# Patient Record
Sex: FEMALE | Race: WHITE | NOT HISPANIC OR LATINO | Employment: UNEMPLOYED | URBAN - METROPOLITAN AREA
[De-identification: names, ages, dates, MRNs, and addresses within clinical notes are randomized per-mention and may not be internally consistent; named-entity substitution may affect disease eponyms.]

---

## 2017-01-03 ENCOUNTER — COMMUNICATION - HEALTHEAST (OUTPATIENT)
Dept: FAMILY MEDICINE | Facility: CLINIC | Age: 52
End: 2017-01-03

## 2017-01-04 ENCOUNTER — COMMUNICATION - HEALTHEAST (OUTPATIENT)
Dept: SLEEP MEDICINE | Facility: CLINIC | Age: 52
End: 2017-01-04

## 2017-01-10 ENCOUNTER — RECORDS - HEALTHEAST (OUTPATIENT)
Dept: GENERAL RADIOLOGY | Facility: CLINIC | Age: 52
End: 2017-01-10

## 2017-01-10 ENCOUNTER — COMMUNICATION - HEALTHEAST (OUTPATIENT)
Dept: TELEHEALTH | Facility: CLINIC | Age: 52
End: 2017-01-10

## 2017-01-10 ENCOUNTER — OFFICE VISIT - HEALTHEAST (OUTPATIENT)
Dept: FAMILY MEDICINE | Facility: CLINIC | Age: 52
End: 2017-01-10

## 2017-01-10 DIAGNOSIS — M54.50 LOW BACK PAIN: ICD-10-CM

## 2017-01-11 ENCOUNTER — AMBULATORY - HEALTHEAST (OUTPATIENT)
Dept: PHYSICAL MEDICINE AND REHAB | Facility: CLINIC | Age: 52
End: 2017-01-11

## 2017-01-11 ENCOUNTER — HOSPITAL ENCOUNTER (OUTPATIENT)
Dept: PHYSICAL MEDICINE AND REHAB | Facility: CLINIC | Age: 52
Discharge: HOME | End: 2017-01-11
Attending: FAMILY MEDICINE

## 2017-01-11 DIAGNOSIS — M54.50 LOW BACK PAIN: ICD-10-CM

## 2017-01-11 DIAGNOSIS — M53.3 SACROILIAC JOINT DYSFUNCTION OF RIGHT SIDE: ICD-10-CM

## 2017-01-11 ASSESSMENT — MIFFLIN-ST. JEOR: SCORE: 1513.98

## 2017-01-12 ENCOUNTER — AMBULATORY - HEALTHEAST (OUTPATIENT)
Dept: PHYSICAL MEDICINE AND REHAB | Facility: CLINIC | Age: 52
End: 2017-01-12

## 2017-01-12 DIAGNOSIS — M51.26 LUMBAR DISC HERNIATION: ICD-10-CM

## 2017-01-12 DIAGNOSIS — M48.061 LUMBAR FORAMINAL STENOSIS: ICD-10-CM

## 2017-01-12 DIAGNOSIS — M54.41 ACUTE BILATERAL LOW BACK PAIN WITH RIGHT-SIDED SCIATICA: ICD-10-CM

## 2017-01-12 DIAGNOSIS — M99.83 OTHER BIOMECHANICAL LESIONS OF LUMBAR REGION: ICD-10-CM

## 2017-01-12 DIAGNOSIS — M48.061 LUMBAR STENOSIS: ICD-10-CM

## 2017-01-13 ENCOUNTER — COMMUNICATION - HEALTHEAST (OUTPATIENT)
Dept: PHYSICAL MEDICINE AND REHAB | Facility: CLINIC | Age: 52
End: 2017-01-13

## 2017-01-15 ENCOUNTER — COMMUNICATION - HEALTHEAST (OUTPATIENT)
Dept: PHYSICAL MEDICINE AND REHAB | Facility: CLINIC | Age: 52
End: 2017-01-15

## 2017-01-15 DIAGNOSIS — M54.50 LOW BACK PAIN: ICD-10-CM

## 2017-01-16 ENCOUNTER — COMMUNICATION - HEALTHEAST (OUTPATIENT)
Dept: FAMILY MEDICINE | Facility: CLINIC | Age: 52
End: 2017-01-16

## 2017-01-16 DIAGNOSIS — M54.50 LOW BACK PAIN: ICD-10-CM

## 2017-01-19 ENCOUNTER — HOSPITAL ENCOUNTER (OUTPATIENT)
Dept: PHYSICAL MEDICINE AND REHAB | Facility: CLINIC | Age: 52
Discharge: HOME | End: 2017-01-19
Attending: PHYSICAL MEDICINE & REHABILITATION

## 2017-01-19 DIAGNOSIS — M51.26 LUMBAR DISC HERNIATION: ICD-10-CM

## 2017-01-19 DIAGNOSIS — M54.41 ACUTE BILATERAL LOW BACK PAIN WITH RIGHT-SIDED SCIATICA: ICD-10-CM

## 2017-01-23 ENCOUNTER — HOSPITAL ENCOUNTER (OUTPATIENT)
Dept: PHYSICAL MEDICINE AND REHAB | Facility: CLINIC | Age: 52
Discharge: HOME | End: 2017-01-23
Attending: NURSE PRACTITIONER

## 2017-01-23 ENCOUNTER — COMMUNICATION - HEALTHEAST (OUTPATIENT)
Dept: PHYSICAL MEDICINE AND REHAB | Facility: CLINIC | Age: 52
End: 2017-01-23

## 2017-01-23 ENCOUNTER — COMMUNICATION - HEALTHEAST (OUTPATIENT)
Dept: FAMILY MEDICINE | Facility: CLINIC | Age: 52
End: 2017-01-23

## 2017-01-23 DIAGNOSIS — M48.061 LUMBAR FORAMINAL STENOSIS: ICD-10-CM

## 2017-01-23 DIAGNOSIS — M54.50 LOW BACK PAIN: ICD-10-CM

## 2017-01-23 DIAGNOSIS — M51.26 LUMBAR DISC HERNIATION: ICD-10-CM

## 2017-01-23 DIAGNOSIS — M48.061 LUMBAR STENOSIS: ICD-10-CM

## 2017-01-23 DIAGNOSIS — M54.41 ACUTE BILATERAL LOW BACK PAIN WITH RIGHT-SIDED SCIATICA: ICD-10-CM

## 2017-01-24 ENCOUNTER — OFFICE VISIT - HEALTHEAST (OUTPATIENT)
Dept: PHYSICAL THERAPY | Facility: REHABILITATION | Age: 52
End: 2017-01-24

## 2017-01-24 DIAGNOSIS — M54.50 ACUTE RIGHT-SIDED LOW BACK PAIN WITHOUT SCIATICA: ICD-10-CM

## 2017-01-24 DIAGNOSIS — M53.3 CHRONIC RIGHT SI JOINT PAIN: ICD-10-CM

## 2017-01-24 DIAGNOSIS — G89.29 CHRONIC RIGHT SI JOINT PAIN: ICD-10-CM

## 2017-01-24 DIAGNOSIS — M62.81 MUSCLE WEAKNESS (GENERALIZED): ICD-10-CM

## 2017-01-25 ENCOUNTER — COMMUNICATION - HEALTHEAST (OUTPATIENT)
Dept: SLEEP MEDICINE | Facility: CLINIC | Age: 52
End: 2017-01-25

## 2017-01-25 ENCOUNTER — OFFICE VISIT - HEALTHEAST (OUTPATIENT)
Dept: SLEEP MEDICINE | Facility: CLINIC | Age: 52
End: 2017-01-25

## 2017-01-25 ENCOUNTER — COMMUNICATION - HEALTHEAST (OUTPATIENT)
Dept: PHYSICAL MEDICINE AND REHAB | Facility: CLINIC | Age: 52
End: 2017-01-25

## 2017-01-25 DIAGNOSIS — G47.69 SLEEP-RELATED MOVEMENT DISORDER: ICD-10-CM

## 2017-01-25 DIAGNOSIS — G47.10 HYPERSOMNIA, UNSPECIFIED: ICD-10-CM

## 2017-01-25 DIAGNOSIS — G47.59 OTHER PARASOMNIA: ICD-10-CM

## 2017-01-25 DIAGNOSIS — G47.33 OSA (OBSTRUCTIVE SLEEP APNEA): ICD-10-CM

## 2017-01-26 ENCOUNTER — OFFICE VISIT - HEALTHEAST (OUTPATIENT)
Dept: PHYSICAL THERAPY | Facility: REHABILITATION | Age: 52
End: 2017-01-26

## 2017-01-26 DIAGNOSIS — M53.3 CHRONIC RIGHT SI JOINT PAIN: ICD-10-CM

## 2017-01-26 DIAGNOSIS — M54.50 ACUTE RIGHT-SIDED LOW BACK PAIN WITHOUT SCIATICA: ICD-10-CM

## 2017-01-26 DIAGNOSIS — M62.81 MUSCLE WEAKNESS (GENERALIZED): ICD-10-CM

## 2017-01-26 DIAGNOSIS — G89.29 CHRONIC RIGHT SI JOINT PAIN: ICD-10-CM

## 2017-01-30 ENCOUNTER — COMMUNICATION - HEALTHEAST (OUTPATIENT)
Dept: PHYSICAL MEDICINE AND REHAB | Facility: CLINIC | Age: 52
End: 2017-01-30

## 2017-01-31 ENCOUNTER — AMBULATORY - HEALTHEAST (OUTPATIENT)
Dept: SLEEP MEDICINE | Facility: CLINIC | Age: 52
End: 2017-01-31

## 2017-01-31 ENCOUNTER — OFFICE VISIT - HEALTHEAST (OUTPATIENT)
Dept: PHYSICAL THERAPY | Facility: REHABILITATION | Age: 52
End: 2017-01-31

## 2017-01-31 ENCOUNTER — COMMUNICATION - HEALTHEAST (OUTPATIENT)
Dept: FAMILY MEDICINE | Facility: CLINIC | Age: 52
End: 2017-01-31

## 2017-01-31 ENCOUNTER — AMBULATORY - HEALTHEAST (OUTPATIENT)
Dept: FAMILY MEDICINE | Facility: CLINIC | Age: 52
End: 2017-01-31

## 2017-01-31 ENCOUNTER — HOSPITAL ENCOUNTER (OUTPATIENT)
Dept: PHYSICAL MEDICINE AND REHAB | Facility: CLINIC | Age: 52
Discharge: HOME | End: 2017-01-31
Attending: NURSE PRACTITIONER

## 2017-01-31 DIAGNOSIS — M53.3 SACROILIAC JOINT DYSFUNCTION OF RIGHT SIDE: ICD-10-CM

## 2017-01-31 DIAGNOSIS — M54.50 LOW BACK PAIN: ICD-10-CM

## 2017-01-31 DIAGNOSIS — M51.26 LUMBAR DISC HERNIATION: ICD-10-CM

## 2017-01-31 DIAGNOSIS — M48.061 LUMBAR STENOSIS: ICD-10-CM

## 2017-01-31 DIAGNOSIS — M48.061 LUMBAR FORAMINAL STENOSIS: ICD-10-CM

## 2017-01-31 DIAGNOSIS — M54.41 ACUTE BILATERAL LOW BACK PAIN WITH RIGHT-SIDED SCIATICA: ICD-10-CM

## 2017-01-31 DIAGNOSIS — M53.3 CHRONIC RIGHT SI JOINT PAIN: ICD-10-CM

## 2017-01-31 DIAGNOSIS — M62.81 MUSCLE WEAKNESS (GENERALIZED): ICD-10-CM

## 2017-01-31 DIAGNOSIS — G89.29 CHRONIC RIGHT SI JOINT PAIN: ICD-10-CM

## 2017-01-31 DIAGNOSIS — M54.50 ACUTE RIGHT-SIDED LOW BACK PAIN WITHOUT SCIATICA: ICD-10-CM

## 2017-02-01 ENCOUNTER — COMMUNICATION - HEALTHEAST (OUTPATIENT)
Dept: PHYSICAL MEDICINE AND REHAB | Facility: CLINIC | Age: 52
End: 2017-02-01

## 2017-02-01 ENCOUNTER — OFFICE VISIT - HEALTHEAST (OUTPATIENT)
Dept: FAMILY MEDICINE | Facility: CLINIC | Age: 52
End: 2017-02-01

## 2017-02-01 DIAGNOSIS — M53.3 SACROILIAC JOINT DYSFUNCTION OF RIGHT SIDE: ICD-10-CM

## 2017-02-01 DIAGNOSIS — M51.26 LUMBAR DISC HERNIATION: ICD-10-CM

## 2017-02-01 DIAGNOSIS — M48.061 LUMBAR FORAMINAL STENOSIS: ICD-10-CM

## 2017-02-01 DIAGNOSIS — M54.31 SCIATICA OF RIGHT SIDE: ICD-10-CM

## 2017-02-01 DIAGNOSIS — G43.909 MIGRAINE: ICD-10-CM

## 2017-02-02 ENCOUNTER — RECORDS - HEALTHEAST (OUTPATIENT)
Dept: ADMINISTRATIVE | Facility: OTHER | Age: 52
End: 2017-02-02

## 2017-02-02 ENCOUNTER — COMMUNICATION - HEALTHEAST (OUTPATIENT)
Dept: PHYSICAL MEDICINE AND REHAB | Facility: CLINIC | Age: 52
End: 2017-02-02

## 2017-02-04 ENCOUNTER — RECORDS - HEALTHEAST (OUTPATIENT)
Dept: ADMINISTRATIVE | Facility: OTHER | Age: 52
End: 2017-02-04

## 2017-02-08 ENCOUNTER — HOSPITAL ENCOUNTER (OUTPATIENT)
Dept: PHYSICAL MEDICINE AND REHAB | Facility: CLINIC | Age: 52
Discharge: HOME | End: 2017-02-08
Attending: NURSE PRACTITIONER

## 2017-02-08 DIAGNOSIS — M54.16 LUMBAR RADICULOPATHY, RIGHT: ICD-10-CM

## 2017-02-08 DIAGNOSIS — Z98.890 HISTORY OF LUMBAR SURGERY: ICD-10-CM

## 2017-02-09 ENCOUNTER — COMMUNICATION - HEALTHEAST (OUTPATIENT)
Dept: FAMILY MEDICINE | Facility: CLINIC | Age: 52
End: 2017-02-09

## 2017-02-09 DIAGNOSIS — J45.909 ASTHMA: ICD-10-CM

## 2017-02-14 ENCOUNTER — OFFICE VISIT - HEALTHEAST (OUTPATIENT)
Dept: PHYSICAL THERAPY | Facility: REHABILITATION | Age: 52
End: 2017-02-14

## 2017-02-14 DIAGNOSIS — G89.29 CHRONIC RIGHT SI JOINT PAIN: ICD-10-CM

## 2017-02-14 DIAGNOSIS — M54.50 ACUTE RIGHT-SIDED LOW BACK PAIN WITHOUT SCIATICA: ICD-10-CM

## 2017-02-14 DIAGNOSIS — M62.81 MUSCLE WEAKNESS (GENERALIZED): ICD-10-CM

## 2017-02-14 DIAGNOSIS — M53.3 CHRONIC RIGHT SI JOINT PAIN: ICD-10-CM

## 2017-02-15 ENCOUNTER — COMMUNICATION - HEALTHEAST (OUTPATIENT)
Dept: FAMILY MEDICINE | Facility: CLINIC | Age: 52
End: 2017-02-15

## 2017-02-15 DIAGNOSIS — M54.50 LOW BACK PAIN: ICD-10-CM

## 2017-02-17 ENCOUNTER — COMMUNICATION - HEALTHEAST (OUTPATIENT)
Dept: FAMILY MEDICINE | Facility: CLINIC | Age: 52
End: 2017-02-17

## 2017-02-21 ENCOUNTER — COMMUNICATION - HEALTHEAST (OUTPATIENT)
Dept: PHYSICAL THERAPY | Facility: REHABILITATION | Age: 52
End: 2017-02-21

## 2017-02-23 ENCOUNTER — COMMUNICATION - HEALTHEAST (OUTPATIENT)
Dept: PHYSICAL THERAPY | Facility: REHABILITATION | Age: 52
End: 2017-02-23

## 2017-02-24 ENCOUNTER — COMMUNICATION - HEALTHEAST (OUTPATIENT)
Dept: FAMILY MEDICINE | Facility: CLINIC | Age: 52
End: 2017-02-24

## 2017-02-24 DIAGNOSIS — F33.9 MAJOR DEPRESSION, RECURRENT (H): ICD-10-CM

## 2017-03-06 ENCOUNTER — COMMUNICATION - HEALTHEAST (OUTPATIENT)
Dept: FAMILY MEDICINE | Facility: CLINIC | Age: 52
End: 2017-03-06

## 2017-03-07 ENCOUNTER — COMMUNICATION - HEALTHEAST (OUTPATIENT)
Dept: FAMILY MEDICINE | Facility: CLINIC | Age: 52
End: 2017-03-07

## 2017-03-07 ENCOUNTER — OFFICE VISIT - HEALTHEAST (OUTPATIENT)
Dept: PHYSICAL THERAPY | Facility: REHABILITATION | Age: 52
End: 2017-03-07

## 2017-03-07 DIAGNOSIS — M62.81 MUSCLE WEAKNESS (GENERALIZED): ICD-10-CM

## 2017-03-07 DIAGNOSIS — M54.50 LOW BACK PAIN: ICD-10-CM

## 2017-03-07 DIAGNOSIS — M53.3 CHRONIC RIGHT SI JOINT PAIN: ICD-10-CM

## 2017-03-07 DIAGNOSIS — M54.50 ACUTE RIGHT-SIDED LOW BACK PAIN WITHOUT SCIATICA: ICD-10-CM

## 2017-03-07 DIAGNOSIS — G89.29 CHRONIC RIGHT SI JOINT PAIN: ICD-10-CM

## 2017-03-09 ENCOUNTER — OFFICE VISIT - HEALTHEAST (OUTPATIENT)
Dept: PHYSICAL THERAPY | Facility: REHABILITATION | Age: 52
End: 2017-03-09

## 2017-03-09 DIAGNOSIS — G89.29 CHRONIC RIGHT SI JOINT PAIN: ICD-10-CM

## 2017-03-09 DIAGNOSIS — M62.81 MUSCLE WEAKNESS (GENERALIZED): ICD-10-CM

## 2017-03-09 DIAGNOSIS — M53.3 CHRONIC RIGHT SI JOINT PAIN: ICD-10-CM

## 2017-03-09 DIAGNOSIS — M54.50 ACUTE RIGHT-SIDED LOW BACK PAIN WITHOUT SCIATICA: ICD-10-CM

## 2017-03-14 ENCOUNTER — OFFICE VISIT - HEALTHEAST (OUTPATIENT)
Dept: PHYSICAL THERAPY | Facility: REHABILITATION | Age: 52
End: 2017-03-14

## 2017-03-14 DIAGNOSIS — M53.3 CHRONIC RIGHT SI JOINT PAIN: ICD-10-CM

## 2017-03-14 DIAGNOSIS — G89.29 CHRONIC RIGHT SI JOINT PAIN: ICD-10-CM

## 2017-03-14 DIAGNOSIS — M54.50 ACUTE RIGHT-SIDED LOW BACK PAIN WITHOUT SCIATICA: ICD-10-CM

## 2017-03-14 DIAGNOSIS — M62.81 MUSCLE WEAKNESS (GENERALIZED): ICD-10-CM

## 2017-03-16 ENCOUNTER — OFFICE VISIT - HEALTHEAST (OUTPATIENT)
Dept: PHYSICAL THERAPY | Facility: REHABILITATION | Age: 52
End: 2017-03-16

## 2017-03-16 DIAGNOSIS — M62.81 MUSCLE WEAKNESS (GENERALIZED): ICD-10-CM

## 2017-03-16 DIAGNOSIS — M53.3 CHRONIC RIGHT SI JOINT PAIN: ICD-10-CM

## 2017-03-16 DIAGNOSIS — G89.29 CHRONIC RIGHT SI JOINT PAIN: ICD-10-CM

## 2017-03-16 DIAGNOSIS — M54.50 ACUTE RIGHT-SIDED LOW BACK PAIN WITHOUT SCIATICA: ICD-10-CM

## 2017-03-23 ENCOUNTER — OFFICE VISIT - HEALTHEAST (OUTPATIENT)
Dept: PHYSICAL THERAPY | Facility: REHABILITATION | Age: 52
End: 2017-03-23

## 2017-03-23 DIAGNOSIS — M62.81 MUSCLE WEAKNESS (GENERALIZED): ICD-10-CM

## 2017-03-23 DIAGNOSIS — G89.29 CHRONIC RIGHT SI JOINT PAIN: ICD-10-CM

## 2017-03-23 DIAGNOSIS — M53.3 CHRONIC RIGHT SI JOINT PAIN: ICD-10-CM

## 2017-03-23 DIAGNOSIS — M54.50 ACUTE RIGHT-SIDED LOW BACK PAIN WITHOUT SCIATICA: ICD-10-CM

## 2017-03-30 ENCOUNTER — OFFICE VISIT - HEALTHEAST (OUTPATIENT)
Dept: PHYSICAL THERAPY | Facility: REHABILITATION | Age: 52
End: 2017-03-30

## 2017-03-30 DIAGNOSIS — M62.81 MUSCLE WEAKNESS (GENERALIZED): ICD-10-CM

## 2017-03-30 DIAGNOSIS — M53.3 CHRONIC RIGHT SI JOINT PAIN: ICD-10-CM

## 2017-03-30 DIAGNOSIS — M54.50 ACUTE RIGHT-SIDED LOW BACK PAIN WITHOUT SCIATICA: ICD-10-CM

## 2017-03-30 DIAGNOSIS — G89.29 CHRONIC RIGHT SI JOINT PAIN: ICD-10-CM

## 2017-04-11 ENCOUNTER — OFFICE VISIT - HEALTHEAST (OUTPATIENT)
Dept: SLEEP MEDICINE | Facility: CLINIC | Age: 52
End: 2017-04-11

## 2017-04-11 DIAGNOSIS — G47.33 OSA ON CPAP: ICD-10-CM

## 2017-04-11 DIAGNOSIS — G47.8 SLEEP DYSFUNCTION WITH SLEEP STAGE DISTURBANCE: ICD-10-CM

## 2017-04-11 DIAGNOSIS — G47.10 HYPERSOMNIA, UNSPECIFIED: ICD-10-CM

## 2017-04-11 ASSESSMENT — MIFFLIN-ST. JEOR: SCORE: 1478.6

## 2017-04-19 ENCOUNTER — OFFICE VISIT - HEALTHEAST (OUTPATIENT)
Dept: FAMILY MEDICINE | Facility: CLINIC | Age: 52
End: 2017-04-19

## 2017-04-19 DIAGNOSIS — E78.5 HYPERLIPIDEMIA: ICD-10-CM

## 2017-04-19 DIAGNOSIS — K13.0 ANGULAR CHEILITIS: ICD-10-CM

## 2017-04-19 DIAGNOSIS — G47.33 OSA (OBSTRUCTIVE SLEEP APNEA): ICD-10-CM

## 2017-04-19 DIAGNOSIS — E03.9 HYPOTHYROID: ICD-10-CM

## 2017-04-20 ENCOUNTER — AMBULATORY - HEALTHEAST (OUTPATIENT)
Dept: LAB | Facility: CLINIC | Age: 52
End: 2017-04-20

## 2017-04-20 DIAGNOSIS — E03.9 HYPOTHYROID: ICD-10-CM

## 2017-04-20 DIAGNOSIS — E78.5 HYPERLIPIDEMIA: ICD-10-CM

## 2017-04-20 LAB
CHOLEST SERPL-MCNC: 226 MG/DL
FASTING STATUS PATIENT QL REPORTED: YES
HDLC SERPL-MCNC: 26 MG/DL
LDLC SERPL CALC-MCNC: 90 MG/DL
LDLC SERPL CALC-MCNC: ABNORMAL MG/DL
TRIGL SERPL-MCNC: 741 MG/DL

## 2017-04-21 ENCOUNTER — COMMUNICATION - HEALTHEAST (OUTPATIENT)
Dept: FAMILY MEDICINE | Facility: CLINIC | Age: 52
End: 2017-04-21

## 2017-05-02 ENCOUNTER — RECORDS - HEALTHEAST (OUTPATIENT)
Dept: ADMINISTRATIVE | Facility: OTHER | Age: 52
End: 2017-05-02

## 2017-05-11 ENCOUNTER — COMMUNICATION - HEALTHEAST (OUTPATIENT)
Dept: FAMILY MEDICINE | Facility: CLINIC | Age: 52
End: 2017-05-11

## 2017-05-11 DIAGNOSIS — J45.909 ASTHMA: ICD-10-CM

## 2017-06-15 ENCOUNTER — COMMUNICATION - HEALTHEAST (OUTPATIENT)
Dept: FAMILY MEDICINE | Facility: CLINIC | Age: 52
End: 2017-06-15

## 2017-06-27 ENCOUNTER — COMMUNICATION - HEALTHEAST (OUTPATIENT)
Dept: FAMILY MEDICINE | Facility: CLINIC | Age: 52
End: 2017-06-27

## 2017-06-27 DIAGNOSIS — J45.909 ASTHMA: ICD-10-CM

## 2017-07-07 ENCOUNTER — COMMUNICATION - HEALTHEAST (OUTPATIENT)
Dept: FAMILY MEDICINE | Facility: CLINIC | Age: 52
End: 2017-07-07

## 2017-07-07 DIAGNOSIS — J45.909 ASTHMA: ICD-10-CM

## 2017-08-15 ENCOUNTER — COMMUNICATION - HEALTHEAST (OUTPATIENT)
Dept: FAMILY MEDICINE | Facility: CLINIC | Age: 52
End: 2017-08-15

## 2017-08-27 ENCOUNTER — COMMUNICATION - HEALTHEAST (OUTPATIENT)
Dept: FAMILY MEDICINE | Facility: CLINIC | Age: 52
End: 2017-08-27

## 2017-08-27 DIAGNOSIS — J45.909 ASTHMA: ICD-10-CM

## 2017-09-08 ENCOUNTER — COMMUNICATION - HEALTHEAST (OUTPATIENT)
Dept: FAMILY MEDICINE | Facility: CLINIC | Age: 52
End: 2017-09-08

## 2017-09-12 ENCOUNTER — COMMUNICATION - HEALTHEAST (OUTPATIENT)
Dept: FAMILY MEDICINE | Facility: CLINIC | Age: 52
End: 2017-09-12

## 2017-09-21 ENCOUNTER — HOSPITAL ENCOUNTER (OUTPATIENT)
Dept: MAMMOGRAPHY | Facility: CLINIC | Age: 52
Discharge: HOME | End: 2017-09-21
Attending: FAMILY MEDICINE

## 2017-09-21 DIAGNOSIS — Z12.31 VISIT FOR SCREENING MAMMOGRAM: ICD-10-CM

## 2017-09-22 ENCOUNTER — COMMUNICATION - HEALTHEAST (OUTPATIENT)
Dept: FAMILY MEDICINE | Facility: CLINIC | Age: 52
End: 2017-09-22

## 2017-09-25 ENCOUNTER — COMMUNICATION - HEALTHEAST (OUTPATIENT)
Dept: FAMILY MEDICINE | Facility: CLINIC | Age: 52
End: 2017-09-25

## 2017-10-24 ENCOUNTER — OFFICE VISIT - HEALTHEAST (OUTPATIENT)
Dept: FAMILY MEDICINE | Facility: CLINIC | Age: 52
End: 2017-10-24

## 2017-10-24 DIAGNOSIS — E78.5 HYPERLIPIDEMIA: ICD-10-CM

## 2017-10-24 DIAGNOSIS — M25.562 LEFT KNEE PAIN: ICD-10-CM

## 2017-10-24 DIAGNOSIS — Z12.11 COLON CANCER SCREENING: ICD-10-CM

## 2017-10-24 DIAGNOSIS — F32.A DEPRESSION: ICD-10-CM

## 2017-10-24 DIAGNOSIS — R79.89 ABNORMAL THYROID BLOOD TEST: ICD-10-CM

## 2017-10-26 ENCOUNTER — AMBULATORY - HEALTHEAST (OUTPATIENT)
Dept: LAB | Facility: CLINIC | Age: 52
End: 2017-10-26

## 2017-10-26 DIAGNOSIS — R79.89 ABNORMAL THYROID BLOOD TEST: ICD-10-CM

## 2017-10-26 DIAGNOSIS — E78.5 HYPERLIPIDEMIA: ICD-10-CM

## 2017-10-26 LAB
CHOLEST SERPL-MCNC: 205 MG/DL
FASTING STATUS PATIENT QL REPORTED: YES
HDLC SERPL-MCNC: 35 MG/DL
LDLC SERPL CALC-MCNC: 113 MG/DL
TRIGL SERPL-MCNC: 285 MG/DL

## 2017-10-30 ENCOUNTER — RECORDS - HEALTHEAST (OUTPATIENT)
Dept: ADMINISTRATIVE | Facility: OTHER | Age: 52
End: 2017-10-30

## 2017-10-31 ENCOUNTER — COMMUNICATION - HEALTHEAST (OUTPATIENT)
Dept: FAMILY MEDICINE | Facility: CLINIC | Age: 52
End: 2017-10-31

## 2017-11-13 ENCOUNTER — RECORDS - HEALTHEAST (OUTPATIENT)
Dept: ADMINISTRATIVE | Facility: OTHER | Age: 52
End: 2017-11-13

## 2017-11-16 ENCOUNTER — COMMUNICATION - HEALTHEAST (OUTPATIENT)
Dept: FAMILY MEDICINE | Facility: CLINIC | Age: 52
End: 2017-11-16

## 2017-11-20 ENCOUNTER — COMMUNICATION - HEALTHEAST (OUTPATIENT)
Dept: FAMILY MEDICINE | Facility: CLINIC | Age: 52
End: 2017-11-20

## 2017-11-30 ENCOUNTER — OFFICE VISIT - HEALTHEAST (OUTPATIENT)
Dept: FAMILY MEDICINE | Facility: CLINIC | Age: 52
End: 2017-11-30

## 2017-11-30 DIAGNOSIS — G47.33 OSA (OBSTRUCTIVE SLEEP APNEA): ICD-10-CM

## 2017-11-30 DIAGNOSIS — F17.200 TOBACCO USE DISORDER: ICD-10-CM

## 2017-11-30 DIAGNOSIS — E78.5 HYPERLIPIDEMIA: ICD-10-CM

## 2017-11-30 DIAGNOSIS — Z01.818 PRE-OP EXAM: ICD-10-CM

## 2017-11-30 DIAGNOSIS — L91.8 SKIN TAG: ICD-10-CM

## 2017-11-30 DIAGNOSIS — E03.9 HYPOTHYROID: ICD-10-CM

## 2017-11-30 ASSESSMENT — MIFFLIN-ST. JEOR: SCORE: 1514.21

## 2017-12-01 LAB
ATRIAL RATE - MUSE: 66 BPM
DIASTOLIC BLOOD PRESSURE - MUSE: NORMAL MMHG
INTERPRETATION ECG - MUSE: NORMAL
P AXIS - MUSE: 50 DEGREES
PR INTERVAL - MUSE: 168 MS
QRS DURATION - MUSE: 86 MS
QT - MUSE: 410 MS
QTC - MUSE: 429 MS
R AXIS - MUSE: 36 DEGREES
SYSTOLIC BLOOD PRESSURE - MUSE: NORMAL MMHG
T AXIS - MUSE: 44 DEGREES
VENTRICULAR RATE- MUSE: 66 BPM

## 2017-12-05 ENCOUNTER — COMMUNICATION - HEALTHEAST (OUTPATIENT)
Dept: FAMILY MEDICINE | Facility: CLINIC | Age: 52
End: 2017-12-05

## 2017-12-11 ENCOUNTER — RECORDS - HEALTHEAST (OUTPATIENT)
Dept: ADMINISTRATIVE | Facility: OTHER | Age: 52
End: 2017-12-11

## 2017-12-23 ENCOUNTER — COMMUNICATION - HEALTHEAST (OUTPATIENT)
Dept: FAMILY MEDICINE | Facility: CLINIC | Age: 52
End: 2017-12-23

## 2017-12-23 DIAGNOSIS — G43.909 MIGRAINE: ICD-10-CM

## 2017-12-28 ENCOUNTER — COMMUNICATION - HEALTHEAST (OUTPATIENT)
Dept: FAMILY MEDICINE | Facility: CLINIC | Age: 52
End: 2017-12-28

## 2017-12-28 DIAGNOSIS — G43.909 MIGRAINE: ICD-10-CM

## 2018-01-04 ENCOUNTER — COMMUNICATION - HEALTHEAST (OUTPATIENT)
Dept: FAMILY MEDICINE | Facility: CLINIC | Age: 53
End: 2018-01-04

## 2018-01-04 DIAGNOSIS — E03.9 HYPOTHYROID: ICD-10-CM

## 2018-02-12 ENCOUNTER — RECORDS - HEALTHEAST (OUTPATIENT)
Dept: ADMINISTRATIVE | Facility: OTHER | Age: 53
End: 2018-02-12

## 2018-03-02 ENCOUNTER — COMMUNICATION - HEALTHEAST (OUTPATIENT)
Dept: FAMILY MEDICINE | Facility: CLINIC | Age: 53
End: 2018-03-02

## 2018-03-08 ENCOUNTER — RECORDS - HEALTHEAST (OUTPATIENT)
Dept: ADMINISTRATIVE | Facility: OTHER | Age: 53
End: 2018-03-08

## 2018-03-09 ENCOUNTER — RECORDS - HEALTHEAST (OUTPATIENT)
Dept: ADMINISTRATIVE | Facility: OTHER | Age: 53
End: 2018-03-09

## 2018-04-08 ENCOUNTER — OFFICE VISIT - HEALTHEAST (OUTPATIENT)
Dept: FAMILY MEDICINE | Facility: CLINIC | Age: 53
End: 2018-04-08

## 2018-04-08 DIAGNOSIS — R39.9 UTI SYMPTOMS: ICD-10-CM

## 2018-04-08 DIAGNOSIS — R39.89 SUSPECTED UTI: ICD-10-CM

## 2018-04-08 LAB
ALBUMIN UR-MCNC: ABNORMAL MG/DL
APPEARANCE UR: ABNORMAL
BACTERIA #/AREA URNS HPF: ABNORMAL HPF
BILIRUB UR QL STRIP: NEGATIVE
COLOR UR AUTO: YELLOW
GLUCOSE UR STRIP-MCNC: NEGATIVE MG/DL
HGB UR QL STRIP: ABNORMAL
KETONES UR STRIP-MCNC: NEGATIVE MG/DL
LEUKOCYTE ESTERASE UR QL STRIP: ABNORMAL
NITRATE UR QL: NEGATIVE
PH UR STRIP: 5.5 [PH] (ref 5–8)
RBC #/AREA URNS AUTO: ABNORMAL HPF
SP GR UR STRIP: 1.02 (ref 1–1.03)
SQUAMOUS #/AREA URNS AUTO: ABNORMAL LPF
UROBILINOGEN UR STRIP-ACNC: ABNORMAL
WBC #/AREA URNS AUTO: >100 HPF

## 2018-04-10 ENCOUNTER — COMMUNICATION - HEALTHEAST (OUTPATIENT)
Dept: FAMILY MEDICINE | Facility: CLINIC | Age: 53
End: 2018-04-10

## 2018-04-10 DIAGNOSIS — N39.0 UTI (URINARY TRACT INFECTION): ICD-10-CM

## 2018-04-10 LAB — BACTERIA SPEC CULT: ABNORMAL

## 2018-04-12 ENCOUNTER — COMMUNICATION - HEALTHEAST (OUTPATIENT)
Dept: FAMILY MEDICINE | Facility: CLINIC | Age: 53
End: 2018-04-12

## 2018-04-12 DIAGNOSIS — E03.9 HYPOTHYROID: ICD-10-CM

## 2018-04-19 ENCOUNTER — OFFICE VISIT - HEALTHEAST (OUTPATIENT)
Dept: FAMILY MEDICINE | Facility: CLINIC | Age: 53
End: 2018-04-19

## 2018-04-19 DIAGNOSIS — L03.90 CELLULITIS: ICD-10-CM

## 2018-04-19 DIAGNOSIS — E03.9 HYPOTHYROID: ICD-10-CM

## 2018-04-19 LAB
T3 SERPL-MCNC: 91 NG/DL (ref 45–175)
T4 FREE SERPL-MCNC: 1.1 NG/DL (ref 0.7–1.8)
TSH SERPL DL<=0.005 MIU/L-ACNC: 0.49 UIU/ML (ref 0.3–5)

## 2018-04-20 ENCOUNTER — COMMUNICATION - HEALTHEAST (OUTPATIENT)
Dept: FAMILY MEDICINE | Facility: CLINIC | Age: 53
End: 2018-04-20

## 2018-04-24 ENCOUNTER — AMBULATORY - HEALTHEAST (OUTPATIENT)
Dept: FAMILY MEDICINE | Facility: CLINIC | Age: 53
End: 2018-04-24

## 2018-04-24 DIAGNOSIS — H69.90 EUSTACHIAN TUBE DYSFUNCTION: ICD-10-CM

## 2018-04-24 DIAGNOSIS — F17.200 TOBACCO USE DISORDER: ICD-10-CM

## 2018-04-24 DIAGNOSIS — L02.91 ABSCESS: ICD-10-CM

## 2018-04-24 DIAGNOSIS — H61.20 CERUMEN IMPACTION: ICD-10-CM

## 2018-04-25 ENCOUNTER — COMMUNICATION - HEALTHEAST (OUTPATIENT)
Dept: FAMILY MEDICINE | Facility: CLINIC | Age: 53
End: 2018-04-25

## 2018-04-26 ENCOUNTER — COMMUNICATION - HEALTHEAST (OUTPATIENT)
Dept: FAMILY MEDICINE | Facility: CLINIC | Age: 53
End: 2018-04-26

## 2018-04-26 LAB — BACTERIA SPEC CULT: ABNORMAL

## 2018-04-27 ENCOUNTER — AMBULATORY - HEALTHEAST (OUTPATIENT)
Dept: FAMILY MEDICINE | Facility: CLINIC | Age: 53
End: 2018-04-27

## 2018-04-27 DIAGNOSIS — L02.213 ABSCESS OF CHEST WALL: ICD-10-CM

## 2018-04-27 ASSESSMENT — MIFFLIN-ST. JEOR: SCORE: 1500.6

## 2018-05-01 ENCOUNTER — COMMUNICATION - HEALTHEAST (OUTPATIENT)
Dept: FAMILY MEDICINE | Facility: CLINIC | Age: 53
End: 2018-05-01

## 2018-05-01 DIAGNOSIS — G43.909 MIGRAINE: ICD-10-CM

## 2018-05-22 ENCOUNTER — COMMUNICATION - HEALTHEAST (OUTPATIENT)
Dept: FAMILY MEDICINE | Facility: CLINIC | Age: 53
End: 2018-05-22

## 2018-05-22 DIAGNOSIS — G43.909 MIGRAINE: ICD-10-CM

## 2018-05-30 ENCOUNTER — COMMUNICATION - HEALTHEAST (OUTPATIENT)
Dept: FAMILY MEDICINE | Facility: CLINIC | Age: 53
End: 2018-05-30

## 2018-07-11 ENCOUNTER — OFFICE VISIT - HEALTHEAST (OUTPATIENT)
Dept: FAMILY MEDICINE | Facility: CLINIC | Age: 53
End: 2018-07-11

## 2018-07-11 ENCOUNTER — RECORDS - HEALTHEAST (OUTPATIENT)
Dept: ADMINISTRATIVE | Facility: OTHER | Age: 53
End: 2018-07-11

## 2018-07-11 DIAGNOSIS — M54.50 ACUTE RIGHT-SIDED LOW BACK PAIN WITHOUT SCIATICA: ICD-10-CM

## 2018-07-11 DIAGNOSIS — M51.26 LUMBAR DISC HERNIATION: ICD-10-CM

## 2018-07-11 DIAGNOSIS — M54.31 SCIATICA OF RIGHT SIDE: ICD-10-CM

## 2018-07-11 DIAGNOSIS — M54.50 LOW BACK PAIN: ICD-10-CM

## 2018-07-18 ENCOUNTER — COMMUNICATION - HEALTHEAST (OUTPATIENT)
Dept: FAMILY MEDICINE | Facility: CLINIC | Age: 53
End: 2018-07-18

## 2018-08-09 ENCOUNTER — COMMUNICATION - HEALTHEAST (OUTPATIENT)
Dept: FAMILY MEDICINE | Facility: CLINIC | Age: 53
End: 2018-08-09

## 2018-08-10 ENCOUNTER — RECORDS - HEALTHEAST (OUTPATIENT)
Dept: ADMINISTRATIVE | Facility: OTHER | Age: 53
End: 2018-08-10

## 2018-09-14 ENCOUNTER — COMMUNICATION - HEALTHEAST (OUTPATIENT)
Dept: FAMILY MEDICINE | Facility: CLINIC | Age: 53
End: 2018-09-14

## 2018-10-11 ENCOUNTER — COMMUNICATION - HEALTHEAST (OUTPATIENT)
Dept: FAMILY MEDICINE | Facility: CLINIC | Age: 53
End: 2018-10-11

## 2018-10-11 DIAGNOSIS — M54.50 LOW BACK PAIN: ICD-10-CM

## 2018-10-30 ENCOUNTER — OFFICE VISIT - HEALTHEAST (OUTPATIENT)
Dept: FAMILY MEDICINE | Facility: CLINIC | Age: 53
End: 2018-10-30

## 2018-10-30 DIAGNOSIS — G56.02 CARPAL TUNNEL SYNDROME OF LEFT WRIST: ICD-10-CM

## 2018-10-30 DIAGNOSIS — Z12.11 SCREENING FOR COLON CANCER: ICD-10-CM

## 2018-10-30 DIAGNOSIS — F17.200 TOBACCO USE DISORDER: ICD-10-CM

## 2018-11-06 ENCOUNTER — COMMUNICATION - HEALTHEAST (OUTPATIENT)
Dept: FAMILY MEDICINE | Facility: CLINIC | Age: 53
End: 2018-11-06

## 2018-11-06 DIAGNOSIS — F32.5 MAJOR DEPRESSION IN REMISSION (H): ICD-10-CM

## 2018-11-06 DIAGNOSIS — E78.5 HYPERLIPIDEMIA: ICD-10-CM

## 2018-11-15 ENCOUNTER — HOSPITAL ENCOUNTER (OUTPATIENT)
Dept: PHYSICAL MEDICINE AND REHAB | Facility: CLINIC | Age: 53
Discharge: HOME | End: 2018-11-15
Attending: FAMILY MEDICINE

## 2018-11-15 DIAGNOSIS — G56.02 CARPAL TUNNEL SYNDROME OF LEFT WRIST: ICD-10-CM

## 2018-11-20 ENCOUNTER — COMMUNICATION - HEALTHEAST (OUTPATIENT)
Dept: FAMILY MEDICINE | Facility: CLINIC | Age: 53
End: 2018-11-20

## 2018-11-20 DIAGNOSIS — G56.02 CARPAL TUNNEL SYNDROME OF LEFT WRIST: ICD-10-CM

## 2018-12-03 ENCOUNTER — RECORDS - HEALTHEAST (OUTPATIENT)
Dept: ADMINISTRATIVE | Facility: OTHER | Age: 53
End: 2018-12-03

## 2018-12-04 ENCOUNTER — COMMUNICATION - HEALTHEAST (OUTPATIENT)
Dept: FAMILY MEDICINE | Facility: CLINIC | Age: 53
End: 2018-12-04

## 2018-12-04 ENCOUNTER — OFFICE VISIT - HEALTHEAST (OUTPATIENT)
Dept: FAMILY MEDICINE | Facility: CLINIC | Age: 53
End: 2018-12-04

## 2018-12-04 DIAGNOSIS — G89.11 ACUTE LOW BACK PAIN DUE TO TRAUMA: ICD-10-CM

## 2018-12-04 DIAGNOSIS — M54.50 ACUTE LOW BACK PAIN DUE TO TRAUMA: ICD-10-CM

## 2019-01-10 ENCOUNTER — RECORDS - HEALTHEAST (OUTPATIENT)
Dept: ADMINISTRATIVE | Facility: OTHER | Age: 54
End: 2019-01-10

## 2019-01-21 ENCOUNTER — RECORDS - HEALTHEAST (OUTPATIENT)
Dept: ADMINISTRATIVE | Facility: OTHER | Age: 54
End: 2019-01-21

## 2019-01-29 ENCOUNTER — OFFICE VISIT - HEALTHEAST (OUTPATIENT)
Dept: FAMILY MEDICINE | Facility: CLINIC | Age: 54
End: 2019-01-29

## 2019-01-29 DIAGNOSIS — M54.50 ACUTE BILATERAL LOW BACK PAIN WITHOUT SCIATICA: ICD-10-CM

## 2019-02-25 ENCOUNTER — RECORDS - HEALTHEAST (OUTPATIENT)
Dept: ADMINISTRATIVE | Facility: OTHER | Age: 54
End: 2019-02-25

## 2019-02-27 ENCOUNTER — OFFICE VISIT - HEALTHEAST (OUTPATIENT)
Dept: FAMILY MEDICINE | Facility: CLINIC | Age: 54
End: 2019-02-27

## 2019-02-27 DIAGNOSIS — E78.5 HYPERLIPIDEMIA, UNSPECIFIED HYPERLIPIDEMIA TYPE: ICD-10-CM
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
## 2019-02-27 DIAGNOSIS — E03.9 HYPOTHYROIDISM, UNSPECIFIED TYPE: ICD-10-CM

## 2019-02-27 DIAGNOSIS — F17.200 TOBACCO USE DISORDER: ICD-10-CM

## 2019-02-27 ASSESSMENT — MIFFLIN-ST. JEOR: SCORE: 1541.42

## 2019-03-04 ENCOUNTER — COMMUNICATION - HEALTHEAST (OUTPATIENT)
Dept: FAMILY MEDICINE | Facility: CLINIC | Age: 54
End: 2019-03-04

## 2019-03-06 ENCOUNTER — COMMUNICATION - HEALTHEAST (OUTPATIENT)
Dept: FAMILY MEDICINE | Facility: CLINIC | Age: 54
End: 2019-03-06

## 2019-04-15 ENCOUNTER — OFFICE VISIT - HEALTHEAST (OUTPATIENT)
Dept: FAMILY MEDICINE | Facility: CLINIC | Age: 54
End: 2019-04-15

## 2019-04-15 DIAGNOSIS — E03.9 ACQUIRED HYPOTHYROIDISM: ICD-10-CM

## 2019-04-15 DIAGNOSIS — J01.10 ACUTE NON-RECURRENT FRONTAL SINUSITIS: ICD-10-CM

## 2019-04-15 DIAGNOSIS — E78.5 HYPERLIPIDEMIA: ICD-10-CM

## 2019-04-15 DIAGNOSIS — J45.20 MILD INTERMITTENT ASTHMA WITHOUT COMPLICATION: ICD-10-CM

## 2019-04-15 DIAGNOSIS — F17.200 TOBACCO USE DISORDER: ICD-10-CM

## 2019-04-15 DIAGNOSIS — E66.01 MORBID OBESITY (H): ICD-10-CM

## 2019-04-15 DIAGNOSIS — F33.0 MILD EPISODE OF RECURRENT MAJOR DEPRESSIVE DISORDER (H): ICD-10-CM

## 2019-04-15 LAB
FLUAV AG SPEC QL IA: NORMAL
FLUBV AG SPEC QL IA: NORMAL

## 2019-05-04 ENCOUNTER — COMMUNICATION - HEALTHEAST (OUTPATIENT)
Dept: FAMILY MEDICINE | Facility: CLINIC | Age: 54
End: 2019-05-04

## 2019-05-04 DIAGNOSIS — M25.50 PAIN IN JOINT, MULTIPLE SITES: ICD-10-CM

## 2019-05-13 ENCOUNTER — COMMUNICATION - HEALTHEAST (OUTPATIENT)
Dept: FAMILY MEDICINE | Facility: CLINIC | Age: 54
End: 2019-05-13

## 2019-05-13 DIAGNOSIS — J01.91 ACUTE RECURRENT SINUSITIS, UNSPECIFIED LOCATION: ICD-10-CM

## 2019-05-24 ENCOUNTER — OFFICE VISIT - HEALTHEAST (OUTPATIENT)
Dept: OTOLARYNGOLOGY | Facility: CLINIC | Age: 54
End: 2019-05-24

## 2019-05-24 ENCOUNTER — COMMUNICATION - HEALTHEAST (OUTPATIENT)
Dept: FAMILY MEDICINE | Facility: CLINIC | Age: 54
End: 2019-05-24

## 2019-05-24 DIAGNOSIS — L29.9 ITCHING OF EAR: ICD-10-CM

## 2019-05-24 DIAGNOSIS — J32.9 CHRONIC SINUSITIS, UNSPECIFIED LOCATION: ICD-10-CM

## 2019-05-24 DIAGNOSIS — R09.81 NASAL CONGESTION: ICD-10-CM

## 2019-06-19 ENCOUNTER — COMMUNICATION - HEALTHEAST (OUTPATIENT)
Dept: FAMILY MEDICINE | Facility: CLINIC | Age: 54
End: 2019-06-19

## 2019-06-20 ENCOUNTER — OFFICE VISIT - HEALTHEAST (OUTPATIENT)
Dept: FAMILY MEDICINE | Facility: CLINIC | Age: 54
End: 2019-06-20

## 2019-06-20 DIAGNOSIS — E89.0 POSTABLATIVE HYPOTHYROIDISM: ICD-10-CM

## 2019-06-20 DIAGNOSIS — E78.5 HYPERLIPIDEMIA, UNSPECIFIED HYPERLIPIDEMIA TYPE: ICD-10-CM

## 2019-06-20 DIAGNOSIS — Z12.4 CERVICAL CANCER SCREENING: ICD-10-CM

## 2019-06-20 DIAGNOSIS — M67.432 GANGLION CYST OF DORSUM OF LEFT WRIST: ICD-10-CM

## 2019-06-20 DIAGNOSIS — G43.809 OTHER MIGRAINE WITHOUT STATUS MIGRAINOSUS, NOT INTRACTABLE: ICD-10-CM

## 2019-06-20 DIAGNOSIS — M26.609 TMJ (TEMPOROMANDIBULAR JOINT SYNDROME): ICD-10-CM

## 2019-06-20 DIAGNOSIS — G47.33 OSA (OBSTRUCTIVE SLEEP APNEA): ICD-10-CM

## 2019-06-20 DIAGNOSIS — Z71.89 ADVANCED DIRECTIVES, COUNSELING/DISCUSSION: ICD-10-CM

## 2019-06-20 DIAGNOSIS — M72.2 PLANTAR FASCIITIS: ICD-10-CM

## 2019-06-20 DIAGNOSIS — J45.20 MILD INTERMITTENT ASTHMA WITHOUT COMPLICATION: ICD-10-CM

## 2019-06-20 DIAGNOSIS — Z12.31 VISIT FOR SCREENING MAMMOGRAM: ICD-10-CM

## 2019-06-20 DIAGNOSIS — Z12.11 COLON CANCER SCREENING: ICD-10-CM

## 2019-06-20 DIAGNOSIS — R25.2 MUSCLE CRAMPS: ICD-10-CM

## 2019-06-21 LAB
HPV SOURCE: ABNORMAL
HUMAN PAPILLOMA VIRUS 16 DNA: NEGATIVE
HUMAN PAPILLOMA VIRUS 18 DNA: NEGATIVE
HUMAN PAPILLOMA VIRUS FINAL DIAGNOSIS: ABNORMAL
HUMAN PAPILLOMA VIRUS OTHER HR: POSITIVE
SPECIMEN DESCRIPTION: ABNORMAL

## 2019-06-27 ENCOUNTER — COMMUNICATION - HEALTHEAST (OUTPATIENT)
Dept: FAMILY MEDICINE | Facility: CLINIC | Age: 54
End: 2019-06-27

## 2019-06-27 DIAGNOSIS — R87.610 ASCUS WITH POSITIVE HIGH RISK HPV CERVICAL: ICD-10-CM

## 2019-06-27 DIAGNOSIS — R87.810 ASCUS WITH POSITIVE HIGH RISK HPV CERVICAL: ICD-10-CM

## 2019-06-27 LAB
BKR LAB AP ABNORMAL BLEEDING: NO
BKR LAB AP BIRTH CONTROL/HORMONES: ABNORMAL
BKR LAB AP CERVICAL APPEARANCE: NORMAL
BKR LAB AP GYN ADEQUACY: ABNORMAL
BKR LAB AP GYN INTERPRETATION: ABNORMAL
BKR LAB AP HPV REFLEX: ABNORMAL
BKR LAB AP LMP: ABNORMAL
BKR LAB AP PATIENT STATUS: ABNORMAL
BKR LAB AP PREVIOUS ABNORMAL: NO
BKR LAB AP PREVIOUS NORMAL: ABNORMAL
HIGH RISK?: NO
PATH REPORT.COMMENTS IMP SPEC: ABNORMAL
RESULT FLAG (HE HISTORICAL CONVERSION): ABNORMAL

## 2019-06-28 ENCOUNTER — RECORDS - HEALTHEAST (OUTPATIENT)
Dept: MAMMOGRAPHY | Facility: CLINIC | Age: 54
End: 2019-06-28

## 2019-06-28 DIAGNOSIS — Z12.31 ENCOUNTER FOR SCREENING MAMMOGRAM FOR MALIGNANT NEOPLASM OF BREAST: ICD-10-CM

## 2019-07-26 ENCOUNTER — COMMUNICATION - HEALTHEAST (OUTPATIENT)
Dept: FAMILY MEDICINE | Facility: CLINIC | Age: 54
End: 2019-07-26

## 2019-07-26 DIAGNOSIS — M25.50 PAIN IN JOINT, MULTIPLE SITES: ICD-10-CM

## 2019-08-01 ENCOUNTER — RECORDS - HEALTHEAST (OUTPATIENT)
Dept: ADMINISTRATIVE | Facility: OTHER | Age: 54
End: 2019-08-01

## 2019-08-21 ENCOUNTER — COMMUNICATION - HEALTHEAST (OUTPATIENT)
Dept: FAMILY MEDICINE | Facility: CLINIC | Age: 54
End: 2019-08-21

## 2019-08-21 DIAGNOSIS — G43.909 MIGRAINE: ICD-10-CM

## 2019-08-22 ENCOUNTER — AMBULATORY - HEALTHEAST (OUTPATIENT)
Dept: FAMILY MEDICINE | Facility: CLINIC | Age: 54
End: 2019-08-22

## 2019-08-22 DIAGNOSIS — R87.610 ASCUS WITH POSITIVE HIGH RISK HPV CERVICAL: ICD-10-CM

## 2019-08-22 DIAGNOSIS — R87.810 ASCUS WITH POSITIVE HIGH RISK HPV CERVICAL: ICD-10-CM

## 2019-08-22 ASSESSMENT — MIFFLIN-ST. JEOR: SCORE: 1564.1

## 2019-08-26 LAB
BKR LAB AP ABNORMAL BLEEDING: NO
BKR LAB AP BIRTH CONTROL/HORMONES: ABNORMAL
BKR LAB AP CERVICAL APPEARANCE: NORMAL
BKR LAB AP GYN ADEQUACY: ABNORMAL
BKR LAB AP GYN INTERPRETATION: ABNORMAL
BKR LAB AP GYN OTHER FINDINGS: ABNORMAL
BKR LAB AP HPV REFLEX: ABNORMAL
BKR LAB AP LMP: ABNORMAL
BKR LAB AP PATIENT STATUS: ABNORMAL
BKR LAB AP PREVIOUS ABNORMAL: ABNORMAL
BKR LAB AP PREVIOUS NORMAL: 2014
HIGH RISK?: NO
LAB AP CHARGES (HE HISTORICAL CONVERSION): NORMAL
PATH REPORT.COMMENTS IMP SPEC: ABNORMAL
PATH REPORT.COMMENTS IMP SPEC: NORMAL
PATH REPORT.COMMENTS IMP SPEC: NORMAL
PATH REPORT.FINAL DX SPEC: NORMAL
PATH REPORT.GROSS SPEC: NORMAL
PATH REPORT.MICROSCOPIC SPEC OTHER STN: NORMAL
PATH REPORT.RELEVANT HX SPEC: NORMAL
RESULT FLAG (HE HISTORICAL CONVERSION): ABNORMAL
RESULT FLAG (HE HISTORICAL CONVERSION): NORMAL

## 2019-08-29 ENCOUNTER — COMMUNICATION - HEALTHEAST (OUTPATIENT)
Dept: FAMILY MEDICINE | Facility: CLINIC | Age: 54
End: 2019-08-29

## 2019-09-10 ENCOUNTER — AMBULATORY - HEALTHEAST (OUTPATIENT)
Dept: FAMILY MEDICINE | Facility: CLINIC | Age: 54
End: 2019-09-10

## 2019-09-10 DIAGNOSIS — F33.0 MILD EPISODE OF RECURRENT MAJOR DEPRESSIVE DISORDER (H): ICD-10-CM

## 2019-09-10 DIAGNOSIS — D22.22: ICD-10-CM

## 2019-09-10 ASSESSMENT — ANXIETY QUESTIONNAIRES
IF YOU CHECKED OFF ANY PROBLEMS ON THIS QUESTIONNAIRE, HOW DIFFICULT HAVE THESE PROBLEMS MADE IT FOR YOU TO DO YOUR WORK, TAKE CARE OF THINGS AT HOME, OR GET ALONG WITH OTHER PEOPLE: SOMEWHAT DIFFICULT
2. NOT BEING ABLE TO STOP OR CONTROL WORRYING: SEVERAL DAYS
5. BEING SO RESTLESS THAT IT IS HARD TO SIT STILL: NOT AT ALL
4. TROUBLE RELAXING: SEVERAL DAYS
GAD7 TOTAL SCORE: 6
7. FEELING AFRAID AS IF SOMETHING AWFUL MIGHT HAPPEN: NOT AT ALL
3. WORRYING TOO MUCH ABOUT DIFFERENT THINGS: SEVERAL DAYS
6. BECOMING EASILY ANNOYED OR IRRITABLE: SEVERAL DAYS
1. FEELING NERVOUS, ANXIOUS, OR ON EDGE: MORE THAN HALF THE DAYS

## 2019-09-10 ASSESSMENT — PATIENT HEALTH QUESTIONNAIRE - PHQ9: SUM OF ALL RESPONSES TO PHQ QUESTIONS 1-9: 6

## 2019-09-20 ENCOUNTER — COMMUNICATION - HEALTHEAST (OUTPATIENT)
Dept: FAMILY MEDICINE | Facility: CLINIC | Age: 54
End: 2019-09-20

## 2019-09-20 LAB
LAB AP CHARGES (HE HISTORICAL CONVERSION): NORMAL
PATH REPORT.ADDENDUM SPEC: NORMAL
PATH REPORT.COMMENTS IMP SPEC: NORMAL
PATH REPORT.COMMENTS IMP SPEC: NORMAL
PATH REPORT.FINAL DX SPEC: NORMAL
PATH REPORT.GROSS SPEC: NORMAL
PATH REPORT.MICROSCOPIC SPEC OTHER STN: NORMAL
PATH REPORT.MICROSCOPIC SPEC OTHER STN: NORMAL
PATH REPORT.RELEVANT HX SPEC: NORMAL
RESULT FLAG (HE HISTORICAL CONVERSION): NORMAL

## 2019-09-29 ENCOUNTER — COMMUNICATION - HEALTHEAST (OUTPATIENT)
Dept: FAMILY MEDICINE | Facility: CLINIC | Age: 54
End: 2019-09-29

## 2019-09-29 DIAGNOSIS — E03.9 HYPOTHYROID: ICD-10-CM

## 2019-09-30 ENCOUNTER — THERAPY VISIT (OUTPATIENT)
Dept: PHYSICAL THERAPY | Facility: CLINIC | Age: 54
End: 2019-09-30
Payer: COMMERCIAL

## 2019-09-30 DIAGNOSIS — M26.609 TEMPOROMANDIBULAR JOINT DISORDER: ICD-10-CM

## 2019-09-30 PROCEDURE — 97112 NEUROMUSCULAR REEDUCATION: CPT | Mod: GP | Performed by: PHYSICAL THERAPIST

## 2019-09-30 PROCEDURE — 97162 PT EVAL MOD COMPLEX 30 MIN: CPT | Mod: GP | Performed by: PHYSICAL THERAPIST

## 2019-09-30 NOTE — PROGRESS NOTES
[facility]: Physical Therapy Initial Evaluation   Sep 30, 2019  Therapist Impression: Susan is a 54 year-old female who presents to physical therapy with a primary complaint of right-sided jaw pain. Clinical findings include excessive mobility of the right TMJ with impaired movement patterns. A right-sided click is also present along with a left deviation. ROM is measured at normal values, but without the patient having any bottom teeth which impacts the accuracy and magnitude of the measurement. Tenderness of the right lateral pterygoid implies involvement and dysfunction. Prognosis is good due to low barriers to treatment and clinical findings that correlate well with the patient complaint.     Subjective:   Chief Complaint: right sided jaw pain   Pain: pain on the right side of the face just in front of the ear   Numbness/Tingling: None   Stiffness: None  New/Recurrent/Chronic: Chronic  DOI/onset: many years ago; symptoms since she was a teenager   Referral Date: 9/12/2019 - Amy Douglass  Mechanism of onset: oral habits (pressing her tongue on the top of her mouth) ; has only had upper teeth for 20 years  PMH/surgical history/trauma:    - Asthma   - Depression   - Implanted Device (spinal cord stimulator treating pain in the leg)   - Menopausal   - migraines   - overweight   - sleep apnea w/ CPAP   - smoking (1 pack per day)   - thyroid problem (graves disease, now supplements)   - foot drop  General health as reported by patient: Good    Medications: Anti-depressants, Muscle Relaxants, Pain, Thyroid, Imitrex  Occupation:  Job duties:  prolonged sitting,  driving,   Previous Treatment (Effect): muscle relaxants (helps a little), steroid (took some of the swelling down, but then it returned)  Imaging: None  AM/PM: Better in the morning, worse as the day goes on  Quality of Pain: usually can just tell it's there, can be sharp  Pain: 5/10 at present, 5/10 at best, 9/10 at  worst  Worse: touching the area, chewing, clenching, yawning,   Better: ibuprofen,   Progression of Symptoms since onset: staying the same   Sleeping: Can keep her awake, maybe twice per week. She lays on the right side.    Current Functional Status:   - chewing - more pain with chewing   - yawning - will get pain once in a while with yawning,   - Clenching - notes that she has oral habits during the day such as clenching  Current HEP/exercise regimen: None  Transportation to Therapy: Independent with transportation  Pain with: chewing, opening,     Habits: caffeine intake (2 cups of coffee per day, pop once per week); clenching; ; tongue thrusting   Denies: chewing gum; grinding    Patient Goals: get rid of the pain and stop clenching.         Objective:    Posture: forward head posture, rounded shoulders    Cervical Screen: negative    Headaches: just migraines    Observable Habits/Behaviors: tongue thrusting    AROM:   Movement  measurement (mm) Pain   Opening 49    Left Lat Excursion 15    Right Lat Excursion 11    Patient ROM measurements were taken without the patient having any teeth on the bottom. She reports that she will be getting new ones in the next week or so. Future measurements are expected to be less. The accuracy of these measurements is also less without individual teeth to use as landmarks for the measurement.     Opening Pattern: L Deviation    Translation during opening: excessive and immediate translation in the right TMJ    Joint Noise: click in the right TMJ    Palpation: (R - right, L - left, B - bilateral)  Extra-Orally Location of Pressure Referred Pain?   TMJ R    Masseter     Temporalis     Supra-Hyoids     Intra-Orally     Medial Pterygoid     Lateral Pterygoid R    Cervical     Sternocleidomastoid     Suboccipitals     Upper Trapezius       Joint Play: WNL    Resisted Motions: no provocation of symptoms    Clench Test: Not tested due to lack of teeth.       Assessment/Plan:    Patient  is a 54 year old female with right side TMJ complaints.    Patient has the following significant findings with corresponding treatment plan.                Referring Diagnosis: B TMJ Arthralgia, Myofascial Pain,   Pain -  hot/cold therapy, manual therapy, splint/taping/bracing/orthotics, self management, education and home program  Decreased proprioception - neuro re-education, therapeutic activities and home program  Impaired muscle performance - neuro re-education and home program  Decreased function - therapeutic activities and home program  Impaired posture - neuro re-education, therapeutic activities and home program      Therapy Evaluation Codes:   1) History comprised of:   Personal factors that impact the plan of care:      Time since onset of symptoms.    Comorbidity factors that impact the plan of care are:      Migraines/headaches.     Medications impacting care: Muscle relaxant and Pain.  2) Examination of Body Systems comprised of:   Body structures and functions that impact the plan of care:      Cervical spine and Head.   Activity limitations that impact the plan of care are:      Chewing, Yawning, and Clenching.  3) Clinical presentation characteristics are:   Evolving/Changing.  4) Decision-Making    Moderate complexity using standardized patient assessment instrument and/or measureable assessment of functional outcome.  Cumulative Therapy Evaluation is: Moderate complexity.    Previous and current functional limitations:  (See Goal Flow Sheet for this information)    Short term and Long term goals: (See Goal Flow Sheet for this information)     Communication ability:  Patient appears to be able to clearly communicate and understand verbal and written communication and follow directions correctly.  Treatment Explanation - The following has been discussed with the patient:   RX ordered/plan of care  Anticipated outcomes  Possible risks and side effects  This patient would benefit from PT intervention  to resume normal activities.   Rehab potential is good.    Frequency:  1 X week, once daily  Duration:  for 6 weeks  Discharge Plan:  Achieve all LTG.  Independent in home treatment program.  Reach maximal therapeutic benefit.    Please refer to the daily flowsheet for treatment today, total treatment time and time spent performing 1:1 timed codes.     If you agree to the above recommendation please sign and date below and return this order to the clinic listed below.        Signature __________________________________________Date: ___________

## 2019-09-30 NOTE — LETTER
[facility]  [id number] [address]  [address]  [place] [address]  [phone]    2019    Re: Susan Kwan   :   1965  MRN:  [id number]   REFERRING PHYSICIAN:   Amy OLMEDO PT    Date of Initial Evaluation: 19  Visits:  Rxs Used: 1  Reason for Referral:  Temporomandibular joint disorder    EVALUATION SUMMARY    [facility]: Physical Therapy Initial Evaluation   Sep 30, 2019  Therapist Impression: Susan is a 54 year-old female who presents to physical therapy with a primary complaint of right-sided jaw pain. Clinical findings include excessive mobility of the right TMJ with impaired movement patterns. A right-sided click is also present along with a left deviation. ROM is measured at normal values, but without the patient having any bottom teeth which impacts the accuracy and magnitude of the measurement. Tenderness of the right lateral pterygoid implies involvement and dysfunction. Prognosis is good due to low barriers to treatment and clinical findings that correlate well with the patient complaint.   Subjective:   Chief Complaint: right sided jaw pain   Pain: pain on the right side of the face just in front of the ear   Numbness/Tingling: None   Stiffness: None  New/Recurrent/Chronic: Chronic  DOI/onset: many years ago; symptoms since she was a teenager   Referral Date: 2019 - Amy Douglass  Mechanism of onset: oral habits (pressing her tongue on the top of her mouth) ; has only had upper teeth for 20 years  PMH/surgical history/trauma:    - Asthma   - Depression   - Implanted Device (spinal cord stimulator treating pain in the leg)   - Menopausal   - migraines   - overweight   - sleep apnea w/ CPAP   - smoking (1 pack per day)   - thyroid problem (graves disease, now supplements)   - foot drop  General health as reported by patient: Good    Medications: Anti-depressants, Muscle Relaxants, Pain, Thyroid, Imitrex  Occupation: Bus   Job duties:  prolonged sitting,  driving,   Previous Treatment (Effect): muscle relaxants (helps a little), steroid (took some of the swelling down, but then it returned)  Imaging: None  AM/PM: Better in the morning, worse as the day goes on  Quality of Pain: usually can just tell it's there, can be sharp  Pain: 5/10 at present, 5/10 at best, 9/10 at worst  Worse: touching the area, chewing, clenching, yawning,   Better: ibuprofen,   Progression of Symptoms since onset: staying the same   Sleeping: Can keep her awake, maybe twice per week. She lays on the right side.    Current Functional Status:   - chewing - more pain with chewing   - yawning - will get pain once in a while with yawning,   - Clenching - notes that she has oral habits during the day such as clenching  Current HEP/exercise regimen: None  Transportation to Therapy: Independent with transportation  Pain with: chewing, opening,     Habits: caffeine intake (2 cups of coffee per day, pop once per week); clenching; ; tongue thrusting   Denies: chewing gum; grinding    Patient Goals: get rid of the pain and stop clenching.     Objective:    Posture: forward head posture, rounded shoulders    Cervical Screen: negative  Re: Susan Kwan   :   1965    Headaches: just migraines    Observable Habits/Behaviors: tongue thrusting    AROM:   Movement  measurement (mm) Pain   Opening 49    Left Lat Excursion 15    Right Lat Excursion 11    Patient ROM measurements were taken without the patient having any teeth on the bottom. She reports that she will be getting new ones in the next week or so. Future measurements are expected to be less. The accuracy of these measurements is also less without individual teeth to use as landmarks for the measurement.     Opening Pattern: L Deviation    Translation during opening: excessive and immediate translation in the right TMJ    Joint Noise: click in the right TMJ    Palpation: (R - right, L - left, B -  bilateral)  Extra-Orally Location of Pressure Referred Pain?   TMJ R    Masseter     Temporalis     Supra-Hyoids     Intra-Orally     Medial Pterygoid     Lateral Pterygoid R    Cervical     Sternocleidomastoid     Suboccipitals     Upper Trapezius       Joint Play: WNL  Resisted Motions: no provocation of symptoms  Clench Test: Not tested due to lack of teeth.     Assessment/Plan:    Patient is a 54 year old female with right side TMJ complaints.    Patient has the following significant findings with corresponding treatment plan.                Referring Diagnosis: B TMJ Arthralgia, Myofascial Pain,   Pain -  hot/cold therapy, manual therapy, splint/taping/bracing/orthotics, self management, education and home program  Decreased proprioception - neuro re-education, therapeutic activities and home program  Impaired muscle performance - neuro re-education and home program  Decreased function - therapeutic activities and home program  Impaired posture - neuro re-education, therapeutic activities and home program              Re: Susan Kwan   :   1965    Therapy Evaluation Codes:   1) History comprised of:   Personal factors that impact the plan of care:      Time since onset of symptoms.    Comorbidity factors that impact the plan of care are:      Migraines/headaches.     Medications impacting care: Muscle relaxant and Pain.  2) Examination of Body Systems comprised of:   Body structures and functions that impact the plan of care:      Cervical spine and Head.   Activity limitations that impact the plan of care are:      Chewing, Yawning, and Clenching.  3) Clinical presentation characteristics are:   Evolving/Changing.  4) Decision-Making    Moderate complexity using standardized patient assessment instrument and/or measureable assessment of functional outcome.  Cumulative Therapy Evaluation is: Moderate complexity.    Previous and current functional limitations:  (See Goal Flow Sheet for this information)     Short term and Long term goals: (See Goal Flow Sheet for this information)     Communication ability:  Patient appears to be able to clearly communicate and understand verbal and written communication and follow directions correctly.  Treatment Explanation - The following has been discussed with the patient:   RX ordered/plan of care  Anticipated outcomes  Possible risks and side effects  This patient would benefit from PT intervention to resume normal activities.   Rehab potential is good.    Frequency:  1 X week, once daily  Duration:  for 6 weeks  Discharge Plan:  Achieve all LTG.  Independent in home treatment program.  Reach maximal therapeutic benefit.    If you agree to the above recommendation please sign and date below and return this order to the clinic listed below.        Signature __________________________________________Date: ___________      Thank you for your referral.    INQUIRIES  Therapist: Adan Dumont DPT, SHABNAM ZARAGOZA [facility]  [address] [address]  [address] [place] [id number]  Phone: [phone]  Fax: [phone]

## 2019-10-01 PROBLEM — M26.609 TEMPOROMANDIBULAR JOINT DISORDER: Status: ACTIVE | Noted: 2019-10-01

## 2019-10-07 ENCOUNTER — THERAPY VISIT (OUTPATIENT)
Dept: PHYSICAL THERAPY | Facility: CLINIC | Age: 54
End: 2019-10-07
Payer: COMMERCIAL

## 2019-10-07 DIAGNOSIS — M26.609 TEMPOROMANDIBULAR JOINT DISORDER: ICD-10-CM

## 2019-10-07 PROCEDURE — 97035 APP MDLTY 1+ULTRASOUND EA 15: CPT | Mod: GP | Performed by: PHYSICAL THERAPIST

## 2019-10-07 PROCEDURE — 97112 NEUROMUSCULAR REEDUCATION: CPT | Mod: GP | Performed by: PHYSICAL THERAPIST

## 2019-10-16 ENCOUNTER — OFFICE VISIT - HEALTHEAST (OUTPATIENT)
Dept: FAMILY MEDICINE | Facility: CLINIC | Age: 54
End: 2019-10-16

## 2019-10-16 DIAGNOSIS — L02.215 CUTANEOUS ABSCESS OF PERINEUM: ICD-10-CM

## 2019-10-16 ASSESSMENT — MIFFLIN-ST. JEOR: SCORE: 1586.78

## 2019-10-20 LAB
BACTERIA SPEC CULT: ABNORMAL

## 2019-10-28 ENCOUNTER — THERAPY VISIT (OUTPATIENT)
Dept: PHYSICAL THERAPY | Facility: CLINIC | Age: 54
End: 2019-10-28
Payer: COMMERCIAL

## 2019-10-28 DIAGNOSIS — M26.609 TEMPOROMANDIBULAR JOINT DISORDER: ICD-10-CM

## 2019-10-28 PROCEDURE — 97140 MANUAL THERAPY 1/> REGIONS: CPT | Mod: GP | Performed by: PHYSICAL THERAPIST

## 2019-10-28 PROCEDURE — 97110 THERAPEUTIC EXERCISES: CPT | Mod: GP | Performed by: PHYSICAL THERAPIST

## 2019-10-28 PROCEDURE — 97035 APP MDLTY 1+ULTRASOUND EA 15: CPT | Mod: GP | Performed by: PHYSICAL THERAPIST

## 2019-11-04 ENCOUNTER — THERAPY VISIT (OUTPATIENT)
Dept: PHYSICAL THERAPY | Facility: CLINIC | Age: 54
End: 2019-11-04
Payer: COMMERCIAL

## 2019-11-04 DIAGNOSIS — M26.609 TEMPOROMANDIBULAR JOINT DISORDER: ICD-10-CM

## 2019-11-04 PROCEDURE — 97140 MANUAL THERAPY 1/> REGIONS: CPT | Mod: GP | Performed by: PHYSICAL THERAPIST

## 2019-11-04 PROCEDURE — 97110 THERAPEUTIC EXERCISES: CPT | Mod: GP | Performed by: PHYSICAL THERAPIST

## 2019-12-04 ENCOUNTER — COMMUNICATION - HEALTHEAST (OUTPATIENT)
Dept: FAMILY MEDICINE | Facility: CLINIC | Age: 54
End: 2019-12-04

## 2019-12-04 ENCOUNTER — OFFICE VISIT - HEALTHEAST (OUTPATIENT)
Dept: FAMILY MEDICINE | Facility: CLINIC | Age: 54
End: 2019-12-04

## 2019-12-04 DIAGNOSIS — M25.561 ACUTE PAIN OF RIGHT KNEE: ICD-10-CM

## 2019-12-04 DIAGNOSIS — M25.50 PAIN IN JOINT, MULTIPLE SITES: ICD-10-CM

## 2019-12-04 DIAGNOSIS — S46.811A STRAIN OF RIGHT TRAPEZIUS MUSCLE, INITIAL ENCOUNTER: ICD-10-CM

## 2019-12-04 DIAGNOSIS — M54.50 ACUTE RIGHT-SIDED LOW BACK PAIN WITHOUT SCIATICA: ICD-10-CM

## 2019-12-12 ENCOUNTER — OFFICE VISIT - HEALTHEAST (OUTPATIENT)
Dept: FAMILY MEDICINE | Facility: CLINIC | Age: 54
End: 2019-12-12

## 2019-12-12 DIAGNOSIS — E78.00 PURE HYPERCHOLESTEROLEMIA: ICD-10-CM

## 2019-12-12 DIAGNOSIS — F17.200 TOBACCO USE DISORDER: ICD-10-CM

## 2019-12-12 DIAGNOSIS — B34.9 VIRAL SYNDROME: ICD-10-CM

## 2019-12-16 ENCOUNTER — COMMUNICATION - HEALTHEAST (OUTPATIENT)
Dept: FAMILY MEDICINE | Facility: CLINIC | Age: 54
End: 2019-12-16

## 2019-12-16 DIAGNOSIS — M54.50 LOW BACK PAIN: ICD-10-CM

## 2019-12-23 PROBLEM — M26.609 TEMPOROMANDIBULAR JOINT DISORDER: Status: RESOLVED | Noted: 2019-10-01 | Resolved: 2019-12-23

## 2019-12-23 NOTE — PROGRESS NOTES
DISCHARGE REPORT    Updated as of December 23, 2019  Progress reporting period is from Sep 30, 2019 to Nov 4, 2019.       SUBJECTIVE  Subjective changes noted by patient:  Unknown. Patient has failed to return to clinic.    Current pain level is unknown. Patient has failed to return to clinic.  .     Initial Pain level: 9/10.   Changes in function:  Unknown. Patient has failed to return to clinic.  Adverse reaction to treatment or activity: Unknown. Patient has failed to return to clinic.    OBJECTIVE  Changes noted in objective findings:  Patient has failed to return to therapy so current objective findings are unknown.    ASSESSMENT/PLAN  Updated problem list and treatment plan: Diagnosis 1:  B TMJ Arthralgia, Myofascial Pain,   STG/LTGs have been met or progress has been made towards goals:  Unknown. Patient has failed to return to clinic.  Assessment of Progress: The patient has not returned to therapy. Current status is unknown.  Self Management Plans:  Patient has been instructed in a home treatment program.  Patient continues to require the following intervention to meet STG and LTG's:  Unknown. Patient has failed to return to clinic.    Recommendations:  Unable to make an accurate recommendation at this time. Patient has failed to return to clinic.    Please refer to the daily flowsheet for treatment today, total treatment time and time spent performing 1:1 timed codes.

## 2020-02-20 ENCOUNTER — COMMUNICATION - HEALTHEAST (OUTPATIENT)
Dept: FAMILY MEDICINE | Facility: CLINIC | Age: 55
End: 2020-02-20

## 2020-02-20 ENCOUNTER — OFFICE VISIT - HEALTHEAST (OUTPATIENT)
Dept: FAMILY MEDICINE | Facility: CLINIC | Age: 55
End: 2020-02-20

## 2020-02-20 DIAGNOSIS — R20.2 PARESTHESIA OF RIGHT LEG: ICD-10-CM

## 2020-02-20 DIAGNOSIS — M54.31 SCIATIC NERVE PAIN, RIGHT: ICD-10-CM

## 2020-02-21 ENCOUNTER — COMMUNICATION - HEALTHEAST (OUTPATIENT)
Dept: FAMILY MEDICINE | Facility: CLINIC | Age: 55
End: 2020-02-21

## 2020-02-21 DIAGNOSIS — M43.06 LUMBAR PARS DEFECT: ICD-10-CM

## 2020-02-21 DIAGNOSIS — M43.06 SPONDYLOLYSIS OF LUMBAR REGION: ICD-10-CM

## 2020-02-22 ENCOUNTER — OFFICE VISIT - HEALTHEAST (OUTPATIENT)
Dept: FAMILY MEDICINE | Facility: CLINIC | Age: 55
End: 2020-02-22

## 2020-02-22 DIAGNOSIS — S32.049D CLOSED FRACTURE OF FOURTH LUMBAR VERTEBRA WITH ROUTINE HEALING, UNSPECIFIED FRACTURE MORPHOLOGY, SUBSEQUENT ENCOUNTER: ICD-10-CM

## 2020-02-24 ENCOUNTER — AMBULATORY - HEALTHEAST (OUTPATIENT)
Dept: OTHER | Facility: CLINIC | Age: 55
End: 2020-02-24

## 2020-02-25 ENCOUNTER — HOSPITAL ENCOUNTER (OUTPATIENT)
Dept: PHYSICAL MEDICINE AND REHAB | Facility: CLINIC | Age: 55
Discharge: HOME | End: 2020-02-25
Attending: NURSE PRACTITIONER

## 2020-02-25 DIAGNOSIS — S32.049D CLOSED FRACTURE OF FOURTH LUMBAR VERTEBRA WITH ROUTINE HEALING, UNSPECIFIED FRACTURE MORPHOLOGY, SUBSEQUENT ENCOUNTER: ICD-10-CM

## 2020-02-25 DIAGNOSIS — M43.06 PARS DEFECT OF LUMBAR SPINE: ICD-10-CM

## 2020-02-25 DIAGNOSIS — M54.41 ACUTE RIGHT-SIDED LOW BACK PAIN WITH RIGHT-SIDED SCIATICA: ICD-10-CM

## 2020-02-25 DIAGNOSIS — Z98.890 HISTORY OF LUMBAR SURGERY: ICD-10-CM

## 2020-02-25 DIAGNOSIS — M54.16 CHRONIC LUMBAR RADICULOPATHY: ICD-10-CM

## 2020-02-25 DIAGNOSIS — Z96.89 S/P INSERTION OF SPINAL CORD STIMULATOR: ICD-10-CM

## 2020-02-25 ASSESSMENT — MIFFLIN-ST. JEOR: SCORE: 1627.15

## 2020-02-27 ENCOUNTER — HOSPITAL ENCOUNTER (OUTPATIENT)
Dept: PHYSICAL MEDICINE AND REHAB | Facility: CLINIC | Age: 55
Discharge: HOME | End: 2020-02-27
Attending: NURSE PRACTITIONER

## 2020-02-27 DIAGNOSIS — Z98.890 HISTORY OF LUMBAR SURGERY: ICD-10-CM

## 2020-02-27 DIAGNOSIS — M54.16 CHRONIC LUMBAR RADICULOPATHY: ICD-10-CM

## 2020-02-27 DIAGNOSIS — S32.049D CLOSED FRACTURE OF FOURTH LUMBAR VERTEBRA WITH ROUTINE HEALING, UNSPECIFIED FRACTURE MORPHOLOGY, SUBSEQUENT ENCOUNTER: ICD-10-CM

## 2020-02-27 DIAGNOSIS — M43.06 PARS DEFECT OF LUMBAR SPINE: ICD-10-CM

## 2020-02-27 DIAGNOSIS — M54.41 ACUTE RIGHT-SIDED LOW BACK PAIN WITH RIGHT-SIDED SCIATICA: ICD-10-CM

## 2020-02-27 ASSESSMENT — MIFFLIN-ST. JEOR: SCORE: 1623.07

## 2020-02-28 ENCOUNTER — OFFICE VISIT - HEALTHEAST (OUTPATIENT)
Dept: FAMILY MEDICINE | Facility: CLINIC | Age: 55
End: 2020-02-28

## 2020-02-28 DIAGNOSIS — J45.909 ASTHMA: ICD-10-CM

## 2020-02-28 DIAGNOSIS — J45.21 MILD INTERMITTENT ASTHMA WITH ACUTE EXACERBATION: ICD-10-CM

## 2020-02-28 DIAGNOSIS — F33.0 MILD EPISODE OF RECURRENT MAJOR DEPRESSIVE DISORDER (H): ICD-10-CM

## 2020-02-28 DIAGNOSIS — L98.9 SKIN LESION: ICD-10-CM

## 2020-02-28 DIAGNOSIS — F17.200 TOBACCO USE DISORDER: ICD-10-CM

## 2020-02-28 DIAGNOSIS — J45.20 MILD INTERMITTENT ASTHMA WITHOUT COMPLICATION: ICD-10-CM

## 2020-02-28 ASSESSMENT — MIFFLIN-ST. JEOR: SCORE: 1633.27

## 2020-03-03 ENCOUNTER — COMMUNICATION - HEALTHEAST (OUTPATIENT)
Dept: PHYSICAL MEDICINE AND REHAB | Facility: CLINIC | Age: 55
End: 2020-03-03

## 2020-03-03 DIAGNOSIS — M54.41 ACUTE RIGHT-SIDED LOW BACK PAIN WITH RIGHT-SIDED SCIATICA: ICD-10-CM

## 2020-03-03 DIAGNOSIS — S32.049D CLOSED FRACTURE OF FOURTH LUMBAR VERTEBRA WITH ROUTINE HEALING, UNSPECIFIED FRACTURE MORPHOLOGY, SUBSEQUENT ENCOUNTER: ICD-10-CM

## 2020-03-03 DIAGNOSIS — M43.06 PARS DEFECT OF LUMBAR SPINE: ICD-10-CM

## 2020-03-04 ENCOUNTER — HOSPITAL ENCOUNTER (OUTPATIENT)
Dept: PHYSICAL MEDICINE AND REHAB | Facility: CLINIC | Age: 55
Discharge: HOME | End: 2020-03-04
Attending: NURSE PRACTITIONER

## 2020-03-04 DIAGNOSIS — M43.06 PARS DEFECT OF LUMBAR SPINE: ICD-10-CM

## 2020-03-04 DIAGNOSIS — Z98.890 HISTORY OF LUMBAR SURGERY: ICD-10-CM

## 2020-03-04 DIAGNOSIS — M54.16 CHRONIC LUMBAR RADICULOPATHY: ICD-10-CM

## 2020-03-04 DIAGNOSIS — M54.41 ACUTE RIGHT-SIDED LOW BACK PAIN WITH RIGHT-SIDED SCIATICA: ICD-10-CM

## 2020-03-04 DIAGNOSIS — S32.049D CLOSED FRACTURE OF FOURTH LUMBAR VERTEBRA WITH ROUTINE HEALING, UNSPECIFIED FRACTURE MORPHOLOGY, SUBSEQUENT ENCOUNTER: ICD-10-CM

## 2020-03-04 DIAGNOSIS — R29.898 RIGHT LEG WEAKNESS: ICD-10-CM

## 2020-03-04 DIAGNOSIS — M54.16 ACUTE RIGHT LUMBAR RADICULOPATHY: ICD-10-CM

## 2020-03-04 ASSESSMENT — PATIENT HEALTH QUESTIONNAIRE - PHQ9: SUM OF ALL RESPONSES TO PHQ QUESTIONS 1-9: 0

## 2020-03-04 ASSESSMENT — MIFFLIN-ST. JEOR: SCORE: 1633.27

## 2020-03-12 ENCOUNTER — HOSPITAL ENCOUNTER (OUTPATIENT)
Dept: RADIOLOGY | Facility: CLINIC | Age: 55
Discharge: HOME | End: 2020-03-12

## 2020-03-12 DIAGNOSIS — M54.41 ACUTE RIGHT-SIDED LOW BACK PAIN WITH RIGHT-SIDED SCIATICA: ICD-10-CM

## 2020-03-12 DIAGNOSIS — S32.049D CLOSED FRACTURE OF FOURTH LUMBAR VERTEBRA WITH ROUTINE HEALING, UNSPECIFIED FRACTURE MORPHOLOGY, SUBSEQUENT ENCOUNTER: ICD-10-CM

## 2020-03-12 DIAGNOSIS — Z98.890 HISTORY OF LUMBAR SURGERY: ICD-10-CM

## 2020-03-12 DIAGNOSIS — R29.898 RIGHT LEG WEAKNESS: ICD-10-CM

## 2020-03-12 DIAGNOSIS — M54.16 ACUTE RIGHT LUMBAR RADICULOPATHY: ICD-10-CM

## 2020-03-12 DIAGNOSIS — M43.06 PARS DEFECT OF LUMBAR SPINE: ICD-10-CM

## 2020-03-12 DIAGNOSIS — M54.16 CHRONIC LUMBAR RADICULOPATHY: ICD-10-CM

## 2020-03-13 ENCOUNTER — COMMUNICATION - HEALTHEAST (OUTPATIENT)
Dept: PHYSICAL MEDICINE AND REHAB | Facility: CLINIC | Age: 55
End: 2020-03-13

## 2020-03-15 ENCOUNTER — COMMUNICATION - HEALTHEAST (OUTPATIENT)
Dept: FAMILY MEDICINE | Facility: CLINIC | Age: 55
End: 2020-03-15

## 2020-03-15 DIAGNOSIS — F33.0 MILD EPISODE OF RECURRENT MAJOR DEPRESSIVE DISORDER (H): ICD-10-CM

## 2020-03-16 ENCOUNTER — COMMUNICATION - HEALTHEAST (OUTPATIENT)
Dept: PHYSICAL MEDICINE AND REHAB | Facility: CLINIC | Age: 55
End: 2020-03-16

## 2020-03-16 DIAGNOSIS — S32.049D CLOSED FRACTURE OF FOURTH LUMBAR VERTEBRA WITH ROUTINE HEALING, UNSPECIFIED FRACTURE MORPHOLOGY, SUBSEQUENT ENCOUNTER: ICD-10-CM

## 2020-03-16 DIAGNOSIS — M43.06 PARS DEFECT OF LUMBAR SPINE: ICD-10-CM

## 2020-03-16 DIAGNOSIS — M54.41 ACUTE RIGHT-SIDED LOW BACK PAIN WITH RIGHT-SIDED SCIATICA: ICD-10-CM

## 2020-03-18 ENCOUNTER — HOSPITAL ENCOUNTER (OUTPATIENT)
Dept: PHYSICAL MEDICINE AND REHAB | Facility: CLINIC | Age: 55
Discharge: HOME | End: 2020-03-18
Attending: PHYSICAL MEDICINE & REHABILITATION

## 2020-03-18 ENCOUNTER — AMBULATORY - HEALTHEAST (OUTPATIENT)
Dept: PHYSICAL MEDICINE AND REHAB | Facility: CLINIC | Age: 55
End: 2020-03-18

## 2020-03-18 ENCOUNTER — COMMUNICATION - HEALTHEAST (OUTPATIENT)
Dept: PHYSICAL MEDICINE AND REHAB | Facility: CLINIC | Age: 55
End: 2020-03-18

## 2020-03-18 DIAGNOSIS — R29.898 RIGHT LEG WEAKNESS: ICD-10-CM

## 2020-03-18 DIAGNOSIS — M54.16 ACUTE RIGHT LUMBAR RADICULOPATHY: ICD-10-CM

## 2020-03-18 DIAGNOSIS — M54.41 ACUTE RIGHT-SIDED LOW BACK PAIN WITH RIGHT-SIDED SCIATICA: ICD-10-CM

## 2020-03-18 DIAGNOSIS — M43.06 PARS DEFECT OF LUMBAR SPINE: ICD-10-CM

## 2020-03-18 DIAGNOSIS — Z98.890 HISTORY OF LUMBAR SURGERY: ICD-10-CM

## 2020-03-20 ENCOUNTER — AMBULATORY - HEALTHEAST (OUTPATIENT)
Dept: NEUROSURGERY | Facility: CLINIC | Age: 55
End: 2020-03-20

## 2020-03-20 ENCOUNTER — RECORDS - HEALTHEAST (OUTPATIENT)
Dept: RADIOLOGY | Facility: CLINIC | Age: 55
End: 2020-03-20

## 2020-03-20 ENCOUNTER — HOSPITAL ENCOUNTER (OUTPATIENT)
Dept: CT IMAGING | Facility: CLINIC | Age: 55
Discharge: HOME | End: 2020-03-20
Attending: SURGERY

## 2020-03-20 DIAGNOSIS — M54.9 BACK PAIN: ICD-10-CM

## 2020-03-23 ENCOUNTER — HOSPITAL ENCOUNTER (OUTPATIENT)
Dept: PHYSICAL MEDICINE AND REHAB | Facility: CLINIC | Age: 55
Discharge: HOME | End: 2020-03-23
Attending: NURSE PRACTITIONER

## 2020-03-23 ENCOUNTER — AMBULATORY - HEALTHEAST (OUTPATIENT)
Dept: PHYSICAL MEDICINE AND REHAB | Facility: CLINIC | Age: 55
End: 2020-03-23

## 2020-03-23 DIAGNOSIS — M54.16 ACUTE RIGHT LUMBAR RADICULOPATHY: ICD-10-CM

## 2020-03-23 DIAGNOSIS — Z98.890 HISTORY OF LUMBAR SURGERY: ICD-10-CM

## 2020-03-23 DIAGNOSIS — S32.049D CLOSED FRACTURE OF FOURTH LUMBAR VERTEBRA WITH ROUTINE HEALING, UNSPECIFIED FRACTURE MORPHOLOGY, SUBSEQUENT ENCOUNTER: ICD-10-CM

## 2020-03-23 DIAGNOSIS — M54.41 ACUTE RIGHT-SIDED LOW BACK PAIN WITH RIGHT-SIDED SCIATICA: ICD-10-CM

## 2020-03-23 DIAGNOSIS — M43.06 PARS DEFECT OF LUMBAR SPINE: ICD-10-CM

## 2020-03-23 DIAGNOSIS — R29.898 RIGHT LEG WEAKNESS: ICD-10-CM

## 2020-03-24 ENCOUNTER — OFFICE VISIT - HEALTHEAST (OUTPATIENT)
Dept: NEUROSURGERY | Facility: CLINIC | Age: 55
End: 2020-03-24

## 2020-03-24 DIAGNOSIS — M54.16 LUMBAR RADICULOPATHY: ICD-10-CM

## 2020-03-24 ASSESSMENT — MIFFLIN-ST. JEOR: SCORE: 1633.27

## 2020-03-27 ENCOUNTER — COMMUNICATION - HEALTHEAST (OUTPATIENT)
Dept: PHYSICAL MEDICINE AND REHAB | Facility: CLINIC | Age: 55
End: 2020-03-27

## 2020-03-27 DIAGNOSIS — M43.06 PARS DEFECT OF LUMBAR SPINE: ICD-10-CM

## 2020-03-27 DIAGNOSIS — S32.049D CLOSED FRACTURE OF FOURTH LUMBAR VERTEBRA WITH ROUTINE HEALING, UNSPECIFIED FRACTURE MORPHOLOGY, SUBSEQUENT ENCOUNTER: ICD-10-CM

## 2020-03-27 DIAGNOSIS — M54.41 ACUTE RIGHT-SIDED LOW BACK PAIN WITH RIGHT-SIDED SCIATICA: ICD-10-CM

## 2020-03-30 ENCOUNTER — COMMUNICATION - HEALTHEAST (OUTPATIENT)
Dept: PHYSICAL MEDICINE AND REHAB | Facility: CLINIC | Age: 55
End: 2020-03-30

## 2020-03-30 ENCOUNTER — HOSPITAL ENCOUNTER (OUTPATIENT)
Dept: PHYSICAL MEDICINE AND REHAB | Facility: CLINIC | Age: 55
Discharge: HOME | End: 2020-03-30
Attending: PHYSICAL MEDICINE & REHABILITATION

## 2020-03-30 DIAGNOSIS — M54.6 THORACIC SPINE PAIN: ICD-10-CM

## 2020-03-30 DIAGNOSIS — M43.06 PARS DEFECT OF LUMBAR SPINE: ICD-10-CM

## 2020-03-30 DIAGNOSIS — M54.41 ACUTE RIGHT-SIDED LOW BACK PAIN WITH RIGHT-SIDED SCIATICA: ICD-10-CM

## 2020-03-30 DIAGNOSIS — S32.049D CLOSED FRACTURE OF FOURTH LUMBAR VERTEBRA WITH ROUTINE HEALING, UNSPECIFIED FRACTURE MORPHOLOGY, SUBSEQUENT ENCOUNTER: ICD-10-CM

## 2020-04-12 ENCOUNTER — COMMUNICATION - HEALTHEAST (OUTPATIENT)
Dept: PHYSICAL MEDICINE AND REHAB | Facility: CLINIC | Age: 55
End: 2020-04-12

## 2020-04-12 DIAGNOSIS — M43.06 PARS DEFECT OF LUMBAR SPINE: ICD-10-CM

## 2020-04-12 DIAGNOSIS — M54.41 ACUTE RIGHT-SIDED LOW BACK PAIN WITH RIGHT-SIDED SCIATICA: ICD-10-CM

## 2020-04-12 DIAGNOSIS — M54.6 THORACIC SPINE PAIN: ICD-10-CM

## 2020-04-14 ENCOUNTER — COMMUNICATION - HEALTHEAST (OUTPATIENT)
Dept: FAMILY MEDICINE | Facility: CLINIC | Age: 55
End: 2020-04-14

## 2020-04-14 DIAGNOSIS — E78.5 HYPERLIPIDEMIA: ICD-10-CM

## 2020-04-14 DIAGNOSIS — E03.9 ACQUIRED HYPOTHYROIDISM: ICD-10-CM

## 2020-04-17 ENCOUNTER — COMMUNICATION - HEALTHEAST (OUTPATIENT)
Dept: PHYSICAL MEDICINE AND REHAB | Facility: CLINIC | Age: 55
End: 2020-04-17

## 2020-04-29 ENCOUNTER — COMMUNICATION - HEALTHEAST (OUTPATIENT)
Dept: PHYSICAL MEDICINE AND REHAB | Facility: CLINIC | Age: 55
End: 2020-04-29

## 2020-04-29 DIAGNOSIS — M54.6 THORACIC SPINE PAIN: ICD-10-CM

## 2020-04-30 ENCOUNTER — COMMUNICATION - HEALTHEAST (OUTPATIENT)
Dept: FAMILY MEDICINE | Facility: CLINIC | Age: 55
End: 2020-04-30

## 2020-05-04 ENCOUNTER — COMMUNICATION - HEALTHEAST (OUTPATIENT)
Dept: PHYSICAL MEDICINE AND REHAB | Facility: CLINIC | Age: 55
End: 2020-05-04

## 2020-05-04 ENCOUNTER — COMMUNICATION - HEALTHEAST (OUTPATIENT)
Dept: FAMILY MEDICINE | Facility: CLINIC | Age: 55
End: 2020-05-04

## 2020-05-13 ENCOUNTER — COMMUNICATION - HEALTHEAST (OUTPATIENT)
Dept: PHYSICAL MEDICINE AND REHAB | Facility: CLINIC | Age: 55
End: 2020-05-13

## 2020-05-13 DIAGNOSIS — M43.06 PARS DEFECT OF LUMBAR SPINE: ICD-10-CM

## 2020-05-13 DIAGNOSIS — M54.41 ACUTE RIGHT-SIDED LOW BACK PAIN WITH RIGHT-SIDED SCIATICA: ICD-10-CM

## 2020-05-13 DIAGNOSIS — M54.6 THORACIC SPINE PAIN: ICD-10-CM

## 2020-05-20 ENCOUNTER — COMMUNICATION - HEALTHEAST (OUTPATIENT)
Dept: PHYSICAL MEDICINE AND REHAB | Facility: CLINIC | Age: 55
End: 2020-05-20

## 2020-05-20 ENCOUNTER — COMMUNICATION - HEALTHEAST (OUTPATIENT)
Dept: FAMILY MEDICINE | Facility: CLINIC | Age: 55
End: 2020-05-20

## 2020-05-20 DIAGNOSIS — M54.41 ACUTE RIGHT-SIDED LOW BACK PAIN WITH RIGHT-SIDED SCIATICA: ICD-10-CM

## 2020-05-20 DIAGNOSIS — M43.06 PARS DEFECT OF LUMBAR SPINE: ICD-10-CM

## 2020-05-20 DIAGNOSIS — Z20.822 EXPOSURE TO COVID-19 VIRUS: ICD-10-CM

## 2020-05-22 ENCOUNTER — COMMUNICATION - HEALTHEAST (OUTPATIENT)
Dept: PHYSICAL MEDICINE AND REHAB | Facility: CLINIC | Age: 55
End: 2020-05-22

## 2020-05-22 DIAGNOSIS — M43.06 PARS DEFECT OF LUMBAR SPINE: ICD-10-CM

## 2020-05-22 DIAGNOSIS — M54.41 ACUTE RIGHT-SIDED LOW BACK PAIN WITH RIGHT-SIDED SCIATICA: ICD-10-CM

## 2020-05-23 ENCOUNTER — AMBULATORY - HEALTHEAST (OUTPATIENT)
Dept: FAMILY MEDICINE | Facility: CLINIC | Age: 55
End: 2020-05-23

## 2020-05-23 DIAGNOSIS — Z20.822 EXPOSURE TO COVID-19 VIRUS: ICD-10-CM

## 2020-05-25 ENCOUNTER — COMMUNICATION - HEALTHEAST (OUTPATIENT)
Dept: FAMILY MEDICINE | Facility: CLINIC | Age: 55
End: 2020-05-25

## 2020-05-26 ENCOUNTER — COMMUNICATION - HEALTHEAST (OUTPATIENT)
Dept: PHYSICAL MEDICINE AND REHAB | Facility: CLINIC | Age: 55
End: 2020-05-26

## 2020-05-26 DIAGNOSIS — M43.06 PARS DEFECT OF LUMBAR SPINE: ICD-10-CM

## 2020-05-26 DIAGNOSIS — M54.41 ACUTE RIGHT-SIDED LOW BACK PAIN WITH RIGHT-SIDED SCIATICA: ICD-10-CM

## 2020-05-27 ENCOUNTER — HOSPITAL ENCOUNTER (OUTPATIENT)
Dept: PHYSICAL MEDICINE AND REHAB | Facility: CLINIC | Age: 55
Discharge: HOME | End: 2020-05-27
Attending: PHYSICAL MEDICINE & REHABILITATION

## 2020-05-27 DIAGNOSIS — M48.061 STENOSIS OF LATERAL RECESS OF LUMBAR SPINE: ICD-10-CM

## 2020-05-27 DIAGNOSIS — M54.50 LUMBAR SPINE PAIN: ICD-10-CM

## 2020-05-27 DIAGNOSIS — M43.06 PARS DEFECT OF LUMBAR SPINE: ICD-10-CM

## 2020-05-27 DIAGNOSIS — R29.898 RIGHT LEG WEAKNESS: ICD-10-CM

## 2020-05-27 DIAGNOSIS — M54.6 THORACIC SPINE PAIN: ICD-10-CM

## 2020-06-01 ENCOUNTER — OFFICE VISIT - HEALTHEAST (OUTPATIENT)
Dept: PHYSICAL THERAPY | Facility: REHABILITATION | Age: 55
End: 2020-06-01

## 2020-06-01 DIAGNOSIS — M62.81 GENERALIZED MUSCLE WEAKNESS: ICD-10-CM

## 2020-06-01 DIAGNOSIS — G89.29 CHRONIC RIGHT-SIDED LOW BACK PAIN WITH RIGHT-SIDED SCIATICA: ICD-10-CM

## 2020-06-01 DIAGNOSIS — M54.41 CHRONIC RIGHT-SIDED LOW BACK PAIN WITH RIGHT-SIDED SCIATICA: ICD-10-CM

## 2020-06-02 ENCOUNTER — HOSPITAL ENCOUNTER (OUTPATIENT)
Dept: PHYSICAL MEDICINE AND REHAB | Facility: CLINIC | Age: 55
Discharge: HOME | End: 2020-06-02
Attending: SURGERY

## 2020-06-02 ENCOUNTER — HOSPITAL ENCOUNTER (OUTPATIENT)
Dept: PHYSICAL MEDICINE AND REHAB | Facility: CLINIC | Age: 55
Discharge: HOME | End: 2020-06-02
Attending: NURSE PRACTITIONER

## 2020-06-02 DIAGNOSIS — M43.06 PARS DEFECT OF LUMBAR SPINE: ICD-10-CM

## 2020-06-02 DIAGNOSIS — M54.41 ACUTE RIGHT-SIDED LOW BACK PAIN WITH RIGHT-SIDED SCIATICA: ICD-10-CM

## 2020-06-02 DIAGNOSIS — R29.898 RIGHT LEG WEAKNESS: ICD-10-CM

## 2020-06-02 DIAGNOSIS — M48.061 STENOSIS OF LATERAL RECESS OF LUMBAR SPINE: ICD-10-CM

## 2020-06-02 DIAGNOSIS — M54.16 LUMBAR RADICULOPATHY: ICD-10-CM

## 2020-06-02 DIAGNOSIS — M54.41 CHRONIC RIGHT-SIDED LOW BACK PAIN WITH RIGHT-SIDED SCIATICA: ICD-10-CM

## 2020-06-02 DIAGNOSIS — G89.29 CHRONIC RIGHT-SIDED LOW BACK PAIN WITH RIGHT-SIDED SCIATICA: ICD-10-CM

## 2020-06-05 ENCOUNTER — OFFICE VISIT - HEALTHEAST (OUTPATIENT)
Dept: PHYSICAL THERAPY | Facility: REHABILITATION | Age: 55
End: 2020-06-05

## 2020-06-05 DIAGNOSIS — M62.81 GENERALIZED MUSCLE WEAKNESS: ICD-10-CM

## 2020-06-05 DIAGNOSIS — G89.29 CHRONIC RIGHT-SIDED LOW BACK PAIN WITH RIGHT-SIDED SCIATICA: ICD-10-CM

## 2020-06-05 DIAGNOSIS — M54.41 CHRONIC RIGHT-SIDED LOW BACK PAIN WITH RIGHT-SIDED SCIATICA: ICD-10-CM

## 2020-06-23 ENCOUNTER — COMMUNICATION - HEALTHEAST (OUTPATIENT)
Dept: PHYSICAL THERAPY | Facility: REHABILITATION | Age: 55
End: 2020-06-23

## 2020-06-30 ENCOUNTER — COMMUNICATION - HEALTHEAST (OUTPATIENT)
Dept: FAMILY MEDICINE | Facility: CLINIC | Age: 55
End: 2020-06-30

## 2020-06-30 DIAGNOSIS — J45.909 ASTHMA: ICD-10-CM

## 2020-07-26 ENCOUNTER — COMMUNICATION - HEALTHEAST (OUTPATIENT)
Dept: FAMILY MEDICINE | Facility: CLINIC | Age: 55
End: 2020-07-26

## 2020-07-26 DIAGNOSIS — F33.0 MILD EPISODE OF RECURRENT MAJOR DEPRESSIVE DISORDER (H): ICD-10-CM

## 2020-08-03 ENCOUNTER — COMMUNICATION - HEALTHEAST (OUTPATIENT)
Dept: PHYSICAL MEDICINE AND REHAB | Facility: CLINIC | Age: 55
End: 2020-08-03

## 2020-09-10 ENCOUNTER — HOSPITAL ENCOUNTER (OUTPATIENT)
Dept: PHYSICAL MEDICINE AND REHAB | Facility: CLINIC | Age: 55
Discharge: HOME | End: 2020-09-10
Attending: NURSE PRACTITIONER

## 2020-09-10 DIAGNOSIS — G89.29 CHRONIC RIGHT-SIDED LOW BACK PAIN WITH RIGHT-SIDED SCIATICA: ICD-10-CM

## 2020-09-10 DIAGNOSIS — M48.061 STENOSIS OF LATERAL RECESS OF LUMBAR SPINE: ICD-10-CM

## 2020-09-10 DIAGNOSIS — M43.06 PARS DEFECT OF LUMBAR SPINE: ICD-10-CM

## 2020-09-10 DIAGNOSIS — M54.41 CHRONIC RIGHT-SIDED LOW BACK PAIN WITH RIGHT-SIDED SCIATICA: ICD-10-CM

## 2020-09-10 DIAGNOSIS — M79.18 MYOFASCIAL PAIN: ICD-10-CM

## 2020-09-10 DIAGNOSIS — M54.16 LUMBAR RADICULOPATHY: ICD-10-CM

## 2020-09-22 ENCOUNTER — COMMUNICATION - HEALTHEAST (OUTPATIENT)
Dept: PHYSICAL MEDICINE AND REHAB | Facility: CLINIC | Age: 55
End: 2020-09-22

## 2020-09-22 ENCOUNTER — HOSPITAL ENCOUNTER (OUTPATIENT)
Dept: PHYSICAL MEDICINE AND REHAB | Facility: CLINIC | Age: 55
Discharge: HOME | End: 2020-09-22
Attending: PHYSICAL MEDICINE & REHABILITATION

## 2020-09-22 DIAGNOSIS — G89.29 CHRONIC RIGHT-SIDED LOW BACK PAIN WITH RIGHT-SIDED SCIATICA: ICD-10-CM

## 2020-09-22 DIAGNOSIS — M54.41 CHRONIC RIGHT-SIDED LOW BACK PAIN WITH RIGHT-SIDED SCIATICA: ICD-10-CM

## 2020-09-22 DIAGNOSIS — M54.16 LUMBAR RADICULOPATHY: ICD-10-CM

## 2020-09-22 DIAGNOSIS — M48.061 STENOSIS OF LATERAL RECESS OF LUMBAR SPINE: ICD-10-CM

## 2020-09-24 ENCOUNTER — HOSPITAL ENCOUNTER (OUTPATIENT)
Dept: PHYSICAL MEDICINE AND REHAB | Facility: CLINIC | Age: 55
Discharge: HOME | End: 2020-09-24
Attending: NURSE PRACTITIONER

## 2020-09-24 DIAGNOSIS — G89.29 CHRONIC RIGHT-SIDED LOW BACK PAIN WITH RIGHT-SIDED SCIATICA: ICD-10-CM

## 2020-09-24 DIAGNOSIS — R29.898 RIGHT LEG WEAKNESS: ICD-10-CM

## 2020-09-24 DIAGNOSIS — M54.16 LUMBAR RADICULOPATHY: ICD-10-CM

## 2020-09-24 DIAGNOSIS — M43.06 PARS DEFECT OF LUMBAR SPINE: ICD-10-CM

## 2020-09-24 DIAGNOSIS — M48.061 STENOSIS OF LATERAL RECESS OF LUMBAR SPINE: ICD-10-CM

## 2020-09-24 DIAGNOSIS — M54.41 CHRONIC RIGHT-SIDED LOW BACK PAIN WITH RIGHT-SIDED SCIATICA: ICD-10-CM

## 2020-10-06 ENCOUNTER — COMMUNICATION - HEALTHEAST (OUTPATIENT)
Dept: PHYSICAL MEDICINE AND REHAB | Facility: CLINIC | Age: 55
End: 2020-10-06

## 2020-10-06 DIAGNOSIS — M54.41 CHRONIC RIGHT-SIDED LOW BACK PAIN WITH RIGHT-SIDED SCIATICA: ICD-10-CM

## 2020-10-06 DIAGNOSIS — G89.29 CHRONIC RIGHT-SIDED LOW BACK PAIN WITH RIGHT-SIDED SCIATICA: ICD-10-CM

## 2020-10-06 DIAGNOSIS — M79.18 MYOFASCIAL PAIN: ICD-10-CM

## 2020-10-06 DIAGNOSIS — M54.16 LUMBAR RADICULOPATHY: ICD-10-CM

## 2020-10-07 ENCOUNTER — HOSPITAL ENCOUNTER (OUTPATIENT)
Dept: PHYSICAL MEDICINE AND REHAB | Facility: CLINIC | Age: 55
Discharge: HOME | End: 2020-10-07
Attending: NURSE PRACTITIONER

## 2020-10-07 ENCOUNTER — HOSPITAL ENCOUNTER (OUTPATIENT)
Dept: PHYSICAL MEDICINE AND REHAB | Facility: CLINIC | Age: 55
Discharge: HOME | End: 2020-10-07
Attending: PHYSICAL MEDICINE & REHABILITATION

## 2020-10-07 DIAGNOSIS — R29.898 RIGHT LEG WEAKNESS: ICD-10-CM

## 2020-10-07 DIAGNOSIS — G89.29 CHRONIC RIGHT-SIDED LOW BACK PAIN WITH RIGHT-SIDED SCIATICA: ICD-10-CM

## 2020-10-07 DIAGNOSIS — M54.41 CHRONIC RIGHT-SIDED LOW BACK PAIN WITH RIGHT-SIDED SCIATICA: ICD-10-CM

## 2020-10-07 DIAGNOSIS — M54.16 LUMBAR RADICULOPATHY: ICD-10-CM

## 2020-10-07 DIAGNOSIS — M48.061 STENOSIS OF LATERAL RECESS OF LUMBAR SPINE: ICD-10-CM

## 2020-10-07 DIAGNOSIS — M43.06 PARS DEFECT OF LUMBAR SPINE: ICD-10-CM

## 2020-10-08 ENCOUNTER — COMMUNICATION - HEALTHEAST (OUTPATIENT)
Dept: PHYSICAL MEDICINE AND REHAB | Facility: CLINIC | Age: 55
End: 2020-10-08

## 2020-10-08 ENCOUNTER — AMBULATORY - HEALTHEAST (OUTPATIENT)
Dept: PHYSICAL MEDICINE AND REHAB | Facility: CLINIC | Age: 55
End: 2020-10-08

## 2020-10-08 DIAGNOSIS — Z98.890 HISTORY OF LUMBAR SURGERY: ICD-10-CM

## 2020-10-08 DIAGNOSIS — R29.898 WEAKNESS OF RIGHT LOWER EXTREMITY: ICD-10-CM

## 2020-10-08 DIAGNOSIS — M54.16 ACUTE RIGHT LUMBAR RADICULOPATHY: ICD-10-CM

## 2020-10-08 DIAGNOSIS — G89.29 CHRONIC RIGHT-SIDED LOW BACK PAIN WITH RIGHT-SIDED SCIATICA: ICD-10-CM

## 2020-10-08 DIAGNOSIS — M54.41 CHRONIC RIGHT-SIDED LOW BACK PAIN WITH RIGHT-SIDED SCIATICA: ICD-10-CM

## 2020-10-14 ENCOUNTER — COMMUNICATION - HEALTHEAST (OUTPATIENT)
Dept: NEUROSURGERY | Facility: CLINIC | Age: 55
End: 2020-10-14

## 2020-10-14 DIAGNOSIS — M54.9 BACK PAIN: ICD-10-CM

## 2020-10-15 ENCOUNTER — AMBULATORY - HEALTHEAST (OUTPATIENT)
Dept: RADIOLOGY | Facility: HOSPITAL | Age: 55
End: 2020-10-15

## 2020-10-15 DIAGNOSIS — Z11.59 ENCOUNTER FOR SCREENING FOR OTHER VIRAL DISEASES: ICD-10-CM

## 2020-10-21 ENCOUNTER — COMMUNICATION - HEALTHEAST (OUTPATIENT)
Dept: PHYSICAL MEDICINE AND REHAB | Facility: CLINIC | Age: 55
End: 2020-10-21

## 2020-10-21 DIAGNOSIS — M54.41 CHRONIC RIGHT-SIDED LOW BACK PAIN WITH RIGHT-SIDED SCIATICA: ICD-10-CM

## 2020-10-21 DIAGNOSIS — G89.29 CHRONIC RIGHT-SIDED LOW BACK PAIN WITH RIGHT-SIDED SCIATICA: ICD-10-CM

## 2020-10-21 DIAGNOSIS — M54.16 LUMBAR RADICULOPATHY: ICD-10-CM

## 2020-10-21 DIAGNOSIS — M79.18 MYOFASCIAL PAIN: ICD-10-CM

## 2020-10-29 ENCOUNTER — HOSPITAL ENCOUNTER (OUTPATIENT)
Dept: PHYSICAL MEDICINE AND REHAB | Facility: CLINIC | Age: 55
Discharge: HOME | End: 2020-10-29
Attending: NURSE PRACTITIONER

## 2020-10-29 DIAGNOSIS — G89.29 CHRONIC RIGHT-SIDED LOW BACK PAIN WITH RIGHT-SIDED SCIATICA: ICD-10-CM

## 2020-10-29 DIAGNOSIS — Z98.890 HISTORY OF LUMBAR SURGERY: ICD-10-CM

## 2020-10-29 DIAGNOSIS — R29.898 WEAKNESS OF RIGHT LOWER EXTREMITY: ICD-10-CM

## 2020-10-29 DIAGNOSIS — M54.16 ACUTE RIGHT LUMBAR RADICULOPATHY: ICD-10-CM

## 2020-10-29 DIAGNOSIS — M54.41 CHRONIC RIGHT-SIDED LOW BACK PAIN WITH RIGHT-SIDED SCIATICA: ICD-10-CM

## 2020-11-02 ENCOUNTER — AMBULATORY - HEALTHEAST (OUTPATIENT)
Dept: LAB | Facility: CLINIC | Age: 55
End: 2020-11-02

## 2020-11-02 DIAGNOSIS — Z11.59 ENCOUNTER FOR SCREENING FOR OTHER VIRAL DISEASES: ICD-10-CM

## 2020-11-04 ENCOUNTER — COMMUNICATION - HEALTHEAST (OUTPATIENT)
Dept: SCHEDULING | Facility: CLINIC | Age: 55
End: 2020-11-04

## 2020-11-05 ENCOUNTER — COMMUNICATION - HEALTHEAST (OUTPATIENT)
Dept: PHYSICAL MEDICINE AND REHAB | Facility: CLINIC | Age: 55
End: 2020-11-05

## 2020-11-05 ENCOUNTER — HOSPITAL ENCOUNTER (OUTPATIENT)
Dept: CT IMAGING | Facility: HOSPITAL | Age: 55
Discharge: HOME | End: 2020-11-05
Attending: SURGERY

## 2020-11-05 ENCOUNTER — HOSPITAL ENCOUNTER (OUTPATIENT)
Dept: RADIOLOGY | Facility: HOSPITAL | Age: 55
Discharge: HOME | End: 2020-11-05
Attending: SURGERY

## 2020-11-05 DIAGNOSIS — M54.9 BACK PAIN: ICD-10-CM

## 2020-11-05 DIAGNOSIS — Z98.890 S/P LUMBAR MICRODISCECTOMY: ICD-10-CM

## 2020-11-05 DIAGNOSIS — G89.29 CHRONIC RIGHT-SIDED LOW BACK PAIN WITH RIGHT-SIDED SCIATICA: ICD-10-CM

## 2020-11-05 DIAGNOSIS — M54.16 ACUTE RIGHT LUMBAR RADICULOPATHY: ICD-10-CM

## 2020-11-05 DIAGNOSIS — M54.41 CHRONIC RIGHT-SIDED LOW BACK PAIN WITH RIGHT-SIDED SCIATICA: ICD-10-CM

## 2020-11-05 DIAGNOSIS — M54.16 LUMBAR RADICULOPATHY: ICD-10-CM

## 2020-11-05 DIAGNOSIS — M79.18 MYOFASCIAL PAIN: ICD-10-CM

## 2020-11-05 ASSESSMENT — MIFFLIN-ST. JEOR: SCORE: 1633.27

## 2020-11-10 ENCOUNTER — OFFICE VISIT - HEALTHEAST (OUTPATIENT)
Dept: NEUROSURGERY | Facility: CLINIC | Age: 55
End: 2020-11-10

## 2020-11-10 DIAGNOSIS — M54.16 LUMBAR RADICULOPATHY: ICD-10-CM

## 2020-11-10 ASSESSMENT — MIFFLIN-ST. JEOR: SCORE: 1633.27

## 2020-11-18 ENCOUNTER — HOSPITAL ENCOUNTER (OUTPATIENT)
Dept: PHYSICAL MEDICINE AND REHAB | Facility: CLINIC | Age: 55
Discharge: HOME | End: 2020-11-18
Attending: NURSE PRACTITIONER

## 2020-11-18 DIAGNOSIS — G89.29 CHRONIC RIGHT-SIDED LOW BACK PAIN WITH RIGHT-SIDED SCIATICA: ICD-10-CM

## 2020-11-18 DIAGNOSIS — M54.41 CHRONIC RIGHT-SIDED LOW BACK PAIN WITH RIGHT-SIDED SCIATICA: ICD-10-CM

## 2020-11-18 DIAGNOSIS — M54.16 LUMBAR RADICULOPATHY: ICD-10-CM

## 2020-11-18 DIAGNOSIS — Z98.890 HISTORY OF LUMBAR SURGERY: ICD-10-CM

## 2020-11-18 DIAGNOSIS — M79.18 MYOFASCIAL PAIN: ICD-10-CM

## 2020-11-18 DIAGNOSIS — M54.16 ACUTE RIGHT LUMBAR RADICULOPATHY: ICD-10-CM

## 2020-11-18 DIAGNOSIS — R29.898 WEAKNESS OF RIGHT LOWER EXTREMITY: ICD-10-CM

## 2020-11-20 ENCOUNTER — SURGERY - HEALTHEAST (OUTPATIENT)
Dept: NEUROSURGERY | Facility: CLINIC | Age: 55
End: 2020-11-20

## 2020-11-20 ENCOUNTER — RECORDS - HEALTHEAST (OUTPATIENT)
Dept: ADMINISTRATIVE | Facility: OTHER | Age: 55
End: 2020-11-20

## 2020-11-20 DIAGNOSIS — M54.16 LUMBAR RADICULOPATHY: ICD-10-CM

## 2020-11-20 DIAGNOSIS — M48.061 SPINAL STENOSIS OF LUMBAR REGION WITHOUT NEUROGENIC CLAUDICATION: ICD-10-CM

## 2020-11-23 ENCOUNTER — AMBULATORY - HEALTHEAST (OUTPATIENT)
Dept: SURGERY | Facility: HOSPITAL | Age: 55
End: 2020-11-23

## 2020-11-23 ENCOUNTER — COMMUNICATION - HEALTHEAST (OUTPATIENT)
Dept: FAMILY MEDICINE | Facility: CLINIC | Age: 55
End: 2020-11-23

## 2020-11-23 ENCOUNTER — COMMUNICATION - HEALTHEAST (OUTPATIENT)
Dept: PHYSICAL MEDICINE AND REHAB | Facility: CLINIC | Age: 55
End: 2020-11-23

## 2020-11-23 DIAGNOSIS — F33.0 MILD EPISODE OF RECURRENT MAJOR DEPRESSIVE DISORDER (H): ICD-10-CM

## 2020-11-23 DIAGNOSIS — Z11.59 ENCOUNTER FOR SCREENING FOR OTHER VIRAL DISEASES: ICD-10-CM

## 2020-11-24 ENCOUNTER — AMBULATORY - HEALTHEAST (OUTPATIENT)
Dept: NEUROSURGERY | Facility: CLINIC | Age: 55
End: 2020-11-24

## 2020-11-24 ENCOUNTER — COMMUNICATION - HEALTHEAST (OUTPATIENT)
Dept: PHYSICAL MEDICINE AND REHAB | Facility: CLINIC | Age: 55
End: 2020-11-24

## 2020-11-24 ENCOUNTER — OFFICE VISIT - HEALTHEAST (OUTPATIENT)
Dept: NEUROSURGERY | Facility: CLINIC | Age: 55
End: 2020-11-24

## 2020-11-24 DIAGNOSIS — Z01.818 PRE-OP EXAM: ICD-10-CM

## 2020-11-24 DIAGNOSIS — M54.16 LUMBAR RADICULOPATHY: ICD-10-CM

## 2020-11-24 ASSESSMENT — MIFFLIN-ST. JEOR: SCORE: 1633.27

## 2020-11-25 ENCOUNTER — COMMUNICATION - HEALTHEAST (OUTPATIENT)
Dept: PHYSICAL MEDICINE AND REHAB | Facility: CLINIC | Age: 55
End: 2020-11-25

## 2020-11-25 ENCOUNTER — COMMUNICATION - HEALTHEAST (OUTPATIENT)
Dept: NEUROSURGERY | Facility: CLINIC | Age: 55
End: 2020-11-25

## 2020-11-25 ENCOUNTER — HOSPITAL ENCOUNTER (OUTPATIENT)
Dept: PHYSICAL MEDICINE AND REHAB | Facility: CLINIC | Age: 55
Discharge: HOME | End: 2020-11-25
Attending: NURSE PRACTITIONER

## 2020-11-25 DIAGNOSIS — Z98.890 HISTORY OF LUMBAR SURGERY: ICD-10-CM

## 2020-11-25 DIAGNOSIS — G89.29 CHRONIC RIGHT-SIDED LOW BACK PAIN WITH RIGHT-SIDED SCIATICA: ICD-10-CM

## 2020-11-25 DIAGNOSIS — R29.898 WEAKNESS OF RIGHT LOWER EXTREMITY: ICD-10-CM

## 2020-11-25 DIAGNOSIS — M54.41 CHRONIC RIGHT-SIDED LOW BACK PAIN WITH RIGHT-SIDED SCIATICA: ICD-10-CM

## 2020-11-25 DIAGNOSIS — M54.16 ACUTE RIGHT LUMBAR RADICULOPATHY: ICD-10-CM

## 2020-11-25 DIAGNOSIS — M54.16 LUMBAR RADICULOPATHY: ICD-10-CM

## 2020-11-27 ENCOUNTER — COMMUNICATION - HEALTHEAST (OUTPATIENT)
Dept: PHYSICAL MEDICINE AND REHAB | Facility: CLINIC | Age: 55
End: 2020-11-27

## 2020-11-27 ENCOUNTER — OFFICE VISIT - HEALTHEAST (OUTPATIENT)
Dept: FAMILY MEDICINE | Facility: CLINIC | Age: 55
End: 2020-11-27

## 2020-11-27 DIAGNOSIS — M79.89 LEFT LEG SWELLING: ICD-10-CM

## 2020-12-02 ENCOUNTER — COMMUNICATION - HEALTHEAST (OUTPATIENT)
Dept: FAMILY MEDICINE | Facility: CLINIC | Age: 55
End: 2020-12-02

## 2020-12-02 ENCOUNTER — HOSPITAL ENCOUNTER (OUTPATIENT)
Dept: PHYSICAL MEDICINE AND REHAB | Facility: CLINIC | Age: 55
Discharge: HOME | End: 2020-12-02
Attending: NURSE PRACTITIONER

## 2020-12-02 DIAGNOSIS — M54.41 CHRONIC RIGHT-SIDED LOW BACK PAIN WITH RIGHT-SIDED SCIATICA: ICD-10-CM

## 2020-12-02 DIAGNOSIS — R29.898 WEAKNESS OF RIGHT LOWER EXTREMITY: ICD-10-CM

## 2020-12-02 DIAGNOSIS — J45.909 ASTHMA: ICD-10-CM

## 2020-12-02 DIAGNOSIS — M54.16 LUMBAR RADICULOPATHY: ICD-10-CM

## 2020-12-02 DIAGNOSIS — M54.16 ACUTE RIGHT LUMBAR RADICULOPATHY: ICD-10-CM

## 2020-12-02 DIAGNOSIS — G89.29 CHRONIC RIGHT-SIDED LOW BACK PAIN WITH RIGHT-SIDED SCIATICA: ICD-10-CM

## 2020-12-03 ENCOUNTER — COMMUNICATION - HEALTHEAST (OUTPATIENT)
Dept: NEUROSURGERY | Facility: CLINIC | Age: 55
End: 2020-12-03

## 2020-12-07 ENCOUNTER — AMBULATORY - HEALTHEAST (OUTPATIENT)
Dept: SURGERY | Facility: HOSPITAL | Age: 55
End: 2020-12-07

## 2020-12-07 ENCOUNTER — COMMUNICATION - HEALTHEAST (OUTPATIENT)
Dept: NEUROSURGERY | Facility: CLINIC | Age: 55
End: 2020-12-07

## 2020-12-07 DIAGNOSIS — Z11.59 ENCOUNTER FOR SCREENING FOR OTHER VIRAL DISEASES: ICD-10-CM

## 2020-12-08 ENCOUNTER — RECORDS - HEALTHEAST (OUTPATIENT)
Dept: MAMMOGRAPHY | Facility: CLINIC | Age: 55
End: 2020-12-08

## 2020-12-08 ENCOUNTER — OFFICE VISIT - HEALTHEAST (OUTPATIENT)
Dept: FAMILY MEDICINE | Facility: CLINIC | Age: 55
End: 2020-12-08

## 2020-12-08 ENCOUNTER — COMMUNICATION - HEALTHEAST (OUTPATIENT)
Dept: NEUROSURGERY | Facility: CLINIC | Age: 55
End: 2020-12-08

## 2020-12-08 DIAGNOSIS — E89.0 POSTABLATIVE HYPOTHYROIDISM: ICD-10-CM

## 2020-12-08 DIAGNOSIS — F17.200 TOBACCO USE DISORDER: ICD-10-CM

## 2020-12-08 DIAGNOSIS — Z11.59 NEED FOR HEPATITIS C SCREENING TEST: ICD-10-CM

## 2020-12-08 DIAGNOSIS — Z12.31 VISIT FOR SCREENING MAMMOGRAM: ICD-10-CM

## 2020-12-08 DIAGNOSIS — M48.061 SPINAL STENOSIS OF LUMBAR REGION, UNSPECIFIED WHETHER NEUROGENIC CLAUDICATION PRESENT: ICD-10-CM

## 2020-12-08 DIAGNOSIS — Z12.31 ENCOUNTER FOR SCREENING MAMMOGRAM FOR MALIGNANT NEOPLASM OF BREAST: ICD-10-CM

## 2020-12-08 DIAGNOSIS — Z01.818 PRE-OP EXAM: ICD-10-CM

## 2020-12-08 DIAGNOSIS — J45.20 MILD INTERMITTENT ASTHMA WITHOUT COMPLICATION: ICD-10-CM

## 2020-12-08 DIAGNOSIS — Z11.4 ENCOUNTER FOR SCREENING FOR HIV: ICD-10-CM

## 2020-12-08 DIAGNOSIS — G47.33 OSA (OBSTRUCTIVE SLEEP APNEA): ICD-10-CM

## 2020-12-08 DIAGNOSIS — M51.26 LUMBAR DISC HERNIATION: ICD-10-CM

## 2020-12-08 DIAGNOSIS — E78.00 PURE HYPERCHOLESTEROLEMIA: ICD-10-CM

## 2020-12-08 DIAGNOSIS — F32.0 MILD MAJOR DEPRESSION (H): ICD-10-CM

## 2020-12-08 LAB
ALBUMIN UR-MCNC: NEGATIVE MG/DL
ANION GAP SERPL CALCULATED.3IONS-SCNC: 12 MMOL/L (ref 5–18)
APPEARANCE UR: CLEAR
APTT PPP: 29 SECONDS (ref 24–37)
BACTERIA #/AREA URNS HPF: ABNORMAL HPF
BILIRUB UR QL STRIP: NEGATIVE
BUN SERPL-MCNC: 13 MG/DL (ref 8–22)
CALCIUM SERPL-MCNC: 9.6 MG/DL (ref 8.5–10.5)
CHLORIDE BLD-SCNC: 105 MMOL/L (ref 98–107)
CHOLEST SERPL-MCNC: 189 MG/DL
CLOSURE TME COLL+EPINEP BLD: 147 SEC (ref 1–180)
CO2 SERPL-SCNC: 25 MMOL/L (ref 22–31)
COLOR UR AUTO: YELLOW
CREAT SERPL-MCNC: 0.81 MG/DL (ref 0.6–1.1)
ERYTHROCYTE [DISTWIDTH] IN BLOOD BY AUTOMATED COUNT: 12 % (ref 11–14.5)
FASTING STATUS PATIENT QL REPORTED: YES
GFR SERPL CREATININE-BSD FRML MDRD: >60 ML/MIN/1.73M2
GLUCOSE BLD-MCNC: 111 MG/DL (ref 70–125)
GLUCOSE UR STRIP-MCNC: NEGATIVE MG/DL
HCT VFR BLD AUTO: 43.2 % (ref 35–47)
HDLC SERPL-MCNC: 34 MG/DL
HGB BLD-MCNC: 14.3 G/DL (ref 12–16)
HGB UR QL STRIP: NEGATIVE
HIV 1+2 AB+HIV1 P24 AG SERPL QL IA: NEGATIVE
INR PPP: 0.94 (ref 0.9–1.1)
KETONES UR STRIP-MCNC: NEGATIVE MG/DL
LDLC SERPL CALC-MCNC: 82 MG/DL
LEUKOCYTE ESTERASE UR QL STRIP: ABNORMAL
MCH RBC QN AUTO: 30.8 PG (ref 27–34)
MCHC RBC AUTO-ENTMCNC: 33.1 G/DL (ref 32–36)
MCV RBC AUTO: 93 FL (ref 80–100)
NITRATE UR QL: NEGATIVE
PH UR STRIP: 6 [PH] (ref 5–8)
PLATELET # BLD AUTO: 341 THOU/UL (ref 140–440)
PMV BLD AUTO: 7.2 FL (ref 7–10)
POTASSIUM BLD-SCNC: 4.6 MMOL/L (ref 3.5–5)
RBC # BLD AUTO: 4.65 MILL/UL (ref 3.8–5.4)
RBC #/AREA URNS AUTO: ABNORMAL HPF
SODIUM SERPL-SCNC: 142 MMOL/L (ref 136–145)
SP GR UR STRIP: >=1.03 (ref 1–1.03)
SQUAMOUS #/AREA URNS AUTO: ABNORMAL LPF
T4 FREE SERPL-MCNC: 1.1 NG/DL (ref 0.7–1.8)
TRIGL SERPL-MCNC: 364 MG/DL
TSH SERPL DL<=0.005 MIU/L-ACNC: 0.31 UIU/ML (ref 0.3–5)
UROBILINOGEN UR STRIP-ACNC: ABNORMAL
WBC #/AREA URNS AUTO: ABNORMAL HPF
WBC: 7.3 THOU/UL (ref 4–11)

## 2020-12-08 ASSESSMENT — PATIENT HEALTH QUESTIONNAIRE - PHQ9: SUM OF ALL RESPONSES TO PHQ QUESTIONS 1-9: 2

## 2020-12-08 ASSESSMENT — MIFFLIN-ST. JEOR: SCORE: 1500.03

## 2020-12-09 ENCOUNTER — HOSPITAL ENCOUNTER (OUTPATIENT)
Dept: PHYSICAL MEDICINE AND REHAB | Facility: CLINIC | Age: 55
Discharge: HOME | End: 2020-12-09
Attending: NURSE PRACTITIONER

## 2020-12-09 DIAGNOSIS — M54.16 ACUTE RIGHT LUMBAR RADICULOPATHY: ICD-10-CM

## 2020-12-09 DIAGNOSIS — M54.41 CHRONIC RIGHT-SIDED LOW BACK PAIN WITH RIGHT-SIDED SCIATICA: ICD-10-CM

## 2020-12-09 DIAGNOSIS — R29.898 WEAKNESS OF RIGHT LOWER EXTREMITY: ICD-10-CM

## 2020-12-09 DIAGNOSIS — M54.16 LUMBAR RADICULOPATHY: ICD-10-CM

## 2020-12-09 DIAGNOSIS — Z98.890 HISTORY OF LUMBAR SURGERY: ICD-10-CM

## 2020-12-09 DIAGNOSIS — G89.29 CHRONIC RIGHT-SIDED LOW BACK PAIN WITH RIGHT-SIDED SCIATICA: ICD-10-CM

## 2020-12-09 LAB
ATRIAL RATE - MUSE: 72 BPM
BACTERIA SPEC CULT: NORMAL
DIASTOLIC BLOOD PRESSURE - MUSE: NORMAL
HCV AB SERPL QL IA: NEGATIVE
INTERPRETATION ECG - MUSE: NORMAL
P AXIS - MUSE: 58 DEGREES
PR INTERVAL - MUSE: 158 MS
QRS DURATION - MUSE: 96 MS
QT - MUSE: 394 MS
QTC - MUSE: 431 MS
R AXIS - MUSE: 34 DEGREES
SYSTOLIC BLOOD PRESSURE - MUSE: NORMAL
T AXIS - MUSE: 42 DEGREES
VENTRICULAR RATE- MUSE: 72 BPM

## 2020-12-14 ENCOUNTER — AMBULATORY - HEALTHEAST (OUTPATIENT)
Dept: LAB | Facility: CLINIC | Age: 55
End: 2020-12-14

## 2020-12-14 DIAGNOSIS — Z11.59 ENCOUNTER FOR SCREENING FOR OTHER VIRAL DISEASES: ICD-10-CM

## 2020-12-16 ENCOUNTER — ANESTHESIA - HEALTHEAST (OUTPATIENT)
Dept: SURGERY | Facility: HOSPITAL | Age: 55
End: 2020-12-16

## 2020-12-16 ENCOUNTER — COMMUNICATION - HEALTHEAST (OUTPATIENT)
Dept: NEUROSURGERY | Facility: CLINIC | Age: 55
End: 2020-12-16

## 2020-12-16 ENCOUNTER — COMMUNICATION - HEALTHEAST (OUTPATIENT)
Dept: SCHEDULING | Facility: CLINIC | Age: 55
End: 2020-12-16

## 2020-12-17 ENCOUNTER — SURGERY - HEALTHEAST (OUTPATIENT)
Dept: SURGERY | Facility: HOSPITAL | Age: 55
End: 2020-12-17

## 2020-12-21 ENCOUNTER — COMMUNICATION - HEALTHEAST (OUTPATIENT)
Dept: NEUROSURGERY | Facility: CLINIC | Age: 55
End: 2020-12-21

## 2020-12-21 DIAGNOSIS — Z98.1 S/P LUMBAR FUSION: ICD-10-CM

## 2020-12-22 ENCOUNTER — COMMUNICATION - HEALTHEAST (OUTPATIENT)
Dept: NEUROLOGY | Facility: CLINIC | Age: 55
End: 2020-12-22

## 2020-12-22 ENCOUNTER — COMMUNICATION - HEALTHEAST (OUTPATIENT)
Dept: NEUROSURGERY | Facility: CLINIC | Age: 55
End: 2020-12-22

## 2020-12-22 DIAGNOSIS — M48.061 SPINAL STENOSIS OF LUMBAR REGION WITHOUT NEUROGENIC CLAUDICATION: ICD-10-CM

## 2020-12-22 DIAGNOSIS — M54.16 LUMBAR RADICULOPATHY: ICD-10-CM

## 2020-12-22 DIAGNOSIS — Z98.1 S/P LUMBAR FUSION: ICD-10-CM

## 2020-12-27 ENCOUNTER — COMMUNICATION - HEALTHEAST (OUTPATIENT)
Dept: FAMILY MEDICINE | Facility: CLINIC | Age: 55
End: 2020-12-27

## 2020-12-27 DIAGNOSIS — M54.50 LOW BACK PAIN: ICD-10-CM

## 2020-12-28 ENCOUNTER — AMBULATORY - HEALTHEAST (OUTPATIENT)
Dept: NEUROSURGERY | Facility: CLINIC | Age: 55
End: 2020-12-28

## 2020-12-30 ENCOUNTER — COMMUNICATION - HEALTHEAST (OUTPATIENT)
Dept: NEUROSURGERY | Facility: CLINIC | Age: 55
End: 2020-12-30

## 2020-12-30 ENCOUNTER — COMMUNICATION - HEALTHEAST (OUTPATIENT)
Dept: PHYSICAL MEDICINE AND REHAB | Facility: CLINIC | Age: 55
End: 2020-12-30

## 2020-12-30 ENCOUNTER — COMMUNICATION - HEALTHEAST (OUTPATIENT)
Dept: NEUROLOGY | Facility: CLINIC | Age: 55
End: 2020-12-30

## 2020-12-30 ENCOUNTER — COMMUNICATION - HEALTHEAST (OUTPATIENT)
Dept: FAMILY MEDICINE | Facility: CLINIC | Age: 55
End: 2020-12-30

## 2020-12-30 DIAGNOSIS — G89.29 CHRONIC RIGHT-SIDED LOW BACK PAIN WITH RIGHT-SIDED SCIATICA: ICD-10-CM

## 2020-12-30 DIAGNOSIS — Z98.1 S/P LUMBAR FUSION: ICD-10-CM

## 2020-12-30 DIAGNOSIS — M79.18 MYOFASCIAL PAIN: ICD-10-CM

## 2020-12-30 DIAGNOSIS — M54.16 LUMBAR RADICULOPATHY: ICD-10-CM

## 2020-12-30 DIAGNOSIS — M48.061 SPINAL STENOSIS OF LUMBAR REGION WITHOUT NEUROGENIC CLAUDICATION: ICD-10-CM

## 2020-12-30 DIAGNOSIS — M54.41 CHRONIC RIGHT-SIDED LOW BACK PAIN WITH RIGHT-SIDED SCIATICA: ICD-10-CM

## 2020-12-31 ENCOUNTER — COMMUNICATION - HEALTHEAST (OUTPATIENT)
Dept: NEUROSURGERY | Facility: CLINIC | Age: 55
End: 2020-12-31

## 2021-01-05 ENCOUNTER — COMMUNICATION - HEALTHEAST (OUTPATIENT)
Dept: NEUROSURGERY | Facility: CLINIC | Age: 56
End: 2021-01-05

## 2021-01-06 ENCOUNTER — COMMUNICATION - HEALTHEAST (OUTPATIENT)
Dept: NEUROSURGERY | Facility: CLINIC | Age: 56
End: 2021-01-06

## 2021-01-06 ENCOUNTER — COMMUNICATION - HEALTHEAST (OUTPATIENT)
Dept: SCHEDULING | Facility: CLINIC | Age: 56
End: 2021-01-06

## 2021-01-07 ENCOUNTER — COMMUNICATION - HEALTHEAST (OUTPATIENT)
Dept: ADMINISTRATIVE | Facility: CLINIC | Age: 56
End: 2021-01-07

## 2021-01-07 ENCOUNTER — OFFICE VISIT - HEALTHEAST (OUTPATIENT)
Dept: FAMILY MEDICINE | Facility: CLINIC | Age: 56
End: 2021-01-07

## 2021-01-07 ENCOUNTER — COMMUNICATION - HEALTHEAST (OUTPATIENT)
Dept: NEUROSURGERY | Facility: CLINIC | Age: 56
End: 2021-01-07

## 2021-01-07 ENCOUNTER — COMMUNICATION - HEALTHEAST (OUTPATIENT)
Dept: FAMILY MEDICINE | Facility: CLINIC | Age: 56
End: 2021-01-07

## 2021-01-07 ENCOUNTER — RECORDS - HEALTHEAST (OUTPATIENT)
Dept: ADMINISTRATIVE | Facility: OTHER | Age: 56
End: 2021-01-07

## 2021-01-07 DIAGNOSIS — F41.0 ANXIETY ATTACK: ICD-10-CM

## 2021-01-07 DIAGNOSIS — M54.16 LUMBAR RADICULOPATHY: ICD-10-CM

## 2021-01-07 DIAGNOSIS — M48.00 SPINAL STENOSIS, UNSPECIFIED SPINAL REGION: ICD-10-CM

## 2021-01-07 DIAGNOSIS — F33.2 SEVERE EPISODE OF RECURRENT MAJOR DEPRESSIVE DISORDER, WITHOUT PSYCHOTIC FEATURES (H): ICD-10-CM

## 2021-01-07 DIAGNOSIS — F19.11 HISTORY OF DRUG ABUSE (H): ICD-10-CM

## 2021-01-07 ASSESSMENT — ANXIETY QUESTIONNAIRES
5. BEING SO RESTLESS THAT IT IS HARD TO SIT STILL: MORE THAN HALF THE DAYS
7. FEELING AFRAID AS IF SOMETHING AWFUL MIGHT HAPPEN: NOT AT ALL
GAD7 TOTAL SCORE: 12
3. WORRYING TOO MUCH ABOUT DIFFERENT THINGS: MORE THAN HALF THE DAYS
1. FEELING NERVOUS, ANXIOUS, OR ON EDGE: SEVERAL DAYS
6. BECOMING EASILY ANNOYED OR IRRITABLE: NEARLY EVERY DAY
4. TROUBLE RELAXING: SEVERAL DAYS
2. NOT BEING ABLE TO STOP OR CONTROL WORRYING: NEARLY EVERY DAY

## 2021-01-07 ASSESSMENT — PATIENT HEALTH QUESTIONNAIRE - PHQ9: SUM OF ALL RESPONSES TO PHQ QUESTIONS 1-9: 12

## 2021-01-08 ENCOUNTER — COMMUNICATION - HEALTHEAST (OUTPATIENT)
Dept: NEUROSURGERY | Facility: CLINIC | Age: 56
End: 2021-01-08

## 2021-01-08 DIAGNOSIS — M54.16 LUMBAR RADICULOPATHY: ICD-10-CM

## 2021-01-11 ENCOUNTER — COMMUNICATION - HEALTHEAST (OUTPATIENT)
Dept: FAMILY MEDICINE | Facility: CLINIC | Age: 56
End: 2021-01-11

## 2021-01-13 ENCOUNTER — COMMUNICATION - HEALTHEAST (OUTPATIENT)
Dept: NEUROSURGERY | Facility: CLINIC | Age: 56
End: 2021-01-13

## 2021-01-13 ENCOUNTER — AMBULATORY - HEALTHEAST (OUTPATIENT)
Dept: NEUROSURGERY | Facility: CLINIC | Age: 56
End: 2021-01-13

## 2021-01-13 ENCOUNTER — COMMUNICATION - HEALTHEAST (OUTPATIENT)
Dept: PHYSICAL MEDICINE AND REHAB | Facility: CLINIC | Age: 56
End: 2021-01-13

## 2021-01-13 DIAGNOSIS — G89.29 CHRONIC RIGHT-SIDED LOW BACK PAIN WITH RIGHT-SIDED SCIATICA: ICD-10-CM

## 2021-01-13 DIAGNOSIS — M54.9 BACK PAIN: ICD-10-CM

## 2021-01-13 DIAGNOSIS — M54.16 ACUTE RIGHT LUMBAR RADICULOPATHY: ICD-10-CM

## 2021-01-13 DIAGNOSIS — M54.41 CHRONIC RIGHT-SIDED LOW BACK PAIN WITH RIGHT-SIDED SCIATICA: ICD-10-CM

## 2021-01-14 ENCOUNTER — COMMUNICATION - HEALTHEAST (OUTPATIENT)
Dept: NEUROLOGY | Facility: CLINIC | Age: 56
End: 2021-01-14

## 2021-01-14 ENCOUNTER — COMMUNICATION - HEALTHEAST (OUTPATIENT)
Dept: NEUROSURGERY | Facility: CLINIC | Age: 56
End: 2021-01-14

## 2021-01-14 DIAGNOSIS — M54.16 LUMBAR RADICULOPATHY: ICD-10-CM

## 2021-01-16 ENCOUNTER — COMMUNICATION - HEALTHEAST (OUTPATIENT)
Dept: FAMILY MEDICINE | Facility: CLINIC | Age: 56
End: 2021-01-16

## 2021-01-16 DIAGNOSIS — M54.16 LUMBAR RADICULOPATHY: ICD-10-CM

## 2021-01-19 ENCOUNTER — COMMUNICATION - HEALTHEAST (OUTPATIENT)
Dept: FAMILY MEDICINE | Facility: CLINIC | Age: 56
End: 2021-01-19

## 2021-01-27 ENCOUNTER — COMMUNICATION - HEALTHEAST (OUTPATIENT)
Dept: NEUROSURGERY | Facility: CLINIC | Age: 56
End: 2021-01-27

## 2021-01-27 ENCOUNTER — HOSPITAL ENCOUNTER (OUTPATIENT)
Dept: RADIOLOGY | Facility: HOSPITAL | Age: 56
Discharge: HOME | End: 2021-01-27
Attending: SURGERY

## 2021-01-27 ENCOUNTER — OFFICE VISIT - HEALTHEAST (OUTPATIENT)
Dept: NEUROSURGERY | Facility: CLINIC | Age: 56
End: 2021-01-27

## 2021-01-27 DIAGNOSIS — Z98.1 S/P LUMBAR FUSION: ICD-10-CM

## 2021-01-27 DIAGNOSIS — R29.898 RIGHT LEG WEAKNESS: ICD-10-CM

## 2021-01-27 DIAGNOSIS — M54.16 LUMBAR RADICULOPATHY: ICD-10-CM

## 2021-01-27 DIAGNOSIS — R29.6 FALLS FREQUENTLY: ICD-10-CM

## 2021-01-27 ASSESSMENT — MIFFLIN-ST. JEOR: SCORE: 1500.03

## 2021-01-28 ENCOUNTER — RECORDS - HEALTHEAST (OUTPATIENT)
Dept: ADMINISTRATIVE | Facility: OTHER | Age: 56
End: 2021-01-28

## 2021-02-01 ENCOUNTER — OFFICE VISIT - HEALTHEAST (OUTPATIENT)
Dept: PHYSICAL THERAPY | Facility: REHABILITATION | Age: 56
End: 2021-02-01

## 2021-02-01 DIAGNOSIS — R29.898 WEAKNESS OF RIGHT LOWER EXTREMITY: ICD-10-CM

## 2021-02-01 DIAGNOSIS — M54.16 LUMBAR RADICULOPATHY: ICD-10-CM

## 2021-02-01 DIAGNOSIS — Z98.1 S/P LUMBAR SPINAL FUSION: ICD-10-CM

## 2021-02-02 ENCOUNTER — COMMUNICATION - HEALTHEAST (OUTPATIENT)
Dept: NEUROSURGERY | Facility: CLINIC | Age: 56
End: 2021-02-02

## 2021-02-02 DIAGNOSIS — M54.16 LUMBAR RADICULOPATHY: ICD-10-CM

## 2021-02-03 ENCOUNTER — COMMUNICATION - HEALTHEAST (OUTPATIENT)
Dept: NEUROSURGERY | Facility: CLINIC | Age: 56
End: 2021-02-03

## 2021-02-03 DIAGNOSIS — M54.16 LUMBAR RADICULOPATHY: ICD-10-CM

## 2021-02-04 ENCOUNTER — COMMUNICATION - HEALTHEAST (OUTPATIENT)
Dept: NEUROSURGERY | Facility: CLINIC | Age: 56
End: 2021-02-04

## 2021-02-04 ENCOUNTER — OFFICE VISIT - HEALTHEAST (OUTPATIENT)
Dept: PHYSICAL THERAPY | Facility: REHABILITATION | Age: 56
End: 2021-02-04

## 2021-02-04 DIAGNOSIS — M54.16 LUMBAR RADICULOPATHY: ICD-10-CM

## 2021-02-04 DIAGNOSIS — R20.0 LEG NUMBNESS: ICD-10-CM

## 2021-02-04 DIAGNOSIS — R29.898 WEAKNESS OF RIGHT LOWER EXTREMITY: ICD-10-CM

## 2021-02-04 DIAGNOSIS — Z98.1 S/P LUMBAR SPINAL FUSION: ICD-10-CM

## 2021-02-04 DIAGNOSIS — M62.81 GENERALIZED MUSCLE WEAKNESS: ICD-10-CM

## 2021-02-08 ENCOUNTER — OFFICE VISIT - HEALTHEAST (OUTPATIENT)
Dept: PHYSICAL THERAPY | Facility: REHABILITATION | Age: 56
End: 2021-02-08

## 2021-02-08 DIAGNOSIS — M54.16 LUMBAR RADICULOPATHY: ICD-10-CM

## 2021-02-08 DIAGNOSIS — M62.81 GENERALIZED MUSCLE WEAKNESS: ICD-10-CM

## 2021-02-08 DIAGNOSIS — Z98.1 S/P LUMBAR SPINAL FUSION: ICD-10-CM

## 2021-02-08 DIAGNOSIS — R29.898 WEAKNESS OF RIGHT LOWER EXTREMITY: ICD-10-CM

## 2021-02-09 ENCOUNTER — HOSPITAL ENCOUNTER (OUTPATIENT)
Dept: CT IMAGING | Facility: HOSPITAL | Age: 56
Discharge: HOME | End: 2021-02-09
Attending: SURGERY

## 2021-02-09 ENCOUNTER — HOSPITAL ENCOUNTER (OUTPATIENT)
Dept: RADIOLOGY | Facility: HOSPITAL | Age: 56
Discharge: HOME | End: 2021-02-09
Attending: SURGERY

## 2021-02-09 DIAGNOSIS — Z98.1 S/P LUMBAR FUSION: ICD-10-CM

## 2021-02-09 DIAGNOSIS — R29.898 RIGHT LEG WEAKNESS: ICD-10-CM

## 2021-02-09 DIAGNOSIS — R29.6 FALLS FREQUENTLY: ICD-10-CM

## 2021-02-09 DIAGNOSIS — M54.16 LUMBAR RADICULOPATHY: ICD-10-CM

## 2021-02-11 ENCOUNTER — OFFICE VISIT - HEALTHEAST (OUTPATIENT)
Dept: FAMILY MEDICINE | Facility: CLINIC | Age: 56
End: 2021-02-11

## 2021-02-11 DIAGNOSIS — M54.16 LUMBAR RADICULOPATHY: ICD-10-CM

## 2021-02-11 DIAGNOSIS — E03.9 ACQUIRED HYPOTHYROIDISM: ICD-10-CM

## 2021-02-11 DIAGNOSIS — M48.00 SPINAL STENOSIS, UNSPECIFIED SPINAL REGION: ICD-10-CM

## 2021-02-11 DIAGNOSIS — F19.11 HISTORY OF DRUG ABUSE (H): ICD-10-CM

## 2021-02-11 DIAGNOSIS — F41.0 ANXIETY ATTACK: ICD-10-CM

## 2021-02-11 DIAGNOSIS — F33.2 SEVERE EPISODE OF RECURRENT MAJOR DEPRESSIVE DISORDER, WITHOUT PSYCHOTIC FEATURES (H): ICD-10-CM

## 2021-02-15 ENCOUNTER — OFFICE VISIT - HEALTHEAST (OUTPATIENT)
Dept: PHYSICAL THERAPY | Facility: REHABILITATION | Age: 56
End: 2021-02-15

## 2021-02-15 DIAGNOSIS — R29.898 WEAKNESS OF RIGHT LOWER EXTREMITY: ICD-10-CM

## 2021-02-15 DIAGNOSIS — Z98.1 S/P LUMBAR SPINAL FUSION: ICD-10-CM

## 2021-02-15 DIAGNOSIS — M54.16 LUMBAR RADICULOPATHY: ICD-10-CM

## 2021-02-15 DIAGNOSIS — M62.81 GENERALIZED MUSCLE WEAKNESS: ICD-10-CM

## 2021-02-16 ENCOUNTER — AMBULATORY - HEALTHEAST (OUTPATIENT)
Dept: NEUROSURGERY | Facility: CLINIC | Age: 56
End: 2021-02-16

## 2021-02-16 ENCOUNTER — OFFICE VISIT - HEALTHEAST (OUTPATIENT)
Dept: NEUROSURGERY | Facility: CLINIC | Age: 56
End: 2021-02-16

## 2021-02-16 ENCOUNTER — COMMUNICATION - HEALTHEAST (OUTPATIENT)
Dept: FAMILY MEDICINE | Facility: CLINIC | Age: 56
End: 2021-02-16

## 2021-02-16 DIAGNOSIS — R26.89 IMBALANCE: ICD-10-CM

## 2021-02-16 DIAGNOSIS — M54.2 NECK PAIN: ICD-10-CM

## 2021-02-16 DIAGNOSIS — G43.909 MIGRAINE: ICD-10-CM

## 2021-02-16 ASSESSMENT — MIFFLIN-ST. JEOR: SCORE: 1418.39

## 2021-02-18 ENCOUNTER — COMMUNICATION - HEALTHEAST (OUTPATIENT)
Dept: NEUROSURGERY | Facility: CLINIC | Age: 56
End: 2021-02-18

## 2021-02-18 ENCOUNTER — OFFICE VISIT - HEALTHEAST (OUTPATIENT)
Dept: PHYSICAL THERAPY | Facility: REHABILITATION | Age: 56
End: 2021-02-18

## 2021-02-18 DIAGNOSIS — M54.16 LUMBAR RADICULOPATHY: ICD-10-CM

## 2021-02-18 DIAGNOSIS — R29.898 WEAKNESS OF RIGHT LOWER EXTREMITY: ICD-10-CM

## 2021-02-18 DIAGNOSIS — Z98.1 S/P LUMBAR SPINAL FUSION: ICD-10-CM

## 2021-02-18 DIAGNOSIS — M62.81 GENERALIZED MUSCLE WEAKNESS: ICD-10-CM

## 2021-02-22 ENCOUNTER — AMBULATORY - HEALTHEAST (OUTPATIENT)
Dept: NEUROSURGERY | Facility: CLINIC | Age: 56
End: 2021-02-22

## 2021-02-22 ENCOUNTER — OFFICE VISIT - HEALTHEAST (OUTPATIENT)
Dept: PHYSICAL THERAPY | Facility: REHABILITATION | Age: 56
End: 2021-02-22

## 2021-02-22 DIAGNOSIS — Z98.1 S/P LUMBAR SPINAL FUSION: ICD-10-CM

## 2021-02-22 DIAGNOSIS — M62.81 GENERALIZED MUSCLE WEAKNESS: ICD-10-CM

## 2021-02-22 DIAGNOSIS — Z11.59 ENCOUNTER FOR SCREENING FOR OTHER VIRAL DISEASES: ICD-10-CM

## 2021-02-22 DIAGNOSIS — M54.16 LUMBAR RADICULOPATHY: ICD-10-CM

## 2021-02-22 DIAGNOSIS — R29.898 WEAKNESS OF RIGHT LOWER EXTREMITY: ICD-10-CM

## 2021-02-25 ENCOUNTER — COMMUNICATION - HEALTHEAST (OUTPATIENT)
Dept: NEUROSURGERY | Facility: CLINIC | Age: 56
End: 2021-02-25

## 2021-02-25 ENCOUNTER — OFFICE VISIT - HEALTHEAST (OUTPATIENT)
Dept: PHYSICAL THERAPY | Facility: REHABILITATION | Age: 56
End: 2021-02-25

## 2021-02-25 DIAGNOSIS — R29.898 WEAKNESS OF RIGHT LOWER EXTREMITY: ICD-10-CM

## 2021-02-25 DIAGNOSIS — M54.16 LUMBAR RADICULOPATHY: ICD-10-CM

## 2021-02-25 DIAGNOSIS — M62.81 GENERALIZED MUSCLE WEAKNESS: ICD-10-CM

## 2021-02-25 DIAGNOSIS — Z98.1 S/P LUMBAR SPINAL FUSION: ICD-10-CM

## 2021-03-01 ENCOUNTER — OFFICE VISIT - HEALTHEAST (OUTPATIENT)
Dept: PHYSICAL THERAPY | Facility: REHABILITATION | Age: 56
End: 2021-03-01

## 2021-03-01 DIAGNOSIS — Z98.1 S/P LUMBAR SPINAL FUSION: ICD-10-CM

## 2021-03-01 DIAGNOSIS — M54.41 CHRONIC RIGHT-SIDED LOW BACK PAIN WITH RIGHT-SIDED SCIATICA: ICD-10-CM

## 2021-03-01 DIAGNOSIS — G89.29 CHRONIC RIGHT-SIDED LOW BACK PAIN WITH RIGHT-SIDED SCIATICA: ICD-10-CM

## 2021-03-01 DIAGNOSIS — M62.81 GENERALIZED MUSCLE WEAKNESS: ICD-10-CM

## 2021-03-01 DIAGNOSIS — M54.16 LUMBAR RADICULOPATHY: ICD-10-CM

## 2021-03-01 DIAGNOSIS — R29.898 WEAKNESS OF RIGHT LOWER EXTREMITY: ICD-10-CM

## 2021-03-03 ENCOUNTER — COMMUNICATION - HEALTHEAST (OUTPATIENT)
Dept: NEUROSURGERY | Facility: CLINIC | Age: 56
End: 2021-03-03

## 2021-03-03 DIAGNOSIS — M54.16 LUMBAR RADICULOPATHY: ICD-10-CM

## 2021-03-08 ENCOUNTER — OFFICE VISIT - HEALTHEAST (OUTPATIENT)
Dept: PHYSICAL THERAPY | Facility: REHABILITATION | Age: 56
End: 2021-03-08

## 2021-03-08 DIAGNOSIS — R29.898 WEAKNESS OF RIGHT LOWER EXTREMITY: ICD-10-CM

## 2021-03-08 DIAGNOSIS — Z98.1 S/P LUMBAR SPINAL FUSION: ICD-10-CM

## 2021-03-08 DIAGNOSIS — M54.16 LUMBAR RADICULOPATHY: ICD-10-CM

## 2021-03-08 DIAGNOSIS — M62.81 GENERALIZED MUSCLE WEAKNESS: ICD-10-CM

## 2021-03-08 DIAGNOSIS — G89.29 CHRONIC RIGHT-SIDED LOW BACK PAIN WITH RIGHT-SIDED SCIATICA: ICD-10-CM

## 2021-03-08 DIAGNOSIS — M54.41 CHRONIC RIGHT-SIDED LOW BACK PAIN WITH RIGHT-SIDED SCIATICA: ICD-10-CM

## 2021-03-09 ENCOUNTER — AMBULATORY - HEALTHEAST (OUTPATIENT)
Dept: LAB | Facility: CLINIC | Age: 56
End: 2021-03-09

## 2021-03-09 DIAGNOSIS — Z11.59 ENCOUNTER FOR SCREENING FOR OTHER VIRAL DISEASES: ICD-10-CM

## 2021-03-09 LAB
SARS-COV-2 PCR COMMENT: NORMAL
SARS-COV-2 RNA SPEC QL NAA+PROBE: NEGATIVE
SARS-COV-2 VIRUS SPECIMEN SOURCE: NORMAL

## 2021-03-10 ENCOUNTER — COMMUNICATION - HEALTHEAST (OUTPATIENT)
Dept: SCHEDULING | Facility: CLINIC | Age: 56
End: 2021-03-10

## 2021-03-11 ENCOUNTER — OFFICE VISIT - HEALTHEAST (OUTPATIENT)
Dept: PHYSICAL THERAPY | Facility: REHABILITATION | Age: 56
End: 2021-03-11

## 2021-03-11 DIAGNOSIS — R29.898 WEAKNESS OF RIGHT LOWER EXTREMITY: ICD-10-CM

## 2021-03-11 DIAGNOSIS — Z98.1 S/P LUMBAR SPINAL FUSION: ICD-10-CM

## 2021-03-11 DIAGNOSIS — M62.81 GENERALIZED MUSCLE WEAKNESS: ICD-10-CM

## 2021-03-12 ENCOUNTER — HOSPITAL ENCOUNTER (OUTPATIENT)
Dept: RADIOLOGY | Facility: HOSPITAL | Age: 56
Discharge: HOME | End: 2021-03-12
Attending: SURGERY

## 2021-03-12 ENCOUNTER — HOSPITAL ENCOUNTER (OUTPATIENT)
Dept: CT IMAGING | Facility: HOSPITAL | Age: 56
Discharge: HOME | End: 2021-03-12
Attending: SURGERY

## 2021-03-12 DIAGNOSIS — M54.2 NECK PAIN: ICD-10-CM

## 2021-03-15 ENCOUNTER — COMMUNICATION - HEALTHEAST (OUTPATIENT)
Dept: NEUROSURGERY | Facility: CLINIC | Age: 56
End: 2021-03-15

## 2021-03-15 DIAGNOSIS — M54.16 LUMBAR RADICULOPATHY: ICD-10-CM

## 2021-03-17 ENCOUNTER — HOSPITAL ENCOUNTER (OUTPATIENT)
Dept: RADIOLOGY | Facility: HOSPITAL | Age: 56
Discharge: HOME | End: 2021-03-17
Attending: SURGERY

## 2021-03-17 ENCOUNTER — RECORDS - HEALTHEAST (OUTPATIENT)
Dept: ADMINISTRATIVE | Facility: OTHER | Age: 56
End: 2021-03-17

## 2021-03-17 ENCOUNTER — SURGERY - HEALTHEAST (OUTPATIENT)
Dept: NEUROSURGERY | Facility: CLINIC | Age: 56
End: 2021-03-17

## 2021-03-17 ENCOUNTER — COMMUNICATION - HEALTHEAST (OUTPATIENT)
Dept: PHYSICAL MEDICINE AND REHAB | Facility: CLINIC | Age: 56
End: 2021-03-17

## 2021-03-17 ENCOUNTER — COMMUNICATION - HEALTHEAST (OUTPATIENT)
Dept: NEUROSURGERY | Facility: CLINIC | Age: 56
End: 2021-03-17

## 2021-03-17 ENCOUNTER — COMMUNICATION - HEALTHEAST (OUTPATIENT)
Dept: NEUROLOGY | Facility: CLINIC | Age: 56
End: 2021-03-17

## 2021-03-17 ENCOUNTER — AMBULATORY - HEALTHEAST (OUTPATIENT)
Dept: NEUROSURGERY | Facility: CLINIC | Age: 56
End: 2021-03-17

## 2021-03-17 ENCOUNTER — HOSPITAL ENCOUNTER (OUTPATIENT)
Dept: RADIOLOGY | Facility: HOSPITAL | Age: 56
Discharge: HOME | End: 2021-03-17
Attending: PHYSICAL MEDICINE & REHABILITATION

## 2021-03-17 ENCOUNTER — OFFICE VISIT - HEALTHEAST (OUTPATIENT)
Dept: NEUROSURGERY | Facility: CLINIC | Age: 56
End: 2021-03-17

## 2021-03-17 DIAGNOSIS — G95.20 CERVICAL CORD COMPRESSION WITH MYELOPATHY (H): ICD-10-CM

## 2021-03-17 DIAGNOSIS — M48.02 CERVICAL STENOSIS OF SPINAL CANAL: ICD-10-CM

## 2021-03-17 DIAGNOSIS — M47.12 CERVICAL SPONDYLOSIS WITH MYELOPATHY: ICD-10-CM

## 2021-03-17 DIAGNOSIS — R26.89 IMBALANCE: ICD-10-CM

## 2021-03-17 DIAGNOSIS — M54.6 THORACIC SPINE PAIN: ICD-10-CM

## 2021-03-17 DIAGNOSIS — Z11.59 ENCOUNTER FOR SCREENING FOR OTHER VIRAL DISEASES: ICD-10-CM

## 2021-03-17 DIAGNOSIS — Z98.1 S/P LUMBAR FUSION: ICD-10-CM

## 2021-03-17 DIAGNOSIS — Z01.818 PRE-OP EXAM: ICD-10-CM

## 2021-03-17 ASSESSMENT — MIFFLIN-ST. JEOR: SCORE: 1413.85

## 2021-03-18 ENCOUNTER — COMMUNICATION - HEALTHEAST (OUTPATIENT)
Dept: FAMILY MEDICINE | Facility: CLINIC | Age: 56
End: 2021-03-18

## 2021-03-19 ENCOUNTER — COMMUNICATION - HEALTHEAST (OUTPATIENT)
Dept: NEUROSURGERY | Facility: CLINIC | Age: 56
End: 2021-03-19

## 2021-03-19 ENCOUNTER — COMMUNICATION - HEALTHEAST (OUTPATIENT)
Dept: FAMILY MEDICINE | Facility: CLINIC | Age: 56
End: 2021-03-19

## 2021-03-19 DIAGNOSIS — E03.9 ACQUIRED HYPOTHYROIDISM: ICD-10-CM

## 2021-03-22 ENCOUNTER — COMMUNICATION - HEALTHEAST (OUTPATIENT)
Dept: NEUROSURGERY | Facility: CLINIC | Age: 56
End: 2021-03-22

## 2021-03-22 ENCOUNTER — COMMUNICATION - HEALTHEAST (OUTPATIENT)
Dept: FAMILY MEDICINE | Facility: CLINIC | Age: 56
End: 2021-03-22

## 2021-03-22 ENCOUNTER — OFFICE VISIT - HEALTHEAST (OUTPATIENT)
Dept: PHYSICAL THERAPY | Facility: REHABILITATION | Age: 56
End: 2021-03-22

## 2021-03-22 DIAGNOSIS — M47.12 CERVICAL SPONDYLOSIS WITH MYELOPATHY: ICD-10-CM

## 2021-03-22 DIAGNOSIS — G95.20 CERVICAL CORD COMPRESSION WITH MYELOPATHY (H): ICD-10-CM

## 2021-03-22 DIAGNOSIS — Z98.1 S/P LUMBAR FUSION: ICD-10-CM

## 2021-03-22 DIAGNOSIS — M54.16 LUMBAR RADICULOPATHY: ICD-10-CM

## 2021-03-23 ENCOUNTER — COMMUNICATION - HEALTHEAST (OUTPATIENT)
Dept: NEUROSURGERY | Facility: CLINIC | Age: 56
End: 2021-03-23

## 2021-03-23 ENCOUNTER — COMMUNICATION - HEALTHEAST (OUTPATIENT)
Dept: FAMILY MEDICINE | Facility: CLINIC | Age: 56
End: 2021-03-23

## 2021-03-23 ENCOUNTER — OFFICE VISIT - HEALTHEAST (OUTPATIENT)
Dept: FAMILY MEDICINE | Facility: CLINIC | Age: 56
End: 2021-03-23

## 2021-03-23 ENCOUNTER — COMMUNICATION - HEALTHEAST (OUTPATIENT)
Dept: NEUROLOGY | Facility: CLINIC | Age: 56
End: 2021-03-23

## 2021-03-23 DIAGNOSIS — Z12.4 ENCOUNTER FOR SCREENING FOR CERVICAL CANCER: ICD-10-CM

## 2021-03-23 DIAGNOSIS — E78.00 PURE HYPERCHOLESTEROLEMIA: ICD-10-CM

## 2021-03-23 DIAGNOSIS — Z01.818 PRE-OP EXAM: ICD-10-CM

## 2021-03-23 DIAGNOSIS — G47.33 OSA (OBSTRUCTIVE SLEEP APNEA): ICD-10-CM

## 2021-03-23 DIAGNOSIS — R87.610 ASCUS WITH POSITIVE HIGH RISK HPV CERVICAL: ICD-10-CM

## 2021-03-23 DIAGNOSIS — F19.11 HISTORY OF DRUG ABUSE (H): ICD-10-CM

## 2021-03-23 DIAGNOSIS — R68.82 DECREASED LIBIDO: ICD-10-CM

## 2021-03-23 DIAGNOSIS — E89.0 POSTABLATIVE HYPOTHYROIDISM: ICD-10-CM

## 2021-03-23 DIAGNOSIS — G95.20 CERVICAL CORD COMPRESSION WITH MYELOPATHY (H): ICD-10-CM

## 2021-03-23 DIAGNOSIS — M48.061 SPINAL STENOSIS OF LUMBAR REGION, UNSPECIFIED WHETHER NEUROGENIC CLAUDICATION PRESENT: ICD-10-CM

## 2021-03-23 DIAGNOSIS — R87.810 ASCUS WITH POSITIVE HIGH RISK HPV CERVICAL: ICD-10-CM

## 2021-03-23 DIAGNOSIS — F33.2 SEVERE EPISODE OF RECURRENT MAJOR DEPRESSIVE DISORDER, WITHOUT PSYCHOTIC FEATURES (H): ICD-10-CM

## 2021-03-23 DIAGNOSIS — Z00.00 ENCOUNTER FOR GENERAL ADULT MEDICAL EXAMINATION WITHOUT ABNORMAL FINDINGS: ICD-10-CM

## 2021-03-23 LAB
ANION GAP SERPL CALCULATED.3IONS-SCNC: 10 MMOL/L (ref 5–18)
APTT PPP: 30 SECONDS (ref 24–37)
BUN SERPL-MCNC: 17 MG/DL (ref 8–22)
CALCIUM SERPL-MCNC: 9.7 MG/DL (ref 8.5–10.5)
CHLORIDE BLD-SCNC: 104 MMOL/L (ref 98–107)
CLOSURE TME COLL+EPINEP BLD: 131 SEC (ref 1–180)
CO2 SERPL-SCNC: 27 MMOL/L (ref 22–31)
CREAT SERPL-MCNC: 0.77 MG/DL (ref 0.6–1.1)
ERYTHROCYTE [DISTWIDTH] IN BLOOD BY AUTOMATED COUNT: 13.8 % (ref 11–14.5)
GFR SERPL CREATININE-BSD FRML MDRD: >60 ML/MIN/1.73M2
GLUCOSE BLD-MCNC: 88 MG/DL (ref 70–125)
HCT VFR BLD AUTO: 42.5 % (ref 35–47)
HGB BLD-MCNC: 14.1 G/DL (ref 12–16)
INR PPP: 0.92 (ref 0.9–1.1)
MCH RBC QN AUTO: 28.8 PG (ref 27–34)
MCHC RBC AUTO-ENTMCNC: 33.2 G/DL (ref 32–36)
MCV RBC AUTO: 87 FL (ref 80–100)
PLATELET # BLD AUTO: 291 THOU/UL (ref 140–440)
PMV BLD AUTO: 9 FL (ref 7–10)
POTASSIUM BLD-SCNC: 4.7 MMOL/L (ref 3.5–5)
RBC # BLD AUTO: 4.89 MILL/UL (ref 3.8–5.4)
SODIUM SERPL-SCNC: 141 MMOL/L (ref 136–145)
WBC: 9.5 THOU/UL (ref 4–11)

## 2021-03-23 ASSESSMENT — MIFFLIN-ST. JEOR: SCORE: 1406.16

## 2021-03-24 LAB
HPV SOURCE: NORMAL
HUMAN PAPILLOMA VIRUS 16 DNA: NEGATIVE
HUMAN PAPILLOMA VIRUS 18 DNA: NEGATIVE
HUMAN PAPILLOMA VIRUS FINAL DIAGNOSIS: NORMAL
HUMAN PAPILLOMA VIRUS OTHER HR: NEGATIVE
SPECIMEN DESCRIPTION: NORMAL

## 2021-03-25 ENCOUNTER — OFFICE VISIT - HEALTHEAST (OUTPATIENT)
Dept: PHYSICAL THERAPY | Facility: REHABILITATION | Age: 56
End: 2021-03-25

## 2021-03-25 DIAGNOSIS — M47.12 CERVICAL SPONDYLOSIS WITH MYELOPATHY: ICD-10-CM

## 2021-03-30 ENCOUNTER — COMMUNICATION - HEALTHEAST (OUTPATIENT)
Dept: NEUROSURGERY | Facility: CLINIC | Age: 56
End: 2021-03-30

## 2021-03-30 DIAGNOSIS — M54.16 LUMBAR RADICULOPATHY: ICD-10-CM

## 2021-03-30 LAB
BKR LAB AP ABNORMAL BLEEDING: NO
BKR LAB AP BIRTH CONTROL/HORMONES: NORMAL
BKR LAB AP CERVICAL APPEARANCE: NORMAL
BKR LAB AP GYN ADEQUACY: NORMAL
BKR LAB AP GYN INTERPRETATION: NORMAL
BKR LAB AP HPV REFLEX: NORMAL
BKR LAB AP LMP: 2014
BKR LAB AP PATIENT STATUS: NORMAL
BKR LAB AP PREVIOUS ABNORMAL: NORMAL
BKR LAB AP PREVIOUS NORMAL: 2019
HIGH RISK?: NO
PATH REPORT.COMMENTS IMP SPEC: NORMAL
RESULT FLAG (HE HISTORICAL CONVERSION): NORMAL

## 2021-03-31 ENCOUNTER — OFFICE VISIT - HEALTHEAST (OUTPATIENT)
Dept: PHYSICAL THERAPY | Facility: REHABILITATION | Age: 56
End: 2021-03-31

## 2021-03-31 ENCOUNTER — COMMUNICATION - HEALTHEAST (OUTPATIENT)
Dept: FAMILY MEDICINE | Facility: CLINIC | Age: 56
End: 2021-03-31

## 2021-03-31 DIAGNOSIS — M47.12 CERVICAL SPONDYLOSIS WITH MYELOPATHY: ICD-10-CM

## 2021-03-31 DIAGNOSIS — R29.898 WEAKNESS OF RIGHT LOWER EXTREMITY: ICD-10-CM

## 2021-03-31 DIAGNOSIS — M62.81 GENERALIZED MUSCLE WEAKNESS: ICD-10-CM

## 2021-03-31 DIAGNOSIS — Z98.1 S/P LUMBAR FUSION: ICD-10-CM

## 2021-03-31 DIAGNOSIS — Z98.1 S/P LUMBAR SPINAL FUSION: ICD-10-CM

## 2021-03-31 DIAGNOSIS — M54.16 LUMBAR RADICULOPATHY: ICD-10-CM

## 2021-04-01 ENCOUNTER — AMBULATORY - HEALTHEAST (OUTPATIENT)
Dept: LAB | Facility: CLINIC | Age: 56
End: 2021-04-01

## 2021-04-01 DIAGNOSIS — Z11.59 ENCOUNTER FOR SCREENING FOR OTHER VIRAL DISEASES: ICD-10-CM

## 2021-04-02 ENCOUNTER — COMMUNICATION - HEALTHEAST (OUTPATIENT)
Dept: NEUROSURGERY | Facility: CLINIC | Age: 56
End: 2021-04-02

## 2021-04-02 ENCOUNTER — OFFICE VISIT - HEALTHEAST (OUTPATIENT)
Dept: PHYSICAL THERAPY | Facility: REHABILITATION | Age: 56
End: 2021-04-02

## 2021-04-02 DIAGNOSIS — G89.29 CHRONIC RIGHT-SIDED LOW BACK PAIN WITH RIGHT-SIDED SCIATICA: ICD-10-CM

## 2021-04-02 DIAGNOSIS — R29.898 WEAKNESS OF RIGHT LOWER EXTREMITY: ICD-10-CM

## 2021-04-02 DIAGNOSIS — M47.12 CERVICAL SPONDYLOSIS WITH MYELOPATHY: ICD-10-CM

## 2021-04-02 DIAGNOSIS — M54.16 LUMBAR RADICULOPATHY: ICD-10-CM

## 2021-04-02 DIAGNOSIS — Z98.1 S/P LUMBAR FUSION: ICD-10-CM

## 2021-04-02 DIAGNOSIS — M54.41 CHRONIC RIGHT-SIDED LOW BACK PAIN WITH RIGHT-SIDED SCIATICA: ICD-10-CM

## 2021-04-02 DIAGNOSIS — M62.81 GENERALIZED MUSCLE WEAKNESS: ICD-10-CM

## 2021-04-02 DIAGNOSIS — Z98.1 S/P LUMBAR SPINAL FUSION: ICD-10-CM

## 2021-04-02 ASSESSMENT — MIFFLIN-ST. JEOR: SCORE: 1405.91

## 2021-04-03 ENCOUNTER — COMMUNICATION - HEALTHEAST (OUTPATIENT)
Dept: SCHEDULING | Facility: CLINIC | Age: 56
End: 2021-04-03

## 2021-04-05 ENCOUNTER — RECORDS - HEALTHEAST (OUTPATIENT)
Dept: ADMINISTRATIVE | Facility: OTHER | Age: 56
End: 2021-04-05

## 2021-04-05 ENCOUNTER — SURGERY - HEALTHEAST (OUTPATIENT)
Dept: SURGERY | Facility: HOSPITAL | Age: 56
End: 2021-04-05

## 2021-04-05 ENCOUNTER — COMMUNICATION - HEALTHEAST (OUTPATIENT)
Dept: SCHEDULING | Facility: CLINIC | Age: 56
End: 2021-04-05

## 2021-04-05 ENCOUNTER — ANESTHESIA - HEALTHEAST (OUTPATIENT)
Dept: SURGERY | Facility: HOSPITAL | Age: 56
End: 2021-04-05

## 2021-04-05 ASSESSMENT — MIFFLIN-ST. JEOR: SCORE: 1466.24

## 2021-04-06 ENCOUNTER — COMMUNICATION - HEALTHEAST (OUTPATIENT)
Dept: NEUROLOGY | Facility: CLINIC | Age: 56
End: 2021-04-06

## 2021-04-06 DIAGNOSIS — M54.16 LUMBAR RADICULOPATHY: ICD-10-CM

## 2021-04-06 DIAGNOSIS — M48.02 CERVICAL STENOSIS OF SPINAL CANAL: ICD-10-CM

## 2021-04-06 DIAGNOSIS — Z98.1 S/P LUMBAR FUSION: ICD-10-CM

## 2021-04-06 DIAGNOSIS — G95.20 CERVICAL CORD COMPRESSION WITH MYELOPATHY (H): ICD-10-CM

## 2021-04-08 ENCOUNTER — COMMUNICATION - HEALTHEAST (OUTPATIENT)
Dept: NEUROSURGERY | Facility: CLINIC | Age: 56
End: 2021-04-08

## 2021-04-09 ENCOUNTER — COMMUNICATION - HEALTHEAST (OUTPATIENT)
Dept: NEUROSURGERY | Facility: CLINIC | Age: 56
End: 2021-04-09

## 2021-04-09 DIAGNOSIS — M54.16 LUMBAR RADICULOPATHY: ICD-10-CM

## 2021-04-15 ENCOUNTER — COMMUNICATION - HEALTHEAST (OUTPATIENT)
Dept: NEUROSURGERY | Facility: CLINIC | Age: 56
End: 2021-04-15

## 2021-04-15 DIAGNOSIS — M62.838 MUSCLE SPASM: ICD-10-CM

## 2021-04-15 DIAGNOSIS — R11.0 NAUSEA: ICD-10-CM

## 2021-04-16 ENCOUNTER — COMMUNICATION - HEALTHEAST (OUTPATIENT)
Dept: NEUROSURGERY | Facility: CLINIC | Age: 56
End: 2021-04-16

## 2021-04-16 DIAGNOSIS — M54.16 LUMBAR RADICULOPATHY: ICD-10-CM

## 2021-04-18 ENCOUNTER — COMMUNICATION - HEALTHEAST (OUTPATIENT)
Dept: FAMILY MEDICINE | Facility: CLINIC | Age: 56
End: 2021-04-18

## 2021-04-18 DIAGNOSIS — F41.0 ANXIETY ATTACK: ICD-10-CM

## 2021-04-18 DIAGNOSIS — F33.2 SEVERE EPISODE OF RECURRENT MAJOR DEPRESSIVE DISORDER, WITHOUT PSYCHOTIC FEATURES (H): ICD-10-CM

## 2021-04-18 DIAGNOSIS — E78.5 HYPERLIPIDEMIA: ICD-10-CM

## 2021-04-21 ENCOUNTER — HOSPITAL ENCOUNTER (OUTPATIENT)
Dept: RADIOLOGY | Facility: HOSPITAL | Age: 56
Discharge: HOME | End: 2021-04-21

## 2021-04-21 ENCOUNTER — AMBULATORY - HEALTHEAST (OUTPATIENT)
Dept: NEUROSURGERY | Facility: CLINIC | Age: 56
End: 2021-04-21

## 2021-04-21 DIAGNOSIS — M54.16 LUMBAR RADICULOPATHY: ICD-10-CM

## 2021-04-21 DIAGNOSIS — G95.20 CERVICAL CORD COMPRESSION WITH MYELOPATHY (H): ICD-10-CM

## 2021-04-21 DIAGNOSIS — Z98.1 S/P LUMBAR FUSION: ICD-10-CM

## 2021-04-21 DIAGNOSIS — M54.2 NECK PAIN: ICD-10-CM

## 2021-04-21 DIAGNOSIS — M48.02 CERVICAL STENOSIS OF SPINAL CANAL: ICD-10-CM

## 2021-04-22 ENCOUNTER — COMMUNICATION - HEALTHEAST (OUTPATIENT)
Dept: NEUROSURGERY | Facility: CLINIC | Age: 56
End: 2021-04-22

## 2021-04-22 DIAGNOSIS — M62.838 MUSCLE SPASM: ICD-10-CM

## 2021-04-22 DIAGNOSIS — M54.16 LUMBAR RADICULOPATHY: ICD-10-CM

## 2021-04-23 ENCOUNTER — AMBULATORY - HEALTHEAST (OUTPATIENT)
Dept: NEUROSURGERY | Facility: CLINIC | Age: 56
End: 2021-04-23

## 2021-04-23 DIAGNOSIS — R91.8 PULMONARY NODULES: ICD-10-CM

## 2021-04-30 ENCOUNTER — COMMUNICATION - HEALTHEAST (OUTPATIENT)
Dept: NEUROSURGERY | Facility: CLINIC | Age: 56
End: 2021-04-30

## 2021-04-30 DIAGNOSIS — M62.838 MUSCLE SPASM: ICD-10-CM

## 2021-05-03 ENCOUNTER — COMMUNICATION - HEALTHEAST (OUTPATIENT)
Dept: NEUROSURGERY | Facility: CLINIC | Age: 56
End: 2021-05-03

## 2021-05-03 DIAGNOSIS — M54.16 LUMBAR RADICULOPATHY: ICD-10-CM

## 2021-05-04 ENCOUNTER — RECORDS - HEALTHEAST (OUTPATIENT)
Dept: ADMINISTRATIVE | Facility: OTHER | Age: 56
End: 2021-05-04

## 2021-05-10 ENCOUNTER — COMMUNICATION - HEALTHEAST (OUTPATIENT)
Dept: NEUROSURGERY | Facility: CLINIC | Age: 56
End: 2021-05-10

## 2021-05-10 DIAGNOSIS — M54.16 LUMBAR RADICULOPATHY: ICD-10-CM

## 2021-05-10 DIAGNOSIS — M62.838 MUSCLE SPASM: ICD-10-CM

## 2021-05-13 ENCOUNTER — HOSPITAL ENCOUNTER (OUTPATIENT)
Dept: CT IMAGING | Facility: CLINIC | Age: 56
Discharge: HOME | End: 2021-05-13
Attending: SURGERY
Payer: COMMERCIAL

## 2021-05-13 DIAGNOSIS — R91.8 PULMONARY NODULES: ICD-10-CM

## 2021-05-14 ENCOUNTER — COMMUNICATION - HEALTHEAST (OUTPATIENT)
Dept: NEUROSURGERY | Facility: CLINIC | Age: 56
End: 2021-05-14

## 2021-05-17 ENCOUNTER — COMMUNICATION - HEALTHEAST (OUTPATIENT)
Dept: NEUROSURGERY | Facility: CLINIC | Age: 56
End: 2021-05-17

## 2021-05-17 ENCOUNTER — RECORDS - HEALTHEAST (OUTPATIENT)
Dept: ADMINISTRATIVE | Facility: OTHER | Age: 56
End: 2021-05-17

## 2021-05-17 DIAGNOSIS — M54.16 LUMBAR RADICULOPATHY: ICD-10-CM

## 2021-05-17 DIAGNOSIS — M62.838 MUSCLE SPASM: ICD-10-CM

## 2021-05-17 DIAGNOSIS — M54.2 NECK PAIN: ICD-10-CM

## 2021-05-25 ENCOUNTER — COMMUNICATION - HEALTHEAST (OUTPATIENT)
Dept: NEUROSURGERY | Facility: CLINIC | Age: 56
End: 2021-05-25

## 2021-05-25 DIAGNOSIS — M62.838 MUSCLE SPASM: ICD-10-CM

## 2021-05-26 VITALS
DIASTOLIC BLOOD PRESSURE: 67 MMHG | TEMPERATURE: 98.8 F | OXYGEN SATURATION: 93 % | SYSTOLIC BLOOD PRESSURE: 110 MMHG | RESPIRATION RATE: 16 BRPM | HEART RATE: 82 BPM

## 2021-05-26 ASSESSMENT — PATIENT HEALTH QUESTIONNAIRE - PHQ9: SUM OF ALL RESPONSES TO PHQ QUESTIONS 1-9: 6

## 2021-05-27 VITALS — SYSTOLIC BLOOD PRESSURE: 110 MMHG | HEART RATE: 72 BPM | RESPIRATION RATE: 18 BRPM | DIASTOLIC BLOOD PRESSURE: 68 MMHG

## 2021-05-27 VITALS — DIASTOLIC BLOOD PRESSURE: 68 MMHG | RESPIRATION RATE: 18 BRPM | HEART RATE: 72 BPM | SYSTOLIC BLOOD PRESSURE: 106 MMHG

## 2021-05-27 ASSESSMENT — PATIENT HEALTH QUESTIONNAIRE - PHQ9
SUM OF ALL RESPONSES TO PHQ QUESTIONS 1-9: 12
SUM OF ALL RESPONSES TO PHQ QUESTIONS 1-9: 2
SUM OF ALL RESPONSES TO PHQ QUESTIONS 1-9: 0

## 2021-05-27 NOTE — PROGRESS NOTES
[facility] Clinic Office Visit    Chief Complaint:  Chief Complaint   Patient presents with     Possible Flu         Assessment/Plan:  1. Acute non-recurrent frontal sinusitis  Based on sx and pt history will treat with augmentin as below and f/u in next week if no improvement or worsening sx.    - Influenza A/B Rapid Test- Nasopharyngeal Washing  - amoxicillin-clavulanate (AUGMENTIN) 875-125 mg per tablet; Take 1 tablet by mouth 2 (two) times a day for 14 days.  Dispense: 28 tablet; Refill: 0    2. Morbid obesity (H)  Pt will f/u for CPE in 2 months for weight check and planning.     3. Acquired hypothyroidism  Restart replacement and recheck labs in 2 months.    - levothyroxine (SYNTHROID, LEVOTHROID) 175 MCG tablet; Take 1 tablet (175 mcg total) by mouth daily.  Dispense: 90 tablet; Refill: 3    4. Mild intermittent asthma without complication  Stable and no significant sx with URI sx.      5. Nicotine Dependence  Pt aware she needs to stop smoking- declines rx assitance- will reassess in 2months.      6. Mild episode of recurrent major depressive disorder (H)  Restart meds now that insurance establised.   - FLUoxetine (PROZAC) 20 MG capsule; Take 1 capsule (20 mg total) by mouth daily.  Dispense: 60 capsule; Refill: 3    7. Hyperlipidemia  Recheck labs in 2 months once back on meds.  Refills as requested.    - simvastatin (ZOCOR) 20 MG tablet; TAKE 1 TABLET(20 MG) BY MOUTH AT BEDTIME  Dispense: 90 tablet; Refill: 4      Return in about 2 months (around 6/15/2019) for Recheck.  The following are part of a depression follow up plan for the patient:  implementation of measures to provide psychological support  The following high BMI interventions were performed this visit: encouragement to exercise and lifestyle education regarding diet  I have counseled the patient for tobacco cessation and the follow up will occur  at the next visit.    Patient Education/AVS:  There are no Patient Instructions on file  for this visit.    HPI:   Susan Kwan is a 53 y.o. female c/o concerned about possible flu.  Has had nasal drainage, neck pain, fatigue, cough, sneeze, congestion starting around 4/3/19.  Started to get better last week but starting on 4/13/19 got worse again- now with increased neck soreness in the back and notes that it if she rotates her head she feels dizzy/vertigo.  Has had vertigo before and does have PT exercises to help with this- last bout was 2-3 years ago.  No fever but notes chills.  Pain in her forehead.  Has had tried nasal irrigation along with mucinex and alkaseltzer without much benefit.  Works in a  and also as a - no obvious known illness exposure. H/o asthma but breathing is fine.  Continues to smoke.  Not on any meds due to lapse in insurance.  Does note nausea but no vomiting or abdominal pain/other GI sx. Clear nasal d/c.  Neck pain 4/10    ROS:  Constitutional, CV, Resp, GI, , MSK, skin, neuro, psych all negative except as outlined in the HPI above and patient denies any other symptoms.      Lab tests reviewed-1: 2018    Physical Exam:  /76 (Patient Site: Right Arm, Patient Position: Sitting, Cuff Size: Adult Regular)   Pulse 76   Temp 97.4  F (36.3  C) (Oral)   Wt 221 lb (100.2 kg)   LMP 09/21/2014   BMI 39.78 kg/m   Body mass index is 39.78 kg/m . Patient's last menstrual period was 09/21/2014.  Vital signs reviewed  Wt Readings from Last 3 Encounters:   04/15/19 221 lb (100.2 kg)   02/27/19 215 lb (97.5 kg)   01/29/19 214 lb 1.9 oz (97.1 kg)     Social History     Tobacco Use   Smoking Status Current Every Day Smoker     Packs/day: 1.00     Types: Cigarettes   Smokeless Tobacco Never Used   Tobacco Comment    down to six cigarettes per day     Social History     Substance and Sexual Activity   Sexual Activity Never     No data recorded  PHQ-9 Total Score: 9 (4/15/2019 11:00 AM)    PHQ-2 Total Score: 2 (4/15/2019 11:00 AM)    ACT Total Score: 20  (4/15/2019 11:00 AM)      All normal as below except abnormalities include: pt appears tired.  Nasal congestion.  Tenderness over frontal sinuses bilaterally.    General is a  53 y.o. female sitting comfortably in no apparent distress.   HEENT:  TM are clear bilaterally.  Eye, nasal, oral exams within normal   Neck: Supple without lymphadenopathy or thyromegally  CV: Regular rate and rhythm S1S2 without rubs, murmurs or gallops,   Lungs: Clear to auscultation bilaterally  Extremities: Warm, No Edema, 2+ Pedal and radial pulses bilaterally  Skin: No lesions or rashes noted  Neuro/MSK: Able to ambulate around the exam room with equal movement, strength and normal coordination of the upper and lower extremeties symmetrically    Results for orders placed or performed in visit on 04/15/19   Influenza A/B Rapid Test- Nasopharyngeal Washing   Result Value Ref Range    Influenza  A, Rapid Antigen No Influenza A antigen detected No Influenza A antigen detected    Influenza B, Rapid Antigen No Influenza B antigen detected No Influenza B antigen detected       Med list and active problem list reviewed and updated as part of this encounter    Current Outpatient Medications on File Prior to Visit   Medication Sig Dispense Refill     cyclobenzaprine (FLEXERIL) 5 MG tablet TAKE 1 TABLET(5 MG) BY MOUTH AT BEDTIME 90 tablet 0     gabapentin (NEURONTIN) 300 MG capsule TAKE 3 CAPSULE BY MOUTH EVERY MORNING,3 IN THE AFTERNOON AND 4 CAPSULE IN THE EVENING 300 capsule 9     lidocaine-menthol 4-1 % PtMd Apply 1 patch topically every 12 (twelve) hours as needed. 5 patch 0     melatonin 3 mg Tab tablet TAKE 1 TABLET(3 MG) BY MOUTH AT BEDTIME AS NEEDED 30 tablet 11     methocarbamol (ROBAXIN) 500 MG tablet Take 500 mg by mouth 4 (four) times a day.  1     SUMAtriptan (IMITREX) 50 MG tablet TAKE 1 TABLET BY MOUTH EVERY 2 HOURS AS NEEDED FOR MIGRAINE. MAY REPEAT IN 2 HOURS AS NEEDED 9 tablet 6     VENTOLIN HFA 90 mcg/actuation inhaler INHALE 1  TO 2 PUFFS EVERY 4 HOURS AS NEEDED FOR WHEEZING 18 g 3     No current facility-administered medications on file prior to visit.          Larisa Rand MD

## 2021-05-28 ASSESSMENT — ANXIETY QUESTIONNAIRES
GAD7 TOTAL SCORE: 6
GAD7 TOTAL SCORE: 12

## 2021-05-28 ASSESSMENT — ASTHMA QUESTIONNAIRES
ACT_TOTALSCORE: 20
ACT_TOTALSCORE: 20
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
## 2021-05-28 NOTE — TELEPHONE ENCOUNTER
RN cannot approve Refill Request    RN can NOT refill this medication med is not covered by policy/route to provider. Last office visit: 4/15/2019 Larisa Rand MD Last Physical: Visit date not found Last MTM visit: Visit date not found Last visit same specialty: 4/15/2019 Larisa Rand MD.  Next visit within 3 mo: Visit date not found  Next physical within 3 mo: Visit date not found      Cristal Gamboa, [facility] Triage/Med Refill 5/4/2019    Requested Prescriptions   Pending Prescriptions Disp Refills     cyclobenzaprine (FLEXERIL) 5 MG tablet [Pharmacy Med Name: CYCLOBENZAPRINE 5MG TABLETS] 90 tablet 0     Sig: TAKE 1 TABLET(5 MG) BY MOUTH AT BEDTIME       There is no refill protocol information for this order

## 2021-05-28 NOTE — TELEPHONE ENCOUNTER
Please schedule pt to be seen for recheck- can either see us or ENT at this point    Ask pt what she would like

## 2021-05-29 NOTE — PROGRESS NOTES
[facility] Office Visit    Chief Complaint:  Chief Complaint   Patient presents with     Follow-up     Mass     on left wrist, painful with pressure     right foot     feels bruised     Concerns     would like to schedule mammo, interested in fecal occult test, interested in pap if there is time         Assessment/Plan:  1. Ganglion cyst of dorsum of left wrist  Mild - reviewed dx with pt and given handout about these.  F/u for increased symptoms.     2. Plantar fasciitis  Reviewed dx with pt and strategies to start with.      3. Colon cancer screening  - Ambulatory referral for Colonoscopy    4. Visit for screening mammogram  - Mammo Screening Bilateral; Future    5. Cervical cancer screening  - Gynecologic Cytology (PAP Smear)    6. Hyperlipidemia, unspecified hyperlipidemia type  Pt not fasting today  - Thyroid Cascade; Future  - Glucose; Future  - Lipid Cascade; Future  - Comprehensive Metabolic Panel; Future  - CK Total; Future    7. Postablative hypothyroidism  F/u for labs in 6-8 weeks once on meds regularly  - Thyroid Cascade; Future    8. TMJ (temporomandibular joint syndrome)  - Ambulatory referral to [facility]  Taking muscle relaxer at night only - noconcern for commercial driving    9. Other migraine without status migrainosus, not intractable  Well controlled with 1-2 imitrex/headache.  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      10. 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 DESI (obstructive sleep apnea)  Wearing cpap regularly without day time sleepiness.      12. Muscle cramps  Unclear cause.  Pt on statin and off thyroid replacement for several months.  Labs as below   - Comprehensive Metabolic Panel; Future  - Magnesium; Future  - HM2(CBC w/o Differential); Future  - Ferritin; Future  - CK Total; Future    13. Advanced directives, counseling/discussion  Pt would trust her daughter Juli Mobley to be POAHC as needed.  Given  HOnoring choices today to complete and return.     14.  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      Total time was 45 minutes, greater than 50% counseling and coordinating care regarding the above issues.    Return in about 6 weeks (around 8/1/2019) for fasting labs.  The following high BMI interventions were performed this visit: encouragement to exercise and lifestyle education regarding diet        HPI:   Susan Kwan is a 53 y.o. female c/o needing a form completed from primary clinic that the meds she is on are appropriate for a 's licsense.  Also noting mass on left wrist and right foot pain.     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  Pt on meds as reviewed today.      Left wrist- bump that is sore when she touches.  On the underside of the wrist below the thumb.  Doesn't move.  Noted about 3 weeks ago.  Never had anything like this before.  Nothing on right wrist.  Right handed.  Has a bump at the base of her left thumb related to an injury but doesn't bother her any more.  Had CTS in Jan this year and still has some tenderness in this area.    Right foot- starting about 3 months ago started to get pain on the arch of the foot just in front of the heel.  Felt like she stepped on a rock but doesn't remember doing this.  Felt bruised.  Now feels like there is something in there when she steps on it at times.  Feels an achiness most of the time.  Sharper pain when she steps on it.  Working  this summer so on feet most of the time.  Wearing tennis shoes at work.  Never had this before.      Has been getting bad eduardo horses all  over.  Noting muscle tightness in toes that cause her toes to go up.  Pain in the back/neck that cause her muscles get tight for several minutes.  Worse at night when trying to stretch in bed.      Was going to get a colonoscopy but ended up getting stomach flu the day of her procedure and so never went in again.  Dad had colon cancer.      Grandma had breast cancer past menopause- wants to schedule mammogram.     Wants to do pap smear today.  Last one was 2014.  No abnormal ones.     Hemorrhoids are coming back.  Tucks and preparation H products both say that if you have a thyroid problem that you shouldn't use their products.   Has been off thyroid for a while due to loss of insurance and has been taking for about a week now.      Chronic pain- takes cyclobenzaprine as needed for TMJ- takes every night.  Takes gabapentin 900, 900, 1200mg and has been on this dose since her back surgery 3-4 years ago.  No side effects or sedation at this point now that she is used to this dosing.  Imitrex for migraines as needed- 2x/ last week for one headache.  Gets 1-2 migraines a month.  Imitrex usually works quickly and able to function quickly after taking 1 dose after 20 minutes.  Tends to get migraines around the right eye and once imitrex works then no visual changes.      Asthma- using inhaler once a week as needed for cough/wheezing.  Did also start flonase for possible allergies per ENT recommendations.      Does use CPAP every night for DESI.      TMJ problems are causing her sinus problems needs referal per ENT    ROS:  Constitutional, CV, Resp, GI, , MSK, skin, neuro, psych all negative except as outlined in the HPI above and patient denies any other symptoms.      Last mammo 2017 wnl  Lab tests reviewed-1: 2018    Physical Exam:  /72 (Patient Site: Left Arm, Patient Position: Sitting, Cuff Size: Adult Regular)   Pulse 80   Resp 16   Wt 217 lb (98.4 kg)   LMP 09/21/2014   BMI 39.06 kg/m   Body mass index  is 39.06 kg/m . Patient's last menstrual period was 09/21/2014.  Vital signs reviewed  Wt Readings from Last 3 Encounters:   06/20/19 217 lb (98.4 kg)   04/15/19 221 lb (100.2 kg)   02/27/19 215 lb (97.5 kg)     Social History     Tobacco Use   Smoking Status Current Every Day Smoker     Packs/day: 1.00     Types: Cigarettes   Smokeless Tobacco Never Used   Tobacco Comment    down to six cigarettes per day     Social History     Substance and Sexual Activity   Sexual Activity Never     No data recorded  PHQ-9 Total Score: 9 (4/15/2019 11:00 AM)    PHQ-2 Total Score: 2 (4/15/2019 11:00 AM)    ACT Total Score: 20 (4/15/2019 11:00 AM)      All normal as below except abnormalities include: grossly normal exam today  GYN:  Normal external genitalia.  Healthy normal vaginal mucosa.  Health appearing cervix.  Testing obtained without pain or difficulty.  Normal bimanual exam.    General is a  53 y.o. female sitting comfortably in no apparent distress.   Neck: Supple without lymphadenopathy or thyromegally  CV: Regular rate and rhythm S1S2 without rubs, murmurs or gallops,   Lungs: Clear to auscultation bilaterally  Abd:  +BS, soft NT/ND,  No masses or organomegally  Extremities: Warm, No Edema, 2+ Pedal and radial pulses bilaterally  Skin: No lesions or rashes noted  Neuro/MSK: Able to ambulate around the exam room with equal movement, strength and normal coordination of the upper and lower extremeties symmetrically    Results for orders placed or performed in visit on 04/15/19   Influenza A/B Rapid Test- Nasopharyngeal Washing   Result Value Ref Range    Influenza  A, Rapid Antigen No Influenza A antigen detected No Influenza A antigen detected    Influenza B, Rapid Antigen No Influenza B antigen detected No Influenza B antigen detected       Med list and active problem list reviewed and updated as part of this encounter    Current Outpatient Medications on File Prior to Visit   Medication Sig Dispense Refill      cyclobenzaprine (FLEXERIL) 5 MG tablet TAKE 1 TABLET(5 MG) BY MOUTH AT BEDTIME 90 tablet 0     FLUoxetine (PROZAC) 20 MG capsule Take 1 capsule (20 mg total) by mouth daily. 60 capsule 3     fluticasone propionate (FLONASE) 50 mcg/actuation nasal spray Apply 1 spray into each nostril daily.       gabapentin (NEURONTIN) 300 MG capsule TAKE 3 CAPSULE BY MOUTH EVERY MORNING,3 IN THE AFTERNOON AND 4 CAPSULE IN THE EVENING 300 capsule 9     levothyroxine (SYNTHROID, LEVOTHROID) 175 MCG tablet Take 1 tablet (175 mcg total) by mouth daily. 90 tablet 3     lidocaine-menthol 4-1 % PtMd Apply 1 patch topically every 12 (twelve) hours as needed. 5 patch 0     melatonin 3 mg Tab tablet TAKE 1 TABLET(3 MG) BY MOUTH AT BEDTIME AS NEEDED 30 tablet 11     simvastatin (ZOCOR) 20 MG tablet TAKE 1 TABLET(20 MG) BY MOUTH AT BEDTIME 90 tablet 4     SUMAtriptan (IMITREX) 50 MG tablet TAKE 1 TABLET BY MOUTH EVERY 2 HOURS AS NEEDED FOR MIGRAINE. MAY REPEAT IN 2 HOURS AS NEEDED 9 tablet 6     VENTOLIN HFA 90 mcg/actuation inhaler INHALE 1 TO 2 PUFFS EVERY 4 HOURS AS NEEDED FOR WHEEZING 18 g 3     [DISCONTINUED] methocarbamol (ROBAXIN) 500 MG tablet Take 500 mg by mouth 4 (four) times a day.  1     No current facility-administered medications on file prior to visit.          Larisa Rand MD

## 2021-05-29 NOTE — PROGRESS NOTES
HPI: This patient is a 54 yo who presents for evaluation of the sinuses at the request of Dr. Rand. The patient reports 3 sinus infections per year, the symptoms of which are nasal congestion, head pressure, ear pressure, and clear rhinorrhea. There have not really been episodes of high fever, localized sinus pain, tooth pain, and pururlent drainage.  Antibiotics provide relief for a short amount of time. Has a prescription for nasal steroids, but not using them on a routine basis.  Does use NetiPot regularly which helps symptoms.  Has known TMD for which she has not been evaluated by a specialist. Notes itching in her ears.  Denies otalgia, otorrhea, fever, weight changes, or other major medical issues.    Past medical history, surgical history, social history, family history, medications, and allergies have been reviewed with the patient and are documented above.    Review of Systems: a 10-system review was performed. Pertinent positives are noted in the HPI and on a separate scanned document in the chart.    PHYSICAL EXAMINATION:  GEN: no acute distress, normocephalic  EYES: extraocular movements are intact, pupils are equal and round. Sclera clear.   EARS: auricles are normally formed. The external auditory canals are clear with minimal to no cerumen. Tympanic membranes are intact bilaterally with no signs of infection, effusion, retractions, or perforations.  NOSE: anterior nares are patent. There are no masses or lesions. The septum is non-obstructing.  OC/OP: clear, upper dentures. No bottom teeth. The tongue and palate are fully mobile and symmetric. The floor of mouth, base of tongue, and tonsils are soft and symmetric.  NECK: soft and supple. No lymphadenopathy or masses. Airway is midline. Tenderness of the bilateral TMJ and some clicking with jaw motion.  NEURO: CN VII and XII are intact and symmetric. alert and oriented. No nystagmus. Gait is normal.  PULM: breathing comfortably on room air, normal  "chest expansion with respiration  HEART: no clubbing or cyanosis, no peripheral edema    IMAGING: none    MEDICAL DECISION-MAKING: This patient is a 54 yo with nasal congestion/allergic rhinitis and sinus pressure due to TMD. Discussed that only 3% of people with \"sinus headache\" actually have any sinus pathology to explain headache and that the other 97% are headache or TMJ. Given her exam findings and lack of true sinus infection symptoms, my suspicion is most of her symptoms are due to TMD.  Will obtain a CT scan to make sure there is no evidence of sinusitis.  If CT scan negative, will refer patient to TMJ clinic for further evaluation and treatment.  As for her nasal congestion, discussed proper use of steroid nasal sprays and recommend using on a consistent basis to see if this improves her nasal congestion.  For the itching in her ears, recommend trying mineral oil or olive oil. Patient with good understanding and in agreement of plan.    "

## 2021-05-29 NOTE — TELEPHONE ENCOUNTER
Patient has seen Dr. Rand and form has been completed.  Called patient to let her know the form was ready for  she stated she will pick it up today

## 2021-05-29 NOTE — TELEPHONE ENCOUNTER
Pt will need to come in to do this form with myself or someone else.     I saw her for a sinus infection and really didn't review her medications, what they were for and if these meds would interfere with her ability to be a .      NTBS

## 2021-05-29 NOTE — TELEPHONE ENCOUNTER
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    Provider Preference: Dr. Rand  How soon do you need to be seen?: next week - patient is looking for 6/28/19 - available all day.  Waitlist offered?: No  Okay to leave a detailed message:  Yes

## 2021-05-29 NOTE — TELEPHONE ENCOUNTER
Patient dropped off  Medication Form for Dr. Jimenez to fill out. Placed the original copies in the 's slot.    When forms are completed, patient would like it:    -Please call patient to let them know it's ready      Please re-route task back to the  to shred the copied forms and complete the task. Thanks!

## 2021-05-30 VITALS — HEIGHT: 63 IN | WEIGHT: 207.2 LBS | BODY MASS INDEX: 36.71 KG/M2

## 2021-05-30 VITALS — WEIGHT: 204 LBS | BODY MASS INDEX: 37.31 KG/M2

## 2021-05-30 VITALS — WEIGHT: 201 LBS | BODY MASS INDEX: 35.61 KG/M2

## 2021-05-30 VITALS — WEIGHT: 199.4 LBS | BODY MASS INDEX: 35.33 KG/M2 | HEIGHT: 63 IN

## 2021-05-30 VITALS — WEIGHT: 203 LBS | BODY MASS INDEX: 35.96 KG/M2

## 2021-05-30 VITALS — WEIGHT: 203.2 LBS | BODY MASS INDEX: 36 KG/M2

## 2021-05-30 VITALS — WEIGHT: 199 LBS | BODY MASS INDEX: 35.25 KG/M2

## 2021-05-30 NOTE — TELEPHONE ENCOUNTER
Relayed message, pt stated that she wants to wait for that colposcopy, will call back and set up appt.

## 2021-05-30 NOTE — TELEPHONE ENCOUNTER
Please call pt and let her know that her pap smear was abnormal but probably okay.  She does have an HPV virus that can cause cervical cancer in the future.      I would recommend that we schedule her to come back for a colposcopy here at [facility] so we can check to make sure everything is mild and will clear up on it's own.  I can answer any questions she has at that point.

## 2021-05-30 NOTE — TELEPHONE ENCOUNTER
RN cannot approve Refill Request    RN can NOT refill this medication med is not covered by policy/route to provider. Last office visit: 6/20/2019 Larisa Rand MD Last Physical: Visit date not found Last MTM visit: Visit date not found Last visit same specialty: 6/20/2019 Larisa Rand MD.  Next visit within 3 mo: Visit date not found  Next physical within 3 mo: Visit date not found      Chelsie Owen, [facility] Triage/Med Refill 7/27/2019    Requested Prescriptions   Pending Prescriptions Disp Refills     cyclobenzaprine (FLEXERIL) 5 MG tablet [Pharmacy Med Name: CYCLOBENZAPRINE 5MG TABLETS] 90 tablet 0     Sig: TAKE 1 TABLET(5 MG) BY MOUTH AT BEDTIME       There is no refill protocol information for this order

## 2021-05-31 VITALS — WEIGHT: 209 LBS | BODY MASS INDEX: 37.03 KG/M2 | HEIGHT: 63 IN

## 2021-05-31 VITALS — BODY MASS INDEX: 36.14 KG/M2 | WEIGHT: 204 LBS

## 2021-05-31 NOTE — PROGRESS NOTES
"[facility]    Chief Complaint:  Chief Complaint   Patient presents with     Colposcopy       Colposcopy Procedure Note   Indications: Pap smear 3 months ago showed: no abnormalities and atypical squamous cellularity of undetermined significance (ASCUS). The prior pap showed no abnormalities. Prior cervical/vaginal disease: normal exam without visible pathology. Prior cervical treatment: no treatment.   Social History     Tobacco Use   Smoking Status Current Every Day Smoker     Packs/day: 1.00     Types: Cigarettes   Smokeless Tobacco Never Used   Tobacco Comment    down to six cigarettes per day       Procedure Details   /76 (Patient Site: Right Arm, Patient Position: Sitting, Cuff Size: Adult Large)   Pulse 80   Resp 20   Ht 5' 2.5\" (1.588 m)   Wt 220 lb (99.8 kg)   LMP 09/21/2014   BMI 39.60 kg/m   Body mass index is 39.6 kg/m . Patient's last menstrual period was 09/21/2014.  The risks and benefits of the procedure and verbal informed consent obtained.   Speculum placed in vagina and excellent visualization of cervix achieved, cervix swabbed x 3 with acetic acid solution.     Findings:   Cervix: no visible lesions, no abnormal vasculature, acetowhite lesion(s) noted at 1 o'clock and SCJ visualized - lesion at 1 o'clock; Endocervical curettage performed and Cervical biopsies taken at 1 o'clock.     Specimens: dx pap smear, ECC, Cervical Bx at 1 O'clock     Complications: none.     Assessment/Plan:  Pt counseled on HPV, treatment, monitoring, etc.  Counseled on effects of nicotine and STIs on progression to cervical cancer.  Shots were reviewed and uptdated as indicated.  Patient was given post colposcopy handout to review.  Will contact patient by phone when results are available.  If PATY 1 or lower will recommend follow-up diagnostic pap smear with HPV testing in 12 months and if PATY 2 or higher will help arrange for further evaluation with GYN specialist to consider treatment " options.      1. ASCUS with positive high risk HPV cervical  - Gynecologic Cytology (PAP Smear)  - Surgical Pathology Exam    Pt scheduled for shave bx of left ear for non healing sore suspicious for squamous cell skin cancer.       Larisa Rand MD

## 2021-05-31 NOTE — TELEPHONE ENCOUNTER
Refill Approved    Rx renewed per Medication Renewal Policy. Medication was last renewed on 5/22/18.    Roshni Moreau, [facility] Triage/Med Refill 8/21/2019     Requested Prescriptions   Pending Prescriptions Disp Refills     SUMAtriptan (IMITREX) 50 MG tablet [Pharmacy Med Name: SUMATRIPTAN 50MG TABLETS] 9 tablet 0     Sig: TAKE 1 TABLET BY MOUTH EVERY 2 HOURS AS NEEDED FOR MIGRAINE. MAY REPEAT IN 2 HOURS AS NEEDED       Triptans Refill Protocol Passed - 8/21/2019  8:29 AM        Passed - PCP or prescribing provider visit in past 12 months       Last office visit with prescriber/PCP: 6/20/2019 Larisa Rand MD OR same dept: 6/20/2019 Larisa Rand MD OR same specialty: 6/20/2019 Larisa Rand MD  Last physical: Visit date not found Last MTM visit: Visit date not found   Next visit within 3 mo: Visit date not found  Next physical within 3 mo: Visit date not found  Prescriber OR PCP: Larisa Rand MD  Last diagnosis associated with med order: 1. Migraine  - SUMAtriptan (IMITREX) 50 MG tablet [Pharmacy Med Name: SUMATRIPTAN 50MG TABLETS]; TAKE 1 TABLET BY MOUTH EVERY 2 HOURS AS NEEDED FOR MIGRAINE. MAY REPEAT IN 2 HOURS AS NEEDED  Dispense: 9 tablet; Refill: 0    If protocol passes may refill for 12 months if within 3 months of last provider visit (or a total of 15 months).

## 2021-06-01 ENCOUNTER — AMBULATORY - HEALTHEAST (OUTPATIENT)
Dept: NEUROSURGERY | Facility: CLINIC | Age: 56
End: 2021-06-01

## 2021-06-01 ENCOUNTER — OFFICE VISIT - HEALTHEAST (OUTPATIENT)
Dept: NEUROSURGERY | Facility: CLINIC | Age: 56
End: 2021-06-01

## 2021-06-01 ENCOUNTER — RECORDS - HEALTHEAST (OUTPATIENT)
Dept: GENERAL RADIOLOGY | Facility: CLINIC | Age: 56
End: 2021-06-01

## 2021-06-01 ENCOUNTER — COMMUNICATION - HEALTHEAST (OUTPATIENT)
Dept: NEUROSURGERY | Facility: CLINIC | Age: 56
End: 2021-06-01

## 2021-06-01 VITALS — WEIGHT: 201 LBS | BODY MASS INDEX: 36.18 KG/M2

## 2021-06-01 VITALS — WEIGHT: 206 LBS | BODY MASS INDEX: 37.08 KG/M2

## 2021-06-01 VITALS — BODY MASS INDEX: 37.8 KG/M2 | WEIGHT: 210 LBS

## 2021-06-01 VITALS — HEIGHT: 63 IN | WEIGHT: 206 LBS | BODY MASS INDEX: 36.5 KG/M2

## 2021-06-01 VITALS — BODY MASS INDEX: 36.36 KG/M2 | WEIGHT: 202 LBS

## 2021-06-01 DIAGNOSIS — M54.2 CERVICALGIA: ICD-10-CM

## 2021-06-01 DIAGNOSIS — Z98.1 HISTORY OF CERVICAL SPINAL ARTHRODESIS: ICD-10-CM

## 2021-06-01 DIAGNOSIS — Z98.1 S/P CERVICAL SPINAL FUSION: ICD-10-CM

## 2021-06-01 DIAGNOSIS — M54.16 LUMBAR RADICULOPATHY: ICD-10-CM

## 2021-06-01 DIAGNOSIS — M54.2 NECK PAIN: ICD-10-CM

## 2021-06-01 DIAGNOSIS — Z98.1 S/P LUMBAR FUSION: ICD-10-CM

## 2021-06-01 ASSESSMENT — MIFFLIN-ST. JEOR: SCORE: 1446.73

## 2021-06-01 NOTE — PROGRESS NOTES
[facility] Office Visit    Chief Complaint:  Chief Complaint   Patient presents with     Procedure     shave bx left ear         Assessment/Plan:  1. Atypical nevus of ear, left  S/p shave bx- await path report for further f/u and planning.    - lidocaine 1%-EPINEPHrine 1:100,000 1 %-1:100,000 injection 0.5 mL (XYLOCAINE W/EPI)  - Skin / nail biopsy  - Surgical Pathology Exam    2. Mild episode of recurrent major depressive disorder (H)  Back up to 40mg daily fluoxetine.  Recommend f/u in 3 months for recheck and TSH now that she has been taking her replacement on regular basis.    - FLUoxetine (PROZAC) 20 MG capsule; Take 2 capsules (40 mg total) by mouth daily.  Dispense: 60 capsule; Refill: 3      Return in about 3 months (around 12/10/2019) for Recheck.    Patient Education/AVS:  There are no Patient Instructions on file for this visit.    HPI:   Susan Kwan is a 54 y.o. female c/o scheduled shave bx left ear.       Sore on the back of her left ear she noted about 4 months ago.  Scabs over and then falls off and then comes back again.  No personal h/o skin cancer.  Dad had some sort of skin cancer on his ear.      Pt decreased her dose of prozac last spring from 40 to 20mg daily and with restarting work as  increased back up to 40mg daily to help with anxiety again.  Doing well and likes this dose.      Working with TMJ doctor and next step is to get new dentures - this is scheduled for later this month.      ROS:  Constitutional, CV, Resp, GI, , MSK, skin, neuro, psych all negative except as outlined in the HPI above and patient denies any other symptoms.      Lab tests reviewed-1: pt still hasn't had her labs drawn ordered last spring.      Physical Exam:  BP 92/62 (Patient Site: Right Arm, Patient Position: Sitting, Cuff Size: Adult Large)   Pulse 80   Resp 24   Wt 216 lb (98 kg)   LMP 09/21/2014   BMI 38.88 kg/m   Body mass index is 38.88 kg/m . Patient's last menstrual  period was 09/21/2014.  Vital signs reviewed  Wt Readings from Last 3 Encounters:   09/10/19 216 lb (98 kg)   08/22/19 220 lb (99.8 kg)   06/20/19 217 lb (98.4 kg)     Social History     Tobacco Use   Smoking Status Current Every Day Smoker     Packs/day: 1.00     Types: Cigarettes   Smokeless Tobacco Never Used   Tobacco Comment    down to six cigarettes per day     Social History     Substance and Sexual Activity   Sexual Activity Never     GIUSEPPE 7 Total Score: 6 (9/10/2019 12:00 PM)    PHQ-9 Total Score: 6 (9/10/2019 12:00 PM)    PHQ-2 Total Score: 2 (9/10/2019 12:00 PM)    ACT Total Score: 20 (4/15/2019 11:00 AM)      All normal as below except abnormalities include: 3mm red/brown papule atypical nevus on back of left pinnae with some scabbing.  See procedure note for details of shave bx.    General is a  54 y.o. female sitting comfortably in no apparent distress.   Neuro/MSK: Able to ambulate around the exam room with equal movement, strength and normal coordination of the upper and lower extremeties symmetrically    Results for orders placed or performed in visit on 08/22/19   Gynecologic Cytology (PAP Smear)   Result Value Ref Range    Case Report       Gynecologic Cytology Report                       Case: [id number]                                   Authorizing Provider:  Larisa Rand MD    Collected:           08/22/2019 1289              Ordering Location:     [facility]  Received:            08/22/2019 7183                                     Medicine/OB                                                                  First Screen:          Juli Moise CT                                                                            (ASCP)                                                                       Pathologist:           Victor Hugo Valadez MD                                                      Specimen:    SUREPATH PAP, DIAGNOSTIC, Endocervical/cervical                                             Interpretation (!) Negative for squamous intraepithelial lesion or malignancy.      Atypical squamous cells of undetermined significance    Result Flag Abnormal (!) Normal    Other Findings Correlated with current biopsy.     Specimen Adequacy       Satisfactory for evaluation, endocervical/transformation zone component present    HPV Reflex? Yes regardless of result     HIGH RISK No     LMP/Menopause Date 9/2014     Abnormal Bleeding No     Pt Status na     Birth Control/Hormones None     Previous Normal/Date 2014     Prev Abn Date/Dx ascus wiht high risk hpv 6/2019     Cervical Appearance Normal    Surgical Pathology Exam   Result Value Ref Range    Case Report       Surgical Pathology Report                         Case: [id number]                                    Authorizing Provider:  Larisa Rand MD    Collected:           08/22/2019 1719              Ordering Location:     [facility]  Received:            08/22/2019 1719                                     Medicine/OB                                                                  Pathologist:           Victor Hugo Valadez MD                                                      Specimens:   A) - Cervix, Endocervical                                                                           B) - Cervix @ 01:00                                                                        Final Diagnosis       A) ENDOCERVICAL CURETTINGS:       -  ATYPICAL SQUAMOUS METAPLASIA       -  NEGATIVE FOR HIGH GRADE EPITHELIAL DYSPLASIA     B) CERVICAL BIOPSY @ 0100:       -  LGSIL (MILD DYSPLASIA, PATY I) WITH KOILOCYTOSIS, CONSISTENT WITH HPV          CYTOPATHIC EFFECT      -  NEGATIVE FOR HIGH GRADE EPITHELIAL DYSPLASIA    Comment       HISTOLOGY-CYTOLOGY CORRELATION:   Biopsy tissue correlates with pap smear [id number], and also with earlier pap smear [id number].    Microscopic Description       Microscopic examination  "performed, substantiating the above diagnosis.    Clinical Information       Clinical history:  ASCUS Pap with high risk HPV    Reason for procedure:  Colposcopy    Gross Description       A) Received in formalin, labeled with the patient's name and \"cervix, endocervical,\" is a 0.3 x 0.2 x 0.2-cm aggregate of pink-red, irregular soft tissue fragments with mucus and blood clot. F&TE-1C    B) Received in formalin, labeled with the patient's name and \"cervix biopsy 1 o'clock\", are several irregular, white to pink mucosal/soft tissue fragments and mucus, measuring individually from minute to 0.2 cm. F&TE-1C RJR:sg    Charges CPT:  88305 x2   ICD-10:  N87.0     Result Flag     HPV High Risk DNA Cervical   Result Value Ref Range    HPV Source [facility]     HPV16 DNA Negative NEG    HPV18 DNA Negative NEG    Other HR HPV Positive (!) NEG    Final Diagnosis SEE NOTES     Specimen Description Cervical Cells        Med list and active problem list reviewed and updated as part of this encounter    Current Outpatient Medications on File Prior to Visit   Medication Sig Dispense Refill     cyclobenzaprine (FLEXERIL) 5 MG tablet TAKE 1 TABLET(5 MG) BY MOUTH AT BEDTIME 30 tablet 0     fluticasone propionate (FLONASE) 50 mcg/actuation nasal spray Apply 1 spray into each nostril daily.       gabapentin (NEURONTIN) 300 MG capsule TAKE 3 CAPSULE BY MOUTH EVERY MORNING,3 IN THE AFTERNOON AND 4 CAPSULE IN THE EVENING 300 capsule 9     ibuprofen (ADVIL,MOTRIN) 800 MG tablet TK 1 T PO TID AFTER MEALS FOR 15 DAYS  0     levothyroxine (SYNTHROID, LEVOTHROID) 175 MCG tablet Take 1 tablet (175 mcg total) by mouth daily. 90 tablet 3     lidocaine-menthol 4-1 % PtMd Apply 1 patch topically every 12 (twelve) hours as needed. 5 patch 0     melatonin 3 mg Tab tablet TAKE 1 TABLET(3 MG) BY MOUTH AT BEDTIME AS NEEDED 30 tablet 11     simvastatin (ZOCOR) 20 MG tablet TAKE 1 TABLET(20 MG) BY MOUTH AT BEDTIME 90 tablet 4     SUMAtriptan (IMITREX) 50 MG " tablet TAKE 1 TABLET BY MOUTH EVERY 2 HOURS AS NEEDED FOR MIGRAINE. MAY REPEAT IN 2 HOURS AS NEEDED 9 tablet 10     VENTOLIN HFA 90 mcg/actuation inhaler INHALE 1 TO 2 PUFFS EVERY 4 HOURS AS NEEDED FOR WHEEZING 18 g 3     [DISCONTINUED] FLUoxetine (PROZAC) 20 MG capsule Take 1 capsule (20 mg total) by mouth daily. 60 capsule 3     No current facility-administered medications on file prior to visit.          Larisa Rand MD

## 2021-06-01 NOTE — PROGRESS NOTES
Skin / nail biopsy  Date/Time: 9/10/2019 1:32 PM  Performed by: Larisa Rand MD  Authorized by: Larisa Rand MD   Consent: Verbal consent obtained.  Risks and benefits: risks, benefits and alternatives were discussed  Consent given by: patient  Patient understanding: patient states understanding of the procedure being performed  Patient identity confirmed: verbally with patient  Local anesthesia used: yes (0.5ml 1% lido with epi)    Anesthesia:  Local anesthesia used: yes (0.5ml 1% lido with epi)  Comments: 3mm atypical nevus identified on back of left pinnae.  After local anesthesia achieved shave bx performed for further evaluation due to concern for skin cancer.  Tissue submitted for pathology.

## 2021-06-02 VITALS — BODY MASS INDEX: 38.09 KG/M2 | WEIGHT: 215 LBS | HEIGHT: 63 IN

## 2021-06-02 VITALS — BODY MASS INDEX: 38.54 KG/M2 | WEIGHT: 214.12 LBS

## 2021-06-02 VITALS — BODY MASS INDEX: 38.02 KG/M2 | WEIGHT: 211.25 LBS

## 2021-06-02 VITALS — WEIGHT: 221 LBS | BODY MASS INDEX: 39.78 KG/M2

## 2021-06-02 VITALS — WEIGHT: 213.56 LBS | BODY MASS INDEX: 38.44 KG/M2

## 2021-06-02 NOTE — PROGRESS NOTES
"Assessment/ Plan  1. Cutaneous abscess of perineum  Incision and drainage   Cutaneous subcutaneous abscess  Location: Right perineum, anterior  History:  Duration: 2 weeks  Discomfort: Moderate  Associated sx: No constitutional symptoms.  Has had some drainage on and off over the last few days  Narrative: Patient noticed a bump that grew in size and then drained on right leg.  Painful.  Never had similar problem before.  Comments: None    Exam: Papule, central ulcer, about 1-1/2 cm of underlying induration    Procedure:   Area prepped with antiseptic  Local Anesthesia instilled to cc of lidocaine with epinephrine in region    Small hemostat used to explore and liberate any sequestrant-this was followed by iris scissors.  No purulent material removed.  Small amount of serous drainage  Cultured?  No    Unable to pack with iodoform gauze since it was so shallow.  Antibiotics? :  Yes/clindamycin as below    Follow-up if needed    - clindamycin (CLEOCIN HCL) 150 MG capsule; Take 1 capsule (150 mg total) by mouth 4 (four) times a day for 7 days.  Dispense: 28 capsule; Refill: 0  - Culture, Wound    Physical Exam  Vitals:    10/16/19 1410   BP: 100/70   Patient Site: Right Arm   Patient Position: Sitting   Cuff Size: Adult Large   Pulse: 80   Resp: 20   Weight: (!) 225 lb (102.1 kg)   Height: 5' 2.5\" (1.588 m)     Patient otherwise alert and oriented, no acute distress.  Voiced understanding of procedure and consent      Please note: Voice recognition software was used in this dictation.  It may therefore contain typographical errors.    "

## 2021-06-03 VITALS
HEART RATE: 80 BPM | DIASTOLIC BLOOD PRESSURE: 70 MMHG | HEIGHT: 63 IN | SYSTOLIC BLOOD PRESSURE: 100 MMHG | RESPIRATION RATE: 20 BRPM | BODY MASS INDEX: 39.87 KG/M2 | WEIGHT: 225 LBS

## 2021-06-03 VITALS — BODY MASS INDEX: 38.98 KG/M2 | WEIGHT: 220 LBS | HEIGHT: 63 IN

## 2021-06-03 VITALS
HEART RATE: 80 BPM | WEIGHT: 216 LBS | SYSTOLIC BLOOD PRESSURE: 92 MMHG | BODY MASS INDEX: 38.88 KG/M2 | RESPIRATION RATE: 24 BRPM | DIASTOLIC BLOOD PRESSURE: 62 MMHG

## 2021-06-03 VITALS — WEIGHT: 217 LBS | BODY MASS INDEX: 39.06 KG/M2

## 2021-06-04 VITALS
SYSTOLIC BLOOD PRESSURE: 98 MMHG | HEIGHT: 64 IN | BODY MASS INDEX: 39.27 KG/M2 | WEIGHT: 230 LBS | DIASTOLIC BLOOD PRESSURE: 78 MMHG | TEMPERATURE: 97.8 F | HEART RATE: 86 BPM | RESPIRATION RATE: 16 BRPM

## 2021-06-04 VITALS
BODY MASS INDEX: 38.59 KG/M2 | WEIGHT: 214.4 LBS | OXYGEN SATURATION: 96 % | RESPIRATION RATE: 16 BRPM | HEART RATE: 66 BPM | TEMPERATURE: 97.9 F | DIASTOLIC BLOOD PRESSURE: 69 MMHG | SYSTOLIC BLOOD PRESSURE: 108 MMHG

## 2021-06-04 VITALS
SYSTOLIC BLOOD PRESSURE: 116 MMHG | HEIGHT: 64 IN | HEART RATE: 68 BPM | BODY MASS INDEX: 39.27 KG/M2 | OXYGEN SATURATION: 95 % | WEIGHT: 230 LBS | DIASTOLIC BLOOD PRESSURE: 80 MMHG

## 2021-06-04 VITALS — BODY MASS INDEX: 41.29 KG/M2 | HEIGHT: 63 IN | WEIGHT: 233 LBS

## 2021-06-04 VITALS
BODY MASS INDEX: 39.81 KG/M2 | RESPIRATION RATE: 14 BRPM | SYSTOLIC BLOOD PRESSURE: 126 MMHG | WEIGHT: 221.2 LBS | HEART RATE: 75 BPM | DIASTOLIC BLOOD PRESSURE: 81 MMHG | TEMPERATURE: 98.1 F | OXYGEN SATURATION: 96 %

## 2021-06-04 VITALS
HEART RATE: 73 BPM | WEIGHT: 220 LBS | RESPIRATION RATE: 18 BRPM | BODY MASS INDEX: 39.6 KG/M2 | TEMPERATURE: 97.7 F | SYSTOLIC BLOOD PRESSURE: 113 MMHG | OXYGEN SATURATION: 98 % | DIASTOLIC BLOOD PRESSURE: 76 MMHG

## 2021-06-04 VITALS
WEIGHT: 214 LBS | RESPIRATION RATE: 18 BRPM | SYSTOLIC BLOOD PRESSURE: 110 MMHG | DIASTOLIC BLOOD PRESSURE: 76 MMHG | HEART RATE: 94 BPM | BODY MASS INDEX: 38.52 KG/M2

## 2021-06-04 VITALS — BODY MASS INDEX: 41.45 KG/M2 | WEIGHT: 233.9 LBS | HEIGHT: 63 IN

## 2021-06-04 VITALS — WEIGHT: 230 LBS | HEIGHT: 64 IN | BODY MASS INDEX: 39.27 KG/M2

## 2021-06-04 NOTE — TELEPHONE ENCOUNTER
Question following Office Visit  When did you see your provider: 12/04/19    What is your question:   Employer is requesting a BACK TO WORK NOTE W/   1. Restrictions  2. Return date   Please fax to :      [employer]   Att:Trae, Fax# [phone]    Okay to leave a detailed message: Yes

## 2021-06-04 NOTE — TELEPHONE ENCOUNTER
Printed letter and provider added no restriction to letter. Letter was faxed to number given.    BREEZY Ramos   1:44 PM

## 2021-06-04 NOTE — PROGRESS NOTES
Four days.    Started as diarrhea.     Sweats and chills.  Felt warm but no tem taken.  Yesterday emesis.  No blood or black in emesis or stools.  Five in first six hours today.   Twice in middle of night.  Boyfriend with same.    Has usual smoker's cough    Ph  Thyroid.    Surgery   On meds    Back surgeries    Hyperlipidemia on statin denies muscle pain  Mood disorder controlled on fluoxetine years.    Nicotine dependence.  Denies hemoptysis.    One ppd.  No will power.        Boyfriend lung surgery for spot.   A year ago.   Pt smokes outside.         OBJECTIVE:   Vitals:    12/12/19 1015   BP: 110/76   Pulse: 94   Resp: 18      Wt is noted.  No diaphoresis  Eyes: nl eom, anicteric   External ears, nose: nl    Neck: nl nodes, supple, thyroid normal   Lungs: clear to ausc   Heart: regular rhythm  Abd: soft nontender     No cva (renal) tenderness  Neuro: no weakness  Skin no rash  Joints: uninflamed   No ketotic breath odor noted  Mental: euthymic  Ext: nontender calves   Gait: normal    Body mass index is 38.52 kg/m .     ASSESSMENT/PLAN:    1. Nicotine Dependence     2. Pure hypercholesterolemia     3. Viral syndrome       Problem number of new, unstable, or uncontrolled problems above (others are stable):3     Life style modification for coronary artery disease risk management    Sx rx, Anticipate resolution otherwise return.  Return sooner if symptoms worsen.    Chronic issues stable/ same treatment  Current Outpatient Medications on File Prior to Visit   Medication Sig Dispense Refill     cyclobenzaprine (FLEXERIL) 10 MG tablet Take 1 tablet (10 mg total) by mouth at bedtime as needed for muscle spasms. 10 tablet 0     FLUoxetine (PROZAC) 20 MG capsule Take 2 capsules (40 mg total) by mouth daily. 60 capsule 3     fluticasone propionate (FLONASE) 50 mcg/actuation nasal spray Apply 1 spray into each nostril daily.       gabapentin (NEURONTIN) 300 MG capsule TAKE 3 CAPSULE BY MOUTH EVERY MORNING,3 IN THE  AFTERNOON AND 4 CAPSULE IN THE EVENING 300 capsule 9     ibuprofen (ADVIL,MOTRIN) 800 MG tablet TK 1 T PO TID AFTER MEALS FOR 15 DAYS  0     levothyroxine (SYNTHROID, LEVOTHROID) 175 MCG tablet Take 1 tablet (175 mcg total) by mouth daily. 90 tablet 3     lidocaine-menthol 4-1 % PtMd Apply 1 patch topically every 12 (twelve) hours as needed. 5 patch 0     melatonin 3 mg Tab tablet TAKE 1 TABLET(3 MG) BY MOUTH AT BEDTIME AS NEEDED 30 tablet 11     simvastatin (ZOCOR) 20 MG tablet TAKE 1 TABLET(20 MG) BY MOUTH AT BEDTIME 90 tablet 4     SUMAtriptan (IMITREX) 50 MG tablet TAKE 1 TABLET BY MOUTH EVERY 2 HOURS AS NEEDED FOR MIGRAINE. MAY REPEAT IN 2 HOURS AS NEEDED 9 tablet 10     VENTOLIN HFA 90 mcg/actuation inhaler INHALE 1 TO 2 PUFFS EVERY 4 HOURS AS NEEDED FOR WHEEZING 18 g 3     No current facility-administered medications on file prior to visit.

## 2021-06-04 NOTE — TELEPHONE ENCOUNTER
Patient has this note already.  No restrictions, can go back tomorrow.  Please reprint and fax to number given.

## 2021-06-04 NOTE — TELEPHONE ENCOUNTER
Refill Approved    Rx renewed per Medication Renewal Policy. Medication was last renewed on 10/11/18.    Roshni Moreau, [facility] Triage/Med Refill 12/18/2019     Requested Prescriptions   Pending Prescriptions Disp Refills     gabapentin (NEURONTIN) 300 MG capsule [Pharmacy Med Name: GABAPENTIN 300MG CAPSULES] 300 capsule 0     Sig: TAKE 3 CAPSULES BY MOUTH EVERY MORNING, 3 CAPSULES BY MOUTH EVERY DAY IN THE AFTERNOON, AND 4 CAPSULES EVERY EVENING       Gabapentin/Levetiracetam/Tiagabine Refill Protocol  Passed - 12/16/2019  9:25 PM        Passed - PCP or prescribing provider visit in past 12 months or next 3 months     Last office visit with prescriber/PCP: 2/27/2019 Earline Jimenez MD OR same dept: 12/12/2019 Ed Machado MD OR same specialty: 12/12/2019 Ed Machado MD  Last physical: 11/30/2017 Last MTM visit: Visit date not found   Next visit within 3 mo: Visit date not found  Next physical within 3 mo: Visit date not found  Prescriber OR PCP: Earline Jimenez MD  Last diagnosis associated with med order: 1. Low back pain  - gabapentin (NEURONTIN) 300 MG capsule [Pharmacy Med Name: GABAPENTIN 300MG CAPSULES]; TAKE 3 CAPSULES BY MOUTH EVERY MORNING, 3 CAPSULES BY MOUTH EVERY DAY IN THE AFTERNOON, AND 4 CAPSULES EVERY EVENING  Dispense: 300 capsule; Refill: 0    If protocol passes may refill for 12 months if within 3 months of last provider visit (or a total of 15 months).

## 2021-06-05 VITALS
OXYGEN SATURATION: 96 % | WEIGHT: 230 LBS | SYSTOLIC BLOOD PRESSURE: 108 MMHG | HEIGHT: 64 IN | RESPIRATION RATE: 16 BRPM | DIASTOLIC BLOOD PRESSURE: 71 MMHG | HEART RATE: 84 BPM | BODY MASS INDEX: 39.27 KG/M2

## 2021-06-05 VITALS
HEIGHT: 63 IN | SYSTOLIC BLOOD PRESSURE: 82 MMHG | OXYGEN SATURATION: 96 % | DIASTOLIC BLOOD PRESSURE: 64 MMHG | HEART RATE: 92 BPM | WEIGHT: 186 LBS | BODY MASS INDEX: 32.96 KG/M2

## 2021-06-05 VITALS — BODY MASS INDEX: 33.21 KG/M2 | WEIGHT: 186 LBS

## 2021-06-05 VITALS
WEIGHT: 230 LBS | SYSTOLIC BLOOD PRESSURE: 137 MMHG | RESPIRATION RATE: 18 BRPM | HEIGHT: 64 IN | HEART RATE: 72 BPM | DIASTOLIC BLOOD PRESSURE: 77 MMHG | OXYGEN SATURATION: 98 % | BODY MASS INDEX: 39.27 KG/M2

## 2021-06-05 VITALS
SYSTOLIC BLOOD PRESSURE: 111 MMHG | DIASTOLIC BLOOD PRESSURE: 76 MMHG | BODY MASS INDEX: 34.09 KG/M2 | HEART RATE: 80 BPM | HEIGHT: 62 IN | TEMPERATURE: 97.7 F | WEIGHT: 185.25 LBS

## 2021-06-05 VITALS
HEIGHT: 63 IN | WEIGHT: 205 LBS | BODY MASS INDEX: 36.32 KG/M2 | HEART RATE: 76 BPM | DIASTOLIC BLOOD PRESSURE: 69 MMHG | SYSTOLIC BLOOD PRESSURE: 99 MMHG | OXYGEN SATURATION: 96 %

## 2021-06-05 VITALS — BODY MASS INDEX: 36.67 KG/M2 | WEIGHT: 199.3 LBS | HEIGHT: 62 IN

## 2021-06-05 VITALS
SYSTOLIC BLOOD PRESSURE: 109 MMHG | BODY MASS INDEX: 33.13 KG/M2 | HEIGHT: 63 IN | OXYGEN SATURATION: 98 % | WEIGHT: 187 LBS | HEART RATE: 84 BPM | DIASTOLIC BLOOD PRESSURE: 60 MMHG | RESPIRATION RATE: 18 BRPM

## 2021-06-05 VITALS
BODY MASS INDEX: 36.32 KG/M2 | DIASTOLIC BLOOD PRESSURE: 67 MMHG | HEIGHT: 63 IN | HEART RATE: 81 BPM | WEIGHT: 205 LBS | SYSTOLIC BLOOD PRESSURE: 106 MMHG | OXYGEN SATURATION: 95 %

## 2021-06-05 VITALS — WEIGHT: 230 LBS | BODY MASS INDEX: 39.27 KG/M2 | HEIGHT: 64 IN

## 2021-06-05 VITALS — WEIGHT: 205 LBS | BODY MASS INDEX: 36.6 KG/M2

## 2021-06-06 NOTE — TELEPHONE ENCOUNTER
CT report is available through [facility]:    EXAM: CT LUMBAR SPINE WITHOUT CONTRAST  LOCATION: [facility]  DATE/TIME: 2/21/2020 7:30 AM  INDICATION: Back pain, right sciatica with skin paresthesias; fall on ice on 02/05/2020, now with no feeling down right lower leg and pain in right thigh.  COMPARISON: Lumbar spine radiographs 02/25/2019 and MRI lumbar spine 05/22/2017  TECHNIQUE: Routine without IV contrast. Multiplanar reformats. Dose reduction techniques were used.  FINDINGS:  VERTEBRAE: Normal vertebral body heights and alignment. There is an acute, nondisplaced fracture of the right L4 pars interarticularis (sagittal image #25). No additional acute fracture or posttraumatic subluxation.  CANAL/FORAMINA: Mild degenerative disc disease throughout the lumbar spine, though without high-grade spinal canal stenosis. Prior right L4 hemilaminotomy.  At L4-L5, there is severe right and mild left neuroforaminal stenosis, with distortion of the exiting right L4 nerve root.  PARASPINAL: Atherosclerotic vascular calcifications. Paraspinous soft tissues are otherwise grossly unremarkable.  IMPRESSION:  1. Acute, nondisplaced fracture of the right L4 pars interarticularis.  2. At L4-L5, there is severe right and mild left neuroforaminal stenosis with distortion of the exiting right L4 nerve root.  3. No high-grade spinal canal stenosis.    Keven Combs MD 02/21/20 8:36 AM

## 2021-06-06 NOTE — PROGRESS NOTES
Chief Complaint   Patient presents with     broken vertebrae     just found out yesterday- was given tylenol 3 yesterday- THe pain so bad that it makes her nauseated.       HPI:  Susan Kwan is a 54 y.o. female who presents today complaining of back pain. Patient was dx with an L4 fracture causing severe right neuroforaminal stenosis at L4-L5. Patient is currently taking Tylenol3 without much benefit. An order for a brace has already been placed, she will be fitted for it in 2 days. She is scheduled to see [facility] on Tuesday next week. She is here for pain control, as she states the pain is nauseating, severe, and keeping her awake. She denies any loss of control of bowel or bladder function.     History obtained from the patient.    Problem List:  2019-08: Xerostomia due to hyposecretion of salivary gland  2019-08: Myofascial pain  2019-07: Benign neoplasm of ascending colon  2019-06: ASCUS with positive high risk HPV cervical  2019-06: TMJ (temporomandibular joint syndrome)  2019-06: Advanced directives, counseling/discussion  2019-04: Obesity (BMI 35.0-39.9) with comorbidity (H)  2017-03: S/P lumbar microdiscectomy  2017-02: Lumbar stenosis  2017-02: Sacroiliac joint dysfunction of right side  2017-02: Lumbar foraminal stenosis  2017-02: Lumbar disc herniation  2017-02: Sciatica of right side  2017-01: DESI (obstructive sleep apnea)  2007-09: Gastro-esophageal reflux disease with esophagitis  2007-09: Depressive disorder  2007-09: Anxiety state  Nicotine Dependence  Migraine Headache  Arthralgias In Multiple Sites  Postablative hypothyroidism  Hyperlipidemia  Major Depression, Recurrent  Sudden Redness Of The Skin (Flushing)  Lower Back Pain  Carpal Tunnel Syndrome  Mild intermittent asthma without complication  Allergic Rhinitis  Lump In / On The Skin  Common Migraine (Without Aura)  Seborrheic Keratosis      Past Medical History:   Diagnosis Date     Anxiety      Carpal tunnel syndrome      Depression      PMDD     Disease of thyroid gland      Migraine      Pregnancy          Substance abuse (H)     sober since , narcotic pain medication       Social History     Tobacco Use     Smoking status: Current Every Day Smoker     Packs/day: 1.00     Types: Cigarettes     Smokeless tobacco: Never Used     Tobacco comment: down to six cigarettes per day   Substance Use Topics     Alcohol use: No       Review of Systems   Genitourinary: Negative for dysuria and enuresis.   Musculoskeletal: Positive for back pain and gait problem.   Neurological: Positive for numbness.       Vitals:    20 1020   BP: 113/76   Patient Site: Right Arm   Patient Position: Sitting   Cuff Size: Adult Large   Pulse: 73   Resp: 18   Temp: 97.7  F (36.5  C)   TempSrc: Oral   SpO2: 98%   Weight: 220 lb (99.8 kg)       Physical Exam  Constitutional:       General: She is not in acute distress.     Appearance: She is well-developed. She is not diaphoretic.   HENT:      Head: Normocephalic and atraumatic.      Right Ear: External ear normal.      Left Ear: External ear normal.   Eyes:      General:         Right eye: No discharge.         Left eye: No discharge.      Conjunctiva/sclera: Conjunctivae normal.   Pulmonary:      Effort: Pulmonary effort is normal. No respiratory distress.   Psychiatric:         Behavior: Behavior normal.         Thought Content: Thought content normal.         Judgment: Judgment normal.         Radiology:  Ct Lumbar Spine Without Contrast    Result Date: 2020  EXAM DATE:         2020 EXAM: CT LUMBAR SPINE WITHOUT CONTRAST LOCATION: [facility] DATE/TIME: 2020 7:30 AM INDICATION: Back pain, right sciatica with skin paresthesias; fall on ice on 2020, now with no feeling down right lower leg and pain in right thigh. COMPARISON: Lumbar spine radiographs 2019 and MRI lumbar spine 2017 TECHNIQUE: Routine without IV contrast. Multiplanar reformats.  Dose reduction  techniques were used. FINDINGS: VERTEBRAE: Normal vertebral body heights and alignment. There is an acute, nondisplaced fracture of the right L4 pars interarticularis (sagittal image #25). No additional acute fracture or posttraumatic subluxation. CANAL/FORAMINA: Mild degenerative disc disease throughout the lumbar spine, though without high-grade spinal canal stenosis. Prior right L4 hemilaminotomy. At L4-L5, there is severe right and mild left neuroforaminal stenosis, with distortion of the exiting right L4 nerve root. PARASPINAL: Atherosclerotic vascular calcifications. Paraspinous soft tissues are otherwise grossly unremarkable. IMPRESSION: 1.  Acute, nondisplaced fracture of the right L4 pars interarticularis. 2.  At L4-L5, there is severe right and mild left neuroforaminal stenosis with distortion of the exiting right L4 nerve root. 3.  No high-grade spinal canal stenosis. DICOM format image data is available to non-affiliated external healthcare facilities or entities on a secure, media-free, reciprocally searchable basis with patient authorization for at least a 12-month period after the study.       Clinical Decision Making:  Patient's CT shows severe foraminal stenosis. This is acute back pain. Will give 3 days worth of Oxycodone to last until the patient's follow up with [facility]. No current signs of cauda equina syndrome.   At the end of the encounter, I discussed results, diagnosis, medications. Discussed red flags for immediate return to clinic/ER, as well as indications for follow up if no improvement. Patient understood and agreed to plan. Patient was stable for discharge.    1. Closed fracture of fourth lumbar vertebra with routine healing, unspecified fracture morphology, subsequent encounter  oxyCODONE (ROXICODONE) 5 MG immediate release tablet    ondansetron (ZOFRAN ODT) 4 MG disintegrating tablet         Patient Instructions   1. May take Oxycodone as needed for pain. Recommend taking stool  softener and drinking more fluids while taking this medicine.   2. Follow up with [facility]. They will continue with your pain management plan.   3. Seek emergency medical attention if you develop any loss of bowel or bladder function.

## 2021-06-06 NOTE — PROGRESS NOTES
Assessment:     Diagnoses and all orders for this visit:    Acute right-sided low back pain with right-sided sciatica  -     EMG; Future; Expected date: 03/04/2020  -     XR Lumbar Spine Flex and Ext 2 or 3 VWS; Future; Expected date: 03/04/2020  -     acetaminophen-codeine (TYLENOL #3) 300-30 mg per tablet; Take 1 tablet by mouth every 6 (six) hours as needed for pain (Moderate to severe breakthrough pain).  Dispense: 30 tablet; Refill: 0    Acute right lumbar radiculopathy  -     EMG; Future; Expected date: 03/04/2020  -     XR Lumbar Spine Flex and Ext 2 or 3 VWS; Future; Expected date: 03/04/2020    Right leg weakness  -     EMG; Future; Expected date: 03/04/2020  -     XR Lumbar Spine Flex and Ext 2 or 3 VWS; Future; Expected date: 03/04/2020    Chronic lumbar radiculopathy  -     XR Lumbar Spine Flex and Ext 2 or 3 VWS; Future; Expected date: 03/04/2020    Closed fracture of fourth lumbar vertebra with routine healing, unspecified fracture morphology, subsequent encounter  -     XR Lumbar Spine Flex and Ext 2 or 3 VWS; Future; Expected date: 03/04/2020  -     acetaminophen-codeine (TYLENOL #3) 300-30 mg per tablet; Take 1 tablet by mouth every 6 (six) hours as needed for pain (Moderate to severe breakthrough pain).  Dispense: 30 tablet; Refill: 0  -     oxyCODONE (ROXICODONE) 5 MG immediate release tablet; Take 1 tablet (5 mg total) by mouth every 8 (eight) hours as needed for pain (Severe breakthrough pain: Do not drive while taking this medication.).  Dispense: 5 tablet; Refill: 0    Pars defect of lumbar spine  -     XR Lumbar Spine Flex and Ext 2 or 3 VWS; Future; Expected date: 03/04/2020  -     acetaminophen-codeine (TYLENOL #3) 300-30 mg per tablet; Take 1 tablet by mouth every 6 (six) hours as needed for pain (Moderate to severe breakthrough pain).  Dispense: 30 tablet; Refill: 0    History of lumbar surgery  -     XR Lumbar Spine Flex and Ext 2 or 3 VWS; Future; Expected date: 03/04/2020       Susan  A White is a 54 y.o. y.o. female with past medical history significant for major depression, TMJ, DESI, hyperlipidemia, nicotine dependence, migraine headache, post ablative hypothyroidism, status post lumbar microdiscectomy Dr. Cerna 2017, spinal cord stimulator implant 2018 who presents today for follow-up regarding:    -Worsening acute right low back pain at L4 since fall 2/5/2020 where patient sustained a right L4 pars interarticularis fracture, nondisplaced.    -Progressive right lumbar radiculopathy, worsening perceived right lower extremity weakness and paresthesias.  Patient is strong on exam however does have sensory deficit below the knee.      -Chronic right lumbar radiculopathy since surgery.     Plan:     A shared decision making plan was used. The patient's values and choices were respected. Prior medical records from 2/272020 were reviewed today. The following represents what was discussed and decided upon by the provider and the patient.        -DIAGNOSTIC TESTS: Images were personally reviewed and interpreted.   --Ordered right lower extremity EMG evaluate radiculopathy.  --Also ordered lumbar flexion-extension x-ray to evaluate L4-5 level and pars defect/fracture.  --Consider CT myelogram lumbar spine if symptoms are not improving.  --Lumbar CT scan 2/21/2020 shows acute nondisplaced right L4 pars interarticularis fracture.  L4-5 severe right and mild left foraminal stenosis with distortion of the right L4 nerve root.  Prior right L4 hemilaminotomy.  --[facility] spinal cord stimulator, patient cannot have MRI.    -INTERVENTIONS: Consider right  L4-5 TFESI pending EMG results.  Currently the weakness and numbness and tingling is her biggest concern over the pain however.    -MEDICATIONS: Refilled Tylenol #3 with codeine, 1 tablet every 6 hours as needed for moderate to severe breakthrough pain, number 30 tablets given for 2 weeks worth.  Patient is going on vacation for 1 week as well therefore  prescribing this is likely early.  -Prescribed oxycodone 5 mg 1 tablet twice daily for severe breakthrough pain for travel only, number 5 tablets given.  Discussed side effects of medications and proper use. Patient verbalized understanding.    -PATIENT EDUCATION:  20 minutes of total visit time was spent face to face with the patient today, 60 % of the visit was spent on counseling, education, and coordinating care.   -5 minutes spent outside of visit time, non-face-to-face time, reviewing chart.    -FOLLOW UP: Follow-up for EMG with Dr. Curtis.  Advised to contact clinic if symptoms worsen or change.    Subjective:     Susan Kwan is a 54 y.o. female who presents today for follow-up regarding Ongoing right low back pain since fall 2/5/2020 where patient slipped on ice and hit her head as well.  Back pain is currently still significant at a 7/10, and 8 at its worst, a 4 at its best it does feel better with wearing her back brace but is most pronounced on the left at the beltline.  Patient was having some intermittent pain into the lower extremity on the right however currently denies any right leg pain.  She is most concerned today however about the progressive weakness that she is having in her right lower extremity and is relying on her cane more often and still feels that her right leg is giving out on her at times even when the pain is not severe.  She does feel progressive worsening numbness and tingling into the anterior shin top of the right foot and sides of the right foot.  Patient denies recent falls since initial injury.  Patient denies left-sided symptoms, denies bowel or bladder loss control.     -Treatment to Date: Right L4-5 hemilaminectomy/microdiscectomy with right L5 nerve root decompression surgery with Dr. Cerna 2017.  Spinal cord stimulator implant T8-9 2018    Patient Active Problem List   Diagnosis     Nicotine Dependence     Migraine Headache     Arthralgias In Multiple Sites      Postablative hypothyroidism     Hyperlipidemia     Major Depression, Recurrent     Sudden Redness Of The Skin (Flushing)     Lower Back Pain     Carpal Tunnel Syndrome     Mild intermittent asthma without complication     Allergic Rhinitis     Seborrheic Keratosis     DESI (obstructive sleep apnea)     Sacroiliac joint dysfunction of right side     Lumbar foraminal stenosis     Lumbar disc herniation     Sciatica of right side     Lumbar stenosis     S/P lumbar microdiscectomy     Gastro-esophageal reflux disease with esophagitis     Depressive disorder     Anxiety state     Obesity (BMI 35.0-39.9) with comorbidity (H)     TMJ (temporomandibular joint syndrome)     Advanced directives, counseling/discussion     ASCUS with positive high risk HPV cervical     Xerostomia due to hyposecretion of salivary gland     Myofascial pain     Benign neoplasm of ascending colon       Current Outpatient Medications on File Prior to Encounter   Medication Sig Dispense Refill     albuterol (VENTOLIN HFA) 90 mcg/actuation inhaler INHALE 1 TO 2 PUFFS EVERY 4 HOURS AS NEEDED FOR WHEEZING 18 g 1     FLUoxetine (PROZAC) 20 MG capsule Take 2 capsules (40 mg total) by mouth daily. 60 capsule 3     gabapentin (NEURONTIN) 300 MG capsule TAKE 3 CAPSULES BY MOUTH EVERY MORNING, 3 CAPSULES BY MOUTH EVERY DAY IN THE AFTERNOON, AND 4 CAPSULES EVERY EVENING 900 capsule 3     levothyroxine (SYNTHROID, LEVOTHROID) 175 MCG tablet Take 1 tablet (175 mcg total) by mouth daily. 90 tablet 3     melatonin 3 mg Tab tablet TAKE 1 TABLET(3 MG) BY MOUTH AT BEDTIME AS NEEDED 30 tablet 11     multivitamin therapeutic tablet Take 1 tablet by mouth daily.       nabumetone (RELAFEN) 500 MG tablet Take 1-2 tablets (500-1,000 mg total) by mouth 2 (two) times a day as needed. Max 4 tablets/day.  Take with food 28 tablet 1     ondansetron (ZOFRAN ODT) 4 MG disintegrating tablet Take 1 tablet (4 mg total) by mouth every 8 (eight) hours as needed for nausea. 10 tablet 0  "    predniSONE (DELTASONE) 10 mg tablet Take 40 mg by mouth daily for 5 days. 20 tablet 0     simvastatin (ZOCOR) 20 MG tablet TAKE 1 TABLET(20 MG) BY MOUTH AT BEDTIME 90 tablet 4     SUMAtriptan (IMITREX) 50 MG tablet TAKE 1 TABLET BY MOUTH EVERY 2 HOURS AS NEEDED FOR MIGRAINE. MAY REPEAT IN 2 HOURS AS NEEDED 9 tablet 10     [DISCONTINUED] acetaminophen-codeine (TYLENOL #3) 300-30 mg per tablet Take 1 tablet by mouth every 6 (six) hours as needed for pain. 30 tablet 0     [DISCONTINUED] oxyCODONE (ROXICODONE) 5 MG immediate release tablet Take 1 tablet (5 mg total) by mouth every 6 (six) hours as needed for pain (Do not drive while taking this medication.). 24 tablet 0     No current facility-administered medications on file prior to encounter.        Allergies   Allergen Reactions     Acetaminophen      Other: very sore stomach       Cefuroxime Axetil      Sulfa (Sulfonamide Antibiotics) Rash       Past Medical History:   Diagnosis Date     Anxiety      Carpal tunnel syndrome      Depression     PMDD     Disease of thyroid gland      Migraine      Pregnancy          Substance abuse (H)     sober since , narcotic pain medication        Review of Systems  ROS: Positive for numbness and tingling and perceived weakness.  Specifically negative for bowel/bladder dysfunction, balance changes, headache, dizziness, fevers, chills, appetite changes, nausea/vomiting, unexplained weight loss. Otherwise 13 systems reviewed are negative. Please see the patient's intake questionnaire from today for details.    Reviewed Social, Family, Past Medical and Past Surgical history with patient, no significant changes noted since prior visit.     Objective:     /67 (Patient Site: Left Arm, Patient Position: Standing, Cuff Size: Adult Regular)   Pulse 85   Ht 5' 4\" (1.626 m)   Wt (!) 230 lb (104.3 kg)   LMP 2014   BMI 39.48 kg/m      PHYSICAL EXAMINATION:    --CONSTITUTIONAL: Well developed, well nourished, " healthy appearing individual.  --PSYCHIATRIC: Appropriate mood and affect. No difficulty interacting due to temper, social withdrawal, or memory issues.  --SKIN: Lumbar region is dry and intact.   --RESPIRATORY: Normal rhythm and effort. No abnormal accessory muscle breathing patterns noted.   --MUSCULOSKELETAL:  Normal lumbar lordosis noted, no lateral shift.  --GROSS MOTOR: Easily arises from a seated position. Gait is non-antalgic  --LUMBAR SPINE:  Inspection reveals no evidence of deformity.   --LOWER EXTREMITY MOTOR TESTING:  Plantar flexion left 5/5, right 5/5   Dorsiflexion left 5/5, right 5/5   Great toe MTP extension left 5/5, right 5/5  Knee flexion left 5/5, right 5/5  Knee extension left 5/5, right 5/5   Hip flexion left 5/5, right 5/5  Hip abduction left 5/5, right 5/5  Hip adduction left 5/5, right 5/5   --HIPS: Full range of motion bilaterally. Negative FABERs on the involved lower extremity.   --NEUROLOGIC: Bilateral patellar and achilles reflexes are physiologic and symmetric. Sensation to light touch is intact on the left, diminished into the right anterior shin dorsal and lateral/medial foot.    RESULTS:   Imaging: Lumbar spine imaging was reviewed today. The images were shown to the patient and the findings were explained using a spine model.      Ct Lumbar Spine Without Contrast  Result Date: 2/21/2020  INDICATION: Back pain, right sciatica with skin paresthesias; fall on ice on 02/05/2020, now with no feeling down right lower leg and pain in right thigh. COMPARISON: Lumbar spine radiographs 02/25/2019 and MRI lumbar spine 05/22/2017 TECHNIQUE: Routine without IV contrast. Multiplanar reformats.  Dose reduction techniques were used. FINDINGS: VERTEBRAE: Normal vertebral body heights and alignment. There is an acute, nondisplaced fracture of the right L4 pars interarticularis (sagittal image #25). No additional acute fracture or posttraumatic subluxation. CANAL/FORAMINA: Mild degenerative disc  disease throughout the lumbar spine, though without high-grade spinal canal stenosis. Prior right L4 hemilaminotomy. At L4-L5, there is severe right and mild left neuroforaminal stenosis, with distortion of the exiting right L4 nerve root. PARASPINAL: Atherosclerotic vascular calcifications. Paraspinous soft tissues are otherwise grossly unremarkable.   IMPRESSION:   1.  Acute, nondisplaced fracture of the right L4 pars interarticularis.   2.  At L4-L5, there is severe right and mild left neuroforaminal stenosis with distortion of the exiting right L4 nerve root.   3.  No high-grade spinal canal stenosis. DICOM format image data is available to non-affiliated external healthcare facilities or entities on a secure, media-free, reciprocally searchable basis with patient authorization for at least a 12-month period after the study.

## 2021-06-06 NOTE — PATIENT INSTRUCTIONS - HE
MRI of the lumbar spine has been ordered for further evaluation.  Please check in at the [facility] desk for information regarding your appointment later today.

## 2021-06-06 NOTE — TELEPHONE ENCOUNTER
Call and spoke with patient and message was given. No further questions. Patient will call [facility] to schedule CT for tomorrow.    BREEZY Ramos  2/20/2020  5:31 PM

## 2021-06-06 NOTE — TELEPHONE ENCOUNTER
Spoke with Jenny @ [facility]. Awaiting a call back from Dr. Curtis to discuss next steps in patient management.     Keven Combs MD 02/21/20 9:04 AM

## 2021-06-06 NOTE — PROGRESS NOTES
Patient presents at the request of Cristela Salguero for right lower extremity EMG.  She has a history of lumbar surgery.  She has spinal cord stimulator in the left gluteal region.  She has low back pain with right lower extremity pain paresthesias and weakness.  Increased after a fall.    EMG/NCS  results: Please see scanned full report.    Comment NCS: Normal study  1.  Normal nerve conduction studies right lower extremity.  This includes right sural and peroneal SNAPs, peroneal and tibial CMAPs and symmetric bilateral tibial H reflexes.    Comment EMG: Abnormal study  1.  Increased insertional activity with  spontaneous activity right tibialis anterior, tibialis posterior and peroneus longus.  Remainder right lower extremity needle EMG normal.    Interpretation: Abnormal study: There is electrodiagnostic evidence of    1.  Right L5 radiculopathy, active.      2. There is no electrodiagnostic evidence of   lumbosacral plexopathy  or focal neuropathy in the right lower extremity.    The testing was completed in its entirety by the physician.      It was our pleasure caring for your patient today, if there any questions or concerns please do not hesitate to contact us.

## 2021-06-06 NOTE — TELEPHONE ENCOUNTER
Patient returned call. Results given. Discussed continued use of the brace and returning for the scheduled EMG. Patient is scheduled for a follow up with Cristela Salguero CNP the following week. Stated understanding and appreciation for call.

## 2021-06-06 NOTE — TELEPHONE ENCOUNTER
Received a call back from Dr. Curtis @ [facility]. He reviewed the patient's CT result and recommended that an LSO Brace be ordered for her with follow up scheduled at [facility] next week.     Keven Combs MD 02/21/20 9:25 AM

## 2021-06-06 NOTE — TELEPHONE ENCOUNTER
Returned pt's call and provider spoke with patient. Patient requesting tylenol #3. Confirmed pharmacy.    BREEZY Ramos  2/20/2020  3:51 PM

## 2021-06-06 NOTE — TELEPHONE ENCOUNTER
Voicemail received from pt returning call. Call placed to pt. Results and recommendations given. Pt stated understanding. Pt states she has no preference where referral is placed. Wherever Cristela would like her to go is ok with her. She is aware referral will be placed and she will be contacted.

## 2021-06-06 NOTE — TELEPHONE ENCOUNTER
Who is calling:  Patient   Reason for Call:  Caller stated that she was denied to get her scans done due to patient having a Spinal cord stimulator. Caller stated she would like to know if the doctor can find another place for her to go to.   Date of last appointment with primary care:   Okay to leave a detailed message: Yes

## 2021-06-06 NOTE — PATIENT INSTRUCTIONS - HE
Thank you for choosing the [facility] for your EMG testing.    The ordering provider will receive your final EMG results within the next few days.  Please follow up with your provider for the results and further treatment recommendations.

## 2021-06-06 NOTE — PATIENT INSTRUCTIONS - HE
~Please call Nurse Navigation line [phone] with any questions or concerns about your treatment plan, if symptoms worsen and you would like to be seen urgently, or if you have problems controlling bladder and bowel function.  ~Follow Up Appointment time slots with Cristela Salguero, CNP with the [facility], are also available at the [facility] location near [facility] on the first and third THURSDAY afternoons of each month.

## 2021-06-06 NOTE — TELEPHONE ENCOUNTER
----- Message from Cristela Salguero CNP sent at 3/13/2020  8:12 AM CDT -----  Please call patient and notify her that I did review her lumbar flexion-extension x-ray which does show slight shift at L4-5 1 to 2 mm but no instability or abnormal movement which is good.  Nothing concerning with her previous pars fracture, recommend still wearing the back brace however at this time until follow-up.  Recommend EMG as scheduled next week and then follow-up to discuss results.

## 2021-06-06 NOTE — PATIENT INSTRUCTIONS - HE
1. May take Oxycodone as needed for pain. Recommend taking stool softener and drinking more fluids while taking this medicine.   2. Follow up with [facility]. They will continue with your pain management plan.   3. Seek emergency medical attention if you develop any loss of bowel or bladder function.

## 2021-06-06 NOTE — PROGRESS NOTES
"S: Patient is a 53 y/o female, 5'3\", 220 lbs seen at our [place] location for the evaluation and delivery of a Aspen Contour LSO on orders from Dr. Keven Combs MD for the treatment of Dx: Spondylolysis of lumbar region [M43.06]. Patient's Epic chart also denotes that she sustained an L4 Fracture which is causing severe lumbar stenosis as well.     O: Patient presented walking under her own power to her appointment today. Patient reports fairly constant pain in her back whenever standing for more than 5 minutes. Patient was alert and oriented throughout our visit.     G: Patient's LSO will support and stabilize her affected vertebra during her healing period in order to allow proper healing and aid in pain control through soft tissue compression.    A: I fit the patient with a 12\" posterior panel, regular anterior panel and XXL quickdraw. Patient was able to stand at the edge of her chair in our office for fitting and after fitting was complete, fit was deemed as optimal. I demonstrated how the LSO functioned with the patient and she was able to demonstrate proper functioning of her LSO during our appointment. Patient and I discussed care and use of her LSO and she was provided with written care and use instructions from the . Patient reported she understood all instructions and did not have further questions at the end of our visit.     P: Patient was asked to call our office if there are any issues with her LSO in the future.   "

## 2021-06-06 NOTE — PROGRESS NOTES
Subjective:    Susan Kwan is a 54 y.o. female who presents for evaluation of 2 concerns:    1.  Asthma check.  She has mild intermittent asthma.  She is a refill of her albuterol inhaler.  She has been using her inhaler a lot recently.  She feels like her asthma has been worse recently.  No fevers.  She has used present prednisone in the past.    2.  Skin lesion.    She has a small scab a bump on her face on the left side by the ear.  Seems to catch on her CPAP mask a lot and she is annoyed by this and can get painful when it gets caught on things.  Additionally, she has a skin tag on the right neck that also seems to get caught on things and painful.  She has had multiple other skin lesions removed.  I reviewed pathology on 2 previous face lesions and they were both benign.    Patient Active Problem List   Diagnosis     Nicotine Dependence     Migraine Headache     Arthralgias In Multiple Sites     Postablative hypothyroidism     Hyperlipidemia     Major Depression, Recurrent     Sudden Redness Of The Skin (Flushing)     Lower Back Pain     Carpal Tunnel Syndrome     Mild intermittent asthma without complication     Allergic Rhinitis     Seborrheic Keratosis     DESI (obstructive sleep apnea)     Sacroiliac joint dysfunction of right side     Lumbar foraminal stenosis     Lumbar disc herniation     Sciatica of right side     Lumbar stenosis     S/P lumbar microdiscectomy     Gastro-esophageal reflux disease with esophagitis     Depressive disorder     Anxiety state     Obesity (BMI 35.0-39.9) with comorbidity (H)     TMJ (temporomandibular joint syndrome)     Advanced directives, counseling/discussion     ASCUS with positive high risk HPV cervical     Xerostomia due to hyposecretion of salivary gland     Myofascial pain     Benign neoplasm of ascending colon       Current Outpatient Medications:      acetaminophen-codeine (TYLENOL #3) 300-30 mg per tablet, Take 1 tablet by mouth every 6 (six) hours as needed  for pain., Disp: 30 tablet, Rfl: 0     albuterol (VENTOLIN HFA) 90 mcg/actuation inhaler, INHALE 1 TO 2 PUFFS EVERY 4 HOURS AS NEEDED FOR WHEEZING, Disp: 18 g, Rfl: 1     FLUoxetine (PROZAC) 20 MG capsule, Take 2 capsules (40 mg total) by mouth daily., Disp: 60 capsule, Rfl: 3     gabapentin (NEURONTIN) 300 MG capsule, TAKE 3 CAPSULES BY MOUTH EVERY MORNING, 3 CAPSULES BY MOUTH EVERY DAY IN THE AFTERNOON, AND 4 CAPSULES EVERY EVENING, Disp: 900 capsule, Rfl: 3     levothyroxine (SYNTHROID, LEVOTHROID) 175 MCG tablet, Take 1 tablet (175 mcg total) by mouth daily., Disp: 90 tablet, Rfl: 3     melatonin 3 mg Tab tablet, TAKE 1 TABLET(3 MG) BY MOUTH AT BEDTIME AS NEEDED, Disp: 30 tablet, Rfl: 11     multivitamin therapeutic tablet, Take 1 tablet by mouth daily., Disp: , Rfl:      nabumetone (RELAFEN) 500 MG tablet, Take 1-2 tablets (500-1,000 mg total) by mouth 2 (two) times a day as needed. Max 4 tablets/day.  Take with food, Disp: 28 tablet, Rfl: 1     ondansetron (ZOFRAN ODT) 4 MG disintegrating tablet, Take 1 tablet (4 mg total) by mouth every 8 (eight) hours as needed for nausea., Disp: 10 tablet, Rfl: 0     oxyCODONE (ROXICODONE) 5 MG immediate release tablet, Take 1 tablet (5 mg total) by mouth every 6 (six) hours as needed for pain (Do not drive while taking this medication.)., Disp: 24 tablet, Rfl: 0     simvastatin (ZOCOR) 20 MG tablet, TAKE 1 TABLET(20 MG) BY MOUTH AT BEDTIME, Disp: 90 tablet, Rfl: 4     SUMAtriptan (IMITREX) 50 MG tablet, TAKE 1 TABLET BY MOUTH EVERY 2 HOURS AS NEEDED FOR MIGRAINE. MAY REPEAT IN 2 HOURS AS NEEDED, Disp: 9 tablet, Rfl: 10     predniSONE (DELTASONE) 10 mg tablet, Take 40 mg by mouth daily for 5 days., Disp: 20 tablet, Rfl: 0     Objective:   Allergies:  Acetaminophen; Cefuroxime axetil; and Sulfa (sulfonamide antibiotics)    Vitals:  Vitals:    02/28/20 0941   BP: 98/78   Patient Site: Left Arm   Patient Position: Sitting   Cuff Size: Adult Regular   Pulse: 86   Resp: 16  "  Temp: 97.8  F (36.6  C)   TempSrc: Oral   Weight: (!) 230 lb (104.3 kg)   Height: 5' 4\" (1.626 m)     Body mass index is 39.48 kg/m .    Vital signs reviewed.  General: Patient is alert and oriented x 3, in no apparent distress  Ears: TMs are non-erythematous with good light reflex bilaterally  Throat: no erythema, edema or exudate noted  Lymphatic: no anterior cervical lymph node enlargement  Cardiac: regular rate and rhythm, no murmurs  Pulmonary: lungs clear to auscultation bilaterally, no crackles, rales, rhonchi, or wheezing noted  Skin: Small 2 mm raised rough tan-colored lesion present on the left lateral head anterior to the ear, small skin colored skin tag present on right lateral neck    Procedure  Left side face skin lesion frozen with liquid nitrogen x5  Right neck skin tag was frozen with liquid nitrogen x5.  I reviewed appropriate aftercare with patient.    PHQ 9 = 0    Assessment and Plan:   1.  Mild intermittent asthma with current flare.  Inhaler refilled.  Prescription sent for prednisone for recent worsening symptoms.  If symptoms are not improving with prednisone, patient will return for follow-up.  Could consider reassessing severity of asthma in the future if needed.    2.  Nicotine dependence.  Patient is smoking more recently due to her back pain.  She has a back brace on today.  She says she has tried everything to quit and nothing has worked.  I discussed she could consider acupuncture if her insurance covers it.  She will look into this.    3.  Skin tag removal.  Right neck skin tag was frozen today as above.    4.  Left sided face lesion.  Appears benign, likely seborrheic keratoses.  I discussed doing a punch biopsy versus freezing it.  Patient would like it frozen.  I reviewed that there is a small possibility of hypopigmentation of the skin at that site.  She is okay with that.  Lesion frozen.  If this does not seem to take care of it, she will return for follow-up.  No acute " concerns today regarding the lesion.  She has no history of skin cancer.  She notes her father has had skin cancer, not sure what kind.    This dictation uses voice recognition software, which may contain typographical errors.

## 2021-06-06 NOTE — PROGRESS NOTES
Assessment:     Diagnoses and all orders for this visit:    Acute right-sided low back pain with right-sided sciatica  -     acetaminophen-codeine (TYLENOL #3) 300-30 mg per tablet; Take 1 tablet by mouth every 6 (six) hours as needed for pain.  Dispense: 30 tablet; Refill: 0    Closed fracture of fourth lumbar vertebra with routine healing, unspecified fracture morphology, subsequent encounter  -     acetaminophen-codeine (TYLENOL #3) 300-30 mg per tablet; Take 1 tablet by mouth every 6 (six) hours as needed for pain.  Dispense: 30 tablet; Refill: 0    Pars defect of lumbar spine  -     acetaminophen-codeine (TYLENOL #3) 300-30 mg per tablet; Take 1 tablet by mouth every 6 (six) hours as needed for pain.  Dispense: 30 tablet; Refill: 0    History of lumbar surgery    Chronic lumbar radiculopathy       Susan Kwan is a 54 y.o. y.o. female with past medical history significant for major depression, TMJ, DESI, hyperlipidemia, nicotine dependence, migraine headache, post ablative hypothyroidism, status post lumbar microdiscectomy Dr. Cerna 2017, spinal cord stimulator implant 2018 who presents today for follow-up regarding:    -Acute right low back pain at L4 since fall 2/5/2020 where patient sustained a right L4 pars interarticularis fracture, nondisplaced.     -Chronic right lumbar radiculopathy since surgery.     Plan:     A shared decision making plan was used. The patient's values and choices were respected. Prior medical records from 2/25/2020 were reviewed today. The following represents what was discussed and decided upon by the provider and the patient.        -DIAGNOSTIC TESTS: Images were personally reviewed and interpreted.   --Consider right lower extremity EMG if perceived weakness is worsening.  --Consider lumbar CT myelogram if symptoms are worsening.  --Lumbar CT scan 2/21/2020 shows acute nondisplaced right L4 pars interarticularis fracture.  L4-5 severe right and mild left foraminal stenosis with  distortion of the right L4 nerve root.  Prior right L4 hemilaminotomy.  --[facility] spinal cord stimulator, patient cannot have MRI.    -INTERVENTIONS: No injection recommendations at this time.  Down the road if right lower extremity symptoms not improving could consider right L4-5 TFESI she does have a right L4 nerve root compression on CT scan.    -MEDICATIONS: Patient is doing well therefore advised patient to stop taking oxycodone and only take as needed for severe breakthrough pain.  -Prescribed Tylenol 3 for moderate pain every 6 hours as needed, number 30 tablets given for 2 weeks worth, discussed with patient she should not take this medication at the same time as oxycodone.  [place]  checked. Discussed the risks (eg, addiction, overdose, worsening pain) verses benefit of opioid use with patient today. Explained that this medication will not be a long term solution to ongoing pain. Discussed using lowest effective dose and the importance of other measures for pain management including PT, other non-opioid medications, behavioral treatments, and other procedure options.   Discussed side effects of medications and proper use. Patient verbalized understanding.    -PHYSICAL THERAPY: Did discuss that if symptoms are doing well in the next couple of weeks we could start physical therapy at that time.  Will hold off at this time however.  Discussed the importance of core strengthening, ROM, stretching exercises with the patient and how each of these entities is important in decreasing pain.  Explained to the patient that the purpose of physical therapy is to teach the patient a home exercise program.  These exercises need to be performed every day in order to decrease pain and prevent future occurrences of pain.        --FMLA paperwork filled out today for restrictions through 3/12/2020.  At that time if patient is doing well we can lift lifting restrictions however discussed with patient we typically still  recommend lifting restrictions for 10 pounds for 2 months.  If she does not lift on a regular basis for her job but needs restrictions lifted to go back as a  in case of an emergency, she is hoping to be able to do this in the next couple of weeks.    -PATIENT EDUCATION:  20 minutes of total visit time was spent face to face with the patient today, 60 % of the visit was spent on counseling, education, and coordinating care.   -5 minutes spent outside of visit time, non-face-to-face time, reviewing chart.    -FOLLOW UP: Follow-up 3/11/2020 per scheduled visit.  Advised to contact clinic if symptoms worsen or change.    Subjective:     Susan Kwan is a 54 y.o. female who presents today for follow-up regarding ongoing low back pain at the belt line on the right since fall 2/5/2020 where patient slipped on the ice and hit her head as well.  She has been improving while wearing the back brace over the last couple of days, currently her pain is a 7/10, a 4 at its best, she reports that today is a little bit worse than it has been in the last couple of days for whatever reason.  She does otherwise feel like the brace is helpful and the medication has been helpful as well.  She does feel that the nabumetone has been making a bigger difference and she is taking less of the oxycodone at this time and is interested in less intense pain medication but slightly stronger than nabumetone.    Patient does have chronic right lower extremity weakness as well that have been progressive post fall, denies any recent trips or falls, she does have chronic numbness and tingling in her right lower extremity since surgery 2017.  She does report that the numbness and tingling had slightly worsened since her fall and she was worried about driving given it was her driving leg, she reports that it has slightly improved since then and is similar to baseline at this time.  Patient is walking with a cane at this time for  stability.    -Treatment to Date: Right L4-5 hemilaminectomy/microdiscectomy with right L5 nerve root decompression surgery with Dr. Cerna 2017.  Spinal cord stimulator implant T8-9 2018    Patient Active Problem List   Diagnosis     Nicotine Dependence     Migraine Headache     Arthralgias In Multiple Sites     Postablative hypothyroidism     Hyperlipidemia     Major Depression, Recurrent     Sudden Redness Of The Skin (Flushing)     Lower Back Pain     Carpal Tunnel Syndrome     Mild intermittent asthma without complication     Allergic Rhinitis     Seborrheic Keratosis     DESI (obstructive sleep apnea)     Sacroiliac joint dysfunction of right side     Lumbar foraminal stenosis     Lumbar disc herniation     Sciatica of right side     Lumbar stenosis     S/P lumbar microdiscectomy     Gastro-esophageal reflux disease with esophagitis     Depressive disorder     Anxiety state     Obesity (BMI 35.0-39.9) with comorbidity (H)     TMJ (temporomandibular joint syndrome)     Advanced directives, counseling/discussion     ASCUS with positive high risk HPV cervical     Xerostomia due to hyposecretion of salivary gland     Myofascial pain     Benign neoplasm of ascending colon       Current Outpatient Medications on File Prior to Encounter   Medication Sig Dispense Refill     FLUoxetine (PROZAC) 20 MG capsule Take 2 capsules (40 mg total) by mouth daily. 60 capsule 3     gabapentin (NEURONTIN) 300 MG capsule TAKE 3 CAPSULES BY MOUTH EVERY MORNING, 3 CAPSULES BY MOUTH EVERY DAY IN THE AFTERNOON, AND 4 CAPSULES EVERY EVENING 900 capsule 3     levothyroxine (SYNTHROID, LEVOTHROID) 175 MCG tablet Take 1 tablet (175 mcg total) by mouth daily. 90 tablet 3     melatonin 3 mg Tab tablet TAKE 1 TABLET(3 MG) BY MOUTH AT BEDTIME AS NEEDED 30 tablet 11     multivitamin therapeutic tablet Take 1 tablet by mouth daily.       nabumetone (RELAFEN) 500 MG tablet Take 1-2 tablets (500-1,000 mg total) by mouth 2 (two) times a day as needed.  "Max 4 tablets/day.  Take with food 28 tablet 1     ondansetron (ZOFRAN ODT) 4 MG disintegrating tablet Take 1 tablet (4 mg total) by mouth every 8 (eight) hours as needed for nausea. 10 tablet 0     oxyCODONE (ROXICODONE) 5 MG immediate release tablet Take 1 tablet (5 mg total) by mouth every 6 (six) hours as needed for pain (Do not drive while taking this medication.). 24 tablet 0     simvastatin (ZOCOR) 20 MG tablet TAKE 1 TABLET(20 MG) BY MOUTH AT BEDTIME 90 tablet 4     SUMAtriptan (IMITREX) 50 MG tablet TAKE 1 TABLET BY MOUTH EVERY 2 HOURS AS NEEDED FOR MIGRAINE. MAY REPEAT IN 2 HOURS AS NEEDED 9 tablet 10     VENTOLIN HFA 90 mcg/actuation inhaler INHALE 1 TO 2 PUFFS EVERY 4 HOURS AS NEEDED FOR WHEEZING 18 g 3     No current facility-administered medications on file prior to encounter.        Allergies   Allergen Reactions     Acetaminophen      Other: very sore stomach       Cefuroxime Axetil      Sulfa (Sulfonamide Antibiotics) Rash       Past Medical History:   Diagnosis Date     Anxiety      Carpal tunnel syndrome      Depression     PMDD     Disease of thyroid gland      Migraine      Pregnancy          Substance abuse (H)     sober since , narcotic pain medication        Review of Systems  ROS: Positive for numbness and tingling, chronic right leg weakness, previous fall.  Specifically negative for bowel/bladder dysfunction, headache, dizziness, foot drop, fevers, chills, appetite changes, nausea/vomiting, unexplained weight loss. Otherwise 13 systems reviewed are negative. Please see the patient's intake questionnaire from today for details.    Reviewed Social, Family, Past Medical and Past Surgical history with patient, no significant changes noted since prior visit.     Objective:     BP 97/52 (Patient Site: Left Arm, Patient Position: Standing, Cuff Size: Adult Regular)   Pulse 89   Ht 5' 2.5\" (1.588 m)   Wt (!) 233 lb (105.7 kg)   LMP 2014   BMI 41.94 kg/m      PHYSICAL " EXAMINATION:    --CONSTITUTIONAL: Well developed, well nourished, healthy appearing individual.  --PSYCHIATRIC: Appropriate mood and affect. No difficulty interacting due to temper, social withdrawal, or memory issues.  --SKIN: Lumbar region is dry and intact.   --RESPIRATORY: Normal rhythm and effort. No abnormal accessory muscle breathing patterns noted.   --MUSCULOSKELETAL:  Normal lumbar lordosis noted, no lateral shift.  --GROSS MOTOR: Easily arises from a seated position. Gait is non-antalgic  --LUMBAR SPINE:  Inspection reveals no evidence of deformity.   --LOWER EXTREMITY MOTOR TESTING:  Plantar flexion left 5/5, right 5/5   Dorsiflexion left 5/5, right 5/5   Great toe MTP extension left 5/5, right 5/5  Knee flexion left 5/5, right 5/5  Knee extension left 5/5, right 5/5   Hip flexion left 5/5, right 5/5  Hip abduction left 5/5, right 5/5  Hip adduction left 5/5, right 5/5   --NEUROLOGIC: Bilateral patellar and achilles reflexes are physiologic and symmetric. Sensation to light touch is intact in the bilateral L4, L5, and S1 dermatomes.    RESULTS:   Imaging: Lumbar spine imaging was reviewed today. The images were shown to the patient and the findings were explained using a spine model.      Ct Lumbar Spine Without Contrast  Result Date: 2/21/2020  INDICATION: Back pain, right sciatica with skin paresthesias; fall on ice on 02/05/2020, now with no feeling down right lower leg and pain in right thigh. COMPARISON: Lumbar spine radiographs 02/25/2019 and MRI lumbar spine 05/22/2017 TECHNIQUE: Routine without IV contrast. Multiplanar reformats.  Dose reduction techniques were used. FINDINGS: VERTEBRAE: Normal vertebral body heights and alignment. There is an acute, nondisplaced fracture of the right L4 pars interarticularis (sagittal image #25). No additional acute fracture or posttraumatic subluxation. CANAL/FORAMINA: Mild degenerative disc disease throughout the lumbar spine, though without high-grade spinal  canal stenosis. Prior right L4 hemilaminotomy. At L4-L5, there is severe right and mild left neuroforaminal stenosis, with distortion of the exiting right L4 nerve root. PARASPINAL: Atherosclerotic vascular calcifications. Paraspinous soft tissues are otherwise grossly unremarkable.   IMPRESSION:   1.  Acute, nondisplaced fracture of the right L4 pars interarticularis.   2.  At L4-L5, there is severe right and mild left neuroforaminal stenosis with distortion of the exiting right L4 nerve root.   3.  No high-grade spinal canal stenosis. DICOM format image data is available to non-affiliated external healthcare facilities or entities on a secure, media-free, reciprocally searchable basis with patient authorization for at least a 12-month period after the study.

## 2021-06-06 NOTE — TELEPHONE ENCOUNTER
"Patient was in clinic today for her CT scan. She was informed by imaging  that results would take about 2-3 days. Patient is wondering if there is any restrictions till she hears back regarding her CT scan. Patient is a  and unable to drive if she can't lift her leg up. Patient states \"she would like to ask a note to be an aide for the time being until she finds out about the CT results\".    Informed patient will route message to provider as there was no restrictions on provider's note from yesterday. Patient would like note released to [facility] and call to inform her letter was done.    BREEZY Ramos  2/21/2020  7:43 AM      "

## 2021-06-06 NOTE — TELEPHONE ENCOUNTER
----- Message from Cristela Salguero CNP sent at 3/18/2020 11:21 AM CDT -----  Regarding: EMG results surgical referral  Please call patient and notify her that I did review her EMG results that she had with Dr. Curtis today and it does show active nerve damage occurring on the right. Typically when we see this we would like her to get a surgical opinion to prevent permanent nerve damage.  Her previous surgery was done with Dr. Cerna we can refer her back to [facility] if she would like or we can refer her to [facility] neurosurgeons to determine if she needs to be seen at this time. They may require her to have a CT myelogram as well however. Please ask where the patient would like to be referred for surgical opinion.

## 2021-06-06 NOTE — TELEPHONE ENCOUNTER
Support staff to please contact patient. I spoke with the MRI tech at [facility]. None of the MRI scanners within the [facility] system are compatible with the patient's spinal cord stimulator. MRI order has therefore been cancelled. CT of the lumbar spine has been ordered through [facility] for further evaluation of patient's symptoms. She should call [phone] Ext [phone] to schedule this for Friday 2/21/2020. A prescription for a limited supply of Tylenol #3 has been provided.     Keven Combs MD 02/20/20 4:25 PM

## 2021-06-06 NOTE — PATIENT INSTRUCTIONS - HE
~Please call Nurse Navigation line [phone] with any questions or concerns about your treatment plan, if symptoms worsen and you would like to be seen urgently, or if you have problems controlling bladder and bowel function.  ~Follow Up Appointment time slots with Cristela Salguero, CNP with [facility], are also available at the [facility] location near [facility] on the first and third THURSDAY afternoons of each month.

## 2021-06-06 NOTE — PROGRESS NOTES
ASSESSMENT: Susan Kwan is a 54 y.o. female who presents for consultation at the request of PCP Earline Jimenez MD, with a past medical history significant for major depression, TMJ, DESI, hyperlipidemia, nicotine dependence, migraine headache, post ablative hypothyroidism, status post lumbar microdiscectomy Dr. Cerna 2017, spinal cord stimulator implant 2018 who presents today for new patient evaluation of:    -Acute right low back pain L4 since fall 2/5/2020 where patient sustained right L4 pars interarticularis fracture nondisplaced.  Recommend back brace x2 to 3 months.    -Chronic right lumbar radiculopathy with perceived weakness progressive since surgery, patient is strong on exam however does have sensory deficit right lower extremity.  Patient reports history of foot drop.    Patient is neurologically intact on exam. No myelopathic or red flag symptoms.     JACKIE Score: 60    WHO 5: 10     Diagnoses and all orders for this visit:    Acute right-sided low back pain with right-sided sciatica  -     nabumetone (RELAFEN) 500 MG tablet; Take 1-2 tablets (500-1,000 mg total) by mouth 2 (two) times a day as needed. Max 4 tablets/day.  Take with food  Dispense: 28 tablet; Refill: 1    Pars defect of lumbar spine  -     nabumetone (RELAFEN) 500 MG tablet; Take 1-2 tablets (500-1,000 mg total) by mouth 2 (two) times a day as needed. Max 4 tablets/day.  Take with food  Dispense: 28 tablet; Refill: 1    History of lumbar surgery    S/P insertion of spinal cord stimulator    Chronic lumbar radiculopathy    Closed fracture of fourth lumbar vertebra with routine healing, unspecified fracture morphology, subsequent encounter  -     nabumetone (RELAFEN) 500 MG tablet; Take 1-2 tablets (500-1,000 mg total) by mouth 2 (two) times a day as needed. Max 4 tablets/day.  Take with food  Dispense: 28 tablet; Refill: 1  -     oxyCODONE (ROXICODONE) 5 MG immediate release tablet; Take 1 tablet (5 mg total) by mouth every 6 (six) hours  as needed for pain (Do not drive while taking this medication.).  Dispense: 24 tablet; Refill: 0      PLAN:  Reviewed spine anatomy and disease process. Discussed diagnosis and treatment options with the patient today. A shared decision making model was used.  The patient's values and choices were respected. The following represents what was discussed and decided upon by the provider and the patient.      -DIAGNOSTIC TESTS:  Images were personally reviewed and interpreted and explained to patient today using spine model.   --Consider right lower extremity EMG if perceived weakness is worsening.  --Consider lumbar CT myelogram if symptoms are worsening.  --Lumbar CT scan 2/21/2020 shows acute nondisplaced right L4 pars interarticularis fracture.  L4-5 severe right and mild left foraminal stenosis with distortion of the right L4 nerve root.  Prior right L4 hemilaminotomy.  --[facility] spinal cord stimulator, patient cannot have MRI.    -PHYSICAL THERAPY: Did discuss that we would recommend physical therapy in the next couple of weeks however would recommend wearing the brace initially due to acute fracture.  Discussed the importance of core strengthening, ROM, stretching exercises with the patient and how each of these entities is important in decreasing pain.  Explained to the patient that the purpose of physical therapy is to teach the patient a home exercise program.  These exercises need to be performed every day in order to decrease pain and prevent future occurrences of pain.        -MEDICATIONS: Prescribe nabumetone 500 mg 1 to 2 tablets twice daily as needed for pain and inflammation at this time.  -Also refilled oxycodone 5 mg 1 tablet every 6-8 hours as needed for severe breakthrough pain number 24 tablets given for 7 days worth.  Discussed with patient at that point if she is still needing pain medication we could consider tapering down to Tylenol 3.  She does have a history of addiction to Vicodin years  prior which she is very aware of and not wanting to get back in this type of scenario.  [place]  checked. Discussed the risks (eg, addiction, overdose, worsening pain) verses benefit of opioid use with patient today. Explained that this medication will not be a long term solution to ongoing pain. Discussed using lowest effective dose and the importance of other measures for pain management including PT, other non-opioid medications, behavioral treatments, and other procedure options.   Discussed multiple medication options today with patient. Discussed risks, side effects, and proper use of medications. Patient verbalized understanding.    -INTERVENTIONS: No recommendations for spine injections at this time.  If her right leg symptoms however worsen down the road we could consider a right L4-5 TFESI she does have a right L4 nerve root compression on her CT scan and chronic progressive radiculopathy symptoms.  Discussed risks and benefits of injections with patient today.    -LSO back brace advised patient to wear this brace at all times during the daytime she can take it off for bathing and sleeping or laying down or icing her back for 10 to 20 minutes.  Did discuss with patient that we recommend wearing this for 2 to 3 months to allow healing of the fracture as well as help with pain relief.    -Workability: Work restrictions given to limit bending and lifting/twisting with wearing her brace at all times, limit lifting 10 pounds, no driving however patient is okay to be a .  She does work as a  and does not feel safe with her right leg weakness giving it is her driving leg.  Restrictions through 3/16/2020, will reevaluate workability at that time.    -PATIENT EDUCATION:  45 minutes of total visit time was spent face to face with the patient today, greater than 50% of total time spent with patient was spent on counseling, education, and coordinating care.   -10 minutes spent outside of visit time,  non-face-to-face time, reviewing chart.    -FOLLOW-UP:   Follow-up in 2 weeks, sooner if needed for pain medication refills.  Patient is aware that she must be seen in clinic for refills.    Advised patient to call the [facility] if symptoms worsen or you have problems controlling bladder and bowel function.   ______________________________________________________________________    SUBJECTIVE:  HPI:  Susan Kwan  Is a 54 y.o. female who presents today for new patient evaluation of low back pain at the beltline on the right ongoing since fall 2/5/2020 where patient slipped on the ice and landed on her back as well as hit her head.  She reports that her pain was minimal at the time of the fall however progressively worsened over the next couple of weeks.  Currently her pain in the low back is a 6/10, and 8 at its worst, a 4 at its best.  She did obtain a back brace yesterday and does feel like it gives her some support and pain relief as well.      -Treatment to Date: Right L4-5 hemilaminectomy/microdiscectomy with right L5 nerve root decompression surgery with Dr. Cerna 2017.  Spinal cord stimulator implant T8-9 2018    -Medications:    Current Outpatient Medications on File Prior to Encounter   Medication Sig Dispense Refill     acetaminophen-codeine (TYLENOL #3) 300-30 mg per tablet Take 1-2 tablets by mouth at bedtime as needed for pain. 10 tablet 0     FLUoxetine (PROZAC) 20 MG capsule Take 2 capsules (40 mg total) by mouth daily. 60 capsule 3     gabapentin (NEURONTIN) 300 MG capsule TAKE 3 CAPSULES BY MOUTH EVERY MORNING, 3 CAPSULES BY MOUTH EVERY DAY IN THE AFTERNOON, AND 4 CAPSULES EVERY EVENING 900 capsule 3     levothyroxine (SYNTHROID, LEVOTHROID) 175 MCG tablet Take 1 tablet (175 mcg total) by mouth daily. 90 tablet 3     melatonin 3 mg Tab tablet TAKE 1 TABLET(3 MG) BY MOUTH AT BEDTIME AS NEEDED 30 tablet 11     multivitamin therapeutic tablet Take 1 tablet by mouth daily.       ondansetron (ZOFRAN  ODT) 4 MG disintegrating tablet Take 1 tablet (4 mg total) by mouth every 8 (eight) hours as needed for nausea. 10 tablet 0     simvastatin (ZOCOR) 20 MG tablet TAKE 1 TABLET(20 MG) BY MOUTH AT BEDTIME 90 tablet 4     SUMAtriptan (IMITREX) 50 MG tablet TAKE 1 TABLET BY MOUTH EVERY 2 HOURS AS NEEDED FOR MIGRAINE. MAY REPEAT IN 2 HOURS AS NEEDED 9 tablet 10     VENTOLIN HFA 90 mcg/actuation inhaler INHALE 1 TO 2 PUFFS EVERY 4 HOURS AS NEEDED FOR WHEEZING 18 g 3     [DISCONTINUED] oxyCODONE (ROXICODONE) 5 MG immediate release tablet Take 1 tablet (5 mg total) by mouth every 6 (six) hours as needed for pain (Do not drive while taking this medication.). 12 tablet 0     No current facility-administered medications on file prior to encounter.        Allergies   Allergen Reactions     Acetaminophen      Other: very sore stomach       Cefuroxime Axetil      Sulfa (Sulfonamide Antibiotics) Rash       Past Medical History:   Diagnosis Date     Anxiety      Carpal tunnel syndrome      Depression     PMDD     Disease of thyroid gland      Migraine      Pregnancy          Substance abuse (H)     sober since , narcotic pain medication        Patient Active Problem List   Diagnosis     Nicotine Dependence     Migraine Headache     Arthralgias In Multiple Sites     Postablative hypothyroidism     Hyperlipidemia     Major Depression, Recurrent     Sudden Redness Of The Skin (Flushing)     Lower Back Pain     Carpal Tunnel Syndrome     Mild intermittent asthma without complication     Allergic Rhinitis     Seborrheic Keratosis     DESI (obstructive sleep apnea)     Sacroiliac joint dysfunction of right side     Lumbar foraminal stenosis     Lumbar disc herniation     Sciatica of right side     Lumbar stenosis     S/P lumbar microdiscectomy     Gastro-esophageal reflux disease with esophagitis     Depressive disorder     Anxiety state     Obesity (BMI 35.0-39.9) with comorbidity (H)     TMJ (temporomandibular joint syndrome)      "Advanced directives, counseling/discussion     ASCUS with positive high risk HPV cervical     Xerostomia due to hyposecretion of salivary gland     Myofascial pain     Benign neoplasm of ascending colon       Past Surgical History:   Procedure Laterality Date     BACK SURGERY       CHOLECYSTECTOMY       NV DILATION/CURETTAGE,DIAGNOSTIC      Description: Dilation And Curettage;  Recorded: 2011;  Comments: for \"clots\" after one of her pregnancies     NV LIGATE FALLOPIAN TUBE      Description: Tubal Ligation;  Recorded: 2011;     NV REMOVAL GALLBLADDER      Description: Cholecystectomy;  Recorded: 2011;     SPINAL CORD STIMULATOR IMPLANT  2018    [facility], Dr. Cerna     TUBAL LIGATION         Family History   Problem Relation Age of Onset     Heart disease Daughter         WPW,  at age 3     Diabetes Paternal Grandmother      Stroke Paternal Grandfather      Sleep apnea Father      Breast cancer Maternal Grandmother        Reviewed past medical, surgical, and family history with patient found on new patient intake packet located in EMR Media tab.     SOCIAL HX: Patient works as a , she is .  Patient smokes 1 pack cigarettes per day, denies history of alcohol use, denies current alcohol use,  does report history of marijuana use years ago but not currently.    ROS: Positive for muscle pain, itching, falls, anxiety/depression, reflux, cough, shortness of breath.  Specifically negative for bowel/bladder dysfunction, balance changes, headache, dizziness, foot drop, fevers, chills, appetite changes, nausea/vomiting, unexplained weight loss. Otherwise 13 systems reviewed are negative. Please see the patient's intake questionnaire from today for details.    OBJECTIVE:  /73 (Patient Site: Left Arm, Patient Position: Sitting, Cuff Size: Adult Regular)   Pulse 88   Ht 5' 2.5\" (1.588 m)   Wt (!) 233 lb 14.4 oz (106.1 kg)   LMP 2014   BMI 42.10 kg/m  "     PHYSICAL EXAMINATION:    --CONSTITUTIONAL:  Vital signs as above.  No acute distress.  The patient is well nourished and well groomed.  --PSYCHIATRIC:  Appropriate mood and affect. The patient is awake, alert, oriented to person, place, time and answering questions appropriately with clear speech.    --SKIN:  Skin over the face, bilateral lower extremities, and posterior torso is clean, dry, intact without rashes.    --RESPIRATORY: Normal rhythm and effort. No abnormal accessory muscle breathing patterns noted.   --STANDING EXAMINATION:  Normal lumbar lordosis noted, no lateral shift.  --MUSCULOSKELETAL: Lumbar spine inspection reveals no evidence of deformity.  Tenderness to palpation right L4 region. Straight leg raising in the supine position is negative to radicular pain. Sciatic notch non-tender.  --SACROILIAC JOINT: One Finger point test negative.  --GROSS MOTOR: Gait is non-antalgic. Easily arises from a seated position.  --LOWER EXTREMITY MOTOR TESTING:  Plantar flexion left 5/5, right 5/5   Dorsiflexion left 5/5, right 5/5   Great toe MTP extension left 5/5, right 5/5  Knee flexion left 5/5, right 5/5  Knee extension left 5/5, right 5/5   Hip flexion left 5/5, right 5/5  Hip abduction left 5/5, right 5/5  Hip adduction left 5/5, right 5/5   --HIPS: Full range of motion bilaterally.   --NEUROLOGICAL:  2/4 patellar, medial hamstring, and achilles reflexes bilaterally.  Sensation to light touch is intact on the left, diminished in the right medial shin and medial foot. Babinski is negative. No clonus.  Negative Maria Isabel reflex bilaterally.  --VASCULAR:  2/4 dorsalis pedis and posterior tibialsi pulses bilaterally.  Bilateral lower extremities are warm.  There is no pitting edema of the bilateral lower extremities.    RESULTS: Prior medical records from [facility] 11/15/2018 to current and care everywhere were reviewed today.    Imaging: Lumbar spine Imaging was personally reviewed and interpreted  today. The images were shown to the patient and the findings were explained using a spine model.      Ct Lumbar Spine Without Contrast  Result Date: 2/21/2020  INDICATION: Back pain, right sciatica with skin paresthesias; fall on ice on 02/05/2020, now with no feeling down right lower leg and pain in right thigh. COMPARISON: Lumbar spine radiographs 02/25/2019 and MRI lumbar spine 05/22/2017 TECHNIQUE: Routine without IV contrast. Multiplanar reformats.  Dose reduction techniques were used. FINDINGS: VERTEBRAE: Normal vertebral body heights and alignment. There is an acute, nondisplaced fracture of the right L4 pars interarticularis (sagittal image #25). No additional acute fracture or posttraumatic subluxation. CANAL/FORAMINA: Mild degenerative disc disease throughout the lumbar spine, though without high-grade spinal canal stenosis. Prior right L4 hemilaminotomy. At L4-L5, there is severe right and mild left neuroforaminal stenosis, with distortion of the exiting right L4 nerve root. PARASPINAL: Atherosclerotic vascular calcifications. Paraspinous soft tissues are otherwise grossly unremarkable.   IMPRESSION:   1.  Acute, nondisplaced fracture of the right L4 pars interarticularis.   2.  At L4-L5, there is severe right and mild left neuroforaminal stenosis with distortion of the exiting right L4 nerve root.   3.  No high-grade spinal canal stenosis. DICOM format image data is available to non-affiliated external healthcare facilities or entities on a secure, media-free, reciprocally searchable basis with patient authorization for at least a 12-month period after the study.

## 2021-06-06 NOTE — PATIENT INSTRUCTIONS - HE
~Please call Nurse Navigation line [phone] with any questions or concerns about your treatment plan, if symptoms worsen and you would like to be seen urgently, or if you have problems controlling bladder and bowel function.  ~Follow Up Appointment time slots with Cristela Salguero, CNP with the [facility], are also available at the [facility] location near [facility] on the first and third THURSDAY afternoons of each month.

## 2021-06-06 NOTE — TELEPHONE ENCOUNTER
Spoke with patient. Reviewed CT result with her. Discussed recommendations from the [facility]. She reports that she has already been contacted and is scheduled for follow up there on Tuesday 2/25/2020. Order has been placed for LSO Brace. Provided the phone number for [facility] in [place] and instructed patient to call there immediately to be fitted with an LSO Brace. Work excuse will be provided for next week through [facility]. Patient had no additional questions at this time.     Keven Combs MD 02/21/20 3:06 PM

## 2021-06-07 NOTE — PROGRESS NOTES
Neurosurgery consultation was requested by: Cristela Salguero  Pain: Low back pain   Radicular Pain is present: Right leg back of thigh, knee down to toes   Motor complaints: Weakness, walks with cane   Sensory complaints: Numbness and tingling top of the right foot   Gait and balance issues: Walks with cane due to weakness   Bowel or bladder issues: None   Duration of SX is: x 1 month   The symptoms are worse with: Activity/walking   The symptoms are better with: Laying down/rest   Injury: None, Did slip on ice back in February   Severity is: Moderate/severe   Patient has tried the following conservative measures: None   JACKIE score is: 64%   Juli Campuzano CMA,9:36 AM

## 2021-06-07 NOTE — TELEPHONE ENCOUNTER
This was already discussed and dealt with last week. Please ask Daria for details.   The form was already reviewed by me. She is still asking for a DOT physical which needs to be done elsewhere since I am not certified in this matter.   Hopefully she has scheduled at [facility] or somewhere they do these exams.

## 2021-06-07 NOTE — TELEPHONE ENCOUNTER
Pt called about [facility] paperwork. She inquires if Cristela thinks she is ok to get back to work. She does feel she can go back to work at this time. Pt ok to do virtual visit if needed.

## 2021-06-07 NOTE — TELEPHONE ENCOUNTER
Called pt to reschedule Right L4-5 & L5S1 TFESI with Dr. Byrne on 04/27/20, d/2 COVID safety concerns.  Pt in agreement.  Forwarded to Scheduling.

## 2021-06-07 NOTE — PROGRESS NOTES
Assessment:     Diagnoses and all orders for this visit:    Acute right-sided low back pain with right-sided sciatica  -     acetaminophen-codeine (TYLENOL #3) 300-30 mg per tablet; Take 1-2 tablets by mouth every 8 (eight) hours as needed for pain (Moderate to severe breakthrough pain).  Dispense: 30 tablet; Refill: 0    Acute right lumbar radiculopathy    Right leg weakness    Pars defect of lumbar spine  -     acetaminophen-codeine (TYLENOL #3) 300-30 mg per tablet; Take 1-2 tablets by mouth every 8 (eight) hours as needed for pain (Moderate to severe breakthrough pain).  Dispense: 30 tablet; Refill: 0    History of lumbar surgery    Closed fracture of fourth lumbar vertebra with routine healing, unspecified fracture morphology, subsequent encounter  -     acetaminophen-codeine (TYLENOL #3) 300-30 mg per tablet; Take 1-2 tablets by mouth every 8 (eight) hours as needed for pain (Moderate to severe breakthrough pain).  Dispense: 30 tablet; Refill: 0       Susan Kwan is a 54 y.o. y.o. female with past medical history significant for major depression, TMJ, DESI, hyperlipidemia, nicotine dependence, migraine headache, post ablative hypothyroidism, status post lumbar microdiscectomy Dr. Cerna 2017, spinal cord stimulator implant 2018 who presents today for follow-up via the telephone regarding:    -Progressive worsening right low back pain with right lumbar radiculopathy, previous EMG did show active L5 radiculopathy.    -Patient has an appointment with [facility] neurosurgeon Dr. Ward 3/24/2020 to discuss surgical options for her acute radiculopathy.      This accurately captures the substance of my conversation with the patient.  Phone call contact time  Call started at: 1300  Call ended at: 1315     Plan:     A shared decision making plan was used. The patient's values and choices were respected. Prior medical records from 3/4/2020 were reviewed today. The following represents what was discussed and  decided upon by the provider and the patient.        -DIAGNOSTIC TESTS: Images were personally reviewed and interpreted.   --EMG 3/18/2020 shows active right L5 radiculopathy.  --Lumbar CT scan 2/21/2020 shows acute nondisplaced right L4 pars interarticularis fracture.  L4-5 severe right and mild left foraminal stenosis with distortion of the right L4 nerve root.  Prior right L4 hemilaminotomy.  --[facility] spinal cord stimulator, patient cannot have MRI.    -INTERVENTIONS: No further injection recommendations at this time.    -MEDICATIONS: Refilled Tylenol 3,  1 to 2 tablets 3 times daily as needed for severe breakthrough pain, number 30 tablets given for 7 days worth.  Patient is aware that this is not a long-term solution for her pain and is to help with severe breakthrough pain only until further plans potentially with neurosurgery.  [place]  checked. Discussed the risks (eg, addiction, overdose, worsening pain) verses benefit of opioid use with patient today. Explained that this medication will not be a long term solution to ongoing pain. Discussed using lowest effective dose and the importance of other measures for pain management including PT, other non-opioid medications, behavioral treatments, and other procedure options.   -Did advise patient to reestablish gabapentin which she has not taken for some time slowly titrating up to 300 mg, 3 tablets in the morning 3 in the afternoon and 4 at nighttime which is a dose she has been on in the past and tolerated.  Discussed side effects of medications and proper use. Patient verbalized understanding.    -PHYSICAL THERAPY: Encourage patient to continue with home exercises from prior physical therapy sessions as tolerated.  Discussed the importance of core strengthening, ROM, stretching exercises with the patient and how each of these entities is important in decreasing pain.  Explained to the patient that the purpose of physical therapy is to teach the patient a  home exercise program.  These exercises need to be performed every day in order to decrease pain and prevent future occurrences of pain.        -PATIENT EDUCATION:  15 minutes of total visit time was spent via telephone with the patient today, 60 % of the visit was spent on counseling, education, and coordinating care.   -5 minutes spent outside of visit time, non-face-to-face time, reviewing chart.    -FOLLOW UP: Follow-up with Dr. Ward [facility] neurosurgery.  Advised to contact clinic if symptoms worsen or change.    Subjective:     Susan Kwan is a 54 y.o. female who presents today for follow-up via telephone regarding ongoing right low back pain that does radiate into the right lower extremity specifically into the anterior shin and the foot with associated numbness and tingling and perceived weakness that has been progressively worsening since a fall she had 2/5/2020 where she slipped on ice.  She has had some chronic lower extremity pain however this is much more significant and she is now having weakness where her right leg is giving out on her and she is needing a cane at this time for walking.  Patient denies left leg symptoms, denies bowel or bladder loss control, denies saddle anesthesia.    -Treatment to Date: Right L4-5 hemilaminectomy/microdiscectomy with right L5 nerve root decompression surgery with Dr. Cerna 2017.  Spinal cord stimulator implant T8-9 2018.    Medications:  Gabapentin 300 mg, not currently taking previous dose 3-3-4.  Some sedation on higher doses.  Tylenol 3 prescribed 3/16/2020, 21 tablets.  Previous prescription for oxycodone 5 mg 3/4/2020 for severe breakthrough pain, patient no longer currently taking.    Patient Active Problem List   Diagnosis     Nicotine Dependence     Migraine Headache     Arthralgias In Multiple Sites     Postablative hypothyroidism     Hyperlipidemia     Major Depression, Recurrent     Sudden Redness Of The Skin (Flushing)     Lower Back Pain      Carpal Tunnel Syndrome     Mild intermittent asthma without complication     Allergic Rhinitis     Seborrheic Keratosis     DESI (obstructive sleep apnea)     Sacroiliac joint dysfunction of right side     Lumbar foraminal stenosis     Lumbar disc herniation     Sciatica of right side     Lumbar stenosis     S/P lumbar microdiscectomy     Gastro-esophageal reflux disease with esophagitis     Depressive disorder     Anxiety state     Obesity (BMI 35.0-39.9) with comorbidity (H)     TMJ (temporomandibular joint syndrome)     Advanced directives, counseling/discussion     ASCUS with positive high risk HPV cervical     Xerostomia due to hyposecretion of salivary gland     Myofascial pain     Benign neoplasm of ascending colon       Current Outpatient Medications on File Prior to Encounter   Medication Sig Dispense Refill     albuterol (VENTOLIN HFA) 90 mcg/actuation inhaler INHALE 1 TO 2 PUFFS EVERY 4 HOURS AS NEEDED FOR WHEEZING 18 g 1     FLUoxetine (PROZAC) 20 MG capsule TAKE 2 CAPSULES(40 MG) BY MOUTH DAILY 60 capsule 3     gabapentin (NEURONTIN) 300 MG capsule TAKE 3 CAPSULES BY MOUTH EVERY MORNING, 3 CAPSULES BY MOUTH EVERY DAY IN THE AFTERNOON, AND 4 CAPSULES EVERY EVENING 900 capsule 3     levothyroxine (SYNTHROID, LEVOTHROID) 175 MCG tablet Take 1 tablet (175 mcg total) by mouth daily. 90 tablet 3     melatonin 3 mg Tab tablet TAKE 1 TABLET(3 MG) BY MOUTH AT BEDTIME AS NEEDED 30 tablet 11     multivitamin therapeutic tablet Take 1 tablet by mouth daily.       ondansetron (ZOFRAN ODT) 4 MG disintegrating tablet Take 1 tablet (4 mg total) by mouth every 8 (eight) hours as needed for nausea. 10 tablet 0     simvastatin (ZOCOR) 20 MG tablet TAKE 1 TABLET(20 MG) BY MOUTH AT BEDTIME 90 tablet 4     SUMAtriptan (IMITREX) 50 MG tablet TAKE 1 TABLET BY MOUTH EVERY 2 HOURS AS NEEDED FOR MIGRAINE. MAY REPEAT IN 2 HOURS AS NEEDED 9 tablet 10     [DISCONTINUED] acetaminophen-codeine (TYLENOL #3) 300-30 mg per tablet Take  1 tablet by mouth every 8 (eight) hours as needed for pain (Moderate to severe breakthrough pain). 21 tablet 0     No current facility-administered medications on file prior to encounter.        Allergies   Allergen Reactions     Acetaminophen      Other: very sore stomach       Cefuroxime Axetil      Sulfa (Sulfonamide Antibiotics) Rash       Past Medical History:   Diagnosis Date     Anxiety      Carpal tunnel syndrome      Depression     PMDD     Disease of thyroid gland      Migraine      Pregnancy          Substance abuse (H)     sober since , narcotic pain medication        Review of Systems  ROS: Positive for numbness and tingling and weakness, headache.  Specifically negative for bowel/bladder dysfunction, balance changes, dizziness, foot drop, fevers, chills, appetite changes, nausea/vomiting, unexplained weight loss. Otherwise 13 systems reviewed are negative. Please see the patient's intake questionnaire from today for details.    Reviewed Social, Family, Past Medical and Past Surgical history with patient, no significant changes noted since prior visit.     Objective:     Virtual visit: No exam completed.    RESULTS:   Imaging: Lumbar spine imaging was reviewed today.     Ct Lumbar Spine Without Contrast  Result Date: 3/20/2020  INDICATION: Back pain or radiculopathy, > 6 weeks. COMPARISON: Lumbar spine radiographs dated 2020, CT lumbar spine 2020. TECHNIQUE: Routine without IV contrast. Multiplanar reformats. Dose reduction techniques were used. FINDINGS: Nomenclature is based on 5 lumbar type vertebral bodies. Alignment is within normal limits. The vertebral body heights are normal. Redemonstrated nondisplaced right L4 pars interarticularis fracture, essentially unchanged in appearance compared to the most recent study. No acute fracture seen. Partially visualized spinal cord stimulator leads overlying the left posterior paraspinous musculature, incompletely evaluated on this exam.  Mild ectasia of the infrarenal abdominal aorta measuring up to 2.6 cm in transverse dimension (series 3 image 42). Moderate atherosclerotic calcification involving the abdominal aorta and iliac arteries. Unremarkable visualized bony pelvis. SEGMENTAL ANALYSIS: T12-L1: Normal disc height. No significant disc bulge or herniation. Minimal facet arthropathy. No significant spinal or neural foraminal stenosis. The findings are unchanged. L1-L2: Normal disc height. No significant disc bulge or herniation. Minimal facet arthropathy. No spinal or neural foraminal stenosis. The findings are unchanged. L2-L3: Normal disc height. There is a symmetric disc bulge with minimal facet arthropathy. No significant spinal stenosis. Mild left neural foraminal stenosis. The right neural foramen is patent. The findings are unchanged. L3-L4: Normal disc height. Small symmetric disc bulge with minimal facet arthropathy. No spinal stenosis. Moderate left neural foraminal stenosis. The right neural foramen is patent. The findings are unchanged. L4-L5: Minimal disc height loss. Findings of right hemilaminectomy again noted. There is a small disc bulge eccentric to the right with mild bilateral facet arthropathy. There is suggestion of at least mild potentially moderate right lateral recess narrowing with mild overall spinal canal narrowing. This is unchanged from prior. Severe right and moderate left neural foraminal stenosis. The findings are unchanged. L5-S1: Normal disc height. No significant disc bulge or herniation. Mild facet arthropathy. No significant spinal or neural foraminal stenosis. The findings are unchanged.   1. Unchanged nondisplaced right L4 pars interarticularis defect/fracture. No acute fracture.   2. Multilevel degenerative disc disease and facet arthropathy of the lumbar spine, as described. No high-grade spinal stenosis. There is at least mild encroachment on the right lateral recess at the L4-L5 level related to an  asymmetric disc bulge to the right. This is seen in the setting of prior right L4-L5 hemilaminectomy.   3. Varying degrees of neural foraminal narrowing, most pronounced on the right at L4-L5 where it is severe. Please see the body of the report for additional details.      Xr Lumbar Spine Flex And Ext 2 Or 3 Vws  Result Date: 3/12/2020  INDICATION: Evaluate spondylolisthesis stability: Please provide flexion, extension, and neutral measurements COMPARISON: Lumbar radiograph 02/25/2019.   Nomenclature assumes 5 lumbar type vertebral bodies. Minimal 1-2 mm anterior spondylolisthesis at L4-L5 without change on flexion or extension views. Otherwise normal alignment. Normal vertebral body heights. Minimal disc space narrowing with  mild marginal osteophytes. Mild to moderate facet arthropathy within the lower lumbar spine. A stimulator device and lead are partially visualized.      Ct Lumbar Spine Without Contrast  Result Date: 2/21/2020  INDICATION: Back pain, right sciatica with skin paresthesias; fall on ice on 02/05/2020, now with no feeling down right lower leg and pain in right thigh. COMPARISON: Lumbar spine radiographs 02/25/2019 and MRI lumbar spine 05/22/2017 TECHNIQUE: Routine without IV contrast. Multiplanar reformats.  Dose reduction techniques were used. FINDINGS: VERTEBRAE: Normal vertebral body heights and alignment. There is an acute, nondisplaced fracture of the right L4 pars interarticularis (sagittal image #25). No additional acute fracture or posttraumatic subluxation. CANAL/FORAMINA: Mild degenerative disc disease throughout the lumbar spine, though without high-grade spinal canal stenosis. Prior right L4 hemilaminotomy. At L4-L5, there is severe right and mild left neuroforaminal stenosis, with distortion of the exiting right L4 nerve root. PARASPINAL: Atherosclerotic vascular calcifications. Paraspinous soft tissues are otherwise grossly unremarkable.   IMPRESSION:   1.  Acute, nondisplaced  fracture of the right L4 pars interarticularis.   2.  At L4-L5, there is severe right and mild left neuroforaminal stenosis with distortion of the exiting right L4 nerve root.   3.  No high-grade spinal canal stenosis. DICOM format image data is available to non-affiliated external healthcare facilities or entities on a secure, media-free, reciprocally searchable basis with patient authorization for at least a 12-month period after the study.

## 2021-06-07 NOTE — TELEPHONE ENCOUNTER
Mohit    I printed out medication list and put it with the forms on your desk for review and complete if ok. Thank you.

## 2021-06-07 NOTE — TELEPHONE ENCOUNTER
Her baclofen prescription was refilled.  She can take ibuprofen over-the-counter.  Otherwise for medication changes, would recommend scheduling a video visit to discuss her current pain management.

## 2021-06-07 NOTE — TELEPHONE ENCOUNTER
DOD,   Are you or Barbara able to sign the medication part or does the providers who does DOT have to sign it?

## 2021-06-07 NOTE — PROGRESS NOTES
NEUROSURGERY CONSULTATION NOTE    3/24/2020     Susan Kwan is a 54 y.o. female who is sent to us in consultation by Earline Jimenez  for evaluation of            CONSULTATION ASSESSMENT AND PLAN:     55 yo female who presents with low back and leg pain, numbness, diffuse weakness.  CT lumbar spine showed right L4 pars versus defect as well as right lateral recess stenosis at the L4-L5 level in the setting of a prior hemilaminectomy.  Patient currently is using a soft brace.  Discussed with patient given history of surgery at this level pars fracture could iatrogenic in nature a well. No instability on dynamic x-rays. Discussed transforaminal epidural injection and physical therapy.  Return to clinic in 6 weeks.    I spent more than 45 minutes in this apt, examining the pt, reviewing the scans, reviewing notes from chart, discussing treatment options with risks and benefits and coordinating care. >50 % clinic time was spent in face to face counseling and coordinating care    Leanna Ward     HPI:  55 yo female who presents with low back and leg pain. This began after falling on ice in the beginning of February. One week later she developed leg numbness. The leg numbness is located diffusely from her kneed own on the right. She also gets a a sharp pain on the right from her buttocks posteriorly to her right knee. Whole leg feels weak on the right. Right drop foot is stable from prior surgery. No left leg symptoms, bowel or bladder dysfunction, saddle anestheia.     Laying down will improve her pain and walking standing and sitting can worsen her pain.  She currently is walking with a cane.  SCS helps the pain when she incresaes it. Tyelnol #3 also can improve herpain. No injections or PT.     Prior Spine Surgery: 1 lumbar decompression perviously and SCS. Current everyday smoker.     Past Medical History:   Diagnosis Date     Anxiety      Asthma      Carpal tunnel syndrome      Depression     PMDD     Disease  "of thyroid gland      Low back pain      Migraine      Pregnancy          Substance abuse (H)     sober since , narcotic pain medication     Past Surgical History:   Procedure Laterality Date     BACK SURGERY       CHOLECYSTECTOMY       FL DILATION/CURETTAGE,DIAGNOSTIC      Description: Dilation And Curettage;  Recorded: 2011;  Comments: for \"clots\" after one of her pregnancies     FL LIGATE FALLOPIAN TUBE      Description: Tubal Ligation;  Recorded: 2011;     FL REMOVAL GALLBLADDER      Description: Cholecystectomy;  Recorded: 2011;     SPINAL CORD STIMULATOR IMPLANT  2018    [facility], Dr. Cerna     TUBAL LIGATION           REVIEW OF SYSTEMS:  ROS reviewed with pt as documented on pt health form of 3/24/2020.      No family hx of anesthetic reactions.  No family hx of hypercoagulability.       MEDICATIONS:  Current Outpatient Medications   Medication Sig Dispense Refill     acetaminophen-codeine (TYLENOL #3) 300-30 mg per tablet Take 1-2 tablets by mouth every 8 (eight) hours as needed for pain (Moderate to severe breakthrough pain). 30 tablet 0     albuterol (VENTOLIN HFA) 90 mcg/actuation inhaler INHALE 1 TO 2 PUFFS EVERY 4 HOURS AS NEEDED FOR WHEEZING 18 g 1     FLUoxetine (PROZAC) 20 MG capsule TAKE 2 CAPSULES(40 MG) BY MOUTH DAILY 60 capsule 3     gabapentin (NEURONTIN) 300 MG capsule TAKE 3 CAPSULES BY MOUTH EVERY MORNING, 3 CAPSULES BY MOUTH EVERY DAY IN THE AFTERNOON, AND 4 CAPSULES EVERY EVENING 900 capsule 3     levothyroxine (SYNTHROID, LEVOTHROID) 175 MCG tablet Take 1 tablet (175 mcg total) by mouth daily. 90 tablet 3     melatonin 3 mg Tab tablet TAKE 1 TABLET(3 MG) BY MOUTH AT BEDTIME AS NEEDED 30 tablet 11     multivitamin therapeutic tablet Take 1 tablet by mouth daily.       ondansetron (ZOFRAN ODT) 4 MG disintegrating tablet Take 1 tablet (4 mg total) by mouth every 8 (eight) hours as needed for nausea. 10 tablet 0     simvastatin (ZOCOR) 20 MG tablet TAKE 1 " TABLET(20 MG) BY MOUTH AT BEDTIME 90 tablet 4     SUMAtriptan (IMITREX) 50 MG tablet TAKE 1 TABLET BY MOUTH EVERY 2 HOURS AS NEEDED FOR MIGRAINE. MAY REPEAT IN 2 HOURS AS NEEDED 9 tablet 10     No current facility-administered medications for this visit.          ALLERGIES/SENSITIVITIES:     Allergies   Allergen Reactions     Acetaminophen      Other: very sore stomach       Cefuroxime Axetil      Sulfa (Sulfonamide Antibiotics) Rash       PERTINENT SOCIAL HISTORY:   Social History     Socioeconomic History     Marital status:      Spouse name: None     Number of children: None     Years of education: None     Highest education level: None   Occupational History     None   Social Needs     Financial resource strain: None     Food insecurity     Worry: None     Inability: None     Transportation needs     Medical: None     Non-medical: None   Tobacco Use     Smoking status: Current Every Day Smoker     Packs/day: 1.00     Types: Cigarettes     Smokeless tobacco: Never Used     Tobacco comment: down to six cigarettes per day   Substance and Sexual Activity     Alcohol use: No     Drug use: No     Sexual activity: Never   Lifestyle     Physical activity     Days per week: None     Minutes per session: None     Stress: None   Relationships     Social connections     Talks on phone: None     Gets together: None     Attends Protestant service: None     Active member of club or organization: None     Attends meetings of clubs or organizations: None     Relationship status: None     Intimate partner violence     Fear of current or ex partner: None     Emotionally abused: None     Physically abused: None     Forced sexual activity: None   Other Topics Concern     None   Social History Narrative     None         FAMILY HISTORY:  Family History   Problem Relation Age of Onset     Heart disease Daughter         WPW,  at age 3     Diabetes Paternal Grandmother      Stroke Paternal Grandfather      Sleep apnea  "Father      Breast cancer Maternal Grandmother         PHYSICAL EXAM:   Constitutional: /80   Pulse 68   Ht 5' 4\" (1.626 m)   Wt (!) 230 lb (104.3 kg)   LMP 09/21/2014   SpO2 95%   BMI 39.48 kg/m       Mental Status: A & O in no acute distress.  Affect is appropriate.  Speech is fluent.  Recent and remote memory are intact.  Attention span and concentration are normal.     Cranial Nerves: CN1: grossly intact per patient recall. CN2: No funduscopic exam performed. CN3,4 & 6: Pupillary light response, lateral and vertical gaze normal.  No nystagmus.  Visual fields are full to confrontation. CN5: Intact to touch CN7: No facial weakness, smile, facial symmetry intact. CN8: Intact to spoken voice. CN9&10: Gag reflex, uvula midline, palate rises with phonation. CN11: Shoulder shrug 5/5 intact bilaterally. CN12: Tongue midline and moves freely from side to side.     Motor: No pronator drift of upper extremity. Normal bulk and tone all muscle groups of upper and lower extremities. R foot drop (baseine chronically per patient)     Sensory: Sensation intact bilaterally to light touch.      Coordination:  Antalgic gait. Ambulates with a cane      Reflexes; supinator, biceps, triceps, knee/ ankle jerk intact. no hoffmans/ no    Babinski    +SLR On right     IMAGING: I personally reviewed all radiographic images      Cc:   Earline Jimenez MD  [address] [place] [id number]  "

## 2021-06-07 NOTE — TELEPHONE ENCOUNTER
Travel questionnaire was asked. Verified that they have no signs of COVID-19 symptoms.    Patient dropped off DOT REQUEST FORM AND  MEDICATION FORM for Dr. Jimenez to fill out. Placed the original copies in the 's slot.    When forms are completed, patient would like it:    Fax to [phone] -no copy is needed      Please re-route task back to the  to shred the copied forms and complete the task. Thanks!

## 2021-06-07 NOTE — TELEPHONE ENCOUNTER
Dr[facility] Jimenez [facility] and all they need was a med list of what Dr. Jimenez prescribed for patient. They did not need Dr. Jimenez to sign anything. Medications was printed. RENETTA was signed by patient. We faxed the forms along with the medication lists to the clinic. I called and confirm with the [facility] and they do not need anything further from us. Patient was inform of this message.

## 2021-06-07 NOTE — TELEPHONE ENCOUNTER
I was unaware the patient was having pain in the thoracic spine.  He also looks as if she is having some questions about gabapentin.      Please schedule a telemedicine visit with me to discuss the need for imaging of the thoracic spine and her medications.

## 2021-06-07 NOTE — TELEPHONE ENCOUNTER
Called and left detailed message for patient to return call to clinic to schedule telephone visit with Dr. Jimenez either today or tomorrow. Form is at my desk. Okay to schedule upon call back.

## 2021-06-07 NOTE — PROGRESS NOTES
"Susan Kwan is a 54 y.o. female who is being evaluated via a billable telephone visit.      The patient has been notified of following:     \"This telephone visit will be conducted via a call between you and your physician/provider. We have found that certain health care needs can be provided without the need for a physical exam.  This service lets us provide the care you need with a short phone conversation.  If a prescription is necessary we can send it directly to your pharmacy.  If lab work is needed we can place an order for that and you can then stop by our lab to have the test done at a later time.    If during the course of the call the physician/provider feels a telephone visit is not appropriate, you will not be charged for this service.\"     Due to the COVID 19 crisis, this visit was done over the telephone with a telemedicine visit as opposed to face-to-face.  This was done to limit exposure/risk for the patient and provider.      Susan Kwan complains of  No chief complaint on file.      The patient contacts us today and follows up via telemedicine visit for evaluation of thoracic spine pain and persistent low back pain running out of medications.  Her thoracic spine has been an issue since the fall February 5, 2020.  She fell slipping on ice.  I did review notes and there was no mention of the thoracic spine pain in any note but she tells me that this is been there since.  Severe pain as if she is being stabbed by a hot poker in the back.  Between his shoulder blades constant pain nothing makes it worse and better with lying down.  She has no radiation of the pain around the chest or no paresthesias.  This pain is a 9/10 at worst 7/10 today 4/10 at best.    Her low back is also very painful and she still has numbness down the right leg although the right leg pain has greatly improved.  Previous EMG showed positive L5 radiculopathy on the right that is active.  She was seen by Dr. Ward in " neurosurgery note was reviewed.  [place] to have a pars fracture at L4 but she felt that lumbar epidural steroid injection would be helpful for diagnostic and therapeutic purposes and order the injection.  If that fails plans for potential CT myelogram.  Patient has started taking gabapentin again taking 900 mg in the morning and 12:30 100 mg at bedtime.  She questions if a muscle relaxer would be helpful for her thoracic spine.    Imaging: *CT scan lumbar spine personally reviewed from March.  This shows a right L4 nondisplaced pars fracture.  This was felt to be acute in February.  Show some lateral recess stenosis in the right at all and foraminal stenosis with an osteophyte.      I have no imaging of the thoracic spine.        ALLERGIES  Acetaminophen; Cefuroxime axetil; and Sulfa (sulfonamide antibiotics)    Review of systems:  Has numbness and tingling in the right leg with weakness.  No bowel or bladder incontinence no headaches, falls, swallowing difficulties, fevers, coordination problems or unintentional weight loss.  No new weakness in the right leg.  Had one episode of left leg weakness which she will monitor.  That has resolved.    I have reviewed and updated the patient's Past Medical History, Social History, Family History and Medication List.      Assessment:  Diagnoses and all orders for this visit:    Thoracic spine pain  -     baclofen (LIORESAL) 10 MG tablet; Take 0.5-1 tablets (5-10 mg total) by mouth 2 (two) times a day as needed (for muscle spasms).  Dispense: 30 tablet; Refill: 0  -     XR Thoracic Spine 3 VWS; Future; Expected date: 03/30/2020    Pars defect of lumbar spine  -     acetaminophen-codeine (TYLENOL #3) 300-30 mg per tablet; Take 1 tablet by mouth 2 (two) times a day as needed for pain.  Dispense: 30 tablet; Refill: 0    Acute right-sided low back pain with right-sided sciatica  -     acetaminophen-codeine (TYLENOL #3) 300-30 mg per tablet; Take 1 tablet by mouth 2 (two) times a day  as needed for pain.  Dispense: 30 tablet; Refill: 0        Pleasant 54 y.o. female with past medical history significant for major depression, TMJ, DESI, hyperlipidemia, nicotine dependence, migraine headache, post ablative hypothyroidism, status post lumbar microdiscectomy Dr. Cerna 2017, spinal cord stimulator implant 2018 who presents today for follow-up via the telephone regardin.  Subacute thoracic spine pain mid thoracic spine since her fall.  Question compression fracture during the fall versus myofascial pain.    2.  Persistent lumbar spine pain with right low back and lower extremity radicular pain with an active right L5 radiculopathy.    3.  She does have right-sided low back pain after a fall on 2020.  There is a pars interarticularis fracture on the right at L4.  Nondisplaced.  Felt to be acute on CT scan from February.  Was seen by neurosurgery.  In a brace.    PLAN:    1.  With regards to her thoracic spine pain, this was not mentioned before but she tells me that she has significant pain since the fall.  We will obtain plain films of the thoracic spine to evaluate for fracture.  This will be done at [facility].  She wants to avoid being in the hospital.  Given the coronavirus situation.    2.  We will provide trial of baclofen for myofascial pain/muscle spasms.    3.  She does have a lumbar epidural steroid injection that was ordered by Dr. Ward and she should schedule this at her earliest convenience.    4.  She may continue with her dose of gabapentin 900 mg in the morning and afternoon and 1200 mg at bedtime.    5.  She has used all of her Tylenol 3.  She was taking 6 tablets/day, 2 tablets 3 times daily as prescribed but her pain is severe and she is now out of this medication.  I did review  and she has received approximately 81 tablets over the past month (all at the [facility] [place] ) but taking them 2 tablets 3 times daily.  I am willing to provide 30  more tablets but only 2 tablets daily.  These should get her through the next 15 days.  Cautioned regarding side effects and she tells me that she has had substance abuse issues in the past with hydrocodone and cautioned her regarding Tylenol 3.      6.  I will have her follow-up in 3 weeks with Cristela Salguero over the telephone with telemedicine.      Call start: 1330  Call completed: 1348    Phone call duration: 18minutes    Jack Curtis,

## 2021-06-07 NOTE — TELEPHONE ENCOUNTER
I think we can review med list and sign the medication portion if Dr Jimenez is comfortable with this- probably best to schedule a virtual visit and fax the form for Dr Jimenez to review at the visit.

## 2021-06-07 NOTE — TELEPHONE ENCOUNTER
Please shred copy. I had put the original back at the  for the pt to come back in to be picked up. Pt was scheduled for a tele visit today with Dr. Jimenez but per Dr. Jimenez, pt is to see a certified DOT doctor and will not fill out form. Pt was called and appt was cancelled. Thanks.

## 2021-06-07 NOTE — TELEPHONE ENCOUNTER
Patient Returning Call  Reason for call:  Return call  Information relayed to patient: n/a  Patient has additional questions:  Yes  If YES, what are your questions/concerns:  Patient stated to have Earline Jimenez MD check yes ok for patient to drive and then sign it. Patient stated that the rest of the form the DOT will take care of it.  Okay to leave a detailed message?: Yes  [phone]

## 2021-06-07 NOTE — TELEPHONE ENCOUNTER
HI Dr. Jimenez, please advise. This is the patient requesting DOT certification, I did direct her to the clinics that have providers that are able to do the DOT visit type. This is what she sent in return. Thank you

## 2021-06-07 NOTE — TELEPHONE ENCOUNTER
Should schedule virtual visit with Dr Jimenez and fax her the form to complete during this visit.

## 2021-06-07 NOTE — PATIENT INSTRUCTIONS - HE
~Please call Nurse Navigation line [phone] with any questions or concerns about your treatment plan, if symptoms worsen and you would like to be seen urgently, or if you have problems controlling bladder and bowel function.

## 2021-06-07 NOTE — TELEPHONE ENCOUNTER
I called and spoke with patient. Patient stated the medication portion just needs to be sign by Dr. Jimenez to clear to drive with the medication. Dr. Jimenez will be calling the provider who did the DOT physical for patient and find out what is needed from Dr. Jimenez. We will contact the patient back once Dr. Jimenez finds out what is needed.     Routing back to Dr. Jimenez.

## 2021-06-07 NOTE — PATIENT INSTRUCTIONS - HE
1.  Continue with gabapentin  2.  Try baclofen for myofascial pain  3.  We will get x-rays of the thoracic spine and call you with the results.  4.  We will provide a refill of Tylenol 3.

## 2021-06-08 NOTE — PROGRESS NOTES
Assessment/plan  1. Lumbar foraminal stenosis  2. Sacroiliac joint dysfunction of right side  3. Lumbar disc herniation  4. Sciatica of right side  Reviewed EHR, evaluation and treatment by Spine car, Physical Medicine, Rehabilitation.   Problem list, medications, reconciled.   Has already received refills of flexeril and percocet, aware of instructions for further refills or need.   Complete FMLA paperwork. See scanned form.   Encouraged to keep up with spine care evaluation as directed.       5. Migraine  Refilled. Discussed appropriate use. Cautioned over side affects and interactions with other chronic medications at this point.   - SUMAtriptan (IMITREX) 50 MG tablet; TAKE 1 TABLET BY MOUTH AT ONSET OF HEADACHE. MAY REPEAT IN 2 HOURS AS NEEDED  Dispense: 9 tablet; Refill: 3          Subjective  [age] year old female here with request for completion of FMLA paperwork.   Has history of low back pain, first noted in December as per patient, seen for the first time about this with Dr. Barlow in January. Has been working with spine care, physical medicine, rehabilitation.   EHR reviewed.   Currently taking her medications as directed. Taking gabapentin three pills TID since pain was not controlled with less. Started naporsyn. Taking flexeril nightly. Taking percocet as needed, encouraged to use sparingly. 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 Patient verbalized understanding.   She was told to stay home from work due to sedative affects of medication. As per EHR, has been excused from work 1/27/17 to 2/8/17. FMLA paperwork completed according to spine care note and evaluation. Patient agrees with plan.   She is not able to sit for long. Is not able to complete job duties at this time.   Is at home currently, her daughter just delivered and she is staying with her. She  "notes good support at home.   Needs a refill for imitrex. Still using this for migraines. No change in frequency.       Past Medical History   Diagnosis Date     Anxiety      Carpal tunnel syndrome      Depression      PMDD     Disease of thyroid gland      Migraine      Pregnancy           Substance abuse      sober since , narcotic pain medication       Patient Active Problem List   Diagnosis     Nicotine Dependence     Migraine Headache     Arthralgias In Multiple Sites     Hypothyroidism Postprocedural     Hyperlipidemia     Major Depression, Recurrent     Sudden Redness Of The Skin (Flushing)     Lower Back Pain     Carpal Tunnel Syndrome     Intermittent Asthma     Allergic Rhinitis     Lump In / On The Skin     Common Migraine (Without Aura)     Seborrheic Keratosis     DESI (obstructive sleep apnea)     Sacroiliac joint dysfunction of right side     Lumbar foraminal stenosis     Lumbar disc herniation     Sciatica of right side     Past Surgical History   Procedure Laterality Date     Pr removal gallbladder       Description: Cholecystectomy;  Recorded: 2011;     Pr ligate fallopian tube       Description: Tubal Ligation;  Recorded: 2011;     Pr dilation/curettage,diagnostic       Description: Dilation And Curettage;  Recorded: 2011;  Comments: for \"clots\" after one of her pregnancies     Cholecystectomy       Tubal ligation       Family History   Problem Relation Age of Onset     Heart disease Daughter      WPW,  at age 3     Diabetes Paternal Grandmother      Stroke Paternal Grandfather      Sleep apnea Father      Current Outpatient Prescriptions on File Prior to Visit   Medication Sig Dispense Refill     albuterol (PROAIR HFA) 90 mcg/actuation inhaler INHALE 1 TO 2 PUFFS BY MOUTH EVERY 4 TO 6 HOURS AS NEEDED 8.5 g 0     cyclobenzaprine (FLEXERIL) 5 MG tablet TAKE 1 TABLET(5 MG) BY MOUTH AT BEDTIME 90 tablet 3     diazePAM (VALIUM) 5 MG tablet 5 mg tablets, take 2 tablets, " 2 hours prior to MRI or Procedure. 2 tablet 0     FLUoxetine (PROZAC) 20 MG capsule TK 2 CS PO D  1     FLUZONE QUAD 1040-1678 60 mcg (15 mcg x 4)/0.5 mL Susp injection        gabapentin (NEURONTIN) 300 MG capsule Take 3 capsules (900 mg total) by mouth 3 (three) times a day. Follow Gabapentin Dosing chart given 270 capsule 3     levothyroxine (SYNTHROID, LEVOTHROID) 175 MCG tablet TAKE 1 TABLET BY MOUTH DAILY 90 tablet 3     naproxen (EC NAPROSYN) 500 MG EC tablet Take 1 tablet (500 mg total) by mouth 3 (three) times a day as needed. 42 tablet 1     oxyCODONE-acetaminophen (PERCOCET) 5-325 mg per tablet Take 1-2 tablets by mouth 3 (three) times a day as needed for pain. 60 tablet 0     [DISCONTINUED] SUMAtriptan (IMITREX) 50 MG tablet TAKE 1 TABLET BY MOUTH AT ONSET OF HEADACHE. MAY REPEAT IN 2 HOURS AS NEEDED 9 tablet 1     [DISCONTINUED] FLUoxetine (PROZAC) 20 MG capsule TAKE 2 CAPSULES BY MOUTH DAILY 60 capsule 0     No current facility-administered medications on file prior to visit.      Social History     Social History     Marital status:      Spouse name: N/A     Number of children: N/A     Years of education: N/A     Occupational History     Not on file.     Social History Main Topics     Smoking status: Current Every Day Smoker     Packs/day: 1.00     Types: Cigarettes     Smokeless tobacco: Not on file     Alcohol use No     Drug use: No     Sexual activity: No     Other Topics Concern     Not on file     Social History Narrative     Objective  Vitals:    02/01/17 1028   BP: 124/70   Pulse: 72   Resp: 24       General Appearance:  Alert, cooperative, no distress, appears stated age, pain on standing from chair   Head:  Normocephalic, without obvious abnormality, atraumatic   Eyes:  PERRL, conjunctiva/corneas clear, EOM's intact   Throat: Lips, mucosa, and tongue normal   Neck: Supple   Skin: Skin color, texture, turgor normal, no rashes or lesions

## 2021-06-08 NOTE — PROGRESS NOTES
[facility]   [place]-Pelvic Initial Evaluation    Patient Name: Susan Kwan  Date of evaluation: 1/24/2017  Referral Diagnosis: Acute bilateral low back pain with right-sided sciatica  Referring provider: Cristela Salguero C*  Visit Diagnosis:     ICD-10-CM    1. Acute right-sided low back pain without sciatica M54.5    2. Chronic right SI joint pain M53.3     G89.29    3. Muscle weakness (generalized) M62.81        Assessment:      Pt. is appropriate for skilled PT intervention as outlined in the Plan of Care (POC).    Goals:  Pt. will demonstrate/verbalize independence in self-management of condition in : 6 weeks  Pt. will be able to walk : for community mobility;with less pain;with less difficulty;in 6 weeks  Patient will sit: 60 minutes;for driving;for work;with less pain;with less difficultty;in 6 weeks  Patient will return to: work;for full duty;in 6 weeks  Patient Turn Head: without dizziness;for driving;for watching traffic;for conversation;in 12 weeks  Patient will look up / down: without dizziness;for drinking;in 12 weeks  Patient will decrease : JACKIE score;for improved quality of function;in 6 weeks    Patient's expectations/goals are realistic.    Barriers to Learning or Achieving Goals:  No Barriers.       Plan / Patient Instructions:        Plan of Care:   Communication with: Referral Source;Employer  Patient Related Instruction: Nature of Condition;Treatment plan and rationale;Self Care instruction;Basis of treatment;Body mechanics;Posture;Expected outcome;Precautions  Times per Week: 2  Number of Weeks: 6  Number of Visits: 12 - PRN   Discharge Planning: Pt will be discharged upon meeting goals or plateau of progress   Therapeutic Exercise: ROM;Stretching;Strengthening  Neuromuscular Reeducation: kinesio tape;posture;core  Manual Therapy: soft tissue mobilization;joint mobilization;muscle energy;strain counterstrain  Modalities: electrical stimulation;TENS;ultrasound;cold pack;hot  pack    Plan for next visit: Focus on strength and stability of the core and pelvis and add MT PRN Only.      Subjective:         History of Present Illness:    Susan is a 51 y.o. female who presents to therapy today with complaints of low back pain. Date of onset/duration of symptoms is 2016. Onset was sudden after sitting on/off for 4 hours and then standing up. Symptoms are getting better. She reports  an episodic  history of similar symptoms. She describes their previous level of function as not limited    Pt reports that she has periodic back flare-ups with the last one about a year ago.  However, this is the worst one yet.      Pain Ratin  Pain rating at best: 4  Pain rating at worst: 8  Pain description: pain, sharp and pressure    Functional limitations are described as occurring with:   bending  performing routine daily activities  sleeping  transitional movements sit to stand and sit to supine  twisting or turning trunk  walking too much     Patient reports benefit from:  pain medication, cold, gabapentin   Pt had a cortisone injection last Thursday and it really has seemed to help.         Objective:      Note: Items left blank indicates the item was not performed or not indicated at the time of the evaluation.    Patient Outcome Measures :    Modified Oswestry Low Back Pain Disablity Questionnaire  in %: 62   Scores range from 0-100%, where a score of 0% represents minimal pain and maximal function. The minimal clinically important difference is a score reduction of 12%.    Examination  1. Acute right-sided low back pain without sciatica     2. Chronic right SI joint pain     3. Muscle weakness (generalized)       Involved side: Right and Bilateral  Posture Observation:      General sitting posture is  fair.  General standing posture is fair.    Lumbar ROM:   Date: 17      *Indicate scale AROM AROM AROM   Lumbar Flexion No loss, no pain     Lumbar Extension No loss, no pain        "Right Left Right Left Right Left   Lumbar Sidebending No loss, no pain  No loss, no pain        Lumbar Rotation Min loss, no pain  Min loss, no pain          Lower Extremity Strength:     Date: 1/24/17     LE strength/5 Right Left Right Left Right Left   Hip Flexion (L1-3) 4+ 4+       Hip Extension (L5-S1) 4 4+       Hip Abduction (L4-5) 4 4       Hip Adduction (L2-3) 5 5       Hip External Rotation 5 5       Hip Internal Rotation 5 5       Knee Extension (L3-4) 5 5       Knee Flexion 5 5       Ankle Dorsiflexion (L4-5) 5 5       Great Toe Extension (L5) 5 5       Ankle Plantar flexion (S1) 5 5       Abdominals Decreased strength and awareness        Sensation: WNL   Reflex : WNL     Palpation:  Tenderness over L4, 5, S1 and right SI joint   Increased tenderness in the right piriformis and gluteal muscles     Lumbar Special Tests:    Lumbar Special Tests Right Left SI Tests Right  Left   Quadrant test - - SI Compression + -   Straight leg raise - - SI Distraction - -   Crossover response - - POSH Test - -   Slump   Sacral Thrust + -   Sit-up test  FADIR - -   Trunk extensor endurance test Decreased strength Resisted Abduction     Prone instability test  Other:     Pubic shotgun  Other:       Pelvis is level     Repeated Motion Testing:  Does not centralize  Does not peripheralize    Passive Mobility - Joint Integrity:  WNL   Pain over L3, 4, 5 S1    LE Screen:  Not indicated.    Exercises Given Today:  Exercise #1: Treadmill or Nu-Step Warm-up   Comment #1: next time   Exercise #2: Cat/cow   Comment #2: x10   Exercise #3: LTR  Comment #3: Bx10   Exercise #4: Piriformis stretch   Comment #4: Bx30\"  Exercise #5: Ab set   Comment #5: 2x5  Exercise #6: Supine Bridge   Comment #6: Bx10   Exercise #7: Supine Hip ABD/ER with orange band  Comment #7: Bx10     Treatment Today     TREATMENT MINUTES COMMENTS   Evaluation 25    Self-care/ Home management     Manual therapy     Neuromuscular Re-education     Therapeutic Activity "     Therapeutic Exercises 30 Pathology, POC, HEP, posture   See flow sheet    Gait training     Modality__________________                Total 55    Blank areas are intentional and mean the treatment did not include these items.     PT Evaluation Code: (Please list factors)  Patient History/Comorbidities: Hyperlipidemia, Chronic LBP  Examination: Pain with mobility, Tenderness, weakness   Clinical Presentation: stable and uncomplicated   Clinical Decision Making: low    Patient History/  Comorbidities Examination  (body structures and functions, activity limitations, and/or participation restrictions) Clinical Presentation Clinical Decision Making (Complexity)   No documented Comorbidities or personal factors 1-2 Elements Stable and/or uncomplicated Low   1-2 documented comorbidities or personal factor 3 Elements Evolving clinical presentation with changing characteristics Moderate   3-4 documented comorbidities or personal factors 4 or more Unstable and unpredictable High            Lina Chavez  1/24/2017  11:38 AM

## 2021-06-08 NOTE — PROGRESS NOTES
Subjective:  51 y.o. female with concerns of severe back pain.  Has been ongoing since December 24.  Was sitting for a couple hours playing cards and then stood up and had significant amount of pain.  At 1226 walk-in care visit followed by emergency room visit.  Prescribed muscle relaxers and ibuprofen.  NSAIDs not helping. Had some relief from percocet in the ER.  Completed a course of prednisone after that.  She may have been getting a little bit better when she returned to work.  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  Reports she has been unable to eat for the last 2 days and had some nausea yesterday.    Denies fever.  Pain radiates only in the area of the gluteus muscles.    Denies saddle anesthesia.  Has had mild urinary incontinence.  Denies weakness of lower extremities.    Social:  She works as a .  She is not currently able to drive due to her recent evaluation for sleep apnea.  She has not started sleep apnea treatment yet.  She is a follow-up visit with pulmonology on January 25.    History   Smoking Status     Current Every Day Smoker     Packs/day: 1.00     Types: Cigarettes   Smokeless Tobacco     Not on file      Objective:  Visit Vitals     /60 (Patient Site: Right Arm, Patient Position: Sitting, Cuff Size: Adult Regular)     Pulse 72     Temp 98.3  F (36.8  C) (Oral)     Wt 204 lb (92.5 kg)  Comment: with shoes     Breastfeeding No     BMI 37.31 kg/m2     GENERAL: alert, not distressed  Does not sit.  Stands with elbows resting on exam table.  CHEST: clear, no rales, rhonchi, or wheezes  CARDIAC: regular without murmur  ABDOMEN: soft, non tender, non distended, normal bowel sounds  BACK: some point tenderness in both SI joins and sciattic notch on left.  NEURO: gait antalgic, SLR unable to test due to pain    Lumbar spine films personally reviewed.  Straightening of the usual lordosis.  No evidence for cause of pain.    Assessment and Plan:   1. Low  back pain  Continue NSAIDs.  Treat breakthrough pain with percocet for the short term.  ASAP referral to spine care (she has appt tomorrow).  Care with additive sedation from opioid and untreated DESI.    - oxyCODONE-acetaminophen (PERCOCET) 5-325 mg per tablet; Take 1-2 tablets by mouth 3 (three) times a day as needed for pain.  Dispense: 30 tablet; Refill: 0  - Ambulatory referral to Spine Care

## 2021-06-08 NOTE — PROGRESS NOTES
[facility] Discharge Summary  Patient Name: Susan Kwan  Date: 7/8/2020  Referral Diagnosis: chronic low back pain  Referring provider: Leanna Ward MD  Visit Diagnosis:   1. Generalized muscle weakness     2. 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  pt NS her last scheduled appt.  I called and LM for her to call me back and let me know her POC but she did not return the call.      Therapy will be discontinued at this time.  The patient will need a new referral to resume.    Thank you for your referral.  Marta WORKMAN Sulma, PT  7/8/2020  11:41 AM    [facility] Daily Progress     Patient Name: Susan Kwan  Date: 6/5/2020  Visit #: 2/12  POC Dates: 6/1/2020-9/1/2020  Referral Diagnosis: Lumbar radiculopathy  Referring provider: Earline Jimenez MD  Visit Diagnosis:     ICD-10-CM    1. Generalized muscle weakness  M62.81    2. Chronic right-sided low back pain with right-sided sciatica  M54.41     G89.29          Assessment:   Patient felt the injection was very helpful in decreasing pain levels so has been able to be more active.  She did not experience any increase in symptoms with the strengthening exercises today.  She has been introduced to core engagement in the past but felt the review was helpful.  She had not been tightening her core so will start doing it again for max stabilization  Patient is benefitting from skilled physical therapy and is making steady progress toward functional goals.  Patient is appropriate to continue with skilled physical therapy intervention, as indicated by initial plan of  care.    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   I got out of bed with a lot more ease as the pain is so much less.    Objective:     Patient Active Problem List   Diagnosis     Nicotine Dependence     Migraine Headache     Arthralgias In Multiple Sites     Postablative hypothyroidism     Hyperlipidemia     Major Depression, Recurrent     Sudden Redness Of The Skin (Flushing)     Lower Back Pain     Carpal Tunnel Syndrome     Mild intermittent asthma without complication     Allergic Rhinitis     Seborrheic Keratosis     DESI (obstructive sleep apnea)     Sacroiliac joint dysfunction of right side     Lumbar foraminal stenosis     Lumbar disc herniation     Sciatica of right side     Lumbar stenosis     S/P lumbar microdiscectomy     Gastro-esophageal reflux disease with esophagitis     Depressive disorder     Anxiety state     Obesity (BMI 35.0-39.9) with comorbidity (H)     TMJ (temporomandibular joint syndrome)     Advanced directives, counseling/discussion     ASCUS with positive high risk HPV cervical     Xerostomia due to hyposecretion of salivary gland     Myofascial pain     Benign neoplasm of ascending colon     Exercises:  Exercise #1: stretches: sitting hamstring stretch, SKC, LTR, supine hip ER/piriformis, sitting midback, sitting midback totation  Comment #1: hold 30 seconds X 1-3 reps  Exercise #2: LE supine nerve glide    repeat 12-15 reps, X 3-5X/day  Comment #2: bridges hold 10 seconds x 6-12 reps  Exercise #3: clamshells  x 20  Comment #3: supine hooklying low abdominals     hip and knee 90 degrees  working up to 20  Exercise #4: sitting hip isometric adduction squeezing ball   hold 10 seconds x 6-12 reps      Treatment Today     TREATMENT MINUTES COMMENTS   Evaluation     Self-care/ Home management     Manual therapy     Neuromuscular Re-education 8 Core engagement: TAr, buttocks and kegel   Therapeutic Activity     Therapeutic Exercises 17 See above or flow sheet   Gait training     Modality__________________                Total 25    Blank areas are intentional and mean the treatment did not include these items.       Marta Ozuna, PT  6/5/2020

## 2021-06-08 NOTE — PROGRESS NOTES
Assessment:     Diagnoses and all orders for this visit:    Acute bilateral low back pain with right-sided sciatica  -     Ambulatory referral to PT/OT    Lumbar disc herniation  -     Ambulatory referral to PT/OT    Lumbar foraminal stenosis  -     Ambulatory referral to PT/OT    Lumbar stenosis  -     Ambulatory referral to PT/OT       Susan Kwan is a 51 y.o. y.o. female with past medical history significant for nicotine dependence, migraine headache, hypothyroidism post procedural, hyperlipidemia, major depression recurrent, carpal tunnel syndrome, chronic low back pain, intermittent asthma, DESI who presents today for follow-up regarding 2 weeks post bilateral L4-5 T if you said when she did receive 50% relief of her symptoms and is feeling better in regards to the pain and improve functioning however her pain is still more bothersome with activity into the midline low back that radiates to the right buttock.  Patient is neurologically intact today.    Recently she was having bladder urgency and occasional incontinence that is more consistent with urge incontinence.  Did review case with Dr. Huerta in regards to this as well and the moderate central canal stenosis at the L4-5 level is unlikely concerning to bladder incontinence.     Plan:     A shared decision making plan was used. The patient's values and choices were respected. Prior medical records from 1/11/17 were reviewed today. The following represents what was discussed and decided upon by the provider and the patient.        -DIAGNOSTIC TESTS: Reviewed lumbar spine MRI again with patient today.    -Workability: As she is a , due to current lethargy with gabapentin, we will have her out of work until 1/26/2016 in returning to work on 1/27/2016 as symptoms typically do improve with this medication.  However if she is not having improvement in her sleepiness with the medication at that time I would recommend tapering back to either  just at nighttime 3 tablets, or 1 tablet in the morning 1 tablet afternoon and 3 at nighttime to prevent excessive sleepiness.    -INTERVENTIONS: Discussed that if her pain does worsen we could consider either repeat bilateral for a right L4-5 TF RHIANNON.    -MEDICATIONS: Encourage patient to continue increasing gabapentin to max dose of 3 tablets 3 times a day.   -Continue ibuprofen as needed for pain as well as muscle relaxant Flexeril.  -Patient is out of Percocet at this time, recommended that she follows up with her primary care provider in regards to refills on this.  Discussed side effects of medications and proper use. Patient verbalized understanding.    -PHYSICAL THERAPY: Referral to physical therapy [facility] and was very sent for nerve gliding exercises and core strengthening to help with her pain and improve functioning.  Discussed the importance of core strengthening, ROM, stretching exercises with the patient and how each of these entities is important in decreasing pain.  Explained to the patient that the purpose of physical therapy is to teach the patient a home exercise program.  These exercises need to be performed every day in order to decrease pain and prevent future occurrences of pain.        -PATIENT EDUCATION:  25 minutes of total visit time was spent face to face with the patient today, 60 % of the visit was spent on counseling, education, and coordinating care.     -FOLLOW UP: Follow-up in 4 weeks, sooner if new symptoms arise or pain worsens.   Advised to contact clinic if symptoms worsen or change.    Subjective:     Susan Kwan is a 51 y.o. female who presents today for follow-up regarding 2 weeks post bilateral L4-5 TF RHIANNON when she did receive about 50% relief of her symptoms and is feeling improvement in her functioning.  Currently her midline low back pain at the lumbosacral junction still radiates to the right posterior buttock and is currently a 6/10, C4 to pass but in 8 at its  worst.  She reports it is more bothersome with sitting and standing for long periods of time but is improved with laying on her stomach.  She denies any radicular pain at this time, denies numbness or tingling or weakness lower extremities.  This is ongoing since 12/25/2016.    Treatment to Date: Physical therapy years ago, nothing recent.  No prior spinal surgery.  Bilateral L4-5 TF RHIANNON 1/19/2017 with 50% relief of symptoms.     Medications: Percocet prescribed by PCP yesterday with some relief of severe pain.  Ibuprofen and Medrol Dosepak with no relief. Flexeril with no relief.  Gabapentin 300 mg 2-2-3 with some relief and minimal side effects however does notice excessive sleepiness.  Patient is still increasing dose.    Patient Active Problem List   Diagnosis     Nicotine Dependence     Migraine Headache     Arthralgias In Multiple Sites     Hypothyroidism Postprocedural     Hyperlipidemia     Major Depression, Recurrent     Sudden Redness Of The Skin (Flushing)     Lower Back Pain     Carpal Tunnel Syndrome     Intermittent Asthma     Allergic Rhinitis     Lump In / On The Skin     Common Migraine (Without Aura)     Seborrheic Keratosis     DESI (obstructive sleep apnea)       Current Outpatient Prescriptions on File Prior to Encounter   Medication Sig Dispense Refill     albuterol (PROAIR HFA) 90 mcg/actuation inhaler INHALE 1 TO 2 PUFFS BY MOUTH EVERY 4 TO 6 HOURS AS NEEDED 8.5 g 0     cyclobenzaprine (FLEXERIL) 5 MG tablet TAKE 1 TABLET(5 MG) BY MOUTH AT BEDTIME 60 tablet 0     diazePAM (VALIUM) 5 MG tablet 5 mg tablets, take 2 tablets, 2 hours prior to MRI or Procedure. 2 tablet 0     FLUoxetine (PROZAC) 20 MG capsule TK 2 CS PO D  1     FLUoxetine (PROZAC) 20 MG capsule TAKE 2 CAPSULES BY MOUTH DAILY 60 capsule 0     FLUZONE QUAD 5342-7203 60 mcg (15 mcg x 4)/0.5 mL Susp injection        gabapentin (NEURONTIN) 300 MG capsule Take 3 capsules (900 mg total) by mouth 3 (three) times a day. Follow Gabapentin  Dosing chart given 270 capsule 3     ibuprofen (ADVIL,MOTRIN) 600 MG tablet Take one tablet 3 times per day.  0     levothyroxine (SYNTHROID, LEVOTHROID) 175 MCG tablet TAKE 1 TABLET BY MOUTH DAILY 90 tablet 3     oxyCODONE-acetaminophen (PERCOCET) 5-325 mg per tablet Take 1-2 tablets by mouth 3 (three) times a day as needed for pain. 30 tablet 0     SUMAtriptan (IMITREX) 50 MG tablet TAKE 1 TABLET BY MOUTH AT ONSET OF HEADACHE. MAY REPEAT IN 2 HOURS AS NEEDED 9 tablet 1     No current facility-administered medications on file prior to encounter.        Allergies   Allergen Reactions     Acetaminophen      Other: very sore stomach       Cefuroxime Axetil      Sulfa (Sulfonamide Antibiotics) Rash       Past Medical History   Diagnosis Date     Anxiety      Carpal tunnel syndrome      Depression      PMDD     Disease of thyroid gland      Migraine      Pregnancy           Substance abuse      sober since , narcotic pain medication        Review of Systems  ROS: Positive for headache dizziness, nausea/vomiting due to headache.  Specifically negative for bowel/bladder dysfunction, balance changes, headache, dizziness, foot drop, fevers, chills, appetite changes, nausea/vomiting, unexplained weight loss. Otherwise 13 systems reviewed are negative. Please see the patient's intake questionnaire from today for details.    Reviewed Social, Family, Past Medical and Past Surgical history with patient, no significant changes noted since prior visit.     Objective:     Visit Vitals     /83 (Patient Site: Left Arm, Patient Position: Sitting)     Pulse 69     Temp 97.7  F (36.5  C) (Oral)     Wt 201 lb (91.2 kg)     SpO2 94%     BMI 35.61 kg/m2     PHYSICAL EXAMINATION:    --CONSTITUTIONAL: Well developed, well nourished, healthy appearing individual.  --PSYCHIATRIC: Appropriate mood and affect. No difficulty interacting due to temper, social withdrawal, or memory issues.  --SKIN: Lumbar region is dry and intact.  Sensation to light touch is intact in the bilateral L4, L5, and S1 dermatomes.  --RESPIRATORY: Normal rhythm and effort. No abnormal accessory muscle breathing patterns noted.   --MUSCULOSKELETAL:  Normal lumbar lordosis noted, no lateral shift.  --GROSS MOTOR: Easily arises from a seated position.   --LUMBAR SPINE:  Inspection reveals no evidence of deformity. Range of motion is not limited in lumbar flexion forward but does have increased pain with return to upright, however significantly limited in extension due to pain. No tenderness to palpation. Straight leg raising in the seated position is negative to radicular pain, however positive to back pain on the right, negative on the left. Sciatic notch non-tender on the left, significantly tender on the right.  --LOWER EXTREMITY MOTOR TESTING:  Plantar flexion left 5/5, right 5/5   Dorsiflexion left 5/5, right 5/5   Great toe MTP extension left 5/5, right 5/5  Knee flexion left 5/5, right 5/5  Knee extension left 5/5, right 5/5   Hip flexion left 5/5, right 5/5  Hip abduction left 5/5, right 5/5  Hip adduction left 5/5, right 5/5   --HIPS: Full range of motion bilaterally. Negative FABERs on the involved lower extremity.   --NEUROLOGIC: Bilateral patellar and achilles reflexes are physiologic and symmetric. Lower extremities are intact to light touch.     RESULTS:   Imaging: MRI of the lumbar spine was reviewed today. The images were shown to the patient and the findings were explained using a spine model.    Xr Lumbar Spine 2 Or 3 Vws  Result Date: 1/11/2017  XR LUMBAR SPINE 2 OR 3 VWS1/10/2017 2:37 PMINDICATION: Back painCOMPARISON: None.FINDINGS: 6 nonrib-bearing lumbar type vertebra. This report will consider the first nonrib-bearing segment L1 with a transitional lumbarized S1. Straightened lumbar lordosis and mild lumbar dextrocurvature centered at L4-5. No radiographic evidence for fracture or subluxation. Mild lumbar spondylosis, including mild multilevel  endplate irregularity/spurring and mild loss of disc height at L5-S1. Minor lower lumbar facet arthropathy.This report was electronically interpreted by: Dr. Darío Lin MD ON 01/11/2017 at 08:49    Lumbar Spine MRI [facility] 1/11/2017  Conclusion:  1.  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  2.  At L4-5, there is a right paracentral/foraminal broad-based disc protrusion resulting in narrowing of the right lateral recess and mass effect on the transverse right L5 nerve root.  There is also moderate central canal stenosis at this level.  There is mild to moderate bilateral foraminal stenosis.  2.  At L3-4, there is mild right and mild-to-moderate left foraminal stenosis.  4.  At L2-3, there is mild bilateral foraminal stenosis.

## 2021-06-08 NOTE — PROGRESS NOTES
"[facility] Daily Progress     Patient Name: Susan Kwan  Date: 2017  Visit #: 2  PTA visit #:  1  Referral Diagnosis: Acute right sided low  Back pain without sciatica  Referring provider: Cristela Salguero C*  Visit Diagnosis:     ICD-10-CM    1. Acute right-sided low back pain without sciatica M54.5    2. Chronic right SI joint pain M53.3     G89.29    3. Muscle weakness (generalized) M62.81          Assessment:   Pt demonstrates weak core and hip muscles. She is challenged with the strengthening exercises.  Patient is appropriate to continue with skilled physical therapy intervention, as indicated by initial plan of care.    Goal Status:  Pt. will demonstrate/verbalize independence in self-management of condition in : 6 weeks: Ongoing  Pt. will be able to walk : for community mobility;with less pain;with less difficulty;in 6 weeks: Progressing towards  Patient will sit: 60 minutes;for driving;for work;with less pain;with less difficultty;in 6 weeks: Ongoing  Patient will return to: work;for full duty;in 6 weeks: Ongoing  Patient Turn Head: without dizziness;for driving;for watching traffic;for conversation;in 12 weeks: Met  Patient will look up / down: without dizziness;for drinking;in 12 weeks: Met  Patient will decrease : JACKIE score;for improved quality of function;in 6 weeks    Plan / Patient Education:     Continue with initial plan of care.  Progress with home program as tolerated.   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  Trial treadmill next visit.    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  Pain Ratin      Objective:     Pelvis levels equal in supine to sit, leg length equal.    Treatment Today     TREATMENT MINUTES COMMENTS   Evaluation     Self-care/ Home management     Manual therapy     Neuromuscular Re-education     Therapeutic Activity     Therapeutic Exercises 30 Nu step WL " "5 x 5 minutes, see flow sheet for ex's performed today, added to HEP: supine march x 10, SL hip AB x10 B, supine alternating arm raises with 2# x10, child's pose 20\" x2,   Gait training     Modality__________________                Total 30    Blank areas are intentional and mean the treatment did not include these items.       Sanaz Martinez,CLT  1/26/2017      "

## 2021-06-08 NOTE — TELEPHONE ENCOUNTER
Dr. Jimenez, patient was exposed to someone who tested positive for COVID-19. Please advise. If you decide that she should also be tested, she prefers [place]. Thank you.

## 2021-06-08 NOTE — TELEPHONE ENCOUNTER
Could you please have this scheduled for a telephone or video visit with Cristela next week to discuss this refill?

## 2021-06-08 NOTE — TELEPHONE ENCOUNTER
PHONE CONSENT:    Due to the COVID-19 pandemic, consents are being performed by phone in order to decrease face-to-face time in order to protect patients and staff.  The patient has been offered other conservative treatment (physical therapy, medications, etc.) but has opted to proceed with an injection.  The risks and benefits of the injection (Right L4-5 and L5-S1 transforaminal epidural steroid injection) were discussed with the patient over the phone on 5-13-20 at 13:17.  The patient was told that the corticosteroid injection will somewhat suppress the immune system.  This could potentially increase the chance of catching the virus if exposed.  If the patient already has the virus but is asymptomatic, having a corticosteroid injection could potentially worsen the course of the virus.  These are theoretical risks.  The patient opted to proceed with the injection at this time.   Other risks of the injection include infection, bleeding, damage to surrounding structures, nerve injury, permanent weakness, permanent paralysis, worsened pain, soreness, headache, allergic reaction, no change in pain.      The patient understands that they cannot have symptoms of an infection or be on antibiotics at the time of the injection as this would increase their risk of infection. If they start antibiotics prior to the procedure, they should call the clinic to see if the procedure will need to be rescheduled.  The patient understands that if they start any blood thinners prior to the injection, the provider must be notified immediately.  The patient denies any allergies to iodine contrast dye or iodine products.  The patient denies any symptoms of an active infection (cough, fevers, myalgias) and denies taking antibiotics.  The patient knows not to come in to clinic if they have any symptoms of an active infection, particularly cough, fevers, myalgias.   The patient denies taking any prescription blood thinning medications. The  patient denies any allergies to iodine or iodine contrast.       The patient verbalized understanding of the information given. The patient was given the opportunity to ask questions of the physician.  The patient elected to proceed and gave consent over the phone with DEMETRIO Dickey as a witness.  Informed consent form was signed by the physician and the witness.

## 2021-06-08 NOTE — PROGRESS NOTES
"[facility] Daily Progress     Patient Name: Susan Kwan  Date: 2017  Visit #: 4  PTA visit #:  2  Referral Diagnosis: Acute right sided low  Back pain without sciatica  Referring provider: Cristela Salguero C*  Visit Diagnosis:     ICD-10-CM    1. Acute right-sided low back pain without sciatica M54.5    2. Chronic right SI joint pain M53.3     G89.29    3. Muscle weakness (generalized) M62.81          Assessment:   Ten days ago on  pt had surgery. Pt reports she had a discectomy. She is now returning to physical therapy. Subjective report of a 5# lifting restriction, no bending or twisting.  Pt ambulates with a FWW.  Patient is appropriate to continue with skilled physical therapy intervention, as indicated by initial plan of care.     Goal Status:  Pt. will demonstrate/verbalize independence in self-management of condition in : 6 weeks: Ongoing  Pt. will be able to walk : for community mobility;with less pain;with less difficulty;in 6 weeks: Progressing towards  Patient will sit: 60 minutes;for driving;for work;with less pain;with less difficultty;in 6 weeks: Ongoing  Patient will return to: work;for full duty;in 6 weeks: Ongoing  Patient Turn Head: without dizziness;for driving;for watching traffic;for conversation;in 12 weeks: Met  Patient will look up / down: without dizziness;for drinking;in 12 weeks: Met  Patient will decrease : JACKIE score;for improved quality of function;in 6 weeks    Plan / Patient Education:     Continue with initial plan of care.  Progress with home program as tolerated.   Pt to follow up with Dr. Kan on   Pt instructed to use ice on her back.  Subjective:     Pain Ratin     Pt reports she had a discectomy on 17. Dr. Kan did the surgery.   Pt reports her R lower  Leg sensation. Pt reports decreased ability to Df her R ankle.  Pt states she has a 5# lifting restriction, no bending and twisting.  Pt reports her LB feels \" tender.\" She is not using " "ice at home.      Objective:     R LE strength:  DF: 2/5  Quads: 5/5  Hamstring: +4/5  ER; 5/5  IR: 4/5  Hip flexors:   Hip Ab: +4/5        Current HEP exercises( Prior to surgery)  Exercises:  Exercise #1: Ab sets  Comment #1: 5\" x10  Exercise #2: glut sets   Comment #2: 5\" x10  Exercise #3: supine march  Comment #3: x5  Exercise #4: supine slternating arm raises  Comment #4: x10  Exercise #5: supine nerve glides  Comment #5: x 10 B  Exercise #6: Supine Bridge   Comment #6: Bx12  Exercise #7: Supine Hip ABD/ER with orange band  Comment #7: 2# x10  Exercise #8: Supine marching; Supine alternating arm raises   Comment #8: March Bx10  Exercise #9: Child's Pose x30\"  Comment #9: Supine nerve glide Rx10       Pt demonstrates benefits from skilled PT.        Treatment Today     TREATMENT MINUTES COMMENTS   Evaluation     Self-care/ Home management     Manual therapy     Neuromuscular Re-education     Therapeutic Activity     Therapeutic Exercises 30 See flow sheet for exercises performed today   Gait training     Modality__________________                Total 30    Blank areas are intentional and mean the treatment did not include these items.       Sanaz Martinez,STEPHANIE  2/14/2017    "

## 2021-06-08 NOTE — PROGRESS NOTES
Assessment:   Diagnoses and all orders for this visit:    Lumbar radiculopathy, right    History of lumbar surgery       Susan Kawn is a 51 y.o. y.o. female with past medical history significant for nicotine dependence, migraine headache, hypothyroidism post procedural, hyperlipidemia, major depression recurrent, carpal tunnel syndrome, chronic low back pain, intermittent asthma, DESI who presents today for follow-up regarding midline low back pain and ongoing numbness and tingling into right L4 and L5 pattern status post recent surgery with Dr. Cerna.      Patient just wanted to update us on her progression and does report that she has significant improvement status post surgery and is doing well.  She is walking with a walker today and does have some weakness noted in the right lower extremity, however reports that it slowly improving.  She does have a follow-up with Dr. Cerna scheduled for 2/17/2017 to discuss workability and ongoing restrictions.  Advised patient to follow-up with him for the next couple of months until he finds off on her, and then she can be seen back at the spine center if needed.    Patient had right L4 hemilaminectomy and right L4-5 micro-discectomy, right L5 decompression of nerve root with Dr. Cerna with [facility] 2/2/2017.     Plan:     A shared decision making plan was used. The patient's values and choices were respected. Prior medical records from 1/31/17 were reviewed today. The following represents what was discussed and decided upon by the provider and the patient.        -DIAGNOSTIC TESTS: Reviewed lumbar spine MRI again the patient today.  No need for further imaging at this time.    -MEDICATIONS: Encouraged patient to continue gabapentin medication.  Also encouraged to continue pain medication Percocet prescribed by Dr. Cerna which they will be continuing to prescribe as needed postsurgery.   Discussed side effects of medications and proper use. Patient verbalized  understanding.    -PHYSICAL THERAPY: Discussed that in regards to any further physical therapy I would defer to 's recommendations.    -PATIENT EDUCATION:  25 minutes of total visit time was spent face to face with the patient today, 60 % of the visit was spent on counseling, education, and coordinating care.     -FOLLOW UP: Follow-up as needed in the future after signed off by Surgeon Dr. Cerna.   Advised to contact clinic if symptoms worsen or change.    Subjective:     Susan Kwan is a 51 y.o. female who presents today for follow-up regarding ongoing midline low back pain and numbness and tingling right lower extremity as well as weakness into dorsiflexors ongoing since surgery 2/2/2017 with Dr. Cerna.  She reports that her right lumbar radiculitis has completely resolved and her pain is significantly improved overall.  Currently her pain is a 4/10, I discuss that and 8 at its worst more significant to the low back that is related to her incision.  She reports that still bending, twisting and lifting does aggravate her pain.  She is currently walking with a walker since surgery due to weakness in her right lower cavity.    Treatment to Date: Physical therapy ×3 sessions with some relief prior to surgery.  Bilateral L4-5 TF RHIANNON 1/19/2017 with 75% relief of symptoms.    Patient had right L4 hemilaminectomy and right L4-5 micro-discectomy, right L5 decompression of nerve root with Dr. Cerna with [facility] 2/2/2017.      Medications: Percocet prescribed by Dr. Cerna.  Gabapentin 300 mg 3-3-3 and tolerating well at this time.    Patient Active Problem List   Diagnosis     Nicotine Dependence     Migraine Headache     Arthralgias In Multiple Sites     Hypothyroidism Postprocedural     Hyperlipidemia     Major Depression, Recurrent     Sudden Redness Of The Skin (Flushing)     Lower Back Pain     Carpal Tunnel Syndrome     Intermittent Asthma     Allergic Rhinitis     Lump In / On The Skin     Common Migraine  (Without Aura)     Seborrheic Keratosis     DESI (obstructive sleep apnea)     Sacroiliac joint dysfunction of right side     Lumbar foraminal stenosis     Lumbar disc herniation     Sciatica of right side       Current Outpatient Prescriptions on File Prior to Encounter   Medication Sig Dispense Refill     albuterol (PROAIR HFA) 90 mcg/actuation inhaler INHALE 1 TO 2 PUFFS BY MOUTH EVERY 4 TO 6 HOURS AS NEEDED 8.5 g 0     cyclobenzaprine (FLEXERIL) 5 MG tablet TAKE 1 TABLET(5 MG) BY MOUTH AT BEDTIME 90 tablet 3     diazePAM (VALIUM) 5 MG tablet 5 mg tablets, take 2 tablets, 2 hours prior to MRI or Procedure. 2 tablet 0     FLUoxetine (PROZAC) 20 MG capsule TK 2 CS PO D  1     FLUZONE QUAD [id number] 60 mcg (15 mcg x 4)/0.5 mL Susp injection        gabapentin (NEURONTIN) 300 MG capsule Take 3 capsules (900 mg total) by mouth 3 (three) times a day. Follow Gabapentin Dosing chart given 270 capsule 3     levothyroxine (SYNTHROID, LEVOTHROID) 175 MCG tablet TAKE 1 TABLET BY MOUTH DAILY 90 tablet 3     naproxen (EC NAPROSYN) 500 MG EC tablet Take 1 tablet (500 mg total) by mouth 3 (three) times a day as needed. 42 tablet 1     oxyCODONE-acetaminophen (PERCOCET) 5-325 mg per tablet Take 1-2 tablets by mouth 3 (three) times a day as needed for pain. 60 tablet 0     SUMAtriptan (IMITREX) 50 MG tablet TAKE 1 TABLET BY MOUTH AT ONSET OF HEADACHE. MAY REPEAT IN 2 HOURS AS NEEDED 9 tablet 3     No current facility-administered medications on file prior to encounter.        Allergies   Allergen Reactions     Acetaminophen      Other: very sore stomach       Cefuroxime Axetil      Sulfa (Sulfonamide Antibiotics) Rash       Past Medical History:   Diagnosis Date     Anxiety      Carpal tunnel syndrome      Depression     PMDD     Disease of thyroid gland      Migraine      Pregnancy          Substance abuse     sober since , narcotic pain medication        Review of Systems  ROS: Positive for numbness and tingling, weakness.   Specifically negative for bowel/bladder dysfunction, balance changes, headache, dizziness, foot drop, fevers, chills, appetite changes, nausea/vomiting, unexplained weight loss. Otherwise 13 systems reviewed are negative. Please see the patient's intake questionnaire from today for details.    Reviewed Social, Family, Past Medical and Past Surgical history with patient, no significant changes noted since prior visit.     Objective:     Visit Vitals     /67 (Patient Site: Left Arm, Patient Position: Sitting)     Pulse 94     Temp 97.9  F (36.6  C) (Oral)     Wt 203 lb (92.1 kg)     BMI 35.96 kg/m2     PHYSICAL EXAMINATION:    --CONSTITUTIONAL: Well developed, well nourished, healthy appearing individual.  --PSYCHIATRIC: Appropriate mood and affect. No difficulty interacting due to temper, social withdrawal, or memory issues.  --SKIN: Lumbar region is dry and intact. Sensation to light touch is intact in the bilateral L4, L5, and S1 dermatomes.  --RESPIRATORY: Normal rhythm and effort. No abnormal accessory muscle breathing patterns noted.   --MUSCULOSKELETAL:  Normal lumbar lordosis noted, no lateral shift.  --GROSS MOTOR: Easily arises from a seated position.   --LUMBAR SPINE:  Inspection reveals no evidence of deformity. Range of motion is not limited in lumbar flexion forward but does have increased pain with return to upright, however significantly limited in extension due to pain as well. No tenderness to palpation. Straight leg raising in the seated position is negative to radicular pain bilaterally, however positive to back pain on the right, negative on the left. Sciatic notch non-tender on the left, significantly tender on the right.  --LOWER EXTREMITY MOTOR TESTING:  Plantar flexion left 5/5, right 2/5   Dorsiflexion left 5/5, right 2/5   Great toe MTP extension left 5/5, right 2/5  Knee flexion left 5/5, right 5/5  Knee extension left 5/5, right 5/5   Hip flexion left 5/5, right 5/5  Hip abduction  left 5/5, right 5/5  Hip adduction left 5/5, right 5/5   --HIPS: Full range of motion bilaterally. Negative FABERs on the involved lower extremity.   --NEUROLOGIC: Bilateral patellar and achilles reflexes are physiologic and symmetric. Lower extremities are intact to light touch.     RESULTS:   Imaging: MRI of the lumbar spine was reviewed today. The images were shown to the patient and the findings were explained using a spine model.    Xr Lumbar Spine 2 Or 3 Vws  Result Date: 1/11/2017  XR LUMBAR SPINE 2 OR 3 VWS1/10/2017 2:37 PMINDICATION: Back painCOMPARISON: None.FINDINGS: 6 nonrib-bearing lumbar type vertebra. This report will consider the first nonrib-bearing segment L1 with a transitional lumbarized S1. Straightened lumbar lordosis and mild lumbar dextrocurvature centered at L4-5. No radiographic evidence for fracture or subluxation. Mild lumbar spondylosis, including mild multilevel endplate irregularity/spurring and mild loss of disc height at L5-S1. Minor lower lumbar facet arthropathy.    Lumbar Spine MRI [facility] 1/11/2017  Conclusion:  1. 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  2. At L4-5, there is a right paracentral/foraminal broad-based disc protrusion resulting in narrowing of the right lateral recess and mass effect on the transverse right L5 nerve root. There is also moderate central canal stenosis at this level. There is mild to moderate bilateral foraminal stenosis.  2. At L3-4, there is mild right and mild-to-moderate left foraminal stenosis.  4. At L2-3, there is mild bilateral foraminal stenosis.

## 2021-06-08 NOTE — PROGRESS NOTES
"Susan Kwan is a 54 y.o. female who is being evaluated via a billable video visit.      The patient has been notified of following:     \"This video visit will be conducted via a call between you and your physician/provider. We have found that certain health care needs can be provided without the need for an in-person physical exam.  This service lets us provide the care you need with a video conversation.  If a prescription is necessary we can send it directly to your pharmacy.  If lab work is needed we can place an order for that and you can then stop by our lab to have the test done at a later time.    Video visits are billed at different rates depending on your insurance coverage. Please reach out to your insurance provider with any questions.    If during the course of the call the physician/provider feels a video visit is not appropriate, you will not be charged for this service.\"    Patient has given verbal consent to a Video visit? Yes    Patient would like to receive their AVS by AVS Preference: Maggie.    Patient would like the video invitation sent by: Text to cell phone: [phone]    Will anyone else be joining your video visit? No        Video Start Time: 7:58 AM    Additional provider notes:   Assessment/Plan:      There are no diagnoses linked to this encounter.    Assessment: Pleasant 54 y.o. female with past medical history significant for major depression, TMJ, DESI, hyperlipidemia, nicotine dependence, migraine headache, post ablative hypothyroidism, status post lumbar microdiscectomy Dr. Cerna 2017, spinal cord stimulator implant 2018     1. Low back pain after a fall on February 5, 2020.  There is a pars interarticularis fracture on the right at L4.  Nondisplaced.  Felt to be acute on CT scan from February.  Was seen by neurosurgery.  Out of brace.  Was doing very well until quarantined for the past 10 days.  Sitting and laying down all day causes significant increase in low back pain.    2. " Persistent lumbar spine pain with right low back and lower extremity radicular pain with an active right L5 radiculopathy.  Likely related to right lateral recess stenosis at L4-5.  Her leg pain has improved with gabapentin.    3.  Chronic right leg weakness/foot drop after lumbar decompression at L4-5.        Discussion:    1.  Her pain significantly increased after being on self quarantine for potential COVID.  She has tested negative but is having significant back pain in the lower lumbar spine only.  No current radiation down the leg.  She has an injection scheduled which had to be rescheduled due to her quarantine.   she has been on Tylenol 3 but is now off.  She would like a refill until she can get her injection.    2.  I will refill Tylenol 3 1 tablet twice a day as needed for pain.  20 tablets provided.   checked and appropriate last prescription provided in April by Cristela Salguero at our clinic.    3.  We will refill baclofen as its helpful as well.    4.  Lumbar epidural scheduled for June 2 she is encouraged to keep that appointment.    5.  Follow-up 2 to 4 weeks after injection with Cristela Salguero.      It was our pleasure caring for your patient today, if there any questions or concerns please do not hesitate to contact us.      Subjective:   Patient ID: Susan Kwan is a 54 y.o. female.    History of Present Illness:   Patient presents for follow-up of low back pain.  Pain is significantly increased over the past 1 to 2 weeks.  She was doing well off the Tylenol 3 and only taking gabapentin still having back pain.  She then was exposed to someone with COVID and has been on self quarantine in her bedroom so she does not get her significant other sick.  With sitting and laying all day long in bed she has developed significant increased pain in the low back.  She has no pain down the leg although she has chronic weakness of the right leg which does worsen with prolonged walking and fatigue that  has not changed since her lumbar surgery.  The back pain is worse with sitting and laying in bed better with getting up and moving around standing and walking.  She has a stiffness in her back.  Rated a 6/10 today.    Her right leg pain is doing well with gabapentin.  Back pain was improved with baclofen as well and she is out of that medication.  She did have a lumbar epidural scheduled which had to be rescheduled due to her exposure to COVID.    Imaging: I personally reviewed CT scan lumbar spine.  Images and report personally reviewed.  This is from 2020.  She hasRight paracentral disc bulge L4-5 with at least moderate.Right lateral recess stenosis.  She also has right pars defect/fracture.  Moderate right L4-5 foraminal stenosis.    Review of Systems: No new weakness in the right leg or no progressive weakness.  No numbness or tingling in the legs.     No bowel or bladder incontinence.  No urinary retention.  No fevers, unintentional weight loss, balance changes, headaches, frequent falling, difficulty swallowing, or coordination difficulties.  Denies chest pain shortness of breath abdominal pain recent illnesses.          Past Medical History:   Diagnosis Date     Anxiety      Asthma      Carpal tunnel syndrome      Depression     PMDD     Disease of thyroid gland      Low back pain      Migraine      Pregnancy          Substance abuse (H)     sober since , narcotic pain medication       The following portions of the patient's history were reviewed and updated as appropriate: allergies, current medications, past family history, past medical history, past social history, past surgical history and problem list.           Objective:   Physical Exam:    There were no vitals filed for this visit.  There is no height or weight on file to calculate BMI.        General:  Well-appearing female in no acute distress.  Overweight, pleasant,   cooperative, and interactive throughout the examination and  interview.  HEENT: Head atraumatic normocephalic, sclera clear.  Skin: No rashes or lesions seen over the face.  Respirations unlabored.  MSK: Gait is nonantalgic.  Able to heel toe walk on the left lower extremity able to toe walk on the right not able to heel stand well on the right.  Range of motion: Mild decreased range of motion lumbar spine and finger to floor testing in flexion.       Neurologic exam: Mental status: Patient is alert and oriented with normal affect.  Attention, knowledge, memory, and language are intact.  Normal coordination throughout the examination of the upper extremities.     Manual muscle testing :   Appears to have at least antigravity strength in the bilateral ankle plantar flexors, left  ankle dorsiflexors, EHL.  She does have at least a 2/5 in the right ankle dorsiflexors and EHL.      Video-Visit Details    Type of service:  Video Visit    Video End Time (time video stopped): 8:15 AM  Originating Location (pt. Location): Home    Distant Location (provider location):  [facility]     Platform used for Video Visit: Ander Curtis DO

## 2021-06-08 NOTE — PROGRESS NOTES
Order for Durable Medical Equipment was processed and equipment ordered.   DME provider: [facility]  Date Faxed: 01/31/17  Ordering Provider: JUSTEN  Equipment ordered: CPAP

## 2021-06-08 NOTE — PROGRESS NOTES
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

## 2021-06-08 NOTE — PATIENT INSTRUCTIONS - HE
1.  I will provide 20 tablets of Tylenol 3 1 tablet twice a day as needed to help with your pain until you have the lumbar epidural.    2.  I will refill baclofen.    3.  Follow-up with Critsela Salguero 2 to 4 weeks after the injection.

## 2021-06-08 NOTE — PROGRESS NOTES
"Susan Kwan is a 54 y.o. female who is being evaluated via a billable video visit.      The patient has been notified of following:     \"This video visit will be conducted via a call between you and your physician/provider. We have found that certain health care needs can be provided without the need for an in-person physical exam.  This service lets us provide the care you need with a video conversation.  If a prescription is necessary we can send it directly to your pharmacy.  If lab work is needed we can place an order for that and you can then stop by our lab to have the test done at a later time.    Video visits are billed at different rates depending on your insurance coverage. Please reach out to your insurance provider with any questions.    If during the course of the call the physician/provider feels a video visit is not appropriate, you will not be charged for this service.\"    Patient has given verbal consent to a Video visit? Yes    Patient would like to receive their AVS by AVS Preference: Maggie.    Patient would like the video invitation sent by: Text to cell phone: .    Will anyone else be joining your video visit? No        Video Start Time: 11:30 AM      Video-Visit Details    Type of service:  Video Visit    Video End Time (time video stopped): 11:42 AM  Originating Location (pt. Location): In patient car    Distant Location (provider location):  [facility]     Platform used for Video Visit: Ander Salguero CNP      Assessment:     Diagnoses and all orders for this visit:    Chronic right-sided low back pain with right-sided sciatica    Lumbar radiculopathy    Pars defect of lumbar spine  -     acetaminophen-codeine (TYLENOL #3) 300-30 mg per tablet; Take 1 tablet by mouth every 8 (eight) hours as needed for pain (Severe).  Dispense: 30 tablet; Refill: 0    Stenosis of lateral recess of lumbar spine    Right leg weakness    Acute right-sided low back pain with " right-sided sciatica  -     acetaminophen-codeine (TYLENOL #3) 300-30 mg per tablet; Take 1 tablet by mouth every 8 (eight) hours as needed for pain (Severe).  Dispense: 30 tablet; Refill: 0       Susan Kwan is a 54 y.o. y.o. female with past medical history significant for major depression, TMJ, DESI, hyperlipidemia, nicotine dependence, migraine headache, post ablative hypothyroidism, status post lumbar microdiscectomy Dr. Cerna 2017, spinal cord stimulator implant 2018 who presents today for follow-up via telephone regarding:    -Ongoing right low back pain with right lumbar radiculopathy, previous EMG showed active L5 radiculopathy.  Patient was evaluated  by Dr. Ward [facility] neurosurgery 3/24/2020 and recommended epidural steroid injection and physical therapy at this time and follow-up with her in 6 weeks.    I have reviewed the note as documented. This accurately captures the substance of my conversation with the patient.     Plan:     A shared decision making plan was used. The patient's values and choices were respected. Prior medical records from 5/27/2020 were reviewed today. The following represents what was discussed and decided upon by the provider and the patient.        -DIAGNOSTIC TESTS: Images were personally reviewed and interpreted.   --Lumbar flexion-extension x-ray shows no dynamic instability.  --EMG 3/18/2020 shows active right L5 radiculopathy.  --Lumbar CT scan 2/21/2020 shows acute nondisplaced right L4 pars interarticularis fracture.  L4-5 severe right and mild left foraminal stenosis with distortion of the right L4 nerve root.  Prior right L4 hemilaminotomy.  --[facility] spinal cord stimulator, patient cannot have MRI.    -INTERVENTIONS: Patient completed RHIANNON injection today, will follow-up 2 weeks postinjection to evaluate her response.    -MEDICATIONS: Refilled Tylenol 3, 1 tablet 3 times daily as needed for severe breakthrough pain, number 30 tablets given for 10 days  worth.  Patient is aware that this medication is for acute pain only and not a long-term recommendation.  -MN  checked. Discussed the risks (eg, addiction, overdose, worsening pain) verses benefit of opioid use with patient today. Explained that this medication will not be a long term solution to ongoing pain. Discussed using lowest effective dose and the importance of other measures for pain management including PT, other non-opioid medications, behavioral treatments, and other procedure options.   Discussed side effects of medications and proper use. Patient verbalized understanding.    -PHYSICAL THERAPY: Encourage patient to continue with home physical therapy at this time.  Discussed the importance of core strengthening, ROM, stretching exercises with the patient and how each of these entities is important in decreasing pain.  Explained to the patient that the purpose of physical therapy is to teach the patient a home exercise program.  These exercises need to be performed every day in order to decrease pain and prevent future occurrences of pain.        -PATIENT EDUCATION:  12 minutes of total visit time was spent on the telephone with the patient today, 60 % of the visit was spent on counseling, education, and coordinating care.   -5 minutes spent outside of visit time, non-face-to-face time, reviewing chart.  -Today we also discussed the issues related to the current COVID-19 pandemic, the pros and cons of the current treatment plan, the CDC guidelines such as social distancing, washing the hands, and covering the cough.    -FOLLOW UP: Follow-up in 2 weeks postinjection.  Advised to contact clinic if symptoms worsen or change.    Subjective:     Susan Kwan is a 54 y.o. female who presents today for follow-up via video follow-up for ongoing right low back pain, right lower extremity pain with numbness and tingling and perceived weakness that is ongoing since a fall 2/5/2020 when she slipped on  ice.  Patient was here for an injection today and is following up with me for medication management.    -Treatment to Date: Right L4-5 hemilaminectomy/microdiscectomy with right L5 nerve root decompression surgery with Dr. Cerna 2017.  Spinal cord stimulator implant T8-9 2018.     Medications:  Gabapentin 300 mg, not currently taking previous dose 3-3-4.  Some sedation on higher doses.  Tylenol 3 prescribed 3/16/2020, 21 tablets.      Previous prescription for oxycodone 5 mg 3/4/2020 for severe breakthrough pain, patient no longer currently taking.    Patient Active Problem List   Diagnosis     Nicotine Dependence     Migraine Headache     Arthralgias In Multiple Sites     Postablative hypothyroidism     Hyperlipidemia     Major Depression, Recurrent     Sudden Redness Of The Skin (Flushing)     Lower Back Pain     Carpal Tunnel Syndrome     Mild intermittent asthma without complication     Allergic Rhinitis     Seborrheic Keratosis     DESI (obstructive sleep apnea)     Sacroiliac joint dysfunction of right side     Lumbar foraminal stenosis     Lumbar disc herniation     Sciatica of right side     Lumbar stenosis     S/P lumbar microdiscectomy     Gastro-esophageal reflux disease with esophagitis     Depressive disorder     Anxiety state     Obesity (BMI 35.0-39.9) with comorbidity (H)     TMJ (temporomandibular joint syndrome)     Advanced directives, counseling/discussion     ASCUS with positive high risk HPV cervical     Xerostomia due to hyposecretion of salivary gland     Myofascial pain     Benign neoplasm of ascending colon       Current Outpatient Medications on File Prior to Encounter   Medication Sig Dispense Refill     acetaminophen-codeine (TYLENOL #3) 300-30 mg per tablet Take 1 tablet by mouth 2 (two) times a day as needed for pain. 20 tablet 0     albuterol (VENTOLIN HFA) 90 mcg/actuation inhaler INHALE 1 TO 2 PUFFS EVERY 4 HOURS AS NEEDED FOR WHEEZING 18 g 1     baclofen (LIORESAL) 10 MG tablet Take  0.5-1 tablets (5-10 mg total) by mouth 2 (two) times a day as needed (for muscle spasms). 30 tablet 0     FLUoxetine (PROZAC) 20 MG capsule TAKE 2 CAPSULES(40 MG) BY MOUTH DAILY 60 capsule 3     gabapentin (NEURONTIN) 300 MG capsule TAKE 3 CAPSULES BY MOUTH EVERY MORNING, 3 CAPSULES BY MOUTH EVERY DAY IN THE AFTERNOON, AND 4 CAPSULES EVERY EVENING 900 capsule 3     levothyroxine (SYNTHROID, LEVOTHROID) 175 MCG tablet TAKE 1 TABLET(175 MCG) BY MOUTH DAILY 90 tablet 3     melatonin 3 mg Tab tablet TAKE 1 TABLET(3 MG) BY MOUTH AT BEDTIME AS NEEDED 30 tablet 11     multivitamin therapeutic tablet Take 1 tablet by mouth daily.       ondansetron (ZOFRAN ODT) 4 MG disintegrating tablet Take 1 tablet (4 mg total) by mouth every 8 (eight) hours as needed for nausea. 10 tablet 0     simvastatin (ZOCOR) 20 MG tablet TAKE 1 TABLET(20 MG) BY MOUTH AT BEDTIME 90 tablet 4     SUMAtriptan (IMITREX) 50 MG tablet TAKE 1 TABLET BY MOUTH EVERY 2 HOURS AS NEEDED FOR MIGRAINE. MAY REPEAT IN 2 HOURS AS NEEDED 9 tablet 10     [DISCONTINUED] acetaminophen-codeine (TYLENOL #3) 300-30 mg per tablet Take 1 tablet by mouth 2 (two) times a day as needed for pain. 30 tablet 0     Current Facility-Administered Medications on File Prior to Encounter   Medication Dose Route Frequency Provider Last Rate Last Dose     [COMPLETED] iohexoL 300 mg iodine/mL injection (OMNIPAQUE)    PRN Lina Vivas, DO   2 mL at 06/02/20 1102     [COMPLETED] lidocaine (PF) 10 mg/mL (1 %) injection (XYLOCAINE-MPF)    PRN Lina Vivas, DO   3 mL at 06/02/20 1101     [COMPLETED] methylPREDNISolone acetate injection (DEPO-MEDROL)    PRN Lina Vivas, DO   80 mg at 06/02/20 1102       Allergies   Allergen Reactions     Acetaminophen      Other: very sore stomach       Cefuroxime Axetil      Sulfa (Sulfonamide Antibiotics) Rash       Past Medical History:   Diagnosis Date     Anxiety      Asthma      Carpal tunnel syndrome       Depression     PMDD     Disease of thyroid gland      Low back pain      Migraine      Pregnancy          Substance abuse (H)     sober since , narcotic pain medication        Review of Systems  ROS:  Specifically negative for bowel/bladder dysfunction, balance changes, headache, dizziness, foot drop, fevers, chills, appetite changes, nausea/vomiting, unexplained weight loss. Otherwise 13 systems reviewed are negative. Please see the patient's intake questionnaire from today for details.    Reviewed Social, Family, Past Medical and Past Surgical history with patient, no significant changes noted since prior visit.     Objective:     Virtual video visit: Patient in her car therefore no physical exam completed.  --PSYCHIATRIC: The patient is verbally awake, alert, oriented to person, place, time and answering questions appropriately with clear speech. Appropriate mood.    RESULTS:   Imaging: Spine imaging was reviewed today.       Ct Lumbar Spine Without Contrast  Result Date: 3/20/2020  INDICATION: Back pain or radiculopathy, > 6 weeks. COMPARISON: Lumbar spine radiographs dated 2020, CT lumbar spine 2020. TECHNIQUE: Routine without IV contrast. Multiplanar reformats. Dose reduction techniques were used. FINDINGS: Nomenclature is based on 5 lumbar type vertebral bodies. Alignment is within normal limits. The vertebral body heights are normal. Redemonstrated nondisplaced right L4 pars interarticularis fracture, essentially unchanged in appearance compared to the most recent study. No acute fracture seen. Partially visualized spinal cord stimulator leads overlying the left posterior paraspinous musculature, incompletely evaluated on this exam. Mild ectasia of the infrarenal abdominal aorta measuring up to 2.6 cm in transverse dimension (series 3 image 42). Moderate atherosclerotic calcification involving the abdominal aorta and iliac arteries. Unremarkable visualized bony pelvis. SEGMENTAL ANALYSIS:  T12-L1: Normal disc height. No significant disc bulge or herniation. Minimal facet arthropathy. No significant spinal or neural foraminal stenosis. The findings are unchanged. L1-L2: Normal disc height. No significant disc bulge or herniation. Minimal facet arthropathy. No spinal or neural foraminal stenosis. The findings are unchanged. L2-L3: Normal disc height. There is a symmetric disc bulge with minimal facet arthropathy. No significant spinal stenosis. Mild left neural foraminal stenosis. The right neural foramen is patent. The findings are unchanged. L3-L4: Normal disc height. Small symmetric disc bulge with minimal facet arthropathy. No spinal stenosis. Moderate left neural foraminal stenosis. The right neural foramen is patent. The findings are unchanged. L4-L5: Minimal disc height loss. Findings of right hemilaminectomy again noted. There is a small disc bulge eccentric to the right with mild bilateral facet arthropathy. There is suggestion of at least mild potentially moderate right lateral recess narrowing with mild overall spinal canal narrowing. This is unchanged from prior. Severe right and moderate left neural foraminal stenosis. The findings are unchanged. L5-S1: Normal disc height. No significant disc bulge or herniation. Mild facet arthropathy. No significant spinal or neural foraminal stenosis. The findings are unchanged.   1. Unchanged nondisplaced right L4 pars interarticularis defect/fracture. No acute fracture.   2. Multilevel degenerative disc disease and facet arthropathy of the lumbar spine, as described. No high-grade spinal stenosis. There is at least mild encroachment on the right lateral recess at the L4-L5 level related to an asymmetric disc bulge to the right. This is seen in the setting of prior right L4-L5 hemilaminectomy.   3. Varying degrees of neural foraminal narrowing, most pronounced on the right at L4-L5 where it is severe. Please see the body of the report for additional  details.        Xr Lumbar Spine Flex And Ext 2 Or 3 Vws  Result Date: 3/12/2020  INDICATION: Evaluate spondylolisthesis stability: Please provide flexion, extension, and neutral measurements COMPARISON: Lumbar radiograph 02/25/2019.   Nomenclature assumes 5 lumbar type vertebral bodies. Minimal 1-2 mm anterior spondylolisthesis at L4-L5 without change on flexion or extension views. Otherwise normal alignment. Normal vertebral body heights. Minimal disc space narrowing with  mild marginal osteophytes. Mild to moderate facet arthropathy within the lower lumbar spine. A stimulator device and lead are partially visualized.        Ct Lumbar Spine Without Contrast  Result Date: 2/21/2020  INDICATION: Back pain, right sciatica with skin paresthesias; fall on ice on 02/05/2020, now with no feeling down right lower leg and pain in right thigh. COMPARISON: Lumbar spine radiographs 02/25/2019 and MRI lumbar spine 05/22/2017 TECHNIQUE: Routine without IV contrast. Multiplanar reformats.  Dose reduction techniques were used. FINDINGS: VERTEBRAE: Normal vertebral body heights and alignment. There is an acute, nondisplaced fracture of the right L4 pars interarticularis (sagittal image #25). No additional acute fracture or posttraumatic subluxation. CANAL/FORAMINA: Mild degenerative disc disease throughout the lumbar spine, though without high-grade spinal canal stenosis. Prior right L4 hemilaminotomy. At L4-L5, there is severe right and mild left neuroforaminal stenosis, with distortion of the exiting right L4 nerve root. PARASPINAL: Atherosclerotic vascular calcifications. Paraspinous soft tissues are otherwise grossly unremarkable.   IMPRESSION:   1.  Acute, nondisplaced fracture of the right L4 pars interarticularis.   2.  At L4-L5, there is severe right and mild left neuroforaminal stenosis with distortion of the exiting right L4 nerve root.   3.  No high-grade spinal canal stenosis. DICOM format image data is available to  non-affiliated external healthcare facilities or entities on a secure, media-free, reciprocally searchable basis with patient authorization for at least a 12-month period after the study.

## 2021-06-08 NOTE — PATIENT INSTRUCTIONS - HE
Please follow up two to four weeks post procedure with Cristela to evaluate your plan of care.       DISCHARGE INSTRUCTIONS    During office hours (8:00 a.m.- 4:00 p.m.) questions or concerns may be answered  by calling Spine Center Navigation Nurses at  [phone].  Messages received after hours will be returned the following business day.      In the case of an emergency, please dial 911 or seek assistance at the nearest Emergency Room/Urgent Care facility.     All Patients:    ? You may experience an increase in your symptoms for the first 2 days (It may take anywhere between 2 days- 2 weeks for the steroid to have maximum effect).    ? You may use ice on the injection site, as frequently as 20 minutes each hour if needed.    ? You may take your pain medicine.    ? You may continue taking your regular medication after your injection. If you have had a Medial Branch Block you may resume pain medication once your pain diary is completed.    ? You may shower. No swimming, tub bath or hot tub for 48 hours.  You may remove your bandaid/bandage as soon as you are home.    ? You may resume light activities, as tolerated.    ? Resume your usual diet as tolerated.    ? It is strongly advised that you do not drive for 1-3 hours post injection.    ? If you have had oral sedation:  Do not drive for 8 hours post injection.      ? If you have had IV sedation:  Do not drive for 24 hours post injection.  Do not operate hazardous machinery or make important personal/business decisions for 24 hours.      POSSIBLE STEROID SIDE EFFECTS (If steroid/cortisone was used for your procedure)    -If you experience these symptoms, it should only last for a short period      Swelling of the legs                Skin redness (flushing)       Mouth (oral) irritation     Blood sugar (glucose) levels              Sweats                      Mood changes    Headache    Sleeplessness         POSSIBLE PROCEDURE SIDE EFFECTS  -Call the Spine Center  if you are concerned    Increased Pain             Increased numbness/tingling        Nausea/Vomiting            Bruising/bleeding at site        Redness or swelling                                                Difficulty walking        Weakness             Fever greater than 100.5    *In the event of a severe headache after an epidural steroid injection that is relieved by lying down, please call the [facility] to speak with a clinical staff member*

## 2021-06-08 NOTE — PROGRESS NOTES
[facility] Daily Progress     Patient Name: Susan Kwan  Date: 2017  Visit #: 3  PTA visit #:  1  Referral Diagnosis: Acute right sided low  Back pain without sciatica  Referring provider: Cristela Salguero C*  Visit Diagnosis:     ICD-10-CM    1. Acute right-sided low back pain without sciatica M54.5    2. Chronic right SI joint pain M53.3     G89.29    3. Muscle weakness (generalized) M62.81          Assessment:     Patient is appropriate to continue with skilled physical therapy intervention, as indicated by initial plan of care.     Pt noted a significant decrease in pain today after MT.  Pt able to walk without deviation after PT session.     Goal Status:  Pt. will demonstrate/verbalize independence in self-management of condition in : 6 weeks: Ongoing  Pt. will be able to walk : for community mobility;with less pain;with less difficulty;in 6 weeks: Progressing towards  Patient will sit: 60 minutes;for driving;for work;with less pain;with less difficultty;in 6 weeks: Ongoing  Patient will return to: work;for full duty;in 6 weeks: Ongoing  Patient Turn Head: without dizziness;for driving;for watching traffic;for conversation;in 12 weeks: Met  Patient will look up / down: without dizziness;for drinking;in 12 weeks: Met  Patient will decrease : JACKIE score;for improved quality of function;in 6 weeks    Plan / Patient Education:     Continue with initial plan of care.  Progress with home program as tolerated.   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  Trial treadmill next visit.    Subjective:     Pain Ratin     Pt reports that on Friday she started to have severe pain in her right buttocks that has pain that runs down the back of the right leg to her knee.  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      Pt reports that her actual back pain is fine.  Pt reports that she needs to stay on the gabapentin 3x per day with 3 pills  "because she tried to decrease and her pain increased.      Pt reports that she did not do her exercises this weekend because of the pain.        Objective:     Pt tolerates the exercises well with min-mod cueing and no reported pain.     Pt with increased right piriformis and gluteal muscle tightness.  Pt also with increased right hamstring muscle tightness and tenderness.  Pt with increased tenderness over the right sciatic nerve in the gluteals.  Pt also with + nerve tension with glide on the right.      Pt with decreased core, lumbar and gluteal strength and decreased pelvic stability.        Current HEP exercises  Exercises:  Exercise #1: Treadmill or Nu-Step Warm-up   Comment #1: Nu step x5 min RL:%  Exercise #2: Cat/cow   Comment #2: x5  Exercise #3: LTR  Comment #3: Bx10   Exercise #4: Piriformis stretch   Comment #4: B 2x30\"  Exercise #5: Ab set   Comment #5: x5  Exercise #6: Supine Bridge   Comment #6: Bx12  Exercise #7: Supine Hip ABD/ER with orange band  Comment #7: 2# x10  Exercise #8: Supine marching; Supine alternating arm raises   Comment #8: March Bx10  Exercise #9: Child's Pose x30\"  Comment #9: Supine nerve glide Rx10       Pt demonstrates benefits from skilled PT.        Treatment Today     TREATMENT MINUTES COMMENTS   Evaluation     Self-care/ Home management     Manual therapy 10 STM to the right gluteal and piriformis as well as right hamstring muscle; right supine manual nerve glide   Pt in L S/L and supine    Neuromuscular Re-education     Therapeutic Activity     Therapeutic Exercises 20 See flow sheet   Gait training     Modality__________________                Total 30    Blank areas are intentional and mean the treatment did not include these items.       Lina Cannon,STEPHANIE  1/31/2017    "

## 2021-06-08 NOTE — PROGRESS NOTES
4 days post injection F/U  --1/19/17 B/L L4-L5 TFESI  --50% relief per pt  --Today C/O mid low back pain and right buttock pain, doing better  --Rates pain 6/10  --No PT    Medication  --Gabapentin 300 mg 3 caps TID  --Out of percocet  --D/C Ibuprofen

## 2021-06-08 NOTE — PATIENT INSTRUCTIONS - HE
~Please call Nurse Navigation line [phone] with any questions or concerns about your treatment plan, if symptoms worsen and you would like to be seen urgently, or if you have problems controlling bladder and bowel function.      Discussed the risks (eg, addiction, overdose, worsening pain) verses benefit of opioid use with patient today. Explained that this medication will not be a long term solution to ongoing pain. Discussed using lowest effective dose and the importance of other measures for pain management including PT, other non-opioid medications, behavioral treatments, and other procedure options.     For any further refills on opioid pain medication you must come in to the clinic in person to discuss further in which you may or may not receive refills.

## 2021-06-08 NOTE — PROGRESS NOTES
"Dear Dr. Earline Jimenez MD  [address] [place] [id number],    Thank you for the opportunity to participate in the care of Susan Kwan.     She is a 51 y.o.  female patient who comes to the sleep medicine clinic for review of her sleep study. The study was completed on 2016 which showed the the patient had moderate obstructive sleep apnea with an apnea hypopnea index of 16.9 events per hour with the lowest O2 sat of 84%.  The patient was also found to have REM sleep without atonia and periodic limb movement sleep.  I reviewed the results of the sleep study with the patient in detail today.  The patient is eager to move forward with pressure therapy.      Past Medical History   Diagnosis Date     Anxiety      Carpal tunnel syndrome      Depression      PMDD     Disease of thyroid gland      Migraine      Pregnancy           Substance abuse      sober since , narcotic pain medication       Past Surgical History   Procedure Laterality Date     Pr removal gallbladder       Description: Cholecystectomy;  Recorded: 2011;     Pr ligate fallopian tube       Description: Tubal Ligation;  Recorded: 2011;     Pr dilation/curettage,diagnostic       Description: Dilation And Curettage;  Recorded: 2011;  Comments: for \"clots\" after one of her pregnancies     Cholecystectomy       Tubal ligation         Social History     Social History     Marital status:      Spouse name: N/A     Number of children: N/A     Years of education: N/A     Occupational History     Not on file.     Social History Main Topics     Smoking status: Current Every Day Smoker     Packs/day: 1.00     Types: Cigarettes     Smokeless tobacco: Not on file     Alcohol use No     Drug use: No     Sexual activity: No     Other Topics Concern     Not on file     Social History Narrative         Current Outpatient Prescriptions   Medication Sig Dispense Refill     albuterol (PROAIR HFA) 90 mcg/actuation inhaler INHALE 1 TO 2 " PUFFS BY MOUTH EVERY 4 TO 6 HOURS AS NEEDED 8.5 g 0     cyclobenzaprine (FLEXERIL) 5 MG tablet TAKE 1 TABLET(5 MG) BY MOUTH AT BEDTIME 60 tablet 0     diazePAM (VALIUM) 5 MG tablet 5 mg tablets, take 2 tablets, 2 hours prior to MRI or Procedure. 2 tablet 0     FLUoxetine (PROZAC) 20 MG capsule TK 2 CS PO D  1     FLUoxetine (PROZAC) 20 MG capsule TAKE 2 CAPSULES BY MOUTH DAILY 60 capsule 0     FLUZONE QUAD 6828-7415 60 mcg (15 mcg x 4)/0.5 mL Susp injection        gabapentin (NEURONTIN) 300 MG capsule Take 3 capsules (900 mg total) by mouth 3 (three) times a day. Follow Gabapentin Dosing chart given 270 capsule 3     levothyroxine (SYNTHROID, LEVOTHROID) 175 MCG tablet TAKE 1 TABLET BY MOUTH DAILY 90 tablet 3     oxyCODONE-acetaminophen (PERCOCET) 5-325 mg per tablet Take 1-2 tablets by mouth 3 (three) times a day as needed for pain. 30 tablet 0     SUMAtriptan (IMITREX) 50 MG tablet TAKE 1 TABLET BY MOUTH AT ONSET OF HEADACHE. MAY REPEAT IN 2 HOURS AS NEEDED 9 tablet 1     No current facility-administered medications for this visit.        Allergies   Allergen Reactions     Acetaminophen      Other: very sore stomach       Cefuroxime Axetil      Sulfa (Sulfonamide Antibiotics) Rash       Physical Exam:  Visit Vitals     /64     Pulse 88     Wt 203 lb 3.2 oz (92.2 kg)     SpO2 94%     BMI 36 kg/m2     BMI:Body mass index is 36 kg/(m^2).   GEN: NAD, obese  Head: Normocephalic.  Neurological: Alert, oriented to time, place, and person.  Psych: normal mood, normal affect     Labs/Studies:  - We reviewed the results of the overnight PSG as described on the HPI.     Lab Results   Component Value Date    WBC 9.9 01/04/2012    HGB 14.4 02/11/2013         Chemistry        Component Value Date/Time     11/09/2016 0850    K 4.7 11/09/2016 0850     11/09/2016 0850    CO2 25 11/09/2016 0850    BUN 21 11/09/2016 0850    CREATININE 0.85 11/09/2016 0850     11/09/2016 0850        Component Value Date/Time     CALCIUM 9.7 11/09/2016 0850    ALKPHOS 86 11/09/2016 0850    AST 16 11/09/2016 0850    ALT 21 11/09/2016 0850    BILITOT 0.4 11/09/2016 0850            No results found for: FERRITIN        Assessment and Plan:  In summary Susan Kwan is a 51 y.o. year old female here for review of sleep study.  1. Obstructive Sleep Apnea  We had an extensive conversation to review the results of her sleep study and to  her on the importance of treating sleep apnea. Patient decided to proceed with CPAP. She will start using the device as soon as she receives it with the intention to use if for the entire night. We discussed some tips to increase PAP tolerance as well as the normal curve of adaptation. CPAP is going to provide improved respiratory function during the night but it can cause some sleep disruption that tends to improve with continuous usage. She should return to the clinic in 6 weeks to review compliance and efficacy monitoring.  We talked about insurance requirements and I encourage the patient to learn the specific details of her health insurance plan regarding durable medical equipment. I also gave patient a list of DME's to choose from. The patient will call us back to tell us which DME the patient would like to work with.  The prescription is in the chart.  2.  Hypersomnia  3.  REM sleep without atonia  I recommend watchful waiting for now.  4.  Periodic limb movement sleep  Most likely will resolve after optimal pressure therapy.     Patient verbalized understanding of these issues, agrees with the plan and all questions were answered today. Patient was given an opportuntity to voice any other symptoms or concerns not listed above. Patient did not have any other symptoms or concerns.      Patient told to return in 6 weeks. Patient instructed to stop at  to schedule appointment before leaving today.    Gerson Wyatt DO  Board Certified in Internal Medicine and Sleep Medicine  [facility].    We spent a total of 15 minutes of face-to-face encounter and more than 50% of the encounter was used for counseling or coordination of care.    (Note created with Dragon voice recognition and unintended spelling errors and word substitutions may occur)

## 2021-06-08 NOTE — PROGRESS NOTES
Assessment:   Diagnoses and all orders for this visit:    Acute bilateral low back pain with right-sided sciatica    Lumbar disc herniation    Lumbar foraminal stenosis    Lumbar stenosis    Sacroiliac joint dysfunction of right side  -     naproxen (EC NAPROSYN) 500 MG EC tablet; Take 1 tablet (500 mg total) by mouth 3 (three) times a day as needed.  Dispense: 42 tablet; Refill: 1         Susan Kwan is a 51 y.o. y.o. female with past medical history significant for nicotine dependence, migraine headache, hypothyroidism post procedural, hyperlipidemia, major depression recurrent, carpal tunnel syndrome, chronic low back pain, intermittent asthma, DESI who presents today for follow-up regarding ongoing midline low back pain that radiates to the right posterior buttock and right posterior thigh in which she has gotten about 75% relief of bilateral L4-5 TF RHIANNON, however on exam does have some increased pain with SI provocative maneuvers on the right today indicating SI joint dysfunction as well complicating her pain.    We'll have her continue with physical therapy as well as start anti-inflammatory medication for SI joint dysfunction as well as radiculitis.  She does well at gabapentin 300 mg, 3-3-3 however does have some dizziness therefore we'll keep her out of work for the next week to see if the symptoms improve.  She tried tapering down to a lower dose of gabapentin however her radicular pain significantly worsened.    Lumbar spine MRI did reveal a L4-5 right paracentral/foraminal disc protrusion with narrowing of the right lateral recess and mass effect on the right L5 nerve root with moderate central canal stenosis.  She does also have L3-4 mild right and mild-to-moderate left foraminal stenosis and L2-3 mild bilateral foraminal stenosis.     Plan:     A shared decision making plan was used. The patient's values and choices were respected. Prior medical records from 1/23/17 were reviewed today. The following  represents what was discussed and decided upon by the provider and the patient.        -DIAGNOSTIC TESTS: Reviewed lumbar spine MRI again the patient today.    -WORKABILITY: Work note given for her to be out of work from 1/27/2017-2/8/2017.  We did try to have her go back to work on light duty as a , however she was unable to tolerate it when she was on a lower dose gabapentin.  Currently she is back at the max dose gabapentin and feeling better with her pain but noticing some dizziness and drowsiness therefore we'll hold off on going back to work until we see if the symptoms resolve.    -INTERVENTIONS: Discussed that if her pain does worsen we could consider either repeat bilateral or a right L4-5 TF RHIANNON.    -MEDICATIONS: Prescribed naproxen 500 mg 1 tablet 3 times a day as needed for pain.   -Patient continue gabapentin 300 mg, 3-3-3.  If she feels in the next couple of days that her side effects are not improving she can titrate slowly down to 3-2-3 or 2-2-3 however she did not do well with 1-1-3 dosing as her pain significantly worsened.  Discussed side effects of medications and proper use. Patient verbalized understanding.    -PHYSICAL THERAPY: Encouraged patient to continue physical therapy.  Discussed the importance of core strengthening, ROM, stretching exercises with the patient and how each of these entities is important in decreasing pain.  Explained to the patient that the purpose of physical therapy is to teach the patient a home exercise program.  These exercises need to be performed every day in order to decrease pain and prevent future occurrences of pain.        -PATIENT EDUCATION:  25 minutes of total visit time was spent face to face with the patient today, 60% of the visit was spent on counseling, education, and coordinating care.     -FOLLOW UP: Follow-up in 1 week to discuss further workability if needed.   Advised to contact clinic if symptoms worsen or change.    Subjective:      Susan Kwan is a 51 y.o. female who presents today for follow-up regarding ongoing midline low back pain and right buttock pain as well as pain into the right posterior thigh that is currently a 6/10 however significantly improved up to 75% from bilateral L4-5 TF RHIANNON 1/19/2017.  Her biggest concern today is the gabapentin medication as she does feel like it's her significant relief with 300 mg, 3-3-3 however she does have increased side effects with loopiness and dizziness and sleepiness with it.  She tried tapering down to 1-1-3 in which the loopiness was better, however her pain significantly worsened.  She works as a  therefore is unable to drive while having the side effects, therefore wanted to discuss workability today.    Treatment to Date: Physical therapy ×3 sessions with some relief.  No prior spinal surgery.  Bilateral L4-5 TF RHIANNON 1/19/2017 with 75% relief of symptoms.      Medications: Percocet prescribed by PCP yesterday with some relief of severe pain.  Ibuprofen and Medrol Dosepak with no relief. Flexeril with no relief.  Gabapentin 300 mg 2-2-3 with some relief and minimal side effects however does notice excessive sleepiness. Patient is still increasing dose.    Patient Active Problem List   Diagnosis     Nicotine Dependence     Migraine Headache     Arthralgias In Multiple Sites     Hypothyroidism Postprocedural     Hyperlipidemia     Major Depression, Recurrent     Sudden Redness Of The Skin (Flushing)     Lower Back Pain     Carpal Tunnel Syndrome     Intermittent Asthma     Allergic Rhinitis     Lump In / On The Skin     Common Migraine (Without Aura)     Seborrheic Keratosis     DESI (obstructive sleep apnea)       Current Outpatient Prescriptions on File Prior to Encounter   Medication Sig Dispense Refill     albuterol (PROAIR HFA) 90 mcg/actuation inhaler INHALE 1 TO 2 PUFFS BY MOUTH EVERY 4 TO 6 HOURS AS NEEDED 8.5 g 0     diazePAM (VALIUM) 5 MG tablet 5 mg tablets,  take 2 tablets, 2 hours prior to MRI or Procedure. 2 tablet 0     FLUoxetine (PROZAC) 20 MG capsule TK 2 CS PO D  1     FLUoxetine (PROZAC) 20 MG capsule TAKE 2 CAPSULES BY MOUTH DAILY 60 capsule 0     FLUZONE QUAD [id number] 60 mcg (15 mcg x 4)/0.5 mL Susp injection        gabapentin (NEURONTIN) 300 MG capsule Take 3 capsules (900 mg total) by mouth 3 (three) times a day. Follow Gabapentin Dosing chart given 270 capsule 3     levothyroxine (SYNTHROID, LEVOTHROID) 175 MCG tablet TAKE 1 TABLET BY MOUTH DAILY 90 tablet 3     SUMAtriptan (IMITREX) 50 MG tablet TAKE 1 TABLET BY MOUTH AT ONSET OF HEADACHE. MAY REPEAT IN 2 HOURS AS NEEDED 9 tablet 1     [DISCONTINUED] cyclobenzaprine (FLEXERIL) 5 MG tablet TAKE 1 TABLET(5 MG) BY MOUTH AT BEDTIME 60 tablet 0     [DISCONTINUED] oxyCODONE-acetaminophen (PERCOCET) 5-325 mg per tablet Take 1-2 tablets by mouth 3 (three) times a day as needed for pain. 30 tablet 0     No current facility-administered medications on file prior to encounter.        Allergies   Allergen Reactions     Acetaminophen      Other: very sore stomach       Cefuroxime Axetil      Sulfa (Sulfonamide Antibiotics) Rash       Past Medical History   Diagnosis Date     Anxiety      Carpal tunnel syndrome      Depression      PMDD     Disease of thyroid gland      Migraine      Pregnancy           Substance abuse      sober since , narcotic pain medication        Review of Systems  ROS: Positive for weakness, nausea over the weekend.  Specifically negative for bowel/bladder dysfunction, balance changes, headache, dizziness, foot drop, fevers, chills, appetite changes, vomiting, unexplained weight loss. Otherwise 13 systems reviewed are negative. Please see the patient's intake questionnaire from today for details.    Reviewed Social, Family, Past Medical and Past Surgical history with patient, no significant changes noted since prior visit.     Objective:     Visit Vitals     /65 (Patient Site: Left  Arm, Patient Position: Sitting)     Pulse 97     Temp 98.5  F (36.9  C) (Oral)     SpO2 93%     PHYSICAL EXAMINATION:    --CONSTITUTIONAL: Well developed, well nourished, healthy appearing individual.  --PSYCHIATRIC: Appropriate mood and affect. No difficulty interacting due to temper, social withdrawal, or memory issues.  --SKIN: Lumbar region is dry and intact. Sensation to light touch is intact in the bilateral L4, L5, and S1 dermatomes.  --RESPIRATORY: Normal rhythm and effort. No abnormal accessory muscle breathing patterns noted.   --MUSCULOSKELETAL:  Normal lumbar lordosis noted, no lateral shift.  --GROSS MOTOR: Easily arises from a seated position.   --LUMBAR SPINE:  Inspection reveals no evidence of deformity. Range of motion is not limited in lumbar flexion forward but does have increased pain with return to upright, however significantly limited in extension due to pain as well. No tenderness to palpation. Straight leg raising in the seated position is negative to radicular pain bilaterally, however positive to back pain on the right, negative on the left. Sciatic notch non-tender on the left, significantly tender on the right.  --LOWER EXTREMITY MOTOR TESTING:  Plantar flexion left 5/5, right 5/5   Dorsiflexion left 5/5, right 5/5   Great toe MTP extension left 5/5, right 5/5  Knee flexion left 5/5, right 5/5  Knee extension left 5/5, right 5/5   Hip flexion left 5/5, right 5/5  Hip abduction left 5/5, right 5/5  Hip adduction left 5/5, right 5/5   --HIPS: Full range of motion bilaterally. Negative FABERs on the involved lower extremity.   --NEUROLOGIC: Bilateral patellar and achilles reflexes are physiologic and symmetric. Lower extremities are intact to light touch.     RESULTS:   Imaging: MRI of the lumbar spine was reviewed today. The images were shown to the patient and the findings were explained using a spine model.    Xr Lumbar Spine 2 Or 3 Vws  Result Date: 1/11/2017  XR LUMBAR SPINE 2 OR 3  VWS1/10/2017 2:37 PMINDICATION: Back painCOMPARISON: None.FINDINGS: 6 nonrib-bearing lumbar type vertebra. This report will consider the first nonrib-bearing segment L1 with a transitional lumbarized S1. Straightened lumbar lordosis and mild lumbar dextrocurvature centered at L4-5. No radiographic evidence for fracture or subluxation. Mild lumbar spondylosis, including mild multilevel endplate irregularity/spurring and mild loss of disc height at L5-S1. Minor lower lumbar facet arthropathy.This report was electronically interpreted by: Dr. Darío Lin MD ON 01/11/2017 at 08:49     Lumbar Spine MRI [facility] 1/11/2017  Conclusion:  1. 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  2. At L4-5, there is a right paracentral/foraminal broad-based disc protrusion resulting in narrowing of the right lateral recess and mass effect on the transverse right L5 nerve root. There is also moderate central canal stenosis at this level. There is mild to moderate bilateral foraminal stenosis.  2. At L3-4, there is mild right and mild-to-moderate left foraminal stenosis.  4. At L2-3, there is mild bilateral foraminal stenosis.

## 2021-06-08 NOTE — PROGRESS NOTES
ASSESSMENT: Susan Kwan is a 51 y.o. female who presents for consultation at the request of Dr. Barlow with a past medical history significant for nicotine dependence, migraine headache, hypothyroidism post procedural, hyperlipidemia, major depression recurrent, carpal tunnel syndrome, chronic low back pain, intermittent asthma, DESI who presents today for new patient evaluation of severe midline low back pain L4-5 and L5-S1 that is worse with standing and walking and constant and does improve with bending forward which is suggestive of spinal stenosis type pain.  She does also notice that 4 times in the last 3 days she's had complete loss of bladder control, she does have a history of urinary stress incontinence however she reports that this has been more than typical.  She denies any bowel incontinence, denies saddle anesthesia.    Patient is neurologically intact on exam.  However due to the concerning symptoms with bladder incontinence, and severe pain we will obtain lumbar spine MRI as soon as possible.  JACKIE: 56%    WHO-5: Did not fill out    PHQ-2: 3    Diagnoses and all orders for this visit:    Low back pain  -     MR Lumbar Spine Without Contrast; Future; Expected date: 1/11/17  -     diazePAM (VALIUM) 5 MG tablet; 5 mg tablets, take 2 tablets, 2 hours prior to MRI or Procedure.  Dispense: 2 tablet; Refill: 0  -     gabapentin (NEURONTIN) 300 MG capsule; Take 3 capsules (900 mg total) by mouth 3 (three) times a day. Follow Gabapentin Dosing chart given  Dispense: 270 capsule; Refill: 3    Sacroiliac joint dysfunction of right side      PLAN:  Reviewed spine anatomy and disease process. Discussed diagnosis and treatment options with the patient today. A shared decision making model was used.  The patient's values and choices were respected. The following represents what was discussed and decided upon by the provider and the patient.      -DIAGNOSTIC TESTS:  Ordered lumbar spine MRI as soon as possible to  rule out cauda equina syndrome.  Did review prior lumbar x-ray.      -MEDICATIONS:  Prescribed gabapentin 300 mg to titrate up to 3 tablets 3 times a day as tolerated particular pain.    -Advised patient to continue ibuprofen as well as Percocet prescribed by PCP as needed for pain.  Discussed side effects of medications and proper use. Patient verbalized understanding.    -INTERVENTIONS:  Can discuss the possibility of epidural steroid injection pending MRI review.    -PATIENT EDUCATION:  45 minutes of total visit time was spent face to face with the patient today, 60 % of the visit was spent on counseling, education, and coordinating care.     -FOLLOW-UP:   Follow-up after obtaining updated lumbar spine MRI.    Advised pt to call the [facility] if symptoms worsen or you have problems controlling bladder and bowel function.   ______________________________________________________________________    SUBJECTIVE:  HPI:  Susan Kwan  Is a 51 y.o. female who presents today for new patient evaluation of low back pain midline bilateral that does radiate to the right posterior buttock that started on Christmas with no known injury that brought her to emergency room on 12/26/2016 due to the pain which is currently severe at the 9/10 which she describes as a dull/poker type pain into the buttock over the majority of her pain in the severe part of her pain is the midline low back.  She does report that is worse with any type of activity, standing she feels she is unable to fully stand upright, sitting for prolonged period of time, walking, bending, laying.  She does report that she does have improvement of her symptoms with leaning on a shopping cart as well as leaning forward in general which is how she presents today standing and leaning on the counter.  Currently she denies any radicular leg pain, denies numbness or tingling, denies weakness.  She does however have new symptoms in regards to bladder incontinence in  which the last 3 days she's had 4 episodes where she lost complete control of her bladder she reports, she does report that she has a history of urgency due to having multiple children however reports that this is different.  She denies any bladder dysfunction and denies saddle anesthesia    Treatment to Date: Physical therapy years ago, nothing recent.  No prior spinal surgery or epidural steroid injection.    Medications: Percocet prescribed by PCP yesterday with some relief of severe pain.  Ibuprofen and Medrol Dosepak with no relief.  Flexeril with no relief.    Current Outpatient Prescriptions on File Prior to Encounter   Medication Sig Dispense Refill     albuterol (PROAIR HFA) 90 mcg/actuation inhaler INHALE 1 TO 2 PUFFS BY MOUTH EVERY 4 TO 6 HOURS AS NEEDED 8.5 g 0     cyclobenzaprine (FLEXERIL) 5 MG tablet TAKE 1 TABLET(5 MG) BY MOUTH AT BEDTIME 60 tablet 0     FLUoxetine (PROZAC) 20 MG capsule TK 2 CS PO D  1     FLUoxetine (PROZAC) 20 MG capsule TAKE 2 CAPSULES BY MOUTH DAILY 60 capsule 0     FLUZONE QUAD 4311-8032 60 mcg (15 mcg x 4)/0.5 mL Susp injection        levothyroxine (SYNTHROID, LEVOTHROID) 175 MCG tablet TAKE 1 TABLET BY MOUTH DAILY 90 tablet 3     oxyCODONE-acetaminophen (PERCOCET) 5-325 mg per tablet Take 1-2 tablets by mouth 3 (three) times a day as needed for pain. 30 tablet 0     SUMAtriptan (IMITREX) 50 MG tablet TAKE 1 TABLET BY MOUTH AT ONSET OF HEADACHE. MAY REPEAT IN 2 HOURS AS NEEDED 9 tablet 1     ibuprofen (ADVIL,MOTRIN) 600 MG tablet Take one tablet 3 times per day.  0     No current facility-administered medications on file prior to encounter.        Allergies   Allergen Reactions     Acetaminophen      Other: very sore stomach       Cefuroxime Axetil      Sulfa (Sulfonamide Antibiotics) Rash       Past Medical History   Diagnosis Date     Anxiety      Carpal tunnel syndrome      Depression      PMDD     Disease of thyroid gland      Migraine      Pregnancy            "Substance abuse      sober since , narcotic pain medication        Patient Active Problem List   Diagnosis     Nicotine Dependence     Migraine Headache     Arthralgias In Multiple Sites     Hypothyroidism Postprocedural     Hyperlipidemia     Major Depression, Recurrent     Sudden Redness Of The Skin (Flushing)     Lower Back Pain     Carpal Tunnel Syndrome     Intermittent Asthma     Allergic Rhinitis     Lump In / On The Skin     Common Migraine (Without Aura)     Seborrheic Keratosis     DESI (obstructive sleep apnea)       Past Surgical History   Procedure Laterality Date     Pr removal gallbladder       Description: Cholecystectomy;  Recorded: 2011;     Pr ligate fallopian tube       Description: Tubal Ligation;  Recorded: 2011;     Pr dilation/curettage,diagnostic       Description: Dilation And Curettage;  Recorded: 2011;  Comments: for \"clots\" after one of her pregnancies     Cholecystectomy       Tubal ligation         Family History   Problem Relation Age of Onset     Heart disease Daughter      WPW,  at age 3     Diabetes Paternal Grandmother      Stroke Paternal Grandfather      Sleep apnea Father        SOCIAL HX: Patient works as a , is single.  Patient does smoke 1 pack a day ongoing, denies alcohol and illicit drug use.    ROS: Positive for dizziness, percent quality, back pain, headache or depression/worry.  Specifically negative for bowel/bladder dysfunction, balance changes, headache, dizziness, foot drop, fevers, chills, appetite changes, nausea/vomiting, unexplained weight loss. Otherwise 13 systems reviewed are negative. Please see the patient's intake questionnaire from today for details.    OBJECTIVE:  Visit Vitals     /84     Pulse (!) 105     Temp 98.2  F (36.8  C)     Ht 5' 3\" (1.6 m)     Wt 207 lb 3.2 oz (94 kg)     BMI 36.7 kg/m2       PHYSICAL EXAMINATION: Limited due to acute pain.    --CONSTITUTIONAL:  Vital signs as above.  Patient " does appear to be in acute pain today..  The patient is well nourished and well groomed.  --PSYCHIATRIC:  Appropriate mood and affect. The patient is awake, alert, oriented to person, place, time and answering questions appropriately with clear speech.    --SKIN:  Skin over the face, bilateral lower extremities, and posterior torso is clean, dry, intact without rashes.    --RESPIRATORY: Normal rhythm and effort. No abnormal accessory muscle breathing patterns noted.   --STANDING EXAMINATION:  Normal lumbar lordosis noted, no lateral shift.  --MUSCULOSKELETAL: Lumbar spine inspection reveals no evidence of deformity. Range of motion is not limited in lumbar flexion forward but does have increased pain with return to upright, however significantly limited in extension due to pain. No tenderness to palpation. Straight leg raising in the seated position is negative to radicular pain, however positive to back pain on the right, negative on the left. Sciatic notch non-tender on the left, significantly tender on the right.  --GROSS MOTOR: Gait is non-antalgic. Easily arises from a seated position. Toe walking and heel walking are normal without significant difficulty.    --LOWER EXTREMITY MOTOR TESTING:  Plantar flexion left 5/5, right 5/5   Dorsiflexion left 5/5, right 5/5   Great toe MTP extension left 5/5, right 5/5  Knee flexion left 5/5, right 5/5  Knee extension left 5/5, right 5/5   Hip flexion left 5/5, right 5/5  Hip abduction left 5/5, right 5/5  Hip adduction left 5/5, right 5/5   --HIPS: Full range of motion bilaterally. Negative FABERs on the involved lower extremity.   --NEUROLOGICAL:  2/4 patellar, medial hamstring, and achilles reflexes bilaterally.  Sensation to light touch is intact in the bilateral L4, L5, and S1 dermatomes. Babinski is negative. No clonus.  --VASCULAR:  2/4 dorsalis pedis and posterior tibialsi pulses bilaterally.  Bilateral lower extremities are warm.  There is no pitting edema of the  bilateral lower extremities.    RESULTS: Prior medical records from 11/21/2016 were reviewed today.    Imaging: MRI of the lumbar spine was reviewed today. The images were shown to the patient and the findings were explained using a spine model.    Xr Lumbar Spine 2 Or 3 Vws  Result Date: 1/11/2017  INDICATION: Back painCOMPARISON: None.FINDINGS: 6 nonrib-bearing lumbar type vertebra. This report will consider the first nonrib-bearing segment L1 with a transitional lumbarized S1. Straightened lumbar lordosis and mild lumbar dextrocurvature centered at L4-5. No radiographic evidence for fracture or subluxation. Mild lumbar spondylosis, including mild multilevel endplate irregularity/spurring and mild loss of disc height at L5-S1. Minor lower lumbar facet arthropathy.

## 2021-06-09 NOTE — PROGRESS NOTES
[facility] Daily Progress     Patient Name: Susan Kwan  Date: 3/23/2017  Visit #: 10 (6th post-op)  PTA visit #:  2  Referral Diagnosis: Acute right sided low  Back pain without sciatica  Referring provider: Cristela Salguero C*  Visit Diagnosis:     ICD-10-CM    1. Acute right-sided low back pain without sciatica M54.5    2. Chronic right SI joint pain M53.3     G89.29    3. Muscle weakness (generalized) M62.81          Assessment:   On  2/4 pt had surgery. Pt reports she had a discectomy and laminectomy. She is now returning to physical therapy for post-operative strengthening. Subjective report of a 5# lifting restriction, no bending or twisting.  Pt ambulates with a moderate gait deviation  .  Patient is appropriate to continue with skilled physical therapy intervention, as indicated by initial plan of care.     Goal Status:  Pt. will demonstrate/verbalize independence in self-management of condition in : 6 weeks: Ongoing  Pt. will be able to walk : for community mobility;with less pain;with less difficulty;in 6 weeks: Progressing towards  Patient will sit: 60 minutes;for driving;for work;with less pain;with less difficultty;in 6 weeks: Ongoing  Patient will return to: work;for full duty;in 6 weeks: Ongoing  Patient will decrease : JACKIE score;for improved quality of function;in 6 weeks: Ongoing     Plan / Patient Education:     Continue with initial plan of care.  Progress with home program as tolerated.     Pt to follow-up with provider and discuss continued right LE numbness and well as recent start of some minimal left ankle numbness.  Continue PT if the provider feels it is appropriate.     Subjective:     Pain Rating: 3     Pt reports she see's Dr. Juárez at the pain clinic tomorrow.      Pt reports that now she seems to be losing feeling in her left leg from the ankle down and continues to have significant right LE numbness.      Pt reports that her low back continues to feel much better  "with very little pain.  Pt reports that overall in her back she feels more mobile and much stronger.      Pt reports that she did her exercises consistently so far.      Objective:     Pt tolerates the exercises well with minimal pain and no radicular sx change.      Lumbar ROM:  All lumbar ROM = WNL and no pain or radicular sx    Lower Extremity Strength:  Date: 3/23/17    LE strength/5 Right Left Right Left   Hip Flexion (L1-3) 4+ 5     Hip Extension (L5-S1)       Hip Abduction (L4-5) 5 5     Hip Adduction (L2-3) 5 5     Hip External Rotation       Hip Internal Rotation       Knee Extension (L3-4) 4 5     Knee Flexion 4+ 5     Ankle Dorsiflexion (L4-5) 3- 4+     Great Toe Extension (L5) 3- 4+     Ankle Plantar flexion (S1) 4+ 4+     Abdominals Increasing strength and awareness          Gait:    Pt with slightly improved gait deviation but still with lateral movement due to decreased right LE, ankle strength and decreased proprioception. Pt with decreased right ankle PF and DF strength as noted above.      Palpation:  Minimal lumbar tightness but no tenderness over the muscle or lumbar vertebrae   Mild incision sensitivity.      Revised Exercises:  Exercise #1: Treadmill  Comment #1: 2.0 mph x5 min; cues on gait to decrease deviation  Exercise #2: LTR  Comment #2: Bx10   Exercise #3: SKTC + H/S stretch + ankle pump   Comment #3: manual H/S Bx30\"  Exercise #4: Piriformis stretch   Comment #4: x30\"  Exercise #5: Ab set + march   Comment #5: B x10; standing ab set x10 with 5\" hold   Exercise #6: Supine Bridge   Comment #6: standing gluteal squeezes - alternating sides x20   Exercise #7: Supine Hip ABD/ER with orange band  Comment #7: green Bx15   Exercise #8: Sit to stand   Comment #8: TG Squats - Level 20 ; Level 14  Exercise #9: Heel/toe raises   Comment #9: Bx20   Exercise #10: standing hip ABD - side steps 4x50 feet B  Comment #10: step up/down 6\" R, L x20     Pt demonstrates benefits from skilled PT.  "       Treatment Today     TREATMENT MINUTES COMMENTS   Evaluation     Self-care/ Home management     Manual therapy     Neuromuscular Re-education     Therapeutic Activity     Therapeutic Exercises 27 See flow sheet for exercises performed today   Gait training     Modality__________________                Total 27    Blank areas are intentional and mean the treatment did not include these items.       Lina Chavez, PT   3/23/2017

## 2021-06-09 NOTE — PROGRESS NOTES
[facility] Daily Progress     Patient Name: Susan Kwan  Date: 3/16/2017  Visit #: 9 (5th post-op)  PTA visit #:  2  Referral Diagnosis: Acute right sided low  Back pain without sciatica  Referring provider: Cristela Salguero C*  Visit Diagnosis:     ICD-10-CM    1. Acute right-sided low back pain without sciatica M54.5    2. Chronic right SI joint pain M53.3     G89.29    3. Muscle weakness (generalized) M62.81          Assessment:   On  2/4 pt had surgery. Pt reports she had a discectomy and laminectomy. She is now returning to physical therapy for post-operative strengthening. Subjective report of a 5# lifting restriction, no bending or twisting.  Pt ambulates with a moderate gait deviation  .  Patient is appropriate to continue with skilled physical therapy intervention, as indicated by initial plan of care.     Goal Status:  Pt. will demonstrate/verbalize independence in self-management of condition in : 6 weeks: Ongoing  Pt. will be able to walk : for community mobility;with less pain;with less difficulty;in 6 weeks: Progressing towards  Patient will sit: 60 minutes;for driving;for work;with less pain;with less difficultty;in 6 weeks: Ongoing  Patient will return to: work;for full duty;in 6 weeks: Ongoing  Patient Turn Head: without dizziness;for driving;for watching traffic;for conversation;in 12 weeks: Met  Patient will look up / down: without dizziness;for drinking;in 12 weeks: Met  Patient will decrease : JACKIE score;for improved quality of function;in 6 weeks    Plan / Patient Education:     Continue with initial plan of care.  Progress with home program as tolerated.     New order was received to continue with PT for post-op treatment including stretching and strengthening.      Decrease to 1x per week after this week.    Subjective:     Pain Rating: 3     Pt reports she has been consulting with Dr. Juárez at the pain clinic.      Pt reports that her back iis just a little sore this AM  "and she feels like this is from lack of sleep..  Pt reports that her right leg has continued numbness from the knee down on the inside of her leg into the big toe and mild tingling into toes 2 and 3.  This is the most bothersome to her.     Pt reports that the past 2 days her right leg has felt weaker, but not sure why.        Pt reports that she did her exercises consistently so far.      Pt reports that she did use ice at home.      Objective:     Pt tolerates the exercises well with minimal pain and no radicular sx change.      Pt with slightly improved gait deviation but still with lateral movement due to decreased right LE, ankle strength and decreased proprioception. Pt with decreased right ankle PF and DF strength.      Pt with considerable core and lumbar weakness with decreased awareness.  Pt also with considerable right gluteal and hip weakness and has difficulty with right SLS activities.  However, this continues to be addressed by her HEP.      Revised Exercises:  Exercise #1: Treadmill  Comment #1: 2.0 mph x5 min; cues on gait to decrease deviation  Exercise #2: LTR  Comment #2: Bx10   Exercise #3: SKTC + H/S stretch + ankle pump   Comment #3: Bx30\" + Bx30\" + Bx10   Exercise #4: Piriformis stretch   Comment #4: Bx30\"  Exercise #5: Ab set + march   Comment #5:  B 3x5  Exercise #6: Supine Bridge   Comment #6: Bx15  Exercise #7: Supine Hip ABD/ER with orange band  Comment #7: Bx15   Exercise #8: Sit to stand   Comment #8: TG Squats - Level 20 B 2x10; Level 14 Rx14  Exercise #9: Heel/toe raises   Comment #9: Bx15  Exercise #10: standing hip ABD  Bx15 - alt   Comment #10: standing toe taps - 8\" step Bx15 - alternating     Pt demonstrates benefits from skilled PT.        Treatment Today     TREATMENT MINUTES COMMENTS   Evaluation     Self-care/ Home management     Manual therapy     Neuromuscular Re-education     Therapeutic Activity     Therapeutic Exercises 30 See flow sheet for exercises performed today "   Gait training     Modality__________________                Total 30    Blank areas are intentional and mean the treatment did not include these items.       Lina Chavez, PT   3/16/2017

## 2021-06-09 NOTE — PROGRESS NOTES
[facility] Daily Progress     Patient Name: Susan Kwan  Date: 3/14/2017  Visit #: 8 (4th post-op)  PTA visit #:  2  Referral Diagnosis: Acute right sided low  Back pain without sciatica  Referring provider: Cristela Salguero C*  Visit Diagnosis:     ICD-10-CM    1. Acute right-sided low back pain without sciatica M54.5    2. Chronic right SI joint pain M53.3     G89.29    3. Muscle weakness (generalized) M62.81          Assessment:   On   pt had surgery. Pt reports she had a discectomy and laminectomy. She is now returning to physical therapy for post-operative strengthening. Subjective report of a 5# lifting restriction, no bending or twisting.  Pt ambulates with a moderate gait deviation  .  Patient is appropriate to continue with skilled physical therapy intervention, as indicated by initial plan of care.     Goal Status:  Pt. will demonstrate/verbalize independence in self-management of condition in : 6 weeks: Ongoing  Pt. will be able to walk : for community mobility;with less pain;with less difficulty;in 6 weeks: Progressing towards  Patient will sit: 60 minutes;for driving;for work;with less pain;with less difficultty;in 6 weeks: Ongoing  Patient will return to: work;for full duty;in 6 weeks: Ongoing  Patient Turn Head: without dizziness;for driving;for watching traffic;for conversation;in 12 weeks: Met  Patient will look up / down: without dizziness;for drinking;in 12 weeks: Met  Patient will decrease : JACKIE score;for improved quality of function;in 6 weeks    Plan / Patient Education:     Continue with initial plan of care.  Progress with home program as tolerated.     New order was received to continue with PT for post-op treatment including stretching and strengthening.      Decrease to 1x per week after this week.    Subjective:     Pain Ratin     Pt reports she has been consulting with Dr. Juárez at the pain clinic.      Pt reports that her back is feeling fine with no major  "pain.  Pt reports that her right leg has continued numbness from the knee down on the inside of her leg into the big toe and mild tingling into toes 2 and 3.  This is the most bothersome to her.     Pt reports that the past 2 days her right leg has felt weaker, but not sure why.        Pt reports that she did her exercises consistently so far.      Pt reports that she did use ice at home.      Objective:     Pt tolerates the exercises well with minimal pain and no radicular sx change.      Pt with slightly improved gait deviation but still with lateral movement due to decreased right LE, ankle strength and decreased proprioception. Pt with decreased right ankle PF and DF strength.      Pt with considerable core and lumbar weakness with decreased awareness.  Pt also with considerable right gluteal and hip weakness and has difficulty with right SLS activities.  However, this continues to be addressed by her HEP.      Revised Exercises:  Exercise #1: Treadmill  Comment #1: 1.5 mph x5 min; cues on gait to decrease deviation  Exercise #2: LTR  Comment #2: Bx10   Exercise #3: SKTC + H/S stretch + ankle pump   Comment #3: Bx30\" + Bx30\" + Bx10   Exercise #4: Piriformis stretch   Comment #4: Bx30\"  Exercise #5: Ab set   Comment #5: 3x5  Exercise #6: Supine Bridge   Comment #6: Bx12  Exercise #7: Supine Hip ABD/ER with orange band  Comment #7: Bx12  Exercise #8: Sit to stand   Comment #8: x12  Exercise #9: Heel/toe raises   Comment #9: Bx12  Exercise #10: standing hip ABD   Comment #10: Bx10 - not HEP yet     Pt demonstrates benefits from skilled PT.        Treatment Today     TREATMENT MINUTES COMMENTS   Evaluation     Self-care/ Home management     Manual therapy     Neuromuscular Re-education     Therapeutic Activity     Therapeutic Exercises 25 See flow sheet for exercises performed today   Gait training     Modality__________________                Total 25    Blank areas are intentional and mean the treatment did not " include these items.       Lina Chavez, PT   3/14/2017

## 2021-06-09 NOTE — PROGRESS NOTES
[facility] Daily Progress     Patient Name: Susan Kwan  Date: 3/9/2017  Visit #: 7 (3rd post-op)  PTA visit #:  2  Referral Diagnosis: Acute right sided low  Back pain without sciatica  Referring provider: Cristela Salguero C*  Visit Diagnosis:     ICD-10-CM    1. Acute right-sided low back pain without sciatica M54.5    2. Chronic right SI joint pain M53.3     G89.29    3. Muscle weakness (generalized) M62.81          Assessment:   On   pt had surgery. Pt reports she had a discectomy and laminectomy. She is now returning to physical therapy for post-operative strengthening. Subjective report of a 5# lifting restriction, no bending or twisting.  Pt ambulates with a moderate gait deviation  .  Patient is appropriate to continue with skilled physical therapy intervention, as indicated by initial plan of care.     Goal Status:  Pt. will demonstrate/verbalize independence in self-management of condition in : 6 weeks: Ongoing  Pt. will be able to walk : for community mobility;with less pain;with less difficulty;in 6 weeks: Progressing towards  Patient will sit: 60 minutes;for driving;for work;with less pain;with less difficultty;in 6 weeks: Ongoing  Patient will return to: work;for full duty;in 6 weeks: Ongoing  Patient Turn Head: without dizziness;for driving;for watching traffic;for conversation;in 12 weeks: Met  Patient will look up / down: without dizziness;for drinking;in 12 weeks: Met  Patient will decrease : JACKIE score;for improved quality of function;in 6 weeks    Plan / Patient Education:     Continue with initial plan of care.  Progress with home program as tolerated.     New order was received to continue with PT for post-op treatment including stretching and strengthening.      Pt instructed to use ice on her back, especially after exercises.      Subjective:     Pain Ratin     Pt reports she has been consulting with Dr. Juárez at the pain clinic.      Pt reports that her back is  "feeling fine with no major pain.  Pt reports that her right leg has continued numbness from the knee down on the inside of her leg into the big toe and mild tingling into toes 2 and 3.  This is the most bothersome to her.      Pt reports that she did her exercises consistently so far.      Pt reports that she did use ice at home.      Objective:     Pt tolerates the exercises well with minimal pain and no radicular sx change.      Pt with moderate gait deviation with a lot of lateral movement due to decreased right LE, ankle strength and decreased proprioception. Pt with decreased right ankle PF and DF strength.      Pt with considerable core and lumbar weakness with decreased awareness.  Pt also with considerable right gluteal and hip weakness and has difficulty with right SLS activities.      Revised Exercises:  Exercise #1: Treadmill  Comment #1: 1.5 mph x5 min; cues on gait to decrease deviation  Exercise #2: LTR  Comment #2: Bx10   Exercise #3: SKTC + H/S stretch + ankle pump   Comment #3: Bx30\" + Bx30\" + Bx10   Exercise #4: Piriformis stretch   Comment #4: Bx30\"  Exercise #5: Ab set   Comment #5: 2x5  Exercise #6: Supine Bridge   Comment #6: Bx10   Exercise #7: Supine Hip ABD/ER with orange band  Comment #7: Bx10   Exercise #8: Sit to stand   Comment #8: x10   Exercise #9: Heel/toe raises   Comment #9: Bx10     Pt demonstrates benefits from skilled PT.        Treatment Today     TREATMENT MINUTES COMMENTS   Evaluation     Self-care/ Home management     Manual therapy     Neuromuscular Re-education     Therapeutic Activity     Therapeutic Exercises 27 See flow sheet for exercises performed today   Gait training     Modality__________________                Total 27    Blank areas are intentional and mean the treatment did not include these items.       Lina Chavez, PT   3/9/2017  "

## 2021-06-09 NOTE — PROGRESS NOTES
[facility] Daily Progress     Patient Name: Susan Kwan  Date: 3/7/2017  Visit #: 5  PTA visit #:  2  Referral Diagnosis: Acute right sided low  Back pain without sciatica  Referring provider: Cristela Salguero C*  Visit Diagnosis:     ICD-10-CM    1. Acute right-sided low back pain without sciatica M54.5    2. Chronic right SI joint pain M53.3     G89.29    3. Muscle weakness (generalized) M62.81          Assessment:   On   pt had surgery. Pt reports she had a discectomy and laminectomy. She is now returning to physical therapy for post-operative strengthening. Subjective report of a 5# lifting restriction, no bending or twisting.  Pt ambulates with a moderate gait deviation  .  Patient is appropriate to continue with skilled physical therapy intervention, as indicated by initial plan of care.     Goal Status:  Pt. will demonstrate/verbalize independence in self-management of condition in : 6 weeks: Ongoing  Pt. will be able to walk : for community mobility;with less pain;with less difficulty;in 6 weeks: Progressing towards  Patient will sit: 60 minutes;for driving;for work;with less pain;with less difficultty;in 6 weeks: Ongoing  Patient will return to: work;for full duty;in 6 weeks: Ongoing  Patient Turn Head: without dizziness;for driving;for watching traffic;for conversation;in 12 weeks: Met  Patient will look up / down: without dizziness;for drinking;in 12 weeks: Met  Patient will decrease : JACKIE score;for improved quality of function;in 6 weeks    Plan / Patient Education:     Continue with initial plan of care.  Progress with home program as tolerated.     New order was received to continue with PT for post-op treatment including stretching and strengthening.      Pt instructed to use ice on her back, especially after exercises.      Subjective:     Pain Ratin     Pt reports she has been consulting with Dr. Juárez at the pain clinic.      Pt reports that her back is feeling fine with  "no major pain.  Pt reports that her right leg has continued numbness from the knee down on the inside of her leg into the big toe and mild tingling into toes 2 and 3.      Pt reports that she is doing some PT exercises at home, but is constantly moving.      Pt reports that she is not using ice at home.      Objective:     Pt tolerates the exercises well with minimal pain and no radicular sx change.      Pt with moderate gait deviation with a lot of lateral movement due to decreased right LE, ankle strength and decreased proprioception. Pt with decreased right ankle PF and DF strength.      Pt with considerable core and lumbar weakness with decreased awareness.      Revised Exercises:  Exercise #1: Treadmill  Comment #1: 1.5 mph x5 min; cues on gait to decrease deviation  Exercise #2: LTR  Comment #2: Bx10   Exercise #3: SKTC + H/S stretch + ankle pump   Comment #3: Bx30\" + Bx30\" + Bx10   Exercise #4: Piriformis stretch   Comment #4: Bx30\"  Exercise #5: Ab set   Comment #5: 2x5  Exercise #6: Supine Bridge   Comment #6: Bx10   Exercise #7: Supine Hip ABD/ER with orange band  Comment #7: Bx10   Exercise #8: Sit to stand   Comment #8: x10   Exercise #9: Heel/toe raises   Comment #9: Bx10     Pt demonstrates benefits from skilled PT.        Treatment Today     TREATMENT MINUTES COMMENTS   Evaluation     Self-care/ Home management     Manual therapy     Neuromuscular Re-education     Therapeutic Activity     Therapeutic Exercises 30 See flow sheet for exercises performed today   Gait training     Modality__________________                Total 30    Blank areas are intentional and mean the treatment did not include these items.       Lina Cannon,JAYT  3/7/2017  "

## 2021-06-09 NOTE — PROGRESS NOTES
[facility] Discharge Summary     Patient Name: Susan Kwan  Date: 3/30/2017  Visit #: 11 (7th post-op)  PTA visit #:  2  Referral Diagnosis: Acute right sided low  Back pain without sciatica  Referring provider: Cristela Salguero C*  Visit Diagnosis:     ICD-10-CM    1. Acute right-sided low back pain without sciatica M54.5    2. Chronic right SI joint pain M53.3     G89.29    3. Muscle weakness (generalized) M62.81          Assessment:   On   pt had surgery. Pt reports she had a discectomy and laminectomy. She is now returning to physical therapy for post-operative strengthening. Subjective report of a 5# lifting restriction, no bending or twisting.    Patient demonstrates readiness for  Independent sx management and HEP.      Goal Status:  Pt. will demonstrate/verbalize independence in self-management of condition in : 6 weeks: Met  Pt. will be able to walk : for community mobility;with less pain;with less difficulty;in 6 weeks: Met  Patient will sit: 60 minutes;for driving;for work;with less pain;with less difficultty;in 6 weeks: Ongoing  Patient will return to: work;for full duty;in 6 weeks: Ongoing  Patient will decrease : JACIKE score;for improved quality of function;in 6 weeks: Met    Plan / Patient Education:     Discharge patient to independent sx management and HEP.       Subjective:     Pain Ratin     Pt reports that saw Dr. Juárez at the pain clinic and he felt like her continued sx were related to swelling and continued healing in her spine.  Pt notes that he indicated that she could discontinue PT at this time and continue independently with her HEP.  Pt reports that since the last time, she has not had the left LE sx.  The right LE sx are the same.      Pt reports that her low back continues to feel much better with very little pain.  Pt reports that overall in her back she feels more mobile and much stronger.      Pt reports that she did her exercises consistently.       Pt  "reports that she is 50% better since her surgery and she is ready to continue with independent sx management and HEP.      Objective:     Pt tolerates the exercises well with minimal pain and no radicular sx change.  Pt demonstrates increased core and lumbar mobility and strength with increased function and decreased pain.  Pt has had minimal to no changes in her right LE radicular sx    Lumbar ROM:  All lumbar ROM = WNL and no pain or radicular sx    Lower Extremity Strength:  Date: 3/23/17   LE strength/5 Right Left   Hip Flexion (L1-3) 4+ 5   Hip Extension (L5-S1)     Hip Abduction (L4-5) 5 5   Hip Adduction (L2-3) 5 5   Hip External Rotation     Hip Internal Rotation     Knee Extension (L3-4) 4 5   Knee Flexion 4+ 5   Ankle Dorsiflexion (L4-5) 3- 4+   Great Toe Extension (L5) 3- 4+   Ankle Plantar flexion (S1) 4+ 4+   Abdominals Increasing strength and awareness    Pt still with abdominal and gluteal weakness.  However this continues to be addressed in her HEP.       Gait:    Pt with slightly improved gait deviation but still with lateral movement due to decreased right LE, ankle strength and decreased proprioception. Pt with decreased right ankle PF and DF strength as noted above.      Palpation:  Minimal lumbar tightness but no tenderness over the muscle or lumbar vertebrae   Mild incision sensitivity.    Outcomes:  Modified Oswestry Low Back Pain Disablity Questionnaire  in %: 14  Eval score was 62%      Revised Exercises:  Exercise #1: Treadmill  Comment #1: 2.0 mph x5 min; cues on gait to decrease deviation  Exercise #2: LTR  Comment #2: Bx10   Exercise #3: SKTC + H/S stretch + ankle pump   Comment #3: H/S Bx30\" + Bx10   Exercise #4: Piriformis stretch   Comment #4: x30\"  Exercise #5: Ab set + march   Comment #5: B 2x10 - leg extended further; standing abs x10 with 5\"  Exercise #6: Supine Bridge   Comment #6: Bridge Bx20; standing gluteal squeezes - alternating sides x20   Exercise #7: Supine Hip ABD/ER " "with green band  Comment #7: green Bx15   Exercise #8: Sit to stand   Comment #8: TG Squats - Level 22 B 2x12 ; Level 16 R, L x15  Exercise #9: Heel/toe raises   Comment #9: Bx25  Exercise #10: standing hip ABD Bx20   Comment #10: step up/down 6\"      Treatment Today     TREATMENT MINUTES COMMENTS   Evaluation     Self-care/ Home management     Manual therapy     Neuromuscular Re-education     Therapeutic Activity     Therapeutic Exercises 27 See flow sheet for exercises performed today   Gait training     Modality__________________                Total 27    Blank areas are intentional and mean the treatment did not include these items.       Lina Chavez, PT   3/30/2017  "

## 2021-06-10 ENCOUNTER — COMMUNICATION - HEALTHEAST (OUTPATIENT)
Dept: NEUROSURGERY | Facility: CLINIC | Age: 56
End: 2021-06-10

## 2021-06-10 DIAGNOSIS — M54.16 LUMBAR RADICULOPATHY: ICD-10-CM

## 2021-06-10 NOTE — PROGRESS NOTES
Assessment/plan  1. Angular cheilitis  Angle of jaw on the right with some minimal warmth.   No flocculence. No well circumscribed cyst.   Will apply warm compress daily for the next week to ten days and observe for changes or worsening of symptoms, in which case she will call or be seen.   For now treated for oral lesions.   Start topical corticosteroid and antifungal ointment.   Increase clear fluids.   Follow up if any change or worsening on this.     2. Hypothyroid  3. Hyperlipidemia  No recent fasting or thyroid studies.   Not fasting today and will return tomorrow.   She realizes she should be on a statin, this prescription fell off her list.   All labs reviewed.   Significantly elevated triglycerides. Restart statin.   Vitamin D supplementation started.   Normal T4 with elevated TSH. Continue same dose for now.   Recheck of all of the above in three months.   - Thyroid Stimulating Hormone (TSH); Future  - T4, Free; Future  - Lipid Cascade; Future  - Comprehensive Metabolic Panel; Future  - Vitamin D, Total (25-Hydroxy); Future    4. DESI (obstructive sleep apnea)  Letter written as requested. Well controlled sleep apnea working with Sleep medicine.   Return to work date already advised as per PT for low back pain and issues.   See scanned letter.             Subjective  [age] year old female here with concerns of a bump on her right jaw. It started two days ago. She feels like there is bump or lump on the angle of her right jaw line.   Is not sure when this started. She does not think it is in the location of her CPAP mask strap. It is minimally tender.   She denies any gum or tooth pain. Does not wear dentures on the lower, only upper.   Of note, she does have crusting, redness, sores on both angles of mouth. This has been present for several weeks. She has not used any medication for this. No other mouth sores. She thinks her sleep apnea is better, working with sleep medicine.   She needs letter to her  "employer saying her sleep apnea is treated and can return to work. Of note, she is working with physical medicine, rehab to improve her back pain. She is working with PT. EHR reviewed. She has been advised to return to work in September, the actual date is not firm. She agrees with this but still needs a letter regarding her sleep apnea.   Of note, she does not know what happen to her prescription for cholesterol. Is taking her thyroid medication. Is not fasting today. Would like to return tomorrow for check of her cholesterol.     Past Medical History:   Diagnosis Date     Anxiety      Carpal tunnel syndrome      Depression     PMDD     Disease of thyroid gland      Migraine      Pregnancy          Substance abuse     sober since , narcotic pain medication     Patient Active Problem List   Diagnosis     Nicotine Dependence     Migraine Headache     Arthralgias In Multiple Sites     Hypothyroidism Postprocedural     Hyperlipidemia     Major Depression, Recurrent     Sudden Redness Of The Skin (Flushing)     Lower Back Pain     Carpal Tunnel Syndrome     Asthma     Allergic Rhinitis     Lump In / On The Skin     Common Migraine (Without Aura)     Seborrheic Keratosis     DESI (obstructive sleep apnea)     Sacroiliac joint dysfunction of right side     Lumbar foraminal stenosis     Lumbar disc herniation     Sciatica of right side     Past Surgical History:   Procedure Laterality Date     CHOLECYSTECTOMY       AK DILATION/CURETTAGE,DIAGNOSTIC      Description: Dilation And Curettage;  Recorded: 2011;  Comments: for \"clots\" after one of her pregnancies     AK LIGATE FALLOPIAN TUBE      Description: Tubal Ligation;  Recorded: 2011;     AK REMOVAL GALLBLADDER      Description: Cholecystectomy;  Recorded: 2011;     TUBAL LIGATION       Family History   Problem Relation Age of Onset     Heart disease Daughter      WPW,  at age 3     Diabetes Paternal Grandmother      Stroke Paternal " Grandfather      Sleep apnea Father      Social History     Social History     Marital status:      Spouse name: N/A     Number of children: N/A     Years of education: N/A     Occupational History     Not on file.     Social History Main Topics     Smoking status: Current Every Day Smoker     Packs/day: 1.00     Types: Cigarettes     Smokeless tobacco: Not on file     Alcohol use No     Drug use: No     Sexual activity: No     Other Topics Concern     Not on file     Social History Narrative     Current Outpatient Prescriptions on File Prior to Visit   Medication Sig Dispense Refill     cyclobenzaprine (FLEXERIL) 5 MG tablet TAKE 1 TABLET(5 MG) BY MOUTH AT BEDTIME 90 tablet 3     FLUoxetine (PROZAC) 20 MG capsule Take 2 caps by mouth daily 180 capsule 0     FLUZONE QUAD [id number] 60 mcg (15 mcg x 4)/0.5 mL Susp injection        gabapentin (NEURONTIN) 300 MG capsule Take 3 capsules (900 mg total) by mouth 3 (three) times a day. Follow Gabapentin Dosing chart given 270 capsule 3     levothyroxine (SYNTHROID, LEVOTHROID) 175 MCG tablet TAKE 1 TABLET BY MOUTH DAILY 90 tablet 3     PROAIR HFA 90 mcg/actuation inhaler INHALE 1-2 PUFF(S) BY MOUTH EVERY 4 TO 6 HOURS AS NEEDED 8.5 g 0     SUMAtriptan (IMITREX) 50 MG tablet TAKE 1 TABLET BY MOUTH AT ONSET OF HEADACHE. MAY REPEAT IN 2 HOURS AS NEEDED 9 tablet 3     No current facility-administered medications on file prior to visit.      Objective  Vitals:    04/19/17 1003   BP: 100/58   Pulse: 84   Resp: 20       General Appearance:  Alert, cooperative, no distress, appears stated age   Head:  Normocephalic, without obvious abnormality, atraumatic, angle of jaw appear normal bilaterally though with some warmth on the right, no cyst or flocculence noted.    Eyes:  PERRL, conjunctiva/corneas clear, EOM's intact   Ears:  Normal TM's and external ear canals, both ears   Nose: Nares normal, septum midline,mucosa normal, no drainage    Throat: Lips, mucosa, and tongue  normal; angular cheilitis biterally with significant erythema and crusting, gums normal   Neck: Supple, symmetrical, trachea midline, no adenopathy;  thyroid: not enlarged, symmetric, no tenderness/mass/nodules; no carotid bruit or JVD   Lungs:   Clear to auscultation bilaterally, respirations unlabored   Heart:  Regular rate and rhythm, S1 and S2 normal, no murmur, rub, or gallop   Extremities: Extremities normal, atraumatic, no cyanosis or edema   Skin: Skin color, texture, turgor normal, no rashes or lesions   Lymph nodes: Cervical, supraclavicular nodes normal   Neurologic: Normal, CN 2-12 intact, using a cane to walk       Recent Results (from the past 240 hour(s))   Thyroid Stimulating Hormone (TSH)    Collection Time: 04/20/17  8:58 AM   Result Value Ref Range    TSH 7.85 (H) 0.30 - 5.00 uIU/mL   T4, Free    Collection Time: 04/20/17  8:58 AM   Result Value Ref Range    Free T4 0.8 0.7 - 1.8 ng/dL   Lipid Cascade    Collection Time: 04/20/17  8:58 AM   Result Value Ref Range    Cholesterol 226 (H) <=199 mg/dL    Triglycerides 741 (H) <=149 mg/dL    HDL Cholesterol 26 (L) >=50 mg/dL    LDL Calculated  <=129 mg/dL    Patient Fasting > 8hrs? Yes    Comprehensive Metabolic Panel    Collection Time: 04/20/17  8:58 AM   Result Value Ref Range    Sodium 140 136 - 145 mmol/L    Potassium 4.6 3.5 - 5.0 mmol/L    Chloride 103 98 - 107 mmol/L    CO2 27 22 - 31 mmol/L    Anion Gap, Calculation 10 5 - 18 mmol/L    Glucose 113 70 - 125 mg/dL    BUN 15 8 - 22 mg/dL    Creatinine 0.96 0.60 - 1.10 mg/dL    GFR MDRD Af Amer >60 >60 mL/min/1.73m2    GFR MDRD Non Af Amer >60 >60 mL/min/1.73m2    Bilirubin, Total 0.2 0.0 - 1.0 mg/dL    Calcium 9.1 8.5 - 10.5 mg/dL    Protein, Total 6.6 6.0 - 8.0 g/dL    Albumin 3.6 3.5 - 5.0 g/dL    Alkaline Phosphatase 99 45 - 120 U/L    AST 19 0 - 40 U/L    ALT 22 0 - 45 U/L   Vitamin D, Total (25-Hydroxy)    Collection Time: 04/20/17  8:58 AM   Result Value Ref Range    Vitamin D, Total  (25-Hydroxy) 24.9 (L) 30.0 - 80.0 ng/mL   Custom LDL Cholesterol, Direct    Collection Time: 04/20/17  8:58 AM   Result Value Ref Range    Direct LDL 90 <=129 mg/dl

## 2021-06-10 NOTE — TELEPHONE ENCOUNTER
Dr. Jimenez, Medication refill requested. Please authorize medication if appropriate. Thank you.

## 2021-06-10 NOTE — PROGRESS NOTES
"Dear Dr. Earline Jimenez MD  [address] [place] [address], [place] [address],    Thank you for the opportunity to participate in the care of Susan Kwan.     She is a 51 y.o. y/o female patient who comes to the sleep medicine clinic for follow up.  This is the patient's first clinical visit since starting CPAP therapy.  She states that she is doing well and feels much more refreshed upon awakening since starting CPAP.  She also reports having more energy during the day compared to before.  Furthermore she denies any episodes of drowsy driving.  She would like to keep the CPAP pressure settings as they are since she is comfortable with them.    Compliance Download data for 30 Days:  Pressure settin-12 CWP  Residual AHI: 2.7 events per hour  Leak: Minimal  Compliance: 83%  Mask Tolerance: Good  Skin irritation: None      Past Medical History:   Diagnosis Date     Anxiety      Carpal tunnel syndrome      Depression     PMDD     Disease of thyroid gland      Migraine      Pregnancy          Substance abuse     sober since , narcotic pain medication       Past Surgical History:   Procedure Laterality Date     CHOLECYSTECTOMY       AR DILATION/CURETTAGE,DIAGNOSTIC      Description: Dilation And Curettage;  Recorded: 2011;  Comments: for \"clots\" after one of her pregnancies     AR LIGATE FALLOPIAN TUBE      Description: Tubal Ligation;  Recorded: 2011;     AR REMOVAL GALLBLADDER      Description: Cholecystectomy;  Recorded: 2011;     TUBAL LIGATION         Social History     Social History     Marital status:      Spouse name: N/A     Number of children: N/A     Years of education: N/A     Occupational History     Not on file.     Social History Main Topics     Smoking status: Current Every Day Smoker     Packs/day: 1.00     Types: Cigarettes     Smokeless tobacco: Not on file     Alcohol use No     Drug use: No     Sexual activity: No     Other Topics Concern     Not on file     Social History " "Narrative       Current Outpatient Prescriptions   Medication Sig Dispense Refill     cyclobenzaprine (FLEXERIL) 5 MG tablet TAKE 1 TABLET(5 MG) BY MOUTH AT BEDTIME 90 tablet 3     FLUoxetine (PROZAC) 20 MG capsule Take 2 caps by mouth daily 180 capsule 0     FLUZONE QUAD 0665-9282 60 mcg (15 mcg x 4)/0.5 mL Susp injection        gabapentin (NEURONTIN) 300 MG capsule Take 3 capsules (900 mg total) by mouth 3 (three) times a day. Follow Gabapentin Dosing chart given 270 capsule 3     levothyroxine (SYNTHROID, LEVOTHROID) 175 MCG tablet TAKE 1 TABLET BY MOUTH DAILY 90 tablet 3     oxyCODONE-acetaminophen (PERCOCET) 5-325 mg per tablet Take 1-2 tablets by mouth 3 (three) times a day as needed for pain. 60 tablet 0     PROAIR HFA 90 mcg/actuation inhaler INHALE 1-2 PUFF(S) BY MOUTH EVERY 4 TO 6 HOURS AS NEEDED 8.5 g 0     SUMAtriptan (IMITREX) 50 MG tablet TAKE 1 TABLET BY MOUTH AT ONSET OF HEADACHE. MAY REPEAT IN 2 HOURS AS NEEDED 9 tablet 3     No current facility-administered medications for this visit.        Allergies   Allergen Reactions     Acetaminophen      Other: very sore stomach       Cefuroxime Axetil      Sulfa (Sulfonamide Antibiotics) Rash       Physical Exam:  /62  Pulse 82  Ht 5' 3\" (1.6 m)  Wt 199 lb 6.4 oz (90.4 kg)  SpO2 99%  BMI 35.32 kg/m2  BMI:Body mass index is 35.32 kg/(m^2).   GEN: NAD, obese  Neurological: Alert, oriented to time, place, and person.  Psych: normal mood, normal affect    Labs/Studies:     I reviewed the efficacy and compliance report from his device. Data summarized on the HPI and the CPAP compliance flow sheet.     Lab Results   Component Value Date    WBC 9.9 01/04/2012    HGB 14.4 02/11/2013         Chemistry        Component Value Date/Time     11/09/2016 0850    K 4.7 11/09/2016 0850     11/09/2016 0850    CO2 25 11/09/2016 0850    BUN 21 11/09/2016 0850    CREATININE 0.85 11/09/2016 0850     11/09/2016 0850        Component Value Date/Time "    CALCIUM 9.7 11/09/2016 0850    ALKPHOS 86 11/09/2016 0850    AST 16 11/09/2016 0850    ALT 21 11/09/2016 0850    BILITOT 0.4 11/09/2016 0850            No results found for: FERRITIN         Assessment and Plan:  In summary Susan Kwna is a 51 y.o. year old female who is here for review of her compliance download data.    1.  Obstructive sleep apnea on CPAP  As per patient's wishes, I will keep her on the same pressure range of 7-12 CWP.  I also encouraged patient to nap with the machine if possible.  I will also give the patient a handout on how to optimally clean and maintenance her equipment.  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  2.  Hypersomnia  Improving  3.  Other sleep disturbance    We counseled the patient on the importannce of using her CPAP device every night and the risks of not treating sleep apnea.      Patient verbalized understanding of these issues, agrees with the plan and all questions were answered today. Patient was given an opportuntity to voice any other symptoms or concerns not listed above. Patient did not have any other symptoms or concerns.      Patient told to return in 12 months. Patient instructed to stop at  to schedule appointment before leaving today.    Gerson Wyatt DO  Board Certified in Internal Medicine and Sleep Medicine  [facility].    I spent a total of 15 minutes of face-to-face encounter and more than 50% of the encounter was used for counseling or coordination of care.    (Note created with Dragon voice recognition and unintended spelling errors and word substitutions may occur)

## 2021-06-11 NOTE — PATIENT INSTRUCTIONS - HE

## 2021-06-11 NOTE — PATIENT INSTRUCTIONS - HE

## 2021-06-11 NOTE — PROGRESS NOTES
Assessment:     Diagnoses and all orders for this visit:    Chronic right-sided low back pain with right-sided sciatica  -     OPS TFESI Lumbar Sacral Unilateral; Future; Expected date: 09/10/2020  -     baclofen (LIORESAL) 10 MG tablet; Take 1 tablet (10 mg total) by mouth 3 (three) times a day as needed.  Dispense: 30 tablet; Refill: 1  -     acetaminophen-codeine (TYLENOL #3) 300-30 mg per tablet; Take 1 tablet by mouth 2 (two) times a day as needed for pain (Severe breakthrough pain). Do not drive while on this medication  Dispense: 10 tablet; Refill: 0    Lumbar radiculopathy  -     OPS TFESI Lumbar Sacral Unilateral; Future; Expected date: 09/10/2020  -     baclofen (LIORESAL) 10 MG tablet; Take 1 tablet (10 mg total) by mouth 3 (three) times a day as needed.  Dispense: 30 tablet; Refill: 1  -     acetaminophen-codeine (TYLENOL #3) 300-30 mg per tablet; Take 1 tablet by mouth 2 (two) times a day as needed for pain (Severe breakthrough pain). Do not drive while on this medication  Dispense: 10 tablet; Refill: 0    Pars defect of lumbar spine    Stenosis of lateral recess of lumbar spine  -     OPS TFESI Lumbar Sacral Unilateral; Future; Expected date: 09/10/2020    Myofascial pain  -     baclofen (LIORESAL) 10 MG tablet; Take 1 tablet (10 mg total) by mouth 3 (three) times a day as needed.  Dispense: 30 tablet; Refill: 1       Susan Kwan is a 55 y.o. y.o. female with past medical history significant for major depression, TMJ, DESI, hyperlipidemia, nicotine dependence, migraine headache, post ablative hypothyroidism, status post lumbar microdiscectomy Dr. Cerna 2017, spinal cord stimulator implant 2018 who presents today for follow-up regarding:    -Right lumbar radiculopathy L5 dermatome pattern with previous significant relief with L4-5 and L5-S1 TFESI x2 months with recurrent symptoms.  Patient is neurologically intact on exam.  She is not interested in surgery at this time however she did have active  right L5 radiculopathy on EMG March 2020, patient is aware that some of the perceived weakness may be permanent at this, she was not interested in surgery back in March either.     Plan:     A shared decision making plan was used. The patient's values and choices were respected. Prior medical records from 6/2/2020 were reviewed today. The following represents what was discussed and decided upon by the provider and the patient.        -DIAGNOSTIC TESTS: Images were personally reviewed and interpreted.   --Lumbar flexion-extension x-ray shows no dynamic instability.  --EMG 3/18/2020 shows active right L5 radiculopathy.  --Lumbar CT scan 2/21/2020 shows acute nondisplaced right L4 pars interarticularis fracture.  L4-5 severe right and mild left foraminal stenosis with distortion of the right L4 nerve root.  Prior right L4 hemilaminotomy.  --[facility] spinal cord stimulator, patient cannot have MRI.    -INTERVENTIONS: Ordered repeat right L4-5 and L5-S1 transforaminal epidural steroid injection with Dr. Byrne.  She got significant relief with this injection previously for over 2 months and she does have severe right foraminal stenosis at L4-5 and active L5 radiculopathy on EMG.    -MEDICATIONS: Prescribed Tylenol 3 1 tablet twice daily as needed for severe breakthrough pain number 10 tablets given for 5 days worth, patient is aware that this is for acute pain only prior to the injection and is not a long-term option for her pain.  -Also refilled baclofen 10 mg 1 tablet 3 times daily which she has found helpful for the spasms in her right lower extremity.  Discussed side effects of medications and proper use. Patient verbalized understanding.    -PHYSICAL THERAPY: Encourage patient to continue with home exercises from prior physical therapy sessions which she is doing at this time.  Discussed the importance of core strengthening, ROM, stretching exercises with the patient and how each of these entities is important in  decreasing pain.  Explained to the patient that the purpose of physical therapy is to teach the patient a home exercise program.  These exercises need to be performed every day in order to decrease pain and prevent future occurrences of pain.        -PATIENT EDUCATION:  20 minutes of total visit time was spent face to face with the patient today, 60 % of the visit was spent on counseling, education, and coordinating care.   -5 minutes spent outside of visit time, non-face-to-face time, reviewing chart.  -Today we also discussed the issues related to the current COVID-19 pandemic, the pros and cons of the current treatment plan, the CDC guidelines such as social distancing, washing the hands, and covering the cough.    -FOLLOW UP:  Follow-up for injection with Dr. Byrne than 2 weeks postinjection   Advised to contact clinic if symptoms worsen or change.    Subjective:     Susan Kwan is a 55 y.o. female who presents today for follow-up regarding recurrent midline low back pain lumbosacral junction that radiates to the right posterior lateral thigh posterior lateral calf as well as some pain into the anterior thigh.  Patient reports that she received significant relief with previous right L4-5 and L5-S1 TFESI in June up until the last month or so when pain has been recurrent.  She does still have some numbness and tingling in her right lower extremity and slight perceived weakness.  Patient denies any recent trips or falls however.  She does feel like she is able to drive and have full control of the pedal.  Patient currently reports her pain is a 7/10, 9 is worse, 5 at its best.  She does report some charley horses into the right buttock that she did not notice previously and has found baclofen to be helpful for this.    -Treatment to Date: Right L4-5 hemilaminectomy/microdiscectomy with right L5 nerve root decompression surgery with Dr. Cerna 2017.  Spinal cord stimulator implant T8-9 2018.  Physical therapy x2  summer 2020 right lumbar radiculopathy    Right L4-5 and L5-S1 TFESI 6/2/2020 with 90% relief x2 months.  Preprocedure pain 4/10, post 7/10.     Medications:  Gabapentin 300 mg, not currently taking previous dose 3-3-4.  Some sedation on higher doses.  Baclofen 10 mg with benefit  Tylenol 3 previously prescribed 3/16/2020, 21 tablets.        Previous prescription for oxycodone 5 mg 3/4/2020 for severe breakthrough pain, patient no longer currently taking.    Patient Active Problem List   Diagnosis     Nicotine Dependence     Migraine Headache     Arthralgias In Multiple Sites     Postablative hypothyroidism     Hyperlipidemia     Major Depression, Recurrent     Sudden Redness Of The Skin (Flushing)     Lower Back Pain     Carpal Tunnel Syndrome     Mild intermittent asthma without complication     Allergic Rhinitis     Seborrheic Keratosis     DESI (obstructive sleep apnea)     Sacroiliac joint dysfunction of right side     Lumbar foraminal stenosis     Lumbar disc herniation     Sciatica of right side     Lumbar stenosis     S/P lumbar microdiscectomy     Gastro-esophageal reflux disease with esophagitis     Depressive disorder     Anxiety state     Obesity (BMI 35.0-39.9) with comorbidity (H)     TMJ (temporomandibular joint syndrome)     Advanced directives, counseling/discussion     ASCUS with positive high risk HPV cervical     Xerostomia due to hyposecretion of salivary gland     Myofascial pain     Benign neoplasm of ascending colon       Current Outpatient Medications on File Prior to Encounter   Medication Sig Dispense Refill     gabapentin (NEURONTIN) 300 MG capsule TAKE 3 CAPSULES BY MOUTH EVERY MORNING, 3 CAPSULES BY MOUTH EVERY DAY IN THE AFTERNOON, AND 4 CAPSULES EVERY EVENING 900 capsule 3     albuterol (PROAIR HFA;PROVENTIL HFA;VENTOLIN HFA) 90 mcg/actuation inhaler INHALE 1 TO 2 PUFFS BY MOUTH EVERY 4 HOURS AS NEEDED FOR WHEEZING 18 g 1     FLUoxetine (PROZAC) 20 MG capsule TAKE 2 CAPSULES(40 MG) BY  MOUTH DAILY 60 capsule 3     levothyroxine (SYNTHROID, LEVOTHROID) 175 MCG tablet TAKE 1 TABLET(175 MCG) BY MOUTH DAILY 90 tablet 3     melatonin 3 mg Tab tablet TAKE 1 TABLET(3 MG) BY MOUTH AT BEDTIME AS NEEDED 30 tablet 11     multivitamin therapeutic tablet Take 1 tablet by mouth daily.       ondansetron (ZOFRAN ODT) 4 MG disintegrating tablet Take 1 tablet (4 mg total) by mouth every 8 (eight) hours as needed for nausea. 10 tablet 0     simvastatin (ZOCOR) 20 MG tablet TAKE 1 TABLET(20 MG) BY MOUTH AT BEDTIME 90 tablet 4     SUMAtriptan (IMITREX) 50 MG tablet TAKE 1 TABLET BY MOUTH EVERY 2 HOURS AS NEEDED FOR MIGRAINE. MAY REPEAT IN 2 HOURS AS NEEDED 9 tablet 10     [DISCONTINUED] acetaminophen-codeine (TYLENOL #3) 300-30 mg per tablet Take 1 tablet by mouth 2 (two) times a day as needed for pain. 20 tablet 0     [DISCONTINUED] baclofen (LIORESAL) 10 MG tablet Take 0.5-1 tablets (5-10 mg total) by mouth 2 (two) times a day as needed (for muscle spasms). 30 tablet 0     No current facility-administered medications on file prior to encounter.        Allergies   Allergen Reactions     Acetaminophen      Other: very sore stomach       Cefuroxime Axetil      Sulfa (Sulfonamide Antibiotics) Rash       Past Medical History:   Diagnosis Date     Anxiety      Asthma      Carpal tunnel syndrome      Depression     PMDD     Disease of thyroid gland      Low back pain      Migraine      Pregnancy          Substance abuse (H)     sober since , narcotic pain medication        Review of Systems  ROS: Positive for numbness and tingling and perceived weakness right lower extremity, headache and increased pain in the evening hours.  Specifically negative for bowel/bladder dysfunction, balance changes, headache, dizziness, foot drop, fevers, chills, appetite changes, nausea/vomiting, unexplained weight loss. Otherwise 13 systems reviewed are negative. Please see the patient's intake questionnaire from today for  details.    Reviewed Social, Family, Past Medical and Past Surgical history with patient, no significant changes noted since prior visit.     Objective:     /70 (Patient Site: Right Arm, Patient Position: Sitting)   LMP 09/21/2014     PHYSICAL EXAMINATION:    --CONSTITUTIONAL: Well developed, well nourished, healthy appearing individual.  --PSYCHIATRIC: Appropriate mood and affect. No difficulty interacting due to temper, social withdrawal, or memory issues.  --SKIN: Lumbar region is dry and intact.   --RESPIRATORY: Normal rhythm and effort. No abnormal accessory muscle breathing patterns noted.   --MUSCULOSKELETAL:  Normal lumbar lordosis noted, no lateral shift.  --GROSS MOTOR: Easily arises from a seated position. Gait is non-antalgic  --LUMBAR SPINE:  Inspection reveals no evidence of deformity.  --LOWER EXTREMITY MOTOR TESTING:  Plantar flexion left 5/5, right 5/5   Dorsiflexion left 5/5, right 5/5   Great toe MTP extension left 5/5, right 5/5  Knee flexion left 5/5, right 5/5  Knee extension left 5/5, right 5/5   Hip flexion left 5/5, right 5/5  Hip abduction left 5/5, right 5/5  Hip adduction left 5/5, right 5/5   --HIPS: Full range of motion bilaterally.   --NEUROLOGIC: Bilateral patellar and achilles reflexes are physiologic and symmetric. Sensation to light touch is intact in the bilateral L4, L5, and S1 dermatomes.    RESULTS:   Imaging: Lumbar spine imaging was reviewed today. The images were shown to the patient and the findings were explained using a spine model.      Ct Lumbar Spine Without Contrast  Result Date: 3/20/2020  INDICATION: Back pain or radiculopathy, > 6 weeks. COMPARISON: Lumbar spine radiographs dated 03/12/2020, CT lumbar spine 02/21/2020. TECHNIQUE: Routine without IV contrast. Multiplanar reformats. Dose reduction techniques were used. FINDINGS: Nomenclature is based on 5 lumbar type vertebral bodies. Alignment is within normal limits. The vertebral body heights are normal.  Redemonstrated nondisplaced right L4 pars interarticularis fracture, essentially unchanged in appearance compared to the most recent study. No acute fracture seen. Partially visualized spinal cord stimulator leads overlying the left posterior paraspinous musculature, incompletely evaluated on this exam. Mild ectasia of the infrarenal abdominal aorta measuring up to 2.6 cm in transverse dimension (series 3 image 42). Moderate atherosclerotic calcification involving the abdominal aorta and iliac arteries. Unremarkable visualized bony pelvis. SEGMENTAL ANALYSIS: T12-L1: Normal disc height. No significant disc bulge or herniation. Minimal facet arthropathy. No significant spinal or neural foraminal stenosis. The findings are unchanged. L1-L2: Normal disc height. No significant disc bulge or herniation. Minimal facet arthropathy. No spinal or neural foraminal stenosis. The findings are unchanged. L2-L3: Normal disc height. There is a symmetric disc bulge with minimal facet arthropathy. No significant spinal stenosis. Mild left neural foraminal stenosis. The right neural foramen is patent. The findings are unchanged. L3-L4: Normal disc height. Small symmetric disc bulge with minimal facet arthropathy. No spinal stenosis. Moderate left neural foraminal stenosis. The right neural foramen is patent. The findings are unchanged. L4-L5: Minimal disc height loss. Findings of right hemilaminectomy again noted. There is a small disc bulge eccentric to the right with mild bilateral facet arthropathy. There is suggestion of at least mild potentially moderate right lateral recess narrowing with mild overall spinal canal narrowing. This is unchanged from prior. Severe right and moderate left neural foraminal stenosis. The findings are unchanged. L5-S1: Normal disc height. No significant disc bulge or herniation. Mild facet arthropathy. No significant spinal or neural foraminal stenosis. The findings are unchanged.   1. Unchanged  nondisplaced right L4 pars interarticularis defect/fracture. No acute fracture.   2. Multilevel degenerative disc disease and facet arthropathy of the lumbar spine, as described. No high-grade spinal stenosis. There is at least mild encroachment on the right lateral recess at the L4-L5 level related to an asymmetric disc bulge to the right. This is seen in the setting of prior right L4-L5 hemilaminectomy.   3. Varying degrees of neural foraminal narrowing, most pronounced on the right at L4-L5 where it is severe. Please see the body of the report for additional details.        Xr Lumbar Spine Flex And Ext 2 Or 3 Vws  Result Date: 3/12/2020  INDICATION: Evaluate spondylolisthesis stability: Please provide flexion, extension, and neutral measurements COMPARISON: Lumbar radiograph 02/25/2019.   Nomenclature assumes 5 lumbar type vertebral bodies. Minimal 1-2 mm anterior spondylolisthesis at L4-L5 without change on flexion or extension views. Otherwise normal alignment. Normal vertebral body heights. Minimal disc space narrowing with  mild marginal osteophytes. Mild to moderate facet arthropathy within the lower lumbar spine. A stimulator device and lead are partially visualized.        Ct Lumbar Spine Without Contrast  Result Date: 2/21/2020  INDICATION: Back pain, right sciatica with skin paresthesias; fall on ice on 02/05/2020, now with no feeling down right lower leg and pain in right thigh. COMPARISON: Lumbar spine radiographs 02/25/2019 and MRI lumbar spine 05/22/2017 TECHNIQUE: Routine without IV contrast. Multiplanar reformats.  Dose reduction techniques were used. FINDINGS: VERTEBRAE: Normal vertebral body heights and alignment. There is an acute, nondisplaced fracture of the right L4 pars interarticularis (sagittal image #25). No additional acute fracture or posttraumatic subluxation. CANAL/FORAMINA: Mild degenerative disc disease throughout the lumbar spine, though without high-grade spinal canal stenosis.  Prior right L4 hemilaminotomy. At L4-L5, there is severe right and mild left neuroforaminal stenosis, with distortion of the exiting right L4 nerve root. PARASPINAL: Atherosclerotic vascular calcifications. Paraspinous soft tissues are otherwise grossly unremarkable.   IMPRESSION:   1.  Acute, nondisplaced fracture of the right L4 pars interarticularis.   2.  At L4-L5, there is severe right and mild left neuroforaminal stenosis with distortion of the exiting right L4 nerve root.   3.  No high-grade spinal canal stenosis. DICOM format image data is available to non-affiliated external healthcare facilities or entities on a secure, media-free, reciprocally searchable basis with patient authorization for at least a 12-month period after the study.

## 2021-06-11 NOTE — PROGRESS NOTES
"Susan Kwan is a 55 y.o. female who is being evaluated via a billable video visit.      The patient has been notified of following:     \"This video visit will be conducted via a call between you and your physician/provider. We have found that certain health care needs can be provided without the need for an in-person physical exam.  This service lets us provide the care you need with a video conversation.  If a prescription is necessary we can send it directly to your pharmacy.  If lab work is needed we can place an order for that and you can then stop by our lab to have the test done at a later time.    Video visits are billed at different rates depending on your insurance coverage. Please reach out to your insurance provider with any questions.    If during the course of the call the physician/provider feels a video visit is not appropriate, you will not be charged for this service.\"    Patient has given verbal consent to a Video visit? Yes  How would you like to obtain your AVS? AVS Preference: MyChart.        Video Start Time: 11:20 AM      Video-Visit Details    Type of service:  Video Visit    Video End Time (time video stopped): 11:34 AM  Originating Location (pt. Location): Home    Distant Location (provider location):  [facility]     Platform used for Video Visit: Jan Salguero CNP    Assessment:     Diagnoses and all orders for this visit:    Chronic right-sided low back pain with right-sided sciatica  -     EMG; Future; Expected date: 09/24/2020  -     acetaminophen-codeine (TYLENOL #3) 300-30 mg per tablet; Take 1 tablet by mouth 2 (two) times a day as needed for pain (Severe breakthrough pain).  Dispense: 14 tablet; Refill: 0    Lumbar radiculopathy  -     EMG; Future; Expected date: 09/24/2020  -     acetaminophen-codeine (TYLENOL #3) 300-30 mg per tablet; Take 1 tablet by mouth 2 (two) times a day as needed for pain (Severe breakthrough pain).  Dispense: 14 tablet; " Refill: 0    Stenosis of lateral recess of lumbar spine    Pars defect of lumbar spine    Right leg weakness  -     EMG; Future; Expected date: 09/24/2020       Susan Kwan is a 55 y.o. y.o. female with past medical history significant for major depression, TMJ, DESI, hyperlipidemia, nicotine dependence, migraine headache, post ablative hypothyroidism, status post lumbar microdiscectomy Dr. Cerna 2017, spinal cord stimulator implant 2018 who presents today for follow-up regarding:    -Acute right lumbar radiculopathy with recurrent symptoms, previous relief with right L4-5 and L5-S1 TFESI for 2 months with repeat injection 2 days ago with some relief of back pain but no relief of leg pain yet.  Patient still endorses left lower extremity weakness.  Discussed previously that given her initial EMG was in March 2020 she is aware that there is potential for chronic radiculopathy and permanent nerve damage at this time, will further evaluate with updated EMG.     Plan:     A shared decision making plan was used. The patient's values and choices were respected. Prior medical records from 9/10/2020 to current were reviewed today. The following represents what was discussed and decided upon by the provider and the patient.        -DIAGNOSTIC TESTS: Images were personally reviewed and interpreted.   --Ordered updated right lower extremity EMG to evaluate radiculopathy.  --Lumbar flexion-extension x-ray shows no dynamic instability.  --EMG 3/18/2020 shows active right L5 radiculopathy.  --Lumbar CT scan 2/21/2020 shows acute nondisplaced right L4 pars interarticularis fracture.  L4-5 severe right and mild left foraminal stenosis with distortion of the right L4 nerve root.  Prior right L4 hemilaminotomy.  --[facility] spinal cord stimulator, patient cannot have MRI.    -Discussed with patient if active radiculopathy noted would likely recommend surgical opinion.    -MEDICATIONS: Refilled Tylenol 3 1 tablet twice daily as  needed for severe breakthrough pain, 14 tablets given for 7 days worth.  Patient is aware that this is not a medication we will prescribe long-term and is for acute radiculopathy symptoms only.  -Continue baclofen as needed.  [place]  checked. Discussed the risks (eg, addiction, overdose, worsening pain) verses benefit of opioid use with patient today. Explained that this medication will not be a long term solution to ongoing pain. Discussed using lowest effective dose and the importance of other measures for pain management including PT, other non-opioid medications, behavioral treatments, and other procedure options.   Discussed side effects of medications and proper use. Patient verbalized understanding.    -PHYSICAL THERAPY: Encourage patient continue with home exercises from prior physical therapy sessions which she is doing at this time.  Discussed the importance of core strengthening, ROM, stretching exercises with the patient and how each of these entities is important in decreasing pain.  Explained to the patient that the purpose of physical therapy is to teach the patient a home exercise program.  These exercises need to be performed every day in order to decrease pain and prevent future occurrences of pain.        -PATIENT EDUCATION:  14 minutes of total visit time was spent face to face with the patient today, 60 % of the visit was spent on counseling, education, and coordinating care.   -5 minutes spent outside of visit time, non-face-to-face time, reviewing chart.  -Today we also discussed the issues related to the current COVID-19 pandemic, the pros and cons of the current treatment plan, the CDC guidelines such as social distancing, washing the hands, and covering the cough.    -FOLLOW UP: Follow-up for EMG with Dr. Curtis and then 2 weeks postinjection as well.  Advised to contact clinic if symptoms worsen or change.    Subjective:     Susan Kwan is a 55 y.o. female who presents today for  follow-up regarding ongoing midline low back pain with right posterior lateral thigh pain as well as posterior calf pain and anterior thigh pain in which she does report received good relief of her back pain with recent right L4-5 and L5-S1 TFESI however minimal relief of her leg symptoms at this time however she is only 2 days post injection.  She does still feel perceived weakness in her right lower extremity with  and numbness and tingling sensations.  Denies left leg symptoms.  Denies bowel or bladder loss control.    -Treatment to Date: Right L4-5 hemilaminectomy/microdiscectomy with right L5 nerve root decompression surgery with Dr. Cerna 2017.  Spinal cord stimulator implant T8-9 2018.  Physical therapy x2 summer 2020 right lumbar radiculopathy     Right L4-5 and L5-S1 TFESI 6/2/2020 with 90% relief x2 months.  Preprocedure pain 4/10, post 7/10.  Right L4-5 and L5-S1 TFESI 9/22/2020 with some relief of back pain, no relief so far right leg pain 2 days post injection.  Preprocedure pain 5/10, post 2/10.     Medications:  Gabapentin 300 mg, not currently taking previous dose 3-3-4.  Some sedation on higher doses.  Baclofen 10 mg with benefit  Tylenol 3 previously prescribed 3/16/2020, 21 tablets.        Previous prescription for oxycodone 5 mg 3/4/2020 for severe breakthrough pain, patient no longer currently taking.    Patient Active Problem List   Diagnosis     Nicotine Dependence     Migraine Headache     Arthralgias In Multiple Sites     Postablative hypothyroidism     Hyperlipidemia     Major Depression, Recurrent     Sudden Redness Of The Skin (Flushing)     Lower Back Pain     Carpal Tunnel Syndrome     Mild intermittent asthma without complication     Allergic Rhinitis     Seborrheic Keratosis     DESI (obstructive sleep apnea)     Sacroiliac joint dysfunction of right side     Lumbar foraminal stenosis     Lumbar disc herniation     Sciatica of right side     Lumbar stenosis     S/P lumbar  microdiscectomy     Gastro-esophageal reflux disease with esophagitis     Depressive disorder     Anxiety state     Obesity (BMI 35.0-39.9) with comorbidity (H)     TMJ (temporomandibular joint syndrome)     Advanced directives, counseling/discussion     ASCUS with positive high risk HPV cervical     Xerostomia due to hyposecretion of salivary gland     Myofascial pain     Benign neoplasm of ascending colon       Current Outpatient Medications on File Prior to Encounter   Medication Sig Dispense Refill     albuterol (PROAIR HFA;PROVENTIL HFA;VENTOLIN HFA) 90 mcg/actuation inhaler INHALE 1 TO 2 PUFFS BY MOUTH EVERY 4 HOURS AS NEEDED FOR WHEEZING 18 g 1     baclofen (LIORESAL) 10 MG tablet Take 1 tablet (10 mg total) by mouth 3 (three) times a day as needed. 30 tablet 1     FLUoxetine (PROZAC) 20 MG capsule TAKE 2 CAPSULES(40 MG) BY MOUTH DAILY 60 capsule 3     gabapentin (NEURONTIN) 300 MG capsule TAKE 3 CAPSULES BY MOUTH EVERY MORNING, 3 CAPSULES BY MOUTH EVERY DAY IN THE AFTERNOON, AND 4 CAPSULES EVERY EVENING 900 capsule 3     levothyroxine (SYNTHROID, LEVOTHROID) 175 MCG tablet TAKE 1 TABLET(175 MCG) BY MOUTH DAILY 90 tablet 3     melatonin 3 mg Tab tablet TAKE 1 TABLET(3 MG) BY MOUTH AT BEDTIME AS NEEDED 30 tablet 11     multivitamin therapeutic tablet Take 1 tablet by mouth daily.       ondansetron (ZOFRAN ODT) 4 MG disintegrating tablet Take 1 tablet (4 mg total) by mouth every 8 (eight) hours as needed for nausea. 10 tablet 0     simvastatin (ZOCOR) 20 MG tablet TAKE 1 TABLET(20 MG) BY MOUTH AT BEDTIME 90 tablet 4     SUMAtriptan (IMITREX) 50 MG tablet TAKE 1 TABLET BY MOUTH EVERY 2 HOURS AS NEEDED FOR MIGRAINE. MAY REPEAT IN 2 HOURS AS NEEDED 9 tablet 10     No current facility-administered medications on file prior to encounter.        Allergies   Allergen Reactions     Acetaminophen      Other: very sore stomach       Cefuroxime Axetil      Sulfa (Sulfonamide Antibiotics) Rash       Past Medical History:    Diagnosis Date     Anxiety      Asthma      Carpal tunnel syndrome      Depression     PMDD     Disease of thyroid gland      Low back pain      Migraine      Pregnancy          Substance abuse (H)     sober since , narcotic pain medication        Review of Systems  ROS: Positive for numbness and tingling in right lower extremity weakness.  Specifically negative for bowel/bladder dysfunction, balance changes, headache, dizziness, foot drop, fevers, chills, appetite changes, nausea/vomiting, unexplained weight loss. Otherwise 13 systems reviewed are negative. Please see the patient's intake questionnaire from today for details.    Reviewed Social, Family, Past Medical and Past Surgical history with patient, no significant changes noted since prior visit.     Objective:     VIRTUAL PHYSICAL EXAM:   --CONSTITUTIONAL: Well developed, well nourished, healthy appearing individual.  --PSYCHIATRIC: Appropriate mood and affect. No difficulty interacting due to temper, social withdrawal, or memory issues.  --SKIN: Lumbar region is visually dry and intact.   --RESPIRATORY: Normal rhythm and effort. No abnormal accessory muscle breathing patterns noted.   --STANDING EXAMINATION:  Normal lumbar lordosis noted, no lateral shift.  --MUSCULOSKELETAL: Lumbar spine inspection reveals no evidence of deformity. Range of motion is not limited in lumbar flexion, extension, lateral rotation.   --GROSS MOTOR: Gait is non-antalgic. Easily arises from a seated position. Toe walking and heel walking are normal without significant difficulty.    --LOWER EXTREMITY MOTOR TESTING:   Full and equal bilateral active range of motion with knee extension/flexion and ankle dorsiflexion/extension to anti-gravity.     RESULTS:   Imaging: Lumbar spine imaging was reviewed today. The images were shown to the patient and the findings were explained using a spine model.      Ct Lumbar Spine Without Contrast  Result Date: 3/20/2020  INDICATION: Back  pain or radiculopathy, > 6 weeks. COMPARISON: Lumbar spine radiographs dated 03/12/2020, CT lumbar spine 02/21/2020. TECHNIQUE: Routine without IV contrast. Multiplanar reformats. Dose reduction techniques were used. FINDINGS: Nomenclature is based on 5 lumbar type vertebral bodies. Alignment is within normal limits. The vertebral body heights are normal. Redemonstrated nondisplaced right L4 pars interarticularis fracture, essentially unchanged in appearance compared to the most recent study. No acute fracture seen. Partially visualized spinal cord stimulator leads overlying the left posterior paraspinous musculature, incompletely evaluated on this exam. Mild ectasia of the infrarenal abdominal aorta measuring up to 2.6 cm in transverse dimension (series 3 image 42). Moderate atherosclerotic calcification involving the abdominal aorta and iliac arteries. Unremarkable visualized bony pelvis. SEGMENTAL ANALYSIS: T12-L1: Normal disc height. No significant disc bulge or herniation. Minimal facet arthropathy. No significant spinal or neural foraminal stenosis. The findings are unchanged. L1-L2: Normal disc height. No significant disc bulge or herniation. Minimal facet arthropathy. No spinal or neural foraminal stenosis. The findings are unchanged. L2-L3: Normal disc height. There is a symmetric disc bulge with minimal facet arthropathy. No significant spinal stenosis. Mild left neural foraminal stenosis. The right neural foramen is patent. The findings are unchanged. L3-L4: Normal disc height. Small symmetric disc bulge with minimal facet arthropathy. No spinal stenosis. Moderate left neural foraminal stenosis. The right neural foramen is patent. The findings are unchanged. L4-L5: Minimal disc height loss. Findings of right hemilaminectomy again noted. There is a small disc bulge eccentric to the right with mild bilateral facet arthropathy. There is suggestion of at least mild potentially moderate right lateral recess  narrowing with mild overall spinal canal narrowing. This is unchanged from prior. Severe right and moderate left neural foraminal stenosis. The findings are unchanged. L5-S1: Normal disc height. No significant disc bulge or herniation. Mild facet arthropathy. No significant spinal or neural foraminal stenosis. The findings are unchanged.   1. Unchanged nondisplaced right L4 pars interarticularis defect/fracture. No acute fracture.   2. Multilevel degenerative disc disease and facet arthropathy of the lumbar spine, as described. No high-grade spinal stenosis. There is at least mild encroachment on the right lateral recess at the L4-L5 level related to an asymmetric disc bulge to the right. This is seen in the setting of prior right L4-L5 hemilaminectomy.   3. Varying degrees of neural foraminal narrowing, most pronounced on the right at L4-L5 where it is severe. Please see the body of the report for additional details.        Xr Lumbar Spine Flex And Ext 2 Or 3 Vws  Result Date: 3/12/2020  INDICATION: Evaluate spondylolisthesis stability: Please provide flexion, extension, and neutral measurements COMPARISON: Lumbar radiograph 02/25/2019.   Nomenclature assumes 5 lumbar type vertebral bodies. Minimal 1-2 mm anterior spondylolisthesis at L4-L5 without change on flexion or extension views. Otherwise normal alignment. Normal vertebral body heights. Minimal disc space narrowing with  mild marginal osteophytes. Mild to moderate facet arthropathy within the lower lumbar spine. A stimulator device and lead are partially visualized.        Ct Lumbar Spine Without Contrast  Result Date: 2/21/2020  INDICATION: Back pain, right sciatica with skin paresthesias; fall on ice on 02/05/2020, now with no feeling down right lower leg and pain in right thigh. COMPARISON: Lumbar spine radiographs 02/25/2019 and MRI lumbar spine 05/22/2017 TECHNIQUE: Routine without IV contrast. Multiplanar reformats.  Dose reduction techniques were  used. FINDINGS: VERTEBRAE: Normal vertebral body heights and alignment. There is an acute, nondisplaced fracture of the right L4 pars interarticularis (sagittal image #25). No additional acute fracture or posttraumatic subluxation. CANAL/FORAMINA: Mild degenerative disc disease throughout the lumbar spine, though without high-grade spinal canal stenosis. Prior right L4 hemilaminotomy. At L4-L5, there is severe right and mild left neuroforaminal stenosis, with distortion of the exiting right L4 nerve root. PARASPINAL: Atherosclerotic vascular calcifications. Paraspinous soft tissues are otherwise grossly unremarkable.   IMPRESSION:   1.  Acute, nondisplaced fracture of the right L4 pars interarticularis.   2.  At L4-L5, there is severe right and mild left neuroforaminal stenosis with distortion of the exiting right L4 nerve root.   3.  No high-grade spinal canal stenosis. DICOM format image data is available to non-affiliated external healthcare facilities or entities on a secure, media-free, reciprocally searchable basis with patient authorization for at least a 12-month period after the study.

## 2021-06-11 NOTE — PATIENT INSTRUCTIONS - HE
Please follow up two weeks post procedure with Cristela to evaluate your plan of care.       DISCHARGE INSTRUCTIONS    During office hours (8:00 a.m.- 4:00 p.m.) questions or concerns may be answered  by calling [facility] Navigation Nurses at  [phone].  Messages received after hours will be returned the following business day.      In the case of an emergency, please dial 911 or seek assistance at the nearest Emergency Room/Urgent Care facility.     All Patients:    ? You may experience an increase in your symptoms for the first 2 days (It may take anywhere between 2 days- 2 weeks for the steroid to have maximum effect).    ? You may use ice on the injection site, as frequently as 20 minutes each hour if needed.    ? You may take your pain medicine.    ? You may continue taking your regular medication after your injection. If you have had a Medial Branch Block you may resume pain medication once your pain diary is completed.    ? You may shower. No swimming, tub bath or hot tub for 48 hours.  You may remove your bandaid/bandage as soon as you are home.    ? You may resume light activities, as tolerated.    ? Resume your usual diet as tolerated.    ? It is strongly advised that you do not drive for 1-3 hours post injection.    ? If you have had oral sedation:  Do not drive for 8 hours post injection.      ? If you have had IV sedation:  Do not drive for 24 hours post injection.  Do not operate hazardous machinery or make important personal/business decisions for 24 hours.      POSSIBLE STEROID SIDE EFFECTS (If steroid/cortisone was used for your procedure)    -If you experience these symptoms, it should only last for a short period      Swelling of the legs                Skin redness (flushing)       Mouth (oral) irritation     Blood sugar (glucose) levels              Sweats                      Mood changes    Headache    Sleeplessness         POSSIBLE PROCEDURE SIDE EFFECTS  -Call the Spine Center if you  are concerned    Increased Pain             Increased numbness/tingling        Nausea/Vomiting            Bruising/bleeding at site        Redness or swelling                                                Difficulty walking        Weakness             Fever greater than 100.5    *In the event of a severe headache after an epidural steroid injection that is relieved by lying down, please call the [facility] to speak with a clinical staff member*

## 2021-06-12 NOTE — PROGRESS NOTES
Assessment:     Diagnoses and all orders for this visit:    Chronic right-sided low back pain with right-sided sciatica    Lumbar radiculopathy    Stenosis of lateral recess of lumbar spine    Pars defect of lumbar spine    Right leg weakness       Susan Kwan is a 55 y.o. y.o. female with past medical history significant for major depression, TMJ, DESI, hyperlipidemia, nicotine dependence, migraine headache, post ablative hypothyroidism, status post lumbar microdiscectomy Dr. Cerna 2017, spinal cord stimulator implant 2018 who presents today for follow-up regarding:    -Acute right lumbar radiculopathy with 100% relief with right L4-5 and L5-S1 TFESI for back pain, minimal relief with lower extremity pain and patient still noting right lower extremity perceived weakness.    Patient has minimal weakness on exam however today but she does have sensory deficits in an L4 and L5 dermatomal pattern.     Plan:     A shared decision making plan was used. The patient's values and choices were respected. Prior medical records from 9/24/2020 to current were reviewed today. The following represents what was discussed and decided upon by the provider and the patient.        -DIAGNOSTIC TESTS: Images were personally reviewed and interpreted.   --Right lower extremity EMG scheduled this afternoon with Dr. Curtis to evaluate radiculopathy.  Discussed with patient if there is active radiculopathy would recommend surgical evaluation which she would be open to.  We could still discuss surgical evaluation if there is chronic radiculopathy however at that point there is some permanent nerve damage that is irreversible.  Patient verbalized understanding and is still open to discussing with the surgeon given her symptoms are fairly significant at this point.  --Lumbar flexion-extension x-ray shows no dynamic instability.  --EMG 3/18/2020 shows active right L5 radiculopathy.  --Lumbar CT scan 2/21/2020 shows acute nondisplaced right  L4 pars interarticularis fracture.  L4-5 severe right and mild left foraminal stenosis with distortion of the right L4 nerve root.  Prior right L4 hemilaminotomy.  --[facility] spinal cord stimulator, patient cannot have MRI.    -INTERVENTIONS: No further injection recommendations at this time.    -MEDICATIONS: Advised patient continue with baclofen and chronic gabapentin.  Tylenol 3 as needed for severe breakthrough pain which she is trying not to take on a regular basis at this time.  Discussed side effects of medications and proper use. Patient verbalized understanding.    -PHYSICAL THERAPY: Encourage patient to continue with home exercises from prior physical therapy sessions.  Discussed the importance of core strengthening, ROM, stretching exercises with the patient and how each of these entities is important in decreasing pain.  Explained to the patient that the purpose of physical therapy is to teach the patient a home exercise program.  These exercises need to be performed every day in order to decrease pain and prevent future occurrences of pain.        -PATIENT EDUCATION:  20 minutes of total visit time was spent face to face with the patient today, 60 % of the visit was spent on counseling, education, and coordinating care.   -5 minutes spent outside of visit time, non-face-to-face time, reviewing chart.  -Today we also discussed the issues related to the current COVID-19 pandemic, the pros and cons of the current treatment plan, the CDC guidelines such as social distancing, washing the hands, and covering the cough.    -FOLLOW UP: Follow-up for EMG with Dr. Curtis which is scheduled later on today.  Advised to contact clinic if symptoms worsen or change.    Subjective:     Susan Kwan is a 55 y.o. female who presents today for follow-up regarding ongoing chronic midline low back pain in which she did receive 100% relief with injection with her back pain however no relief with right lower extremity  pain.  She is still having fairly significant pain into the right buttock as well as pain in the lateral shin and foot on the right only that is currently a 6/10, a 9 at its worst, 5 at its best.  More bothersome with sitting and driving, nothing is really helping at this time with her symptoms.  Patient currently denies left sided symptoms, denies recent trips or falls but is walking with a cane because of her perceived weakness and reports that she has been very careful so that she does not fall.  Patient denies bowel or bladder loss control..    -Treatment to Date: Right L4-5 hemilaminectomy/microdiscectomy with right L5 nerve root decompression surgery with Dr. Cerna 2017.  Spinal cord stimulator implant T8-9 2018.  Physical therapy x2 summer 2020 right lumbar radiculopathy     Right L4-5 and L5-S1 TFESI 6/2/2020 with 90% relief x2 months.  Preprocedure pain 4/10, post 7/10.  Right L4-5 and L5-S1 TFESI 9/22/2020 with 100% relief of back pain, no relief with right lower extremity symptoms. Preprocedure pain 5/10, post 2/10.     Medications:  Gabapentin 300 mg, not currently taking previous dose 3-3-4.  Some sedation on higher doses.  Baclofen 10 mg with benefit  Tylenol 3 previously prescribed 3/16/2020, 21 tablets.        Previous prescription for oxycodone 5 mg 3/4/2020 for severe breakthrough pain, patient no longer currently taking.    Patient Active Problem List   Diagnosis     Nicotine Dependence     Migraine Headache     Arthralgias In Multiple Sites     Postablative hypothyroidism     Hyperlipidemia     Major Depression, Recurrent     Sudden Redness Of The Skin (Flushing)     Lower Back Pain     Carpal Tunnel Syndrome     Mild intermittent asthma without complication     Allergic Rhinitis     Seborrheic Keratosis     DESI (obstructive sleep apnea)     Sacroiliac joint dysfunction of right side     Lumbar foraminal stenosis     Lumbar disc herniation     Sciatica of right side     Lumbar stenosis     S/P  lumbar microdiscectomy     Gastro-esophageal reflux disease with esophagitis     Depressive disorder     Anxiety state     Obesity (BMI 35.0-39.9) with comorbidity (H)     TMJ (temporomandibular joint syndrome)     Advanced directives, counseling/discussion     ASCUS with positive high risk HPV cervical     Xerostomia due to hyposecretion of salivary gland     Myofascial pain     Benign neoplasm of ascending colon       Current Outpatient Medications on File Prior to Visit   Medication Sig Dispense Refill     albuterol (PROAIR HFA;PROVENTIL HFA;VENTOLIN HFA) 90 mcg/actuation inhaler INHALE 1 TO 2 PUFFS BY MOUTH EVERY 4 HOURS AS NEEDED FOR WHEEZING 18 g 1     baclofen (LIORESAL) 10 MG tablet TAKE 1 TABLET BY MOUTH THREE TIMES DAILY AS NEEDED 30 tablet 1     FLUoxetine (PROZAC) 20 MG capsule TAKE 2 CAPSULES(40 MG) BY MOUTH DAILY 60 capsule 3     gabapentin (NEURONTIN) 300 MG capsule TAKE 3 CAPSULES BY MOUTH EVERY MORNING, 3 CAPSULES BY MOUTH EVERY DAY IN THE AFTERNOON, AND 4 CAPSULES EVERY EVENING 900 capsule 3     levothyroxine (SYNTHROID, LEVOTHROID) 175 MCG tablet TAKE 1 TABLET(175 MCG) BY MOUTH DAILY 90 tablet 3     melatonin 3 mg Tab tablet TAKE 1 TABLET(3 MG) BY MOUTH AT BEDTIME AS NEEDED 30 tablet 11     multivitamin therapeutic tablet Take 1 tablet by mouth daily.       ondansetron (ZOFRAN ODT) 4 MG disintegrating tablet Take 1 tablet (4 mg total) by mouth every 8 (eight) hours as needed for nausea. 10 tablet 0     simvastatin (ZOCOR) 20 MG tablet TAKE 1 TABLET(20 MG) BY MOUTH AT BEDTIME 90 tablet 4     SUMAtriptan (IMITREX) 50 MG tablet TAKE 1 TABLET BY MOUTH EVERY 2 HOURS AS NEEDED FOR MIGRAINE. MAY REPEAT IN 2 HOURS AS NEEDED 9 tablet 10     [DISCONTINUED] baclofen (LIORESAL) 10 MG tablet Take 1 tablet (10 mg total) by mouth 3 (three) times a day as needed. 30 tablet 1     No current facility-administered medications on file prior to visit.        Allergies   Allergen Reactions     Acetaminophen      Other:  very sore stomach       Cefuroxime Axetil      Sulfa (Sulfonamide Antibiotics) Rash       Past Medical History:   Diagnosis Date     Anxiety      Asthma      Carpal tunnel syndrome      Depression     PMDD     Disease of thyroid gland      Low back pain      Migraine      Pregnancy          Substance abuse (H)     sober since , narcotic pain medication        Review of Systems  ROS: Positive for numbness and tingling right lower extremity weakness.  Specifically negative for bowel/bladder dysfunction, balance changes, headache, dizziness, foot drop, fevers, chills, appetite changes, nausea/vomiting, unexplained weight loss. Otherwise 13 systems reviewed are negative. Please see the patient's intake questionnaire from today for details.    Reviewed Social, Family, Past Medical and Past Surgical history with patient, no significant changes noted since prior visit.     Objective:     LMP 2014     PHYSICAL EXAMINATION:    --CONSTITUTIONAL: Well developed, well nourished, healthy appearing individual.  --PSYCHIATRIC: Appropriate mood and affect. No difficulty interacting due to temper, social withdrawal, or memory issues.  --SKIN: Lumbar region is dry and intact.   --RESPIRATORY: Normal rhythm and effort. No abnormal accessory muscle breathing patterns noted.   --MUSCULOSKELETAL:  Normal lumbar lordosis noted, no lateral shift.  --GROSS MOTOR: Easily arises from a seated position. Gait is non-antalgic  --LUMBAR SPINE:  Inspection reveals no evidence of deformity.   --LOWER EXTREMITY MOTOR TESTING:  Plantar flexion left 5/5, right 5/5   Dorsiflexion left 5/5, right 4/5   Great toe MTP extension left 5/5, right 4/5  Knee flexion left 5/5, right 5/5  Knee extension left 5/5, right 5/5   Hip flexion left 5/5, right 5/5  Hip abduction left 5/5, right 5/5  Hip adduction left 5/5, right 5/5   --NEUROLOGIC: Bilateral patellar and achilles reflexes are physiologic and symmetric. Sensation to light touch is intact in  the bilateral L4, L5, and S1 dermatomes.    RESULTS:   Imaging: Lumbar spine imaging was reviewed today. The images were shown to the patient and the findings were explained using a spine model.      Ct Lumbar Spine Without Contrast  Result Date: 3/20/2020  INDICATION: Back pain or radiculopathy, > 6 weeks. COMPARISON: Lumbar spine radiographs dated 03/12/2020, CT lumbar spine 02/21/2020. TECHNIQUE: Routine without IV contrast. Multiplanar reformats. Dose reduction techniques were used. FINDINGS: Nomenclature is based on 5 lumbar type vertebral bodies. Alignment is within normal limits. The vertebral body heights are normal. Redemonstrated nondisplaced right L4 pars interarticularis fracture, essentially unchanged in appearance compared to the most recent study. No acute fracture seen. Partially visualized spinal cord stimulator leads overlying the left posterior paraspinous musculature, incompletely evaluated on this exam. Mild ectasia of the infrarenal abdominal aorta measuring up to 2.6 cm in transverse dimension (series 3 image 42). Moderate atherosclerotic calcification involving the abdominal aorta and iliac arteries. Unremarkable visualized bony pelvis. SEGMENTAL ANALYSIS: T12-L1: Normal disc height. No significant disc bulge or herniation. Minimal facet arthropathy. No significant spinal or neural foraminal stenosis. The findings are unchanged. L1-L2: Normal disc height. No significant disc bulge or herniation. Minimal facet arthropathy. No spinal or neural foraminal stenosis. The findings are unchanged. L2-L3: Normal disc height. There is a symmetric disc bulge with minimal facet arthropathy. No significant spinal stenosis. Mild left neural foraminal stenosis. The right neural foramen is patent. The findings are unchanged. L3-L4: Normal disc height. Small symmetric disc bulge with minimal facet arthropathy. No spinal stenosis. Moderate left neural foraminal stenosis. The right neural foramen is patent. The  findings are unchanged. L4-L5: Minimal disc height loss. Findings of right hemilaminectomy again noted. There is a small disc bulge eccentric to the right with mild bilateral facet arthropathy. There is suggestion of at least mild potentially moderate right lateral recess narrowing with mild overall spinal canal narrowing. This is unchanged from prior. Severe right and moderate left neural foraminal stenosis. The findings are unchanged. L5-S1: Normal disc height. No significant disc bulge or herniation. Mild facet arthropathy. No significant spinal or neural foraminal stenosis. The findings are unchanged.   1. Unchanged nondisplaced right L4 pars interarticularis defect/fracture. No acute fracture.   2. Multilevel degenerative disc disease and facet arthropathy of the lumbar spine, as described. No high-grade spinal stenosis. There is at least mild encroachment on the right lateral recess at the L4-L5 level related to an asymmetric disc bulge to the right. This is seen in the setting of prior right L4-L5 hemilaminectomy.   3. Varying degrees of neural foraminal narrowing, most pronounced on the right at L4-L5 where it is severe. Please see the body of the report for additional details.        Xr Lumbar Spine Flex And Ext 2 Or 3 Vws  Result Date: 3/12/2020  INDICATION: Evaluate spondylolisthesis stability: Please provide flexion, extension, and neutral measurements COMPARISON: Lumbar radiograph 02/25/2019.   Nomenclature assumes 5 lumbar type vertebral bodies. Minimal 1-2 mm anterior spondylolisthesis at L4-L5 without change on flexion or extension views. Otherwise normal alignment. Normal vertebral body heights. Minimal disc space narrowing with  mild marginal osteophytes. Mild to moderate facet arthropathy within the lower lumbar spine. A stimulator device and lead are partially visualized.        Ct Lumbar Spine Without Contrast  Result Date: 2/21/2020  INDICATION: Back pain, right sciatica with skin paresthesias;  fall on ice on 02/05/2020, now with no feeling down right lower leg and pain in right thigh. COMPARISON: Lumbar spine radiographs 02/25/2019 and MRI lumbar spine 05/22/2017 TECHNIQUE: Routine without IV contrast. Multiplanar reformats.  Dose reduction techniques were used. FINDINGS: VERTEBRAE: Normal vertebral body heights and alignment. There is an acute, nondisplaced fracture of the right L4 pars interarticularis (sagittal image #25). No additional acute fracture or posttraumatic subluxation. CANAL/FORAMINA: Mild degenerative disc disease throughout the lumbar spine, though without high-grade spinal canal stenosis. Prior right L4 hemilaminotomy. At L4-L5, there is severe right and mild left neuroforaminal stenosis, with distortion of the exiting right L4 nerve root. PARASPINAL: Atherosclerotic vascular calcifications. Paraspinous soft tissues are otherwise grossly unremarkable.   IMPRESSION:   1.  Acute, nondisplaced fracture of the right L4 pars interarticularis.   2.  At L4-L5, there is severe right and mild left neuroforaminal stenosis with distortion of the exiting right L4 nerve root.   3.  No high-grade spinal canal stenosis. DICOM format image data is available to non-affiliated external healthcare facilities or entities on a secure, media-free, reciprocally searchable basis with patient authorization for at least a 12-month period after the study.

## 2021-06-12 NOTE — PROGRESS NOTES
Patient presents at the request of Cristela Salguero for right lower extremity EMG.  She has right-sided low back pain with lower extremity pain and paresthesias down the lateral aspect of the leg to the knee and then anterior and posterior leg distal to the knee.  Has a sense of weakness in the right leg.  On exam, she has some mild ankle dorsiflexor weakness on the right.  Decreased sensation throughout right L4-S1 dermatomes.  No hyperreflexia.    EMG/NCS  results: Please see scanned full report.    Comment NCS: Normal study  1.  Normal nerve conduction studies right lower extremity.    Comment EMG: Abnormal study  1.  Increased insertional activity with denervation of the right tibialis anterior, tibialis posterior and prolonged motor unit potential duration   of the right TFL, tibialis anterior, and tibialis posterior.    Interpretation: Abnormal study: There is electrodiagnostic evidence of:    1.  Right L5 radiculopathy, chronic and active.  I cannot completely exclude concomitant L4 nerve root involvement.    2.  There is no electrodiagnostic evidence of  lumbosacral plexopathy  or focal neuropathy in the right lower extremity.    The testing was completed in its entirety by the physician.      It was our pleasure caring for your patient today, if there any questions or concerns please do not hesitate to contact us.

## 2021-06-12 NOTE — PROGRESS NOTES
Assessment:     Diagnoses and all orders for this visit:    Chronic right-sided low back pain with right-sided sciatica  -     acetaminophen-codeine (TYLENOL #3) 300-30 mg per tablet; Take 1 tablet by mouth 3 (three) times a day as needed for pain.  Dispense: 30 tablet; Refill: 0    Acute right lumbar radiculopathy  -     acetaminophen-codeine (TYLENOL #3) 300-30 mg per tablet; Take 1 tablet by mouth 3 (three) times a day as needed for pain.  Dispense: 30 tablet; Refill: 0    History of lumbar surgery    Weakness of right lower extremity       Susan Kwan is a 55 y.o. y.o. female with past medical history significant for major depression, TMJ, DESI, hyperlipidemia, nicotine dependence, migraine headache, post ablative hypothyroidism, status post lumbar microdiscectomy Dr. Cerna 2017, spinal cord stimulator implant 2018 who presents today for follow-up regarding:    -Acute progressive right lumbar radiculopathy with 100% relief with right L4-5 and L5-S1 TFESI however only x2 to 3 weeks and then recurrent severe pain.  Patient still noting perceived weakness right lower extremity and does have weakness on exam.      *She is scheduled for CT myelogram next week for surgical consideration with Dr. Ward.  She is wishing to move forward with surgery at this time and not repeat the injections.     Plan:     A shared decision making plan was used. The patient's values and choices were respected. Prior medical records from 10/7/2020 to current were reviewed today. The following represents what was discussed and decided upon by the provider and the patient.        -DIAGNOSTIC TESTS: Images were personally reviewed and interpreted.   --Right lower extremity EMG 10/7/2020 with active and chronic right L5 radiculopathy, potentially L4 as well.  --Lumbar flexion-extension x-ray shows no dynamic instability.  --EMG 3/18/2020 shows active right L5 radiculopathy.  --Lumbar CT scan 2/21/2020 shows acute nondisplaced right L4  pars interarticularis fracture.  L4-5 severe right and mild left foraminal stenosis with distortion of the right L4 nerve root.  Prior right L4 hemilaminotomy.  --[facility] spinal cord stimulator, patient cannot have MRI.    -Referral placed to [facility] neurosurgery 10/8/2020 due to active L5 radiculopathy to discuss surgical options, no appointment has been scheduled.  However she is scheduled for CT myelogram 11/5/2020 ordered by neurosurgery, Dr. Ward.    -INTERVENTIONS: Did discuss that we could repeat right L4-5 and L5-S1 TFESI given her severe pain however discussed that it would unlikely give her any further lasting relief than her previous injection but it did help her for 2 to 3 weeks.  Patient defers on the injection at this time which is reasonable.    -MEDICATIONS: Prescribed Tylenol 3 1 tablet 3 times daily as needed for severe breakthrough pain number 30 tablets given for 10 days worth.  -MN  checked. Discussed the risks (eg, addiction, overdose, worsening pain) verses benefit of opioid use with patient today. Explained that this medication will not be a long term solution to ongoing pain. Discussed using lowest effective dose and the importance of other measures for pain management including PT, other non-opioid medications, behavioral treatments, and other procedure options.   Discussed side effects of medications and proper use. Patient verbalized understanding.    -PHYSICAL THERAPY: Encourage patient continue with home exercises as tolerated at this time.  Discussed the importance of core strengthening, ROM, stretching exercises with the patient and how each of these entities is important in decreasing pain.  Explained to the patient that the purpose of physical therapy is to teach the patient a home exercise program.  These exercises need to be performed every day in order to decrease pain and prevent future occurrences of pain.        -PATIENT EDUCATION:  20 minutes of total visit  time was spent face to face with the patient today, 60 % of the visit was spent on counseling, education, and coordinating care.   -5 minutes spent outside of visit time, non-face-to-face time, reviewing chart.  -Today we also discussed the issues related to the current COVID-19 pandemic, the pros and cons of the current treatment plan, the CDC guidelines such as social distancing, washing the hands, and covering the cough.    -FOLLOW UP: Follow-up for CT myelogram and then with Dr. Ward for surgical discussion.  Advised to contact clinic if symptoms worsen or change.    Subjective:     Susan Kwan is a 55 y.o. female who presents today for follow-up regarding ongoing chronic midline low back pain that had significantly worsened with radiating pain to the right lateral thigh and lateral shin as well as anterior shin with associated numbness and tingling and significant weakness in her leg.  She did report 100% relief with the injection previously with her back pain however at this point she reports that the injection is completely worn off and her pain is returned to baseline.  Currently her pain is fairly severe at an 8/10 constant type pain worse at bedtime, a 5 at its best.  Again she does feel perceived weakness in the right lower extremity however denies any recent trips or falls.  Currently denies left-sided symptoms.  Denies bowel or bladder loss control.    -Treatment to Date: Right L4-5 hemilaminectomy/microdiscectomy with right L5 nerve root decompression surgery with Dr. Cerna 2017.  Spinal cord stimulator implant T8-9 2018.  Physical therapy x2 summer 2020 right lumbar radiculopathy     Right L4-5 and L5-S1 TFESI 6/2/2020 with 90% relief x2 months.  Preprocedure pain 4/10, post 7/10.  Right L4-5 and L5-S1 TFESI 9/22/2020 with 100% relief of back pain, no relief with right lower extremity symptoms. Preprocedure pain 5/10, post 2/10.     Medications:  Gabapentin 300 mg, not currently taking previous  dose 3-3-4.  Some sedation on higher doses.  Baclofen 10 mg with benefit  Tylenol 3 previously prescribed 3/16/2020, 21 tablets.        Previous prescription for oxycodone 5 mg 3/4/2020 for severe breakthrough pain, patient no longer currently taking.    Patient Active Problem List   Diagnosis     Nicotine Dependence     Migraine Headache     Arthralgias In Multiple Sites     Postablative hypothyroidism     Hyperlipidemia     Major Depression, Recurrent     Sudden Redness Of The Skin (Flushing)     Lower Back Pain     Carpal Tunnel Syndrome     Mild intermittent asthma without complication     Allergic Rhinitis     Seborrheic Keratosis     DESI (obstructive sleep apnea)     Sacroiliac joint dysfunction of right side     Lumbar foraminal stenosis     Lumbar disc herniation     Sciatica of right side     Lumbar stenosis     S/P lumbar microdiscectomy     Gastro-esophageal reflux disease with esophagitis     Depressive disorder     Anxiety state     Obesity (BMI 35.0-39.9) with comorbidity (H)     TMJ (temporomandibular joint syndrome)     Advanced directives, counseling/discussion     ASCUS with positive high risk HPV cervical     Xerostomia due to hyposecretion of salivary gland     Myofascial pain     Benign neoplasm of ascending colon       Current Outpatient Medications on File Prior to Encounter   Medication Sig Dispense Refill     albuterol (PROAIR HFA;PROVENTIL HFA;VENTOLIN HFA) 90 mcg/actuation inhaler INHALE 1 TO 2 PUFFS BY MOUTH EVERY 4 HOURS AS NEEDED FOR WHEEZING 18 g 1     baclofen (LIORESAL) 10 MG tablet TAKE 1 TABLET BY MOUTH THREE TIMES DAILY AS NEEDED 30 tablet 1     FLUoxetine (PROZAC) 20 MG capsule TAKE 2 CAPSULES(40 MG) BY MOUTH DAILY 60 capsule 3     gabapentin (NEURONTIN) 300 MG capsule TAKE 3 CAPSULES BY MOUTH EVERY MORNING, 3 CAPSULES BY MOUTH EVERY DAY IN THE AFTERNOON, AND 4 CAPSULES EVERY EVENING 900 capsule 3     levothyroxine (SYNTHROID, LEVOTHROID) 175 MCG tablet TAKE 1 TABLET(175 MCG) BY  MOUTH DAILY 90 tablet 3     melatonin 3 mg Tab tablet TAKE 1 TABLET(3 MG) BY MOUTH AT BEDTIME AS NEEDED 30 tablet 11     multivitamin therapeutic tablet Take 1 tablet by mouth daily.       simvastatin (ZOCOR) 20 MG tablet TAKE 1 TABLET(20 MG) BY MOUTH AT BEDTIME 90 tablet 4     SUMAtriptan (IMITREX) 50 MG tablet TAKE 1 TABLET BY MOUTH EVERY 2 HOURS AS NEEDED FOR MIGRAINE. MAY REPEAT IN 2 HOURS AS NEEDED 9 tablet 10     ondansetron (ZOFRAN ODT) 4 MG disintegrating tablet Take 1 tablet (4 mg total) by mouth every 8 (eight) hours as needed for nausea. 10 tablet 0     [DISCONTINUED] baclofen (LIORESAL) 10 MG tablet Take 1 tablet (10 mg total) by mouth 3 (three) times a day as needed. 30 tablet 1     No current facility-administered medications on file prior to encounter.        Allergies   Allergen Reactions     Acetaminophen      Other: very sore stomach       Cefuroxime Axetil      Sulfa (Sulfonamide Antibiotics) Rash       Past Medical History:   Diagnosis Date     Anxiety      Asthma      Carpal tunnel syndrome      Depression     PMDD     Disease of thyroid gland      Low back pain      Migraine      Pregnancy          Substance abuse (H)     sober since , narcotic pain medication        Review of Systems  ROS: Positive for numbness and tingling, weakness, increased pain in the evening hours.  Specifically negative for bowel/bladder dysfunction, balance changes, headache, dizziness, foot drop, fevers, chills, appetite changes, nausea/vomiting, unexplained weight loss. Otherwise 13 systems reviewed are negative. Please see the patient's intake questionnaire from today for details.    Reviewed Social, Family, Past Medical and Past Surgical history with patient, no significant changes noted since prior visit.     Objective:     /59 (Patient Site: Right Arm, Patient Position: Sitting, Cuff Size: Adult Large)   Pulse 77   LMP 2014     PHYSICAL EXAMINATION:    --CONSTITUTIONAL: Well developed, well  nourished, healthy appearing individual.  --PSYCHIATRIC: Appropriate mood and affect. No difficulty interacting due to temper, social withdrawal, or memory issues.  --SKIN: Lumbar region is dry and intact.   --RESPIRATORY: Normal rhythm and effort. No abnormal accessory muscle breathing patterns noted.   --MUSCULOSKELETAL:  Normal lumbar lordosis noted, no lateral shift.  --GROSS MOTOR: Easily arises from a seated position. Gait is non-antalgic  --LUMBAR SPINE:  Inspection reveals no evidence of deformity.   --SACROILIAC JOINT: One Finger point test negative.  --LOWER EXTREMITY MOTOR TESTING:  Plantar flexion left 5/5, right 3/5   Dorsiflexion left 5/5, right 3/5   Great toe MTP extension left 5/5, right 5/5  Knee flexion left 5/5, right 4/5  Knee extension left 5/5, right 4/5   Hip flexion left 5/5, right 4/5  --NEUROLOGIC: Bilateral patellar and achilles reflexes are physiologic and symmetric. Sensation to light touch is intact in the bilateral L4, L5, and S1 dermatomes.    RESULTS:   Imaging: Lumbar spine imaging was reviewed today. The images were shown to the patient and the findings were explained using a spine model.      Ct Lumbar Spine Without Contrast  Result Date: 3/20/2020  INDICATION: Back pain or radiculopathy, > 6 weeks. COMPARISON: Lumbar spine radiographs dated 03/12/2020, CT lumbar spine 02/21/2020. TECHNIQUE: Routine without IV contrast. Multiplanar reformats. Dose reduction techniques were used. FINDINGS: Nomenclature is based on 5 lumbar type vertebral bodies. Alignment is within normal limits. The vertebral body heights are normal. Redemonstrated nondisplaced right L4 pars interarticularis fracture, essentially unchanged in appearance compared to the most recent study. No acute fracture seen. Partially visualized spinal cord stimulator leads overlying the left posterior paraspinous musculature, incompletely evaluated on this exam. Mild ectasia of the infrarenal abdominal aorta measuring up to  2.6 cm in transverse dimension (series 3 image 42). Moderate atherosclerotic calcification involving the abdominal aorta and iliac arteries. Unremarkable visualized bony pelvis. SEGMENTAL ANALYSIS: T12-L1: Normal disc height. No significant disc bulge or herniation. Minimal facet arthropathy. No significant spinal or neural foraminal stenosis. The findings are unchanged. L1-L2: Normal disc height. No significant disc bulge or herniation. Minimal facet arthropathy. No spinal or neural foraminal stenosis. The findings are unchanged. L2-L3: Normal disc height. There is a symmetric disc bulge with minimal facet arthropathy. No significant spinal stenosis. Mild left neural foraminal stenosis. The right neural foramen is patent. The findings are unchanged. L3-L4: Normal disc height. Small symmetric disc bulge with minimal facet arthropathy. No spinal stenosis. Moderate left neural foraminal stenosis. The right neural foramen is patent. The findings are unchanged. L4-L5: Minimal disc height loss. Findings of right hemilaminectomy again noted. There is a small disc bulge eccentric to the right with mild bilateral facet arthropathy. There is suggestion of at least mild potentially moderate right lateral recess narrowing with mild overall spinal canal narrowing. This is unchanged from prior. Severe right and moderate left neural foraminal stenosis. The findings are unchanged. L5-S1: Normal disc height. No significant disc bulge or herniation. Mild facet arthropathy. No significant spinal or neural foraminal stenosis. The findings are unchanged.   1. Unchanged nondisplaced right L4 pars interarticularis defect/fracture. No acute fracture.   2. Multilevel degenerative disc disease and facet arthropathy of the lumbar spine, as described. No high-grade spinal stenosis. There is at least mild encroachment on the right lateral recess at the L4-L5 level related to an asymmetric disc bulge to the right. This is seen in the setting of  prior right L4-L5 hemilaminectomy.   3. Varying degrees of neural foraminal narrowing, most pronounced on the right at L4-L5 where it is severe. Please see the body of the report for additional details.        Xr Lumbar Spine Flex And Ext 2 Or 3 Vws  Result Date: 3/12/2020  INDICATION: Evaluate spondylolisthesis stability: Please provide flexion, extension, and neutral measurements COMPARISON: Lumbar radiograph 02/25/2019.   Nomenclature assumes 5 lumbar type vertebral bodies. Minimal 1-2 mm anterior spondylolisthesis at L4-L5 without change on flexion or extension views. Otherwise normal alignment. Normal vertebral body heights. Minimal disc space narrowing with  mild marginal osteophytes. Mild to moderate facet arthropathy within the lower lumbar spine. A stimulator device and lead are partially visualized.        Ct Lumbar Spine Without Contrast  Result Date: 2/21/2020  INDICATION: Back pain, right sciatica with skin paresthesias; fall on ice on 02/05/2020, now with no feeling down right lower leg and pain in right thigh. COMPARISON: Lumbar spine radiographs 02/25/2019 and MRI lumbar spine 05/22/2017 TECHNIQUE: Routine without IV contrast. Multiplanar reformats.  Dose reduction techniques were used. FINDINGS: VERTEBRAE: Normal vertebral body heights and alignment. There is an acute, nondisplaced fracture of the right L4 pars interarticularis (sagittal image #25). No additional acute fracture or posttraumatic subluxation. CANAL/FORAMINA: Mild degenerative disc disease throughout the lumbar spine, though without high-grade spinal canal stenosis. Prior right L4 hemilaminotomy. At L4-L5, there is severe right and mild left neuroforaminal stenosis, with distortion of the exiting right L4 nerve root. PARASPINAL: Atherosclerotic vascular calcifications. Paraspinous soft tissues are otherwise grossly unremarkable.   IMPRESSION:   1.  Acute, nondisplaced fracture of the right L4 pars interarticularis.   2.  At L4-L5,  there is severe right and mild left neuroforaminal stenosis with distortion of the exiting right L4 nerve root.   3.  No high-grade spinal canal stenosis. DICOM format image data is available to non-affiliated external healthcare facilities or entities on a secure, media-free, reciprocally searchable basis with patient authorization for at least a 12-month period after the study.

## 2021-06-12 NOTE — PATIENT INSTRUCTIONS - HE
~Please call Nurse Navigation line [phone] with any questions or concerns about your treatment plan, if symptoms worsen and you would like to be seen urgently, or if you have problems controlling bladder and bowel function.

## 2021-06-12 NOTE — TELEPHONE ENCOUNTER
----- Message from Cristela Salguero CNP sent at 10/8/2020  8:52 AM CDT -----  Regarding: EMG results  Please call patient and notify her that I did review her EMG results that she had yesterday with Dr. Curtis.  It did show both active and chronic lumbar radiculopathy therefore some of the weakness that she has in her lower extremity is permanent however reasonable to discuss options further options with a surgeon as well at this time due to the active component which means some of it may be reversible if she has surgery very soon. If she waits too long it will all be chronic and irreversible.   We discussed this all in clinic as well yesterday as well.    The other option would be trialing an Right L5-S1 TF RHIANNON to see if it helps with the pain however it would not help with weakness now or in the future.    Thanks,  Cristela

## 2021-06-12 NOTE — PATIENT INSTRUCTIONS - HE
~Please call our [facility] Nurse Navigation line [phone] with any questions or concerns about your treatment plan, if symptoms worsen and you would like to be seen urgently, or if you have problems controlling bladder and bowel function.      You have been prescribed a controlled substance medication Tylenol 3 today for pain control.   This medication must be taken as prescribed only as it can be very dangerous to your health if taken more than prescribed.  We discussed the risks (eg, addiction, overdose, worsening pain, death) verses the potential benefit of opioid medication use. Explained that this medication will not be a long term solution to ongoing pain. Discussed using lowest effective dose and the importance of other measures for pain management including physical therapy, other non-opioid medications, behavioral treatments, acupuncture and massage, and other procedure options.     For any further refills on opioid pain medication you must come in to the clinic in person to discuss further in which you may or may not receive refills.

## 2021-06-12 NOTE — PATIENT INSTRUCTIONS - HE
Thank you for choosing the [facility] for your EMG testing.    The ordering provider will receive your final EMG results within the next few days.  Please follow up with your provider for the results and further treatment recommendations.

## 2021-06-12 NOTE — TELEPHONE ENCOUNTER
Call to pt with provider's results and recommendations. Pt stated understanding. Pt ok with neurosurgery referral at this time. She is aware order will be placed and she will be contacted to schedule.

## 2021-06-13 NOTE — PROGRESS NOTES
"Susan Kwan is a 55 y.o. female who is being evaluated via a billable video visit.      The patient has been notified of following:     \"This video visit will be conducted via a call between you and your physician/provider. We have found that certain health care needs can be provided without the need for an in-person physical exam.  This service lets us provide the care you need with a video conversation.  If a prescription is necessary we can send it directly to your pharmacy.  If lab work is needed we can place an order for that and you can then stop by our lab to have the test done at a later time.    Video visits are billed at different rates depending on your insurance coverage. Please reach out to your insurance provider with any questions.    If during the course of the call the physician/provider feels a video visit is not appropriate, you will not be charged for this service.\"    Patient has given verbal consent to a Video visit? Yes  How would you like to obtain your AVS? AVS Preference: MyChart.        Video Start Time: 7:56 AM    Video-Visit Details    Type of service:  Video Visit    Video End Time (time video stopped): 8:10 AM  Originating Location (pt. Location): Home    Distant Location (provider location):  [facility]     Platform used for Video Visit: Jan Salguero CNP    Assessment:     Diagnoses and all orders for this visit:    Chronic right-sided low back pain with right-sided sciatica  -     oxyCODONE-acetaminophen (PERCOCET/ENDOCET) 5-325 mg per tablet; Take 0.5-1 tablets by mouth every 6 (six) hours as needed for pain.  Dispense: 30 tablet; Refill: 0    Acute right lumbar radiculopathy  -     oxyCODONE-acetaminophen (PERCOCET/ENDOCET) 5-325 mg per tablet; Take 0.5-1 tablets by mouth every 6 (six) hours as needed for pain.  Dispense: 30 tablet; Refill: 0    Weakness of right lower extremity    Lumbar radiculopathy    History of lumbar " surgery       Susan Kwan is a 55 y.o. y.o. female with past medical history significant for major depression, TMJ, DESI, hyperlipidemia, nicotine dependence, migraine headache, post ablative hypothyroidism, status post lumbar microdiscectomy Dr. Cerna 2017, spinal cord stimulator implant 2018 who presents today for follow-up regarding:    -Acute progressive right lumbar radiculopathy with short-term relief with right L4-5 and L5-S1 TFESI, ongoing worsening right lower extremity pain with paresthesias.    Patient is scheduled for surgery with Dr. Ward 1/4/2021, surgery schedulers are working on moving surgery date up due to progressive weakness and numbness in right lower extremity..     Plan:     A shared decision making plan was used. The patient's values and choices were respected. Prior medical records from 11/18/2020 the current were reviewed today. The following represents what was discussed and decided upon by the provider and the patient.        -DIAGNOSTIC TESTS: Images were personally reviewed and interpreted.   --Right lower extremity EMG 10/7/2020 with active and chronic right L5 radiculopathy, potentially L4 as well.  --Lumbar flexion-extension x-ray shows no dynamic instability.  --EMG 3/18/2020 shows active right L5 radiculopathy.  --Lumbar CT scan 2/21/2020 shows acute nondisplaced right L4 pars interarticularis fracture.  L4-5 severe right and mild left foraminal stenosis with distortion of the right L4 nerve root.  Prior right L4 hemilaminotomy.  --[facility] spinal cord stimulator, patient cannot have MRI.    -INTERVENTIONS: No further injection recommendations as patient is awaiting surgery.    -MEDICATIONS: Discontinue Tylenol 3 due to ineffectiveness  -Prescribed Percocet 5/325 mg 1/2 to 1 tablet every 6 hours as needed for severe breakthrough pain number 30 tablets given for 10 days worth.  [place]  checked. Discussed the risks (eg, addiction, overdose, worsening pain) verses benefit of  opioid use with patient today. Explained that this medication will not be a long term solution to ongoing pain. Discussed using lowest effective dose and the importance of other measures for pain management including PT, other non-opioid medications, behavioral treatments, and other procedure options.   -Did advise patient that at this point it may be worth discontinuing all NSAID/ibuprofen type medication as she would need to discontinue/hold these 7 to 10 days prior to surgery anyway.  Patient verbalized understanding.  Discussed side effects of medications and proper use. Patient verbalized understanding.     -PATIENT EDUCATION:  16 minutes of total visit time was spent face to face with the patient today, 60 % of the visit was spent on counseling, education, and coordinating care.   -5 minutes spent outside of visit time, non-face-to-face time, reviewing chart.  -Today we also discussed the issues related to the current COVID-19 pandemic, the pros and cons of the current treatment plan, the CDC guidelines such as social distancing, washing the hands, and covering the cough.    -FOLLOW UP: Follow-up as needed for medication refills prior to spinal surgery.  Advised to contact clinic if symptoms worsen or change.    Subjective:     Susan Kwan is a 55 y.o. female who presents today for follow-up regarding ongoing right low back pain and diffuse right lower extremity pain with numbness and tingling and progressive right lower extremity weakness.  Patient previously was using a cane for walking and now she is resorting to a walker with a seat as she does not feel her right leg will hold her.  Currently her pain has also increased and is a constant 8/10, 10 at its worst and her current Tylenol 3 is not touching the pain as it was helping previously.  She is also taking gabapentin with minimal benefit at this time as well as ibuprofen 3 times daily alternating with Tylenol 500 mg 3 times daily.  Patient was seen in  clinic yesterday with Dr. Ward and there attempting to move up her surgery due to her progressive right lower extremity pain paresthesias and weakness, which originally was scheduled for 1/4/2020.     -Treatment to Date: Right L4-5 hemilaminectomy/microdiscectomy with right L5 nerve root decompression surgery with Dr. Cerna 2017.  Spinal cord stimulator implant T8-9 2018.  Physical therapy x2 summer 2020 right lumbar radiculopathy     Right L4-5 and L5-S1 TFESI 6/2/2020 with 90% relief x2 months.  Preprocedure pain 4/10, post 7/10.  Right L4-5 and L5-S1 TFESI 9/22/2020 with 100% relief of back pain, no relief with right lower extremity symptoms. Preprocedure pain 5/10, post 2/10.     Medications:  Gabapentin 300 mg, not currently taking previous dose 3-3-4.  Some sedation on higher doses.  Baclofen 10 mg with benefit  Tylenol 3 previously prescribed 3/16/2020, 21 tablets.        Previous prescription for oxycodone 5 mg 3/4/2020 for severe breakthrough pain, patient no longer currently taking.    Patient Active Problem List   Diagnosis     Nicotine Dependence     Migraine Headache     Arthralgias In Multiple Sites     Postablative hypothyroidism     Hyperlipidemia     Major Depression, Recurrent     Sudden Redness Of The Skin (Flushing)     Lower Back Pain     Carpal Tunnel Syndrome     Mild intermittent asthma without complication     Allergic Rhinitis     Seborrheic Keratosis     DESI (obstructive sleep apnea)     Sacroiliac joint dysfunction of right side     Lumbar foraminal stenosis     Lumbar disc herniation     Sciatica of right side     Lumbar stenosis     S/P lumbar microdiscectomy     Gastro-esophageal reflux disease with esophagitis     Depressive disorder     Anxiety state     Obesity (BMI 35.0-39.9) with comorbidity (H)     TMJ (temporomandibular joint syndrome)     Advanced directives, counseling/discussion     ASCUS with positive high risk HPV cervical     Xerostomia due to hyposecretion of salivary  gland     Myofascial pain     Benign neoplasm of ascending colon     Lumbar radiculopathy     Spinal stenosis of lumbar region without neurogenic claudication       Current Outpatient Medications on File Prior to Encounter   Medication Sig Dispense Refill     baclofen (LIORESAL) 10 MG tablet Take 1 tablet (10 mg total) by mouth 3 (three) times a day as needed. 90 tablet 1     gabapentin (NEURONTIN) 300 MG capsule TAKE 3 CAPSULES BY MOUTH EVERY MORNING, 3 CAPSULES BY MOUTH EVERY DAY IN THE AFTERNOON, AND 4 CAPSULES EVERY EVENING 900 capsule 3     [DISCONTINUED] acetaminophen-codeine (TYLENOL #3) 300-30 mg per tablet Take 1 tablet by mouth 3 (three) times a day as needed for pain. 30 tablet 0     albuterol (PROAIR HFA;PROVENTIL HFA;VENTOLIN HFA) 90 mcg/actuation inhaler INHALE 1 TO 2 PUFFS BY MOUTH EVERY 4 HOURS AS NEEDED FOR WHEEZING 18 g 1     FLUoxetine (PROZAC) 20 MG capsule TAKE 2 CAPSULES(40 MG) BY MOUTH DAILY 60 capsule 3     levothyroxine (SYNTHROID, LEVOTHROID) 175 MCG tablet TAKE 1 TABLET(175 MCG) BY MOUTH DAILY 90 tablet 3     multivitamin therapeutic tablet Take 1 tablet by mouth daily.       simvastatin (ZOCOR) 20 MG tablet TAKE 1 TABLET(20 MG) BY MOUTH AT BEDTIME 90 tablet 4     SUMAtriptan (IMITREX) 50 MG tablet TAKE 1 TABLET BY MOUTH EVERY 2 HOURS AS NEEDED FOR MIGRAINE. MAY REPEAT IN 2 HOURS AS NEEDED 9 tablet 10     [DISCONTINUED] baclofen (LIORESAL) 10 MG tablet Take 1 tablet (10 mg total) by mouth 3 (three) times a day as needed. 30 tablet 1     No current facility-administered medications on file prior to encounter.        Allergies   Allergen Reactions     Acetaminophen      Other: very sore stomach       Sulfa (Sulfonamide Antibiotics) Rash     Cefuroxime Axetil Rash       Past Medical History:   Diagnosis Date     Anxiety      Asthma      Carpal tunnel syndrome      Depression     PMDD     Disease of thyroid gland      Low back pain      Migraine      DESI on CPAP      Pregnancy           Substance abuse (H)     sober since 2008, narcotic pain medication        Review of Systems  ROS:  Specifically negative for bowel/bladder dysfunction, balance changes, headache, dizziness, foot drop, fevers, chills, appetite changes, nausea/vomiting, unexplained weight loss. Otherwise 13 systems reviewed are negative. Please see the patient's intake questionnaire from today for details.    Reviewed Social, Family, Past Medical and Past Surgical history with patient, no significant changes noted since prior visit.     Objective:     VIRTUAL PHYSICAL EXAM:   --CONSTITUTIONAL: Well developed, well nourished, healthy appearing individual.  --PSYCHIATRIC: Appropriate mood and affect. No difficulty interacting due to temper, social withdrawal, or memory issues.  --SKIN: Lumbar region is visually dry and intact.   --RESPIRATORY: Normal rhythm and effort. No abnormal accessory muscle breathing patterns noted.   --STANDING EXAMINATION:  Normal lumbar lordosis noted, no lateral shift.  --MUSCULOSKELETAL: Lumbar spine inspection reveals no evidence of deformity.     RESULTS:   Imaging: Lumbar spine imaging was reviewed today. The images were shown to the patient and the findings were explained using a spine model.      Ct Lumbar Myelogram  Result Date: 11/5/2020  EXAM: CT LUMBAR MYELOGRAM LOCATION: [facility] DATE/TIME: 11/5/2020 9:01 AM INDICATION: Low back pain, >6 wks / red flag(s) / radiculopathy COMPARISON: 03/20/2020 head CT. CONTRAST: 20 mL Omnipaque 180 intrathecal TECHNIQUE: Routine with intrathecal contrast administered in a separate procedure. Multiplanar reformats. Dose reduction techniques were used. FINDINGS: Nomenclature is based on 5 lumbar type vertebral bodies. Straightening of lumbar lordosis. No listhesis. Vertebral body heights are maintained. No destructive bony lesion. Normal myelographic appearance of the distal spinal cord and cauda equina with conus medullaris at L1. No  extraspinal soft tissue abnormality. Unremarkable visualized bony pelvis. T12-L1: Normal disc height. No herniation. Mild facet arthropathy. No spinal canal or neural foraminal stenosis. L1-L2: Normal disc height. No herniation. Mild facet arthropathy. No spinal canal or neural foraminal stenosis. L2-L3: Mild disc height loss. Disc bulge. Mild facet arthropathy. Mild spinal canal stenosis. Mild bilateral neural foraminal stenosis. L3-L4: Mild disc height loss. Disc bulge. Normal facets. Mild spinal canal stenosis. Moderate right neural foraminal stenosis. Mild left neural foraminal stenosis. L4-L5: Mild disc height loss. Asymmetric right disc bulge. Mild bilateral facet arthropathy. Mild spinal canal stenosis. Severe right neural foraminal stenosis. Mild left neural foraminal stenosis. L5-S1: Normal disc height. No herniation. Moderate facet arthritic. No spinal canal stenosis. No neural foraminal stenosis..   1.  Unchanged L4 pars interarticularis fracture. 2.  Multilevel lumbar spondylosis. 3.  No high-grade spinal canal stenosis. 4.  Severe neural foraminal stenosis on the right at L4-L5. Moderate neural foraminal stenosis on the right at L3-L4.         Ct Lumbar Spine Without Contrast  Result Date: 3/20/2020  INDICATION: Back pain or radiculopathy, > 6 weeks. COMPARISON: Lumbar spine radiographs dated 03/12/2020, CT lumbar spine 02/21/2020. TECHNIQUE: Routine without IV contrast. Multiplanar reformats. Dose reduction techniques were used. FINDINGS: Nomenclature is based on 5 lumbar type vertebral bodies. Alignment is within normal limits. The vertebral body heights are normal. Redemonstrated nondisplaced right L4 pars interarticularis fracture, essentially unchanged in appearance compared to the most recent study. No acute fracture seen. Partially visualized spinal cord stimulator leads overlying the left posterior paraspinous musculature, incompletely evaluated on this exam. Mild ectasia of the infrarenal  abdominal aorta measuring up to 2.6 cm in transverse dimension (series 3 image 42). Moderate atherosclerotic calcification involving the abdominal aorta and iliac arteries. Unremarkable visualized bony pelvis. SEGMENTAL ANALYSIS: T12-L1: Normal disc height. No significant disc bulge or herniation. Minimal facet arthropathy. No significant spinal or neural foraminal stenosis. The findings are unchanged. L1-L2: Normal disc height. No significant disc bulge or herniation. Minimal facet arthropathy. No spinal or neural foraminal stenosis. The findings are unchanged. L2-L3: Normal disc height. There is a symmetric disc bulge with minimal facet arthropathy. No significant spinal stenosis. Mild left neural foraminal stenosis. The right neural foramen is patent. The findings are unchanged. L3-L4: Normal disc height. Small symmetric disc bulge with minimal facet arthropathy. No spinal stenosis. Moderate left neural foraminal stenosis. The right neural foramen is patent. The findings are unchanged. L4-L5: Minimal disc height loss. Findings of right hemilaminectomy again noted. There is a small disc bulge eccentric to the right with mild bilateral facet arthropathy. There is suggestion of at least mild potentially moderate right lateral recess narrowing with mild overall spinal canal narrowing. This is unchanged from prior. Severe right and moderate left neural foraminal stenosis. The findings are unchanged. L5-S1: Normal disc height. No significant disc bulge or herniation. Mild facet arthropathy. No significant spinal or neural foraminal stenosis. The findings are unchanged.   1. Unchanged nondisplaced right L4 pars interarticularis defect/fracture. No acute fracture.   2. Multilevel degenerative disc disease and facet arthropathy of the lumbar spine, as described. No high-grade spinal stenosis. There is at least mild encroachment on the right lateral recess at the L4-L5 level related to an asymmetric disc bulge to the  right. This is seen in the setting of prior right L4-L5 hemilaminectomy.   3. Varying degrees of neural foraminal narrowing, most pronounced on the right at L4-L5 where it is severe. Please see the body of the report for additional details.        Xr Lumbar Spine Flex And Ext 2 Or 3 Vws  Result Date: 3/12/2020  INDICATION: Evaluate spondylolisthesis stability: Please provide flexion, extension, and neutral measurements COMPARISON: Lumbar radiograph 02/25/2019.   Nomenclature assumes 5 lumbar type vertebral bodies. Minimal 1-2 mm anterior spondylolisthesis at L4-L5 without change on flexion or extension views. Otherwise normal alignment. Normal vertebral body heights. Minimal disc space narrowing with  mild marginal osteophytes. Mild to moderate facet arthropathy within the lower lumbar spine. A stimulator device and lead are partially visualized.        Ct Lumbar Spine Without Contrast  Result Date: 2/21/2020  INDICATION: Back pain, right sciatica with skin paresthesias; fall on ice on 02/05/2020, now with no feeling down right lower leg and pain in right thigh. COMPARISON: Lumbar spine radiographs 02/25/2019 and MRI lumbar spine 05/22/2017 TECHNIQUE: Routine without IV contrast. Multiplanar reformats.  Dose reduction techniques were used. FINDINGS: VERTEBRAE: Normal vertebral body heights and alignment. There is an acute, nondisplaced fracture of the right L4 pars interarticularis (sagittal image #25). No additional acute fracture or posttraumatic subluxation. CANAL/FORAMINA: Mild degenerative disc disease throughout the lumbar spine, though without high-grade spinal canal stenosis. Prior right L4 hemilaminotomy. At L4-L5, there is severe right and mild left neuroforaminal stenosis, with distortion of the exiting right L4 nerve root. PARASPINAL: Atherosclerotic vascular calcifications. Paraspinous soft tissues are otherwise grossly unremarkable.   IMPRESSION:   1.  Acute, nondisplaced fracture of the right L4 pars  interarticularis.   2.  At L4-L5, there is severe right and mild left neuroforaminal stenosis with distortion of the exiting right L4 nerve root.   3.  No high-grade spinal canal stenosis. DICOM format image data is available to non-affiliated external healthcare facilities or entities on a secure, media-free, reciprocally searchable basis with patient authorization for at least a 12-month period after the study.

## 2021-06-13 NOTE — TELEPHONE ENCOUNTER
Agree that she should be seen at urgent care for leg swelling.  Follow up with Cristela if symptoms persist.

## 2021-06-13 NOTE — PROGRESS NOTES
"Susan Kwan is a 55 y.o. female who is being evaluated via a billable video visit.      The patient has been notified of following:     \"This video visit will be conducted via a call between you and your physician/provider. We have found that certain health care needs can be provided without the need for an in-person physical exam.  This service lets us provide the care you need with a video conversation.  If a prescription is necessary we can send it directly to your pharmacy.  If lab work is needed we can place an order for that and you can then stop by our lab to have the test done at a later time.    Video visits are billed at different rates depending on your insurance coverage. Please reach out to your insurance provider with any questions.    If during the course of the call the physician/provider feels a video visit is not appropriate, you will not be charged for this service.\"    Patient has given verbal consent to a Video visit? Yes  How would you like to obtain your AVS? AVS Preference: MyChart.        Video Start Time: 9:10 AM    Video-Visit Details    Type of service:  Video Visit    Video End Time (time video stopped): 9:22 AM  Originating Location (pt. Location): Home    Distant Location (provider location):  [facility]     Platform used for Video Visit: Ander Salguero CNP    Assessment:     Diagnoses and all orders for this visit:    Chronic right-sided low back pain with right-sided sciatica  -     oxyCODONE-acetaminophen (PERCOCET/ENDOCET) 5-325 mg per tablet; Take 0.5-1 tablets by mouth every 6 (six) hours as needed for pain (Severe breakthrough pain).  Dispense: 30 tablet; Refill: 0    Acute right lumbar radiculopathy  -     oxyCODONE-acetaminophen (PERCOCET/ENDOCET) 5-325 mg per tablet; Take 0.5-1 tablets by mouth every 6 (six) hours as needed for pain (Severe breakthrough pain).  Dispense: 30 tablet; Refill: 0    Weakness of right lower " extremity    Lumbar radiculopathy    History of lumbar surgery       Susan Kwan is a 55 y.o. y.o. female with past medical history significant for major depression, TMJ, DESI, hyperlipidemia, nicotine dependence, migraine headache, post ablative hypothyroidism, status post lumbar microdiscectomy Dr. Cerna 2017, spinal cord stimulator implant 2018 who presents today for follow-up regarding:    -Ongoing severe right lumbar radiculopathy with short-term relief with right L4-5 and L5-S1 TFESI.  Patient endorsing significant right lower extremity weakness.    Patient is now scheduled for surgery with Dr. Ward on 12/17/2020 at [facility].     Plan:     A shared decision making plan was used. The patient's values and choices were respected. Prior medical records from 12/2/2020 to current were reviewed today. The following represents what was discussed and decided upon by the provider and the patient.        -DIAGNOSTIC TESTS: Images were personally reviewed and interpreted.   --Right lower extremity EMG 10/7/2020 with active and chronic right L5 radiculopathy, potentially L4 as well.  --Lumbar flexion-extension x-ray shows no dynamic instability.  --EMG 3/18/2020 shows active right L5 radiculopathy.  --Lumbar CT scan 2/21/2020 shows acute nondisplaced right L4 pars interarticularis fracture.  L4-5 severe right and mild left foraminal stenosis with distortion of the right L4 nerve root.  Prior right L4 hemilaminotomy.  --[facility] spinal cord stimulator, patient cannot have MRI.    -INTERVENTIONS: No further injection recommendations at this time.    -MEDICATIONS: Refilled Percocet 5/325 mg 1 tablet every 6 hours as needed for severe breakthrough pain, 30 tablets given for 8 days worth to get her through surgery which is currently scheduled for 12/17/2020.  Post surgery medication will be managed by Dr. Ward.  -[place]  checked. Discussed the risks (eg, addiction, overdose, worsening pain) verses benefit of opioid  use with patient today. Explained that this medication will not be a long term solution to ongoing pain. Discussed using lowest effective dose and the importance of other measures for pain management including PT, other non-opioid medications, behavioral treatments, and other procedure options.   Discussed side effects of medications and proper use. Patient verbalized understanding.    -PHYSICAL THERAPY: Encourage patient to continue with home exercises as tolerated from prior physical therapy sessions.  Discussed the importance of core strengthening, ROM, stretching exercises with the patient and how each of these entities is important in decreasing pain.  Explained to the patient that the purpose of physical therapy is to teach the patient a home exercise program.  These exercises need to be performed every day in order to decrease pain and prevent future occurrences of pain.        -PATIENT EDUCATION:  12 minutes of total visit time was spent face to face with the patient today, 60 % of the visit was spent on counseling, education, and coordinating care.   -5 minutes spent outside of visit time, non-face-to-face time, reviewing chart.  -Today we also discussed the issues related to the current COVID-19 pandemic, the pros and cons of the current treatment plan, the CDC guidelines such as social distancing, washing the hands, and covering the cough.    -FOLLOW UP: Follow-up for surgery with Dr. Ward [facility] neurosurgery.  Advised to contact clinic if symptoms worsen or change.    Subjective:     Susan Kwan is a 55 y.o. female who presents today for follow-up regarding ongoing significant right low back pain with right lower extremity pain, currently her pain is a 6/10, 9 at its worst, 5 at its best and she denies any changes in her pain at this time.  She is still feeling numbness and tingling as well as perceived weakness in her right lower extremity.  Patient is currently scheduled for surgery with  Dr. Ward on 12/17/2020.  She is following up for medication management with Percocet refill, patient reports that she it will be out today.    -Treatment to Date: Right L4-5 hemilaminectomy/microdiscectomy with right L5 nerve root decompression surgery with Dr. Cerna 2017.  Spinal cord stimulator implant T8-9 2018.  Physical therapy x2 summer 2020 right lumbar radiculopathy     Right L4-5 and L5-S1 TFESI 6/2/2020 with 90% relief x2 months.  Preprocedure pain 4/10, post 7/10.  Right L4-5 and L5-S1 TFESI 9/22/2020 with 100% relief of back pain, no relief with right lower extremity symptoms. Preprocedure pain 5/10, post 2/10.     Medications:  Gabapentin 300 mg, not currently taking previous dose 3-3-4.  Some sedation on higher doses.  Baclofen 10 mg with benefit  Tylenol 3 previously prescribed 3/16/2020, 21 tablets.        Previous prescription for oxycodone 5 mg 3/4/2020 for severe breakthrough pain, patient no longer currently taking.    Patient Active Problem List   Diagnosis     Nicotine Dependence     Migraine Headache     Arthralgias In Multiple Sites     Postablative hypothyroidism     Hyperlipidemia     Major Depression, Recurrent     Sudden Redness Of The Skin (Flushing)     Lower Back Pain     Carpal Tunnel Syndrome     Mild intermittent asthma without complication     Allergic Rhinitis     Seborrheic Keratosis     DESI (obstructive sleep apnea)     Sacroiliac joint dysfunction of right side     Lumbar foraminal stenosis     Lumbar disc herniation     Sciatica of right side     Lumbar stenosis     S/P lumbar microdiscectomy     Gastro-esophageal reflux disease with esophagitis     Depressive disorder     Anxiety state     Obesity (BMI 35.0-39.9) with comorbidity (H)     TMJ (temporomandibular joint syndrome)     Advanced directives, counseling/discussion     ASCUS with positive high risk HPV cervical     Xerostomia due to hyposecretion of salivary gland     Myofascial pain     Benign neoplasm of ascending  colon     Lumbar radiculopathy     Spinal stenosis of lumbar region without neurogenic claudication       Current Outpatient Medications on File Prior to Encounter   Medication Sig Dispense Refill     baclofen (LIORESAL) 10 MG tablet Take 1 tablet (10 mg total) by mouth 3 (three) times a day as needed. 90 tablet 1     gabapentin (NEURONTIN) 300 MG capsule TAKE 3 CAPSULES BY MOUTH EVERY MORNING, 3 CAPSULES BY MOUTH EVERY DAY IN THE AFTERNOON, AND 4 CAPSULES EVERY EVENING 900 capsule 3     [DISCONTINUED] oxyCODONE-acetaminophen (PERCOCET/ENDOCET) 5-325 mg per tablet Take 0.5-1 tablets by mouth every 6 (six) hours as needed for pain. 30 tablet 0     albuterol (PROAIR HFA;PROVENTIL HFA;VENTOLIN HFA) 90 mcg/actuation inhaler INHALE 1 TO 2 PUFFS BY MOUTH EVERY 4 HOURS AS NEEDED FOR WHEEZING 18 g 1     FLUoxetine (PROZAC) 20 MG capsule TAKE 2 CAPSULES(40 MG) BY MOUTH DAILY 60 capsule 3     levothyroxine (SYNTHROID, LEVOTHROID) 175 MCG tablet TAKE 1 TABLET(175 MCG) BY MOUTH DAILY 90 tablet 3     multivitamin therapeutic tablet Take 1 tablet by mouth daily.       simvastatin (ZOCOR) 20 MG tablet TAKE 1 TABLET(20 MG) BY MOUTH AT BEDTIME 90 tablet 4     SUMAtriptan (IMITREX) 50 MG tablet TAKE 1 TABLET BY MOUTH EVERY 2 HOURS AS NEEDED FOR MIGRAINE. MAY REPEAT IN 2 HOURS AS NEEDED 9 tablet 10     [DISCONTINUED] baclofen (LIORESAL) 10 MG tablet Take 1 tablet (10 mg total) by mouth 3 (three) times a day as needed. 30 tablet 1     No current facility-administered medications on file prior to encounter.        Allergies   Allergen Reactions     Acetaminophen      Other: very sore stomach       Sulfa (Sulfonamide Antibiotics) Rash     Cefuroxime Axetil Rash       Past Medical History:   Diagnosis Date     Anxiety      Asthma      Carpal tunnel syndrome      Depression     PMDD     Disease of thyroid gland      Low back pain      Migraine      DESI on CPAP      Pregnancy          Substance abuse (H)     sober since , narcotic  pain medication        Review of Systems  ROS:  Specifically negative for bowel/bladder dysfunction, balance changes, headache, dizziness, foot drop, fevers, chills, appetite changes, nausea/vomiting, unexplained weight loss. Otherwise 13 systems reviewed are negative. Please see the patient's intake questionnaire from today for details.    Reviewed Social, Family, Past Medical and Past Surgical history with patient, no significant changes noted since prior visit.     Objective:     VIRTUAL PHYSICAL EXAM:   --CONSTITUTIONAL: Well developed, well nourished, healthy appearing individual.  --PSYCHIATRIC: Appropriate mood and affect. No difficulty interacting due to temper, social withdrawal, or memory issues.  --SKIN: Lumbar region is visually dry and intact.   --RESPIRATORY: Normal rhythm and effort. No abnormal accessory muscle breathing patterns noted.   --STANDING EXAMINATION:  Normal lumbar lordosis noted, no lateral shift.  --MUSCULOSKELETAL: Lumbar spine inspection reveals no evidence of deformity. Range of motion is not limited in lumbar flexion, extension, lateral rotation.   --GROSS MOTOR: Gait is non-antalgic. Easily arises from a seated position. Toe walking and heel walking are normal without significant difficulty.    --LOWER EXTREMITY MOTOR TESTING:   Full and equal bilateral active range of motion with knee extension/flexion and ankle dorsiflexion/extension to anti-gravity.   Heel raises 10/10 bilaterally.      RESULTS:   Imaging: Lumbar spine imaging was reviewed today. The images were shown to the patient and the findings were explained using a spine model.      Us Venous Leg Left  Result Date: 11/27/2020  EXAM DATE:         11/27/2020 EXAM: US LOWER EXTREMITY VEINS LTD LEFT LOCATION: [facility] DATE/TIME: 11/27/2020 3:15 PM INDICATION: Left leg acute swelling and calf tenderness. COMPARISON: None. TECHNIQUE: Venous Duplex ultrasound of the left lower extremity with and without  compression, augmentation and duplex. Color flow and spectral Doppler with waveform analysis performed. FINDINGS: Exam includes the common femoral, femoral, popliteal, and contralateral common femoral veins as well as segmentally visualized deep calf veins and greater saphenous vein. LEFT: No deep vein thrombosis. No superficial thrombophlebitis. No popliteal cyst. IMPRESSION: 1.  No deep venous thrombosis in the left lower extremity. 2.  Left leg subcutaneous edema.         Ct Lumbar Myelogram  Result Date: 11/5/2020  EXAM: CT LUMBAR MYELOGRAM LOCATION: [facility] DATE/TIME: 11/5/2020 9:01 AM INDICATION: Low back pain, >6 wks / red flag(s) / radiculopathy COMPARISON: 03/20/2020 head CT. CONTRAST: 20 mL Omnipaque 180 intrathecal TECHNIQUE: Routine with intrathecal contrast administered in a separate procedure. Multiplanar reformats. Dose reduction techniques were used. FINDINGS: Nomenclature is based on 5 lumbar type vertebral bodies. Straightening of lumbar lordosis. No listhesis. Vertebral body heights are maintained. No destructive bony lesion. Normal myelographic appearance of the distal spinal cord and cauda equina with conus medullaris at L1. No extraspinal soft tissue abnormality. Unremarkable visualized bony pelvis. T12-L1: Normal disc height. No herniation. Mild facet arthropathy. No spinal canal or neural foraminal stenosis. L1-L2: Normal disc height. No herniation. Mild facet arthropathy. No spinal canal or neural foraminal stenosis. L2-L3: Mild disc height loss. Disc bulge. Mild facet arthropathy. Mild spinal canal stenosis. Mild bilateral neural foraminal stenosis. L3-L4: Mild disc height loss. Disc bulge. Normal facets. Mild spinal canal stenosis. Moderate right neural foraminal stenosis. Mild left neural foraminal stenosis. L4-L5: Mild disc height loss. Asymmetric right disc bulge. Mild bilateral facet arthropathy. Mild spinal canal stenosis. Severe right neural foraminal  stenosis. Mild left neural foraminal stenosis. L5-S1: Normal disc height. No herniation. Moderate facet arthritic. No spinal canal stenosis. No neural foraminal stenosis..   1.  Unchanged L4 pars interarticularis fracture. 2.  Multilevel lumbar spondylosis. 3.  No high-grade spinal canal stenosis. 4.  Severe neural foraminal stenosis on the right at L4-L5. Moderate neural foraminal stenosis on the right at L3-L4.         Ct Lumbar Spine Without Contrast  Result Date: 3/20/2020  INDICATION: Back pain or radiculopathy, > 6 weeks. COMPARISON: Lumbar spine radiographs dated 03/12/2020, CT lumbar spine 02/21/2020. TECHNIQUE: Routine without IV contrast. Multiplanar reformats. Dose reduction techniques were used. FINDINGS: Nomenclature is based on 5 lumbar type vertebral bodies. Alignment is within normal limits. The vertebral body heights are normal. Redemonstrated nondisplaced right L4 pars interarticularis fracture, essentially unchanged in appearance compared to the most recent study. No acute fracture seen. Partially visualized spinal cord stimulator leads overlying the left posterior paraspinous musculature, incompletely evaluated on this exam. Mild ectasia of the infrarenal abdominal aorta measuring up to 2.6 cm in transverse dimension (series 3 image 42). Moderate atherosclerotic calcification involving the abdominal aorta and iliac arteries. Unremarkable visualized bony pelvis. SEGMENTAL ANALYSIS: T12-L1: Normal disc height. No significant disc bulge or herniation. Minimal facet arthropathy. No significant spinal or neural foraminal stenosis. The findings are unchanged. L1-L2: Normal disc height. No significant disc bulge or herniation. Minimal facet arthropathy. No spinal or neural foraminal stenosis. The findings are unchanged. L2-L3: Normal disc height. There is a symmetric disc bulge with minimal facet arthropathy. No significant spinal stenosis. Mild left neural foraminal stenosis. The right neural foramen  is patent. The findings are unchanged. L3-L4: Normal disc height. Small symmetric disc bulge with minimal facet arthropathy. No spinal stenosis. Moderate left neural foraminal stenosis. The right neural foramen is patent. The findings are unchanged. L4-L5: Minimal disc height loss. Findings of right hemilaminectomy again noted. There is a small disc bulge eccentric to the right with mild bilateral facet arthropathy. There is suggestion of at least mild potentially moderate right lateral recess narrowing with mild overall spinal canal narrowing. This is unchanged from prior. Severe right and moderate left neural foraminal stenosis. The findings are unchanged. L5-S1: Normal disc height. No significant disc bulge or herniation. Mild facet arthropathy. No significant spinal or neural foraminal stenosis. The findings are unchanged.   1. Unchanged nondisplaced right L4 pars interarticularis defect/fracture. No acute fracture.   2. Multilevel degenerative disc disease and facet arthropathy of the lumbar spine, as described. No high-grade spinal stenosis. There is at least mild encroachment on the right lateral recess at the L4-L5 level related to an asymmetric disc bulge to the right. This is seen in the setting of prior right L4-L5 hemilaminectomy.   3. Varying degrees of neural foraminal narrowing, most pronounced on the right at L4-L5 where it is severe. Please see the body of the report for additional details.        Xr Lumbar Spine Flex And Ext 2 Or 3 Vws  Result Date: 3/12/2020  INDICATION: Evaluate spondylolisthesis stability: Please provide flexion, extension, and neutral measurements COMPARISON: Lumbar radiograph 02/25/2019.   Nomenclature assumes 5 lumbar type vertebral bodies. Minimal 1-2 mm anterior spondylolisthesis at L4-L5 without change on flexion or extension views. Otherwise normal alignment. Normal vertebral body heights. Minimal disc space narrowing with  mild marginal osteophytes. Mild to moderate  facet arthropathy within the lower lumbar spine. A stimulator device and lead are partially visualized.        Ct Lumbar Spine Without Contrast  Result Date: 2/21/2020  INDICATION: Back pain, right sciatica with skin paresthesias; fall on ice on 02/05/2020, now with no feeling down right lower leg and pain in right thigh. COMPARISON: Lumbar spine radiographs 02/25/2019 and MRI lumbar spine 05/22/2017 TECHNIQUE: Routine without IV contrast. Multiplanar reformats.  Dose reduction techniques were used. FINDINGS: VERTEBRAE: Normal vertebral body heights and alignment. There is an acute, nondisplaced fracture of the right L4 pars interarticularis (sagittal image #25). No additional acute fracture or posttraumatic subluxation. CANAL/FORAMINA: Mild degenerative disc disease throughout the lumbar spine, though without high-grade spinal canal stenosis. Prior right L4 hemilaminotomy. At L4-L5, there is severe right and mild left neuroforaminal stenosis, with distortion of the exiting right L4 nerve root. PARASPINAL: Atherosclerotic vascular calcifications. Paraspinous soft tissues are otherwise grossly unremarkable.   IMPRESSION:   1.  Acute, nondisplaced fracture of the right L4 pars interarticularis.   2.  At L4-L5, there is severe right and mild left neuroforaminal stenosis with distortion of the exiting right L4 nerve root.   3.  No high-grade spinal canal stenosis. DICOM format image data is available to non-affiliated external healthcare facilities or entities on a secure, media-free, reciprocally searchable basis with patient authorization for at least a 12-month period after the study.

## 2021-06-13 NOTE — PROGRESS NOTES
"  Assessment & Plan:       1. Left leg swelling  US Venous Leg Left      Medical Decision Making  Patient presents with acute onset left lower extremity swelling.  Initial concerns for DVT were ruled out with a normal ultrasound.  For the patient symptoms are likely secondary to knee pain irritation.  Patient noted having to episodes of the knee \"giving out\".  Recommend rest, ice, compression, and elevation.  Discussed signs of worsening symptoms and when to follow-up with PCP if no symptom improvement.    Patient Instructions   You were seen today with left lower leg swelling.  There are no signs of a blood clot on ultrasound.  Feel that your symptoms are most likely secondary to a knee injury resulting in swelling of the right leg.  Recommend rest, ice, compression, and elevation.        Subjective:       Susan Kwan is a 55 y.o. female here for evaluation of left leg swelling.  Onset of symptoms was in the last 24 hours.  Patient noted that her left knee buckled twice yesterday.  She has had a chronic knee issues over the years.  Then since this morning patient has had worsening left leg swelling distal to the knee with tingling radiating down to the patient's left foot.  She otherwise denies any cuts, trauma, injury, fevers, and rashes.  Patient has no history of DVT.  She is not on any blood thinners.  No recent procedures, prolonged sitting, or estrogen products.    The following portions of the patient's history were reviewed and updated as appropriate: allergies, current medications and problem list.    Review of Systems  Pertinent items are noted in HPI.     Allergies  Allergies   Allergen Reactions     Acetaminophen      Other: very sore stomach       Sulfa (Sulfonamide Antibiotics) Rash     Cefuroxime Axetil Rash       Family History   Problem Relation Age of Onset     Heart disease Daughter         WPW,  at age 3     Diabetes Paternal Grandmother      Stroke Paternal Grandfather      Sleep " apnea Father      Breast cancer Maternal Grandmother        Social History     Socioeconomic History     Marital status:      Spouse name: None     Number of children: None     Years of education: None     Highest education level: None   Occupational History     None   Social Needs     Financial resource strain: None     Food insecurity     Worry: None     Inability: None     Transportation needs     Medical: None     Non-medical: None   Tobacco Use     Smoking status: Former Smoker     Packs/day: 0.50     Types: Cigarettes     Smokeless tobacco: Never Used   Substance and Sexual Activity     Alcohol use: No     Drug use: No     Sexual activity: Never   Lifestyle     Physical activity     Days per week: None     Minutes per session: None     Stress: None   Relationships     Social connections     Talks on phone: None     Gets together: None     Attends Mosque service: None     Active member of club or organization: None     Attends meetings of clubs or organizations: None     Relationship status: None     Intimate partner violence     Fear of current or ex partner: None     Emotionally abused: None     Physically abused: None     Forced sexual activity: None   Other Topics Concern     None   Social History Narrative     None         Objective:       /67 (Patient Site: Right Arm, Patient Position: Sitting, Cuff Size: Adult Large)   Pulse 82   Temp 98.8  F (37.1  C) (Oral)   Resp 16   LMP 09/21/2014   SpO2 93%   General appearance: alert, appears stated age, cooperative, no distress and non-toxic  Extremities: Left lower extremity: Pain to palpation of the calf, no obvious trauma or deformity  Skin: Moderate swelling in the left lower extremity, no pitting edema, slight erythema and increased warmth to touch  Pulses: Pedal pulses are intact    Imaging    Us Venous Leg Left    Result Date: 11/27/2020  EXAM DATE:         11/27/2020 EXAM: US LOWER EXTREMITY VEINS LTD LEFT LOCATION: [facility]  RADIOLOGY [place] DATE/TIME: 11/27/2020 3:15 PM INDICATION: Left leg acute swelling and calf tenderness. COMPARISON: None. TECHNIQUE: Venous Duplex ultrasound of the left lower extremity with and without compression, augmentation and duplex. Color flow and spectral Doppler with waveform analysis performed. FINDINGS: Exam includes the common femoral, femoral, popliteal, and contralateral common femoral veins as well as segmentally visualized deep calf veins and greater saphenous vein. LEFT: No deep vein thrombosis. No superficial thrombophlebitis. No popliteal cyst. IMPRESSION: 1.  No deep venous thrombosis in the left lower extremity. 2.  Left leg subcutaneous edema.     Discussed findings with the patient.

## 2021-06-13 NOTE — TELEPHONE ENCOUNTER
"PSP: Cristela Salguero, CNP     Patient had called and left message on nurse navigation line at 1247 regarding left leg swelling.     Returned her call. Reports \"The leg felt weird yesterday. Looked at it today and I have cankles down there.\" When asked if there is temperature or color changes, she responds \"It feels cold. There is not a whole lot of feeling in the leg.\" She does have a SCS and reports she has felt the vibration in her left leg and needed to turn the SCS down twice. \"I haven't had to do that in a couple of years.\"     Explained covering provider would be notified of the update/concern. Patient will be called if any further recommendations. Stated understanding. States she is going to an urgent care now to have it checked out.   "

## 2021-06-13 NOTE — PROGRESS NOTES
"Susan Kwan is a 55 y.o. female who is being evaluated via a billable video visit.      The patient has been notified of following:     \"This video visit will be conducted via a call between you and your physician/provider. We have found that certain health care needs can be provided without the need for an in-person physical exam.  This service lets us provide the care you need with a video conversation.  If a prescription is necessary we can send it directly to your pharmacy.  If lab work is needed we can place an order for that and you can then stop by our lab to have the test done at a later time.    Video visits are billed at different rates depending on your insurance coverage. Please reach out to your insurance provider with any questions.    If during the course of the call the physician/provider feels a video visit is not appropriate, you will not be charged for this service.\"    Patient has given verbal consent to a Video visit? Yes  How would you like to obtain your AVS? AVS Preference: MyChart.        Video Start Time: 8:20 AM    Video-Visit Details    Type of service:  Video Visit    Video End Time (time video stopped): 8:37 AM  Originating Location (pt. Location): Home    Distant Location (provider location):  [facility]     Platform used for Video Visit: Jan Salguero CNP    Assessment:     Diagnoses and all orders for this visit:    Chronic right-sided low back pain with right-sided sciatica  -     oxyCODONE-acetaminophen (PERCOCET/ENDOCET) 5-325 mg per tablet; Take 0.5-1 tablets by mouth every 6 (six) hours as needed for pain.  Dispense: 30 tablet; Refill: 0    Acute right lumbar radiculopathy  -     oxyCODONE-acetaminophen (PERCOCET/ENDOCET) 5-325 mg per tablet; Take 0.5-1 tablets by mouth every 6 (six) hours as needed for pain.  Dispense: 30 tablet; Refill: 0    Weakness of right lower extremity    Lumbar radiculopathy       Susan Kwan is a 55 " y.o. y.o. female with past medical history significant for major depression, TMJ, DESI, hyperlipidemia, nicotine dependence, migraine headache, post ablative hypothyroidism, status post lumbar microdiscectomy Dr. Cerna 2017, spinal cord stimulator implant 2018 who presents today for follow-up regarding:    -Ongoing severe right lumbar radiculopathy with short-term relief with right L4-5 and L5-S1 TFESI.  Patient endorsing significant right lower extremity weakness.    Patient is scheduled for surgery with Dr. Ward 1/4/2021, surgery schedulers are working on moving surgery date up due to progressive weakness and numbness in right lower extremity.     Plan:     A shared decision making plan was used. The patient's values and choices were respected. Prior medical records from 11/25/2020 were reviewed today. The following represents what was discussed and decided upon by the provider and the patient.        -DIAGNOSTIC TESTS: Images were personally reviewed and interpreted.   --Right lower extremity EMG 10/7/2020 with active and chronic right L5 radiculopathy, potentially L4 as well.  --Lumbar flexion-extension x-ray shows no dynamic instability.  --EMG 3/18/2020 shows active right L5 radiculopathy.  --Lumbar CT scan 2/21/2020 shows acute nondisplaced right L4 pars interarticularis fracture.  L4-5 severe right and mild left foraminal stenosis with distortion of the right L4 nerve root.  Prior right L4 hemilaminotomy.  --[facility] spinal cord stimulator, patient cannot have MRI.    -INTERVENTIONS: No further injection recommendations    -MEDICATIONS: Refilled Percocet 5/325 mg 1/2 to 1 tablet every 6 hours as needed for severe breakthrough pain number 30 tablets given for 10 days worth.  This medication is for management of severe radicular pain prior to spine surgery.  -[place]  checked. Discussed the risks (eg, addiction, overdose, worsening pain) verses benefit of opioid use with patient today. Explained that this  medication will not be a long term solution to ongoing pain. Discussed using lowest effective dose and the importance of other measures for pain management including PT, other non-opioid medications, behavioral treatments, and other procedure options.   Discussed side effects of medications and proper use. Patient verbalized understanding.    Workability forms completed today as well as disability parking permit for 6 months.    -PHYSICAL THERAPY: Encourage patient to continue with home exercises as tolerable.  Discussed the importance of core strengthening, ROM, stretching exercises with the patient and how each of these entities is important in decreasing pain.  Explained to the patient that the purpose of physical therapy is to teach the patient a home exercise program.  These exercises need to be performed every day in order to decrease pain and prevent future occurrences of pain.        -PATIENT EDUCATION:  17 minutes of total visit time was spent face to face with the patient today, 60 % of the visit was spent on counseling, education, and coordinating care.   -5 minutes spent outside of visit time, non-face-to-face time, reviewing chart.  -Today we also discussed the issues related to the current COVID-19 pandemic, the pros and cons of the current treatment plan, the CDC guidelines such as social distancing, washing the hands, and covering the cough.    -FOLLOW UP: Follow-up for medication management prior to surgery as needed.  Advised to contact clinic if symptoms worsen or change.    Subjective:     Susan Kwan is a 55 y.o. female who presents today for follow-up regarding ongoing significant right low back pain with right lower extremity pain currently 7/10, 9 at its worst, 6 at its best.  Patient did endorse swelling in her left lower extremity onset 11/27/2020 after she had multiple falls the day before because of her right leg giving out on her.  She did see urgent care and had an ultrasound at [facility]  which was negative for DVT, does report at this point that the swelling in her left leg has resolved.  Currently her symptoms are back to right-sided however she denies any current lower extremity swelling on the right.    -Treatment to Date: Right L4-5 hemilaminectomy/microdiscectomy with right L5 nerve root decompression surgery with Dr. Cerna 2017.  Spinal cord stimulator implant T8-9 2018.  Physical therapy x2 summer 2020 right lumbar radiculopathy     Right L4-5 and L5-S1 TFESI 6/2/2020 with 90% relief x2 months.  Preprocedure pain 4/10, post 7/10.  Right L4-5 and L5-S1 TFESI 9/22/2020 with 100% relief of back pain, no relief with right lower extremity symptoms. Preprocedure pain 5/10, post 2/10.     Medications:  Gabapentin 300 mg, not currently taking previous dose 3-3-4.  Some sedation on higher doses.  Baclofen 10 mg with benefit  Tylenol 3 previously prescribed 3/16/2020, 21 tablets.        Previous prescription for oxycodone 5 mg 3/4/2020 for severe breakthrough pain, patient no longer currently taking.    Patient Active Problem List   Diagnosis     Nicotine Dependence     Migraine Headache     Arthralgias In Multiple Sites     Postablative hypothyroidism     Hyperlipidemia     Major Depression, Recurrent     Sudden Redness Of The Skin (Flushing)     Lower Back Pain     Carpal Tunnel Syndrome     Mild intermittent asthma without complication     Allergic Rhinitis     Seborrheic Keratosis     DESI (obstructive sleep apnea)     Sacroiliac joint dysfunction of right side     Lumbar foraminal stenosis     Lumbar disc herniation     Sciatica of right side     Lumbar stenosis     S/P lumbar microdiscectomy     Gastro-esophageal reflux disease with esophagitis     Depressive disorder     Anxiety state     Obesity (BMI 35.0-39.9) with comorbidity (H)     TMJ (temporomandibular joint syndrome)     Advanced directives, counseling/discussion     ASCUS with positive high risk HPV cervical     Xerostomia due to  hyposecretion of salivary gland     Myofascial pain     Benign neoplasm of ascending colon     Lumbar radiculopathy     Spinal stenosis of lumbar region without neurogenic claudication       Current Outpatient Medications on File Prior to Encounter   Medication Sig Dispense Refill     baclofen (LIORESAL) 10 MG tablet Take 1 tablet (10 mg total) by mouth 3 (three) times a day as needed. 90 tablet 1     gabapentin (NEURONTIN) 300 MG capsule TAKE 3 CAPSULES BY MOUTH EVERY MORNING, 3 CAPSULES BY MOUTH EVERY DAY IN THE AFTERNOON, AND 4 CAPSULES EVERY EVENING 900 capsule 3     [DISCONTINUED] oxyCODONE-acetaminophen (PERCOCET/ENDOCET) 5-325 mg per tablet Take 0.5-1 tablets by mouth every 6 (six) hours as needed for pain. 30 tablet 0     albuterol (PROAIR HFA;PROVENTIL HFA;VENTOLIN HFA) 90 mcg/actuation inhaler INHALE 1 TO 2 PUFFS BY MOUTH EVERY 4 HOURS AS NEEDED FOR WHEEZING 18 g 1     FLUoxetine (PROZAC) 20 MG capsule TAKE 2 CAPSULES(40 MG) BY MOUTH DAILY 60 capsule 3     levothyroxine (SYNTHROID, LEVOTHROID) 175 MCG tablet TAKE 1 TABLET(175 MCG) BY MOUTH DAILY 90 tablet 3     multivitamin therapeutic tablet Take 1 tablet by mouth daily.       simvastatin (ZOCOR) 20 MG tablet TAKE 1 TABLET(20 MG) BY MOUTH AT BEDTIME 90 tablet 4     SUMAtriptan (IMITREX) 50 MG tablet TAKE 1 TABLET BY MOUTH EVERY 2 HOURS AS NEEDED FOR MIGRAINE. MAY REPEAT IN 2 HOURS AS NEEDED 9 tablet 10     [DISCONTINUED] baclofen (LIORESAL) 10 MG tablet Take 1 tablet (10 mg total) by mouth 3 (three) times a day as needed. 30 tablet 1     No current facility-administered medications on file prior to encounter.        Allergies   Allergen Reactions     Acetaminophen      Other: very sore stomach       Sulfa (Sulfonamide Antibiotics) Rash     Cefuroxime Axetil Rash       Past Medical History:   Diagnosis Date     Anxiety      Asthma      Carpal tunnel syndrome      Depression     PMDD     Disease of thyroid gland      Low back pain      Migraine      DESI on  CPAP      Pregnancy          Substance abuse (H)     sober since , narcotic pain medication        Review of Systems  ROS: Positive for right leg numbness and tingling and weakness.  Specifically negative for bowel/bladder dysfunction, balance changes, headache, dizziness, foot drop, fevers, chills, appetite changes, nausea/vomiting, unexplained weight loss. Otherwise 13 systems reviewed are negative. Please see the patient's intake questionnaire from today for details.    Reviewed Social, Family, Past Medical and Past Surgical history with patient, no significant changes noted since prior visit.     Objective:     VIRTUAL PHYSICAL EXAM:   --CONSTITUTIONAL: Well developed, well nourished, healthy appearing individual.  --PSYCHIATRIC: Appropriate mood and affect. No difficulty interacting due to temper, social withdrawal, or memory issues.  --SKIN: Lumbar region is visually dry and intact.   --RESPIRATORY: Normal rhythm and effort. No abnormal accessory muscle breathing patterns noted.   --STANDING EXAMINATION:  Normal lumbar lordosis noted, no lateral shift.  --MUSCULOSKELETAL: Lumbar spine inspection reveals no evidence of deformity.     RESULTS:   Imaging: Lumbar spine imaging was reviewed today. The images were shown to the patient and the findings were explained using a spine model.      Us Venous Leg Left  Result Date: 2020  EXAM DATE:         2020 EXAM:  LOWER EXTREMITY VEINS LTD LEFT LOCATION: [facility] DATE/TIME: 2020 3:15 PM INDICATION: Left leg acute swelling and calf tenderness. COMPARISON: None. TECHNIQUE: Venous Duplex ultrasound of the left lower extremity with and without compression, augmentation and duplex. Color flow and spectral Doppler with waveform analysis performed. FINDINGS: Exam includes the common femoral, femoral, popliteal, and contralateral common femoral veins as well as segmentally visualized deep calf veins and greater saphenous vein.  LEFT: No deep vein thrombosis. No superficial thrombophlebitis. No popliteal cyst. IMPRESSION: 1.  No deep venous thrombosis in the left lower extremity. 2.  Left leg subcutaneous edema.       Ct Lumbar Myelogram  Result Date: 11/5/2020  EXAM: CT LUMBAR MYELOGRAM LOCATION: [facility] DATE/TIME: 11/5/2020 9:01 AM INDICATION: Low back pain, >6 wks / red flag(s) / radiculopathy COMPARISON: 03/20/2020 head CT. CONTRAST: 20 mL Omnipaque 180 intrathecal TECHNIQUE: Routine with intrathecal contrast administered in a separate procedure. Multiplanar reformats. Dose reduction techniques were used. FINDINGS: Nomenclature is based on 5 lumbar type vertebral bodies. Straightening of lumbar lordosis. No listhesis. Vertebral body heights are maintained. No destructive bony lesion. Normal myelographic appearance of the distal spinal cord and cauda equina with conus medullaris at L1. No extraspinal soft tissue abnormality. Unremarkable visualized bony pelvis. T12-L1: Normal disc height. No herniation. Mild facet arthropathy. No spinal canal or neural foraminal stenosis. L1-L2: Normal disc height. No herniation. Mild facet arthropathy. No spinal canal or neural foraminal stenosis. L2-L3: Mild disc height loss. Disc bulge. Mild facet arthropathy. Mild spinal canal stenosis. Mild bilateral neural foraminal stenosis. L3-L4: Mild disc height loss. Disc bulge. Normal facets. Mild spinal canal stenosis. Moderate right neural foraminal stenosis. Mild left neural foraminal stenosis. L4-L5: Mild disc height loss. Asymmetric right disc bulge. Mild bilateral facet arthropathy. Mild spinal canal stenosis. Severe right neural foraminal stenosis. Mild left neural foraminal stenosis. L5-S1: Normal disc height. No herniation. Moderate facet arthritic. No spinal canal stenosis. No neural foraminal stenosis..   1.  Unchanged L4 pars interarticularis fracture. 2.  Multilevel lumbar spondylosis. 3.  No high-grade spinal canal  stenosis. 4.  Severe neural foraminal stenosis on the right at L4-L5. Moderate neural foraminal stenosis on the right at L3-L4.         Ct Lumbar Spine Without Contrast  Result Date: 3/20/2020  INDICATION: Back pain or radiculopathy, > 6 weeks. COMPARISON: Lumbar spine radiographs dated 03/12/2020, CT lumbar spine 02/21/2020. TECHNIQUE: Routine without IV contrast. Multiplanar reformats. Dose reduction techniques were used. FINDINGS: Nomenclature is based on 5 lumbar type vertebral bodies. Alignment is within normal limits. The vertebral body heights are normal. Redemonstrated nondisplaced right L4 pars interarticularis fracture, essentially unchanged in appearance compared to the most recent study. No acute fracture seen. Partially visualized spinal cord stimulator leads overlying the left posterior paraspinous musculature, incompletely evaluated on this exam. Mild ectasia of the infrarenal abdominal aorta measuring up to 2.6 cm in transverse dimension (series 3 image 42). Moderate atherosclerotic calcification involving the abdominal aorta and iliac arteries. Unremarkable visualized bony pelvis. SEGMENTAL ANALYSIS: T12-L1: Normal disc height. No significant disc bulge or herniation. Minimal facet arthropathy. No significant spinal or neural foraminal stenosis. The findings are unchanged. L1-L2: Normal disc height. No significant disc bulge or herniation. Minimal facet arthropathy. No spinal or neural foraminal stenosis. The findings are unchanged. L2-L3: Normal disc height. There is a symmetric disc bulge with minimal facet arthropathy. No significant spinal stenosis. Mild left neural foraminal stenosis. The right neural foramen is patent. The findings are unchanged. L3-L4: Normal disc height. Small symmetric disc bulge with minimal facet arthropathy. No spinal stenosis. Moderate left neural foraminal stenosis. The right neural foramen is patent. The findings are unchanged. L4-L5: Minimal disc height loss.  Findings of right hemilaminectomy again noted. There is a small disc bulge eccentric to the right with mild bilateral facet arthropathy. There is suggestion of at least mild potentially moderate right lateral recess narrowing with mild overall spinal canal narrowing. This is unchanged from prior. Severe right and moderate left neural foraminal stenosis. The findings are unchanged. L5-S1: Normal disc height. No significant disc bulge or herniation. Mild facet arthropathy. No significant spinal or neural foraminal stenosis. The findings are unchanged.   1. Unchanged nondisplaced right L4 pars interarticularis defect/fracture. No acute fracture.   2. Multilevel degenerative disc disease and facet arthropathy of the lumbar spine, as described. No high-grade spinal stenosis. There is at least mild encroachment on the right lateral recess at the L4-L5 level related to an asymmetric disc bulge to the right. This is seen in the setting of prior right L4-L5 hemilaminectomy.   3. Varying degrees of neural foraminal narrowing, most pronounced on the right at L4-L5 where it is severe. Please see the body of the report for additional details.        Xr Lumbar Spine Flex And Ext 2 Or 3 Vws  Result Date: 3/12/2020  INDICATION: Evaluate spondylolisthesis stability: Please provide flexion, extension, and neutral measurements COMPARISON: Lumbar radiograph 02/25/2019.   Nomenclature assumes 5 lumbar type vertebral bodies. Minimal 1-2 mm anterior spondylolisthesis at L4-L5 without change on flexion or extension views. Otherwise normal alignment. Normal vertebral body heights. Minimal disc space narrowing with  mild marginal osteophytes. Mild to moderate facet arthropathy within the lower lumbar spine. A stimulator device and lead are partially visualized.        Ct Lumbar Spine Without Contrast  Result Date: 2/21/2020  INDICATION: Back pain, right sciatica with skin paresthesias; fall on ice on 02/05/2020, now with no feeling down  right lower leg and pain in right thigh. COMPARISON: Lumbar spine radiographs 02/25/2019 and MRI lumbar spine 05/22/2017 TECHNIQUE: Routine without IV contrast. Multiplanar reformats.  Dose reduction techniques were used. FINDINGS: VERTEBRAE: Normal vertebral body heights and alignment. There is an acute, nondisplaced fracture of the right L4 pars interarticularis (sagittal image #25). No additional acute fracture or posttraumatic subluxation. CANAL/FORAMINA: Mild degenerative disc disease throughout the lumbar spine, though without high-grade spinal canal stenosis. Prior right L4 hemilaminotomy. At L4-L5, there is severe right and mild left neuroforaminal stenosis, with distortion of the exiting right L4 nerve root. PARASPINAL: Atherosclerotic vascular calcifications. Paraspinous soft tissues are otherwise grossly unremarkable.   IMPRESSION:   1.  Acute, nondisplaced fracture of the right L4 pars interarticularis.   2.  At L4-L5, there is severe right and mild left neuroforaminal stenosis with distortion of the exiting right L4 nerve root.   3.  No high-grade spinal canal stenosis. DICOM format image data is available to non-affiliated external healthcare facilities or entities on a secure, media-free, reciprocally searchable basis with patient authorization for at least a 12-month period after the study.

## 2021-06-13 NOTE — PROGRESS NOTES
"NEUROSURGERY FOLLOWUP  NOTE    Susan Kwan comes today in f/u.54 yo female who presents with low back and leg pain, numbness, diffuse weakness.  CT lumbar spine showed right L4 pars versus defect as well as right lateral recess stenosis at the L4-L5 level in the setting of a prior hemilaminectomy.  Patient currently is using a soft brace.  Discussed with patient given history of surgery at this level pars fracture could iatrogenic in nature a well. No instability on dynamic x-rays. Discussed transforaminal epidural injection and physical therapy.  Return to clinic in 6 weeks.    Symptoms continue. She has low back pain. Right leg pain, numbness, weakness. Pain is located over right lateral thigh.  No bowel or bladder dysfunction. Underwent right L4-S1 TFESI on 6/2/20 with several months of relief. She also underwent ar right L4-S1 TFESI on 9/22/20 this lasted one week. EMG showed chronic and active right L5 radiculopathy.     PHYSICAL EXAM:   Constitutional: /71   Pulse 84   Resp 16   Ht 5' 4\" (1.626 m)   Wt (!) 230 lb (104.3 kg)   LMP 09/21/2014   SpO2 96%   BMI 39.48 kg/m       Mental Status: A & O in no acute distress.  Affect is appropriate.  Speech is fluent.  Recent and remote memory are intact.  Attention span and concentration are normal.     Motor: Normal bulk and tone all muscle groups of  lower extremities.     Sensory: Sensation intact bilaterally to light touch.      IMAGING:   I personally reviewed all radiographic images     CONSULTATION ASSESSMENT AND PLAN:    54 yo female who presents with low back and leg pain, numbness, diffuse weakness.  CT lumbar spine showed right L4 pars versus defect as well as right lateral recess stenosis at the L4-L5 level in the setting of a prior hemilaminectomy. Underwent right L4-S1 TFESI on 6/2/20 with several months of relief. She also underwent ar right L4-S1 TFESI on 9/22/20 this lasted one week. EMG showed chronic and active right L5 radiculopathy. " CT myelogram showed a right sided disc herniation at right L4-5 with mild displacement of the right L5 nerve root and moderate lateral recess stenosis. Discussed right L4-5 discectomy with L4-5 instrumented fusion. Has a SCS which will need to be turned off prior to surgery. She agreed to proceed    I spent more than 25 minutes in this apt, examining the pt, reviewing the scans, reviewing notes from chart, discussing treatment options with risks and benefits and coordinating care. >50 % clinic time was spent in face to face counseling and coordinating care    Leanna Ward      CC:     Earline Jimenez MD  [address] [place] [id number]

## 2021-06-13 NOTE — TELEPHONE ENCOUNTER
PATIENT NAME:  Susan Kwan  YOB: 1965  MRN: [id number]  SURGEON: Dr. Ward  DATE of SURGERY: 12/17/2020  PROCEDURE: Right L4-L5 Redo microdiscectomy; L4-L5 fusion      FOLLOW-UP PLAN:  Wound Check:  7-10 days  Staples/Sutures Out : n/a  Post Op Visit: 6 weeks  Post-op Provider: NP on Dr. Ward clinic day  DIAGNOSTICS:  XR  DISPOSITION:  Home 12/19/2020      ADDITIONAL INSTRUCTIONS FOR MEDICAL STAFF:

## 2021-06-13 NOTE — PATIENT INSTRUCTIONS - HE
L4-5 Fusion and Discectomy.     Provided complete information about approaching surgery.  Discussed discharge planning and expected outcomes after surgery as well as follow-ups and restrictions.  Emphasized on stop taking ASA, NSAID's, vitamins and /or OTC herbal supplements within 10 days and any anticoagulant meds within 7 days prior to surgery.  Reminded patient to bring all pertinent films to hospital the day of surgery.    NPO after midnight, Please arrive 2.5 hours prior to scheduled surgery.    Using a washcloth and a bottle of provided Hibiclens, wash your body, avoiding your face and genitals. Preferably, shower the night before surgery and the morning of surgery using a half a bottle each time for your whole body shower. If you are unable to take a shower in the morning of surgery, please discuss your options with the nurse at your readiness visit.     Provided written pamphlets about surgery and Hibiclens bottle.  Answered all questions.  The  will call patient with all pre-op orders and instructions.  Patient to complete all required diagnostic tests prior to surgery.  If test are not completed this will cancel the surgery; contact the clinic nurses at [phone] if unable to complete test.

## 2021-06-13 NOTE — PROGRESS NOTES
Assessment/plan  1. Left knee pain  Discussed possible etiology.   Discussed possible evaluation, treatment.   Patient defers all and requests referral back to orthopedics. This was arranged. Will follow recommendations.   - Ambulatory referral to Orthopedics    2. Hyperlipidemia  Follow up lipids. Not fasting today, will follow up for that lab only. Refilled statin for now at same dose.   Encouraged regular exercise, low fat diet.   - Lipid Cascade; Future    3. Abnormal thyroid blood test  Follow up on thyroid testing. Will return for that with her fasting blood work.   - Thyroid Stimulating Hormone (TSH); Future  - T4, Free; Future  - T3, Total; Future    4. Depression  Stable.   Continue SSRI. Encouraged compliance.   Declines therapy or counseling.   Refills provided.     5. Colon cancer screening  Referred for colonoscopy.   - Ambulatory referral for Colonoscopy      Subjective  [age] year old female here for follow up.   She notes new onset knee pain, it is worsening for the last three years. She did have knee surgery twelve years ago. She thinks they went in with a scope. She is not sure of the exact injury. She denies recent fall or injury. She note cracking, catching, clicking every time she bends her knee, sits, or straightens it out. This is very bothersome. She is worried she needs surgery again. She wants to see orthopedic surgery.   She notes her depression is stable. She used to only take her SSRI during PMS. She got into the habit of taking her SSRI daily. Since then, she feel like her mood is much better. She note her children motivate her to take her medication regularly. She would like a refill. She does not see a therapist. She does not want to.   She has not check her thyroid testing. Reviewed last testing with patient, did not follow up on this yet.   She agrees to schedule colonoscopy. Mammogram up to date. Pap smear up to date. Influenza up to date.       ROS: 12 systems reviewed, all  negative except for what is mentioned in HPI.     Past Medical History:   Diagnosis Date     Anxiety      Carpal tunnel syndrome      Depression     PMDD     Disease of thyroid gland      Migraine      Pregnancy          Substance abuse     sober since , narcotic pain medication     Patient Active Problem List   Diagnosis     Nicotine Dependence     Migraine Headache     Arthralgias In Multiple Sites     Hypothyroidism Postprocedural     Hyperlipidemia     Major Depression, Recurrent     Sudden Redness Of The Skin (Flushing)     Lower Back Pain     Carpal Tunnel Syndrome     Asthma     Allergic Rhinitis     Lump In / On The Skin     Common Migraine (Without Aura)     Seborrheic Keratosis     DESI (obstructive sleep apnea)     Sacroiliac joint dysfunction of right side     Lumbar foraminal stenosis     Lumbar disc herniation     Sciatica of right side     Family History   Problem Relation Age of Onset     Heart disease Daughter      WPW,  at age 3     Diabetes Paternal Grandmother      Stroke Paternal Grandfather      Sleep apnea Father      Breast cancer Maternal Grandfather      Social History     Social History     Marital status:      Spouse name: N/A     Number of children: N/A     Years of education: N/A     Occupational History     Not on file.     Social History Main Topics     Smoking status: Current Every Day Smoker     Packs/day: 1.00     Types: Cigarettes     Smokeless tobacco: Not on file     Alcohol use No     Drug use: No     Sexual activity: No     Other Topics Concern     Not on file     Social History Narrative     Current Outpatient Prescriptions on File Prior to Visit   Medication Sig Dispense Refill     clotrimazole (LOTRIMIN) 1 % cream Apply to affected area 2 or 3 times per day for 2 weeks. 30 g 0     cyclobenzaprine (FLEXERIL) 5 MG tablet TAKE 1 TABLET(5 MG) BY MOUTH AT BEDTIME 90 tablet 3     FLUZONE QUAD 2871-8525 60 mcg (15 mcg x 4)/0.5 mL Susp injection         gabapentin (NEURONTIN) 300 MG capsule Take 3 capsules (900 mg total) by mouth 3 (three) times a day. Follow Gabapentin Dosing chart given 270 capsule 3     levothyroxine (SYNTHROID, LEVOTHROID) 175 MCG tablet TAKE 1 TABLET BY MOUTH DAILY 90 tablet 3     PROAIR HFA 90 mcg/actuation inhaler INHALE 1-2 PUFFS EVERY 4 HOURS AS NEEDED 8.5 g 1     SUMAtriptan (IMITREX) 50 MG tablet TAKE 1 TABLET BY MOUTH AT ONSET OF HEADACHE. MAY REPEAT IN 2 HOURS AS NEEDED 9 tablet 3     triamcinolone (KENALOG) 0.1 % cream Apply to affected area 2 or 3 times per day for two weeks. 30 g 0     varenicline (CHANTIX) 0.5 MG tablet Take 1 tab PO daily for days 1-2. Take 1 tab PO BID for days 4-7. 11 tablet 0     varenicline (CHANTIX CONTINUING MONTH WALE) 1 mg tablet Take 1 tablet (1 mg total) by mouth 2 (two) times a day. 60 tablet 2     VENTOLIN HFA 90 mcg/actuation inhaler INHALE 1 TO 2 PUFFS EVERY 4 HOURS AS NEEDED FOR WHEEZING 18 g 3     No current facility-administered medications on file prior to visit.      Objective  Vitals:    10/24/17 0956   BP: 98/62   Pulse: 80   Resp: 20       General Appearance:  Alert, cooperative, no distress, appears stated age   Head:  Normocephalic, without obvious abnormality, atraumatic   Eyes:  PERRL, conjunctiva/corneas clear, EOM's intact   Ears:  Normal TM's and external ear canals, both ears   Nose: Nares normal, septum midline,mucosa normal, no drainage    Throat: Lips, mucosa, and tongue normal; teeth and gums normal   Neck: Supple, symmetrical, trachea midline, no adenopathy;  thyroid: not enlarged, symmetric, no tenderness/mass/nodules; no carotid bruit or JVD   Lungs:   Clear to auscultation bilaterally, respirations unlabored   Heart:  Regular rate and rhythm, S1 and S2 normal, no murmur, rub, or gallop   Abdomen:   Soft, non-tender, bowel sounds active all four quadrants   Extremities: Left knee without any effusion, edema, some crepitus on extension, flexion, negative anterior drawer,  mcmurrays, tender along medial joint line, patella normal   Skin: Skin color, texture, turgor normal, no rashes or lesions   Neurologic: Normal, CN 2-12 intact         Labs: pending

## 2021-06-13 NOTE — PATIENT INSTRUCTIONS - HE
~Please call our [facility] Nurse Navigation line [phone] with any questions or concerns about your treatment plan, if symptoms worsen and you would like to be seen urgently, or if you have problems controlling bladder and bowel function.      You have been prescribed a controlled substance medication Percocet today for pain control.   This medication must be taken as prescribed only as it can be very dangerous to your health if taken more than prescribed.  We discussed the risks (eg, addiction, overdose, worsening pain, death) verses the potential benefit of opioid medication use. Explained that this medication will not be a long term solution to ongoing pain. Discussed using lowest effective dose and the importance of other measures for pain management including physical therapy, other non-opioid medications, behavioral treatments, acupuncture and massage, and other procedure options.     For any further refills on opioid pain medication you must come in to the clinic in person to discuss further in which you may or may not receive refills.

## 2021-06-13 NOTE — PROGRESS NOTES
[address] [address]  Dept: [phone]  Dept Fax: [phone]  Primary Provider: Jen Jimenez MD  Pre-op Performing Provider: JEN JIMENEZ    PREOPERATIVE EVALUATION:  Today's date: 12/8/2020    Susan Kwan is a 55 y.o. female who presents for a preoperative evaluation.    Surgical Information:  Surgery/Procedure: low back   Surgery Location: [facility]  Surgeon: Leanna Ward  Surgery Date: 12/17/2020  Time of Surgery: 7:30am  Where patient plans to recover: At home with family  Fax number for surgical facility: Note does not need to be faxed, will be available electronically in Epic.    Type of Anesthesia Anticipated: General    Subjective     HPI related to upcoming procedure: [age] year old female with long history of low back pain presents for pre-op evaluation.   Has L4-5 spinal stenosis, herniation, scheduled for discectomy and fusion.   Working with neurosurgery.   History of microdiscectomy in 2017, spinal cord stimulator implant in 2018, has ongoing and worsening right sided lumbar radiculopathy with weakness since a fall in February where she also had a L4 pars interarticularis fracture.   She is still smoking, one cigarette every few days, used to be a pack per day.   Is taking her synthroid and antidepressants as usual.   Needing her inhaler only intermittently.   Is using her CPAP every night.   No recent illness, fever, cough, hospitalizations, covid 19 exposure.       Preop Questions 12/8/2020   Have you ever had a heart attack or stroke? No   Have you ever had surgery on your heart or blood vessels, such as a stent placement, a coronary artery bypass, or surgery on an artery in your head, neck, heart, or legs? No   Do you have chest pain with activity? No   Do you have a history of  heart failure? No   Do you currently have a cold, bronchitis or symptoms of other infection? No   Do you have a cough, shortness of breath, or wheezing?  No   Do you or anyone in your family have previous history of blood clots? No   Do you or does anyone in your family have a serious bleeding problem such as prolonged bleeding following surgeries or cuts? No   Have you ever had problems with anemia or been told to take iron pills? No   Have you had any abnormal blood loss such as black, tarry or bloody stools, or abnormal vaginal bleeding? No   Have you ever had a blood transfusion? No   Are you willing to have a blood transfusion if it is medically needed before, during, or after your surgery? Yes   Have you or any of your relatives ever had problems with anesthesia? No   Do you have sleep apnea, excessive snoring or daytime drowsiness? YES    Do you have a CPAP machine? Yes   Do you have any artifical heart valves or other implanted medical devices like a pacemaker, defibrillator, or continuous glucose monitor? No   Do you have artificial joints? No   Are you allergic to latex? No   Is there any chance that you may be pregnant? No     Health Care Directive:  Patient does not have a Health Care Directive or Living Will: Discussed advance care planning with patient; information given to patient to review.    Preoperative Review of :    reviewed - controlled substances reflected in medication list.  :[id number]}  See problem list for active medical problems.  Problems all longstanding and stable, except as noted/documented.  See ROS for pertinent symptoms related to these conditions.      Review of Systems  Constitutional, neuro, ENT, endocrine, pulmonary, cardiac, gastrointestinal, genitourinary, integument and psychiatric systems are negative, except as otherwise noted.      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 08/01/2019     Myofascial pain 08/01/2019     Benign neoplasm of  "ascending colon 2019     ASCUS with positive high risk HPV cervical 2019     TMJ (temporomandibular joint syndrome) 2019     Advanced directives, counseling/discussion 2019     Obesity (BMI 35.0-39.9) with comorbidity (H) 04/15/2019     S/P lumbar microdiscectomy 2017     Lumbar stenosis 2017     Sacroiliac joint dysfunction of right side 2017     Lumbar foraminal stenosis 2017     Lumbar disc herniation 2017     Sciatica of right side 2017     DESI (obstructive sleep apnea) 2017     Seborrheic Keratosis      Nicotine Dependence      Migraine Headache      Arthralgias In Multiple Sites      Postablative hypothyroidism      Hyperlipidemia      Major Depression, Recurrent      Sudden Redness Of The Skin (Flushing)      Lower Back Pain      Carpal Tunnel Syndrome      Mild intermittent asthma without complication      Allergic Rhinitis      Gastro-esophageal reflux disease with esophagitis 2007     Depressive disorder 2007     Anxiety state 2007     Past Medical History:   Diagnosis Date     Anxiety      Asthma      Carpal tunnel syndrome      Depression     PMDD     Disease of thyroid gland      Low back pain      Migraine      DESI on CPAP      Pregnancy          Substance abuse (H)     sober since , narcotic pain medication     Past Surgical History:   Procedure Laterality Date     BACK SURGERY       CHOLECYSTECTOMY       MN DILATION/CURETTAGE,DIAGNOSTIC      Description: Dilation And Curettage;  Recorded: 2011;  Comments: for \"clots\" after one of her pregnancies     MN LIGATE FALLOPIAN TUBE      Description: Tubal Ligation;  Recorded: 2011;     MN REMOVAL GALLBLADDER      Description: Cholecystectomy;  Recorded: 2011;     [facility]     TONSILLECTOMY       TUBAL LIGATION       XR MYELOGRAM LUMBAR  2020     Family History   Problem Relation Age of " Onset     Heart disease Daughter         WPW,  at age 3     Diabetes Paternal Grandmother      Stroke Paternal Grandfather      Sleep apnea Father      Breast cancer Maternal Grandmother      Heart disease Mother      No Medical Problems Son            Current Outpatient Medications   Medication Sig Dispense Refill     albuterol (PROAIR HFA;PROVENTIL HFA;VENTOLIN HFA) 90 mcg/actuation inhaler INHALE 1 TO 2 PUFFS BY MOUTH EVERY 4 HOURS AS NEEDED FOR WHEEZING 18 g 1     baclofen (LIORESAL) 10 MG tablet Take 1 tablet (10 mg total) by mouth 3 (three) times a day as needed. 90 tablet 1     FLUoxetine (PROZAC) 20 MG capsule TAKE 2 CAPSULES(40 MG) BY MOUTH DAILY 60 capsule 3     gabapentin (NEURONTIN) 300 MG capsule TAKE 3 CAPSULES BY MOUTH EVERY MORNING, 3 CAPSULES BY MOUTH EVERY DAY IN THE AFTERNOON, AND 4 CAPSULES EVERY EVENING 900 capsule 3     levothyroxine (SYNTHROID, LEVOTHROID) 175 MCG tablet TAKE 1 TABLET(175 MCG) BY MOUTH DAILY 90 tablet 3     multivitamin therapeutic tablet Take 1 tablet by mouth daily.       oxyCODONE-acetaminophen (PERCOCET/ENDOCET) 5-325 mg per tablet Take 0.5-1 tablets by mouth every 6 (six) hours as needed for pain. 30 tablet 0     simvastatin (ZOCOR) 20 MG tablet TAKE 1 TABLET(20 MG) BY MOUTH AT BEDTIME 90 tablet 4     SUMAtriptan (IMITREX) 50 MG tablet TAKE 1 TABLET BY MOUTH EVERY 2 HOURS AS NEEDED FOR MIGRAINE. MAY REPEAT IN 2 HOURS AS NEEDED 9 tablet 10     No current facility-administered medications for this visit.        Allergies   Allergen Reactions     Acetaminophen      Other: very sore stomach       Sulfa (Sulfonamide Antibiotics) Rash     Cefuroxime Axetil Rash       Social History     Tobacco Use     Smoking status: Former Smoker     Packs/day: 0.50     Types: Cigarettes     Smokeless tobacco: Never Used   Substance Use Topics     Alcohol use: No        Social History     Substance and Sexual Activity   Drug Use No        Objective     BP 99/69 (Patient Site: Right  "Arm, Patient Position: Sitting, Cuff Size: Adult Large)   Pulse 76   Ht 5' 2.75\" (1.594 m)   Wt 205 lb (93 kg)   LMP 09/21/2014   SpO2 96% Comment: room air  BMI 36.60 kg/m    Physical Exam    GENERAL APPEARANCE: healthy, alert and no distress     EYES: EOMI, PERRL     HENT: ear canals and TM's normal and nose and mouth without ulcers or lesions     NECK: no adenopathy, no asymmetry, masses, or scars and thyroid normal to palpation     RESP: lungs clear to auscultation - no rales, rhonchi or wheezes     CV: regular rates and rhythm, normal S1 S2, no S3 or S4 and no murmur, click or rub     ABDOMEN:  soft, nontender, no HSM or masses and bowel sounds normal     SKIN: no suspicious lesions or rashes     NEURO: Normal strength and tone, sensory exam grossly normal     PSYCH: mentation appears normal. and affect normal/bright     LYMPHATICS: No cervical adenopathy    PRE-OP Diagnostics:   Recent Results (from the past 240 hour(s))   Thyroid Stimulating Hormone (TSH)    Collection Time: 12/08/20  9:18 AM   Result Value Ref Range    TSH 0.31 0.30 - 5.00 uIU/mL   T4, Free    Collection Time: 12/08/20  9:18 AM   Result Value Ref Range    Free T4 1.1 0.7 - 1.8 ng/dL   Lipid Cascade    Collection Time: 12/08/20  9:18 AM   Result Value Ref Range    Cholesterol 189 <=199 mg/dL    Triglycerides 364 (H) <=149 mg/dL    HDL Cholesterol 34 (L) >=50 mg/dL    LDL Calculated 82 <=129 mg/dL    Patient Fasting > 8hrs? Yes    Hepatitis C Antibody (Anti-HCV)    Collection Time: 12/08/20  9:18 AM   Result Value Ref Range    Hepatitis C Ab Negative Negative   HIV Antigen/Antibody Screening Cascade    Collection Time: 12/08/20  9:18 AM   Result Value Ref Range    HIV Antigen / Antibody Negative Negative   HM2(CBC w/o Differential)    Collection Time: 12/08/20  9:18 AM   Result Value Ref Range    WBC 7.3 4.0 - 11.0 thou/uL    RBC 4.65 3.80 - 5.40 mill/uL    Hemoglobin 14.3 12.0 - 16.0 g/dL    Hematocrit 43.2 35.0 - 47.0 %    MCV 93 80 - " 100 fL    MCH 30.8 27.0 - 34.0 pg    MCHC 33.1 32.0 - 36.0 g/dL    RDW 12.0 11.0 - 14.5 %    Platelets 341 140 - 440 thou/uL    MPV 7.2 7.0 - 10.0 fL   Basic Metabolic Panel    Collection Time: 12/08/20  9:18 AM   Result Value Ref Range    Sodium 142 136 - 145 mmol/L    Potassium 4.6 3.5 - 5.0 mmol/L    Chloride 105 98 - 107 mmol/L    CO2 25 22 - 31 mmol/L    Anion Gap, Calculation 12 5 - 18 mmol/L    Glucose 111 70 - 125 mg/dL    Calcium 9.6 8.5 - 10.5 mg/dL    BUN 13 8 - 22 mg/dL    Creatinine 0.81 0.60 - 1.10 mg/dL    GFR MDRD Af Amer >60 >60 mL/min/1.73m2    GFR MDRD Non Af Amer >60 >60 mL/min/1.73m2   INR    Collection Time: 12/08/20  9:18 AM   Result Value Ref Range    INR 0.94 0.90 - 1.10   APTT(PTT)    Collection Time: 12/08/20  9:18 AM   Result Value Ref Range    PTT 29 24 - 37 seconds   Platelet Function Test    Collection Time: 12/08/20  9:18 AM   Result Value Ref Range    PFA-COL/ 1 - 180 sec   Urinalysis-UC if Indicated    Collection Time: 12/08/20  9:25 AM   Result Value Ref Range    Color, UA Yellow Colorless, Yellow, Straw, Light Yellow    Clarity, UA Clear Clear    Glucose, UA Negative Negative    Bilirubin, UA Negative Negative    Ketones, UA Negative Negative    Specific Gravity, UA >=1.030 1.005 - 1.030    Blood, UA Negative Negative    pH, UA 6.0 5.0 - 8.0    Protein, UA Negative Negative mg/dL    Urobilinogen, UA 0.2 E.U./dL 0.2 E.U./dL, 1.0 E.U./dL    Nitrite, UA Negative Negative    Leukocytes, UA Small (!) Negative    Bacteria, UA Moderate (!) None Seen hpf    RBC, UA 0-2 None Seen, 0-2 hpf    WBC, UA 5-10 (!) None Seen, 0-5 hpf    Squam Epithel, UA 25-50 (!) None Seen, 0-5 lpf   Culture, Urine    Collection Time: 12/08/20  9:25 AM    Specimen: Urine   Result Value Ref Range    Culture Mixture of urogenital organisms    Electrocardiogram Perform and Read    Collection Time: 12/08/20  9:54 AM   Result Value Ref Range    SYSTOLIC BLOOD PRESSURE      DIASTOLIC BLOOD PRESSURE       VENTRICULAR RATE 72 BPM    ATRIAL RATE 72 BPM    P-R INTERVAL 158 ms    QRS DURATION 96 ms    Q-T INTERVAL 394 ms    QTC CALCULATION (BEZET) 431 ms    P Axis 58 degrees    R AXIS 34 degrees    T AXIS 42 degrees    MUSE DIAGNOSIS       Normal sinus rhythm  Normal ECG  When compared with ECG of 30-NOV-2017 10:55,  No significant change was found  Confirmed by DINA RUST MD LOC:[facility] ([id number]) on 12/9/2020 4:49:37 PM       EKG: appears normal, NSR    REVISED CARDIAC RISK INDEX (RCRI)   The patient has the following serious cardiovascular risks for perioperative complications:   - No serious cardiac risks = 0 points    RCRI INTERPRETATION: 0 points: Class I (very low risk - 0.4% complication rate)  Assessment & Plan      The proposed surgical procedure is considered INTERMEDIATE risk.    Pre-op exam  - HM2(CBC w/o Differential)  - Basic Metabolic Panel  - INR  - APTT(PTT)  - Platelet Function Test  - Urinalysis-UC if Indicated  - Electrocardiogram Perform and Read  - Culture, Urine  Lumbar disc herniation  Spinal stenosis of lumbar region, unspecified whether neurogenic claudication present  Postablative hypothyroidism  - Thyroid Stimulating Hormone (TSH)  - T4, Free  Pure hypercholesterolemia  - Lipid Cascade  Mild intermittent asthma without complication  DESI (obstructive sleep apnea)  Mild major depression (H)  Nicotine Dependence  Visit for screening mammogram  - Mammo Screening Bilateral  Encounter for screening for HIV  - HIV Antigen/Antibody Screening Cascade  Need for hepatitis C screening test  - Hepatitis C Antibody (Anti-HCV)  EHR reviewed.   Past medical history, problem list, past surgical history, family history, social history, medications reviewed, updated, reconciled.   Mammogram today.   Needs fasting labs, check lipids.   Reviewed need to follow up on abnormal pap smear, this was due in the summer will follow up on this once healed from surgery.   Mood stable. Asthma stable. On CPAP, encouraged  compliance. Counseled on smoking cessation, has cut down a lot.      Risks and Recommendations:  The patient has the following additional risks and recommendations for perioperative complications:  Cardiovascular:   - no concern  Pumlonary:    - Active nicotine user, advised smoking cessation  Obstructive Sleep Apnea:   On CPAP. Use personal CPAP while admitted to hospital.   Anemia/Bleeding/Clotting:    - no concern  Social and Substance:    - Active nicotine user, advised smoking cessation    Medication Instructions:  Patient is to take all scheduled medications on the day of surgery EXCEPT for modifications listed below:  Imitrex-is taking for migraines as needed-hold on day of surgery.     RECOMMENDATION:  APPROVAL GIVEN to proceed with proposed procedure, without further diagnostic evaluation.    Signed Electronically by: Earline Jimenez MD

## 2021-06-13 NOTE — PATIENT INSTRUCTIONS - HE
~Please call our [facility] Nurse Navigation line [phone] with any questions or concerns about your treatment plan, if symptoms worsen and you would like to be seen urgently, or if you have problems controlling bladder and bowel function.      You have been prescribed a controlled substance medication Percocet today for pain control.   This medication must be taken as prescribed only as it can be very dangerous to your health if taken more than prescribed.  We discussed the risks (eg, addiction, overdose, worsening pain, death) verses the potential benefit of opioid medication use. Explained that this medication will not be a long term solution to ongoing pain. Discussed using lowest effective dose and the importance of other measures for pain management including physical therapy, other non-opioid medications, behavioral treatments, acupuncture and massage, and other procedure options.     For any further refills on opioid pain medication you must come in to the clinic in person to discuss further in which you may or may not receive refills.

## 2021-06-13 NOTE — TELEPHONE ENCOUNTER
Patient calling for a refill of oxycodone.     DOS: 12/17/2020  Procedure: RIGHT LUMBAR 4-LUMBAR 5 REDO MICRODISCECTOMY, LUMBAR 4-LUMBAR 5 POSTERIOR INSTRUMENTED FUSION AND ARTHRODESIS, USE OF STEALTH NAVIGATION, USE OF ALLOGRAFT, USE OF AUTOGRAFT  Surgeon: Ed     Current symptom(s): itching at incision site, pain is in the low back, denies any radiculopathy, 8/10 wakes her up at night     Current pain management: 2 tablets of oxycodone q 3 hours, valium q 4, gabapentin as prescribed, not using heat or ice    Last fill: 12/19/2020  Next visit: none scheduled     Medication pended for your approval, if appropriate. Pharmacy verified. * Pt is requesting refill only because of the holiday weekend.     Informed patient request will be forwarded to care team.     Sara Arenas RN

## 2021-06-13 NOTE — TELEPHONE ENCOUNTER
ORDER FROM: Dr. Ward    PRE AUTHORIZATION: PA pending. Ok to schedule.    METHOD OF PATIENT CONTACT: Spoke with Susan on the phone. The best phone number to reach: [phone]    PROCEDURE: Right lumbar 4-lumbar 5 microdiscectomy and transforaminal lumbar interbody fusion and lumbar 4-lumbar 5 posterior lateral instrumented fusion and arthrodesis, use of stealth navigation, use of allograft, use of autograft    SURGICAL DATE: 20 @ 7:15 AM - CHET    READINESS VISIT: PLEASE CALL    PCP, CLINIC, PHONE #: Dr. Earline Jimenez, [facility], [phone]    PRE-OP PHYSICAL: 20 @ 9:20 AM with Dr. Jimenez    COVID- TESTIN20 @ 9:30 AM at the [facility] COVID lab    FILM INFO: CT/XRAY LUMBAR MYELOGRAM: 20 @ CHET HONG LUMBAR: 3/20/20 @ [facility]    SURGERY CONFIRMATION LETTER: Mailed to the patient on 20

## 2021-06-13 NOTE — PROGRESS NOTES
"Susan Kwan is a 55 y.o. female who is being evaluated via a billable video visit.      The patient has been notified of following:     \"This video visit will be conducted via a call between you and your physician/provider. We have found that certain health care needs can be provided without the need for an in-person physical exam.  This service lets us provide the care you need with a video conversation.  If a prescription is necessary we can send it directly to your pharmacy.  If lab work is needed we can place an order for that and you can then stop by our lab to have the test done at a later time.    Video visits are billed at different rates depending on your insurance coverage. Please reach out to your insurance provider with any questions.    If during the course of the call the physician/provider feels a video visit is not appropriate, you will not be charged for this service.\"    Patient has given verbal consent to a Video visit? Yes  How would you like to obtain your AVS? AVS Preference: MyChart.    Video Start Time: 1:22 PM    Video-Visit Details    Type of service:  Video Visit    Video End Time (time video stopped): 1:32 PM  Originating Location (pt. Location): Home    Distant Location (provider location):  [facility]     Platform used for Video Visit: Jan Salguero CNP    Assessment:     Diagnoses and all orders for this visit:    Chronic right-sided low back pain with right-sided sciatica  -     baclofen (LIORESAL) 10 MG tablet; Take 1 tablet (10 mg total) by mouth 3 (three) times a day as needed.  Dispense: 90 tablet; Refill: 1  -     acetaminophen-codeine (TYLENOL #3) 300-30 mg per tablet; Take 1 tablet by mouth 3 (three) times a day as needed for pain.  Dispense: 30 tablet; Refill: 0    Acute right lumbar radiculopathy  -     acetaminophen-codeine (TYLENOL #3) 300-30 mg per tablet; Take 1 tablet by mouth 3 (three) times a day as needed for pain.  " Dispense: 30 tablet; Refill: 0    History of lumbar surgery    Weakness of right lower extremity    Lumbar radiculopathy  -     baclofen (LIORESAL) 10 MG tablet; Take 1 tablet (10 mg total) by mouth 3 (three) times a day as needed.  Dispense: 90 tablet; Refill: 1    Myofascial pain  -     baclofen (LIORESAL) 10 MG tablet; Take 1 tablet (10 mg total) by mouth 3 (three) times a day as needed.  Dispense: 90 tablet; Refill: 1       Susan Kwan is a 55 y.o. y.o. female with past medical history significant for major depression, TMJ, DESI, hyperlipidemia, nicotine dependence, migraine headache, post ablative hypothyroidism, status post lumbar microdiscectomy Dr. Cerna 2017, spinal cord stimulator implant 2018 who presents today for follow-up regarding:    -Acute progressive right lumbar radiculopathy with short-term relief only with right L4-5 and L5-S1 TFESI with ongoing right lower extremity perceived weakness as well as previous weakness on exam 10/29/2020.    Patient is awaiting surgical intervention with Dr. Ward.     Plan:     A shared decision making plan was used. The patient's values and choices were respected. Prior medical records from 10/29/2020 to current were reviewed today. The following represents what was discussed and decided upon by the provider and the patient.        -DIAGNOSTIC TESTS: Images were personally reviewed and interpreted.   --Right lower extremity EMG 10/7/2020 with active and chronic right L5 radiculopathy, potentially L4 as well.  --Lumbar flexion-extension x-ray shows no dynamic instability.  --EMG 3/18/2020 shows active right L5 radiculopathy.  --Lumbar CT scan 2/21/2020 shows acute nondisplaced right L4 pars interarticularis fracture.  L4-5 severe right and mild left foraminal stenosis with distortion of the right L4 nerve root.  Prior right L4 hemilaminotomy.  --[facility] spinal cord stimulator, patient cannot have MRI.    -INTERVENTIONS: No further injection recommendations at  this time.    -MEDICATIONS: Refilled baclofen 1 tablet 3 times daily as needed for myofascial pain.  -Also prescribed Tylenol 3 1 tablet 3 times daily as needed for severe breakthrough pain.  30 tablets given for 10 days worth.  Discussed with patient that she cannot drive while taking this medication therefore on the days if she is going back to work she cannot take this while driving.  [place]. Discussed the risks (eg, addiction, overdose, worsening pain) verses benefit of opioid use with patient today. Explained that this medication will not be a long term solution to ongoing pain. Discussed using lowest effective dose and the importance of other measures for pain management including PT, other non-opioid medications, behavioral treatments, and other procedure options.   Discussed side effects of medications and proper use. Patient verbalized understanding.    -PHYSICAL THERAPY: Encourage patient to continue with home exercises as tolerable.  Discussed the importance of core strengthening, ROM, stretching exercises with the patient and how each of these entities is important in decreasing pain.  Explained to the patient that the purpose of physical therapy is to teach the patient a home exercise program.  These exercises need to be performed every day in order to decrease pain and prevent future occurrences of pain.        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  Discussed with patient that if she needs FMLA paperwork filled out for flareups and presurgical appointments that I would be happy to fill that out, post surgery this paperwork be filled out by the surgical team.    -PATIENT EDUCATION:  20 minutes of total visit time was spent face to face with the patient today, 60 % of the visit was spent on counseling, education, and coordinating  care.   -5 minutes spent outside of visit time, non-face-to-face time, reviewing chart.  -Today we also discussed the issues related to the current COVID-19 pandemic, the pros and cons of the current treatment plan, the CDC guidelines such as social distancing, washing the hands, and covering the cough.    -FOLLOW UP: Follow-up as needed for medication management prior to surgery date which is currently still tentative.  Advised to contact clinic if symptoms worsen or change.    Subjective:     Susan Kwan is a 55 y.o. female who presents today for follow-up regarding ongoing right low back pain that radiates to the right lower extremity posterior buttock anterior thigh and anterior shin as well as the top of the right foot with associated numbness and tingling and perceived weakness.  Currently her pain is an 8/10, 4 at its best.  She reports that she has had a couple really bad days but overall is doing about the same.  She was evaluated by Dr. Ward recently and is planning on moving forward with lumbar fusion surgery at L4-5 as well as discectomy, surgery date is pending however.  She is currently requiring Tylenol 3 3 times daily as needed for severe breakthrough pain.    -Treatment to Date: Right L4-5 hemilaminectomy/microdiscectomy with right L5 nerve root decompression surgery with Dr. Cerna 2017.  Spinal cord stimulator implant T8-9 2018.  Physical therapy x2 summer 2020 right lumbar radiculopathy     Right L4-5 and L5-S1 TFESI 6/2/2020 with 90% relief x2 months.  Preprocedure pain 4/10, post 7/10.  Right L4-5 and L5-S1 TFESI 9/22/2020 with 100% relief of back pain, no relief with right lower extremity symptoms. Preprocedure pain 5/10, post 2/10.     Medications:  Gabapentin 300 mg, not currently taking previous dose 3-3-4.  Some sedation on higher doses.  Baclofen 10 mg with benefit  Tylenol 3 previously prescribed 3/16/2020, 21 tablets.        Previous prescription for oxycodone 5 mg 3/4/2020 for  severe breakthrough pain, patient no longer currently taking.    Patient Active Problem List   Diagnosis     Nicotine Dependence     Migraine Headache     Arthralgias In Multiple Sites     Postablative hypothyroidism     Hyperlipidemia     Major Depression, Recurrent     Sudden Redness Of The Skin (Flushing)     Lower Back Pain     Carpal Tunnel Syndrome     Mild intermittent asthma without complication     Allergic Rhinitis     Seborrheic Keratosis     DESI (obstructive sleep apnea)     Sacroiliac joint dysfunction of right side     Lumbar foraminal stenosis     Lumbar disc herniation     Sciatica of right side     Lumbar stenosis     S/P lumbar microdiscectomy     Gastro-esophageal reflux disease with esophagitis     Depressive disorder     Anxiety state     Obesity (BMI 35.0-39.9) with comorbidity (H)     TMJ (temporomandibular joint syndrome)     Advanced directives, counseling/discussion     ASCUS with positive high risk HPV cervical     Xerostomia due to hyposecretion of salivary gland     Myofascial pain     Benign neoplasm of ascending colon       Current Outpatient Medications on File Prior to Encounter   Medication Sig Dispense Refill     gabapentin (NEURONTIN) 300 MG capsule TAKE 3 CAPSULES BY MOUTH EVERY MORNING, 3 CAPSULES BY MOUTH EVERY DAY IN THE AFTERNOON, AND 4 CAPSULES EVERY EVENING 900 capsule 3     albuterol (PROAIR HFA;PROVENTIL HFA;VENTOLIN HFA) 90 mcg/actuation inhaler INHALE 1 TO 2 PUFFS BY MOUTH EVERY 4 HOURS AS NEEDED FOR WHEEZING 18 g 1     FLUoxetine (PROZAC) 20 MG capsule TAKE 2 CAPSULES(40 MG) BY MOUTH DAILY 60 capsule 3     levothyroxine (SYNTHROID, LEVOTHROID) 175 MCG tablet TAKE 1 TABLET(175 MCG) BY MOUTH DAILY 90 tablet 3     multivitamin therapeutic tablet Take 1 tablet by mouth daily.       simvastatin (ZOCOR) 20 MG tablet TAKE 1 TABLET(20 MG) BY MOUTH AT BEDTIME 90 tablet 4     SUMAtriptan (IMITREX) 50 MG tablet TAKE 1 TABLET BY MOUTH EVERY 2 HOURS AS NEEDED FOR MIGRAINE. MAY  REPEAT IN 2 HOURS AS NEEDED 9 tablet 10     [DISCONTINUED] baclofen (LIORESAL) 10 MG tablet Take 1 tablet (10 mg total) by mouth 3 (three) times a day as needed. 30 tablet 1     [DISCONTINUED] baclofen (LIORESAL) 10 MG tablet TAKE 1 TABLET BY MOUTH THREE TIMES DAILY AS NEEDED 30 tablet 1     No current facility-administered medications on file prior to encounter.        Allergies   Allergen Reactions     Acetaminophen      Other: very sore stomach       Sulfa (Sulfonamide Antibiotics) Rash     Cefuroxime Axetil Rash       Past Medical History:   Diagnosis Date     Anxiety      Asthma      Carpal tunnel syndrome      Depression     PMDD     Disease of thyroid gland      Low back pain      Migraine      DESI on CPAP      Pregnancy          Substance abuse (H)     sober since , narcotic pain medication        Review of Systems  ROS:  Specifically negative for bowel/bladder dysfunction, balance changes, headache, dizziness, foot drop, fevers, chills, appetite changes, nausea/vomiting, unexplained weight loss. Otherwise 13 systems reviewed are negative. Please see the patient's intake questionnaire from today for details.    Reviewed Social, Family, Past Medical and Past Surgical history with patient, no significant changes noted since prior visit.     Objective:     VIRTUAL PHYSICAL EXAM:   --CONSTITUTIONAL: Well developed, well nourished, healthy appearing individual.  --PSYCHIATRIC: Appropriate mood and affect. No difficulty interacting due to temper, social withdrawal, or memory issues.  --SKIN: Lumbar region is visually dry and intact.   --RESPIRATORY: Normal rhythm and effort. No abnormal accessory muscle breathing patterns noted.   --STANDING EXAMINATION:  Normal lumbar lordosis noted, no lateral shift.  --MUSCULOSKELETAL: Lumbar spine inspection reveals no evidence of deformity.     RESULTS:   Imaging: Lumbar spine imaging was reviewed today. The images were shown to the patient and the findings were  explained using a spine model.      Ct Lumbar Myelogram  Result Date: 11/5/2020  EXAM: CT LUMBAR MYELOGRAM LOCATION: [facility] DATE/TIME: 11/5/2020 9:01 AM INDICATION: Low back pain, >6 wks / red flag(s) / radiculopathy COMPARISON: 03/20/2020 head CT. CONTRAST: 20 mL Omnipaque 180 intrathecal TECHNIQUE: Routine with intrathecal contrast administered in a separate procedure. Multiplanar reformats. Dose reduction techniques were used. FINDINGS: Nomenclature is based on 5 lumbar type vertebral bodies. Straightening of lumbar lordosis. No listhesis. Vertebral body heights are maintained. No destructive bony lesion. Normal myelographic appearance of the distal spinal cord and cauda equina with conus medullaris at L1. No extraspinal soft tissue abnormality. Unremarkable visualized bony pelvis. T12-L1: Normal disc height. No herniation. Mild facet arthropathy. No spinal canal or neural foraminal stenosis. L1-L2: Normal disc height. No herniation. Mild facet arthropathy. No spinal canal or neural foraminal stenosis. L2-L3: Mild disc height loss. Disc bulge. Mild facet arthropathy. Mild spinal canal stenosis. Mild bilateral neural foraminal stenosis. L3-L4: Mild disc height loss. Disc bulge. Normal facets. Mild spinal canal stenosis. Moderate right neural foraminal stenosis. Mild left neural foraminal stenosis. L4-L5: Mild disc height loss. Asymmetric right disc bulge. Mild bilateral facet arthropathy. Mild spinal canal stenosis. Severe right neural foraminal stenosis. Mild left neural foraminal stenosis. L5-S1: Normal disc height. No herniation. Moderate facet arthritic. No spinal canal stenosis. No neural foraminal stenosis..   1.  Unchanged L4 pars interarticularis fracture. 2.  Multilevel lumbar spondylosis. 3.  No high-grade spinal canal stenosis. 4.  Severe neural foraminal stenosis on the right at L4-L5. Moderate neural foraminal stenosis on the right at L3-L4.       Ct Lumbar Spine Without  Contrast  Result Date: 3/20/2020  INDICATION: Back pain or radiculopathy, > 6 weeks. COMPARISON: Lumbar spine radiographs dated 03/12/2020, CT lumbar spine 02/21/2020. TECHNIQUE: Routine without IV contrast. Multiplanar reformats. Dose reduction techniques were used. FINDINGS: Nomenclature is based on 5 lumbar type vertebral bodies. Alignment is within normal limits. The vertebral body heights are normal. Redemonstrated nondisplaced right L4 pars interarticularis fracture, essentially unchanged in appearance compared to the most recent study. No acute fracture seen. Partially visualized spinal cord stimulator leads overlying the left posterior paraspinous musculature, incompletely evaluated on this exam. Mild ectasia of the infrarenal abdominal aorta measuring up to 2.6 cm in transverse dimension (series 3 image 42). Moderate atherosclerotic calcification involving the abdominal aorta and iliac arteries. Unremarkable visualized bony pelvis. SEGMENTAL ANALYSIS: T12-L1: Normal disc height. No significant disc bulge or herniation. Minimal facet arthropathy. No significant spinal or neural foraminal stenosis. The findings are unchanged. L1-L2: Normal disc height. No significant disc bulge or herniation. Minimal facet arthropathy. No spinal or neural foraminal stenosis. The findings are unchanged. L2-L3: Normal disc height. There is a symmetric disc bulge with minimal facet arthropathy. No significant spinal stenosis. Mild left neural foraminal stenosis. The right neural foramen is patent. The findings are unchanged. L3-L4: Normal disc height. Small symmetric disc bulge with minimal facet arthropathy. No spinal stenosis. Moderate left neural foraminal stenosis. The right neural foramen is patent. The findings are unchanged. L4-L5: Minimal disc height loss. Findings of right hemilaminectomy again noted. There is a small disc bulge eccentric to the right with mild bilateral facet arthropathy. There is suggestion of at  least mild potentially moderate right lateral recess narrowing with mild overall spinal canal narrowing. This is unchanged from prior. Severe right and moderate left neural foraminal stenosis. The findings are unchanged. L5-S1: Normal disc height. No significant disc bulge or herniation. Mild facet arthropathy. No significant spinal or neural foraminal stenosis. The findings are unchanged.   1. Unchanged nondisplaced right L4 pars interarticularis defect/fracture. No acute fracture.   2. Multilevel degenerative disc disease and facet arthropathy of the lumbar spine, as described. No high-grade spinal stenosis. There is at least mild encroachment on the right lateral recess at the L4-L5 level related to an asymmetric disc bulge to the right. This is seen in the setting of prior right L4-L5 hemilaminectomy.   3. Varying degrees of neural foraminal narrowing, most pronounced on the right at L4-L5 where it is severe. Please see the body of the report for additional details.        Xr Lumbar Spine Flex And Ext 2 Or 3 Vws  Result Date: 3/12/2020  INDICATION: Evaluate spondylolisthesis stability: Please provide flexion, extension, and neutral measurements COMPARISON: Lumbar radiograph 02/25/2019.   Nomenclature assumes 5 lumbar type vertebral bodies. Minimal 1-2 mm anterior spondylolisthesis at L4-L5 without change on flexion or extension views. Otherwise normal alignment. Normal vertebral body heights. Minimal disc space narrowing with  mild marginal osteophytes. Mild to moderate facet arthropathy within the lower lumbar spine. A stimulator device and lead are partially visualized.        Ct Lumbar Spine Without Contrast  Result Date: 2/21/2020  INDICATION: Back pain, right sciatica with skin paresthesias; fall on ice on 02/05/2020, now with no feeling down right lower leg and pain in right thigh. COMPARISON: Lumbar spine radiographs 02/25/2019 and MRI lumbar spine 05/22/2017 TECHNIQUE: Routine without IV contrast.  Multiplanar reformats.  Dose reduction techniques were used. FINDINGS: VERTEBRAE: Normal vertebral body heights and alignment. There is an acute, nondisplaced fracture of the right L4 pars interarticularis (sagittal image #25). No additional acute fracture or posttraumatic subluxation. CANAL/FORAMINA: Mild degenerative disc disease throughout the lumbar spine, though without high-grade spinal canal stenosis. Prior right L4 hemilaminotomy. At L4-L5, there is severe right and mild left neuroforaminal stenosis, with distortion of the exiting right L4 nerve root. PARASPINAL: Atherosclerotic vascular calcifications. Paraspinous soft tissues are otherwise grossly unremarkable.   IMPRESSION:   1.  Acute, nondisplaced fracture of the right L4 pars interarticularis.   2.  At L4-L5, there is severe right and mild left neuroforaminal stenosis with distortion of the exiting right L4 nerve root.   3.  No high-grade spinal canal stenosis. DICOM format image data is available to non-affiliated external healthcare facilities or entities on a secure, media-free, reciprocally searchable basis with patient authorization for at least a 12-month period after the study.

## 2021-06-13 NOTE — PATIENT INSTRUCTIONS - HE
~Please call our [facility] Nurse Navigation line [phone] with any questions or concerns about your treatment plan, if symptoms worsen and you would like to be seen urgently, or if you have problems controlling bladder and bowel function.      You have been prescribed a controlled substance medication Tylenol 3 with Codeine today for pain control.   This medication must be taken as prescribed only as it can be very dangerous to your health if taken more than prescribed.  We discussed the risks (eg, addiction, overdose, worsening pain, death) verses the potential benefit of opioid medication use. Explained that this medication will not be a long term solution to ongoing pain. Discussed using lowest effective dose and the importance of other measures for pain management including physical therapy, other non-opioid medications, behavioral treatments, acupuncture and massage, and other procedure options.     For any further refills on opioid pain medication you must come in to the clinic in person to discuss further in which you may or may not receive refills.

## 2021-06-13 NOTE — ANESTHESIA POSTPROCEDURE EVALUATION
Patient: Susan Kwan  Procedure(s):  RIGHT LUMBAR 4-LUMBAR 5 REDO MICRODISCECTOMY, LUMBAR 4-LUMBAR 5 POSTERIOR INSTRUMENTED FUSION AND ARTHRODESIS, USE OF STEALTH NAVIGATION, USE OF ALLOGRAFT, USE OF AUTOGRAFT  Anesthesia type: general    Last vitals:   Vitals Value Taken Time   /59 12/17/20 1430   Temp 36.6  C (97.9  F) 12/17/20 1430   Pulse 60 12/17/20 1437   Resp 17 12/17/20 1437   SpO2 95 % 12/17/20 1437   Vitals shown include unvalidated device data.  Post vital signs: stable  Level of consciousness: awake and responds to simple questions  Post-anesthesia pain: pain controlled  Post-anesthesia nausea and vomiting: no  Pulmonary: unassisted, return to baseline  Cardiovascular: stable and blood pressure at baseline  Hydration: adequate  Anesthetic events: no    QCDR Measures:  ASA# 11 - Nette-op Cardiac Arrest: ASA11B - Patient did NOT experience unanticipated cardiac arrest  ASA# 12 - Nette-op Mortality Rate: ASA12B - Patient did NOT die  ASA# 13 - PACU Re-Intubation Rate: ASA13B - Patient did NOT require a new airway mgmt  ASA# 10 - Composite Anes Safety: ASA10A - No serious adverse event    Additional Notes:

## 2021-06-13 NOTE — ANESTHESIA CARE TRANSFER NOTE
Last vitals:   Vitals:    12/17/20 1238   BP: 119/58   Pulse: 65   Resp: 12   Temp: 37.4  C (99.4  F)   SpO2: 100%     Patient's level of consciousness is drowsy  Spontaneous respirations: yes  Maintains airway independently: yes  Dentition unchanged: yes  Oropharynx: oropharynx clear of all foreign objects    QCDR Measures:  ASA# 20 - Surgical Safety Checklist: WHO surgical safety checklist completed prior to induction    PQRS# 430 - Adult PONV Prevention: 4558F - Pt received => 2 anti-emetic agents (different classes) preop & intraop  ASA# 8 - Peds PONV Prevention: NA - Not pediatric patient, not GA or 2 or more risk factors NOT present  PQRS# 424 - Nette-op Temp Management: 4559F - At least one body temp DOCUMENTED => 35.5C or 95.9F within required timeframe  PQRS# 426 - PACU Transfer Protocol: - Transfer of care checklist used  ASA# 14 - Acute Post-op Pain: ASA14B - Patient did NOT experience pain >= 7 out of 10

## 2021-06-13 NOTE — PATIENT INSTRUCTIONS - HE
You were seen today with left lower leg swelling.  There are no signs of a blood clot on ultrasound.  Feel that your symptoms are most likely secondary to a knee injury resulting in swelling of the right leg.  Recommend rest, ice, compression, and elevation.

## 2021-06-13 NOTE — TELEPHONE ENCOUNTER
"PSP:  Cristela Salguero CNP   Last clinic visit:  11/18/20 video visit; 11/24 Dr Ward  Reason for call: Pain management  Clinical information:  \"The Tylenol #3 aren't working and she doesn't have any openings today.\" Patient taking 1 T#3 three times a day along with 2 ES Tylenol. She states she is taking 2 Ibuprofen twice a day as well.   Advice given to patient: Discussed that PSP has availability tomorrow to see her and further manage her pain until her surgery (hoping to get moved up form 1/4/21). Encouraged her to take 3 Ibuprofen every 8 hours with food as well as the Tylenol #3 and ES Tylenol. Patient has 6 tablets left of the Tylenol #3. Will update PSP with status and will call patient back if further/different directions.   Provider to address: Pain management; has appointment at 0740 tomorrow AM with you    "

## 2021-06-13 NOTE — PATIENT INSTRUCTIONS - HE
~Please call our [facility] Nurse Navigation line [phone] with any questions or concerns about your treatment plan, if symptoms worsen and you would like to be seen urgently, or if you have problems controlling bladder and bowel function.    You have been prescribed a controlled substance medication today for pain control.   This medication must be taken as prescribed only as it can be very dangerous to your health if taken more than prescribed.  We discussed the risks (eg, addiction, overdose, worsening pain, death) verses the potential benefit of opioid medication use. Explained that this medication will not be a long term solution to ongoing pain. Discussed using lowest effective dose and the importance of other measures for pain management including physical therapy, other non-opioid medications, behavioral treatments, acupuncture and massage, and other procedure options.     For any further refills on opioid pain medication you must come in to the clinic in person to discuss further in which you may or may not receive refills.

## 2021-06-13 NOTE — PROGRESS NOTES
"NEUROSURGERY FOLLOWUP  NOTE  Susan Kwan comes today in f/u.56 yo female who presents with low back and leg pain, numbness, diffuse weakness.  CT lumbar spine showed right L4 pars versus defect as well as right lateral recess stenosis at the L4-L5 level in the setting of a prior hemilaminectomy. Underwent right L4-S1 TFESI on 6/2/20 with several months of relief. She also underwent ar right L4-S1 TFESI on 9/22/20 this lasted one week. EMG showed chronic and active right L5 radiculopathy. CT myelogram showed a right sided disc herniation at right L4-5 with mild displacement of the right L5 nerve root and moderate lateral recess stenosis. Discussed right L4-5 discectomy with L4-5 instrumented fusion. Has a SCS which will need to be turned off prior to surgery. She agreed to proceed.    Since our last visit, her right leg has become progressively numb. She cannot feel the leg hardly at all with walking. Using a walker now to brace her self. Feels the leg has got weaker as well. 2 episodes of bowel loss with continence after those episodes. No episodes of urinary retention or incontinence. Shooting pains primarily int he buttocks. Will come at intervals of every 15 mins. No left leg symptoms.     On tylenol #3, baclofen, gabapentin without relief.     PHYSICAL EXAM:   Constitutional: /77   Pulse 72   Resp 18   Ht 5' 4\" (1.626 m)   Wt (!) 230 lb (104.3 kg)   LMP 09/21/2014   SpO2 98%   BMI 39.48 kg/m       Mental Status: A & O in no acute distress.  Affect is appropriate.  Speech is fluent.  Recent and remote memory are intact.  Attention span and concentration are normal.     Cranial Nerves: CN1: grossly intact per patient recall. CN2: No funduscopic exam performed. CN3,4 & 6: Pupillary light response, lateral and vertical gaze normal.  No nystagmus.  Visual fields are full to confrontation. CN5: Intact to touch CN7: No facial weakness, smile, facial symmetry intact. CN8: Intact to spoken voice. CN9&10: " Gag reflex, uvula midline, palate rises with phonation. CN11: Shoulder shrug 5/5 intact bilaterally. CN12: Tongue midline and moves freely from side to side.     Motor:Normal bulk and tone all muscle groups of upper and lower extremities limited in right LE grossly but focally weak in DF and EHL 4/5     Sensory: Sensation intact bilaterally to light touch diminished to LT in L5 on right      Coordination:  Antalgic gait. Ambulates with a walker. Right heel lift impaired.       Reflexes;  knee/ ankle jerk intact.     IMAGING:   I personally reviewed all radiographic images        CONSULTATION ASSESSMENT AND PLAN:    56 yo female who presents with low back and leg pain, numbness, diffuse weakness.  CT lumbar spine showed right L4 pars versus defect as well as right lateral recess stenosis at the L4-L5 level in the setting of a prior hemilaminectomy EMG showed chronic and active right L5 radiculopathy. CT myelogram showed a right sided disc herniation at right L4-5 with mild displacement of the right L5 nerve root and moderate lateral recess stenosis and right L4-5 foraminal stneosis. Discussed right L4-5 discectomy with L4-5 instrumented fusion. Given she is having increased weakness and numbness of her right leg will move her surgical date up and try to get scheduled very soon.      I spent more than 15 minutes in this apt, examining the pt, reviewing the scans, reviewing notes from chart, discussing treatment options with risks and benefits and coordinating care. >50 % clinic time was spent in face to face counseling and coordinating care    Leanna Ward      CC:     Earline Jimenez MD  [address] [place] [id number]

## 2021-06-13 NOTE — ANESTHESIA PREPROCEDURE EVALUATION
Anesthesia Evaluation      Patient summary reviewed   History of anesthetic complications (emergence agitation)     Airway   Mallampati: II  Neck ROM: full   Pulmonary - normal exam   (+) asthma   well controlled, sleep apnea on CPAP, ,     ROS comment: Former smoker                         Cardiovascular - negative ROS and normal exam   Neuro/Psych      Comments: Hx of opioid abuse, remission since 2008 although occasionally on percocet for radiculopathy  Acute on chronic lumbar radiculopathy, numbness & some weakness in lower extremities    Endo/Other    (+) hypothyroidism, obesity,      GI/Hepatic/Renal    (+) GERD,             Dental    (+) edentulous                       Anesthesia Plan  Planned anesthetic: general endotracheal  Precedex drip  Ketamine 50mg pre-incision  ASA 3   Induction: intravenous   Anesthetic plan and risks discussed with: patient  Anesthesia plan special considerations: antiemetics,   Post-op plan: routine recovery

## 2021-06-13 NOTE — TELEPHONE ENCOUNTER
Refill Approved    Rx renewed per Medication Renewal Policy. Medication was last renewed on 7/28/20.    Roshni Moreau, [facility] Triage/Med Refill 11/23/2020     Requested Prescriptions   Pending Prescriptions Disp Refills     FLUoxetine (PROZAC) 20 MG capsule [Pharmacy Med Name: FLUOXETINE 20MG CAPSULES] 60 capsule 3     Sig: TAKE 2 CAPSULES(40 MG) BY MOUTH DAILY       SSRI Refill Protocol  Passed - 11/23/2020  6:01 AM        Passed - PCP or prescribing provider visit in last year     Last office visit with prescriber/PCP: 2/27/2019 Earline Jimenez MD OR same dept: 2/28/2020 Rabia Manjarrez PA-C OR same specialty: 2/28/2020 Rabia Manjarrez PA-C  Last physical: 11/30/2017 Last MTM visit: Visit date not found   Next visit within 3 mo: Visit date not found  Next physical within 3 mo: Visit date not found  Prescriber OR PCP: Earline Jimenez MD  Last diagnosis associated with med order: 1. Mild episode of recurrent major depressive disorder (H)  - FLUoxetine (PROZAC) 20 MG capsule [Pharmacy Med Name: FLUOXETINE 20MG CAPSULES]; TAKE 2 CAPSULES(40 MG) BY MOUTH DAILY  Dispense: 60 capsule; Refill: 3    If protocol passes may refill for 12 months if within 3 months of last provider visit (or a total of 15 months).

## 2021-06-13 NOTE — TELEPHONE ENCOUNTER
"PSP:  Cristela Salguero CNP ans Dr. Ward  Last clinic visit: 11/18/20 with Cristela Salguero CNP  Reason for call: Right leg numbness and difficulty walking started today  Clinical information:  Patient called to report the leg numbness and difficulty walking. \"Just started today. I didn't fall or anything.\" Denies trauma or injury. When asked about saddle parasthesia she states \"I can't feel my whole leg.\" Reports she had 2 bouts of stool incontinence on Saturday. She is scheduled to have surgery with Dr. Ward on 1/4/21.   Advice given to patient: PSP would be updated and she would be called with how to proceed.   Provider to address: New numbness, difficulty walking and incontinence.   "

## 2021-06-13 NOTE — TELEPHONE ENCOUNTER
Called patient, discussed surgery, post-op course, expectations, follow up plan.    Reviewed H&P from 12/08/2020 - cleared for surgery  Labs, EKG - WNL    CT done on 11/5/2020 - in [place]    To OR as planned.     Check in - 0500    Nothing to eat or drink after midnight the night before surgery.     Reviewed with patient: Arrive 2.5 -3 hours prior to scheduled surgery.    Bring all pertinent films to hospital the day of surgery.     Continue to refrain from NSAIDS (Ibuprofen, Aleve, Naprosyn), ASA, Over the counter herbal medications or supplements, anti-coagulants and blood thinners.     Patient confirmed they have help/assistance in place at home upon discharge    Instructions: using a washcloth and a bottle of provided Hibiclens, wash your body, avoiding your face and genitals. Preferably, shower the night before surgery and the morning of surgery using a half a bottle each time for your whole body shower.        Patient was informed that we will provide up to 1 week prescription of pain medication for post-operative pain.      Instructed patient about the following: After your surgery, if you will be staying in-patient, a nursing provider team will be monitoring you closely throughout your stay and communicate your health status to your surgeon and other providers.  You will be seen by Advanced Practice Providers (e.g., nurse practitioners, clinic nurse specialist, and physician assistants) who will check on you regularly to assess the status of your surgery.     Sussy Saldana RN, CNRN

## 2021-06-14 NOTE — PROGRESS NOTES
Modified Oswestry Low back Pain Disability Questionnaire    1. PAIN INTENSITY  3- Pain medication provides me with moderate relief from pain  2. PERSONAL CARE  0- I can take care of myself normally without causing  increased pain.   3. LIFTING  N/A  4. WALKING  3- Pain prevents me from walking more than   mile.  5. SITTING  2- Pain prevents me from sitting more than 1 hour.  6. STANDING  2- Pain prevents me from standing for more than 1 hour.  7. SLEEPING  3- Even when I take medication, I sleep less than 4 hours.  8. SOCIAL LIFE  3- Pain prevents me from going out very often.  9. TRAVELING  2- My pain restricts my travel over 2 hours.  10. EMPLOYMENT/HOMEMAKING  2- I can perform most of my homemaking/job activities, but pain prevents me from performing more physically stressful activities (e.g. lifting, vacuuming).    SCORE:__20__ x2=__40/44%____

## 2021-06-14 NOTE — TELEPHONE ENCOUNTER
Patient called back and would prefer percocet. Cancelled tylenol 3. Recommend no more than 1-2 every six hours due to her tylenol intolerance as well as tylenol guidelines for no more than 3000/day

## 2021-06-14 NOTE — TELEPHONE ENCOUNTER
Controlled Substance Refill Request  Medication Name:   Requested Prescriptions     Pending Prescriptions Disp Refills     oxyCODONE (ROXICODONE) 5 MG immediate release tablet 50 tablet 0     Si tab every 4 hours as needed for moderate pain. 2 tabs every 4 hours as needed for severe pain.     Date Last Fill: 20  [facility]ed [facility]  Submit electronically to pharmacy  Controlled Substance Agreement on file:   Encounter-Level CSA Scan Date:    There are no encounter-level csa scan date.        Last office visit:  20  Essence Parra RN, MA  [facility]    Triage Nurse Advisor

## 2021-06-14 NOTE — PROGRESS NOTES
Susan Kwan is status post Redo right lumbar 4-lumbar 5 hemilaminectomy, medial facetectomy and microdiscectomy; Lumbar 4-lumbar 5 posterior instrumented fusion ([employer] Solera); Lumbar 4-lumbar 5 posterolateral arthrodesis on 12/17/2020 with Dr. Ward.  Preoperatively presented with low back and leg pain, numbness, diffuse weakness.   Today she returns in follow up for wound check. She is very pleased with her outcome - reports much improved pain and strength in her right leg. Back pain is mild to moderate and associated with activity. No numbness or tingling in LE. Gait and balance are normal. Takes Gabapentin, Oxycodone and Valium prn.    Per Dr. Ward, Susan requires a Bone Growth stimulator therapy due to poor bone quality.      Surgical wound WNL - CDI, no signs of infection or skin breakdown.  Incision well-healed: good skin approximation, no redness or visible/palpable edema, no tenderness to palpation.  PT. AF, denies fever, chills or sweats.  Pt. reports that the symptoms are improved from pre-op.    Sussy Saldana, RN, CNRN

## 2021-06-14 NOTE — TELEPHONE ENCOUNTER
Reason for Call:  Medication or medication refill:    Do you use a [facility]?  Name of the pharmacy and phone number for the current request: [facility]  [phone]    Name of the medication requested: diazePAM (VALIUM) 5 MG tablet    Other request: Surgery by Dr Ward, Neurosurgery    Can we leave a detailed message on this number? Yes    Phone number patient can be reached at: Home number on file [phone] (home)    Best Time: any     Call taken on 12/30/2020 at 10:50 AM by Laura L Goldberg

## 2021-06-14 NOTE — PATIENT INSTRUCTIONS - HE
A dressing is not required. Your wound is covered with steri-strips.  The steri-strips should fall off in 7-10 days.  If they have not fallen off in 14 days, remove them.    Keep the wound clean.    Wash your hands before touching the wound.  Ensure that anyone assisting you in the care of your wound washes her/his hands before touching the wound. Good handwashing can decrease the risk of serious infection.    If you are unable to see your wound, have someone check the wound daily for redness, swelling,or drainage. A small amount of drainage is normal.    You may shower. Cover the wound with Tegaderm for 7-10 days.  Do not allow shower to beat directly on the wound.  Pat the wound dry. Do not rub.    No tub baths until the wound is well healed.  Usually 3-4 weeks.     If you develop redness, swelling, drainage, or temp 101 or greater, call our office at [phone].        * No lifting, pushing or pulling greater than 5-10 pound (this is about a gallon of milk) for the first 6 weeks after surgery .  *No repetitive bending, twisting, or jarring activities for 6 weeks.  *No overhead work  *No aerobic or strenuous activity  *No activities with increased risk of falls  *You may move about your home as tolerated  *You may walk up and down stairs as tolerated  *You may increase your activity slowly over the next 6 weeks    WALKING PROGRAM: As you can tolerate, walk daily-start with 5-10 minutes of continuous walking. This is in addition to the walking that you do as part of your daily activities. Increase the time that you walk by 5 minutes every couple of days. Do not exceed 30-45 minutes of continuous walking until seen in follow-up. Walking is the best exercise after surgery.  **Listen to your body, if you find that you are more painful or fatigued, you may need to proceed more slowly.    **Do not smoke or expose yourself to second hand smoke. Cigarette smoke can delay healing and cause complications.     DRIVING:   We recommend that you do not drive while taking medications for pain or muscle spasms. Always read and follow the advice on your prescription bottle. If you have questions, speak with your pharmacist.  We recommend that you do not drive while wearing a brace, as it could limit your range of motion.    WORK: If you plan to return to work before you 4-6 weeks appointment, call and discuss with one of the nurses in the neurosurgery office.

## 2021-06-14 NOTE — TELEPHONE ENCOUNTER
Attempted to reach out to patient, no answer. Left voice message for patient to call clinic back to further discuss oxycodone refill.   Sara Arenas RN

## 2021-06-14 NOTE — TELEPHONE ENCOUNTER
Percocet filled. S/P microdiscectomy, fusion with Dr Ward 12/17/2020     reviewed pre office RN    Sultana Montes, TOMI-CNP  [facility]  O: [phone]    
259.9

## 2021-06-14 NOTE — PATIENT INSTRUCTIONS - HE
1. Next visit with us in another 6 weeks with XR, re-evaluate return to work.     2. Start physical therapy    3. OK to start adding back activity and weight to your daily activity. Don't do what your body doesn't want you to.     4. Percocet sent to your pharmacy     5. Stop smoking     6. CT cervical spine. Dr Ward will call you with results. R/O cause of falls, dropping things.

## 2021-06-14 NOTE — TELEPHONE ENCOUNTER
Reason for Call:  Other call back      Detailed comments: pt has an 8;40 today with lesia and she is unable to get her phone to work to sign in    Phone Number Patient can be reached at: Home number on file [phone] (home)    Best Time: asap    Can we leave a detailed message on this number?: No call back needed    Call taken on 1/7/2021 at 8:46 AM by Catalina Ramos

## 2021-06-14 NOTE — TELEPHONE ENCOUNTER
----- Message from Earline Jimenez MD sent at 1/15/2021  4:32 PM CST -----  Please call. Would patient like to schedule follow with me re: anxiety in 4-6 weeks or will she call  back herself.

## 2021-06-14 NOTE — TELEPHONE ENCOUNTER
Lots of virtual visits available with Dr. Jimenez tomorrow can we get her scheduled with one of those.

## 2021-06-14 NOTE — TELEPHONE ENCOUNTER
Called to inform patient that her refill will be filled on Saturday. Also encouraged her to begin the weaning process by taking the oxycodone every 4 hours and increasing the time between doses as tolerated.     Pt verbalized understanding.     Sara Arenas RN

## 2021-06-14 NOTE — TELEPHONE ENCOUNTER
"Pt reports that she cannot take the oxycodone any more because it is \"messing her days up.\" Pt elaborated that she has felt more and more confused since taking oxycodone.     Pt also reports she stopped taking valium 2 days ago because she ran out and \"still has shaking real bad\" in her right arm. Using gabapentin as prescribed. Pt reports she was told to \"stay off of tylenol\" because of that \"bone growth thing.\" Instructed patient that we ask patients not to take NSAIDS but that tylenol is safe to take. Pt declined to use tylenol.     She reports pain at the incision site, states the pain is there \"most of the time\" and she has additional low back pain when getting in and out of bed.     Informed patient that our office refilled valium for her yesterday and it was sent to her pharmacy.     Advised patient to discontinue use of oxycodone; apply ice to her incision site for pain prn, and to  her refill of valium.     Pt verbalized agreement with this plan and declined any additional questions.     Instructed patient to call the clinic with add'l questions or concerns.     Sara Arenas RN   "

## 2021-06-14 NOTE — PROGRESS NOTES
Assessment/Plan:      Visit for Preoperative Exam.   1. Pre-op exam  2. DESI (obstructive sleep apnea)  3. Hyperlipidemia  4. Nicotine Dependence  5. Hypothyroid  - Electrocardiogram Perform and Read  - HM2(CBC w/o Differential)  - Basic Metabolic Panel  Patient approved for surgery with general or local anesthesia. Postoperative Care will be managed by Hospital Service. Labs will be done as indicated. Above recommendations were reviewed with the patient. Proceed with proposed surgery without additional clinical clarifications.   6. Skin tag  Two skin tags, left upper zygomatic arch, left anterior upper arm, treated with cryotherapy. See below. Follow up if needed in three to four weeks.     Subjective:     Scheduled Procedure: left knee surgery  Surgery Date:  12/5/17    HPI  [age] year old female here for preop evaluation, history of DESI on CPAP, tobacco abuse, down to six cigarettes, hypothyroid, better controlled recently, depression with upcoming electrode placement, hyperlipidemia.   History of left knee internal derangement. Scheduled for arthroscopy. She is well today. No recent illness   She has two skin tags she would like removed today. The one on the left side of her face is irritated by her face mask, the left upper arm one irritation by clothing. Are not changing in size but scabbing often. No bleeding. No ulceration. No colour change     Current Outpatient Prescriptions   Medication Sig Dispense Refill     clotrimazole (LOTRIMIN) 1 % cream Apply to affected area 2 or 3 times per day for 2 weeks. 30 g 0     cyclobenzaprine (FLEXERIL) 5 MG tablet TAKE 1 TABLET(5 MG) BY MOUTH AT BEDTIME 90 tablet 3     FLUoxetine (PROZAC) 20 MG capsule TAKE 2 CAPSULES BY MOUTH DAILY 60 capsule 11     FLUZONE QUAD 5695-1442 60 mcg (15 mcg x 4)/0.5 mL Susp injection        gabapentin (NEURONTIN) 300 MG capsule Take 3 capsules (900 mg total) by mouth 3 (three) times a day. Follow Gabapentin Dosing chart given 270 capsule 3      levothyroxine (SYNTHROID, LEVOTHROID) 175 MCG tablet TAKE 1 TABLET BY MOUTH DAILY 90 tablet 3     melatonin 3 mg Tab tablet TAKE 1 TABLET(3 MG) BY MOUTH AT BEDTIME AS NEEDED 30 tablet 11     PROAIR HFA 90 mcg/actuation inhaler INHALE 1-2 PUFFS EVERY 4 HOURS AS NEEDED 8.5 g 1     simvastatin (ZOCOR) 20 MG tablet Take 1 tablet (20 mg total) by mouth at bedtime. 90 tablet 3     SUMAtriptan (IMITREX) 50 MG tablet TAKE 1 TABLET BY MOUTH AT ONSET OF HEADACHE. MAY REPEAT IN 2 HOURS AS NEEDED 9 tablet 3     triamcinolone (KENALOG) 0.1 % cream Apply to affected area 2 or 3 times per day for two weeks. 30 g 0     varenicline (CHANTIX CONTINUING MONTH WALE) 1 mg tablet Take 1 tablet (1 mg total) by mouth 2 (two) times a day. 60 tablet 2     varenicline (CHANTIX) 0.5 MG tablet Take 1 tab PO daily for days 1-2. Take 1 tab PO BID for days 4-7. 11 tablet 0     VENTOLIN HFA 90 mcg/actuation inhaler INHALE 1 TO 2 PUFFS EVERY 4 HOURS AS NEEDED FOR WHEEZING 18 g 3     No current facility-administered medications for this visit.        Allergies   Allergen Reactions     Acetaminophen      Other: very sore stomach       Cefuroxime Axetil      Sulfa (Sulfonamide Antibiotics) Rash       Immunization History   Administered Date(s) Administered     Influenza, inj, historic,unspecified 11/01/2011, 10/01/2012, 09/02/2017     Influenza, seasonal,quad inj 36+ mos 09/16/2016     Influenza, seasonal,quad inj 6-35 mos 11/07/2013     Td, Adult, Absorbed 04/25/2000     Tdap 01/04/2012       Patient Active Problem List   Diagnosis     Nicotine Dependence     Migraine Headache     Arthralgias In Multiple Sites     Hypothyroidism Postprocedural     Hyperlipidemia     Major Depression, Recurrent     Sudden Redness Of The Skin (Flushing)     Lower Back Pain     Carpal Tunnel Syndrome     Asthma     Allergic Rhinitis     Lump In / On The Skin     Common Migraine (Without Aura)     Seborrheic Keratosis     DESI (obstructive sleep apnea)     Sacroiliac  "joint dysfunction of right side     Lumbar foraminal stenosis     Lumbar disc herniation     Sciatica of right side       Past Medical History:   Diagnosis Date     Anxiety      Carpal tunnel syndrome      Depression     PMDD     Disease of thyroid gland      Migraine      Pregnancy          Substance abuse     sober since , narcotic pain medication       Social History     Social History     Marital status:      Spouse name: N/A     Number of children: N/A     Years of education: N/A     Occupational History     Not on file.     Social History Main Topics     Smoking status: Current Every Day Smoker     Packs/day: 1.00     Types: Cigarettes     Smokeless tobacco: Not on file      Comment: down to six cigarettes per day     Alcohol use No     Drug use: No     Sexual activity: No     Other Topics Concern     Not on file     Social History Narrative       Past Surgical History:   Procedure Laterality Date     BACK SURGERY       CHOLECYSTECTOMY       LA DILATION/CURETTAGE,DIAGNOSTIC      Description: Dilation And Curettage;  Recorded: 2011;  Comments: for \"clots\" after one of her pregnancies     LA LIGATE FALLOPIAN TUBE      Description: Tubal Ligation;  Recorded: 2011;     LA REMOVAL GALLBLADDER      Description: Cholecystectomy;  Recorded: 2011;     TUBAL LIGATION         Recent Health  Fever: no  Chills: no  Fatigue: no  Chest Pain: no  Cough: no  Dyspnea: no  Urinary Frequency: no  Nausea: no  Vomiting: no  Diarrhea: no  Abdominal Pain: no  Easy Bruising: no  Lower Extremity Swelling: no  Poor Exercise Tolerance: no    Most recent Health Maintenance Visit:  1 year(s) ago    Pertinent History  Prior Anesthesia: yes  Previous Anesthesia Reaction:  no  Diabetes: no  Cardiovascular Disease: no  Pulmonary Disease: no  Renal Disease: no  GI Disease: no  Sleep Apnea: yes  Thromboembolic Problems: no  Clotting Disorder: no  Bleeding Disorder: no  Transfusion Reaction: no  Impaired " "Immunity: no  Steroid use in the last 6 months: no  Frequent Aspirin use: no    Family history of   Family History   Problem Relation Age of Onset     Heart disease Daughter      WPW,  at age 3     Diabetes Paternal Grandmother      Stroke Paternal Grandfather      Sleep apnea Father      Breast cancer Maternal Grandfather          Social history of there is no transfusion refusal and there are no concerns regarding care after surgery    After surgery, the patient plans to recover at home with family.    Review of Systems  A 12 point comprehensive review of systems was negative except as noted.          Objective:         Vitals:    17 1006   BP: 98/66   Pulse: 92   Resp: 20   Temp: 98.1  F (36.7  C)   TempSrc: Oral   SpO2: 97%   Weight: 209 lb (94.8 kg)   Height: 5' 2.5\" (1.588 m)         Physical Exam:  General Appearance: Alert, cooperative, no distress, appears stated age  Head: Normocephalic, without obvious abnormality, atraumatic  Eyes: PERRL, conjunctiva/corneas clear, EOM's intact  Ears: Normal TM's and external ear canals, both ears  Nose: Nares normal, septum midline,mucosa normal, no drainage  Throat: Lips, mucosa, and tongue normal; teeth and gums normal  Neck: Supple, symmetrical, trachea midline, no adenopathy;  thyroid: not enlarged, symmetric, no tenderness/mass/nodules; no carotid bruit or JVD  Lungs: Clear to auscultation bilaterally, respirations unlabored  Heart: Regular rate and rhythm, S1 and S2 normal, no murmur, rub, or gallop  Abdomen: Soft, non-tender, bowel sounds active all four quadrants,  no masses, no organomegaly  Pelvic:Not examined  Extremities: Extremities normal, atraumatic, no cyanosis or edema  Skin: left zyomatic arch, 1 mm skin tag, flesh coloured, scaly, left upper arm anterior, 2 mm skin tag, flesh coloured  Lymph nodes: Cervical, supraclavicular nodes normal  Neurologic: Normal         Procedure Note    Pre-operative Diagnosis: irritated skin " tag    Post-operative Diagnosis: same    Locations: left, upper zygomatic arch 1mm,  left upper anterior arm 2mm    Indications: irritation    Procedure Details   Patient informed of risks (permanent scarring, infection, light or dark discoloration, bleeding, infection, weakness, numbness and recurrence of the lesion) and benefits of the procedure and verbal informed consent obtained.    The areas are treated with liquid nitrogen therapy, frozen until ice ball extended 1 mm beyond lesion, allowed to thaw, and treated again. The patient tolerated procedure well.  The patient was instructed on post-op care, warned that there may be blister formation, redness and pain. Recommend OTC analgesia as needed for pain.    Condition:  Stable    Complications:  none.    Plan:  1. Instructed to keep the area dry and covered for 24-48h and clean thereafter.  2. Warning signs of infection were reviewed.    3. Return in 3-4 weeks if needed.

## 2021-06-14 NOTE — TELEPHONE ENCOUNTER
RN triage --   Call from pt - pt crying   Pt states she had surgery last week and was given oxycodone -- and ' it really messed me up' -- w/ confusion -- -and then stopped oxycodone and  now my depression and anxiety are ' though the roof ' --   Pt denies thoughts of hurting self or others   Pt is not alone  Pt states she has not eaten for a couple days  Pt states she is drinking fluids and U.O. OK   Pt states she has chem-dep hx.  Gave pt crisis numbers  Advised pt be seen - in person or virtual   No appts available     please advise     Carmen Cartagena RN [facility] RN triage      Reason for Disposition    Depression is worsening (e.g.,sleeping poorly, less able to do activities of daily living)    Symptoms interfere with work or school    Additional Information    Negative: Patient attempted suicide    Negative: Patient is threatening suicide now    Negative: Violent behavior, or threatening to physically hurt or kill someone    Negative: Patient is very confused (disoriented, slurred speech) and no other adult (e.g., friend or family member) available    Negative: Difficult to awaken or acting very confused (disoriented, slurred speech) and new onset    Negative: Sounds like a life-threatening emergency to the triager    Negative: Suicide thoughts, threats, attempts, or questions    Negative: Depression and unable to do any of normal activities (e.g., self care, school, work; in comparison to baseline).    Negative: Very strange or confused behavior    Negative: Fever > 101 F (38.3 C)    Negative: Sometimes has thoughts of suicide    Protocols used: DEPRESSION-A-OH

## 2021-06-14 NOTE — TELEPHONE ENCOUNTER
"Patient calling for a refill of Percocet.     MNPMP query completed.  Printed and scanned into chart.       DOS: 12/17/2020  Procedure: RIGHT LUMBAR 4-LUMBAR 5 REDO MICRODISCECTOMY, LUMBAR 4-LUMBAR 5 POSTERIOR INSTRUMENTED FUSION  Surgeon: Ed    Current symptom(s): \"hurts when I do just about anything\", worst pain 8/10, sleeps in recliner, low back aching pain, shoots down the right leg, tingling same as pre-op on her toes (right), pt is using a walker and cane to ambulate and has fallen twice since surgery    Current pain management: Percocet, q6, 2 tabs, pt suffers from insomnia and sometimes takes pain meds throughout the night. Pt has not been able to begin weaning but is aware that she needs to begin this process. Robaxin four times a day, ice applied prn, not using lidocaine patches      Last fill: 1/8/2021  Next visit: 1/27/2021    Pharmacy verified. Pt states she will run out over the weekend and would like the refill \"predated to fill on Saturday\"       Informed patient request will be forwarded to care team for approval.   "

## 2021-06-14 NOTE — TELEPHONE ENCOUNTER
12/17 redo microdiscectomy, L4-5 fusion with Dr Ward. Has oxycodone at home, taking every 3 hours. Will run out on 12/25 if taking as directed. Recommend every 4 hour dosing if she can tolerate it. Will send for refill between Thursday-Saturday.     ROBERT Lees  [facility]  O: [phone]

## 2021-06-14 NOTE — PROGRESS NOTES
"Susan Kwan is a 55 y.o. female who is being evaluated via a billable video visit.      How would you like to obtain your AVS? MyChart.  If dropped from the video visit, the video invitation should be resent by: Text to cell phone: [phone]  Will anyone else be joining your video visit? No      Video Start Time: 9:32  Assessment & Plan     Severe episode of recurrent major depressive disorder, without psychotic features (H)  Anxiety attack  History of drug abuse (H)  Lumbar radiculopathy  Spinal stenosis, unspecified spinal region  - FLUoxetine (PROZAC) 20 MG capsule  Dispense: 90 capsule; Refill: 2  - lidocaine (LIDODERM) 5 %  Dispense: 15 patch; Refill: 0  EHR reviewed.   Past medical history, problem list, past surgical history, family history, social history, medications reviewed, updated, reconciled.   Mood is uncontrolled.   It seems patient is highly anxious and troubled with side affects on use of opiates due to her history of drug abuse.   She will not plan to use this in the near future.   She has been taking her selective serotonin reuptake inhibitor as directed. Still notes inadequate pain control.   She will increase fluoxetine to 60 mg per day, a new prescription sent. We reviewed possible adverse affects, reason to call or be seen urgently.  Trial of lidoderm patch. We reviewed appropriate use.   Will follow up with neurosurgery as directed.   Will follow up with me regarding her mood in four to six weeks.     [id number]}     BMI:   Estimated body mass index is 36.6 kg/m  as calculated from the following:    Height as of 12/8/20: 5' 2.75\" (1.594 m).    Weight as of 12/17/20: 205 lb (93 kg).   I have had an Advance Directives discussion with the patient.      Earline Jimenez MD  [facility]     Susan Kwan is 55 y.o. and presents to clinic today for the following health issues. Video visit completed due to the current pandemic of covid 19.   She is very unwell, " anxious, angry since discharge from hospital. Was admitted on 12/17/20 for lumbar microdiscectomy, fusion and arthrodesis for long history for lumbar radiculopathy. She was well initially. She was placed on oxycodone for paijn control. At first she did not think anything of this, but then felt very unwell, dizzy, nauseated, anxious with use. She was worried about her history of addiction to opiates. She is not sure if if relieved her pain. She was surprised about her behaviour over the next few days, even lashing out to her grand daughter which she has not done before. She does not feel like herself. Her kids and boyfriend urge her to seek advice. She is taking her depression medication as usual. Her mood has been normal for several months. She does not have thoughts of suicide or homocide. She does not know what to do about her pain. She feels like the incision area is very sore, she says she can not take ibuprofen. She can not take opiates. She has follow up planned with neurosurgery.           Objective       Vitals:  No vitals were obtained today due to virtual visit.  GENERAL: Healthy, alert and no distress  EYES: Eyes grossly normal to inspection. No discharge or erythema, or obvious scleral/conjunctival abnormalities.  RESP: No audible wheeze, cough, or visible cyanosis.  No visible retractions or increased work of breathing.    NEURO: Cranial nerves grossly intact. Mentation and speech appropriate for age.  PSYCH: Mentation appears normal, affect sad, concerned, anxious, judgement and insight intact, normal speech and appearance well-groomed    Video-Visit Details    Type of service:  Video Visit    Video End Time (time video stopped): 9:46 AM  Originating Location (pt. Location): Home    Distant Location (provider location):  [facility]     Platform used for Video Visit: [employer]

## 2021-06-14 NOTE — TELEPHONE ENCOUNTER
Requested Prescriptions     Pending Prescriptions Disp Refills     oxyCODONE (ROXICODONE) 5 MG immediate release tablet 50 tablet 0     Si tab every 4 hours as needed for moderate pain. 2 tabs every 4 hours as needed for severe pain.

## 2021-06-14 NOTE — TELEPHONE ENCOUNTER
"Received another call from Susan who requested to cancel her T#3 Rx and send her a Percocet prescription instead (\" I was able to tolerate Percocet in the past\").  Placed call to her pharmacy to cancel Tylenol #3.  Sussy Saldana RN, CNRN    "

## 2021-06-14 NOTE — TELEPHONE ENCOUNTER
Central PA team  [phone]  Pool: [facility] PA MED ([id number])          PA has been initiated.       PA form completed and faxed insurance via Cover My Meds     Key:  RUBENS ERICKSON Case ID: [id number]     Medication:  Lidocaine 5% patches    Insurance:  [employer]        Response will be received via fax and may take up to 5-10 business days depending on plan

## 2021-06-14 NOTE — TELEPHONE ENCOUNTER
Pharmacy sent refill request for baclofen  --Med last Rx 11/18/20 #90, 1 refill   --Last OV 12/9/20  --Future appt: none  --Please advise

## 2021-06-14 NOTE — PROGRESS NOTES
NEUROSURGERY FOLLOW UP EVALUATION:    ASSESSMENT  Susan Kwan is a 55 y.o. female, who presents today status post RIGHT LUMBAR 4-LUMBAR 5 REDO MICRODISCECTOMY, LUMBAR 4-LUMBAR 5 POSTERIOR INSTRUMENTED FUSION AND ARTHRODESIS with Dr Ward 12/17/2020. XR today personally reviewed and compared to prior. Hardware intact as well as alignment.     Today she reports on going back pain. Has had back pain all her life. Says its severe and she just deals with it. Pain is midline. No leg pain. Numbness better in her right leg, can feel her leg but top of her foot sensation diminished. No change in bowel or bladder. Walking with a cane. Ran out of her percocet. Taking 1 every 6 hours as needed. Refill sent. States she went through withdrawal from oxycodone. Lost 25#. Reports 4 falls since surgery, dropping things. CT cervical Per Ed with Dr Ward to follow up on it. Unable to have MRI due to spine stimulator. Continue off work until we re-evaluate after PT gets started and CT scan cervical can be evaluated. She is wearing her bone stimulator she received today.     PLAN:  1. Next visit with us in another 6 weeks with XR, re-evaluate return to work.     2. Start physical therapy    3. OK to start adding back activity and weight to your daily activity. Don't do what your body doesn't want you to.     4. Percocet sent to your pharmacy     5. Stop smoking     6. CT cervical spine. Dr Ward will call you with results. R/O cause of falls, dropping things.     HPI: 55 year old presented with low back pain, numbness, diffuses weakness. Right leg numbness. No urinary incontinence. CT lumbar spine showed right L4 pars versus defect as well as right lateral recess stenosis at the L4-L5 level in the setting of a prior hemilaminectomy. Underwent right L4-S1 TFESI on 6/2/20 with several months of relief. She also underwent ar right L4-S1 TFESI on 9/22/20 this lasted one week. EMG showed chronic and active right L5 radiculopathy.  CT myelogram showed a right sided disc herniation at right L4-5 with mild displacement of the right L5 nerve root and moderate lateral recess stenosis. Discussed right L4-5 discectomy with L4-5 instrumented fusion.       PHYSICAL EXAM:  Heart Rate:  [81] 81  BP: (106)/(67) 106/67  SpO2:  [95 %] 95 %      Alert and oriented x3, speech normal  PERRL, EOMI, face symmetric, tongue midline, shoulder shrug equal  No pronator drift, finger to nose smooth and accurate bilaterally  Arm strength: 5/5  Strength:   Hip flexors 4+/5 right, 5/5 left   Quadriceps: 4/5 right, 5/5 left   Ankle dorsiflexion: 4/5 right, 5/5 left   Extensor hallicus longus: 4/5 right, 5/5 left   Ankle plantar flexion : 4/5 right, 5/5 left   Bulk and tone: normal  Reflexes: No pathological reflexes   Coordination/Gait: slightly wide with cane  Incision: healed - personally viewed     IMAGING:  The imaging was personally reviewed by me.    ROBERT Lees  [facility]  O: [phone]

## 2021-06-14 NOTE — TELEPHONE ENCOUNTER
"Susan Kwan is status post Redo right lumbar 4-lumbar 5 hemilaminectomy, medial facetectomy and microdiscectomy; Lumbar 4-lumbar 5 posterior instrumented fusion ([employer] Solera); Lumbar 4-lumbar 5 posterolateral arthrodesis on 12/17/2020 with Dr. Ward. Last seen on 12/28/2020 for wound check.  Today she calls to report intermittent anxiety attacks associated with increased back pain, no radiculopathy, reports improved strength in LE.  She stopped taking Oxycodone as she developed some \"mood changes\" and felt it is \"too strong for her\". She takes Gabapentin and Valium as prescribed. Just started on Prozac as of yesterday.  Can not take Tylenol or hydrocodone.  She enquired about T#3 prescription.  Sussy Saldana RN, CNRN      "

## 2021-06-15 NOTE — TELEPHONE ENCOUNTER
After discussion with Dr. Ward, called and let Susan know that she should get lumbar xrays and to start taking a Medrol Dosepak. F/u after scans next week.  Sussy Saldana RN, CNRN

## 2021-06-15 NOTE — PROGRESS NOTES
"[facility] Rehabilitation Daily Progress     Patient Name: Susan Kwan  Date: 2/4/2021  Visit #: 2/12  Authorization dates:  2/1/21-5/2/21  Referral Diagnosis: Lumbar radiculopathy  Referring provider: Sultana Montes CNP  Visit Diagnosis:     ICD-10-CM    1. S/P lumbar spinal fusion  Z98.1    2. Weakness of right lower extremity  R29.898    3. Lumbar radiculopathy  M54.16    4. Generalized muscle weakness  M62.81        No data recorded     Assessment:     Response to Intervention:  Tolerated well with report of decreased right LE pain    Symptoms are consistent with:  Medical diagnosis  .  Patient is appropriate to continue with skilled physical therapy intervention, as indicated by initial plan of care.    Goal Status:  Pt. will demonstrate/verbalize independence in self-management of condition in : 6 weeks  Pt. will be independent with home exercise program in : 6 weeks    Pt will: improve LE strength and balance to be able to safely ambulate with SPC in 6 weeks.  Pt will: get in and out of bed/chair with <2/10 pain in 6 weeks.    Other functional progress:           Plan / Patient Education:     Continue with initial plan of care.  Progress with home program as tolerated.      Subjective:     Pain Rating:  Resting 4  Activity:  4    Response to last treatment: felt ok.  HEP- Frequency: 3x/day, Questions or difficulties:  none.    Patient reports:      Doing the exercises whenever she remembers to do them.    She feels some right sided low back pain with pinching when she tries to sit up straight.      Objective:             Treatment Today   Manual Therapy  MFR layers 1-3 right: quad, hamstring, gastroc    Exercises:  Exercise #1: Supine piriformis stretch 30\" B  Comment #1: Supine glute squeezes 5\" hold x 10  Exercise #2: Supine ab setting 5\" hold x 10  Comment #2: Supine SLR with glute squeeze x 10 B            TREATMENT MINUTES COMMENTS   Evaluation     Self-care/ Home management     Manual " therapy 12 See above.   Neuromuscular Re-education     Therapeutic Activity     Therapeutic Exercises 14 See exercise flow-sheet for details.    Gait training     Modality__________________                Total 26    Blank areas are intentional and mean the treatment did not include these items.       Gasper Hernandez, PT  2/4/2021    [facility]

## 2021-06-15 NOTE — PROGRESS NOTES
[facility] Rehabilitation Daily Progress     Patient Name: Susan wKan  Date: 3/8/2021  Visit #:   Authorization dates:  21-21  Referral Diagnosis: Lumbar radiculopathy  Referring provider: Sultana Montes CNP  Visit Diagnosis:     ICD-10-CM    1. S/P lumbar spinal fusion  Z98.1    2. Generalized muscle weakness  M62.81    3. Weakness of right lower extremity  R29.898    4. Lumbar radiculopathy  M54.16    5. Chronic right-sided low back pain with right-sided sciatica  M54.41     G89.29        No data recorded     Assessment:     Response to Intervention:  Tolerated new exercises and manual therapy well.    PT reviewed some exercises in clinic to review form and continue with strengthening.     PT continued with manual therapy to R glutes, piriformis, hamstrings, for decreased pain and muscle tightness.       Symptoms are consistent with:  Medical diagnosis  .  Patient is appropriate to continue with skilled physical therapy intervention, as indicated by initial plan of care.    Goal Status:  Pt. will demonstrate/verbalize independence in self-management of condition in : 6 weeks;Progressing toward  Pt. will be independent with home exercise program in : 6 weeks;Progressing toward    Pt will: improve LE strength and balance to be able to safely ambulate with SPC in 6 weeks- met, using SPC for gait currently safely  Pt will: get in and out of bed/chair with <2/10 pain in 6 weeks.- progressing toward    Other functional progress:     Pt not having as much pain in R LE     Pt not dropping items much anymore      Plan / Patient Education:     Continue with initial plan of care.  Progress with home program as tolerated.    Plan: Possible last visit next session, check in.       Subjective:     Pain Ratin/10      Response to last treatment: felt good     HEP- Frequency: 3x/day, Questions or difficulties:  none.    Patient reports:      Exercises are going well.    No real pain during the  "day.    Pt had no pain last night for the first time    MD prescribed muscle relaxer which has helped a lot      Objective:     Treatment Today     Exercises:  Exercise #1: Supine piriformis stretch  Comment #1: Supine bridging with glute squeeze at top  Exercise #2: Supine ab setting with marching x 5 B  Comment #2: Supine SLR with glute squeeze  Exercise #3: Sit to stand  Comment #3: Balance: SLS left 13\" right 12\", feet together eyes closed 60\", tandem stance left in front 40\", right 60\", SLS with leg lifts out to the side 10 bilateral- not today  Exercise #4: Supine hip isometric adduction with towel roll  Comment #4: Clam shell B x 10 with orange band  Exercise #5: R side hip flexor stretch hanging off table x 1 min  Comment #5: NuStep x 4 min, RL 6, discussion of symptoms     Manual Therapy:  STM/MFR to R piriformis glute med, glute max, hamstrings       TREATMENT MINUTES COMMENTS   Evaluation     Self-care/ Home management     Manual therapy 17 See above   Neuromuscular Re-education     Therapeutic Activity     Therapeutic Exercises 10 See exercise flow-sheet for details.    Gait training     Modality__________________                Total 27    Blank areas are intentional and mean the treatment did not include these items.     I attest that I was present in the room, making skilled assessments and directing the service provided today.  I was responsible for the assessment and treatment of the patient.  During this time, I was not engaged in treating another patient or doing other tasks.  Gasper Hernandez, PT       Jackie Castro, SPT    Gasper Hernandez, PT  3/8/2021    [facility]   "

## 2021-06-15 NOTE — PROGRESS NOTES
[facility] Rehabilitation Daily Progress     Patient Name: Susan Kwan  Date: 2/22/2021  Visit #: 6/12  Authorization dates:  2/1/21-5/2/21  Referral Diagnosis: Lumbar radiculopathy  Referring provider: Sultana Montes CNP  Visit Diagnosis:     ICD-10-CM    1. S/P lumbar spinal fusion  Z98.1    2. Generalized muscle weakness  M62.81    3. Lumbar radiculopathy  M54.16    4. Weakness of right lower extremity  R29.898        No data recorded     Assessment:     Pt with a flare up this week of glute pain and hip pain which radiates into groin. Pt reported feeling better at end of PT session.     Response to Intervention:  Tolerated new exercises and manual therapy well.    PT added clam shells and isometric adduction today for hip muscle activation and support.      Symptoms are consistent with:  Medical diagnosis  .  Patient is appropriate to continue with skilled physical therapy intervention, as indicated by initial plan of care.    Goal Status:  Pt. will demonstrate/verbalize independence in self-management of condition in : 6 weeks;Progressing toward  Pt. will be independent with home exercise program in : 6 weeks;Progressing toward    Pt will: improve LE strength and balance to be able to safely ambulate with SPC in 6 weeks- met, using SPC for gait currently safely  Pt will: get in and out of bed/chair with <2/10 pain in 6 weeks.- progressing toward    Other functional progress:     Pt not having as much pain in R LE     Pt not dropping items much anymore      Plan / Patient Education:     Continue with initial plan of care.  Progress with home program as tolerated.    Plan: mini squats, NuStep, check response to new exercises      Subjective:     Pain Rating:    pPt has been having a lot of hi[ pain at night, into the groin  Reports pain is an 8/10 at night    Response to last treatment: felt good     HEP- Frequency: 3x/day, Questions or difficulties:  none.    Patient reports:      Exercises are going  "okay, do not seem to aggravate the pain       Objective:     Treatment Today     Exercises:  Exercise #1: Supine piriformis stretch  30\" x 2 bilateral  Comment #1: Supine bridging with glute squeeze at top  10\" x 10  Exercise #2: Supine ab setting  Comment #2: Supine SLR with glute squeeze  Exercise #3: Sit to stand  10  Comment #3: Balance: SLS left 13\" right 12\", feet together eyes closed 60\", tandem stance left in front 40\", right 60\", SLS with leg lifts out to the side 10 bilateral- not today  Exercise #4: Supine hip isometric adduction with towel roll 10\" x 10-cued for HEP  Comment #4: Clam shell B x 10- cued for HEP  Exercise #5: R side hip flexor stretch hanging off table x 1 min     Manual Therapy:  STM/MFR to R piriformis glute med, glute max       TREATMENT MINUTES COMMENTS   Evaluation     Self-care/ Home management     Manual therapy 12 See above   Neuromuscular Re-education     Therapeutic Activity     Therapeutic Exercises 15 See exercise flow-sheet for details.    Gait training     Modality__________________                Total 27    Blank areas are intentional and mean the treatment did not include these items.     I attest that I was present in the room, making skilled assessments and directing the service provided today.  I was responsible for the assessment and treatment of the patient.  During this time, I was not engaged in treating another patient or doing other tasks.  Gasper Hernandez, PT         Jackie Castro, SPT  Gasper Hernandez, PT  2/22/2021    [facility]   "

## 2021-06-15 NOTE — PROGRESS NOTES
[facility] Rehabilitation Daily Progress     Patient Name: Susan Kwan  Date: 3/11/2021  Visit #: 10/12  Authorization dates:  21-21  Referral Diagnosis: Lumbar radiculopathy  Referring provider: Sultana Montes CNP  Visit Diagnosis:     ICD-10-CM    1. S/P lumbar spinal fusion  Z98.1    2. Generalized muscle weakness  M62.81    3. Weakness of right lower extremity  R29.898        No data recorded     Assessment:     Trial of independent self management started.    PT reviewed some exercises in clinic to review form and continue with strengthening.     PT continued with manual therapy to R glutes, piriformis, hamstrings, for decreased pain and muscle tightness.       Symptoms are consistent with:  Medical diagnosis  .  Patient is appropriate to continue with skilled physical therapy intervention, as indicated by initial plan of care.    Goal Status:  Pt. will demonstrate/verbalize independence in self-management of condition in : 6 weeks;Progressing toward  Pt. will be independent with home exercise program in : 6 weeks;Progressing toward    Pt will: improve LE strength and balance to be able to safely ambulate with SPC in 6 weeks- met, using SPC for gait currently safely  Pt will: get in and out of bed/chair with <2/10 pain in 6 weeks.- progressing toward    Other functional progress:     Pt not having as much pain in R LE     Pt not dropping items much anymore      Plan / Patient Education:     Trial of independent self-management of condition initiated.  Patient to contact PT by phone or schedule an appointment as needed if symptoms increase or progress stops.  If patient has not returned to continue therapy in 30 days then physical therapy will be discharged.       Subjective:     Pain Ratin/10      Response to last treatment: felt good     HEP- Frequency: 3x/day, Questions or difficulties:  none.    Patient reports:      Feeling pretty good, except for a persistent cramp in right  "glut      Objective:     Treatment Today     Exercises:  Exercise #1: Supine piriformis stretch B x 30\"  Comment #1: Supine bridging with glute squeeze at top 1 x 10 reps B  Exercise #2: Supine ab setting with marching and leg extension x 5 B  Comment #2: Supine SLR with glute squeeze  Exercise #3: Sit to stand  Comment #3: Balance: SLS left 13\" right 12\", feet together eyes closed 60\", tandem stance left in front 40\", right 60\", SLS with leg lifts out to the side 10 bilateral- not today  Exercise #4: Supine hip isometric adduction with towel roll  Comment #4: Clam shell  Exercise #5: R side hip flexor stretch hanging off table  Comment #5: NuStep x 4 min, RL 6, discussion of symptoms     Manual Therapy:  STM/MFR to R piriformis glute med, glute max,       TREATMENT MINUTES COMMENTS   Evaluation     Self-care/ Home management     Manual therapy 25 See above   Neuromuscular Re-education     Therapeutic Activity     Therapeutic Exercises 3 See exercise flow-sheet for details.    Gait training     Modality__________________                Total 28    Blank areas are intentional and mean the treatment did not include these items.       Gasper Hernandez, PT  3/11/2021    [facility]   "

## 2021-06-15 NOTE — PROCEDURES
[facility] Post Procedure    Procedure: Lumbar Puncture for Cervical Myelogram    Radiologist: Gasper Paredes    Contrast: 14 mL Omnipaque 180  Fluoro Time: 0.5 minutes  Images: 2    EBL: minimal  Complications: None    Preliminary findings: (see dictation for full detail)  No cervical spinal stenosis. Please see CT Myelogram report.    Assess/Plan:   Bedrest for 1-2 hours.  Report to Neurosurgery.      Gasper Paredes  Pager: [phone]  Emergency pager: [phone]  Office: [phone]

## 2021-06-15 NOTE — PROGRESS NOTES
"Susan Kwan is a 55 y.o. female who is being evaluated via a billable video visit.      How would you like to obtain your AVS? MyChart.  If dropped from the video visit, the video invitation should be resent by: Text to cell phone:   Will anyone else be joining your video visit? No      Video Start Time: 11:45    Assessment & Plan   Severe episode of recurrent major depressive disorder, without psychotic features (H)  - FLUoxetine (PROZAC) 20 MG capsule  Dispense: 270 capsule; Refill: 1  Anxiety attack  - FLUoxetine (PROZAC) 20 MG capsule  Dispense: 270 capsule; Refill: 1  History of drug abuse (H)  Lumbar radiculopathy  Spinal stenosis, unspecified spinal region  Acquired hypothyroidism  Improved.   We reviewed current medication list.   Since her recovery and mood are both stable will continue the same dose of fluoxetine at 60 mg per day for a few more months. Refill are provided.   Encouraged PT and follow up as directed with neurosurgery.   She will follow up on this in three to four months.   Thyroid function normal at the last visit.   Does need follow up on pap smear. Will consider scheduling her physical when able or at least for the next follow up.           Tobacco Cessation:   reports that she has been smoking cigarettes. She has been smoking about 0.50 packs per day. She has never used smokeless tobacco.  BMI:   Estimated body mass index is 33.39 kg/m  as calculated from the following:    Height as of 2/16/21: 5' 2.75\" (1.594 m).    Weight as of 2/16/21: 187 lb (84.8 kg).     Return in about 3 months (around 5/11/2021) for Recheck.    Earline Jimenez MD  [facility]    Subjective   Susan Kwan is 55 y.o. and presents today for follow up.   Video visit completed due to the current pandemic of covid 19.   She feels well today.   Was seen one month ago with worsening mood. She had some exacerbation of anxiety, depression after use of narcotics postoperatively. This was weaned off. " She was treated with increased dose of selective serotonin reuptake inhibitor. She has been taking the same dose. She feels like this has been helping with her mood. There is no concerns of anxiety, depression, changes in mood. Her pain is controlled. She is working with PT, neurosurgery. Her recovery is going well. No other new concerns.         Objective       Vitals:  No vitals were obtained today due to virtual visit.    Physical Exam  GENERAL: Healthy, alert and no distress  EYES: Eyes grossly normal to inspection. No discharge or erythema, or obvious scleral/conjunctival abnormalities.  RESP: No audible wheeze, cough, or visible cyanosis.  No visible retractions or increased work of breathing.    NEURO: Cranial nerves grossly intact. Mentation and speech appropriate for age.  PSYCH: Mentation appears normal, affect normal/bright, judgement and insight intact, normal speech and appearance well-groomed        Video-Visit Details    Type of service:  Video Visit    Video End Time (time video stopped): 11:50  Originating Location (pt. Location): Home    Distant Location (provider location):  [facility]     Platform used for Video Visit: Ander

## 2021-06-15 NOTE — PROGRESS NOTES
[facility] Rehabilitation Daily Progress     Patient Name: Susan Kwan  Date: 2/15/2021  Visit #: 4/12  Authorization dates:  2/1/21-5/2/21  Referral Diagnosis: Lumbar radiculopathy  Referring provider: Sultana Montes CNP  Visit Diagnosis:     ICD-10-CM    1. S/P lumbar spinal fusion  Z98.1    2. Weakness of right lower extremity  R29.898    3. Lumbar radiculopathy  M54.16    4. Generalized muscle weakness  M62.81        No data recorded     Assessment:     Response to Intervention:  Felt less painful and looser after MT.    Pt challenged today with single leg stance and heel/toe gait.     Symptoms are consistent with:  Medical diagnosis  .  Patient is appropriate to continue with skilled physical therapy intervention, as indicated by initial plan of care.    Goal Status:  Pt. will demonstrate/verbalize independence in self-management of condition in : 6 weeks  Pt. will be independent with home exercise program in : 6 weeks    Pt will: improve LE strength and balance to be able to safely ambulate with SPC in 6 weeks.  Pt will: get in and out of bed/chair with <2/10 pain in 6 weeks.    Other functional progress:     Pt not having as much pain in R LE     Pt not dropping items much anymore      Plan / Patient Education:     Continue with initial plan of care.  Progress with home program as tolerated.    Plan: mini squats, NuStep      Subjective:     Pain Rating:  Resting 2  Activity:  2    Response to last treatment: Pt has been feeling good since last appointment. Manual therapy seemed to help relieve pain. Exercises have been going well.     Pt has tingling in R dorsum of foot.     Pt reports walking and balance have been going okay. No buckling of legs or falls.     HEP- Frequency: 3x/day, Questions or difficulties:  none.    Patient reports:      Doing the exercises about once a day. Doing glute squeezes more frequently.      Objective:     Treatment Today   Manual Therapy  MFR layers 1-3 right: Glute  "med, glute min, piriformis, Quad, tib anterior    Exercises:  Exercise #1: Supine piriformis stretch 30\" x 2  B  Comment #1: Supine bridging with glute squeeze at top 1 x 10 reps  Exercise #2: Supine ab setting 5\" hold x 10  Comment #2: Supine SLR with glute squeeze 5\" x 5 B- cued to raise leg to knee height  Exercise #3: Sit to stand x 10            TREATMENT MINUTES COMMENTS   Evaluation     Self-care/ Home management     Manual therapy 15 See above.   Neuromuscular Re-education 10    Therapeutic Activity     Therapeutic Exercises 3 (NC) See exercise flow-sheet for details.    Gait training     Modality__________________                Total 28    Blank areas are intentional and mean the treatment did not include these items.     I attest that I was present in the room, making skilled assessments and directing the service provided today.  I was responsible for the assessment and treatment of the patient.  During this time, I was not engaged in treating another patient or doing other tasks.  Gasper Hernandez, PT           Jackie Castro, SPT  Gasper Hernandez, PT  2/15/2021    [facility]   "

## 2021-06-15 NOTE — TELEPHONE ENCOUNTER
Refill Approved    Rx renewed per Medication Renewal Policy. Medication was last renewed on 8/29/19, last 2/11/21.    Essence Parra, [facility] Triage/Med Refill 2/16/2021     Requested Prescriptions   Pending Prescriptions Disp Refills     SUMAtriptan (IMITREX) 50 MG tablet [Pharmacy Med Name: SUMATRIPTAN 50MG TABLETS] 9 tablet 10     Sig: TAKE 1 TABLET BY MOUTH EVERY 2 HOURS AS NEEDED FOR MIGRAINE. MAY REPEAT IN 2 HOURS AS NEEDED       Triptans Refill Protocol Passed - 2/16/2021 11:55 AM        Passed - PCP or prescribing provider visit in past 12 months       Last office visit with prescriber/PCP: 2/27/2019 Earline Jimenez MD OR same dept: 2/28/2020 Rabia Manjarrez PA-C OR same specialty: 2/28/2020 Rabia Manjarrez PA-C  Last physical: 12/8/2020 Last MTM visit: Visit date not found   Next visit within 3 mo: Visit date not found  Next physical within 3 mo: Visit date not found  Prescriber OR PCP: Earline Jimenez MD  Last diagnosis associated with med order: 1. Migraine  - SUMAtriptan (IMITREX) 50 MG tablet [Pharmacy Med Name: SUMATRIPTAN 50MG TABLETS]; TAKE 1 TABLET BY MOUTH EVERY 2 HOURS AS NEEDED FOR MIGRAINE. MAY REPEAT IN 2 HOURS AS NEEDED  Dispense: 9 tablet; Refill: 10    If protocol passes may refill for 12 months if within 3 months of last provider visit (or a total of 15 months).                            
124

## 2021-06-15 NOTE — PROGRESS NOTES
"[facility] Rehabilitation Daily Progress     Patient Name: Susan Kwan  Date: 2/8/2021  Visit #: 3/12  Authorization dates:  2/1/21-5/2/21  Referral Diagnosis: Lumbar radiculopathy  Referring provider: Sultana Montes CNP  Visit Diagnosis:     ICD-10-CM    1. S/P lumbar spinal fusion  Z98.1    2. Weakness of right lower extremity  R29.898    3. Lumbar radiculopathy  M54.16    4. Generalized muscle weakness  M62.81        No data recorded     Assessment:     Response to Intervention:  Felt less painful and looser after MT.    Symptoms are consistent with:  Medical diagnosis  .  Patient is appropriate to continue with skilled physical therapy intervention, as indicated by initial plan of care.    Goal Status:  Pt. will demonstrate/verbalize independence in self-management of condition in : 6 weeks  Pt. will be independent with home exercise program in : 6 weeks    Pt will: improve LE strength and balance to be able to safely ambulate with SPC in 6 weeks.  Pt will: get in and out of bed/chair with <2/10 pain in 6 weeks.    Other functional progress:     Pt not having as much pain in R LE     Pt not dropping items much anymore      Plan / Patient Education:     Continue with initial plan of care.  Progress with home program as tolerated.    Plan: trial balance activities, mini squats, NuStep      Subjective:     Pain Rating:  Resting 3  Activity:  3    Response to last treatment: Pt has been feeling good since last appointment. Manual therapy seemed to help relieve pain. Exercises have been going well.     Pt has tingling in R dorsum of foot.     HEP- Frequency: 3x/day, Questions or difficulties:  none.    Patient reports:      Doing the exercises about once a day. Doing glute squeezes more frequently.      Objective:     Treatment Today   Manual Therapy  MFR layers 1-3 right: Quad, gastroc    Exercises:  Exercise #1: Supine piriformis stretch 30\" x 2  B  Comment #1: Supine bridging with glute squeeze at top " "1 x 10 reps  Exercise #2: Supine ab setting 5\" hold x 10  Comment #2: Supine SLR with glute squeeze 5\" x 5 B- cued to raise leg to knee height  Exercise #3: Sit to stand x 10            TREATMENT MINUTES COMMENTS   Evaluation     Self-care/ Home management     Manual therapy 15 See above.   Neuromuscular Re-education     Therapeutic Activity     Therapeutic Exercises 12 See exercise flow-sheet for details.    Gait training     Modality__________________                Total 27    Blank areas are intentional and mean the treatment did not include these items.     I attest that I was present in the room, making skilled assessments and directing the service provided today.  I was responsible for the assessment and treatment of the patient.  During this time, I was not engaged in treating another patient or doing other tasks.  Gasper Hernandez, PT           Jackie Castro, SPT  Gasper Hernandez, PT  2/8/2021    [facility]   "

## 2021-06-15 NOTE — TELEPHONE ENCOUNTER
[facility]. She stated she is 'falling, back pain and leg numbness'. Patient had lumbar surgery w Dr. Ward 12/18/20. Due to return in March for 3 mo post op. Patient also has cervical imaging scheduled which Dr. Ward is to call patient once imaging is complete.    Please call patient to triage further before I schedule a f/u appt for lumbar. I'm assuming we may need imaging prior.    Best number: [phone].

## 2021-06-15 NOTE — PROGRESS NOTES
"NEUROSURGERY FOLLOWUP  NOTE    Susan Kwan comes today in f/u after right lumbar 4-5 redo microdiscectomy and lumbar 4-5 instrumented fusion on 12/17/2020.  From a lumbar's perspective she overall has been doing fairly well.  She had some increased back pain recently which resolved after Medrol Dosepak she now feels great.  Since her last visit she notes increased imbalance and dropping of objects.  She has been ambulating with a cane for years.  She denies any neck or radicular arm pain, arm weakness, arm numbness or tingling, bowel or bladder dysfunction.    PHYSICAL EXAM:   Constitutional: /60   Pulse 84   Resp 18   Ht 5' 2.75\" (1.594 m)   Wt 187 lb (84.8 kg)   LMP 09/21/2014   SpO2 98%   BMI 33.39 kg/m       Mental Status: A & O in no acute distress.  Affect is appropriate.  Speech is fluent.  Recent and remote memory are intact.  Attention span and concentration are normal.    Motor: Normal bulk and tone all muscle groups of upper and lower extremities.     Sensory: Sensation intact bilaterally to light touch.      Coordination:   Ambulates with a cane.    IMAGING:   I personally reviewed all radiographic images     CONSULTATION ASSESSMENT AND PLAN:    Patient is a 55-year-old female who is status post right lumbar 4-5 redo microdiscectomy and lumbar 4-5 instrumented fusion on 12/17/2020.  We discussed patient's recent CT of her cervical spine which showed a C5-6 disc osteophyte complex which contributed to mild to moderate spinal canal stenosis.  Patient is unable to obtain MRI due to a spinal cord stimulator.  Given finding discussed getting a CT myelogram to better evaluate for any spinal cord compression.  She also states that she gets midthoracic back pain on occasion.  We will obtain cervical and thoracic x-rays as well.  She will return to visit after imaging is obtained.    I spent more than 23 minutes in this apt, examining the pt, reviewing the scans, reviewing notes from chart, " discussing treatment options with risks and benefits and coordinating care.     Leanna Ward      CC:     Earline Jimenez MD  [address] [place] [id number]

## 2021-06-15 NOTE — PROGRESS NOTES
[facility] Rehabilitation Daily Progress     Patient Name: Susan Kwan  Date: 2/25/2021  Visit #: 7/12  Authorization dates:  2/1/21-5/2/21  Referral Diagnosis: Lumbar radiculopathy  Referring provider: Sultana Montes CNP  Visit Diagnosis:     ICD-10-CM    1. S/P lumbar spinal fusion  Z98.1    2. Generalized muscle weakness  M62.81    3. Weakness of right lower extremity  R29.898    4. Lumbar radiculopathy  M54.16        No data recorded     Assessment:     Pt with a flare up this week of glute pain and hip pain which radiates into groin. Pt reported feeling better at end of PT session.     Response to Intervention:  Tolerated new exercises and manual therapy well.    PT added clam shells and isometric adduction today for hip muscle activation and support.      Symptoms are consistent with:  Medical diagnosis  .  Patient is appropriate to continue with skilled physical therapy intervention, as indicated by initial plan of care.    Goal Status:  Pt. will demonstrate/verbalize independence in self-management of condition in : 6 weeks;Progressing toward  Pt. will be independent with home exercise program in : 6 weeks;Progressing toward    Pt will: improve LE strength and balance to be able to safely ambulate with SPC in 6 weeks- met, using SPC for gait currently safely  Pt will: get in and out of bed/chair with <2/10 pain in 6 weeks.- progressing toward    Other functional progress:     Pt not having as much pain in R LE     Pt not dropping items much anymore      Plan / Patient Education:     Continue with initial plan of care.  Progress with home program as tolerated.    Plan: mini squats, NuStep, check response to new exercises      Subjective:     Pain Rating:      Reports pain is an 8/10 at night    Response to last treatment: felt good     HEP- Frequency: 3x/day, Questions or difficulties:  none.    Patient reports:      Exercises are going well.    No real pain during the day.    Still having glut  "pain at night.      Objective:     Treatment Today     Exercises:  Exercise #1: Supine piriformis stretch  30\" x 2 bilateral  Comment #1: Supine bridging with glute squeeze at top  10\" x 10  Exercise #2: Supine ab setting  Comment #2: Supine SLR with glute squeeze  Exercise #3: Sit to stand  10  Comment #3: Balance: SLS left 13\" right 12\", feet together eyes closed 60\", tandem stance left in front 40\", right 60\", SLS with leg lifts out to the side 10 bilateral- not today  Exercise #4: Supine hip isometric adduction with towel roll 10\" x 10-cued for HEP  Comment #4: Clam shell B x 10- cued for HEP  Exercise #5: R side hip flexor stretch hanging off table x 1 min     Manual Therapy:  STM/MFR to R piriformis glute med, glute max, hamstrings       TREATMENT MINUTES COMMENTS   Evaluation     Self-care/ Home management     Manual therapy 23 See above   Neuromuscular Re-education     Therapeutic Activity     Therapeutic Exercises 3 See exercise flow-sheet for details.    Gait training     Modality__________________                Total 26    Blank areas are intentional and mean the treatment did not include these items.       Gasper Hernandez, PT  2/25/2021    [facility]   "

## 2021-06-15 NOTE — PROGRESS NOTES
"[facility] Rehabilitation Daily Progress     Patient Name: Susan Kwan  Date: 2/18/2021  Visit #: 5/12  Authorization dates:  2/1/21-5/2/21  Referral Diagnosis: Lumbar radiculopathy  Referring provider: Sultana Montes CNP  Visit Diagnosis:     ICD-10-CM    1. S/P lumbar spinal fusion  Z98.1    2. Weakness of right lower extremity  R29.898    3. Lumbar radiculopathy  M54.16    4. Generalized muscle weakness  M62.81        No data recorded     Assessment:     Response to Intervention:  Tolerated exercise quite well.    Pt challenged today with single leg stance and heel/toe gait.     Symptoms are consistent with:  Medical diagnosis  .  Patient is appropriate to continue with skilled physical therapy intervention, as indicated by initial plan of care.    Goal Status:  Pt. will demonstrate/verbalize independence in self-management of condition in : 6 weeks;Progressing toward  Pt. will be independent with home exercise program in : 6 weeks;Progressing toward    Pt will: improve LE strength and balance to be able to safely ambulate with SPC in 6 weeks- met, using SPC for gait currently safely  Pt will: get in and out of bed/chair with <2/10 pain in 6 weeks.- progressing toward    Other functional progress:     Pt not having as much pain in R LE     Pt not dropping items much anymore      Plan / Patient Education:     Continue with initial plan of care.  Progress with home program as tolerated.    Plan: mini squats, NuStep      Subjective:     Pain Rating:  Resting 0  Activity:  0    Response to last treatment: felt good     HEP- Frequency: 3x/day, Questions or difficulties:  none.    Patient reports:      Exercises are going well.    Legs are feeling good. No pain      Objective:     Treatment Today     Exercises:  Exercise #1: Supine piriformis stretch  30\" x 2 bilateral  Comment #1: Supine bridging with glute squeeze at top  10\" x 10  Exercise #2: Supine ab setting  5\" x 10  Comment #2: Supine SLR with glute " "squeeze  10 bilateral  Exercise #3: Sit to stand  10  Comment #3: Balance: SLS left 13\" right 12\", feet together eyes closed 60\", tandem stance left in front 40\", right 60\", SLS with leg lifts out to the side 10 bilateral            TREATMENT MINUTES COMMENTS   Evaluation     Self-care/ Home management     Manual therapy     Neuromuscular Re-education 10    Therapeutic Activity     Therapeutic Exercises 15 See exercise flow-sheet for details.    Gait training     Modality__________________                Total 25    Blank areas are intentional and mean the treatment did not include these items.       Gasper Hernandez, PT  2/18/2021    [facility]   "

## 2021-06-15 NOTE — PROGRESS NOTES
Patient is here for a follow up after a CT scan. She states that the pain is the same in her right leg. She says its a little worse after PT.   Juli Campuzano,[facility],12:22 PM

## 2021-06-15 NOTE — PROGRESS NOTES
[facility] Rehabilitation Daily Progress     Patient Name: Susan Kwan  Date: 3/1/2021  Visit #: 8/12  Authorization dates:  2/1/21-5/2/21  Referral Diagnosis: Lumbar radiculopathy  Referring provider: Sultana Montes CNP  Visit Diagnosis:     ICD-10-CM    1. S/P lumbar spinal fusion  Z98.1    2. Generalized muscle weakness  M62.81    3. Weakness of right lower extremity  R29.898    4. Lumbar radiculopathy  M54.16    5. Chronic right-sided low back pain with right-sided sciatica  M54.41     G89.29        No data recorded     Assessment:     Response to Intervention:  Tolerated new exercises and manual therapy well.    PT added orange band to clam shells and marching to abdominal sets for added challenge today.    PT continued with manual therapy to R glutes, piriformis, hamstrings, for decreased pain and muscle tightness.       Symptoms are consistent with:  Medical diagnosis  .  Patient is appropriate to continue with skilled physical therapy intervention, as indicated by initial plan of care.    Goal Status:  Pt. will demonstrate/verbalize independence in self-management of condition in : 6 weeks;Progressing toward  Pt. will be independent with home exercise program in : 6 weeks;Progressing toward    Pt will: improve LE strength and balance to be able to safely ambulate with SPC in 6 weeks- met, using SPC for gait currently safely  Pt will: get in and out of bed/chair with <2/10 pain in 6 weeks.- progressing toward    Other functional progress:     Pt not having as much pain in R LE     Pt not dropping items much anymore      Plan / Patient Education:     Continue with initial plan of care.  Progress with home program as tolerated.    Plan: mini squats, NuStep, check response to new exercises      Subjective:     Pain Rating:      Reports pain is an 4/10 at night    Response to last treatment: felt good     HEP- Frequency: 3x/day, Questions or difficulties:  none.    Patient reports:      Exercises are  "going well.    No real pain during the day.    Still having glut pain at night.    MD prescribed muscle relaxer which has helped a lot      Objective:     Treatment Today     Exercises:  Exercise #1: Supine piriformis stretch  Comment #1: Supine bridging with glute squeeze at top  Exercise #2: Supine ab setting with marching x 5 B  Comment #2: Supine SLR with glute squeeze  Exercise #3: Sit to stand  Comment #3: Balance: SLS left 13\" right 12\", feet together eyes closed 60\", tandem stance left in front 40\", right 60\", SLS with leg lifts out to the side 10 bilateral- not today  Exercise #4: Supine hip isometric adduction with towel roll  Comment #4: Clam shell B x 10 with orange band  Exercise #5: R side hip flexor stretch hanging off table x 1 min     Manual Therapy:  STM/MFR to R piriformis glute med, glute max, hamstrings       TREATMENT MINUTES COMMENTS   Evaluation     Self-care/ Home management     Manual therapy 13 See above   Neuromuscular Re-education     Therapeutic Activity     Therapeutic Exercises 15 See exercise flow-sheet for details.    Gait training     Modality__________________                Total 28    Blank areas are intentional and mean the treatment did not include these items.     I attest that I was present in the room, making skilled assessments and directing the service provided today.  I was responsible for the assessment and treatment of the patient.  During this time, I was not engaged in treating another patient or doing other tasks.  Gasper Hernandez, PT       Jackie Castro, SPT    Gasper Hernandez, PT  3/1/2021    [facility]   "

## 2021-06-16 ENCOUNTER — COMMUNICATION - HEALTHEAST (OUTPATIENT)
Dept: NEUROSURGERY | Facility: CLINIC | Age: 56
End: 2021-06-16

## 2021-06-16 DIAGNOSIS — M62.838 MUSCLE SPASM: ICD-10-CM

## 2021-06-16 DIAGNOSIS — G95.20 CERVICAL CORD COMPRESSION WITH MYELOPATHY (H): ICD-10-CM

## 2021-06-16 DIAGNOSIS — M54.16 LUMBAR RADICULOPATHY: ICD-10-CM

## 2021-06-16 DIAGNOSIS — M48.02 CERVICAL STENOSIS OF SPINAL CANAL: ICD-10-CM

## 2021-06-16 NOTE — ANESTHESIA POSTPROCEDURE EVALUATION
Patient: Susan Kwan  Procedure(s):  CERVICAL 5-CERVICAL 6 ANTERIOR CERVICAL DECOMPRESSION AND FUSION WITH PLATE  Anesthesia type: general    Patient location: PACU  Last vitals:   Vitals Value Taken Time   /59 04/05/21 1340   Temp 37  C (98.6  F) 04/05/21 1230   Pulse 80 04/05/21 1348   Resp 18 04/05/21 1348   SpO2 99 % 04/05/21 1348   Vitals shown include unvalidated device data.  Post vital signs: stable  Level of consciousness: awake and responds to simple questions  Post-anesthesia pain: pain controlled  Post-anesthesia nausea and vomiting: no  Pulmonary: unassisted, return to baseline  Cardiovascular: stable and blood pressure at baseline  Hydration: adequate  Anesthetic events: no    QCDR Measures:  ASA# 11 - Nette-op Cardiac Arrest: ASA11B - Patient did NOT experience unanticipated cardiac arrest  ASA# 12 - Nette-op Mortality Rate: ASA12B - Patient did NOT die  ASA# 13 - PACU Re-Intubation Rate: ASA13B - Patient did NOT require a new airway mgmt  ASA# 10 - Composite Anes Safety: ASA10A - No serious adverse event    Additional Notes:

## 2021-06-16 NOTE — PATIENT INSTRUCTIONS - HE
YOU WERE TAUGHT FOR A C45 ACDF   Provided complete information about approaching surgery.  Discussed discharge planning and expected outcomes after surgery as well as follow-ups and restrictions.  Emphasized on stop taking ASA, NSAID's, vitamins and /or OTC herbal supplements within 10 days and any anticoagulant meds within 7 days prior to surgery.  Reminded patient to bring all pertinent films to hospital the day of surgery.    NPO after midnight    Provided written pamphlet about surgery.  Answered all questions.  The  will call patient with all pre-op orders and instructions.  Patient to complete all required diagnostic tests prior to surgery.  If test are not completed this will cancel the surgery; contact the clinic nurses at [phone] if unable to complete test.      Using a washcloth and a bottle of provided Hibiclens, wash your body, avoiding your face and genitals. Preferably, shower the night before surgery and the morning of surgery using a half a bottle each time for your whole body shower. If you are unable to take a shower in the morning of surgery, please discuss your options with the nurse at your readiness visit.DAYO GIVEN SOAP TODAY

## 2021-06-16 NOTE — PROGRESS NOTES
"NEUROSURGERY FOLLOWUP  NOTE  Susan Kwan comes today in f/u.  55-year-old female who is status post right lumbar 4-5 redo microdiscectomy and lumbar 4-5 instrumented fusion on 12/17/2020.  We discussed patient's recent CT of her cervical spine which showed a C5-6 disc osteophyte complex which contributed to mild to moderate spinal canal stenosis.  Patient is unable to obtain MRI due to a spinal cord stimulator.  Given finding discussed getting a CT myelogram to better evaluate for any spinal cord compression.  She also states that she gets midthoracic back pain on occasion.  We will obtain cervical and thoracic x-rays as well.  She will return to visit after imaging is obtained.     Patient continues to have imbalance and dropping of objects. She denies any neck or radicular arm pain, arm weakness or numbness or tingling, bowel or bladder dysfunction.       PHYSICAL EXAM:   Constitutional: BP (!) 82/64   Pulse 92   Ht 5' 2.75\" (1.594 m)   Wt 186 lb (84.4 kg)   LMP 09/21/2014   SpO2 96%   BMI 33.21 kg/m       Mental Status: A & O in no acute distress.  Affect is appropriate.  Speech is fluent.  Recent and remote memory are intact.  Attention span and concentration are normal.     Motor:  Normal bulk and tone all muscle groups of upper and lower extremities.     Sensory: Sensation intact bilaterally to light touch except diminished distal loss of sensation of right L5      Coordination:  Imbalance with  tandem gait      Reflexes; supinator, biceps, triceps, knee/ ankle jerk intact.  no hoffmans    IMAGING:   I personally reviewed all radiographic images        CONSULTATION ASSESSMENT AND PLAN:    55-year-old female who is status post right lumbar 4-5 redo microdiscectomy and lumbar 4-5 instrumented fusion on 12/17/2020 who presented with imbalance and fine motor difficulties.   Cervical CT myelogram  shows moderate spinal canal stenosis at cervical 5-6 with flattening of the spinal cord.  Cervical x-rays show " no dynamic instability with 1 mm retrolisthesis of C4 on 5 and extension only.  Given findings recommend cervical 5-6 anterior cervical decompression and fusion.  Risks and benefits discussed in detail and she agreed to proceed.    I spent more than 30 minutes in this apt, examining the pt, reviewing the scans, reviewing notes from chart, discussing treatment options with risks and benefits and coordinating care.    Leanna Ward      CC:     Earline Jimenez MD  [address] [place] [id number]

## 2021-06-16 NOTE — TELEPHONE ENCOUNTER
Controlled Substance Refill Request  Medication Name:   Requested Prescriptions     Pending Prescriptions Disp Refills     acetaminophen-codeine (TYLENOL #3) 300-30 mg per tablet 15 tablet 0     Sig: Take 1 tablet by mouth every 6 (six) hours as needed for pain.     Date Last Fill: 3/17/21  Requested [facility]acy: [facility]  Submit electronically to pharmacy  Controlled Substance Agreement on file:   Encounter-Level CSA Scan Date:    There are no encounter-level csa scan date.        Last office visit:  2/11/21

## 2021-06-16 NOTE — ANESTHESIA PREPROCEDURE EVALUATION
Anesthesia Evaluation      Patient summary reviewed   History of anesthetic complications (combative)     Airway   Mallampati: II  Neck ROM: limited   Pulmonary     breath sounds clear to auscultation  (+) asthma  sleep apnea on CPAP, , a smoker  (-) pneumonia, shortness of breath, recent URI                         Cardiovascular   Exercise tolerance: > or = 4 METS  (-) angina  ECG reviewed  Rhythm: regular  Rate: normal,         Neuro/Psych    (+) neuromuscular disease,  chronic pain  (-) no CVA    Endo/Other    (+) hypothyroidism,   (-) no diabetes     GI/Hepatic/Renal            Dental    (+) poor dentition, upper dentures, lower dentures and edentulous                       Anesthesia Plan  Planned anesthetic: general endotracheal  Magnesium & precedex gtt for pain, ketamine 50mg  Decadron 10mg, zofran   glidescope intubation with neutral neck position  2 PIV   ASA 2   Induction: intravenous   Anesthetic plan and risks discussed with: patient  Anesthesia plan special considerations: video-assisted, increased risk of difficult airway, antiemetics,   Post-op plan: routine recovery

## 2021-06-16 NOTE — PROGRESS NOTES
[facility] Rehabilitation Daily Progress     Patient Name: Susan Kwan  Date: 3/25/2021  Visit #: 2/10  Authorization dates:  3/22/21-5/21/21  Referral Diagnosis: Cervical spondylosis with myelopathy  Referring provider: Leanna Ward MD  Visit Diagnosis:     ICD-10-CM    1. Cervical spondylosis with myelopathy  M47.12        No data recorded     Assessment:     Response to Intervention:  Tolerated manual therapy well.  Decreased pain.    Symptoms are consistent with:  Medical diagnois  Patient is appropriate to continue with skilled physical therapy intervention, as indicated by initial plan of care.    Goal Status:  Pt. will demonstrate/verbalize independence in self-management of condition in : 6 weeks;Progressing toward  Pt. will be independent with home exercise program in : 6 weeks;Progressing toward  Pt. will report decreased intensity, frequency of : Pain;in 6 weeks  Pt. will have improved quality of sleep: with less pain;getting 75-90% of required amount;in 6 weeks  Pt. will show improved balance for safer : ambulation;for household ambulation;in 6 weeks  Pt. will improve posture : and demonstrate posture with minimal to no cuing;in 6 weeks    Pt will: improve LE strength and balance to be able to safely ambulate with SPC in 6 weeks- met, using SPC for gait currently safely  Pt will: get in and out of bed/chair with <2/10 pain in 6 weeks.- progressing toward    Other functional progress:           Plan / Patient Education:     Continue with initial plan of care.  Progress with home program as tolerated.  Chin tuck, scap set,     Subjective:     Pain Rating:  Resting 5  Activity:  6    Response to last treatment: felt a little stiff later that day, but overall better  HEP- Frequency: 1-2x/day, Questions or difficulties:  none.    Patient reports:      Neck is still sore and stiff, but a little better than at last visit.      Objective:              Treatment Today   Manual Therapy  Manual  "therapy:  Neck    MFR layers 1-3 bilateral:  Suboccipitals, cervical extensors, cervical paraspinal rotators, scalenes.    Manual therapy:  Cervical longitudinal mobilization    Rate/grade Target  Direction  Relative movement Location in range Patient position   2 Cervical vertebrae Superior Distraction of facet joints Head in neutral Supine          Exercises:  Exercise #1: Supine piriformis stretch B x 30\"  Comment #1: Supine bridging with glute squeeze at top 1 x 10 reps B  Exercise #2: Supine ab setting with marching and leg extension x 5 B  Comment #2: Supine SLR with glute squeeze  Exercise #3: Sit to stand  Comment #3: Balance: SLS left 13\" right 12\", feet together eyes closed 60\", tandem stance left in front 40\", right 60\", SLS with leg lifts out to the side 10 bilateral- not today  Exercise #4: Supine hip isometric adduction with towel roll  Comment #4: Clam shell  Exercise #5: R side hip flexor stretch hanging off table  Comment #5: NuStep x 4 min, RL 6, discussion of symptoms            TREATMENT MINUTES COMMENTS   Evaluation     Self-care/ Home management     Manual therapy 25 See above.   Neuromuscular Re-education     Therapeutic Activity     Therapeutic Exercises   See exercise flow-sheet for details.    Gait training     Modality__________________                Total 25    Blank areas are intentional and mean the treatment did not include these items.       Gasper Hernandez, PT  3/25/2021    [facility]   "

## 2021-06-16 NOTE — TELEPHONE ENCOUNTER
I don't think we are supposed to do preop and physical on the same day are we? Can we ask breonna?

## 2021-06-16 NOTE — TELEPHONE ENCOUNTER
Called patient, discussed surgery, post-op course, expectations, follow up plan.    Reviewed H&P from - 3/23/21 cleared for surgery   Labs - WNL; COVID result pending     CT myelogram done on 3/12/21- in [facility]    To OR as planned.     Check in - 0630 [facility]     Nothing to eat or drink after midnight the night before surgery.     Reviewed with patient: Arrive 2.5 -3 hours prior to scheduled surgery.    Bring all pertinent films to hospital the day of surgery.     Continue to refrain from NSAIDS (Ibuprofen, Aleve, Naprosyn), ASA, Over the counter herbal medications or supplements, anti-coagulants and blood thinners.     Pt will bring CPAP from home and inhalers from home.     Patient confirmed they have help/assistance in place at home upon discharge.    Instructions: using a washcloth and a bottle of provided Hibiclens, wash your body, avoiding your face and genitals. Preferably, shower the night before surgery and the morning of surgery using a half a bottle each time for your whole body shower.    Patient was informed that we will provide up to 1 week prescription of pain medication for post-operative pain.    Instructed patient about the following: After your surgery, if you will be staying in-patient, a nursing provider team will be monitoring you closely throughout your stay and communicate your health status to your surgeon and other providers.  You will be seen by Advanced Practice Providers (e.g., nurse practitioners, clinic nurse specialist, and physician assistants) who will check on you regularly to assess the status of your surgery.   Of note: pt has a history of opioid abuse, post-op pain medication monitoring needed per PCP.   All of pt's questions and concerns addressed to her satisfaction. Pt was informed that we will call her to schedule her post-op appts after discharge.  Sara Arenas RN

## 2021-06-16 NOTE — PROGRESS NOTES
Susan Kwan is status post Anterior cervical discectomy and arthrodesis cervical 5-cervical 6 on 04/05/2021 with Dr. Ward.  Preoperatively presented with imbalance and fine motor difficulties.  Today she returns in follow up for wound check. She is very pleased with her outcome - reports a minimal incisional discomfort, denies difficulty with swallowing. Denies radicular pain, sensory or motor disturbance in UE. Gait and balance are normal. Wears a [place] J collar. Takes Tramadol prn.  Of note, the xrays were scheduled today by mistake. Will repeat XR on 6/1/2021 with no charge.    Surgical wound WNL - CDI, no signs of infection or skin breakdown.  Incision well-healed: good skin approximation, no redness or visible/palpable edema, no tenderness to palpation.  PT. AF, denies fever, chills or sweats.  Pt. reports that the symptoms are improved from pre-op.    Sussy Saldana, RN, CNRN

## 2021-06-16 NOTE — TELEPHONE ENCOUNTER
T#3 ordered per Dr Ward. Cervical pain. Surgery up coming in a few weeks.  reviewed and nothing out of sorts.     Sultana Montes, TOMI-CNP  [facility] Neurosurgery  O: [phone]

## 2021-06-16 NOTE — PROGRESS NOTES
[facility] Rehabilitation Daily Progress     Patient Name: Susan Kwan  Date: 3/31/2021  Visit #: 3/10  Authorization dates:  3/22/21-5/21/21  Referral Diagnosis: Cervical spondylosis with myelopathy  Referring provider: Leanna Ward MD  Visit Diagnosis:     ICD-10-CM    1. Cervical spondylosis with myelopathy  M47.12    2. S/P lumbar fusion  Z98.1    3. S/P lumbar spinal fusion  Z98.1    4. Generalized muscle weakness  M62.81    5. Weakness of right lower extremity  R29.898    6. Lumbar radiculopathy  M54.16        No data recorded     Assessment:     Response to Intervention:  Tolerated manual therapy well.  Decreased pain.    Symptoms are consistent with:  Medical diagnois  Patient is appropriate to continue with skilled physical therapy intervention, as indicated by initial plan of care.    Goal Status:  Pt. will demonstrate/verbalize independence in self-management of condition in : 6 weeks;Progressing toward  Pt. will be independent with home exercise program in : 6 weeks;Progressing toward  Pt. will report decreased intensity, frequency of : Pain;in 6 weeks  Pt. will have improved quality of sleep: with less pain;getting 75-90% of required amount;in 6 weeks  Pt. will show improved balance for safer : ambulation;for household ambulation;in 6 weeks  Pt. will improve posture : and demonstrate posture with minimal to no cuing;in 6 weeks    Pt will: improve LE strength and balance to be able to safely ambulate with SPC in 6 weeks- met, using SPC for gait currently safely  Pt will: get in and out of bed/chair with <2/10 pain in 6 weeks.- progressing toward    Other functional progress:           Plan / Patient Education:     Continue with initial plan of care.  Progress with home program as tolerated.  Chin tuck, scap set,     Subjective:     Pain Rating:  Resting 4  Activity:  4    Response to last treatment:felt good.  HEP- Frequency: 1-2x/day, Questions or difficulties:  none.    Patient  "reports:      Feeling a little better..      Objective:              Treatment Today   Manual Therapy  Manual therapy:  Neck    MFR layers 1-3 bilateral:  Suboccipitals, cervical extensors, cervical paraspinal rotators, scalenes.    Manual therapy:  Cervical longitudinal mobilization    Rate/grade Target  Direction  Relative movement Location in range Patient position   2 Cervical vertebrae Superior Distraction of facet joints Head in neutral Supine          Exercises:  Exercise #1: Supine piriformis stretch B x 30\"  Comment #1: Supine bridging with glute squeeze at top 1 x 10 reps B  Exercise #2: Supine ab setting with marching and leg extension x 5 B  Comment #2: Supine SLR with glute squeeze  Exercise #3: Sit to stand  Comment #3: Balance: SLS left 13\" right 12\", feet together eyes closed 60\", tandem stance left in front 40\", right 60\", SLS with leg lifts out to the side 10 bilateral- not today  Exercise #4: Supine hip isometric adduction with towel roll  Comment #4: Clam shell  Exercise #5: R side hip flexor stretch hanging off table  Comment #5: NuStep x 4 min, RL 6, discussion of symptoms            TREATMENT MINUTES COMMENTS   Evaluation     Self-care/ Home management     Manual therapy 25 See above.   Neuromuscular Re-education     Therapeutic Activity     Therapeutic Exercises   See exercise flow-sheet for details.    Gait training     Modality__________________                Total 25    Blank areas are intentional and mean the treatment did not include these items.       Gasper Hernandez, PT  3/31/2021    [facility]   "

## 2021-06-16 NOTE — TELEPHONE ENCOUNTER
Who is calling:  Provider/Neuro Surgery/Luz  Reason for Call:  Patient is having surgery on 4/5/21 and Provider is wondering if the upcoming Physical can also have the necessary Pre op for clearance for surgery, Please contact Luz at Neuro Surgery for next route of care.   Date of last appointment with primary care: NA  Okay to leave a detailed message: Yes

## 2021-06-16 NOTE — TELEPHONE ENCOUNTER
Called the caller back and I left a detailed message that Dr. Jimenez can do it in the same day and that it will be a split bill and if she have any further questions, she can call us.

## 2021-06-16 NOTE — TELEPHONE ENCOUNTER
ORDER FROM: Dr. Ward    PRE AUTHORIZATION: PA not needed. Ok to schedule.    METHOD OF PATIENT CONTACT: Spoke with Susan on the phone. Best number to reach: [phone].    PROCEDURE: Cervical 5-cervical 6 anterior cervical decompression and fusion with plate.    SURGICAL DATE: 21 @ 7:15 AM - [facility]    READINESS VISIT: PLEASE CALL    PCP, CLINIC, PHONE #: Dr. Jimenez, [facility], [phone]    PRE-OP PHYSICAL: 3/23/21 @ 11:20 AM with Dr. Jimenez    COVID- TESTIN21 @ 9:00 AM @ [facility]    FILM INFO: XRAY CERVICAL: 3/17/21 @ [facility]          XRAY/CT MYELOGRAM: 3/12/21 @ [facility]    SURGERY CONFIRMATION LETTER: Mailed to patient on 3/19/21

## 2021-06-16 NOTE — TELEPHONE ENCOUNTER
PATIENT NAME:  Susan Kwan  YOB: 1965  MRN: [id number]  SURGEON: Dr Ward  DATE of SURGERY: 4/5/2021  PROCEDURE: C5-6 ACDF     FOLLOW-UP:  Wound appt: 2wk postop incision appt dermabond  Post Op/Follow up  Visit: first week of June with NP/PA (one appt combined for BOTH her 6mo lumbar fusion and ~8wk postop cervical fusion)  DIAGNOSTICS: cervical XR and lumbar XR first week of June   DISPOSITION: home    Aaliyah VELÁZQUEZ-C  [facility] Neurosurgery  O. [phone]

## 2021-06-16 NOTE — TELEPHONE ENCOUNTER
Refill Approved    Rx renewed per Medication Renewal Policy. Medication was last renewed on 4/14/20, last OV 2/11/21.    Essence Parra, [facility] Triage/Med Refill 3/21/2021     Requested Prescriptions   Pending Prescriptions Disp Refills     levothyroxine (SYNTHROID, LEVOTHROID) 175 MCG tablet [Pharmacy Med Name: LEVOTHYROXINE 0.175MG (175MCG) TABS] 90 tablet 3     Sig: TAKE 1 TABLET(175 MCG) BY MOUTH DAILY       Thyroid Hormones Protocol Passed - 3/19/2021  5:59 AM        Passed - Provider visit in past 12 months or next 3 months     Last office visit with prescriber/PCP: 2/27/2019 Earline Jimenez MD OR same dept: Visit date not found OR same specialty: 2/28/2020 Raiba Manjarrez PA-C  Last physical: 12/8/2020 Last MTM visit: Visit date not found   Next visit within 3 mo: Visit date not found  Next physical within 3 mo: Visit date not found  Prescriber OR PCP: Earline Jimenez MD  Last diagnosis associated with med order: 1. Acquired hypothyroidism  - levothyroxine (SYNTHROID, LEVOTHROID) 175 MCG tablet [Pharmacy Med Name: LEVOTHYROXINE 0.175MG (175MCG) TABS]; TAKE 1 TABLET(175 MCG) BY MOUTH DAILY  Dispense: 90 tablet; Refill: 3    If protocol passes may refill for 12 months if within 3 months of last provider visit (or a total of 15 months).             Passed - TSH on file in past 12 months for patient age 12 & older     TSH   Date Value Ref Range Status   12/08/2020 0.31 0.30 - 5.00 uIU/mL Final

## 2021-06-16 NOTE — TELEPHONE ENCOUNTER
Dr. Jimenez, per Kristina said yes do it the same day but it will be a split bill, bill a physical and then we can bill a office visit with it for the pre-op.

## 2021-06-16 NOTE — PROGRESS NOTES
Progress Notes by Gasper Hernandez PT at 4/2/2021  9:30 AM     Author: Gasper Hernandez, PT Service: -- Author Type: Physical Therapist    Filed: 8/10/2021  8:56 PM Encounter Date: 4/2/2021 Status: Addendum    : Gasper Hernandez PT (Physical Therapist)    Related Notes: Original Note by Gasper Hernandez PT (Physical Therapist) filed at 4/2/2021  9:57 AM       [facility] Discharge Summary  Patient Name: Susan Kwan  Date: 8/10/2021  Referral Diagnosis: Cervical spondylosis with myelopathy  Referring provider: Leanna Ward MD  Visit Diagnosis:   1. Cervical spondylosis with myelopathy     2. S/P lumbar fusion     3. S/P lumbar spinal fusion     4. Generalized muscle weakness     5. Weakness of right lower extremity     6. Lumbar radiculopathy     7. Chronic right-sided low back pain with right-sided sciatica         Goals:  No data recorded  No data recorded    Patient was seen for 4 visits physical therapy.    The patient attended therapy initially, but did not finish the therapy sessions prescribed.  Goals were not fully achieved. Explanation for goals not achieved: The patient discontinued therapy, did not return.    Therapy will be discontinued at this time.  Please see progress note dated 4/2/21 for patient status.      Thank you for your referral.  Gasper Hernandez, PT  8/10/2021  8:56 PM     [facility] Daily Progress     Patient Name: Susan Kwan  Date: 4/2/2021  Visit #: 4/10  Authorization dates:  3/22/21-5/21/21  Referral Diagnosis: Cervical spondylosis with myelopathy  Referring provider: Leanna Ward MD  Visit Diagnosis:     ICD-10-CM    1. Cervical spondylosis with myelopathy  M47.12    2. S/P lumbar fusion  Z98.1    3. S/P lumbar spinal fusion  Z98.1    4. Generalized muscle weakness  M62.81    5. Weakness of right lower extremity  R29.898    6. Lumbar radiculopathy  M54.16    7. Chronic right-sided low back  pain with right-sided sciatica  M54.41     G89.29        No data recorded     Assessment:     Response to Intervention:  Tolerated manual therapy well.  Decreased pain.    Symptoms are consistent with:  Medical diagnois  Patient is appropriate to continue with skilled physical therapy intervention, as indicated by initial plan of care.    Goal Status:  Pt. will demonstrate/verbalize independence in self-management of condition in : 6 weeks;Progressing toward  Pt. will be independent with home exercise program in : 6 weeks;Progressing toward  Pt. will report decreased intensity, frequency of : Pain;in 6 weeks  Pt. will have improved quality of sleep: with less pain;getting 75-90% of required amount;in 6 weeks  Pt. will show improved balance for safer : ambulation;for household ambulation;in 6 weeks  Pt. will improve posture : and demonstrate posture with minimal to no cuing;in 6 weeks    Pt will: improve LE strength and balance to be able to safely ambulate with SPC in 6 weeks- met, using SPC for gait currently safely  Pt will: get in and out of bed/chair with <2/10 pain in 6 weeks.- progressing toward    Other functional progress:           Plan / Patient Education:     Continue with initial plan of care.  Progress with home program as tolerated.  Chin tuck, scap set,     Subjective:     Pain Rating:  Resting 4  Activity:  4    Response to last treatment:felt good.  HEP- Frequency: 1-2x/day, Questions or difficulties:  none.    Patient reports:      Feeling about the same      Objective:              Treatment Today   Manual Therapy  Manual therapy:  Neck    MFR layers 1-3 bilateral:  Suboccipitals, cervical extensors, cervical paraspinal rotators, scalenes.    Manual therapy:  Cervical longitudinal mobilization    Rate/grade Target  Direction  Relative movement Location in range Patient position   2 Cervical vertebrae Superior Distraction of facet joints Head in neutral Supine          Exercises:  Exercise #1:  "Supine piriformis stretch B x 30\"  Comment #1: Supine bridging with glute squeeze at top 1 x 10 reps B  Exercise #2: Supine ab setting with marching and leg extension x 5 B  Comment #2: Supine SLR with glute squeeze  Exercise #3: Sit to stand  Comment #3: Balance: SLS left 13\" right 12\", feet together eyes closed 60\", tandem stance left in front 40\", right 60\", SLS with leg lifts out to the side 10 bilateral- not today  Exercise #4: Supine hip isometric adduction with towel roll  Comment #4: Clam shell  Exercise #5: R side hip flexor stretch hanging off table  Comment #5: NuStep x 4 min, RL 6, discussion of symptoms            TREATMENT MINUTES COMMENTS   Evaluation     Self-care/ Home management     Manual therapy 25 See above.   Neuromuscular Re-education     Therapeutic Activity     Therapeutic Exercises   See exercise flow-sheet for details.    Gait training     Modality__________________                Total 25    Blank areas are intentional and mean the treatment did not include these items.       Gasper Hernandez, PT  4/2/2021    [facility]        "

## 2021-06-16 NOTE — TELEPHONE ENCOUNTER
Controlled Substance Refill Request  Medication Name:   Requested Prescriptions     Pending Prescriptions Disp Refills     acetaminophen-codeine (TYLENOL #3) 300-30 mg per tablet 15 tablet 0     Sig: Take 1 tablet by mouth every 6 (six) hours as needed for pain.     Date Last Fill: 3/17/21  [facility]ed [facility]acy: [facility]  Submit electronically to pharmacy  Controlled Substance Agreement on file:   Encounter-Level CSA Scan Date:    There are no encounter-level csa scan date.        Last office visit:  3/23/21

## 2021-06-16 NOTE — ANESTHESIA CARE TRANSFER NOTE
Last vitals:   Vitals:    04/05/21 0631   BP: 94/64   Pulse: 72   Resp: 18   Temp: 36.9  C (98.4  F)   SpO2: 97%     Patient's level of consciousness is drowsy  Spontaneous respirations: yes  Maintains airway independently: yes  Dentition unchanged: yes  Oropharynx: oropharynx clear of all foreign objects    QCDR Measures:  ASA# 20 - Surgical Safety Checklist: WHO surgical safety checklist completed prior to induction    PQRS# 430 - Adult PONV Prevention: 4558F - Pt received => 2 anti-emetic agents (different classes) preop & intraop  ASA# 8 - Peds PONV Prevention: NA - Not pediatric patient, not GA or 2 or more risk factors NOT present  PQRS# 424 - Nette-op Temp Management: 4559F - At least one body temp DOCUMENTED => 35.5C or 95.9F within required timeframe  PQRS# 426 - PACU Transfer Protocol: - Transfer of care checklist used  ASA# 14 - Acute Post-op Pain: ASA14B - Patient did NOT experience pain >= 7 out of 10

## 2021-06-16 NOTE — TELEPHONE ENCOUNTER
Dr. Jimenez, the pt is scheduled to see you on 3/23 for a Physical. Ok to do both the Physical and Pre-op? Please advise. Thanks.

## 2021-06-16 NOTE — TELEPHONE ENCOUNTER
RN cannot approve Refill Request    RN can NOT refill this medication patient reports taking medication differently. Last office visit: 2/27/2019 Earline Jimenez MD Last Physical: 3/23/2021 Last MTM visit: Visit date not found Last visit same specialty: 2/28/2020 Rabia Manjarrez PA-C.  Next visit within 3 mo: Visit date not found  Next physical within 3 mo: Visit date not found      Severiano PEREIRA Harjit, [facility] Triage/Med Refill 4/19/2021    Requested Prescriptions   Pending Prescriptions Disp Refills     simvastatin (ZOCOR) 20 MG tablet [Pharmacy Med Name: SIMVASTATIN 20MG TABLETS] 90 tablet 4     Sig: TAKE 1 TABLET(20 MG) BY MOUTH AT BEDTIME       Statins Refill Protocol (Hmg CoA Reductase Inhibitors) Passed - 4/18/2021  6:49 AM        Passed - PCP or prescribing provider visit in past 12 months      Last office visit with prescriber/PCP: 2/27/2019 Earline Jimenez MD OR same dept: Visit date not found OR same specialty: 2/28/2020 Rabia Manjarrez PA-C  Last physical: 3/23/2021 Last MTM visit: Visit date not found   Next visit within 3 mo: Visit date not found  Next physical within 3 mo: Visit date not found  Prescriber OR PCP: Earline Jimenez MD  Last diagnosis associated with med order: 1. Hyperlipidemia  - simvastatin (ZOCOR) 20 MG tablet [Pharmacy Med Name: SIMVASTATIN 20MG TABLETS]; TAKE 1 TABLET(20 MG) BY MOUTH AT BEDTIME  Dispense: 90 tablet; Refill: 4    2. Severe episode of recurrent major depressive disorder, without psychotic features (H)  - FLUoxetine (PROZAC) 20 MG capsule [Pharmacy Med Name: FLUOXETINE 20MG CAPSULES]; TAKE 3 CAPSULES(60 MG) BY MOUTH DAILY  Dispense: 90 capsule; Refill: 0    3. Anxiety attack  - FLUoxetine (PROZAC) 20 MG capsule [Pharmacy Med Name: FLUOXETINE 20MG CAPSULES]; TAKE 3 CAPSULES(60 MG) BY MOUTH DAILY  Dispense: 90 capsule; Refill: 0    If protocol passes may refill for 12 months if within 3 months of last provider visit (or a total of 15 months).               Refused Prescriptions Disp Refills     FLUoxetine (PROZAC) 20 MG capsule [Pharmacy Med Name: FLUOXETINE 20MG CAPSULES] 90 capsule 0     Sig: TAKE 3 CAPSULES(60 MG) BY MOUTH DAILY       SSRI Refill Protocol  Passed - 4/18/2021  6:49 AM        Passed - PCP or prescribing provider visit in last year     Last office visit with prescriber/PCP: 2/27/2019 Earline Jimenez MD OR same dept: Visit date not found OR same specialty: 2/28/2020 Rabia Manjarrez PA-C  Last physical: 3/23/2021 Last MTM visit: Visit date not found   Next visit within 3 mo: Visit date not found  Next physical within 3 mo: Visit date not found  Prescriber OR PCP: Earline Jimenez MD  Last diagnosis associated with med order: 1. Hyperlipidemia  - simvastatin (ZOCOR) 20 MG tablet [Pharmacy Med Name: SIMVASTATIN 20MG TABLETS]; TAKE 1 TABLET(20 MG) BY MOUTH AT BEDTIME  Dispense: 90 tablet; Refill: 4    2. Severe episode of recurrent major depressive disorder, without psychotic features (H)  - FLUoxetine (PROZAC) 20 MG capsule [Pharmacy Med Name: FLUOXETINE 20MG CAPSULES]; TAKE 3 CAPSULES(60 MG) BY MOUTH DAILY  Dispense: 90 capsule; Refill: 0    3. Anxiety attack  - FLUoxetine (PROZAC) 20 MG capsule [Pharmacy Med Name: FLUOXETINE 20MG CAPSULES]; TAKE 3 CAPSULES(60 MG) BY MOUTH DAILY  Dispense: 90 capsule; Refill: 0    If protocol passes may refill for 12 months if within 3 months of last provider visit (or a total of 15 months).

## 2021-06-16 NOTE — TELEPHONE ENCOUNTER
Called pt to verify the need for a tramadol refill. Pt reports she will run out on Sunday and forgot to request a refill when she was in having her wound checked yesterday.     Pt reports ongoing posterior neck pain that radiates down into her right arm. Denies n/t in her fingers. Takes 1 tramadol every 6 hours, robaxin four times a day, applies ice to her neck prn. Instructed pt to add in tylenol, as tolerated d/t upset stomach three times a day. She verbalized understanding.     [place]NPMP query completed-no discrepancies. Last fill 4/16/21 for 40#.     Please send to [facility] on [address] in [place].     Sara Arenas RN

## 2021-06-16 NOTE — PATIENT INSTRUCTIONS - HE
A dressing is not required.    Keep the wound clean.    Wash your hands before touching the wound.  Ensure that anyone assisting you in the care of your wound washes her/his hands before touching the wound. Good handwashing can decrease the risk of serious infection.    If you are unable to see your wound, have someone check the wound daily for redness, swelling,or drainage. A small amount of drainage is normal.    You may shower.  Pat the wound dry. Do not rub.    No tub baths until the wound is well healed.  Usually 5-6 weeks.     If you develop redness, swelling, drainage, or temp 101 or greater, call our office at [phone].        * No lifting, pushing or pulling greater than 5-10 pound (this is about a gallon of milk) for the first 6 weeks after surgery .  *No repetitive bending, twisting, or jarring activities for 6 weeks.  *No overhead work  *No aerobic or strenuous activity  *No activities with increased risk of falls  *You may move about your home as tolerated  *You may walk up and down stairs as tolerated  *You may increase your activity slowly over the next 6 weeks    WALKING PROGRAM: As you can tolerate, walk daily-start with 5-10 minutes of continuous walking. This is in addition to the walking that you do as part of your daily activities. Increase the time that you walk by 5 minutes every couple of days. Do not exceed 30-45 minutes of continuous walking until seen in follow-up. Walking is the best exercise after surgery.  **Listen to your body, if you find that you are more painful or fatigued, you may need to proceed more slowly.    **Do not smoke or expose yourself to second hand smoke. Cigarette smoke can delay healing and cause complications.     DRIVING:  We recommend that you do not drive while taking medications for pain or muscle spasms. Always read and follow the advice on your prescription bottle. If you have questions, speak with your pharmacist.  We recommend that you do not drive  while wearing a brace, as it could limit your range of motion.    WORK: If you plan to return to work before you 4-6 weeks appointment, call and discuss with one of the nurses in the neurosurgery office.

## 2021-06-16 NOTE — TELEPHONE ENCOUNTER
"Called pt in response to [facility] post regarding her incision and throat discomfort.     Pt reported some slight \"wetness\" around her incision that has since subsided. Pt reports no foul odor or discharge, no fever/chills, no redness, swelling, or tenderness, incision is approximated and clean, skin glue intact. She reports her throat discomfort and dysphagia symptoms are improving and she has no further concerns at this time.     Enforced proper medication use, icing, positioning. Pt verbalized understanding and agreement with treatment plan. Follow up appts scheduled.     She will call with questions or concerns if they arise.     Sara Arenas RN    "

## 2021-06-16 NOTE — PROGRESS NOTES
[address] [address]  Dept: [phone]  Dept Fax: [phone]  Primary Provider: Earline Jimenez MD      PREOPERATIVE EVALUATION:  Today's date: 3/23/2021    Susan Kwan is a 55 y.o. female who presents for a preoperative evaluation and annual physical exam.     Surgical Information:  Surgery/Procedure: CERVICAL 5-CERVICAL 6 ANTERIOR CERVICAL DECOMPRESSION AND FUSION WITH PLATE  Surgery Location: [facility]   Surgeon: Dr. Ward  Surgery Date: 4/5/21  Time of Surgery: 0715  Where patient plans to recover: At home with family  Fax number for surgical facility: Note does not need to be faxed, will be available electronically in Epic.    Type of Anesthesia Anticipated: General    Assessment & Plan      The proposed surgical procedure is considered INTERMEDIATE risk.    Encounter for general adult medical examination without abnormal findings  Pre-op exam  - HM2(CBC w/o Differential)  - Basic Metabolic Panel  - INR  - APTT(PTT)  - Platelet Function Test  Spinal stenosis of lumbar region, unspecified whether neurogenic claudication present  Severe episode of recurrent major depressive disorder, without psychotic features (H)  History of drug abuse (H)  Pure hypercholesterolemia  Postablative hypothyroidism  DESI (obstructive sleep apnea)  ASCUS with positive high risk HPV cervical  Encounter for screening for cervical cancer   - Gynecologic Cytology (PAP Smear)  - HPV High Risk DNA Cervical  Decreased libido  - buPROPion (WELLBUTRIN XL) 150 MG 24 hr tablet  Dispense: 30 tablet; Refill: 2    EHR reviewed.   Blood work collected as per neurology.   Recent blood showing lipids well controlled, thyroid function well controlled.   Repeat pap smear collected for abnormal pap smear history.   We reviewed natural course of decreased libido. Reviewed options of treatment. Defers any family counseling. Trial of wellbutrin. Follow up on this in four to six weeks.       Risks  and Recommendations:  The patient has the following additional risks and recommendations for perioperative complications:   - Consult Hospitalist / IM to assist with post-op medical management  Social and Substance:    - history of opoid abuse, recent history of acute mood worsening, reactions to use of opioid postoperatively.     Medication Instructions:  Patient is to take all scheduled medications on the day of surgery EXCEPT for modifications listed below:   Imitrex-taking for migraines, patient is to hold on day of surgery.     RECOMMENDATION:  APPROVAL GIVEN to proceed with proposed procedure, without further diagnostic evaluation.        Subjective     HPI related to upcoming procedure:     Preop Questions 3/23/2021   Have you ever had a heart attack or stroke? No   Have you ever had surgery on your heart or blood vessels, such as a stent placement, a coronary artery bypass, or surgery on an artery in your head, neck, heart, or legs? No   Do you have chest pain with activity? No   Do you have a history of  heart failure? No   Do you currently have a cold, bronchitis or symptoms of other infection? No   Do you have a cough, shortness of breath, or wheezing? No   Do you or anyone in your family have previous history of blood clots? No   Do you or does anyone in your family have a serious bleeding problem such as prolonged bleeding following surgeries or cuts? No   Have you ever had problems with anemia or been told to take iron pills? No   Have you had any abnormal blood loss such as black, tarry or bloody stools, or abnormal vaginal bleeding? No   Have you ever had a blood transfusion? No   Are you willing to have a blood transfusion if it is medically needed before, during, or after your surgery? NO    Have you or any of your relatives ever had problems with anesthesia? YES    Do you have sleep apnea, excessive snoring or daytime drowsiness? YES    Do you have a CPAP machine? Yes   Do you have any artifical  heart valves or other implanted medical devices like a pacemaker, defibrillator, or continuous glucose monitor? YES    What type of device do you have? Spinal cord stimulator   Name of the clinic that manages your device:  [facility]   Do you have artificial joints? YES    Are you allergic to latex? No   Is there any chance that you may be pregnant? No     Here for pre op examination and annual physical.   She needs repeat pap smear, history of ASCUS with positive HPV.   Colon cancer screening up to date.   Mammogram up to date.   She notes decreased sexual interest. This started several months ago. Her relationship is stable. Wants to treat this. No pelvic pain, dryness or concerns.   History of microdiscectomy in 2017, spinal cord stimulator implant in 2018, has ongoing and worsening right sided lumbar radiculopathy with weakness since a fall in February where she also had a L4 pars interarticularis fracture.   She is still smoking, one cigarette every few days, used to be a pack per day.   Is taking her synthroid and antidepressants as usual.   Needing her inhaler only intermittently.   Is using her CPAP every night.   No recent illness, fever, cough, hospitalizations, covid 19 exposure. Covid vaccination series started.         Health Care Directive:  Patient does not have a Health Care Directive or Living Will: Discussed advance care planning with patient; information given to patient to review.    Preoperative Review of :    reviewed - controlled substances reflected in medication list.    ASTHMA - Patient has a longstanding history of moderate Asthma . Patient has been doing well overall noting NO SYMPTOMS and continues on medication regimen consisting of intermittent albuterol without adverse reactions or side effects.     DEPRESSION - Patient has a long history of Depression of moderate severity requiring medication for control with recent symptoms being stable..Current symptoms of depression  include sexual difficulty, decreased libido. See above.     HYPERLIPIDEMIA - Patient has a long history of significant Hyperlipidemia requiring medication for treatment with recent good control. Patient reports no problems or side effects with the medication.     HYPOTHYROIDISM - Patient has a longstanding history of chronic Hypothyroidism. Patient has been doing well, noting no tremor, insomnia, hair loss or changes in skin texture. Continues to take medications as directed, without adverse reactions or side effects. Last TSH   Lab Results   Component Value Date    TSH 0.31 12/08/2020   .        Review of Systems  Constitutional, neuro, ENT, endocrine, pulmonary, cardiac, gastrointestinal, genitourinary, musculoskeletal, integument and psychiatric systems are negative, except as otherwise noted.      Patient Active Problem List    Diagnosis Date Noted     Cervical stenosis of spinal canal 03/17/2021     Cervical cord compression with myelopathy (H) 03/17/2021     Lumbar radiculopathy 11/23/2020     Spinal stenosis of lumbar region without neurogenic claudication 11/23/2020     Xerostomia due to hyposecretion of salivary gland 08/01/2019     Myofascial pain 08/01/2019     Benign neoplasm of ascending colon 07/11/2019     ASCUS with positive high risk HPV cervical 06/27/2019     TMJ (temporomandibular joint syndrome) 06/20/2019     Advanced directives, counseling/discussion 06/20/2019     Obesity (BMI 35.0-39.9) with comorbidity (H) 04/15/2019     S/P lumbar microdiscectomy 03/24/2017     Lumbar stenosis 02/02/2017     Sacroiliac joint dysfunction of right side 02/01/2017     Lumbar foraminal stenosis 02/01/2017     Lumbar disc herniation 02/01/2017     Sciatica of right side 02/01/2017     DESI (obstructive sleep apnea) 01/11/2017     Seborrheic Keratosis      Nicotine Dependence      Migraine Headache      Arthralgias In Multiple Sites      Postablative hypothyroidism      Hyperlipidemia      Major Depression,  "Recurrent      Sudden Redness Of The Skin (Flushing)      Lower Back Pain      Carpal Tunnel Syndrome      Mild intermittent asthma without complication      Allergic Rhinitis      Gastro-esophageal reflux disease with esophagitis 2007     Depressive disorder 2007     Anxiety state 2007     Past Medical History:   Diagnosis Date     Anxiety      Asthma      Carpal tunnel syndrome      Depression     PMDD     Disease of thyroid gland      History of anesthesia complications     wakes up combative at times     Low back pain      Migraine      DESI on CPAP      Pregnancy          Substance abuse (H)     sober since , narcotic pain medication     Past Surgical History:   Procedure Laterality Date     BACK SURGERY       CHOLECYSTECTOMY       CT DILATION/CURETTAGE,DIAGNOSTIC      Description: Dilation And Curettage;  Recorded: 2011;  Comments: for \"clots\" after one of her pregnancies     CT LIGATE FALLOPIAN TUBE      Description: Tubal Ligation;  Recorded: 2011;     CT REMOVAL GALLBLADDER      Description: Cholecystectomy;  Recorded: 2011;     SPINAL CORD STIMULATOR IMPLANT  2018    [facility], Dr. Cerna     TONSILLECTOMY       TUBAL LIGATION       XR MYELOGRAM CERVICAL  3/12/2021     XR MYELOGRAM LUMBAR  2020     Current Outpatient Medications   Medication Sig Dispense Refill     albuterol (PROAIR HFA;PROVENTIL HFA;VENTOLIN HFA) 90 mcg/actuation inhaler INHALE 1 TO 2 PUFFS BY MOUTH EVERY 4 HOURS AS NEEDED FOR WHEEZING 18 g 1     benzocaine-menthoL (CEPACOL) 15-3.6 mg Take 1 lozenge by mouth every hour as needed.  0     FLUoxetine (PROZAC) 20 MG capsule Take 3 capsules (60 mg total) by mouth daily. 270 capsule 1     gabapentin (NEURONTIN) 300 MG capsule TAKE 3 CAPSULES BY MOUTH EVERY MORNING, 3 CAPSULES BY MOUTH EVERY DAY IN THE AFTERNOON, AND 4 CAPSULES BY MOUTH EVERY EVENING 900 capsule 3     levothyroxine (SYNTHROID, LEVOTHROID) 175 MCG tablet TAKE 1 TABLET(175 " MCG) BY MOUTH DAILY 90 tablet 3     lidocaine (LIDODERM) 5 % Apply to skin. Remove & discard patch within 12 hours. 15 patch 0     melatonin 3 mg Tab tablet Take 1 tablet (3 mg total) by mouth at bedtime as needed.  0     multivitamin therapeutic tablet Take 1 tablet by mouth daily.       nicotine polacrilex (COMMIT) 2 MG lozenge Apply 2 mg to the mouth or throat as needed for smoking cessation.       polyethylene glycol (MIRALAX) 17 gram packet Take 1 packet (17 g total) by mouth 2 (two) times a day.  0     simvastatin (ZOCOR) 20 MG tablet TAKE 1 TABLET(20 MG) BY MOUTH AT BEDTIME 90 tablet 4     SUMAtriptan (IMITREX) 50 MG tablet TAKE 1 TABLET BY MOUTH EVERY 2 HOURS AS NEEDED FOR MIGRAINE. MAY REPEAT IN 2 HOURS AS NEEDED 9 tablet 10     acetaminophen-codeine (TYLENOL #3) 300-30 mg per tablet Take 1 tablet by mouth every 6 (six) hours as needed for pain. 15 tablet 0     buPROPion (WELLBUTRIN XL) 150 MG 24 hr tablet Take 1 tablet (150 mg total) by mouth every morning. 30 tablet 2     methocarbamoL (ROBAXIN) 750 MG tablet Take 1 tablet (750 mg total) by mouth 4 (four) times a day as needed. 40 tablet 0     No current facility-administered medications for this visit.        Allergies   Allergen Reactions     Acetaminophen      Other: very sore stomach (this only happens if she takes too much) she is able to take some Tylenol     Sulfa (Sulfonamide Antibiotics) Rash     Cefuroxime Axetil Rash       Social History     Tobacco Use     Smoking status: Current Every Day Smoker     Packs/day: 0.50     Types: Cigarettes     Smokeless tobacco: Never Used   Substance Use Topics     Alcohol use: No      Family History   Problem Relation Age of Onset     Heart disease Daughter         WPW,  at age 3     Diabetes Paternal Grandmother      Stroke Paternal Grandfather      Sleep apnea Father      Breast cancer Maternal Grandmother      Heart disease Mother      No Medical Problems Son      Social History     Substance and  "Sexual Activity   Drug Use No        Objective     /76 (Patient Site: Left Arm, Patient Position: Sitting, Cuff Size: Adult Regular)   Pulse 80   Temp 97.7  F (36.5  C) (Other)   Ht 5' 2.48\" (1.587 m)   Wt 185 lb 4 oz (84 kg)   LMP 09/21/2014   BMI 33.36 kg/m    Physical Exam    GENERAL APPEARANCE: healthy, alert and no distress     EYES: EOMI, PERRL     HENT: ear canals and TM's normal and nose and mouth without ulcers or lesions     NECK: no adenopathy, no asymmetry, masses, or scars and thyroid normal to palpation     RESP: lungs clear to auscultation - no rales, rhonchi or wheezes     BREAST: normal without masses, tenderness or nipple discharge and no palpable axillary masses or adenopathy     CV: regular rates and rhythm, normal S1 S2, no S3 or S4 and no murmur, click or rub     ABDOMEN:  soft, nontender, no HSM or masses and bowel sounds normal     : external genitalia within normal limits, cervix appears normal, no lesions or discharge     MS: extremities normal- no gross deformities noted, no evidence of inflammation in joints, FROM in all extremities.     SKIN: no suspicious lesions or rashes     NEURO: Normal strength and tone, sensory exam grossly normal, mentation intact and speech normal     PSYCH: mentation appears normal. and affect normal/bright     LYMPHATICS: No cervical adenopathy    Labs-pending    PRE-OP Diagnostics:   Labs pending at this time. Results will be reviewed when available.  No EKG required, no history of coronary heart disease, significant arrhythmia, peripheral arterial disease or other structural heart disease.    REVISED CARDIAC RISK INDEX (RCRI)   The patient has the following serious cardiovascular risks for perioperative complications:   - No serious cardiac risks = 0 points    RCRI INTERPRETATION: 0 points: Class I (very low risk - 0.4% complication rate)         Signed Electronically by: Earline Jimenez MD    Copy of this evaluation report is provided to " requesting physician.

## 2021-06-17 NOTE — TELEPHONE ENCOUNTER
Pt is requesting a refill of tramadol. She is s/p C5-6 ACDF with Dr. Ward on 4/5/21. She reports ongoing neck and shoulder pain. Denies pain, numbness or tingling into her arms. Pt is compliant with use of her collar.     She takes 4 tabs of tramadol per day and 4 tabs of robaxin per day along with Tylenol 100 mg three times a day. Uses ice for sore/stiff muscles. New rx sent for lidocaine patches - instructed pt to use OTC pain patches if not approved by insurance.     Pt has 4 tabs of tramadol left. [place] query completed. Last filled on 5/10/21 (40#, 10 day supply). No other providers prescribing.     Please send to [facility] in [place].     Sara Arenas RN

## 2021-06-17 NOTE — TELEPHONE ENCOUNTER
Called Susan and let her know her pulmonary nodules are 4 mm. She should do a f/u CT in 1 year due to her past smoking history  - she verbalized agreement.   Sussy Saldana RN, CNRN

## 2021-06-17 NOTE — PROGRESS NOTES
"Subjective:      Patient ID: Susan Kwan is a 52 y.o. female.    Chief Complaint:    HPI Susan Kwan is a 52 y.o. female who presents today complaining of dysuria x 1 day. About 4 days ago she had the sensation that she was unable to drain her bladder. Then this morning she started having pain after she finished urinating.  She states that the pain is very intense and felt like a sensation that \"someone punched her in the urethra\".  Since this morning she has been having some urinary frequency also. She has not had UTI's before in the past.  Has not been taking any medications over-the-counter for any of her symptoms.  She denies any new flank pain, pelvic pain, hematuria, fever, or vomiting.  She did have some mild nausea earlier this morning.    Past Medical History:   Diagnosis Date     Anxiety      Carpal tunnel syndrome      Depression     PMDD     Disease of thyroid gland      Migraine      Pregnancy          Substance abuse     sober since , narcotic pain medication       Past Surgical History:   Procedure Laterality Date     BACK SURGERY       CHOLECYSTECTOMY       SC DILATION/CURETTAGE,DIAGNOSTIC      Description: Dilation And Curettage;  Recorded: 2011;  Comments: for \"clots\" after one of her pregnancies     SC LIGATE FALLOPIAN TUBE      Description: Tubal Ligation;  Recorded: 2011;     SC REMOVAL GALLBLADDER      Description: Cholecystectomy;  Recorded: 2011;     TUBAL LIGATION         Family History   Problem Relation Age of Onset     Heart disease Daughter      WPW,  at age 3     Diabetes Paternal Grandmother      Stroke Paternal Grandfather      Sleep apnea Father      Breast cancer Maternal Grandfather        Social History   Substance Use Topics     Smoking status: Current Every Day Smoker     Packs/day: 1.00     Types: Cigarettes     Smokeless tobacco: Never Used      Comment: down to six cigarettes per day     Alcohol use No       Review of Systems "   Constitutional: Negative for fever.   Gastrointestinal: Positive for nausea. Negative for vomiting.   Genitourinary: Positive for dysuria and frequency. Negative for hematuria, pelvic pain, vaginal discharge and vaginal pain.       Objective:     Pulse 99  LMP 09/21/2014  SpO2 99%    Physical Exam   Constitutional: She appears well-developed and well-nourished. No distress.   HENT:   Head: Normocephalic and atraumatic.   Right Ear: External ear normal.   Left Ear: External ear normal.   Eyes: Conjunctivae are normal.   Neck: Normal range of motion. Neck supple.   Cardiovascular: Normal rate, regular rhythm and normal heart sounds.  Exam reveals no gallop and no friction rub.    No murmur heard.  Pulmonary/Chest: Effort normal and breath sounds normal. No respiratory distress. She has no wheezes. She has no rales.   Abdominal: Soft. She exhibits no distension. There is tenderness in the right lower quadrant and suprapubic area. There is no rebound, no guarding and no CVA tenderness.   Lymphadenopathy:     She has no cervical adenopathy.   Skin: She is not diaphoretic.   Psychiatric: She has a normal mood and affect. Her behavior is normal. Judgment and thought content normal.   Nursing note and vitals reviewed.      Labs:  Recent Results (from the past 24 hour(s))   Urinalysis-UC if Indicated   Result Value Ref Range    Color, UA Yellow Colorless, Yellow, Straw, Light Yellow    Clarity, UA Slightly Cloudy (!) Clear    Glucose, UA Negative Negative    Bilirubin, UA Negative Negative    Ketones, UA Negative Negative    Specific Gravity, UA 1.020 1.005 - 1.030    Blood, UA Moderate (!) Negative    pH, UA 5.5 5.0 - 8.0    Protein,  mg/dL (!) Negative mg/dL    Urobilinogen, UA 0.2 E.U./dL 0.2 E.U./dL, 1.0 E.U./dL    Nitrite, UA Negative Negative    Leukocytes, UA Large (!) Negative    Bacteria, UA Few (!) None Seen hpf    RBC, UA 10-25 (!) None Seen, 0-2 hpf    WBC, UA >100 (!) None Seen, 0-5 hpf    Squam  Epithel, UA 0-5 None Seen, 0-5 lpf       Assessment:     Procedures    1. Suspected UTI  nitrofurantoin, macrocrystal-monohydrate, (MACROBID) 100 MG capsule   2. UTI symptoms  Urinalysis-UC if Indicated    Culture, Urine         Patient Instructions   1. Increase fluid intake  2. Complete antibiotic regimen as prescribed. You will be notified if the treatment plan needs to be changed based on the urine culture results.   3. Follow if you have a fever of 100.4 F (38 C) or higher, no improvement after three days of treatment, trouble urinating because of pain,new or increased discharge from the vagina, rash or joint pain, Increased back or abdominal pain.

## 2021-06-17 NOTE — PROGRESS NOTES
Assessment:   1. Abscess  - Culture, Wound  2. Nicotine Dependence  3. Cerumen impaction  4. Eustachian tube dysfunction  - Ear cerumen removal; Future  Plan:     Apply hot compresses frequently to promote drainage.  Reassured that this represents a benign process, abscess in not in the breast area or tissue.   Oral antibiotics -- see med orders already taking.   I & D procedure as below. No complications. Wound care discussed. Advised on reasons to be seen in follow up or urgently.   Last mammogram in September of 2017, she will be due this September, she is aware.   Regarding smoking, we discussed options of smoking cessation assistance. She wants to try Chantix again. This was prescribed. Aware of appropriate use. Cautioned over possible adverse affects. Will follow up on this in four to six weeks.   Chronic eustachian tube dysfunction discussed. She does not want to take an antihistamine or intranasal steroid. Ear wash, irrigation completed. Will monitor her symptoms. Follow up as needed on this.     Subjective:       Susan Kwan is a 52 y.o. female who presents for evaluation of a probable cutaneous abscess. Lesion is located in the right chest wall, inferior to breast. Onset was 1 week ago, but was treated for cellulitis last week. She has been taking her antibiotics, but the area seems to be puffier than before and she feels like it needs to be lanced. She has had some minimal drainage. It is mildly tender. No fever. No chills. No nausea or vomiting. Has not tried any other medication for this.     Patient does not have previous history of cutaneous abscesses. Patient does not have diabetes.  She is up to smoking a pack per day. The number of cigarettes has varied over the years. Has been able to cut down but she always starts again. She has tried chantix in the past. Would like to try this again. She denies adverse side affects when used before.   She denies use of alcohol or illicit drugs.   She notes  "ongoing feeling of ear fullness. Her right ear is worst. Has had this looked at before. She feels like she has to pop her ear, has tried and it doesn't work. This is present for several months since she got water in her ear. She was seen for this before. There was no ear infection. She feels like there might be something in her ear. Doesn't want to take any more medications.       ROS: 12 systems reviewed, all negative except for what is mentioned in HPI.     The following portions of the patient's history were reviewed and updated as appropriate: allergies, current medications, past family history, past medical history, past social history, past surgical history and problem list.    Past Medical History:   Diagnosis Date     Anxiety      Carpal tunnel syndrome      Depression     PMDD     Disease of thyroid gland      Migraine      Pregnancy          Substance abuse     sober since , narcotic pain medication     Patient Active Problem List   Diagnosis     Nicotine Dependence     Migraine Headache     Arthralgias In Multiple Sites     Hypothyroidism Postprocedural     Hyperlipidemia     Major Depression, Recurrent     Sudden Redness Of The Skin (Flushing)     Lower Back Pain     Carpal Tunnel Syndrome     Asthma     Allergic Rhinitis     Lump In / On The Skin     Common Migraine (Without Aura)     Seborrheic Keratosis     DESI (obstructive sleep apnea)     Sacroiliac joint dysfunction of right side     Lumbar foraminal stenosis     Lumbar disc herniation     Sciatica of right side     Past Surgical History:   Procedure Laterality Date     BACK SURGERY       CHOLECYSTECTOMY       ND DILATION/CURETTAGE,DIAGNOSTIC      Description: Dilation And Curettage;  Recorded: 2011;  Comments: for \"clots\" after one of her pregnancies     ND LIGATE FALLOPIAN TUBE      Description: Tubal Ligation;  Recorded: 2011;     ND REMOVAL GALLBLADDER      Description: Cholecystectomy;  Recorded: 2011;     SPINAL CORD " STIMULATOR IMPLANT  2018    [facility], Dr. Cerna     TUBAL LIGATION       Family History   Problem Relation Age of Onset     Heart disease Daughter      WPW,  at age 3     Diabetes Paternal Grandmother      Stroke Paternal Grandfather      Sleep apnea Father      Breast cancer Maternal Grandfather      Social History     Social History     Marital status:      Spouse name: N/A     Number of children: N/A     Years of education: N/A     Occupational History     Not on file.     Social History Main Topics     Smoking status: Current Every Day Smoker     Packs/day: 1.00     Types: Cigarettes     Smokeless tobacco: Never Used      Comment: down to six cigarettes per day     Alcohol use No     Drug use: No     Sexual activity: No     Other Topics Concern     Not on file     Social History Narrative     Current Outpatient Prescriptions on File Prior to Visit   Medication Sig Dispense Refill     clindamycin (CLEOCIN) 300 MG capsule Take 1 capsule (300 mg total) by mouth 3 (three) times a day for 10 days. 30 capsule 0     clotrimazole (LOTRIMIN) 1 % cream APPLY TO THE AFFECTED AREA TWICE DAILY TO THREE TIMES DAILY FOR 2 WEEKS 30 g 0     cyclobenzaprine (FLEXERIL) 5 MG tablet TAKE 1 TABLET(5 MG) BY MOUTH AT BEDTIME 90 tablet 0     FLUoxetine (PROZAC) 20 MG capsule TAKE 2 CAPSULES BY MOUTH DAILY 60 capsule 11     gabapentin (NEURONTIN) 300 MG capsule Take 3 capsules (900 mg total) by mouth 3 (three) times a day. Follow Gabapentin Dosing chart given 270 capsule 3     levothyroxine (SYNTHROID, LEVOTHROID) 175 MCG tablet Take 1 tablet (175 mcg total) by mouth daily. 90 tablet 2     melatonin 3 mg Tab tablet TAKE 1 TABLET(3 MG) BY MOUTH AT BEDTIME AS NEEDED 30 tablet 11     PROAIR HFA 90 mcg/actuation inhaler INHALE 1-2 PUFFS EVERY 4 HOURS AS NEEDED 8.5 g 1     simvastatin (ZOCOR) 20 MG tablet Take 1 tablet (20 mg total) by mouth at bedtime. 90 tablet 3     SUMAtriptan (IMITREX) 50 MG tablet TAKE 1 TABLET  BY MOUTH AT ONSET OF HEADACHE. MAY REPEAT IN 2 HOURS AS NEEDED 9 tablet 0     triamcinolone (KENALOG) 0.1 % cream APPLY TO THE AFFECTED AREA TWICE DAILY TO THREE TIMES DAILY FOR 3 WEEKS 30 g 0     VENTOLIN HFA 90 mcg/actuation inhaler INHALE 1 TO 2 PUFFS EVERY 4 HOURS AS NEEDED FOR WHEEZING 18 g 3     No current facility-administered medications on file prior to visit.           Objective:     Vitals:    04/24/18 1256   BP: 108/62   Pulse: 79   SpO2: 98%     General Appearance:  Alert, cooperative, no distress, appears stated age   Head:  Normocephalic, without obvious abnormality, atraumatic   Eyes:  PERRL, conjunctiva/corneas clear, EOM's intact   Ears:  Normal TM left, thin layer of cerumen right ear canal, overlying TM   Nose: Nares normal, septum midline,mucosa normal, no drainage    Throat: Lips, mucosa, and tongue normal   Neck: Supple, symmetrical, trachea midline, no adenopathy;  thyroid: not enlarged, symmetric, no tenderness/mass/nodules; no carotid bruit or JVD   Lungs:   Clear to auscultation bilaterally, respirations unlabored   Breasts:  No breast masses, tenderness, asymmetry, or nipple discharge.   Heart:  Regular rate and rhythm, S1 and S2 normal, no murmur, rub, or gallop   Abdomen:   Soft, non-tender, bowel sounds active all four quadrants,  no masses, no organomegaly   Extremities: Extremities normal, atraumatic, no cyanosis or edema   Skin: Inferior to right breast, 3 cm abscess   Lymph nodes: Cervical, supraclavicular  nodes normal   Neurologic: Normal        There is an area characterized by a subcutaneous mass consistent with a cutaneous abscess measuring 3 cm in greatest dimension. Location: right chest wall inferior to right breast.    Procedure  Informed consent obtained.  The area was prepped in the usual manner and the skin overlying the abscess was anesthetized with 4cc of 1% plain lidocaine.  The area was sharply incised and approx 5 ccs of purulent material was obtained. Packing was  not inserted.   EBL: none  Complications: none   Area was cleansed and dressed with nonstick dressing

## 2021-06-17 NOTE — PROGRESS NOTES
Assessment/plan  1. Cellulitis  Does not appear flocculent.   Treated for cellulitis.   Discussed natural course and history.   Will apply warm compress.   Started on clindamycin.   Due to recent antibiotic use, prescribed fluconazole in case needed.   Advised on reasons to follow up or be seen urgently and discussed possible development of abscess, she will follow up if needed.       2. Hypothyroid  Recheck thyroid status.   Is within normal limits. Continue same dose of synthroid for now. Recheck in six months.   - Thyroid Stimulating Hormone (TSH)  - T4, Free  - T3, Total        Subjective  [age] year old female here with several concerns.   Would like to check her thyroid status. Has been feeling more tired lately.   Of note, her pain is better, s/p T8-9 spinal cord stimulator paddle placement via T9 laminectomy in March.   She notes a painful bump, located on her chest wall, below her right breast. This started couple weeks ago, it was small, like a pimple. It has gotten bigger and is red. There is some warmth. She denies fever, chills, nausea, vomiting. Of note, was recently treated for a UTI.       ROS: 12 systems reviewed, all negative except for what is mentioned in HPI.     Past Medical History:   Diagnosis Date     Anxiety      Carpal tunnel syndrome      Depression     PMDD     Disease of thyroid gland      Migraine      Pregnancy          Substance abuse     sober since , narcotic pain medication     Patient Active Problem List   Diagnosis     Nicotine Dependence     Migraine Headache     Arthralgias In Multiple Sites     Hypothyroidism Postprocedural     Hyperlipidemia     Major Depression, Recurrent     Sudden Redness Of The Skin (Flushing)     Lower Back Pain     Carpal Tunnel Syndrome     Asthma     Allergic Rhinitis     Lump In / On The Skin     Common Migraine (Without Aura)     Seborrheic Keratosis     DESI (obstructive sleep apnea)     Sacroiliac joint dysfunction of right side     Lumbar  "foraminal stenosis     Lumbar disc herniation     Sciatica of right side     Past Surgical History:   Procedure Laterality Date     BACK SURGERY       CHOLECYSTECTOMY       MD DILATION/CURETTAGE,DIAGNOSTIC      Description: Dilation And Curettage;  Recorded: 2011;  Comments: for \"clots\" after one of her pregnancies     MD LIGATE FALLOPIAN TUBE      Description: Tubal Ligation;  Recorded: 2011;     MD REMOVAL GALLBLADDER      Description: Cholecystectomy;  Recorded: 2011;     SPINAL CORD STIMULATOR IMPLANT  2018    [facility], Dr. Cerna     TUBAL LIGATION       Family History   Problem Relation Age of Onset     Heart disease Daughter      WPW,  at age 3     Diabetes Paternal Grandmother      Stroke Paternal Grandfather      Sleep apnea Father      Breast cancer Maternal Grandfather      Social History     Social History     Marital status:      Spouse name: N/A     Number of children: N/A     Years of education: N/A     Occupational History     Not on file.     Social History Main Topics     Smoking status: Current Every Day Smoker     Packs/day: 1.00     Types: Cigarettes     Smokeless tobacco: Never Used      Comment: down to six cigarettes per day     Alcohol use No     Drug use: No     Sexual activity: No     Other Topics Concern     Not on file     Social History Narrative     Current Outpatient Prescriptions on File Prior to Visit   Medication Sig Dispense Refill     clotrimazole (LOTRIMIN) 1 % cream APPLY TO THE AFFECTED AREA TWICE DAILY TO THREE TIMES DAILY FOR 2 WEEKS 30 g 0     cyclobenzaprine (FLEXERIL) 5 MG tablet TAKE 1 TABLET(5 MG) BY MOUTH AT BEDTIME 90 tablet 0     FLUoxetine (PROZAC) 20 MG capsule TAKE 2 CAPSULES BY MOUTH DAILY 60 capsule 11     FLUZONE QUAD 2161-0825 60 mcg (15 mcg x 4)/0.5 mL Susp injection        gabapentin (NEURONTIN) 300 MG capsule Take 3 capsules (900 mg total) by mouth 3 (three) times a day. Follow Gabapentin Dosing chart given 270 " capsule 3     levothyroxine (SYNTHROID, LEVOTHROID) 175 MCG tablet Take 1 tablet (175 mcg total) by mouth daily. 90 tablet 2     melatonin 3 mg Tab tablet TAKE 1 TABLET(3 MG) BY MOUTH AT BEDTIME AS NEEDED 30 tablet 11     PROAIR HFA 90 mcg/actuation inhaler INHALE 1-2 PUFFS EVERY 4 HOURS AS NEEDED 8.5 g 1     simvastatin (ZOCOR) 20 MG tablet Take 1 tablet (20 mg total) by mouth at bedtime. 90 tablet 3     SUMAtriptan (IMITREX) 50 MG tablet TAKE 1 TABLET BY MOUTH AT ONSET OF HEADACHE. MAY REPEAT IN 2 HOURS AS NEEDED 9 tablet 0     triamcinolone (KENALOG) 0.1 % cream APPLY TO THE AFFECTED AREA TWICE DAILY TO THREE TIMES DAILY FOR 3 WEEKS 30 g 0     VENTOLIN HFA 90 mcg/actuation inhaler INHALE 1 TO 2 PUFFS EVERY 4 HOURS AS NEEDED FOR WHEEZING 18 g 3     [DISCONTINUED] varenicline (CHANTIX CONTINUING MONTH WALE) 1 mg tablet Take 1 tablet (1 mg total) by mouth 2 (two) times a day. 60 tablet 2     [DISCONTINUED] varenicline (CHANTIX) 0.5 MG tablet Take 1 tab PO daily for days 1-2. Take 1 tab PO BID for days 4-7. 11 tablet 0     No current facility-administered medications on file prior to visit.      Objective  Vitals:    04/19/18 1451   BP: 108/84   Pulse: 84   Resp: 20       General Appearance:  Alert, cooperative, no distress, appears stated age   Head:  Normocephalic, without obvious abnormality, atraumatic   Eyes:  PERRL, conjunctiva/corneas clear, EOM's intact   Throat: Lips, mucosa, and tongue normal   Neck: Supple   Lungs:   Clear to auscultation bilaterally, respirations unlabored   Heart:  Regular rate and rhythm, S1 and S2 normal, no murmur, rub, or gallop   Extremities: Extremities normal, atraumatic, no cyanosis or edema   Skin: Inferior to right breast, chest wall with 3 cm oblong area of induration, not flocculent, no drainage, minimal warmth   Neurologic: No focal deficits     Recent Results (from the past 240 hour(s))   Thyroid Stimulating Hormone (TSH)   Result Value Ref Range    TSH 0.49 0.30 - 5.00  uIU/mL   T4, Free   Result Value Ref Range    Free T4 1.1 0.7 - 1.8 ng/dL   T3, Total   Result Value Ref Range    T3, Total 91 45 - 175 ng/dL

## 2021-06-17 NOTE — TELEPHONE ENCOUNTER
Called pt to clarify what type of pain medication she is wanting. She would like a pain medication to take at night that is not addicting as she has had problems with this in the past. Pt suggested tylenol #3.     Pt reports her pain is mostly at night in her neck and between her shoulder blades. It does radiate down into her arms and is worse R>L. Pain is 8/10 during the night.  Denies N/T into hands.     Currently taking: Tylenol 1000 mg three times a day, robaxin four times a day and using tramadol at night only. Pt does state that when she uses the tramadol, it does work well for her.     MNPMP query completed.  Printed and scanned into chart. Last tramadol filled 4/23 (40#, 10 day supply) by our clinic, we are the only prescribers-no discrepancies.     Sara Arenas RN

## 2021-06-17 NOTE — PATIENT INSTRUCTIONS - HE
Patient Instructions by Larisa Rand MD at 6/20/2019  9:00 AM     Author: Larisa Rand MD Service: -- Author Type: Physician    Filed: 6/20/2019  9:20 AM Encounter Date: 6/20/2019 Status: Addendum    : Larisa Rand MD (Physician)    Related Notes: Original Note by Larisa Rand MD (Physician) filed at 6/20/2019  9:15 AM       Patient Education     Ganglion Cyst: Hand    A ganglion cyst is a firm, fluid-filled lump that can suddenly appear on the front or back of the wrist or at the base of a finger. These cysts grow from normal tissue in the wrist and fingers, and range in size from a pea to a peach pit. Although ganglion cysts are common, they dont spread, and they dont become cancerous. They can occur after an injury, but many times it isnt known why they grow. Ganglion cysts can change in size, and may go away on their own.  What are the symptoms of a ganglion cyst?  A ganglion cyst is sometimes painful, especially when it first occurs. Constantly using your hand or wrist can make the cyst enlarge and hurt more. Some hand and wrist movements, such as grasping things, may also be difficult.  How does a ganglion cyst develop?  Your wrist and hand are made up of many small bones that meet at joints. Tendons attach muscles to the bones at the joints. The tendons allow the joints to bend and straighten. Both tendons and joints are lined with tissue called synovium. This tissue makes a thick fluid that keeps the joints and tendons moving easily. Sometimes the tissue balloons out from the joint or tendons and forms a cyst. As the cyst fills with fluid and grows, it appears as a lump you can feel.  Where do ganglion cysts occur?  A ganglion cyst can occur anywhere on the hand near a joint. Cysts most commonly appear on the back or palm side of the wrist, or on the palm at the base of a finger. Your doctor can usually diagnose a cyst by examining the lump. He or she may draw off a  little fluid or order an X-ray to rule out other problems.  How is a ganglion cyst treated?  Your healthcare provider may just watch your ganglion cyst. Many shrink and become painless without treatment. Some disappear altogether. If the cyst is unsightly or painful, or makes it hard for you to use your hand, your healthcare provider can treat it or, if needed, remove it surgically.  Nonsurgical treatment  To shrink the cyst, your provider may remove (aspirate) the fluid with a needle. If the cyst hurts, your provider may also give you an injection of an anti-inflammatory, such as cortisone, to relieve the irritation. Your hand may then be wrapped to help keep the cyst from recurring.  Surgery  If the cyst reappears after treatment, your healthcare provider may remove it surgically. A section of the tissue that lines the joint or tendon is removed along with the cyst. This helps prevent another cyst from forming, although recurrence of the cyst is still possible after surgery. Usually, only your hand or arm is numbed, and you can go home a few hours after surgery. Your hand may be in a splint for several days.  Date Last Reviewed: 9/1/2017 2000-2017 [employer]. [address] [place] [address]. All rights reserved. This information is not intended as a substitute for professional medical care. Always follow your healthcare professional's instructions.         Patient Education     Plantar Fasciitis  Plantar fasciitis is a painful swelling of the plantar fascia. The plantar fascia is a thick, fibrous layer of tissue. It covers the bones on the bottom of your foot. And it supports the foot bones in an arched position.  This can happen gradually or suddenly. It usually affects one foot at a time. Heel pain can be sharp, like a knife sticking into the bottom of your foot. You may feel pain after exercising, long-distance jogging, stair climbing, long periods of standing, or after standing  up.  Risk factors include: non-active lifestyle, arthritis, diabetes, obesity or recent weight gain, flat foot, high arch. Wearing high heels, loose shoes, or shoes with poor arch support for long periods of time adds to the risk. This problem is commonly found in runners and dancers. It also found in people who stand on hard surfaces for long periods of time.  Foot pain from this condition is usually worse in the morning. But it improves with walking. By the end of the day there may be a dull aching. Treatment requires short-term rest and controlling swelling. It may take up to 9 months before all symptoms go away. Rarely, a steroid injection into the foot, or surgery, may be needed.  Home care    If you are overweight, lose weight to help healing.    Choose supportive shoes with good arch support and shock absorbency. Replace athletic shoes when they become worn out. Dont walk or run barefoot.    Premade or custom-fitted shoe inserts may be helpful. Inserts made of silicone seem to be the most effective. Custom-made inserts can be provided by a podiatrist or foot specialist, physical therapist, or orthopedist.    Premade or custom-made night splints keep the heel stretched out while you sleep. They may prevent morning pain.    Avoid activities that stress the feet: jogging, prolonged standing or walking, contact sports, etc.    First thing in the morning and before sports, stretch the bottom of your feet. Gently flex your ankle so the toes move toward your knee.    Icing may help control heel pain. Apply an ice pack to the heel for 10-20 minutes as a preventive. Or ice your heel after a severe flare-up of symptoms. You may repeat this every 1-2 hours as needed.    You may use over-the-counter pain medicine to control pain, unless another medicine was prescribed. Anti-inflammatory pain medicines, such as ibuprofen or naproxen, may work better than acetaminophen. If you have chronic liver or kidney disease or ever  had a stomach ulcer or GI bleeding, talk with your healthcare provider before using these medicines.  Follow-up care  Follow up with your healthcare provider, physical therapist, or podiatrist or foot specialist as advised.  Call for an appointment if pain worsens or there is no relief after a few weeks of home treatment. Shoe inserts, a night splint, or a special boot may be required.  If X-rays were taken, you will be told of any new findings that may affect your care.  When to seek medical advice  Call your healthcare provider right away if any of these occur:    Foot swelling    Redness with increasing pain  Date Last Reviewed: 11/21/2015 2000-2017 [employer]. [address] [place] [address], [place] [address]. All rights reserved. This information is not intended as a substitute for professional medical care. Always follow your healthcare professional's instructions.

## 2021-06-17 NOTE — PROGRESS NOTES
[facility] EXAM note      Chief Complaint   Patient presents with     Follow-up       Assessment & Plan    Problem List Items Addressed This Visit     None      Visit Diagnoses     Abscess of chest wall    -  Primary: I&D performed again today.  Patient tolerated hemostat to break up sinuses.  Too small for packing so instructed her to keep expressing drainage throughout the day to make sure that it stays open.  Continue to apply warm compresses.  Finish antibiotic course to completion.  Follow-up if no improvement.  Discussed warning signs and symptoms and when to return.  Including spreading erythema, fevers or significant pain.    Relevant Medications    lidocaine 1%-EPINEPHrine 1:100,000 1 %-1:100,000 injection 3 mL (XYLOCAINE W/EPI) (Start on 4/28/2018 11:00 AM)    Other Relevant Orders    Incision and drainage          History    Susan Kwan is a 52 y.o.  female who presents for the following issues:    Abscess: Patient has been on clindamycin since 4/19 she has a few days left of this prescription.  Was seen on 4/24-by PCP and was noted to have a 3 cm abscess in the right chest wall below the lower right breast.  This was I&D- the patient states that she was in a lot of pain because the lidocaine did not work and so Dr. Jimenez was not able to get a very big opening.  However it has greatly decreased in size since that visit but there is still a hard bump that is not open and draining and so she wanted it evaluated today.  Was wondering if it needed to be I&D again  Denies any fevers, erythema, current drainage or warmth.  Does endorse pain    mEDICATIONS    Current Outpatient Prescriptions on File Prior to Visit   Medication Sig Dispense Refill     clindamycin (CLEOCIN) 300 MG capsule Take 1 capsule (300 mg total) by mouth 3 (three) times a day for 10 days. 30 capsule 0     clotrimazole (LOTRIMIN) 1 % cream APPLY TO THE AFFECTED AREA TWICE DAILY TO THREE TIMES DAILY FOR 2 WEEKS 30 g 0      cyclobenzaprine (FLEXERIL) 5 MG tablet TAKE 1 TABLET(5 MG) BY MOUTH AT BEDTIME 90 tablet 0     FLUoxetine (PROZAC) 20 MG capsule TAKE 2 CAPSULES BY MOUTH DAILY 60 capsule 11     gabapentin (NEURONTIN) 300 MG capsule Take 3 capsules (900 mg total) by mouth 3 (three) times a day. Follow Gabapentin Dosing chart given 270 capsule 3     levothyroxine (SYNTHROID, LEVOTHROID) 175 MCG tablet Take 1 tablet (175 mcg total) by mouth daily. 90 tablet 2     melatonin 3 mg Tab tablet TAKE 1 TABLET(3 MG) BY MOUTH AT BEDTIME AS NEEDED 30 tablet 11     PROAIR HFA 90 mcg/actuation inhaler INHALE 1-2 PUFFS EVERY 4 HOURS AS NEEDED 8.5 g 1     simvastatin (ZOCOR) 20 MG tablet Take 1 tablet (20 mg total) by mouth at bedtime. 90 tablet 3     SUMAtriptan (IMITREX) 50 MG tablet TAKE 1 TABLET BY MOUTH AT ONSET OF HEADACHE. MAY REPEAT IN 2 HOURS AS NEEDED 9 tablet 0     triamcinolone (KENALOG) 0.1 % cream APPLY TO THE AFFECTED AREA TWICE DAILY TO THREE TIMES DAILY FOR 3 WEEKS 30 g 0     varenicline (CHANTIX CONTINUING MONTH WALE) 1 mg tablet Take 1 tablet (1 mg total) by mouth 2 (two) times a day. 60 tablet 2     varenicline (CHANTIX) 0.5 MG tablet Take 1 tab PO daily for days 1-2. Take 1 tab PO BID for days 4-7. 11 tablet 0     VENTOLIN HFA 90 mcg/actuation inhaler INHALE 1 TO 2 PUFFS EVERY 4 HOURS AS NEEDED FOR WHEEZING 18 g 3     No current facility-administered medications on file prior to visit.        Pertinent past medical, surgical, social and family history reviewed and updated in [facility].    Social History     Social History     Marital status:      Spouse name: N/A     Number of children: N/A     Years of education: N/A     Occupational History     Not on file.     Social History Main Topics     Smoking status: Current Every Day Smoker     Packs/day: 1.00     Types: Cigarettes     Smokeless tobacco: Never Used      Comment: down to six cigarettes per day     Alcohol use No     Drug use: No     Sexual activity: No     Other  "Topics Concern     Not on file     Social History Narrative         Review of systems     Pertinent Positives and negatives in HPI.     Physical Exam    /72  Pulse 82  Temp 97.2  F (36.2  C) (Oral)   Resp 20  Ht 5' 2.5\" (1.588 m)  Wt 206 lb (93.4 kg)  LMP 09/21/2014  SpO2 95%  BMI 37.08 kg/m2  GEN:  52 y.o. female sitting comfortably in no apparent distress.   HEENT: EOMI, no scleral icterus, buccal mucosa moist  Neck: Supple without lymphadenopathy   CHEST/LUNG: No respiratory distress  SKIN: 1.5 x 1 cm raised lesion under the right breast on the chest wall.  Hard to palpation slightly fluctuant.  No surrounding erythema or warmth noted.  No opening or active drainage  PSYCH:  Mood and affect appropriate      Procedure  Informed consent obtained.  The area was prepped in the usual manner and the skin overlying the abscess was anesthetized with 2cc of 1% lidocaine with epi.  Patient tolerated much better this time.   Using an 11 blade I made an incision about 3 mm in length at the area of greatest fluctuance.  Small amount of purulent material was expressed.  There had also been some purulent drainage expressed when I had inserted the lidocaine through a very small hole that apparently was in the abscess.  I then used a curved hemostat to help break up the sinuses with good results.  Too small for packing.  EBL: Trace  Complications: none   Area was cleansed and dressed with sterile gauze and Band-Aid.          Follow up: If no improvement    Lexi Mac    "

## 2021-06-17 NOTE — PATIENT INSTRUCTIONS - HE
Patient Instructions by Leandra Lynch CNP at 12/4/2019 10:10 AM     Author: Leandra Lynch CNP Service: -- Author Type: Nurse Practitioner    Filed: 12/4/2019 11:13 AM Encounter Date: 12/4/2019 Status: Addendum    : Leandra Lynch CNP (Nurse Practitioner)    Related Notes: Original Note by Leandra Lynch CNP (Nurse Practitioner) filed at 12/4/2019 11:11 AM       800 mg ibuprofen up to 3 times daily.  Next dose around dinnertime.    Ice your knee and painful areas 4-6 times today and tomorrow.    Off work tomorrow.    Cyclobenzaprine at night if needed.    If you need more time off work or restrictions, go to occupational health clinic or primary care provider.      Patient Education     Knee Sprain    A sprain is an injury to the ligaments or capsule that holds a joint together. There are no broken bones. Most sprains take 3 to 6 weeks to heal. If it a severe sprain where the ligament is completely torn, it can take months to recover.  Most knee sprains are treated with a splint, knee immobilizer brace, or elastic wrap for support. Severe sprains may require surgery.  Home care    Stay off the injured leg as much as possible until you can walk on it without pain. If you have a lot of pain with walking, crutches or a walker may be prescribed. (These can be rented or purchased at many pharmacies and surgical or orthopedic supply stores). Follow your healthcare provider's advice about when to begin putting weight on that leg.    Keep your leg elevated to reduce pain and swelling. When sleeping, place a pillow under the injured leg. When sitting, support the injured leg so it is level with your waist. This is very important during the first 48 hours.    Apply an ice pack over the injured area for 15 to 20 minutes every 3 to 6 hours. You should do this for the first 24 to 48 hours. You can make an ice pack by filling a plastic bag that seals at the top with ice cubes and then wrapping it with a thin  towel. Continue to use ice packs for relief of pain and swelling as needed. As the ice melts, be careful to avoid getting your wrap, splint, or cast wet. After 48 hours, apply heat (warm shower or warm bath) for 15 to 20 minutes several times a day, or alternate ice and heat. You can place the ice pack directly over the splint. If you have to wear a hook-and-loop knee brace, you can open it to apply the ice pack, or heat, directly to the knee. Never put ice directly on the skin. Always wrap the ice in a towel or other type of cloth.    You may use over-the-counter pain medicine to control pain, unless another pain medicine was prescribed.If you have chronic liver or kidney disease or ever had a stomach ulcer or GI bleeding, talk with your healthcare provider before using these medicines.    If you were given a splint, keep it completely dry at all times. Bathe with your splint out of the water, protected with 2 large plastic bags, rubber-banded at the top end. If a fiberglass splint gets wet, you can dry it with a hair dryer. If you have a hook-and-loop knee brace, you can remove this to bathe, unless told otherwise.  Follow-up care  Follow up with your doctor as advised. Any X-rays you had today dont show any broken bones, breaks, or fractures. Sometimes fractures dont show up on the first X-ray. Bruises and sprains can sometimes hurt as much as a fracture. These injuries can take time to heal completely. If your symptoms dont improve or they get worse, talk with your doctor. You may need a repeat X-ray. If X-rays were taken, you will be told of any new findings that may affect your care.  Call 911  Call 911 if you have:     Shortness of breath     Chest pain  When to seek medical advice  Call your healthcare provider right away if any of these occur:    The splint or knee immobilizer brace becomes wet or soft    The fiberglass cast or splint remains wet for more than 24 hours    Pain or swelling increases    The  injured leg or toes become cold, blue, numb, or tingly  Date Last Reviewed: 11/20/2015 2000-2017 [employer]. [address] [place] [address], [place] [address]. All rights reserved. This information is not intended as a substitute for professional medical care. Always follow your healthcare professional's instructions.

## 2021-06-17 NOTE — TELEPHONE ENCOUNTER
Telephone Encounter by Michelle Coto at 1/18/2021  8:42 AM     Author: Michelle Coto Service: -- Author Type: --    Filed: 1/18/2021  8:43 AM Encounter Date: 1/11/2021 Status: Signed    : Michelle Coto APPROVED:    Approval start date: 12/14/2020  Approval end date:  01/13/2022    Pharmacy has been notified of approval and will contact patient when medication is ready for pickup.

## 2021-06-18 NOTE — LETTER
Letter by Earline Jimenez MD at      Author: Earline Jimenez MD Service: -- Author Type: --    Filed:  Encounter Date: 2/27/2019 Status: (Other)       February 27, 2019     Patient: Susan Kwan   YOB: 1965   Date of Visit: 2/27/2019       To Whom It May Concern:    It is my medical opinion that Susan Kwan may return to work on 2/28/19 without restrictions.     If you have any questions or concerns, please don't hesitate to call.    Sincerely,        Electronically signed by Earline Jimenez MD

## 2021-06-19 NOTE — LETTER
Letter by Leandra Lynch CNP at      Author: Leandra Lynch CNP Service: -- Author Type: --    Filed:  Encounter Date: 12/4/2019 Status: Signed         December 4, 2019     Patient: Susan Kwan   YOB: 1965   Date of Visit: 12/4/2019       To Whom It May Concern:    It is my medical opinion that Susan Kwan may return to work on 12/6.    If you have any questions or concerns, please don't hesitate to call.    Sincerely,        Electronically signed by Leandra Lynch CNP

## 2021-06-19 NOTE — PROGRESS NOTES
Subjective:      Susan Kwan is a 52 y.o. female who presents for evaluation of back pain.  Patient has a long history of back issues.  She had a spinal cord stimulator placed in March 2018.  Between that and the gabapentin she takes, she has had significant improvement in leg and foot pain, and better mobility.  She called her surgeon's office to get a refill for gabapentin, but they stated they would no longer fill it for her, she needed to follow-up with her PCP.  I did review note through care everywhere today that confirms this.  She had been taking gabapentin prior to surgery.  She actually ran out of it for a few days, and did notice significant increase in pain when she was not taking it.  She is a , and says this these treatment methods allow her to work.    Patient Active Problem List   Diagnosis     Nicotine Dependence     Migraine Headache     Arthralgias In Multiple Sites     Hypothyroidism Postprocedural     Hyperlipidemia     Major Depression, Recurrent     Sudden Redness Of The Skin (Flushing)     Lower Back Pain     Carpal Tunnel Syndrome     Asthma     Allergic Rhinitis     Lump In / On The Skin     Common Migraine (Without Aura)     Seborrheic Keratosis     DESI (obstructive sleep apnea)     Sacroiliac joint dysfunction of right side     Lumbar foraminal stenosis     Lumbar disc herniation     Sciatica of right side       Current Outpatient Prescriptions:      clotrimazole (LOTRIMIN) 1 % cream, APPLY TO THE AFFECTED AREA TWICE DAILY TO THREE TIMES DAILY FOR 2 WEEKS, Disp: 30 g, Rfl: 0     cyclobenzaprine (FLEXERIL) 5 MG tablet, TAKE 1 TABLET(5 MG) BY MOUTH AT BEDTIME, Disp: 90 tablet, Rfl: 0     FLUoxetine (PROZAC) 20 MG capsule, TAKE 2 CAPSULES BY MOUTH DAILY, Disp: 60 capsule, Rfl: 11     gabapentin (NEURONTIN) 300 MG capsule, Take 3 capsules in the morning, 3 capsules in the afternoon, and 4 capsules in the evening., Disp: 300 capsule, Rfl: 1     levothyroxine (SYNTHROID,  LEVOTHROID) 175 MCG tablet, Take 1 tablet (175 mcg total) by mouth daily., Disp: 90 tablet, Rfl: 2     melatonin 3 mg Tab tablet, TAKE 1 TABLET(3 MG) BY MOUTH AT BEDTIME AS NEEDED, Disp: 30 tablet, Rfl: 11     PROAIR HFA 90 mcg/actuation inhaler, INHALE 1-2 PUFFS EVERY 4 HOURS AS NEEDED, Disp: 8.5 g, Rfl: 1     simvastatin (ZOCOR) 20 MG tablet, Take 1 tablet (20 mg total) by mouth at bedtime., Disp: 90 tablet, Rfl: 3     SUMAtriptan (IMITREX) 50 MG tablet, TAKE 1 TABLET BY MOUTH EVERY 2 HOURS AS NEEDED FOR MIGRAINE. MAY REPEAT IN 2 HOURS AS NEEDED, Disp: 9 tablet, Rfl: 6     triamcinolone (KENALOG) 0.1 % cream, APPLY TO THE AFFECTED AREA TWICE DAILY TO THREE TIMES DAILY FOR 3 WEEKS, Disp: 30 g, Rfl: 0     varenicline (CHANTIX CONTINUING MONTH WALE) 1 mg tablet, Take 1 tablet (1 mg total) by mouth 2 (two) times a day., Disp: 60 tablet, Rfl: 2     varenicline (CHANTIX) 0.5 MG tablet, Take 1 tab PO daily for days 1-2. Take 1 tab PO BID for days 4-7., Disp: 11 tablet, Rfl: 0     VENTOLIN HFA 90 mcg/actuation inhaler, INHALE 1 TO 2 PUFFS EVERY 4 HOURS AS NEEDED FOR WHEEZING, Disp: 18 g, Rfl: 3     acetaminophen-codeine (TYLENOL #3) 300-30 mg per tablet, Take 1-2 tablets by mouth every 4-6 hours, as needed for pain., Disp: 35 tablet, Rfl: 0    Current Facility-Administered Medications:      lidocaine 1%-EPINEPHrine 1:100,000 1 %-1:100,000 injection 3 mL (XYLOCAINE W/EPI), 3 mL, Other, Once, Lexi Mac,      Objective:     Allergies   Allergen Reactions     Acetaminophen      Other: very sore stomach       Cefuroxime Axetil      Sulfa (Sulfonamide Antibiotics) Rash     Vitals:    07/11/18 1501   BP: 118/68   Pulse: 76     Body mass index is 37.8 kg/(m^2).    Vitals reviewed as above.  General: Patient is alert and oriented x 3, in no apparent distress  Cardiac: Regular rate and rhythm, no murmurs  Pulmonary: lungs clear to ausculation, no crackles, rales, rhonchi, or wheezing  Musculoskeletal: Patient walks in normal  tandem gait    Assessment and Plan:     1. Chronic low back and right leg pain.  Pain is improved with spinal cord stimulator.  She continues to take gabapentin daily.  She is taking 3 pills in the morning, 3 pills in the afternoon, 4 pills in the evening.  This regimen seems to work well for her.  I will refill these for her today.    However, it may be worthwhile to attempt to wean off or decrease these doses in the future.  She also states she will be traveling on a bus for several hours, going to [place].  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  She has been off Tylenol 3 for a while, and is wondering if she can have a small prescription to help her while she is on this long bus ride.  She knows her back and leg symptoms will be significantly aggravated by this travel.  Prescription monitoring report completed on patient.  It shows regular monthly gabapentin prescriptions.  Stronger narcotics such as oxycodone and Tylenol 3 prescribed as well.  Last dose of Tylenol 3 prescribed on 6/8/2018.  Prescription sent to today.    2.  Healthcare maintenance.  Colonoscopy was planned, but had to be canceled due to illness.  I will have our specialty schedulers help her reschedule this.    This dictation uses voice recognition software, which may contain typographical errors.

## 2021-06-19 NOTE — LETTER
Letter by Larisa Rand MD at      Author: Larisa Rand MD Service: -- Author Type: --    Filed:  Encounter Date: 4/15/2019 Status: (Other)         April 15, 2019     Patient: Susan Kwan   YOB: 1965   Date of Visit: 4/15/2019       To Whom it May Concern:    Susan Kwan was seen in my clinic on 4/15/2019.  Due to acute illness she is not able to work today or tomorrow.  She should be able to return to work 4/17/19 without restrictions    If you have any questions or concerns, please don't hesitate to call.    Sincerely,         Electronically signed by Larisa Rand MD

## 2021-06-20 NOTE — LETTER
Letter by Cristela Salguero CNP at      Author: Cristela Salguero CNP Service: -- Author Type: --    Filed:  Date of Service:  Status: (Other)       February 25, 2020     Patient: Susan Kwan   YOB: 1965   Date of Visit: 2/25/2020       To Whom It May Concern:    It is my medical opinion that Susan Kwan may return to light duty immediately with the following restrictions: 10 pound lifting limit, limiting bending and twisting with back brace on at all times.  No driving, however patient is okay to work as a .  Restriction through 3/16/2020, will reevaluate further workability at that time.    If you have any questions or concerns, please don't hesitate to call.    Sincerely,        Cristela Salguero CNP

## 2021-06-20 NOTE — LETTER
Letter by Cristela Salguero CNP at      Author: Cristela Salguero CNP Service: -- Author Type: --    Filed:  Encounter Date: 5/4/2020 Status: (Other)         May 4, 2020     Patient: Susan Kwan   YOB: 1965   Date of Visit: 5/4/2020       To Whom It May Concern:    It is my medical opinion that Susan Kwan may return to full duty with no restrictions at this time.     If you have any questions or concerns, please don't hesitate to call.    Sincerely,        Electronically signed by Cristela Salguero CNP

## 2021-06-20 NOTE — LETTER
Letter by Jacy Madrigal RN at      Author: Jacy Madrigal RN Service: -- Author Type: --    Filed:  Encounter Date: 5/25/2020 Status: (Other)       5/25/2020        Susan Kwan  [address] [place] [id number]    This letter provides a written record that you were tested for COVID-19 on 5/23/20.     Your result was negative.    This means that we didnt find the virus that causes COVID-19 in your sample. A test may show negative when you do actually have the virus. This can happen when the virus is in the early stages of infection, before you feel illness symptoms.    Even if you dont have symptoms, they may still appear. For safety, its very important to follow these rules.    Keep yourself away from others (self-isolation):      Stay home. Dont go to work, school or anywhere else.     Stay in your own room (and use your own bathroom), if you can.    Stay away from others in your home. No hugging, kissing or shaking hands. No visitors.    Clean high touch surfaces often (doorknobs, counters, handles, etc.). Use a household cleaning spray or wipes.    Cover your mouth and nose with a mask, tissue or washcloth to avoid spreading germs.    Wash your hands and face often with soap and water.    Stay in self-isolation until you meet ALL of the guidelines below:    1. You have had no fever for at least 72 hours (that is 3 full days of no fever without the use of medicine that reduces fevers), AND  2. other symptoms (such as cough, shortness of breath) have gotten better, AND  3. at least 10 days have passed since your symptoms first appeared.    Going back to work  Check with your employer for any guidelines to follow for going back to work.    Employers: This document serves as formal notice that your employee tested negative for COVID-19, as of the testing date shown above.    For questions regarding this letter or your Negative COVID-19 result, call [phone] between 8A to 6:30P  (M-F) and 10A to 6:30P (weekends).

## 2021-06-20 NOTE — LETTER
Letter by Keven Combs MD at      Author: Keven Combs MD Service: -- Author Type: --    Filed:  Encounter Date: 2/20/2020 Status: (Other)         February 20, 2020     Patient: Susan Kwan   YOB: 1965   Date of Visit: 2/20/2020       To Whom it May Concern:    Susan Kwan was seen in my clinic on 2/20/2020.  Please excuse her absence from work today (2/20/2020) and tomorrow (2/21/2020) due to illness / injury.      If you have any questions or concerns, please don't hesitate to call.    Sincerely,         Electronically signed by Keven Combs MD

## 2021-06-20 NOTE — LETTER
Letter by Ed Machado MD at      Author: Ed Machado MD Service: -- Author Type: --    Filed:  Encounter Date: 12/12/2019 Status: Signed         December 12, 2019     Patient: Susan Kwan   YOB: 1965   Date of Visit: 12/12/2019       To Whom It May Concern:    It is my medical opinion that Susan Kwan may return to work on December 16.    If you have any questions or concerns, please don't hesitate to call.    Sincerely,        Electronically signed by Ed Machado MD

## 2021-06-20 NOTE — LETTER
Letter by Keven Combs MD at      Author: Keven Combs MD Service: -- Author Type: --    Filed:  Encounter Date: 2/21/2020 Status: (Other)         February 21, 2020     Patient: Susan Kwan   YOB: 1965   Date of Visit: 2/20/2020       To Whom it May Concern:    Susan Kwan was seen in my clinic on 2/20/2020.  Please excuse her absence from work from Monday 2/24/2020 through Friday 2/28/2020 due to illness / injury.      If you have any questions or concerns, please don't hesitate to call.    Sincerely,         Electronically signed by Keven Combs MD

## 2021-06-21 NOTE — LETTER
Letter by Sultana Montes CNP at      Author: Sultana Montes CNP Service: -- Author Type: --    Filed:  Encounter Date: 1/27/2021 Status: (Other)         January 27, 2021     Patient: Susan Kwan   YOB: 1965   Date of Visit: 1/27/2021       To Whom It May Concern:    It is my medical opinion that Susan Kwan should remain off work while recovery from back surgery. Next follow up is in 6 weeks and we will re-evaluate return to work.     If you have any questions or concerns, please don't hesitate to call.    Sincerely,        Electronically signed by Sultana Montes CNP

## 2021-06-21 NOTE — LETTER
Letter by Cristela Salguero CNP at      Author: Cristela Salguero CNP Service: -- Author Type: --    Filed:  Date of Service:  Status: (Other)       October 29, 2020     Patient: Susan Kwan   YOB: 1965   Date of Visit: 10/29/2020       To Whom It May Concern:    It is my medical opinion that Susan Kwan should remain out of work until 11/1/2020, can return to work as tolerated 11/2/2020.    If you have any questions or concerns, please don't hesitate to call.    Sincerely,        Cristela Salguero CNP

## 2021-06-21 NOTE — LETTER
Letter by Susy Gómez RN at      Author: Susy Gómez RN Service: -- Author Type: --    Filed:  Encounter Date: 3/31/2021 Status: (Other)         Susan Kwan  [address]  [facility] [address]             March 31, 2021         Dear Ms. Kwan,    We are happy to inform you that your recent Pap smear and Human Papillomavirus (HPV) test results are normal and negative.    It is recommended that you have your next Pap smear and Human Papillomavirus (HPV) test in 3 years. You will also need to return to the clinic every year for an annual wellness visit.    If you have additional questions regarding this result, please contact our office and we will be happy to assist you.      Sincerely,    Your [facility] Care Team

## 2021-06-21 NOTE — LETTER
Letter by Leanna Ward MD at      Author: Leanna Ward MD Service: -- Author Type: --    Filed:  Encounter Date: 3/19/2021 Status: (Other)       Dear Ms. Kwan,    This letter will help in preparation for your upcoming surgery. Please contact us with any additional questions you may have regarding your surgery. Contact information for your surgery scheduler:   Rocio Ward and Daysi: [phone] ~ Luz Mancini and Sherrie: [phone] ~ Osvaldo    You are scheduled for: Anterior Cervical Decompression and Fusion  With: Dr. Leanna Ward  Date/Time: Monday, April 5, 2021at 7:15 am  (time subject to change)  Location: [place] [address]    Check in at the Welcome Desk inside the main doors of the hospital. An escort will take you to the surgery waiting area. A nurse from [facility] (surgery admit unit) at the hospital will call you with your exact arrival time to the hospital - typically one-and-a-half to two-and-a-half hours prior to your scheduled surgery time.     In the event of an emergency surgery case, there may be an adjustment to your start time for surgery.     PREPARING FOR YOUR SURGERY    *Pre-op Physical: Tuesday, March 23, 2021 at 11:20 am with Dr. Jimenez at the [facility]. Please have your LABS completed at this visit. Lab orders have been faxed to your clinic for you.       *Please discuss the necessity of receiving a pneumococcal vaccine prior to surgery at your pre-op physical. Recommended for all patients over the age of 65, or based on certain medical conditions.     *After the pre-op physical is complete, please have your clinic fax the visit note to your surgery scheduler at [phone].    *Pre-op Lab Work: Tuesday, March 23, 2021 at your Pre-op physical appointment.      *CPAP Machine: Please bring with you day of surgery.     *COVID-19 Testing: Thursday, April 1, 2021 at 9:00 am at the [facility]  [facility], [address] [place] [address].    *Readiness Visit: Dr. Ward's nurse will call you prior to your procedure to go over your Pre-op physical and lab results, give Pre-surgery instructions and also answer any additional questions you may have.     *Ensure that you have completed your pre-op physical, along with any other necessary tests/appointments (listed above), prior to your Readiness Visit.           ADDITIONAL INFORMATION REGARDING YOUR SURGERY    Medications    Bring a list ALL of your medications, including any over-the-counter vitamins and herbal supplements to your Readiness Visit, and on the day of surgery.    DO NOT bring your medications with you the day of surgery.    Please see attached third sheet for more details on medications/vitamins/herbal supplements that should be discontinued prior to your surgery date.     If you are unsure if you should discontinue a medication/ vitamin/herbal supplement, please call our office and discuss with a nurse.    Continue taking your medications/vitamins/herbal supplements unless they are on the attached list.     Failure to follow the instructions regarding medications/vitamins/herbal supplements will result in cancellation of your surgery.    Day BEFORE Surgery    DO NOT shave near your surgical site. This can cause irritation of the skin    Using a washcloth and provided bottle of Hibiclens, shower the night BEFORE surgery, using a half bottle of Hibiclens to wash your body, avoiding face and genitals. The morning OF surgery, shower and use the second half of the bottle to wash your body, avoiding face and genitals. If you are unable to take a shower the morning of surgery, please discuss your options with the nurse at your readiness visit.     NOTHING  to eat after 11:00 p.m. the night prior to your procedure    CLEAR LIQUIDS: May have the following liquids up to two (2) hours before your arrival time at the hospital: water,  plain black coffee (no cream or milk), plain black tea or plain green tea (no cream or milk), Gatorade or Propel Water.    SMOKING: Stop smoking as far before surgery as possible, or as directed by your surgeon. NO tobacco products of any kind (cigarettes, e-cigarettes, chewing tobacco) beginning at 11:00 p.m. the night prior to your procedure.     ALCOHOL: You should stop drinking alcohol beginning at 11:00 p.m. the night prior to your procedure    Contact our office if you have symptoms of illness such as a fever of 101 or greater, chills, cough, sore throat, or if you develop a rash or any open sore    Day OF Surgery    If youve been instructed to take a medication(s) on the morning of surgery, please take with a very small sip of water.    Wear loose & comfortable clothing and flat shoes, Leave jewelry/valuables at home. If you wear contact lenses, remove them at home and wear glasses. Remove any body piercings. Remove nail polish.     Planning for Discharge    Start planning for your care after discharge as soon as you receive this letter.    If you have not made arrangements to have someone take you home and stay with you for the first 48 hours after discharge, your surgery will be cancelled.        PRE-OPERATIVE MEDICATION INSTRUCTIONS  Review this information with your primary care physician prior to discontinuing any of the medications listed below.  Notify your primary care physician that you have been instructed to discontinue these medications.    TEN (10) Days Prior to Surgery, STOP the Following Medications   Arelis-[place]  Anacin  Aspirin  Excedrin  Pepto Bismol    **Before taking ANY over-the-counter medications, check the label for Aspirin   Non-steroidal   Anti-inflammatory Medications (NSAIDS)    Celebrex  Diclofenac (Cataflam)  Etodolac (Iodine)  Fenoprofen (Nalfon)  Ibuprofen (Advil, Motrin, Nuprin)  Indomethacin (Indocin)  Ketoprofen  Ketorolac (Toradol)  Melaxicam (Mobic)  Naproxen (AnaProx,  Aleve, Naprosyn)  Relafen (Nabumetone)   Herbal Supplements (this is a partial list of herbals to be discontinued)    Madan [place] Quai  Ephedra  Feverfew  Fish Oil  Flaxseed Oil  Garlic  Shivani  Gingko  Ginseng  Goldenseal  Imitrex (Sumatriptan)  Kava  Krill Oil  Licorice  Multi Vitamins  Barber Wort  Valerian  Vitamin E  Yohimbe   CHECK WITH YOUR PRESCRIBING DOCTOR BEFORE STOPPING ANY BLOOD THINNERS (approximately 7 days prior to surgery)  (Coumadin, Plavix, Platel, Aggrenox, Effient (Prasugrel), Ticlid), Xarelto, and Pradaxa      ALWAYS CHECK WITH YOUR PRESCRIBING DOCTOR REGARDING THE MEDICATIONS LISTED BELOW; RECOMMENDED STOP TIME IS ALSO LISTED      If you are taking Lovenox, discontinue 24 HOURS prior to surgery    If you are taking weight loss medication, discontinue 7 days prior to surgery    If you are taking Metformin or Simvastatin, check with your primary care physician (or whoever has prescribed you this medication) regarding when to discontinue prior to surgery

## 2021-06-21 NOTE — LETTER
Letter by Leanna Ward MD at      Author: Leanna Ward MD Service: -- Author Type: --    Filed:  Encounter Date: 2/25/2021 Status: (Other)         February 25, 2021     Patient: Susan Kwan   YOB: 1965   Date of Visit: 2/25/2021       To Whom It May Concern:    It is my medical opinion that Susan Kwan should remain off of work while recovering from back surgery until 03/16/2021. On this date she will be reevaluated in clinic, and her projected return to work status will be adjusted.     If you have any questions or concerns, please don't hesitate to call.    Sincerely,        Electronically signed by Leanna Ward MD

## 2021-06-21 NOTE — PROGRESS NOTES
Assessment/plan  1. Nicotine Dependence  Counseled on smoking cessation.   She would like to try Chantix again.   This was prescribed. Cautioned over possible adverse affects.   Follow up on this in six to eight weeks.       2. Carpal tunnel syndrome of left wrist  Exam remarkable.   EMG scheduled.   Will follow results.   Patient requests surgical intervention if present.   Wrist brace provided in the mean time.   - EMG- Left Arm; Future  - Cologuard  - Wrist/Arm DME:    3. Screening for colon cancer  Has refused colon cancer screening, we advised colonoscopy due to smoking history, family history of colon cancer, but she refuses again.   Cologuard screening agreed upon.   Due for mammogram.   Due for pap smear next year.         Subjective  [age] old female here with several concerns.   She thinks she has carpal tunnel in the left. She has had this for many years. Also had this in the right. She had surgery. It is better. The left wrist is getting worse over the last two months. Her fingers are tingling and numb. She thinks she would like to have surgery eventually. Denies any fall or injury. Her pain is controlled through her spinal cord stimulator.   Would like to restart Chantix. Started smoking again after her grandson moved out a few weeks ago. Feels out of control with smoking. Also her room mate is being evaluated for a lung lesion, he is 70 years old. They both want to quit smoking. She has been smoking since she was [age] years old. Is back up to a pack per day. She denies cough, congestion, shortness of breath. Has used Chantix in the past without any adverse affects.   She agrees to some colon cancer screening but not colonoscopy. She was advised on this before. Her father had colon cancer she thinks in his eighties. She notes normal bowel movements. No blood in stool.   Says her influenza vaccine is completed.       ROS: 12 systems reviewed, all negative except for what is mentioned in HPI.  "    Past Medical History:   Diagnosis Date     Anxiety      Carpal tunnel syndrome      Depression     PMDD     Disease of thyroid gland      Migraine      Pregnancy          Substance abuse (H)     sober since , narcotic pain medication     Patient Active Problem List   Diagnosis     Nicotine Dependence     Migraine Headache     Arthralgias In Multiple Sites     Hypothyroidism Postprocedural     Hyperlipidemia     Major Depression, Recurrent     Sudden Redness Of The Skin (Flushing)     Lower Back Pain     Carpal Tunnel Syndrome     Asthma     Allergic Rhinitis     Lump In / On The Skin     Common Migraine (Without Aura)     Seborrheic Keratosis     DESI (obstructive sleep apnea)     Sacroiliac joint dysfunction of right side     Lumbar foraminal stenosis     Lumbar disc herniation     Sciatica of right side     Past Surgical History:   Procedure Laterality Date     BACK SURGERY       CHOLECYSTECTOMY       TN DILATION/CURETTAGE,DIAGNOSTIC      Description: Dilation And Curettage;  Recorded: 2011;  Comments: for \"clots\" after one of her pregnancies     TN LIGATE FALLOPIAN TUBE      Description: Tubal Ligation;  Recorded: 2011;     TN REMOVAL GALLBLADDER      Description: Cholecystectomy;  Recorded: 2011;     SPINAL CORD STIMULATOR IMPLANT  2018    [facility], Dr. Cerna     TUBAL LIGATION       Family History   Problem Relation Age of Onset     Heart disease Daughter      WPW,  at age 3     Diabetes Paternal Grandmother      Stroke Paternal Grandfather      Sleep apnea Father      Breast cancer Maternal Grandfather      Social History     Social History     Marital status:      Spouse name: N/A     Number of children: N/A     Years of education: N/A     Occupational History     Not on file.     Social History Main Topics     Smoking status: Current Every Day Smoker     Packs/day: 1.00     Types: Cigarettes     Smokeless tobacco: Never Used      Comment: down to six " cigarettes per day     Alcohol use No     Drug use: No     Sexual activity: No     Other Topics Concern     Not on file     Social History Narrative     Current Outpatient Prescriptions on File Prior to Visit   Medication Sig Dispense Refill     clotrimazole (LOTRIMIN) 1 % cream APPLY TO THE AFFECTED AREA TWICE DAILY TO THREE TIMES DAILY FOR 2 WEEKS 30 g 0     cyclobenzaprine (FLEXERIL) 5 MG tablet TAKE 1 TABLET(5 MG) BY MOUTH AT BEDTIME 90 tablet 0     FLUoxetine (PROZAC) 20 MG capsule TAKE 2 CAPSULES BY MOUTH DAILY 60 capsule 11     gabapentin (NEURONTIN) 300 MG capsule TAKE 3 CAPSULE BY MOUTH EVERY MORNING,3 IN THE AFTERNOON AND 4 CAPSULE IN THE EVENING 300 capsule 9     levothyroxine (SYNTHROID, LEVOTHROID) 175 MCG tablet Take 1 tablet (175 mcg total) by mouth daily. 90 tablet 2     melatonin 3 mg Tab tablet TAKE 1 TABLET(3 MG) BY MOUTH AT BEDTIME AS NEEDED 30 tablet 11     PROAIR HFA 90 mcg/actuation inhaler INHALE 1-2 PUFFS EVERY 4 HOURS AS NEEDED 8.5 g 1     simvastatin (ZOCOR) 20 MG tablet Take 1 tablet (20 mg total) by mouth at bedtime. 90 tablet 3     SUMAtriptan (IMITREX) 50 MG tablet TAKE 1 TABLET BY MOUTH EVERY 2 HOURS AS NEEDED FOR MIGRAINE. MAY REPEAT IN 2 HOURS AS NEEDED 9 tablet 6     triamcinolone (KENALOG) 0.1 % cream APPLY TO THE AFFECTED AREA TWICE DAILY TO THREE TIMES DAILY FOR 3 WEEKS 30 g 0     VENTOLIN HFA 90 mcg/actuation inhaler INHALE 1 TO 2 PUFFS EVERY 4 HOURS AS NEEDED FOR WHEEZING 18 g 3     acetaminophen-codeine (TYLENOL #3) 300-30 mg per tablet Take 1-2 tablets by mouth every 4-6 hours, as needed for pain. 35 tablet 0     Current Facility-Administered Medications on File Prior to Visit   Medication Dose Route Frequency Provider Last Rate Last Dose     lidocaine 1%-EPINEPHrine 1:100,000 1 %-1:100,000 injection 3 mL (XYLOCAINE W/EPI)  3 mL Other Once Lexi Mac DO         Objective  Vitals:    10/30/18 0808   BP: 118/72   Pulse: 72   Resp: 20       General Appearance:  Alert,  cooperative, no distress, appears stated age   Head:  Normocephalic, without obvious abnormality, atraumatic   Eyes:  PERRL, conjunctiva/corneas clear, EOM's intact   Throat: Lips, mucosa, and tongue normal   Neck: Supple, symmetrical, trachea midline, no adenopathy;  thyroid: not enlarged, symmetric, no tenderness/mass/nodules; no carotid bruit or JVD   Lungs:   Clear to auscultation bilaterally, respirations unlabored   Heart:  Regular rate and rhythm, S1 and S2 normal, no murmur   Extremities: Positive phalen and tinnel on the left upper extremity, no deformity or signs of tenosynovitis. No edema or effusion.    Skin: Skin color, texture, turgor normal, no rashes or lesions   Lymph nodes: Cervical, supraclavicular nodes normal   Neurologic: Normal

## 2021-06-21 NOTE — LETTER
Letter by Leanna Ward MD at      Author: Leanna Ward MD Service: -- Author Type: --    Filed:  Encounter Date: 12/7/2020 Status: (Other)       Dear Ms. Kwan,    This letter will help in preparation for your upcoming surgery. Please contact us with any additional questions you may have regarding your surgery. Contact information for your surgery scheduler:   Vu Penaloza, and Daysi: [phone] ~ Luz Mancini and Sherrie: [phone] ~ Osvaldo    You are scheduled for: Right Lumbar Microdiscectomy and Transforaminal Lumbar Interbody Fusion  With: Dr. Leanna Ward  Date/Time: Thursday, December 17, 2020 at 7:15 am  (time subject to change)  Location: [place] [address]    Check in at the Welcome Desk inside the main doors of the hospital. An escort will take you to the surgery waiting area. A nurse from [facility] (surgery admit unit) at the hospital will call you with your exact arrival time to the hospital - typically one-and-a-half to two-and-a-half hours prior to your scheduled surgery time.     In the event of an emergency surgery case, there may be an adjustment to your start time for surgery.     PREPARING FOR YOUR SURGERY    *Pre-op Physical: Done on Tuesday, December 8, 2020 with Dr. Jimenez at the [facility].      *Please discuss the necessity of receiving a pneumococcal vaccine prior to surgery at your pre-op physical. Recommended for all patients over the age of 65, or based on certain medical conditions.     *After the pre-op physical is complete, please have your clinic fax the visit note to your surgery scheduler at [phone].    *Pre-op Lab Work: Done on 12/08/20 at your Pre-op physical appointment with Dr. Jimenez.     *CPAP Machine: Please bring with you day of surgery.     *COVID-19 Testing: Monday, 12/14/20 at 9:30 am at the [facility], [address] [place] [address].      *Readiness Visit: Dr. Ward's nurse will call you prior to your procedure to go over your Pre-op physical and lab results, give Pre-surgery instructions and also answer any additional questions you may have.     *Ensure that you have completed your pre-op physical, along with any other necessary tests/appointments (listed above), prior to your Readiness Visit.           ADDITIONAL INFORMATION REGARDING YOUR SURGERY    Medications    Bring a list ALL of your medications, including any over-the-counter vitamins and herbal supplements to your Readiness Visit, and on the day of surgery.    DO NOT bring your medications with you the day of surgery.    Please see attached third sheet for more details on medications/vitamins/herbal supplements that should be discontinued prior to your surgery date.     If you are unsure if you should discontinue a medication/ vitamin/herbal supplement, please call our office and discuss with a nurse.    Continue taking your medications/vitamins/herbal supplements unless they are on the attached list.     Failure to follow the instructions regarding medications/vitamins/herbal supplements will result in cancellation of your surgery.    Day BEFORE Surgery    DO NOT shave near your surgical site. This can cause irritation of the skin    Using a washcloth and provided bottle of Hibiclens, shower the night BEFORE surgery, using a half bottle of Hibiclens to wash your body, avoiding face and genitals. The morning OF surgery, shower and use the second half of the bottle to wash your body, avoiding face and genitals. If you are unable to take a shower the morning of surgery, please discuss your options with the nurse at your readiness visit.     NOTHING  to eat after 11:00 p.m. the night prior to your procedure    CLEAR LIQUIDS: May have the following liquids up to two (2) hours before your arrival time at the hospital: water, plain black coffee (no cream or milk), plain black tea or plain  green tea (no cream or milk), Gatorade or Propel Water.    SMOKING: Stop smoking as far before surgery as possible, or as directed by your surgeon. NO tobacco products of any kind (cigarettes, e-cigarettes, chewing tobacco) beginning at 11:00 p.m. the night prior to your procedure.     ALCOHOL: You should stop drinking alcohol beginning at 11:00 p.m. the night prior to your procedure    Contact our office if you have symptoms of illness such as a fever of 101 or greater, chills, cough, sore throat, or if you develop a rash or any open sore    Day OF Surgery    If youve been instructed to take a medication(s) on the morning of surgery, please take with a very small sip of water.    Wear loose & comfortable clothing and flat shoes, Leave jewelry/valuables at home. If you wear contact lenses, remove them at home and wear glasses. Remove any body piercings. Remove nail polish.     Planning for Discharge    Start planning for your care after discharge as soon as you receive this letter.    If you have not made arrangements to have someone take you home and stay with you for the first 48 hours after discharge, your surgery will be cancelled.        PRE-OPERATIVE MEDICATION INSTRUCTIONS  Review this information with your primary care physician prior to discontinuing any of the medications listed below.  Notify your primary care physician that you have been instructed to discontinue these medications.    TEN (10) Days Prior to Surgery, STOP the Following Medications   Arelis-[place]  Anacin  Aspirin  Excedrin  Pepto Bismol    **Before taking ANY over-the-counter medications, check the label for Aspirin   Non-steroidal   Anti-inflammatory Medications (NSAIDS)    Celebrex  Diclofenac (Cataflam)  Etodolac (Iodine)  Fenoprofen (Nalfon)  Ibuprofen (Advil, Motrin, Nuprin)  Indomethacin (Indocin)  Ketoprofen  Ketorolac (Toradol)  Melaxicam (Mobic)  Naproxen (AnaProx, Aleve, Naprosyn)  Relafen (Nabumetone)   Herbal Supplements (this  is a partial list of herbals to be discontinued)    Madan Zuñiga  Ephedra  Feverfew  Fish Oil  Flaxseed Oil  Garlic  Shivani  Gingko  Ginseng  Goldenseal  Imitrex (Sumatriptan)  Kava  Krill Oil  Licorice  Multi Vitamins  [facility]  Valerian  Vitamin E  Yohimbe   CHECK WITH YOUR PRESCRIBING DOCTOR BEFORE STOPPING ANY BLOOD THINNERS (approximately 7 days prior to surgery)  (Coumadin, Plavix, Platel, Aggrenox, Effient (Prasugrel), Ticlid), Xarelto, and Pradaxa      ALWAYS CHECK WITH YOUR PRESCRIBING DOCTOR REGARDING THE MEDICATIONS LISTED BELOW; RECOMMENDED STOP TIME IS ALSO LISTED      If you are taking Lovenox, discontinue 24 HOURS prior to surgery    If you are taking weight loss medication, discontinue 7 days prior to surgery    If you are taking Metformin or Simvastatin, check with your primary care physician (or whoever has prescribed you this medication) regarding when to discontinue prior to surgery

## 2021-06-21 NOTE — PROGRESS NOTES
Patient presents at the request of Dr. Jimenez for left upper extremity EMG.  She has left hand numbness and tingling digits 1-4 been present for quite some time.  Worse at night and with driving.  She has some weakness in the hand as well.  She has a history of right carpal tunnel release.  She is right-handed.    EMG/NCS  results: Please see scanned full report.    Comment NCS: Abnormal study:    1.  Prolonged latency left median SNAP and transcarpal study.  2.  Prolonged left median CMAP latency and normal amplitude.  3.  Prolonged left median versus ulnar transcarpal latency comparison.    Comment EMG: Normal study.  1.  Normal needle EMG left upper extremity.    Interpretation: Abnormal study:    1.  Left median neuropathy at the wrist consistent with entrapment in the carpal tunnel, moderate in severity.    2. There is no electrodiagnostic evidence of cervical radiculopathy, brachial plexopathy, or ulnar neuropathy in the left upper extremity.    The testing was completed in its entirety by the physician.      It was our pleasure caring for your patient today, if there any questions or concerns please do not hesitate to contact us.

## 2021-06-21 NOTE — LETTER
Letter by Leanna Ward MD at      Author: Leanna Ward MD Service: -- Author Type: --    Filed:  Encounter Date: 3/17/2021 Status: (Other)         March 17, 2021     Patient: Susan Kwan   YOB: 1965   Date of Visit: 3/17/2021       To Whom It May Concern:    It is my medical opinion that Susan Kwan should remain out of work until re-evaluated at 6 weeks post surgery. Date to be determined. .    If you have any questions or concerns, please don't hesitate to call.    Sincerely,        Electronically signed by Leanna Ward MD
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
## 2021-06-23 NOTE — PROGRESS NOTES
Subjective:  53 y.o. female with concerns acute exacerbation of chronic back pain.  Patient has spinal cord stimulator implanted for back pain.  Is noticed about 1 week of increasing back pain.  Denies fevers or chills.  Feels pain in the low lumbar/buttock area bilaterally.  Will get pain when she rolls over in bed.  May be a little bit of radiation towards left leg.  Currently has ibuprofen for pain and feels like it is not effective.  Taking 800 mg twice per day.  Has gabapentin.  She also has some cyclobenzaprine.    [place]  reviewed  Hydrocodone prescription 2-1/2 weeks ago #8  Outside records delivered to [employer] note 1/16/19 Tylenol #3 from [employer] !?!    Works as  and day care provider.    Chart lists substance abuse with annotation sober since 2008, narcotic pain medication    Outpatient Medications Prior to Visit   Medication Sig Dispense Refill     clotrimazole (LOTRIMIN) 1 % cream APPLY TO THE AFFECTED AREA TWICE DAILY TO THREE TIMES DAILY FOR 2 WEEKS 30 g 0     FLUoxetine (PROZAC) 20 MG capsule Take 2 capsules (40 mg total) by mouth daily. 60 capsule 11     gabapentin (NEURONTIN) 300 MG capsule TAKE 3 CAPSULE BY MOUTH EVERY MORNING,3 IN THE AFTERNOON AND 4 CAPSULE IN THE EVENING 300 capsule 9     levothyroxine (SYNTHROID, LEVOTHROID) 175 MCG tablet Take 1 tablet (175 mcg total) by mouth daily. 90 tablet 2     lidocaine-menthol 4-1 % PtMd Apply 1 patch topically every 12 (twelve) hours as needed. 5 patch 0     melatonin 3 mg Tab tablet TAKE 1 TABLET(3 MG) BY MOUTH AT BEDTIME AS NEEDED 30 tablet 11     PROAIR HFA 90 mcg/actuation inhaler INHALE 1-2 PUFFS EVERY 4 HOURS AS NEEDED 8.5 g 1     simvastatin (ZOCOR) 20 MG tablet TAKE 1 TABLET(20 MG) BY MOUTH AT BEDTIME 90 tablet 3     SUMAtriptan (IMITREX) 50 MG tablet TAKE 1 TABLET BY MOUTH EVERY 2 HOURS AS NEEDED FOR MIGRAINE. MAY REPEAT IN 2 HOURS AS NEEDED 9 tablet 6     triamcinolone (KENALOG) 0.1 % cream APPLY TO THE AFFECTED AREA TWICE DAILY TO  THREE TIMES DAILY FOR 3 WEEKS 30 g 0     varenicline (CHANTIX CONTINUING MONTH WALE) 1 mg tablet Take 1 tablet (1 mg total) by mouth 2 (two) times a day. 60 tablet 2     varenicline (CHANTIX) 0.5 MG tablet Take 1 tab PO daily for days 1-2. Take 1 tab PO BID for days 4-7. 11 tablet 0     VENTOLIN HFA 90 mcg/actuation inhaler INHALE 1 TO 2 PUFFS EVERY 4 HOURS AS NEEDED FOR WHEEZING 18 g 3     cyclobenzaprine (FLEXERIL) 5 MG tablet TAKE 1 TABLET(5 MG) BY MOUTH AT BEDTIME 90 tablet 0     Facility-Administered Medications Prior to Visit   Medication Dose Route Frequency Provider Last Rate Last Dose     lidocaine 1%-EPINEPHrine 1:100,000 1 %-1:100,000 injection 3 mL (XYLOCAINE W/EPI)  3 mL Other Once Lexi Mac,           Social History     Tobacco Use   Smoking Status Current Every Day Smoker     Packs/day: 1.00     Types: Cigarettes   Smokeless Tobacco Never Used   Tobacco Comment    down to six cigarettes per day      Objective:  /74 (Patient Site: Right Arm, Patient Position: Sitting, Cuff Size: Adult Large)   Pulse 88   Resp 28   Wt 214 lb 1.9 oz (97.1 kg)   LMP 09/21/2014   BMI 38.54 kg/m    GENERAL: alert, not distressed  CHEST: clear, no rales, rhonchi, or wheezes  CARDIAC: regular without murmur, gallop, or rub  BACK: mild tenderness bilaterally around posterior pelvic brim.  Neurologic: Normal straight leg raise test.  DTRs the patella and Achilles normal 2/4.    Assessment and Plan:   1. Acute bilateral low back pain without sciatica  Continue the PT she has been doing with a home exercise program.  Can increase cyclobenzaprine to 10 mg up to three times a day.  Advised she needs to be very careful about using this and not drive if she is sedated.    [facility].  Follow up with Orthopedics if back is still a problem.  Follow up with PMD if not better in 1-2 weeks.    Total time 25 min with greater than 50% in counseling on fare plan.

## 2021-06-24 ENCOUNTER — COMMUNICATION - HEALTHEAST (OUTPATIENT)
Dept: NEUROSURGERY | Facility: CLINIC | Age: 56
End: 2021-06-24

## 2021-06-24 ENCOUNTER — AMBULATORY - HEALTHEAST (OUTPATIENT)
Dept: NEUROSURGERY | Facility: CLINIC | Age: 56
End: 2021-06-24

## 2021-06-24 DIAGNOSIS — M62.838 MUSCLE SPASM: ICD-10-CM

## 2021-06-24 DIAGNOSIS — M48.02 CERVICAL STENOSIS OF SPINAL CANAL: ICD-10-CM

## 2021-06-24 DIAGNOSIS — Z98.1 S/P CERVICAL SPINAL FUSION: ICD-10-CM

## 2021-06-24 DIAGNOSIS — M54.16 LUMBAR RADICULOPATHY: ICD-10-CM

## 2021-06-24 DIAGNOSIS — G95.20 CERVICAL CORD COMPRESSION WITH MYELOPATHY (H): ICD-10-CM

## 2021-06-24 NOTE — TELEPHONE ENCOUNTER
RN cannot approve Refill Request    RN can NOT refill this medication med is not covered by policy/route to provider.    Gasper Preciado, [facility] Triage/Med Refill 3/6/2019    Requested Prescriptions   Pending Prescriptions Disp Refills     cyclobenzaprine (FLEXERIL) 5 MG tablet [Pharmacy Med Name: CYCLOBENZAPRINE 5MG TABLETS] 90 tablet 0     Sig: TAKE 1 TABLET(5 MG) BY MOUTH AT BEDTIME    There is no refill protocol information for this order

## 2021-06-25 NOTE — PROGRESS NOTES
Patient is here for a post op visit, cervical and lumbar. She states that she is still having pain and stiffness.   Juli Campuzano,KATIE,1:39 PM

## 2021-06-25 NOTE — PROGRESS NOTES
"NEUROSURGERY FOLLOWUP  NOTE    Susan Kwan comes today in f/u.  Patient is a 55-year-old female who is status post lumbar 4-lumbar 5 microdiscectomy and instrumented fusion on 12/17/2020 and cervical 5-cervical 6 anterior cervical decompression fusion on 4/52021.  She still has ongoing left-sided neck pain and pain between her shoulder blades.  She also notes cramping in her right buttocks into her posterior leg which began just recently.  She feels like her overall myelopathy symptoms have improved.  No radicular arm pain or numbness or tingling as well.    PHYSICAL EXAM:   Constitutional: /62   Pulse 88   Resp 16   Ht 5' 2.25\" (1.581 m)   Wt 195 lb (88.5 kg)   LMP 09/21/2014   SpO2 97%   BMI 35.38 kg/m       Mental Status: A & O in no acute distress.  Affect is appropriate.  Speech is fluent.  Recent and remote memory are intact.  Attention span and concentration are normal.     Motor:  Normal bulk and tone all muscle groups of upper and lower extremities.     Sensory: Sensation intact bilaterally to light touch.     IMAGING:   I personally reviewed all radiographic images        CONSULTATION ASSESSMENT AND PLAN:    55-year-old female who is status post lumbar 4-lumbar 5 microdiscectomy and instrumented fusion on 12/17/2020 and cervical 5-cervical 6 anterior cervical decompression fusion on 4/52021.  Images reviewed with patient. Recommend physical therapy and weaning out of cervical collar.  Also recommend discontinuing Robaxin and starting a trial of Flexeril.  If no relief at 5 mg 3 times a day she will call our office to increase to 10 mg three times a day.  Bone stimulator also ordered for cervical spine she will follow up in 6 weeks with new x-rays of her cervical spine..    I spent more than 15 minutes in this apt, examining the pt, reviewing the scans, reviewing notes from chart, discussing treatment options with risks and benefits and coordinating care.     Leanna Ward      CC: "     Earline Jimenez MD  [address] [facility] [address]

## 2021-06-26 NOTE — TELEPHONE ENCOUNTER
Refilled. Patient has an appt coming up with Dr. Ward - should discuss pain regimen going forward.

## 2021-06-26 NOTE — TELEPHONE ENCOUNTER
Pt is s/p C5-6 ACDF with Dr. Ward on 4/5/21. She requested a refill of tramadol and robaxin via [facility].     Called pt to assess her symptoms. She reports ongoing pain between her shoulder blades that radiates into her arms, the right arm is more painful than the left. Denies N/T or weakness.     Pt is taking tramadol three times a day, robaxin four times a day and using lidocaine patches and ice/heat PRN.     [place] completed.  Tramadol last filled on 6/16/21 (30#, 7 day supply) no discrepancies.     Message routed to [facility] for refill approval.     Sara Arenas RN

## 2021-06-28 NOTE — PROGRESS NOTES
Progress Notes by Leandra Lynch CNP at 12/4/2019 10:10 AM     Author: Leandra Lynch CNP Service: -- Author Type: Nurse Practitioner    Filed: 12/12/2019  8:14 AM Encounter Date: 12/4/2019 Status: Signed    : Leandra Lynch CNP (Nurse Practitioner)       Chief Complaint   Patient presents with   ? Fall     slipped on ice at work, Rt buttcheek and lower back, injury 0800 today twisted body       ASSESSMENT & PLAN:   Diagnoses and all orders for this visit:    Acute right-sided low back pain without sciatica  -     ibuprofen tablet 600 mg (ADVIL,MOTRIN)  -     cyclobenzaprine (FLEXERIL) 10 MG tablet; Take 1 tablet (10 mg total) by mouth at bedtime as needed for muscle spasms.  Dispense: 10 tablet; Refill: 0    Acute pain of right knee  -     ibuprofen tablet 600 mg (ADVIL,MOTRIN)    Arthralgias In Multiple Sites    Strain of right trapezius muscle, initial encounter  -     cyclobenzaprine (FLEXERIL) 10 MG tablet; Take 1 tablet (10 mg total) by mouth at bedtime as needed for muscle spasms.  Dispense: 10 tablet; Refill: 0        MDM:  Patient given 2 days off of work to rest.  NSAIDs, cyclobenzaprine at night.  Discussed nonuse of cyclobenzaprine during the day unless she is able to sleep.  Avoid lifting greater than 10 pounds.  Patient states she does not lift at work.     Supportive care discussed.  See discharge instructions below for specific recommendations given.    At the end of the encounter, I discussed results, diagnosis, medications. Discussed red flags for immediate return to clinic/ER, as well as indications for follow up if no improvement. Patient and/or caregiver understood and agreed to plan. Patient was stable for discharge.    SUBJECTIVE    HPI:  HPI  Susan Kwan presents to the walk-in clinic with   Chief Complaint   Patient presents with   ? Fall     slipped on ice at work, Rt buttcheek and lower back, injury 0800 today twisted body     States slipped on ice and twisted, no fall.   Pain rt knee, rt buttock and low back, and right upper back.  Does not feel radiating pain.  Happened 0800 today.  Pain currently 8/10.  Has not taken anything for pain yet.      Associated with: History of lumbar back problems/disc herniation, SI joint dysfunction in the past, not currently. Has spinal cord stimulator placed by neurosurgery.      Denies: N/T/weakness in legs.  Pain meds yet.      Has history of TMJ.  Was on cyclobenzaprine, last 1 month ago.  Says she does tolerate this and it seems to work for muscle related issues.    See ROS for additional symptoms and/or pertinent negatives.       History obtained from the patient.    Past Medical History:   Diagnosis Date   ? Anxiety    ? Carpal tunnel syndrome    ? Depression     PMDD   ? Disease of thyroid gland    ? Migraine    ? Pregnancy        ? Substance abuse (H)     sober since , narcotic pain medication       Active Ambulatory (Non-Hospital) Problems    Diagnosis   ? 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 Myofascial pain   ? Benign neoplasm of ascending colon   ? ASCUS with positive high risk HPV cervical   ? TMJ (temporomandibular joint syndrome)   ? Advanced directives, counseling/discussion   ? Obesity (BMI 35.0-39.9) with comorbidity (H)   ? S/P lumbar microdiscectomy   ? Lumbar stenosis   ? Sacroiliac joint dysfunction of right side   ? Lumbar foraminal stenosis   ? Lumbar disc herniation   ? Sciatica of right side   ? DESI (obstructive sleep apnea)   ? Seborrheic Keratosis   ? Nicotine Dependence   ? Migraine Headache   ? Arthralgias In Multiple Sites   ? Postablative hypothyroidism   ? Hyperlipidemia   ? Major Depression, Recurrent   ? Sudden Redness Of The Skin (Flushing)   ? Lower Back Pain   ? Carpal Tunnel Syndrome   ? Mild intermittent asthma without complication   ? Allergic Rhinitis   ? Gastro-esophageal reflux disease with esophagitis   ? Depressive disorder   ? Anxiety state       Family History   Problem  Relation Age of Onset   ? Heart disease Daughter         WPW,  at age 3   ? Diabetes Paternal Grandmother    ? Stroke Paternal Grandfather    ? Sleep apnea Father    ? Breast cancer Maternal Grandmother        Social History     Tobacco Use   ? Smoking status: Current Every Day Smoker     Packs/day: 1.00     Types: Cigarettes   ? Smokeless tobacco: Never Used   ? Tobacco comment: down to six cigarettes per day   Substance Use Topics   ? Alcohol use: No       Review of Systems   Musculoskeletal:        Denies other injury except as noted above.   Neurological: Negative for dizziness, syncope, weakness, light-headedness and numbness.        Denies head trauma.       OBJECTIVE    Vitals:    19 1014   BP: 108/69   Pulse: 66   Resp: 16   Temp: 97.9  F (36.6  C)   TempSrc: Oral   SpO2: 96%   Weight: 214 lb 6.4 oz (97.3 kg)       Physical Exam  Constitutional:       General: She is not in acute distress.     Appearance: She is well-developed.   HENT:      Right Ear: External ear normal.      Left Ear: External ear normal.   Eyes:      General:         Right eye: No discharge.         Left eye: No discharge.      Conjunctiva/sclera: Conjunctivae normal.   Cardiovascular:      Pulses: Normal pulses.   Pulmonary:      Effort: Pulmonary effort is normal.   Musculoskeletal: Normal range of motion.         General: Tenderness (Lower back paraspinal muscles.  No L-spine, T-spine, C-spine tenderness.  Tender right upper trapezius muscles with palpable spasm.) present.      Right knee: Normal. She exhibits no swelling, no effusion, no deformity, no erythema, normal alignment, no LCL laxity, no bony tenderness and no MCL laxity.   Skin:     General: Skin is warm and dry.      Capillary Refill: Capillary refill takes less than 2 seconds.   Neurological:      Mental Status: She is alert and oriented to person, place, and time. Mental status is at baseline.      Gait: Gait normal.   Psychiatric:         Mood and Affect:  Mood normal.         Behavior: Behavior normal.         Thought Content: Thought content normal.         Judgment: Judgment normal.         Labs:  No results found for this or any previous visit (from the past 240 hour(s)).      Radiology:    No results found.    PATIENT INSTRUCTIONS:   Patient Instructions   800 mg ibuprofen up to 3 times daily.  Next dose around dinnertime.    Ice your knee and painful areas 4-6 times today and tomorrow.    Off work tomorrow.    Cyclobenzaprine at night if needed.    If you need more time off work or restrictions, go to occupational health clinic or primary care provider.      Patient Education     Knee Sprain    A sprain is an injury to the ligaments or capsule that holds a joint together. There are no broken bones. Most sprains take 3 to 6 weeks to heal. If it a severe sprain where the ligament is completely torn, it can take months to recover.  Most knee sprains are treated with a splint, knee immobilizer brace, or elastic wrap for support. Severe sprains may require surgery.  Home care    Stay off the injured leg as much as possible until you can walk on it without pain. If you have a lot of pain with walking, crutches or a walker may be prescribed. (These can be rented or purchased at many pharmacies and surgical or orthopedic supply stores). Follow your healthcare provider's advice about when to begin putting weight on that leg.    Keep your leg elevated to reduce pain and swelling. When sleeping, place a pillow under the injured leg. When sitting, support the injured leg so it is level with your waist. This is very important during the first 48 hours.    Apply an ice pack over the injured area for 15 to 20 minutes every 3 to 6 hours. You should do this for the first 24 to 48 hours. You can make an ice pack by filling a plastic bag that seals at the top with ice cubes and then wrapping it with a thin towel. Continue to use ice packs for relief of pain and swelling as needed.  As the ice melts, be careful to avoid getting your wrap, splint, or cast wet. After 48 hours, apply heat (warm shower or warm bath) for 15 to 20 minutes several times a day, or alternate ice and heat. You can place the ice pack directly over the splint. If you have to wear a hook-and-loop knee brace, you can open it to apply the ice pack, or heat, directly to the knee. Never put ice directly on the skin. Always wrap the ice in a towel or other type of cloth.    You may use over-the-counter pain medicine to control pain, unless another pain medicine was prescribed.If you have chronic liver or kidney disease or ever had a stomach ulcer or GI bleeding, talk with your healthcare provider before using these medicines.    If you were given a splint, keep it completely dry at all times. Bathe with your splint out of the water, protected with 2 large plastic bags, rubber-banded at the top end. If a fiberglass splint gets wet, you can dry it with a hair dryer. If you have a hook-and-loop knee brace, you can remove this to bathe, unless told otherwise.  Follow-up care  Follow up with your doctor as advised. Any X-rays you had today dont show any broken bones, breaks, or fractures. Sometimes fractures dont show up on the first X-ray. Bruises and sprains can sometimes hurt as much as a fracture. These injuries can take time to heal completely. If your symptoms dont improve or they get worse, talk with your doctor. You may need a repeat X-ray. If X-rays were taken, you will be told of any new findings that may affect your care.  Call 911  Call 911 if you have:     Shortness of breath     Chest pain  When to seek medical advice  Call your healthcare provider right away if any of these occur:    The splint or knee immobilizer brace becomes wet or soft    The fiberglass cast or splint remains wet for more than 24 hours    Pain or swelling increases    The injured leg or toes become cold, blue, numb, or tingly  Date Last Reviewed:  11/20/2015 2000-2017 [employer]. [address] [place] [address], [place] [address]. All rights reserved. This information is not intended as a substitute for professional medical care. Always follow your healthcare professional's instructions.

## 2021-06-29 NOTE — PROGRESS NOTES
Progress Notes by Dulce Ozuna at 6/1/2020  9:00 AM     Author: Dulce Ozuna Service: -- Author Type: Physical Therapist    Filed: 6/1/2020 12:41 PM Encounter Date: 6/1/2020 Status: Attested    : Dulce Ozuna (Physical Therapist) Cosigner: Leanna Ward MD at 6/1/2020  1:10 PM    Attestation signed by Leanna Ward MD at 6/1/2020  1:10 PM    Physician recommendation:     x___ Follow therapist's recommendation        ___ Modify therapy                      [facility]  Lumbo-Pelvic Initial Evaluation        [facility] Certification Request    June 1, 2020      Patient: Susan Kwan  MR Number: [id number]  YOB: 1965  Date of Visit: 6/1/2020      Dear Dr. Ward    Thank you for this referral.   We are seeing Susan Kwan for Physical Therapy of low back pain.    Medicare and/or Medicaid requires physician review and approval of the treatment plan. Please review the plan of care and verify that you agree with the therapy plan of care by co-signing this note.    If you have any questions or concerns, please don't hesitate to call.    Sincerely,      Marta WORKMAN Sulma, PT        Physician recommendation:     ___ Follow therapist's recommendation        ___ Modify therapy      *Physician co-signature indicates they certify the need for these services furnished within this plan and while under their care.      Patient Name: Susan Kwan  Date of evaluation: 6/1/2020  Referral Diagnosis: Lumbar radiculopathy  Referring provider: Leanna Ward MD  Visit Diagnosis:     ICD-10-CM    1. Chronic right-sided low back pain with right-sided sciatica  M54.41     G89.29    2. Generalized muscle weakness  M62.81        Assessment:       Patient presents with long history of low back pain.  More recently she slipped and fell on the ice  2/2020 and had a pars fx.  She was in a brace which was recently discharged.  She then got exposed to  Covid-19 and had to quarantine for 2 weeks.  During that time, more sitting and laying down increased her back pain on the right side.  She has a history of a microdiscectomy and does have a spinal cord stimulator.  Today she has pain to palpation, pain with AROM, weakness and pain with SLR.  Functional limitations are described as occurring with: sitting, lifting, not working, sleeping.  Pt. is appropriate and a good candidate for skilled PT intervention as outlined in the Plan of Care (POC) to improve pain levels and function.  She did get relief with the supine LE nerve glides and other stretches today.  [facility]    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    Barriers to Learning or Achieving Goals:  Patient Active Problem List   Diagnosis   ? Nicotine Dependence   ? Migraine Headache   ? Arthralgias In Multiple Sites   ? Postablative hypothyroidism   ? Hyperlipidemia   ? Major Depression, Recurrent   ? Sudden Redness Of The Skin (Flushing)   ? Lower Back Pain   ? Carpal Tunnel Syndrome   ? Mild intermittent asthma without complication   ? Allergic Rhinitis   ? Seborrheic Keratosis   ? DESI (obstructive sleep apnea)   ? Sacroiliac joint dysfunction of right side   ? Lumbar foraminal stenosis   ? Lumbar disc herniation   ? Sciatica of right side   ? Lumbar stenosis   ? S/P lumbar microdiscectomy   ? Gastro-esophageal reflux disease with esophagitis   ? Depressive disorder   ? Anxiety state   ? Obesity (BMI 35.0-39.9) with comorbidity (H)   ? TMJ (temporomandibular joint syndrome)   ? Advanced directives, counseling/discussion   ? ASCUS with positive high risk HPV cervical   ? Xerostomia due to  hyposecretion of salivary gland   ? Myofascial pain   ? Benign neoplasm of ascending colon       POC, goals and pathology of condition were reviewed with the patient.  Patient is in agreement with the POC.       Plan / Patient Instructions:        Plan of Care:   Authorization / Certification Start Date: 20  Authorization / Certification End Date: 20  Authorization / Certification Number of Visits: 12  Communication with: Referral Source  Patient Related Instruction: Nature of Condition;Treatment plan and rationale;Self Care instruction;Basis of treatment;Body mechanics;Posture  Times per Week: 1-2  Number of Weeks: 12  Number of Visits: 12  Therapeutic Exercise: ROM;Stretching;Strengthening  Neuromuscular Reeducation: kinesio tape;posture;core  Manual Therapy: soft tissue mobilization;myofascial release      Plan for next visit:   Review stretches  Core engagement  strengthening  edu for body mechanics       Subjective:         History of Present Illness:    Susan is a 54 y.o. female who presents to therapy today with complaints of low back and thoracic pain. Patient slipped and fell on the ice in 2020.  She was found to have a pars L4 fracture and was placed in a brace.  More recently the brace was discharged but she was quarantined for Covid-19 for two weeks due to exposure, tested negative, and sitting around more has caused an increase in low back and thoracic pain with L5 radicular symptoms on the right.  The current pain is bilateral with R>L, pain into right buttock and refers into the right leg on the inside of her lower leg and top of foot.  She does have a history of drop foot on the right.  She did see Dr. Curtis through a video visit and is scheduled for an injection on 2020.   Symptoms are constant and getting worse. She does have a history of a lumbar microdiscectomy 2017 and a spinal cord stimulator in 2018.     Pain Ratin  Pain rating at best: 5  Pain rating at worst: 9  Pain  description: burning, numbness, pain, sharp, shooting, tingling and weakness    Functional limitations are described as occurring with:   sitting, lifting, not working, sleeping    Patient reports benefit from:  change positions, move around         Objective:      Note: Items left blank indicates the item was not performed or not indicated at the time of the evaluation.    Patient Outcome Measures :    Modified Oswestry Low Back Pain Disablity Questionnaire  in %: 36     Scores range from 0-100%, where a score of 0% represents minimal pain and maximal function. The minimal clinically important difference is a score reduction of 12%.    Examination  1. Chronic right-sided low back pain with right-sided sciatica     2. Generalized muscle weakness       Involved side: Right  Posture Observation:      General standing posture is fair.  She does wear orthotics for her flat feet    Lumbar ROM:         Within Normal Limits unless noted  Date: 06/01/20     *Indicate scale AROM AROM AROM   Lumbar Flexion      Lumbar Extension       Right Left Right Left Right Left   Lumbar Sidebending         Lumbar Rotation Pain on right        Thoracic Flexion      Thoracic Extension      Thoracic Sidebending         Thoracic Rotation           Lower Extremity Strength:     Date: 06/01/20     LE strength/5 Right Left Right Left Right Left   Hip Flexion (L1-3) 5 5       Hip Extension (L5-S1)         Hip Abduction (L4-5) 5 5       Hip Adduction (L2-3) 4+ 4+       Hip External Rotation         Hip Internal Rotation         Knee Extension (L3-4) 5 5       Knee Flexion 5 5       Ankle Dorsiflexion (L4-5)         Great Toe Extension (L5) 5-        Ankle Plantar flexion (S1) 5-        Abdominals        Sensation           Reflex Testing  Lumbar Dermatomes Right Left UE Reflexes Right Left   Iliac Crest and Groin (L1)   Biceps (C5-6)     Anterior Medial Thigh (L2)   Brachioradialis (C5-6)     Anterior Thigh, Medial Epicondyle Femur (L3)   Triceps  (C7-8)     Lateral Thigh, Anterior Knee, Medial Leg/Malleolus (L4) decreased to light touch  Hoffmanns test     Lateral Leg, Dorsal Foot (L5)   LE Reflexes     Lateral Foot (S1)   Patellar (L3-4)     Posterior Leg (S2)   Achilles (S1-2)     Other:   Babinski Response       Palpation: pain left lumbar paraspinals, bilateral SI joint, right QL, right piriformis  Able to toe and heel walk but struggles with heel walking on left  Bridging: slight pain, weakness noted    Lumbar Special Tests:     Lumbar Special Tests Right Left SI Tests Right  Left   Quadrant test   SI Compression     Straight leg raise + + with pain on right,  SI Distraction     Crossover response   POSH Test     Slump - - Sacral Thrust     Sit-up test  FADIR     Trunk extensor endurance test  Resisted Abduction     Prone instability test  Other:     Pubic shotgun  Other:         LE Screen:  normal hamstring length on right, not able to determine on left     Exercises:  Exercise #1: stretches: sitting hamstring stretch, SKC, LTR, supine hip ER/piriformis, sitting midback, sitting midback totation  Comment #1: hold 30 seconds X 1-3 reps  Exercise #2: LE supine nerve glide    repeat 12-15 reps, X 3-5X/day      Treatment Today     TREATMENT MINUTES COMMENTS   Evaluation 25 lumbar   Self-care/ Home management 8 Posture for sitting and standing, handout issued   Manual therapy     Neuromuscular Re-education     Therapeutic Activity     Therapeutic Exercises 23 See above or flow sheet   Gait training     Modality__________________                Total 56    Blank areas are intentional and mean the treatment did not include these items.       PT Evaluation Code: (Please list factors)  Patient History/Comorbidities:   Patient Active Problem List   Diagnosis   ? Nicotine Dependence   ? Migraine Headache   ? Arthralgias In Multiple Sites   ? Postablative hypothyroidism   ? Hyperlipidemia   ? Major Depression, Recurrent   ? Sudden Redness Of The Skin (Flushing)    ? Lower Back Pain   ? Carpal Tunnel Syndrome   ? Mild intermittent asthma without complication   ? Allergic Rhinitis   ? Seborrheic Keratosis   ? DESI (obstructive sleep apnea)   ? Sacroiliac joint dysfunction of right side   ? Lumbar foraminal stenosis   ? Lumbar disc herniation   ? Sciatica of right side   ? Lumbar stenosis   ? S/P lumbar microdiscectomy   ? Gastro-esophageal reflux disease with esophagitis   ? Depressive disorder   ? Anxiety state   ? Obesity (BMI 35.0-39.9) with comorbidity (H)   ? TMJ (temporomandibular joint syndrome)   ? Advanced directives, counseling/discussion   ? ASCUS with positive high risk HPV cervical   ? 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 Myofascial pain   ? Benign neoplasm of ascending colon     Examination: pain with trunk AROM, weakness  Clinical Presentation: stable  Clinical Decision Making: low    Patient History/  Comorbidities Examination  (body structures and functions, activity limitations, and/or participation restrictions) Clinical Presentation Clinical Decision Making (Complexity)   No documented Comorbidities or personal factors 1-2 Elements Stable and/or uncomplicated Low   1-2 documented comorbidities or personal factor 3 Elements Evolving clinical presentation with changing characteristics Moderate   3-4 documented comorbidities or personal factors 4 or more Unstable and unpredictable High              Marta Ozuna, PT  6/1/2020  7:30 AM

## 2021-06-30 NOTE — PROGRESS NOTES
Progress Notes by Gasper Hernandez PT at 2/1/2021  8:15 AM     Author: Gasper Hernandez PT Service: -- Author Type: Physical Therapist    Filed: 2/1/2021  6:45 PM Encounter Date: 2/1/2021 Status: Attested    : Gasper Hernandez PT (Physical Therapist) Cosigner: Sultana Montes CNP at 2/3/2021  9:00 AM    Attestation signed by Sultana Montes CNP at 2/3/2021  9:00 AM    I agree with outlined plan of care.     ROBERT Lees  [facility]  O: [phone]                      [facility] Certification Request    February 1, 2021      Patient: Susan Kwan  MR Number: [id number]  YOB: 1965  Date of Visit: 2/1/2021      Dear Sultana Mg CNP:    Thank you for this referral.   We are seeing Susan Kwan in Physical Therapy for Lumbar radiculopathy.    Medicare and/or Medicaid requires physician review and approval of the treatment plan. Please review the plan of care and verify that you agree with the therapy plan of care by co-signing this note.      Plan of Care  Authorization / Certification Start Date: 02/01/21  Authorization / Certification End Date: 05/02/21  Authorization / Certification Number of Visits: 12  Communication with: Referral Source  Patient Related Instruction: Body mechanics;Posture;Treatment plan and rationale;Self Care instruction;Basis of treatment;Expected outcome;Next steps;Nature of Condition  Times per Week: 1-2  Number of Weeks: 12  Number of Visits: 12  Discharge Planning: Pt will be discharged upon meeting goals or plateau of progress  Therapeutic Exercise: Stretching;Strengthening  Neuromuscular Reeducation: posture;balance/proprioception;core  Manual Therapy: soft tissue mobilization;myofascial release;joint mobilization;muscle energy  Modalities: TENS;electrical stimulation;cold pack;hot pack      Goals:  Pt. will demonstrate/verbalize independence in self-management of condition in : 6  weeks  Pt. will be independent with home exercise program in : 6 weeks    Pt will: improve LE strength and balance to be able to safely ambulate with SPC in 6 weeks.  Pt will: get in and out of bed/chair with <2/10 pain in 6 weeks.        If you have any questions or concerns, please don't hesitate to call.    Sincerely,      Gasper Hernandez, PT      Physician:    For Medicare/MA patients:      Physician recommendation:     ___ Follow therapist's recommendation        ___ Modify therapy      *Physician co-signature indicates they certify the need for these services furnished within this plan and while under their care.         [facility]  Lumbo-Pelvic Initial Evaluation    Patient Name: Susan Kwan  Date of evaluation: 2/1/2021  Referral Diagnosis: Lumbar radiculopathy  Referring provider: Sultana Montes CNP  Visit Diagnosis:     ICD-10-CM    1. S/P lumbar spinal fusion  Z98.1    2. Weakness of right lower extremity  R29.898    3. Lumbar radiculopathy  M54.16        No data recorded     Assessment:         Pt presented to clinic s/p L4/L5 discectomy and fusion. Pt with ongoing R LE weakness, balance deficits, and bilateral LBP. Pt unsteady with SPC and fell to knees in waiting area. PT helped pt up and confirmed pt did not injure herself. PT educated pt on using her FWW for now, due to recent falls and ongoing balance difficulties. Pt with twitches of upper and lower extremities. Pt reports dropping items frequently. Pt will have CT scan of cervical spine on February 9. Pt with decreased strength on R side and decreased/absent reflexes bilaterally.     Pt responded well to initial exercises and manual therapy to B glutes. Pt educated about importance of HEP.    Impairments in  pain, strength, motor function, gait/locomotion and balance  Patient's signs and symptoms are consistent with lumbar radiculopathy.  Patient responded well to initial exercises, MT.  Prognosis to achieve goals is   fair   Pt. is appropriate for skilled PT intervention as outlined in the Plan of Care (POC).    Goals:  Pt. will demonstrate/verbalize independence in self-management of condition in : 6 weeks  Pt. will be independent with home exercise program in : 6 weeks    Pt will: improve LE strength and balance to be able to safely ambulate with SPC in 6 weeks.  Pt will: get in and out of bed/chair with <2/10 pain in 6 weeks.      Barriers to Learning or Achieving Goals:  No Barriers.    Patient's expectations/goals are realistic.    A shared decision making model was used.  The patient's values and choices were respected.  The following represents what was discussed and decided upon by the physical therapist and the patient.          Plan / Patient Instructions:        Plan of Care:   Authorization / Certification Start Date: 02/01/21  Authorization / Certification End Date: 05/02/21  Authorization / Certification Number of Visits: 12  Communication with: Referral Source  Patient Related Instruction: Body mechanics;Posture;Treatment plan and rationale;Self Care instruction;Basis of treatment;Expected outcome;Next steps;Nature of Condition  Times per Week: 1-2  Number of Weeks: 12  Number of Visits: 12  Discharge Planning: Pt will be discharged upon meeting goals or plateau of progress  Therapeutic Exercise: Stretching;Strengthening  Neuromuscular Reeducation: posture;balance/proprioception;core  Manual Therapy: soft tissue mobilization;myofascial release;joint mobilization;muscle energy  Modalities: TENS;electrical stimulation;cold pack;hot pack      POC and pathology of condition were reviewed with patient.  Pt. is in agreement with the Plan of Care  A Home Exercise Program (HEP) was initiated today.  Pt. was instructed in exercises by PT and patient was given a handout with detailed instructions.    Plan for next visit: Balance activities in parallel bars, NuStep, progress strengthening     Subjective:           History of  Present Illness:    Susan is a 55 y.o. female who presents to therapy today with complaints of R low back pain and R foot weakness s/p lumbar surgery. Pt also has pain between shoulder blades and reports dropping items frequently.  Date of onset:  2020 (most recent surgery) . Onset was insidious. Symptoms are constant. She  reports chronic history of similar symptoms, with previous surgeries over the past few years. She describes their previous level of function as limited. Pt has right foot drop secondary to first surgery in . Pt will have cervical CT on .     Pain Ratin-5  Pain rating at best: 2  Pain rating at worst: 5  Pain description: Cramping    Functional limitations are described as occurring with:   balance  bending  lifting  repetitive movements  performing routine daily activities  sitting > 2 hrs  twisting or turning trunk  walking >30 minutes, balance impairments  work ()    Pt finds relief from ice.        Objective:      Note: Items left blank indicates the item was not performed or not indicated at the time of the evaluation.    Examination  1. S/P lumbar spinal fusion     2. Weakness of right lower extremity     3. Lumbar radiculopathy       No data recorded     Lumbar ROM:    Date: 21      *Indicate scale AROM AROM AROM   Lumbar Flexion WNL     Lumbar Extension Min loss      Right Left Right Left Right Left   Lumbar Sidebending WNL WNL       Lumbar Rotation WNL WNL         Lower Extremity Strength:    Date: 21      LE strength/5 Right Left Right Left Right Left   Hip Flexion (L1-3) 4 4+       Hip Extension (L5-S1)         Hip Abduction (L4-5) 5 5       Hip Adduction (L2-3) 5 5       Hip External Rotation 4- 4-       Hip Internal Rotation 4 4       Knee Extension (L3-4) 4 5       Knee Flexion 4 5       Ankle Dorsiflexion (L4-5) 4 5       Great Toe Extension (L5)         Ankle Plantar flexion (S1)         Abdominals        Sensation    Pt reports  "tingling in R LE      Reflex Testing:    Lumbar Dermatomes Right Left UE Reflexes Right Left   Iliac Crest and Groin (L1) WNL WNL Biceps (C5-6) 0 1+   Anterior Medial Thigh (L2) WNL WNL Brachioradialis (C5-6) 0 1+   Anterior Thigh, Medial Epicondyle Femur (L3) WNL WNL Triceps (C7-8) 0 0   Lateral Thigh, Anterior Knee, Medial Leg/Malleolus (L4) WNL WNL Hoffmanns test - -   Lateral Leg, Dorsal Foot (L5) WNL WNL LE Reflexes     Lateral Foot (S1) WNL WNL Patellar (L3-4) 0 0   Posterior Leg (S2) WNL WNL Achilles (S1-2) 1+ 0   Other:   Babinski Response       Palpation:  Hypertonicity of B glute med, glute max, piriformis    Lumbar Special Tests:     Lumbar Special Tests Right Left SI Tests Right  Left   Quadrant test   SI Compression     Straight leg raise + - SI Distraction     Crossover response - - POSH Test     Slump   Sacral Thrust     Sit-up test  FADIR     Trunk extensor endurance test  Resisted Abduction     Prone instability test  Other:     Pubic shotgun  Other:       Passive Mobility - Joint Integrity:  Hypomobile T10- S1    LE Screen:  Hip PROM:  WNL.  Hip Scour:  -.    Treatment Today     Exercises:  Exercise #1: Supine piriformis stretch 30\" B  Comment #1: Supine glute squeezes 5\" hold x 10  Exercise #2: Supine ab setting 5\" hold x 10  Comment #2: Supine SLR with glute squeeze x 10 B         Manual therapy  STM/MFR to B glutes, piriformis    TREATMENT MINUTES COMMENTS   Evaluation 37 Educated pt about etiology of condition, prognosis, plan of care.    Self-care/ Home management     Manual therapy 9 See above.   Neuromuscular Re-education     Therapeutic Activity     Therapeutic Exercises 14 See exercise flow-sheet.   Gait training     Modality__________________                Total 60    Blank areas are intentional and mean the treatment did not include these items.     PT Evaluation Code: (Please list factors)  Patient History/Comorbidities:   Patient Active Problem List   Diagnosis   ? Nicotine Dependence "   ? Migraine Headache   ? Arthralgias In Multiple Sites   ? Postablative hypothyroidism   ? Hyperlipidemia   ? Major Depression, Recurrent   ? Sudden Redness Of The Skin (Flushing)   ? Lower Back Pain   ? Carpal Tunnel Syndrome   ? Mild intermittent asthma without complication   ? Allergic Rhinitis   ? Seborrheic Keratosis   ? DESI (obstructive sleep apnea)   ? Sacroiliac joint dysfunction of right side   ? Lumbar foraminal stenosis   ? Lumbar disc herniation   ? Sciatica of right side   ? Lumbar stenosis   ? S/P lumbar microdiscectomy   ? Gastro-esophageal reflux disease with esophagitis   ? Depressive disorder   ? Anxiety state   ? Obesity (BMI 35.0-39.9) with comorbidity (H)   ? TMJ (temporomandibular joint syndrome)   ? Advanced directives, counseling/discussion   ? ASCUS with positive high risk HPV cervical   ? 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 Myofascial pain   ? Benign neoplasm of ascending colon   ? Lumbar radiculopathy   ? Spinal stenosis of lumbar region without neurogenic claudication      Past Medical History:   Diagnosis Date   ? Anxiety    ? Asthma    ? Carpal tunnel syndrome    ? Depression     PMDD   ? Disease of thyroid gland    ? History of anesthesia complications     wakes up combative at times   ? Low back pain    ? Migraine    ? DESI on CPAP    ? Pregnancy        ? Substance abuse (H)     sober since , narcotic pain medication      Examination: as above  Clinical Presentation: stable  Clinical Decision Making: low complexity    Patient History/  Comorbidities Examination  (body structures and functions, activity limitations, and/or participation restrictions) Clinical Presentation Clinical Decision Making (Complexity)   No documented Comorbidities or personal factors 1-2 Elements Stable and/or uncomplicated Low   1-2 documented comorbidities or personal factor 3 Elements Evolving clinical presentation with changing characteristics Moderate   3-4 documented  comorbidities or personal factors 4 or more Unstable and unpredictable High     I attest that I was present in the room, making skilled assessments and directing the service provided today.  I was responsible for the assessment and treatment of the patient.  During this time, I was not engaged in treating another patient or doing other tasks.  Gasper Hernandez, PT             Jackie Castro, SPT  Gasper Hernandez, PT  2/1/2021  8:19 AM

## 2021-06-30 NOTE — PROGRESS NOTES
Progress Notes by Gasper Hernandez PT at 3/22/2021  8:15 AM     Author: Gasper Hernandez PT Service: -- Author Type: Physical Therapist    Filed: 3/22/2021 12:03 PM Encounter Date: 3/22/2021 Status: Attested    : Gasper Hernandez PT (Physical Therapist) Cosigner: Leanna Ward MD at 3/23/2021  9:09 AM    Attestation signed by Leanna Ward MD at 3/23/2021  9:09 AM       Physician:     For Medicare/MA patients:       Physician recommendation:     _x__ Follow therapist's recommendation        ___ Modify therapy                    [facility] Certification Request    March 22, 2021      Patient: Susan Kwan  MR Number: [id number]  YOB: 1965  Date of Visit: 3/22/2021      Dear Leanna Alfaro MD:    Thank you for this referral.   We are seeing Susan Kwan in Physical Therapy for Cervical spondylosis with myelopathy.    Medicare and/or Medicaid requires physician review and approval of the treatment plan. Please review the plan of care and verify that you agree with the therapy plan of care by co-signing this note.      Plan of Care  Authorization / Certification Start Date: 03/22/21  Authorization / Certification End Date: 05/21/21  Authorization / Certification Number of Visits: 10  Communication with: Referral Source  Patient Related Instruction: Body mechanics;Posture;Treatment plan and rationale;Self Care instruction;Basis of treatment;Expected outcome;Next steps;Nature of Condition  Times per Week: 1-2  Number of Weeks: 12  Number of Visits: 12  Discharge Planning: Pt will be discharged upon meeting goals or plateau of progress  Therapeutic Exercise: Stretching;Strengthening  Neuromuscular Reeducation: posture;balance/proprioception;core  Manual Therapy: soft tissue mobilization;myofascial release;joint mobilization;muscle energy  Modalities: TENS;electrical stimulation;cold pack;hot pack      Goals:  Pt. will demonstrate/verbalize  independence in self-management of condition in : 6 weeks;Progressing toward  Pt. will be independent with home exercise program in : 6 weeks;Progressing toward  Pt. will report decreased intensity, frequency of : Pain;in 6 weeks  Pt. will have improved quality of sleep: with less pain;getting 75-90% of required amount;in 6 weeks  Pt. will show improved balance for safer : ambulation;for household ambulation;in 6 weeks  Pt. will improve posture : and demonstrate posture with minimal to no cuing;in 6 weeks    Pt will: improve LE strength and balance to be able to safely ambulate with SPC in 6 weeks- met, using SPC for gait currently safely  Pt will: get in and out of bed/chair with <2/10 pain in 6 weeks.- progressing toward        If you have any questions or concerns, please don't hesitate to call.    Sincerely,      Gasper Hernandez, PT      Physician:    For Medicare/MA patients:      Physician recommendation:     ___ Follow therapist's recommendation        ___ Modify therapy      *Physician co-signature indicates they certify the need for these services furnished within this plan and while under their care.       [facility]   Cervical Thoracic Initial Evaluation    Patient Name: Susan Kwan  Date of evaluation: 3/22/2021  Referral Diagnosis: Cervical spondylosis with myelopathy  Referring provider: Leanna Ward MD  Visit Diagnosis:     ICD-10-CM    1. Cervical spondylosis with myelopathy  M47.12    2. S/P lumbar fusion  Z98.1        No data recorded     Assessment:      Impairments in  pain, posture, ROM, joint mobility, strength, gait/locomotion and balance  Patient's signs and symptoms are consistent with neck pain with cervical spondylosis and myelopathy.  Patient responded well to manual therapy..  Prognosis to achieve goals is  good   Pt. is appropriate for skilled PT intervention as outlined in the Plan of Care (POC).    Goals:  Pt. will demonstrate/verbalize  independence in self-management of condition in : 6 weeks;Progressing toward  Pt. will be independent with home exercise program in : 6 weeks;Progressing toward  Pt. will report decreased intensity, frequency of : Pain;in 6 weeks  Pt. will have improved quality of sleep: with less pain;getting 75-90% of required amount;in 6 weeks  Pt. will show improved balance for safer : ambulation;for household ambulation;in 6 weeks  Pt. will improve posture : and demonstrate posture with minimal to no cuing;in 6 weeks    Pt will: improve LE strength and balance to be able to safely ambulate with SPC in 6 weeks- met, using SPC for gait currently safely  Pt will: get in and out of bed/chair with <2/10 pain in 6 weeks.- progressing toward      Patient's expectations/goals are realistic.    Barriers to Learning or Achieving Goals:  No Barriers.       Plan / Patient Instructions:        Plan of Care:   Authorization / Certification Start Date: 03/22/21  Authorization / Certification End Date: 05/21/21  Authorization / Certification Number of Visits: 10  Communication with: Referral Source  Patient Related Instruction: Body mechanics;Posture;Treatment plan and rationale;Self Care instruction;Basis of treatment;Expected outcome;Next steps;Nature of Condition  Times per Week: 1-2  Number of Weeks: 12  Number of Visits: 12  Discharge Planning: Pt will be discharged upon meeting goals or plateau of progress  Therapeutic Exercise: Stretching;Strengthening  Neuromuscular Reeducation: posture;balance/proprioception;core  Manual Therapy: soft tissue mobilization;myofascial release;joint mobilization;muscle energy  Modalities: TENS;electrical stimulation;cold pack;hot pack      POC and pathology of condition were reviewed with patient.  Pt. is in agreement with the Plan of Care  A Home Exercise Program (HEP) was initiated today.  Pt. was instructed in exercises by PT and patient was given a handout with detailed instructions.    Plan for next  visit: review HEP, continue manual therapy progress exercise as possible     Subjective:           History of Present Illness:    Susan is a 55 y.o. female who presents to therapy today with complaints of neck pain, like a burning hot poker. Date of onset:  2021. Onset was gradual. Symptoms are constant. She denies history of similar symptoms. She describes their previous level of function as not limited.    Pain Ratin  Pain rating at best: 4  Pain rating at worst: 8  Pain description: aching, dull, pain and sharp    Functional limitations are described as occurring with:   lifting  looking up, looking down and turning head  sitting    sleeping  standing           Objective:      Note: Items left blank indicates the item was not performed or not indicated at the time of the evaluation.    Patient Outcome Measures :    No data recorded   Scores range from 0-100%, where a score of 0% represents minimal pain and maximal function. The minmal clinically important difference is a score reduction of 10%.    Cervical Thoracic Examination  1. Cervical spondylosis with myelopathy     2. S/P lumbar fusion       No data recorded   Involved side: Bilateral  Posture Observation:      Cervical:  Moderate forward head  Shoulder/Thoracic complex: Moderate bilateral scapular protraction     Cervical ROM:    Date: 21      *Indicate scale AROM AROM AROM   Cervical Flexion Nil with pain     Cervical Extension Nil with pain      Right Left Right Left Right Left   Cervical Sidebending Min  min       Cervical Rotation Mod with pain Mod with pain       Cervical Protraction min     Cervical Retraction min     Thoracic Flexion      Thoracic Extension      Thoracic Sidebending         Thoracic Rotation           Strength     Date: 21      Cervical Myotomes/5 Right Left Right Left Right Left   Cervical Flexion (C1-2) 5 5       Cervical Sidebending (C3) 5 5       Shoulder Elevation (C4) 5 5       Shoulder Abduction (C5) 5 5      "  Elbow Flexion (C6) 5 5       Elbow Extension (C7) 5 5       Wrist Flexion (C7) 5 5       Wrist Extension (C6) 5 5       Thumb abduction (C8) 5 5       Finger Abduction (T1) 5 5         Sensation          Reflex Testing     Cervical Dermatomes Right Left UE Reflexes Right Left   Back of the Head (C2)   Biceps (C5-6)     Supraclavicular Fossa (C3)   Brachioradialis (C5-6)     AC Joint (C4)   Triceps (C7-8)     Lateral Biceps (C5)   Hoffmanns test     Palmar Thumb (C6)   LE Reflexes     Palmar 3rd Finger (C7)   Patellar (L3-4)     Palmar 5th Finger (C8)   Achilles (S1-2)     Ulnar Forearm (T1)   Babinski Response         Flexibility & Palpation:  Tight pec minor bilateral, restricted and tender scalenes SCM, suboccipitals, upper trap,     Passive Mobility-Joint Integrity: Not tested.  Not indicated.    Cervical Special Tests      Cervical Special Tests Right Left UE Nerve Mobility Right Left   Cervical compression   Median nerve     Cervical distraction   Ulnar nerve     Spurlings test   Radial nerve     Shoulder abduction sign   Thoracic outlet     Deep neck flexor endurance test   Dash     Upper cervical rotation   Adsons     Sharper-Александр   Cervical rotation lateral flexion     Alar ligament test   Other:     Other:   Other:       Manual therapy:  Neck    MFR layers 1-3 bilateral:  Suboccipitals, cervical extensors, cervical paraspinal rotators, scalenes.    Manual therapy:  Cervical longitudinal mobilization    Rate/grade Target  Direction  Relative movement Location in range Patient position   2 Cervical vertebrae Superior Distraction of facet joints Head in neutral Supine          Treatment Today     Therapeutic Exercises:  Not today  Exercise #1: Supine piriformis stretch B x 30\"  Comment #1: Supine bridging with glute squeeze at top 1 x 10 reps B  Exercise #2: Supine ab setting with marching and leg extension x 5 B  Comment #2: Supine SLR with glute squeeze  Exercise #3: Sit to stand  Comment #3: Balance: " "SLS left 13\" right 12\", feet together eyes closed 60\", tandem stance left in front 40\", right 60\", SLS with leg lifts out to the side 10 bilateral- not today  Exercise #4: Supine hip isometric adduction with towel roll  Comment #4: Clam shell  Exercise #5: R side hip flexor stretch hanging off table  Comment #5: NuStep x 4 min, RL 6, discussion of symptoms       Manual therapy:  TREATMENT MINUTES COMMENTS   Evaluation 25    Self-care/ Home management     Manual therapy 30    Neuromuscular Re-education     Therapeutic Activity     Therapeutic Exercises     Gait training     Modality__________________                Total 55    Blank areas are intentional and mean the treatment did not include these items.     PT Evaluation Code: (Please list factors)  Patient History/Comorbidities:   Patient Active Problem List   Diagnosis   ? Nicotine Dependence   ? Migraine Headache   ? Arthralgias In Multiple Sites   ? Postablative hypothyroidism   ? Hyperlipidemia   ? Major Depression, Recurrent   ? Sudden Redness Of The Skin (Flushing)   ? Lower Back Pain   ? Carpal Tunnel Syndrome   ? Mild intermittent asthma without complication   ? Allergic Rhinitis   ? Seborrheic Keratosis   ? DESI (obstructive sleep apnea)   ? Sacroiliac joint dysfunction of right side   ? Lumbar foraminal stenosis   ? Lumbar disc herniation   ? Sciatica of right side   ? Lumbar stenosis   ? S/P lumbar microdiscectomy   ? Gastro-esophageal reflux disease with esophagitis   ? Depressive disorder   ? Anxiety state   ? Obesity (BMI 35.0-39.9) with comorbidity (H)   ? TMJ (temporomandibular joint syndrome)   ? Advanced directives, counseling/discussion   ? ASCUS with positive high risk HPV cervical   ? 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 Myofascial pain   ? Benign neoplasm of ascending colon   ? Lumbar radiculopathy   ? Spinal stenosis of lumbar region without neurogenic claudication   ? Cervical stenosis of spinal canal   ? Cervical cord " compression with myelopathy (H)      Past Medical History:   Diagnosis Date   ? Anxiety    ? Asthma    ? Carpal tunnel syndrome    ? Depression     PMDD   ? Disease of thyroid gland    ? History of anesthesia complications     wakes up combative at times   ? Low back pain    ? Migraine    ? DESI on CPAP    ? Pregnancy        ? Substance abuse (H)     sober since , narcotic pain medication      Examination: as above  Clinical Presentation: stable  Clinical Decision Making: low complexity        Patient History/  Comorbidities Examination  (body structures and functions, activity limitations, and/or participation restrictions) Clinical Presentation Clinical Decision Making (Complexity)   No documented Comorbidities or personal factors 1-2 Elements Stable and/or uncomplicated Low   1-2 documented comorbidities or personal factor 3 Elements Evolving clinical presentation with changing characteristics Moderate   3-4 documented comorbidities or personal factors 4 or more Unstable and unpredictable High               Gasper Hernandez, PT  3/22/2021  8:24 AM

## 2021-07-01 NOTE — PROCEDURES
Procedures by Jack Steward MD at 11/5/2020  8:00 AM     Author: Jack Steward MD Service: Interventional Radiology Author Type: Physician    Filed: 11/5/2020  8:48 AM Date of Service: 11/5/2020  8:00 AM Status: Signed    : Jack Steward MD (Physician)           Procedure: Fluoroscopic guided lumbar puncture for CT myelogram    Radiologist: Jack Steward    Contrast: 20 ml omnipaque 180  Fluoro Time: 0.3 minutes  Number of images: 2     EBL: Minimal  Complications: None    Preliminary findings: (see dictation for full detail)  Technically successful fluoroscopic guided lumbar puncture at L5-S1 for CT myelogram    Assess/Plan:   Routine post procedure monitoring  Bedrest for 2 hours  Report to ordering provider    Jack Steward  [phone]

## 2021-07-03 NOTE — ADDENDUM NOTE
Addendum Note by Sierra Quesada CNP at 1/8/2021  9:52 AM     Author: Sierra Quesada CNP Service: -- Author Type: Nurse Practitioner    Filed: 1/8/2021  9:52 AM Encounter Date: 1/8/2021 Status: Signed    : Sierra Quesada CNP (Nurse Practitioner)    Addended by: SIERRA QUESADA on: 1/8/2021 09:52 AM        Modules accepted: Orders

## 2021-07-03 NOTE — ADDENDUM NOTE
Addendum Note by Tiffany Mendieta CMA at 11/25/2020  8:00 AM     Author: Tiffany Mendieta CMA Service: -- Author Type: Certified Medical Assistant    Filed: 11/25/2020 10:07 AM Date of Service: 11/25/2020  8:00 AM Status: Signed    : Tiffany Mendieta CMA (Certified Medical Assistant)    Encounter addended by: Tiffany Mendieta CMA on: 11/25/2020 10:07 AM      Actions taken: Order Reconciliation Section accessed

## 2021-07-03 NOTE — ADDENDUM NOTE
Addendum Note by Cristela Salguero CNP at 6/2/2020  1:20 PM     Author: Cristela Salguero CNP Service: -- Author Type: Nurse Practitioner    Filed: 6/2/2020 12:14 PM Date of Service: 6/2/2020  1:20 PM Status: Signed    : Cirstela Salguero CNP (Nurse Practitioner)    Encounter addended by: Cristela Salguero CNP on: 6/2/2020 12:14 PM      Actions taken: Clinical Note Signed

## 2021-07-03 NOTE — ADDENDUM NOTE
Addendum Note by Tiffany Mendieta CMA at 11/25/2020  8:00 AM     Author: Tiffany Mendieta CMA Service: -- Author Type: Certified Medical Assistant    Filed: 11/25/2020 10:10 AM Date of Service: 11/25/2020  8:00 AM Status: Signed    : Tiffany Mendieta CMA (Certified Medical Assistant)    Encounter addended by: Tiffany Mendieta CMA on: 11/25/2020 10:10 AM      Actions taken: Order Reconciliation Section accessed

## 2021-07-03 NOTE — ADDENDUM NOTE
Addendum Note by Cristela Salguero CNP at 12/2/2020  8:20 AM     Author: Cristela Salguero CNP Service: -- Author Type: Nurse Practitioner    Filed: 12/2/2020  8:41 AM Date of Service: 12/2/2020  8:20 AM Status: Signed    : Cristela Salguero CNP (Nurse Practitioner)    Encounter addended by: Cristela Salguero CNP on: 12/2/2020  8:41 AM      Actions taken: Level of Service modified, Follow-up modified, Pend   clinical note, Clinical Note Signed

## 2021-07-03 NOTE — ADDENDUM NOTE
Addendum Note by Sierra Quesada CNP at 1/8/2021  9:30 AM     Author: Sierra Quesada CNP Service: -- Author Type: Nurse Practitioner    Filed: 1/8/2021  9:30 AM Encounter Date: 1/8/2021 Status: Signed    : Sierra Quesada CNP (Nurse Practitioner)    Addended by: SIERRA QUESADA on: 1/8/2021 09:30 AM        Modules accepted: Orders

## 2021-07-04 NOTE — LETTER
Letter by Leanna Ward MD at      Author: Leanna Ward MD Service: -- Author Type: --    Filed:  Encounter Date: 6/1/2021 Status: (Other)         June 1, 2021     Patient: Susan Kwan   YOB: 1965   Date of Visit: 6/1/2021       To Whom It May Concern:    Susan Kwan has undergone neck surgery on 4/5/2021.    It is my medical opinion that Susan Kwan should remain out of work until she completes her Physical Therapy program on 7/5/2021.    If you have any questions or concerns, please don't hesitate to call.    Sincerely,        Electronically signed by Leanna Ward MD

## 2021-07-04 NOTE — ADDENDUM NOTE
Addendum Note by Sierra Quesada CNP at 6/24/2021  3:10 PM     Author: Sierra Quesada CNP Service: -- Author Type: Nurse Practitioner    Filed: 6/24/2021  3:10 PM Encounter Date: 6/24/2021 Status: Signed    : Sierra Quesada CNP (Nurse Practitioner)    Addended by: SIERRA QUESADA on: 6/24/2021 03:10 PM        Modules accepted: Orders

## 2021-07-05 ENCOUNTER — COMMUNICATION - HEALTHEAST (OUTPATIENT)
Dept: NEUROSURGERY | Facility: CLINIC | Age: 56
End: 2021-07-05

## 2021-07-05 DIAGNOSIS — M62.838 MUSCLE SPASM: ICD-10-CM

## 2021-07-06 VITALS
RESPIRATION RATE: 16 BRPM | HEIGHT: 62 IN | WEIGHT: 195 LBS | BODY MASS INDEX: 35.88 KG/M2 | SYSTOLIC BLOOD PRESSURE: 104 MMHG | DIASTOLIC BLOOD PRESSURE: 62 MMHG | HEART RATE: 88 BPM | OXYGEN SATURATION: 97 %

## 2021-07-07 ENCOUNTER — COMMUNICATION - HEALTHEAST (OUTPATIENT)
Dept: NEUROSURGERY | Facility: CLINIC | Age: 56
End: 2021-07-07

## 2021-07-07 DIAGNOSIS — M54.16 LUMBAR RADICULOPATHY: ICD-10-CM

## 2021-07-07 NOTE — TELEPHONE ENCOUNTER
PT SITTING UP IN BED HAVING BREAKFAST. Telephone Encounter by Sierra Lyon CNP at 7/7/2021  8:18 AM     Author: Sierra Lyon CNP Service: -- Author Type: Nurse Practitioner    Filed: 7/7/2021  8:18 AM Encounter Date: 7/5/2021 Status: Signed    : Sierra Lyon CNP (Nurse Practitioner)       Denied duplicate tramadol. Patient to have an appt with Dr. Ward next week, need to confirm continued plan of care regarding pain medications.

## 2021-07-12 ENCOUNTER — PATIENT OUTREACH (OUTPATIENT)
Dept: NURSING | Facility: CLINIC | Age: 56
End: 2021-07-12

## 2021-07-12 NOTE — PROGRESS NOTES
7/12/2021  CCC referral from PCP  Apply for disability   Clinic Care Coordination Contact  Community Health Worker Initial Outreach    CHW Initial Information Gathering:  Referral Source: PCP  Preferred Hospital: [facility]  [phone]  Preferred Urgent Care: [facility], [phone]  Current living arrangement:: I live in a private home with family, I live in a private home (boyfriend)  Type of residence:: Apartment  Community Resources: None  Supplies used at home:: Other (CPAP, bone growth stimulator)  Equipment Currently Used at Home: none  Informal Support system:: Significant other, Family  No PCP office visit in Past Year: No  Transportation means:: Family, Regular car  CHW Additional Questions  If ED/Hospital discharge, follow-up appointment scheduled as recommended?: N/A  Medication changes made following ED/Hospital discharge?: N/A  MyChart active?: Yes  Patient sent Social Determinants of Health questionnaire?: No    Patient accepts CC: Yes. Patient scheduled for assessment with CCC SW  on 7-19-21 at 10am. Patient noted desire to discuss support to apply for disability benefit and other support and services in the community    CHW sent [employer] message for [employer] contact information and CHW direct contact number.      CHW Follow up: 7-18-21 review assessment, goals and consult with CCC SW if needed.

## 2021-07-13 ENCOUNTER — MYC MEDICAL ADVICE (OUTPATIENT)
Dept: FAMILY MEDICINE | Facility: CLINIC | Age: 56
End: 2021-07-13

## 2021-07-14 ENCOUNTER — OFFICE VISIT (OUTPATIENT)
Dept: NEUROSURGERY | Facility: CLINIC | Age: 56
End: 2021-07-14
Payer: COMMERCIAL

## 2021-07-14 ENCOUNTER — HOSPITAL ENCOUNTER (OUTPATIENT)
Dept: RADIOLOGY | Facility: HOSPITAL | Age: 56
Discharge: HOME | End: 2021-07-14
Attending: SURGERY | Admitting: SURGERY
Payer: COMMERCIAL

## 2021-07-14 VITALS
DIASTOLIC BLOOD PRESSURE: 74 MMHG | HEIGHT: 63 IN | HEART RATE: 84 BPM | BODY MASS INDEX: 36.5 KG/M2 | SYSTOLIC BLOOD PRESSURE: 106 MMHG | OXYGEN SATURATION: 98 % | WEIGHT: 206 LBS | RESPIRATION RATE: 18 BRPM

## 2021-07-14 DIAGNOSIS — Z98.1 S/P LUMBAR FUSION: Primary | ICD-10-CM

## 2021-07-14 DIAGNOSIS — Z98.1 S/P CERVICAL SPINAL FUSION: ICD-10-CM

## 2021-07-14 PROCEDURE — 99213 OFFICE O/P EST LOW 20 MIN: CPT | Performed by: SURGERY

## 2021-07-14 PROCEDURE — 72040 X-RAY EXAM NECK SPINE 2-3 VW: CPT

## 2021-07-14 ASSESSMENT — MIFFLIN-ST. JEOR: SCORE: 1508.54

## 2021-07-14 NOTE — LETTER
7/14/2021         RE: Susan Kwan  [address]  [facility] [place] [facility] [place] [address]        Dear Colleague,    Thank you for referring your patient, Susan Kwan, to the [facility]. Please see a copy of my visit note below.    NEUROSURGERY FOLLOWUP  NOTE  Susan Kwan comes today in f/u. 55-year-old female who is status post lumbar 4-lumbar 5 microdiscectomy and instrumented fusion on 12/17/2020 and cervical 5-cervical 6 anterior cervical decompression fusion on 4/52021.Recommend physical therapy and weaning out of cervical collar.  Recommend fully weaning out of collar. She is staking flexeril PRN for neck spasms. PT starts next week. Recommend lumbar xray today for 6 month f/u. Discussed cervical xray appears stable. We will repeat films of cervical and lumbar in November.     I spent more than 10 minutes in this apt, examining the pt, reviewing the scans, reviewing notes from chart, discussing treatment options with risks and benefits and coordinating care.       Leanna Ward MD      CC:     Barbara [facility] [address] [place] [address]        Again, thank you for allowing me to participate in the care of your patient.        Sincerely,        Leanna Ward MD

## 2021-07-14 NOTE — PROGRESS NOTES
NEUROSURGERY FOLLOWUP  NOTE  Susan Kwan comes today in f/u. 55-year-old female who is status post lumbar 4-lumbar 5 microdiscectomy and instrumented fusion on 12/17/2020 and cervical 5-cervical 6 anterior cervical decompression fusion on 4/52021.Recommend physical therapy and weaning out of cervical collar.  Recommend fully weaning out of collar. She is staking flexeril PRN for neck spasms. PT starts next week. Recommend lumbar xray today for 6 month f/u. Discussed cervical xray appears stable. We will repeat films of cervical and lumbar in November.     I spent more than 10 minutes in this apt, examining the pt, reviewing the scans, reviewing notes from chart, discussing treatment options with risks and benefits and coordinating care.       Leanna Ward MD      CC:     Barbara, [facility] [address] [place] [address]

## 2021-07-14 NOTE — NURSING NOTE
Patient is here for a follow up. She states that overall she is doing well but does still have some pain in between her shoulder blades.  Juli Campuzano MA,CMA,7:52 AM

## 2021-07-15 DIAGNOSIS — Z98.1 S/P CERVICAL SPINAL FUSION: ICD-10-CM

## 2021-07-15 DIAGNOSIS — Z98.1 S/P LUMBAR FUSION: Primary | ICD-10-CM

## 2021-07-15 NOTE — PROGRESS NOTES
Patient was instructed at last office visit with Dr. Ward to have updated lumbar x-rays taken this month and then again in November.     Orders placed for lumbar x-rays in July, 2021; and cervical and lumbar x-rays in November, 2021.     Message sent to scheduling to assist pt in scheduling the correct imaging appts.     Sara Arenas RN

## 2021-07-16 ENCOUNTER — HOSPITAL ENCOUNTER (OUTPATIENT)
Dept: RADIOLOGY | Facility: CLINIC | Age: 56
End: 2021-07-16
Attending: SURGERY
Payer: COMMERCIAL

## 2021-07-16 DIAGNOSIS — Z98.1 S/P LUMBAR FUSION: ICD-10-CM

## 2021-07-16 PROCEDURE — 999N000065 XR LUMBAR SPINE 2-3 VIEWS

## 2021-07-19 ENCOUNTER — TELEPHONE (OUTPATIENT)
Dept: NEUROSURGERY | Facility: CLINIC | Age: 56
End: 2021-07-19

## 2021-07-19 ENCOUNTER — PATIENT OUTREACH (OUTPATIENT)
Dept: CARE COORDINATION | Facility: CLINIC | Age: 56
End: 2021-07-19

## 2021-07-19 ENCOUNTER — PATIENT OUTREACH (OUTPATIENT)
Dept: NURSING | Facility: CLINIC | Age: 56
End: 2021-07-19

## 2021-07-19 DIAGNOSIS — Z98.1 S/P LUMBAR FUSION: Primary | ICD-10-CM

## 2021-07-19 RX ORDER — METHOCARBAMOL 750 MG/1
750 TABLET, FILM COATED ORAL 4 TIMES DAILY PRN
Qty: 40 TABLET | Refills: 0 | Status: SHIPPED | OUTPATIENT
Start: 2021-07-19 | End: 2021-07-25

## 2021-07-19 RX ORDER — METHOCARBAMOL 750 MG/1
750 TABLET, FILM COATED ORAL
COMMUNITY
Start: 2021-07-05 | End: 2021-09-13

## 2021-07-19 RX ORDER — METHOCARBAMOL 750 MG/1
750 TABLET, FILM COATED ORAL 4 TIMES DAILY
Qty: 40 TABLET | Refills: 0 | OUTPATIENT
Start: 2021-07-19

## 2021-07-19 SDOH — ECONOMIC STABILITY: FOOD INSECURITY: WITHIN THE PAST 12 MONTHS, THE FOOD YOU BOUGHT JUST DIDN'T LAST AND YOU DIDN'T HAVE MONEY TO GET MORE.: NEVER TRUE

## 2021-07-19 SDOH — ECONOMIC STABILITY: TRANSPORTATION INSECURITY
IN THE PAST 12 MONTHS, HAS LACK OF TRANSPORTATION KEPT YOU FROM MEETINGS, WORK, OR FROM GETTING THINGS NEEDED FOR DAILY LIVING?: NO

## 2021-07-19 SDOH — ECONOMIC STABILITY: INCOME INSECURITY: IN THE LAST 12 MONTHS, WAS THERE A TIME WHEN YOU WERE NOT ABLE TO PAY THE MORTGAGE OR RENT ON TIME?: NO

## 2021-07-19 SDOH — HEALTH STABILITY: PHYSICAL HEALTH: ON AVERAGE, HOW MANY DAYS PER WEEK DO YOU ENGAGE IN MODERATE TO STRENUOUS EXERCISE (LIKE A BRISK WALK)?: 0 DAYS

## 2021-07-19 SDOH — ECONOMIC STABILITY: FOOD INSECURITY: WITHIN THE PAST 12 MONTHS, YOU WORRIED THAT YOUR FOOD WOULD RUN OUT BEFORE YOU GOT MONEY TO BUY MORE.: NEVER TRUE

## 2021-07-19 SDOH — HEALTH STABILITY: PHYSICAL HEALTH: ON AVERAGE, HOW MANY MINUTES DO YOU ENGAGE IN EXERCISE AT THIS LEVEL?: 0 MIN

## 2021-07-19 SDOH — ECONOMIC STABILITY: TRANSPORTATION INSECURITY
IN THE PAST 12 MONTHS, HAS THE LACK OF TRANSPORTATION KEPT YOU FROM MEDICAL APPOINTMENTS OR FROM GETTING MEDICATIONS?: NO

## 2021-07-19 ASSESSMENT — SOCIAL DETERMINANTS OF HEALTH (SDOH)
DO YOU BELONG TO ANY CLUBS OR ORGANIZATIONS SUCH AS CHURCH GROUPS UNIONS, FRATERNAL OR ATHLETIC GROUPS, OR SCHOOL GROUPS?: YES
HOW HARD IS IT FOR YOU TO PAY FOR THE VERY BASICS LIKE FOOD, HOUSING, MEDICAL CARE, AND HEATING?: NOT HARD AT ALL
ARE YOU MARRIED, WIDOWED, DIVORCED, SEPARATED, NEVER MARRIED, OR LIVING WITH A PARTNER?: LIVING WITH PARTNER
HOW OFTEN DO YOU GET TOGETHER WITH FRIENDS OR RELATIVES?: MORE THAN THREE TIMES A WEEK
HOW OFTEN DO YOU ATTENT MEETINGS OF THE CLUB OR ORGANIZATION YOU BELONG TO?: 1 TO 4 TIMES PER YEAR
IN A TYPICAL WEEK, HOW MANY TIMES DO YOU TALK ON THE PHONE WITH FAMILY, FRIENDS, OR NEIGHBORS?: MORE THAN THREE TIMES A WEEK
WITHIN THE LAST YEAR, HAVE YOU BEEN HUMILIATED OR EMOTIONALLY ABUSED IN OTHER WAYS BY YOUR PARTNER OR EX-PARTNER?: NO
WITHIN THE LAST YEAR, HAVE TO BEEN RAPED OR FORCED TO HAVE ANY KIND OF SEXUAL ACTIVITY BY YOUR PARTNER OR EX-PARTNER?: NO
HOW OFTEN DO YOU ATTEND CHURCH OR RELIGIOUS SERVICES?: NEVER
WITHIN THE LAST YEAR, HAVE YOU BEEN KICKED, HIT, SLAPPED, OR OTHERWISE PHYSICALLY HURT BY YOUR PARTNER OR EX-PARTNER?: NO
WITHIN THE LAST YEAR, HAVE YOU BEEN AFRAID OF YOUR PARTNER OR EX-PARTNER?: NO

## 2021-07-19 ASSESSMENT — LIFESTYLE VARIABLES
HOW OFTEN DO YOU HAVE A DRINK CONTAINING ALCOHOL: NEVER
HOW OFTEN DO YOU HAVE SIX OR MORE DRINKS ON ONE OCCASION: NEVER

## 2021-07-19 ASSESSMENT — ACTIVITIES OF DAILY LIVING (ADL): DEPENDENT_IADLS:: INDEPENDENT

## 2021-07-19 NOTE — PROGRESS NOTES
Clinic Care Coordination Contact  CCC MARCELA contacted Susan by phone to complete an assessment for enrollment into [facility] (formerly [facility]).    SW explained the Social Security process. Informed she could apply on her own with some support from CCC or she could work with Disability Specialists who would provide full support throughout the process. Will send both resources to her via [employer] for review.    SW will mail Health Care Directive form.     SW explained transportation through insurance. Will send her URide information.       Clinic Care Coordination Contact  OUTREACH    Referral Information:  Referral Source: PCP    Primary Diagnosis: Psychosocial    Chief Complaint   Patient presents with     Clinic Care Coordination - Initial        Universal Utilization:   Clinic Utilization  Difficulty keeping appointments:: No  Compliance Concerns: No  No-Show Concerns: No  No PCP office visit in Past Year: No  Utilization    Hospital Admissions  2             ED Visits  0             No Show Count (past year)  0                Current as of: 7/19/2021  2:08 AM              Clinical Concerns:  Current Medical Concerns:  Back pain, history of surgeries will effect ability to work     Current Behavioral Concerns: None reported    Education Provided to patient: Role of CCC   Pain  Pain (GOAL):: Yes  Type: Chronic (>3mo)  Location of chronic pain:: Back  Health Maintenance Reviewed: Not assessed  Clinical Pathway: None    Medication Management:  Medication review status: Medications reviewed and no changes reported per patient.             Functional Status:  Dependent ADLs:: Independent  Dependent IADLs:: Independent  Bed or wheelchair confined:: No  Mobility Status: Independent  Fallen 2 or more times in the past year?: No  Any fall with injury in the past year?: No    Living Situation:  Current living arrangement:: I live in a private home with family, I live in a private home (boyfriend)  Type of residence:: Apartment    Lifestyle &  Psychosocial Needs:    Social Determinants of Health     Tobacco Use: Medium Risk     Smoking Tobacco Use: Former Smoker     Smokeless Tobacco Use: Never Used   Alcohol Use: Not At Risk     Frequency of Alcohol Consumption: Never     Average Number of Drinks: Not on file     Frequency of Binge Drinking: Never   Financial Resource Strain: Low Risk      Difficulty of Paying Living Expenses: Not hard at all   Food Insecurity: No Food Insecurity     Worried About Running Out of Food in the Last Year: Never true     Ran Out of Food in the Last Year: Never true   Transportation Needs: No Transportation Needs     Lack of Transportation (Medical): No     Lack of Transportation (Non-Medical): No   Physical Activity: Inactive     Days of Exercise per Week: 0 days     Minutes of Exercise per Session: 0 min   Stress: No Stress Concern Present     Feeling of Stress : Only a little   Social Connections: Moderately Integrated     Frequency of Communication with Friends and Family: More than three times a week     Frequency of Social Gatherings with Friends and Family: More than three times a week     Attends Yarsani Services: Never     Active Member of Clubs or Organizations: Yes     Attends Club or Organization Meetings: 1 to 4 times per year     Marital Status: Living with partner   Intimate Partner Violence: Not At Risk     Fear of Current or Ex-Partner: No     Emotionally Abused: No     Physically Abused: No     Sexually Abused: No   Depression: Not at risk     PHQ-2 Score: 0   Housing Stability: Low Risk      Unable to Pay for Housing in the Last Year: No     Number of Places Lived in the Last Year: 1     Unstable Housing in the Last Year: No     Diet:: Regular  Inadequate nutrition (GOAL):: No  Tube Feeding: No  Inadequate activity/exercise (GOAL):: No  Significant changes in sleep pattern (GOAL): No  Transportation means:: Family, Regular car     Yarsani or spiritual beliefs that impact treatment:: No  Mental health  DX:: No  Mental health management concern (GOAL):: No  Informal Support system:: Significant other, Family             Resources and Interventions:  Current Resources:      Community Resources: None  Supplies used at home:: Other (CPAP, bone growth stimulator)  Equipment Currently Used at Home: none  Employment Status: employed part-time (, out of work due to surgeries)         Advance Care Plan/Directive  Advanced Care Plans/Directives on file:: No  Advanced Care Plan/Directive Status: Not Applicable          Goals:   Goals        General     Financial Wellbeing (pt-stated)      Notes - Note created  7/19/2021 10:45 AM by Bartolome Blanco LICSW     Goal Statement: I want to apply for Social Security Disability within 1-2 months.  Date Goal set: 07/19/21  Barriers: The application process  Strengths:  Date to Achieve By: 9/30/2021  Patient expressed understanding of goal: Yes  Action steps to achieve this goal:  1. I will review resources for the disability process sent by   2. I will decide if I want to apply on my own or use Disability Specialists  3. I will update [facility] team          Psychosocial (pt-stated)      Notes - Note created  7/19/2021 10:44 AM by Bartolome Blanco LICSW     Goal Statement: I want to complete a [facility]ust[facility] Care Directive within 1-2 months  Date Goal set: 07/19/21  Barriers:   Strengths:   Date to Achieve By: 9/30/2021  Patient expressed understanding of goal: Yes  Action steps to achieve this goal:  1. I will review and complete the Health Care Directive mailed by [facility]  2. I will update [facility] team at outreaches on the status                 Patient/Caregiver understanding: Reported understanding    Outreach Frequency: monthly  Future Appointments              In 1 week Gasper Hernandez PT [facility]          Plan: Standard Outreach

## 2021-07-19 NOTE — PROGRESS NOTES
7/19/2021  Clinic Care Coordination Contact  Care Team Conversations     Patient enrolled in [facility] of  7-19-21  Reviewed assessment, goals, any delegations from CCC RN, and consult with CCC RN or CC SW as needed.    Follow up appt with CCC RN/CCC SW: none     CHW Follow up: Monthly    CHW Plan: Follow up on goals    CHW Next Follow Up: 8-31-21    CHW off on 8-17-21 to 8-27-21    Tammi Shah  Community Health Worker  [facility] Care Coordination  [email or web address]   Office: [phone]  Fax: [phone]

## 2021-07-19 NOTE — LETTER
[facility]  Complex Care Plan  About Me:    Patient Name:  Susan Olvera    YOB: 1965  Age:         55 year old   Karena MRN:    [id number] Telephone Information:  Home Phone [phone]   Mobile [phone]       Address:  [address] [place] [id number] Email address:  [email or web address]      Emergency Contact(s)    Name Relationship Lgl Grd Work Phone Home Phone Mobile Phone   1. KENJI OLVERA Daughter    [phone]   2. PAULA FUNG Friend   [phone]   3. KENJI OLVERA Other    [phone]           Primary language:  English     needed? No   [facility]:  [phone] op. 1  Other communication barriers:    Preferred Method of Communication:     Current living arrangement: I live in a private home with family, I live in a private home (boyfriend)  Mobility Status/ Medical Equipment: Independent    Health Maintenance  Health Maintenance Reviewed: Not assessed    My Access Plan  Medical Emergency 911   Primary Clinic Line   -     24 Hour Appointment Line [phone] or  [phone] ([phone]) (toll-free)   24 Hour Nurse Line [phone] (toll-free)   Preferred Urgent Care [facility] [phone]   [facility]  [phone]   Preferred Pharmacy [facility] [place], [place] - [address]     Behavioral Health Crisis Line The National Suicide Prevention Lifeline at 1-666.920.4511 or 911             My Care Team Members  Patient Care Team       Relationship Specialty Notifications Start End    Earline Jimenez MD PCP - General Family Medicine  6/26/21     Phone: [phone] Fax: [phone]         [address] [place] [address]    Leanna Ward MD Assigned Neuroscience Provider   7/16/21     Phone: [phone] Fax: [phone] [address] [place] [address]    Earline Jimenez,  MD Assigned PCP   7/16/21     Phone: [phone] Fax: [phone]         [address] [place] [address]    Bartolome Blanco LICSW Lead Care Coordinator  Admissions 7/19/21     Tammi Shah Community Health Worker  Admissions 7/19/21             My Care Plans  Self Management and Treatment Plan  Goals and (Comments)  Goals        General     Financial Wellbeing (pt-stated)      Notes - Note created  7/19/2021 10:45 AM by Bartolome Blanco LICSW     Goal Statement: I want to apply for Social Security Disability within 1-2 months.  Date Goal set: 07/19/21  Barriers: The application process  Strengths:  Date to Achieve By: 9/30/2021  Patient expressed understanding of goal: Yes  Action steps to achieve this goal:  1. I will review resources for the disability process sent by   2. I will decide if I want to apply on my own or use Disability Specialists  3. I will update [facility] team          Psychosocial (pt-stated)      Notes - Note created  7/19/2021 10:44 AM by Bartolome Blanco LICSW     Goal Statement: I want to complete a [facility] Care Directive within 1-2 months  Date Goal set: 07/19/21  Barriers:   Strengths:   Date to Achieve By: 9/30/2021  Patient expressed understanding of goal: Yes  Action steps to achieve this goal:  1. I will review and complete the Health Care Directive mailed by [facility]  2. I will update [facility] team at [facility] on the status                  Action Plans on File:                       Advance Care Plans/Directives Type:        My Medical and Care Information  Problem List   Patient Active Problem List   Diagnosis     CARDIOVASCULAR SCREENING; LDL GOAL LESS THAN 160     Hypothyroidism      Current Medications and Allergies:  See printed Medication Report.    Care Coordination Start Date: 7/19/2021   Frequency of Care Coordination: monthly   Form Last Updated: 07/19/2021

## 2021-07-19 NOTE — LETTER
[facility] CARE COORDINATION    July 19, 2021    Susan Kwan  [address]  [place] [facility] [place] [address]      Dear Susan,    I am a clinic care coordinator who works with Earline Jimenez MD at [facility]. I wanted to thank you for spending the time to talk with me.  Below is a description of clinic care coordination and how I can further assist you.      The clinic care coordination team is made up of a registered nurse,  and community health worker who understand the health care system. The goal of clinic care coordination is to help you manage your health and improve access to the health care system in the most efficient manner. The team can assist you in meeting your health care goals by providing education, coordinating services, strengthening the communication among your providers and supporting you with any resource needs.    Please feel free to contact the Community Health Worker at [phone] with any questions or concerns. We are focused on providing you with the highest-quality healthcare experience possible and that all starts with you.     Sincerely,     Bartolome Blanco, [facility] HospitalSW    Enclosed: I have enclosed a copy of the Complex Care Plan. This has helpful information and goals that we have talked about. Please keep this in an easy to access place to use as needed.

## 2021-07-25 ENCOUNTER — MYC REFILL (OUTPATIENT)
Dept: NEUROSURGERY | Facility: CLINIC | Age: 56
End: 2021-07-25

## 2021-07-25 DIAGNOSIS — Z98.1 S/P LUMBAR FUSION: ICD-10-CM

## 2021-07-26 ENCOUNTER — HOSPITAL ENCOUNTER (OUTPATIENT)
Dept: PHYSICAL THERAPY | Facility: REHABILITATION | Age: 56
End: 2021-07-26
Payer: COMMERCIAL

## 2021-07-26 DIAGNOSIS — Z98.1 S/P CERVICAL SPINAL FUSION: Primary | ICD-10-CM

## 2021-07-26 PROCEDURE — 97110 THERAPEUTIC EXERCISES: CPT | Mod: GP | Performed by: PHYSICAL THERAPIST

## 2021-07-26 PROCEDURE — 97161 PT EVAL LOW COMPLEX 20 MIN: CPT | Mod: GP | Performed by: PHYSICAL THERAPIST

## 2021-07-26 PROCEDURE — 97140 MANUAL THERAPY 1/> REGIONS: CPT | Mod: GP | Performed by: PHYSICAL THERAPIST

## 2021-07-26 RX ORDER — METHOCARBAMOL 750 MG/1
750 TABLET, FILM COATED ORAL 4 TIMES DAILY PRN
Qty: 40 TABLET | Refills: 0 | Status: SHIPPED | OUTPATIENT
Start: 2021-07-26 | End: 2021-08-01

## 2021-07-27 NOTE — PROGRESS NOTES
[facility]          OUTPATIENT PHYSICAL THERAPY ORTHOPEDIC EVALUATION  PLAN OF TREATMENT FOR OUTPATIENT REHABILITATION  (COMPLETE FOR INITIAL CLAIMS ONLY)  Patient's Last Name, First Name, M.I.  YOB: 1965  Susan Kwan    Provider s Name:  [facility]   Medical Record No.  [id number]   Start of Care Date:  07/26/21   Onset Date:  (P) 04/05/21   Type:     _X__PT   ___OT   ___SLP Medical Diagnosis:  (P) Lumbar radiculopathy, s/p cervical fusion     PT Diagnosis:  (P) ne   Visits from SOC:  1      _________________________________________________________________________________  Plan of Treatment/Functional Goals:  (P) joint mobilization, manual therapy, neuromuscular re-education, ROM, stretching, strengthening           Goals  Goal Identifier: HEP  Goal Description: (P) Patient will be independent with HEP and self management of symptoms   Target Date: (P) 09/25/21    Goal Identifier: AROM  Goal Description: (P) Patient will improve painfree cervical AROM to be able to check blind spots while driving without pain.  Target Date: (P) 09/25/21    Goal Identifier: reach/grasp  Goal Description: (P) patient will reach over head into cabinets at home without pain.  Target Date: (P) 09/25/21                                                           Therapy Frequency:     Predicted Duration of Therapy Intervention:  (P) 8 weeks    Gasper Hernandez, PT                 I CERTIFY THE NEED FOR THESE SERVICES FURNISHED UNDER        THIS PLAN OF TREATMENT AND WHILE UNDER MY CARE     (Physician co-signature of this document indicates review and certification of the therapy plan).                       Certification Date From:  (P) 07/26/21   Certification Date To:  (P) 09/25/21    Referring Provider:  Dr. Leanna Ward    Initial Assessment        See Epic Evaluation Start of Care Date: 07/26/21 07/26/21 0900   General Information  "  Type of Visit Initial OP Ortho PT Evaluation   Start of Care Date 07/26/21   Referring Physician Dr. Leanna Ward   Patient/Family Goals Statement \"to be able to move my head more\"   Orders Evaluate and Treat   Date of Order 06/24/21   Certification Required? Yes   Medical Diagnosis lumbar radiculopathy, s/p cervical fusion.   Precautions/Limitations   (patient recently weaned out of cervical brace.)   Body Part(s)   Body Part(s) Cervical Spine   Presentation and Etiology   Pertinent history of current problem (include personal factors and/or comorbidities that impact the POC) Patient has had cervical fusion 4/2021.  Limited AROM, neck pain.   Impairments A. Pain;D. Decreased ROM;E. Decreased flexibility;F. Decreased strength and endurance;N. Headaches   Functional Limitations perform activities of daily living;perform required work activities   Symptom Location neck   Onset date of current episode/exacerbation 04/05/21   Pain rating (0-10 point scale) Best (/10);Worst (/10)   Best (/10) 6   Worst (/10) 6   Fall Risk Screen   Fall screen completed by PT   Have you fallen 2 or more times in the past year? Yes   Have you fallen and had an injury in the past year? No   Is patient a fall risk? Yes   Fall screen comments [employer] 12/30 seconds   Abuse Screen (yes response referral indicated)   Feels Unsafe at Home or Work/School no   Feels Threatened by Someone no   Does Anyone Try to Keep You From Having Contact with Others or Doing Things Outside Your Home? no   Physical Signs of Abuse Present no   Cervical Spine   Cervical Flexion ROM 60   Cervical Extension ROM 10   Cervical Right Side Bending ROM 30   Cervical Left Side Bending ROM 30   Cervical Right Rotation ROM 40 with pain   Cervical Left Rotation ROM 30    Shoulder Shrug (C2-C4) Strength 5   Shoulder Abd (C5) Strength 5   Elbow Flexion (C5, C6) Strength 5   Elbow Extension (C7) Strength 5   Wrist Extension (C6) Strength 5   Wrist Flexion (C7) Strength 5 "   Thumb Abd (C8) Strength 5   5th Finger Add (T1) Strength 5   Planned Therapy Interventions   Planned Therapy Interventions joint mobilization;manual therapy;neuromuscular re-education;ROM;stretching;strengthening   Clinical Impression   Criteria for Skilled Therapeutic Interventions Met yes, treatment indicated   PT Diagnosis ne   Clinical Presentation Stable/Uncomplicated   Clinical Decision Making (Complexity) Low complexity   Predicted Duration of Therapy Intervention (days/wks) 8 weeks   Risk & Benefits of therapy have been explained Yes   Patient, Family & other staff in agreement with plan of care Yes   Clinical Impression Comments Patient recovering from cervical fusion surgery.   ORTHO GOALS   PT Ortho Eval Goals 1;2;3   Ortho Goal 1   Goal Identifier HEP   Goal Description Patient will be independent with HEP and self management of symptoms    Target Date 09/25/21   Ortho Goal 2   Goal Identifier AROM   Goal Description Patient will improve painfree cervical AROM to be able to check blind spots while driving without pain.   Target Date 09/25/21   Ortho Goal 3   Goal Identifier reach/grasp   Goal Description patient will reach over head into cabinets at home without pain.   Target Date 09/25/21   Total Evaluation Time   PT Felicia Low Complexity Minutes (95118) 15   Therapy Certification   Certification date from 07/26/21   Certification date to 09/25/21   Medical Diagnosis Lumbar radiculopathy, s/p cervical fusion

## 2021-07-28 ENCOUNTER — MYC MEDICAL ADVICE (OUTPATIENT)
Dept: FAMILY MEDICINE | Facility: CLINIC | Age: 56
End: 2021-07-28

## 2021-07-28 ENCOUNTER — HOSPITAL ENCOUNTER (OUTPATIENT)
Dept: PHYSICAL THERAPY | Facility: REHABILITATION | Age: 56
End: 2021-07-28
Payer: COMMERCIAL

## 2021-07-28 DIAGNOSIS — M47.12 CERVICAL SPONDYLOSIS WITH MYELOPATHY: ICD-10-CM

## 2021-07-28 DIAGNOSIS — Z98.1 S/P CERVICAL SPINAL FUSION: Primary | ICD-10-CM

## 2021-07-28 PROCEDURE — 97140 MANUAL THERAPY 1/> REGIONS: CPT | Mod: GP | Performed by: PHYSICAL THERAPIST

## 2021-07-28 NOTE — LETTER
August 3, 2021      Susan Kwan  [address] [place] [address]        To Whom It May Concern:    Susan Kwan was seen on 7/7/21.    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 She is working closely with neurosurgery.   Below is a list of her diagnosis.   Please call with any further questions.     1. Spinal stenosis of lumbar region, unspecified whether neurogenic claudication present  2. Lumbar disc herniation  3. Lumbar radiculopathy    Sincerely,        Earline Jimenez MD

## 2021-08-01 ENCOUNTER — HEALTH MAINTENANCE LETTER (OUTPATIENT)
Age: 56
End: 2021-08-01

## 2021-08-01 ENCOUNTER — MYC REFILL (OUTPATIENT)
Dept: NEUROSURGERY | Facility: CLINIC | Age: 56
End: 2021-08-01

## 2021-08-01 DIAGNOSIS — Z98.1 S/P LUMBAR FUSION: ICD-10-CM

## 2021-08-02 RX ORDER — METHOCARBAMOL 750 MG/1
750 TABLET, FILM COATED ORAL 4 TIMES DAILY PRN
Qty: 40 TABLET | Refills: 0 | Status: SHIPPED | OUTPATIENT
Start: 2021-08-02 | End: 2021-08-08

## 2021-08-03 NOTE — TELEPHONE ENCOUNTER
A letter was written. Should be in the letter section and she should be able to see on mychart. Please tell her and let me know if anything else is needed.

## 2021-08-08 ENCOUNTER — MYC REFILL (OUTPATIENT)
Dept: NEUROSURGERY | Facility: CLINIC | Age: 56
End: 2021-08-08

## 2021-08-08 DIAGNOSIS — Z98.1 S/P LUMBAR FUSION: ICD-10-CM

## 2021-08-09 RX ORDER — METHOCARBAMOL 750 MG/1
750 TABLET, FILM COATED ORAL 4 TIMES DAILY PRN
Qty: 40 TABLET | Refills: 0 | Status: SHIPPED | OUTPATIENT
Start: 2021-08-09 | End: 2021-08-16

## 2021-08-10 ENCOUNTER — TELEPHONE (OUTPATIENT)
Dept: FAMILY MEDICINE | Facility: CLINIC | Age: 56
End: 2021-08-10

## 2021-08-10 NOTE — TELEPHONE ENCOUNTER
Patient's daughter called, she is trying to help patient with her disability paperwork. Juli stated that patient was told that paperwork would be available via [employer] for her disability case and they still have not received anything. Please reach out to patient's daughter, Juli to assist in gathering information for patient's disability.

## 2021-08-16 ENCOUNTER — MYC REFILL (OUTPATIENT)
Dept: NEUROSURGERY | Facility: CLINIC | Age: 56
End: 2021-08-16

## 2021-08-16 DIAGNOSIS — Z98.1 S/P LUMBAR FUSION: ICD-10-CM

## 2021-08-16 RX ORDER — METHOCARBAMOL 750 MG/1
750 TABLET, FILM COATED ORAL 4 TIMES DAILY PRN
Qty: 40 TABLET | Refills: 0 | Status: SHIPPED | OUTPATIENT
Start: 2021-08-16 | End: 2021-08-25

## 2021-08-17 ENCOUNTER — HOSPITAL ENCOUNTER (OUTPATIENT)
Dept: PHYSICAL THERAPY | Facility: REHABILITATION | Age: 56
End: 2021-08-17
Payer: COMMERCIAL

## 2021-08-17 DIAGNOSIS — M54.16 LUMBAR RADICULOPATHY: ICD-10-CM

## 2021-08-17 DIAGNOSIS — Z98.1 S/P CERVICAL SPINAL FUSION: Primary | ICD-10-CM

## 2021-08-17 PROCEDURE — 97140 MANUAL THERAPY 1/> REGIONS: CPT | Mod: GP | Performed by: PHYSICAL THERAPIST

## 2021-08-17 PROCEDURE — 97110 THERAPEUTIC EXERCISES: CPT | Mod: GP | Performed by: PHYSICAL THERAPIST

## 2021-08-20 ENCOUNTER — HOSPITAL ENCOUNTER (OUTPATIENT)
Dept: PHYSICAL THERAPY | Facility: REHABILITATION | Age: 56
End: 2021-08-20
Payer: COMMERCIAL

## 2021-08-20 DIAGNOSIS — Z98.1 S/P CERVICAL SPINAL FUSION: Primary | ICD-10-CM

## 2021-08-20 DIAGNOSIS — Z98.1 S/P LUMBAR SPINAL FUSION: ICD-10-CM

## 2021-08-20 DIAGNOSIS — M54.16 LUMBAR RADICULOPATHY: ICD-10-CM

## 2021-08-20 DIAGNOSIS — M47.12 CERVICAL SPONDYLOSIS WITH MYELOPATHY: ICD-10-CM

## 2021-08-20 PROCEDURE — 97140 MANUAL THERAPY 1/> REGIONS: CPT | Mod: GP | Performed by: PHYSICAL THERAPIST

## 2021-08-24 ENCOUNTER — HOSPITAL ENCOUNTER (OUTPATIENT)
Dept: PHYSICAL THERAPY | Facility: REHABILITATION | Age: 56
End: 2021-08-24
Payer: COMMERCIAL

## 2021-08-24 DIAGNOSIS — Z98.1 S/P CERVICAL SPINAL FUSION: Primary | ICD-10-CM

## 2021-08-24 DIAGNOSIS — M62.81 GENERALIZED MUSCLE WEAKNESS: ICD-10-CM

## 2021-08-24 DIAGNOSIS — Z98.1 S/P LUMBAR FUSION: ICD-10-CM

## 2021-08-24 DIAGNOSIS — M54.16 LUMBAR RADICULOPATHY: ICD-10-CM

## 2021-08-24 DIAGNOSIS — Z98.1 S/P LUMBAR SPINAL FUSION: ICD-10-CM

## 2021-08-24 DIAGNOSIS — M47.12 CERVICAL SPONDYLOSIS WITH MYELOPATHY: ICD-10-CM

## 2021-08-24 PROCEDURE — 97140 MANUAL THERAPY 1/> REGIONS: CPT | Mod: GP | Performed by: PHYSICAL THERAPIST

## 2021-08-25 ENCOUNTER — MYC REFILL (OUTPATIENT)
Dept: NEUROSURGERY | Facility: CLINIC | Age: 56
End: 2021-08-25

## 2021-08-25 DIAGNOSIS — Z98.1 S/P LUMBAR FUSION: ICD-10-CM

## 2021-08-25 RX ORDER — METHOCARBAMOL 750 MG/1
750 TABLET, FILM COATED ORAL 4 TIMES DAILY PRN
Qty: 40 TABLET | Refills: 0 | Status: SHIPPED | OUTPATIENT
Start: 2021-08-25 | End: 2021-09-03

## 2021-08-31 ENCOUNTER — OFFICE VISIT (OUTPATIENT)
Dept: NEUROSURGERY | Facility: CLINIC | Age: 56
End: 2021-08-31
Payer: COMMERCIAL

## 2021-08-31 ENCOUNTER — HOSPITAL ENCOUNTER (OUTPATIENT)
Dept: PHYSICAL THERAPY | Facility: REHABILITATION | Age: 56
End: 2021-08-31
Payer: COMMERCIAL

## 2021-08-31 VITALS
WEIGHT: 206 LBS | BODY MASS INDEX: 36.5 KG/M2 | OXYGEN SATURATION: 97 % | HEART RATE: 62 BPM | RESPIRATION RATE: 18 BRPM | DIASTOLIC BLOOD PRESSURE: 52 MMHG | HEIGHT: 63 IN | SYSTOLIC BLOOD PRESSURE: 107 MMHG

## 2021-08-31 DIAGNOSIS — M62.81 GENERALIZED MUSCLE WEAKNESS: ICD-10-CM

## 2021-08-31 DIAGNOSIS — M47.12 CERVICAL SPONDYLOSIS WITH MYELOPATHY: ICD-10-CM

## 2021-08-31 DIAGNOSIS — Z98.1 S/P CERVICAL SPINAL FUSION: Primary | ICD-10-CM

## 2021-08-31 DIAGNOSIS — Z98.1 S/P LUMBAR SPINAL FUSION: ICD-10-CM

## 2021-08-31 DIAGNOSIS — M54.16 LUMBAR RADICULOPATHY: ICD-10-CM

## 2021-08-31 DIAGNOSIS — Z98.1 S/P CERVICAL SPINAL FUSION: ICD-10-CM

## 2021-08-31 DIAGNOSIS — M54.2 NECK PAIN: Primary | ICD-10-CM

## 2021-08-31 PROCEDURE — 97110 THERAPEUTIC EXERCISES: CPT | Mod: GP | Performed by: PHYSICAL THERAPIST

## 2021-08-31 PROCEDURE — 97140 MANUAL THERAPY 1/> REGIONS: CPT | Mod: GP | Performed by: PHYSICAL THERAPIST

## 2021-08-31 PROCEDURE — 99213 OFFICE O/P EST LOW 20 MIN: CPT | Performed by: NURSE PRACTITIONER

## 2021-08-31 ASSESSMENT — MIFFLIN-ST. JEOR: SCORE: 1508.54

## 2021-08-31 NOTE — NURSING NOTE
Patient is here for a follow up on pain. She has pain in her neck and between her shoulder blades that is not improving. She states that her right leg continues to fall asleep. She is taking muscle relaxer's that help a little. She is currently doing PT.  Juli Campuzano MA,CMA,10:37 AM

## 2021-08-31 NOTE — PATIENT INSTRUCTIONS
1. Spine center for possible injection  2. November with XR and NP on Dr Ward day   3. Continue physical therapy   4. Come back sooner if needed

## 2021-08-31 NOTE — PROGRESS NOTES
"Neurosurgery Progress Note:  8/31/2021    A/P: 55 year old female S/P L4-5 microdiscectomy and fusion 12/17/2020 and C5-6 ACDF 4/2021 with Dr Ward. Last seen 7/14/2021 by Dr Ward with plans for PT. Cervical XR stable at the last visit.     Today: Right sided neck pain, pain between the shoulder blades persists. Feels \"tight\". Today was therapy session #6. Little progress per patient. Therapist feels patient has plateaud. Jose hoarse in right buttocks one week ago. Was present before spine surgery but has returned. Right leg falls asleep - not relieved with any position. Started a week ago. No trauma. Pain meds include methocarbamol. Activity at home includes walking and pool therapy - three times a week. Pool therapy helps her neck.     PLAN: cervical and lumbar XR November per Dr Ward. Spine center consult for possible trigger point injection. Return to clinic sooner if needed. Continue therapy for buttocks, leg symptoms.     HPI: Presented with imbalance and fine motor difficulties. CT myelogram moderate spinal canal stenosis C5-6 with flattening of the spinal cord. No instability with 1 mm retrolisthesis C4 on 5. Chronic back pain her adult life. Presented with low back pain, numbness, diffuse weakness. No urinary incontinence. CT with right L4 pars defect as well as right lateral recess stenosis L4-5 with prior hemilaminectomy. Injection with several months of relief initially. 2nd injection 9/22/2020 with less relief - just one week. EMG chronic and active right L5 radiculopathy. CT myelogram right sided disc herniation at right L4-5 with mild displacement of the right L5 nerve root and moderate lateral recess stenosis.     Physical Exam  VS: /52   Pulse 62   Resp 18   Ht 1.6 m (5' 3\")   Wt 93.4 kg (206 lb)   SpO2 97%   BMI 36.49 kg/m       Tight to touch along her shoulder blades     General: alert, oriented. MARTINEZ. Speech clear.     Strength: full strength all extremities     Sensation: " intact to light touch with exception of chronic numbness right inner calf    Coordination: gait steady    Reflexes: no livingston, no clonus     Bulk and tone normal for age    Imaging: none at this time     ROBERT Lees  [facility] Neurosurgery  O: [phone]

## 2021-08-31 NOTE — LETTER
"    8/31/2021         RE: Susan KEN Aniya  [address]  [place] [facility] [place] [address]        Dear Colleague,    Thank you for referring your patient, Susan Kwan, to the [facility]. Please see a copy of my visit note below.    Neurosurgery Progress Note:  8/31/2021    A/P: 55 year old female S/P L4-5 microdiscectomy and fusion 12/17/2020 and C5-6 ACDF 4/2021 with Dr Ward. Last seen 7/14/2021 by Dr Ward with plans for PT. Cervical XR stable at the last visit.     Today: Right sided neck pain, pain between the shoulder blades persists. Feels \"tight\". Today was therapy session #6. Little progress per patient. Therapist feels patient has plateaud. Jose hoarse in right buttocks one week ago. Was present before spine surgery but has returned. Right leg falls asleep - not relieved with any position. Started a week ago. No trauma. Pain meds include methocarbamol. Activity at home includes walking and pool therapy - three times a week. Pool therapy helps her neck.     PLAN: cervical and lumbar XR November per Dr Ward. Spine center consult for possible trigger point injection. Return to clinic sooner if needed. Continue therapy for buttocks, leg symptoms.     HPI: Presented with imbalance and fine motor difficulties. CT myelogram moderate spinal canal stenosis C5-6 with flattening of the spinal cord. No instability with 1 mm retrolisthesis C4 on 5. Chronic back pain her adult life. Presented with low back pain, numbness, diffuse weakness. No urinary incontinence. CT with right L4 pars defect as well as right lateral recess stenosis L4-5 with prior hemilaminectomy. Injection with several months of relief initially. 2nd injection 9/22/2020 with less relief - just one week. EMG chronic and active right L5 radiculopathy. CT myelogram right sided disc herniation at right L4-5 with mild displacement of the right L5 nerve root and moderate lateral recess stenosis. " "    Physical Exam  VS: /52   Pulse 62   Resp 18   Ht 1.6 m (5' 3\")   Wt 93.4 kg (206 lb)   SpO2 97%   BMI 36.49 kg/m       Tight to touch along her shoulder blades     General: alert, oriented. MARTINEZ. Speech clear.     Strength: full strength all extremities     Sensation: intact to light touch with exception of chronic numbness right inner calf    Coordination: gait steady    Reflexes: no livingston, no clonus     Bulk and tone normal for age    Imaging: none at this time     Sultana Montes, TOMI-CNP  [facility] Neurosurgery  O: [phone]            Again, thank you for allowing me to participate in the care of your patient.        Sincerely,        Leanna Ward MD    "

## 2021-09-01 ENCOUNTER — PATIENT OUTREACH (OUTPATIENT)
Dept: CARE COORDINATION | Facility: CLINIC | Age: 56
End: 2021-09-01

## 2021-09-01 NOTE — PROGRESS NOTES
9/1/2021  Clinic Care Coordination Contact  [facility]/Voicemail       Clinical Data: Care Coordinator Outreach  Outreach attempted x 1.  Phone is disconnected/invalid    Plan: Care Coordinator  will try to reach patient again in 10 business days.    CHW Follow up 9-15-21 wait for updated contact number.

## 2021-09-02 DIAGNOSIS — F33.2 SEVERE EPISODE OF RECURRENT MAJOR DEPRESSIVE DISORDER, WITHOUT PSYCHOTIC FEATURES (H): Primary | ICD-10-CM

## 2021-09-03 ENCOUNTER — HOSPITAL ENCOUNTER (OUTPATIENT)
Dept: PHYSICAL THERAPY | Facility: REHABILITATION | Age: 56
End: 2021-09-03
Payer: COMMERCIAL

## 2021-09-03 ENCOUNTER — MYC REFILL (OUTPATIENT)
Dept: NEUROSURGERY | Facility: CLINIC | Age: 56
End: 2021-09-03

## 2021-09-03 DIAGNOSIS — M54.16 LUMBAR RADICULOPATHY: ICD-10-CM

## 2021-09-03 DIAGNOSIS — Z98.1 S/P LUMBAR SPINAL FUSION: ICD-10-CM

## 2021-09-03 DIAGNOSIS — M47.12 CERVICAL SPONDYLOSIS WITH MYELOPATHY: ICD-10-CM

## 2021-09-03 DIAGNOSIS — Z98.1 S/P LUMBAR FUSION: ICD-10-CM

## 2021-09-03 DIAGNOSIS — M62.81 GENERALIZED MUSCLE WEAKNESS: ICD-10-CM

## 2021-09-03 DIAGNOSIS — Z98.1 S/P CERVICAL SPINAL FUSION: Primary | ICD-10-CM

## 2021-09-03 PROCEDURE — 97140 MANUAL THERAPY 1/> REGIONS: CPT | Mod: GP | Performed by: PHYSICAL THERAPIST

## 2021-09-03 RX ORDER — METHOCARBAMOL 750 MG/1
750 TABLET, FILM COATED ORAL 4 TIMES DAILY PRN
Qty: 40 TABLET | Refills: 0 | Status: SHIPPED | OUTPATIENT
Start: 2021-09-03 | End: 2021-09-09

## 2021-09-03 RX ORDER — BUPROPION HYDROCHLORIDE 150 MG/1
TABLET ORAL
Qty: 30 TABLET | Refills: 4 | Status: SHIPPED | OUTPATIENT
Start: 2021-09-03 | End: 2022-02-14

## 2021-09-03 NOTE — TELEPHONE ENCOUNTER
"Routing refill request to provider for review/approval because:  Drug not active on patient's medication list    Last Written Prescription Date: 8/3/2021  Last Fill Quantity: 30,  # refills: Not specified  Last office visit provider:  7/7/2021     Requested Prescriptions   Pending Prescriptions Disp Refills    buPROPion (WELLBUTRIN XL) 150 MG 24 hr tablet [Pharmacy Med Name: BUPROPION XL 150MG TABLETS (24 H)] 30 tablet      Sig: TAKE 1 TABLET(150 MG) BY MOUTH EVERY MORNING        SSRIs Protocol Failed - 9/2/2021  1:59 PM        Failed - Medication is active on med list        Passed - Recent (12 mo) or future (30 days) visit within the authorizing provider's specialty     Patient has had an office visit with the authorizing provider or a provider within the authorizing providers department within the previous 12 mos or has a future within next 30 days. See \"Patient Info\" tab in inbasket, or \"Choose Columns\" in Meds & Orders section of the refill encounter.              Passed - Medication is Bupropion     If the medication is Bupropion (Wellbutrin), and the patient is taking for smoking cessation; OK to refill.          Passed - Patient is age 18 or older        Passed - No active pregnancy on record        Passed - No positive pregnancy test in last 12 months              Sheng Edmonds RN 09/03/21 11:05 AM   "

## 2021-09-07 ENCOUNTER — HOSPITAL ENCOUNTER (OUTPATIENT)
Dept: PHYSICAL THERAPY | Facility: REHABILITATION | Age: 56
End: 2021-09-07
Payer: COMMERCIAL

## 2021-09-07 DIAGNOSIS — M54.16 LUMBAR RADICULOPATHY: ICD-10-CM

## 2021-09-07 DIAGNOSIS — Z98.1 S/P CERVICAL SPINAL FUSION: Primary | ICD-10-CM

## 2021-09-07 DIAGNOSIS — M47.12 CERVICAL SPONDYLOSIS WITH MYELOPATHY: ICD-10-CM

## 2021-09-07 DIAGNOSIS — Z98.1 S/P LUMBAR SPINAL FUSION: ICD-10-CM

## 2021-09-07 DIAGNOSIS — R29.898 WEAKNESS OF RIGHT LOWER EXTREMITY: ICD-10-CM

## 2021-09-07 DIAGNOSIS — M62.81 GENERALIZED MUSCLE WEAKNESS: ICD-10-CM

## 2021-09-07 PROCEDURE — 97140 MANUAL THERAPY 1/> REGIONS: CPT | Mod: GP | Performed by: PHYSICAL THERAPIST

## 2021-09-09 ENCOUNTER — MYC REFILL (OUTPATIENT)
Dept: NEUROSURGERY | Facility: CLINIC | Age: 56
End: 2021-09-09

## 2021-09-09 DIAGNOSIS — Z98.1 S/P LUMBAR FUSION: ICD-10-CM

## 2021-09-09 RX ORDER — METHOCARBAMOL 750 MG/1
750 TABLET, FILM COATED ORAL 4 TIMES DAILY PRN
Qty: 40 TABLET | Refills: 0 | Status: SHIPPED | OUTPATIENT
Start: 2021-09-09 | End: 2021-10-27

## 2021-09-13 ENCOUNTER — OFFICE VISIT (OUTPATIENT)
Dept: PHYSICAL MEDICINE AND REHAB | Facility: CLINIC | Age: 56
End: 2021-09-13
Payer: COMMERCIAL

## 2021-09-13 VITALS — HEART RATE: 78 BPM | DIASTOLIC BLOOD PRESSURE: 61 MMHG | SYSTOLIC BLOOD PRESSURE: 128 MMHG

## 2021-09-13 DIAGNOSIS — M99.02 SOMATIC DYSFUNCTION OF THORACIC REGION: ICD-10-CM

## 2021-09-13 DIAGNOSIS — M99.01 SOMATIC DYSFUNCTION OF CERVICAL REGION: ICD-10-CM

## 2021-09-13 DIAGNOSIS — M99.00 SOMATIC DYSFUNCTION OF HEAD REGION: ICD-10-CM

## 2021-09-13 DIAGNOSIS — M99.07 SOMATIC DYSFUNCTION OF UPPER EXTREMITY: ICD-10-CM

## 2021-09-13 DIAGNOSIS — M99.08 SOMATIC DYSFUNCTION OF RIB REGION: ICD-10-CM

## 2021-09-13 DIAGNOSIS — M54.2 CERVICAL SPINE PAIN: Primary | ICD-10-CM

## 2021-09-13 DIAGNOSIS — M79.18 MYOFASCIAL PAIN: ICD-10-CM

## 2021-09-13 DIAGNOSIS — Z98.1 S/P CERVICAL SPINAL FUSION: ICD-10-CM

## 2021-09-13 PROCEDURE — 99214 OFFICE O/P EST MOD 30 MIN: CPT | Mod: 25 | Performed by: PHYSICAL MEDICINE & REHABILITATION

## 2021-09-13 PROCEDURE — 98927 OSTEOPATH MANJ 5-6 REGIONS: CPT | Performed by: PHYSICAL MEDICINE & REHABILITATION

## 2021-09-13 RX ORDER — BACLOFEN 10 MG/1
5-10 TABLET ORAL 3 TIMES DAILY PRN
Qty: 90 TABLET | Refills: 1 | Status: SHIPPED | OUTPATIENT
Start: 2021-09-13 | End: 2021-10-27

## 2021-09-13 ASSESSMENT — PAIN SCALES - GENERAL: PAINLEVEL: MODERATE PAIN (4)

## 2021-09-13 NOTE — PATIENT INSTRUCTIONS
1. Baclofen (muscle relaxant medication) has been prescribed today. Please take 1/2-1 tab three times daily as needed. This medication may cause drowsiness. Please do not work or drive while taking this medication until you know how it effects you. If it does make you drowsy, you should only take it before bedtime or at times that you do not have to work/drive.    2. Osteopathic manipulation today
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
## 2021-09-14 ENCOUNTER — HOSPITAL ENCOUNTER (OUTPATIENT)
Dept: PHYSICAL THERAPY | Facility: REHABILITATION | Age: 56
End: 2021-09-14
Payer: COMMERCIAL

## 2021-09-14 DIAGNOSIS — R29.898 WEAKNESS OF RIGHT LOWER EXTREMITY: ICD-10-CM

## 2021-09-14 DIAGNOSIS — Z98.1 S/P LUMBAR SPINAL FUSION: ICD-10-CM

## 2021-09-14 DIAGNOSIS — M62.81 GENERALIZED MUSCLE WEAKNESS: ICD-10-CM

## 2021-09-14 DIAGNOSIS — Z98.1 S/P CERVICAL SPINAL FUSION: Primary | ICD-10-CM

## 2021-09-14 DIAGNOSIS — M54.16 LUMBAR RADICULOPATHY: ICD-10-CM

## 2021-09-14 DIAGNOSIS — M47.12 CERVICAL SPONDYLOSIS WITH MYELOPATHY: ICD-10-CM

## 2021-09-14 PROCEDURE — 97140 MANUAL THERAPY 1/> REGIONS: CPT | Mod: GP | Performed by: PHYSICAL THERAPIST

## 2021-09-16 ENCOUNTER — PATIENT OUTREACH (OUTPATIENT)
Dept: CARE COORDINATION | Facility: CLINIC | Age: 56
End: 2021-09-16

## 2021-09-16 NOTE — LETTER
[facility] C[facility] COORDINATION  [address] [place] [address]    September 16, 2021    Susan Kwan  [address]  [place] [address]      Dear Susan,    I have been attempting to reach you since our last contact. I would like to continue to work with you and provide any additional support you may need on achieving your health care related goals.     I would appreciate if you would give me a call at [phone] or through [facility] to let me know if you would like to continue working together. I know that there are many things that can affect our ability to communicate and I hope we can continue to work together.    All of us at the [facility].  are invested in your health and are here to assist you in meeting your goals.     Sincerely,    Tammi Shah  Community Health Worker  [facility] Care Coordination  [email or web address]  [email or web address]   Office: [phone]  Fax: [phone]

## 2021-09-16 NOTE — PROGRESS NOTES
9/16/2021  Clinic Care Coordination Contact  [facility]       Clinical Data: Care Coordinator Outreach  Outreach attempted x 2.  Left message on patient's voicemail with call back information and requested return call.  Plan: Care Coordinator will send unable to contact letter with care coordinator contact information via [employer]     Care Coordinator will try to reach patient again in 1 month.        CHW Follow up: Monthly    CHW Plan: Follow up on goals    CHW Next Follow Up: 10-12-21

## 2021-09-17 ENCOUNTER — HOSPITAL ENCOUNTER (OUTPATIENT)
Dept: PHYSICAL THERAPY | Facility: REHABILITATION | Age: 56
End: 2021-09-17
Payer: COMMERCIAL

## 2021-09-17 DIAGNOSIS — Z98.1 S/P CERVICAL SPINAL FUSION: Primary | ICD-10-CM

## 2021-09-17 DIAGNOSIS — M62.81 GENERALIZED MUSCLE WEAKNESS: ICD-10-CM

## 2021-09-17 DIAGNOSIS — M47.12 CERVICAL SPONDYLOSIS WITH MYELOPATHY: ICD-10-CM

## 2021-09-17 DIAGNOSIS — Z98.1 S/P LUMBAR SPINAL FUSION: ICD-10-CM

## 2021-09-17 DIAGNOSIS — M54.16 LUMBAR RADICULOPATHY: ICD-10-CM

## 2021-09-17 PROCEDURE — 97140 MANUAL THERAPY 1/> REGIONS: CPT | Mod: GP | Performed by: PHYSICAL THERAPIST

## 2021-09-20 ENCOUNTER — OFFICE VISIT (OUTPATIENT)
Dept: PHYSICAL MEDICINE AND REHAB | Facility: CLINIC | Age: 56
End: 2021-09-20
Payer: COMMERCIAL

## 2021-09-20 VITALS
DIASTOLIC BLOOD PRESSURE: 63 MMHG | HEIGHT: 63 IN | BODY MASS INDEX: 37.56 KG/M2 | HEART RATE: 78 BPM | WEIGHT: 212 LBS | SYSTOLIC BLOOD PRESSURE: 107 MMHG

## 2021-09-20 DIAGNOSIS — M79.18 MYOFASCIAL PAIN: ICD-10-CM

## 2021-09-20 DIAGNOSIS — M99.08 SOMATIC DYSFUNCTION OF RIB REGION: ICD-10-CM

## 2021-09-20 DIAGNOSIS — J38.7 LARYNGEAL MASS: ICD-10-CM

## 2021-09-20 DIAGNOSIS — M99.07 SOMATIC DYSFUNCTION OF UPPER EXTREMITY: ICD-10-CM

## 2021-09-20 DIAGNOSIS — M99.01 SOMATIC DYSFUNCTION OF CERVICAL REGION: ICD-10-CM

## 2021-09-20 DIAGNOSIS — Z98.1 S/P CERVICAL SPINAL FUSION: ICD-10-CM

## 2021-09-20 DIAGNOSIS — M99.00 SOMATIC DYSFUNCTION OF HEAD REGION: ICD-10-CM

## 2021-09-20 DIAGNOSIS — M54.2 CERVICAL SPINE PAIN: Primary | ICD-10-CM

## 2021-09-20 DIAGNOSIS — M99.02 SOMATIC DYSFUNCTION OF THORACIC REGION: ICD-10-CM

## 2021-09-20 PROCEDURE — 98927 OSTEOPATH MANJ 5-6 REGIONS: CPT | Performed by: PHYSICAL MEDICINE & REHABILITATION

## 2021-09-20 PROCEDURE — 99214 OFFICE O/P EST MOD 30 MIN: CPT | Mod: 25 | Performed by: PHYSICAL MEDICINE & REHABILITATION

## 2021-09-20 RX ORDER — GABAPENTIN 300 MG/1
900 CAPSULE ORAL 3 TIMES DAILY
COMMUNITY
End: 2022-02-24

## 2021-09-20 RX ORDER — SIMVASTATIN 20 MG
20 TABLET ORAL AT BEDTIME
COMMUNITY
End: 2022-05-31

## 2021-09-20 ASSESSMENT — PAIN SCALES - GENERAL: PAINLEVEL: MODERATE PAIN (5)

## 2021-09-20 ASSESSMENT — MIFFLIN-ST. JEOR: SCORE: 1530.76
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
## 2021-09-20 NOTE — PATIENT INSTRUCTIONS
1. A CT and xrays were ordered for you today.  You will be contacted by scheduling within 3 days.    If you are not contacted, please call Radiology at [phone].      2. Osteopathic manual medicine today
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
## 2021-09-21 ENCOUNTER — HOSPITAL ENCOUNTER (OUTPATIENT)
Dept: CT IMAGING | Facility: HOSPITAL | Age: 56
Discharge: HOME | End: 2021-09-21
Attending: PHYSICAL MEDICINE & REHABILITATION | Admitting: PHYSICAL MEDICINE & REHABILITATION
Payer: COMMERCIAL

## 2021-09-21 ENCOUNTER — TELEPHONE (OUTPATIENT)
Dept: PHYSICAL MEDICINE AND REHAB | Facility: CLINIC | Age: 56
End: 2021-09-21

## 2021-09-21 ENCOUNTER — HOSPITAL ENCOUNTER (OUTPATIENT)
Dept: PHYSICAL THERAPY | Facility: REHABILITATION | Age: 56
End: 2021-09-21
Payer: COMMERCIAL

## 2021-09-21 DIAGNOSIS — Z98.1 S/P LUMBAR SPINAL FUSION: ICD-10-CM

## 2021-09-21 DIAGNOSIS — M47.12 CERVICAL SPONDYLOSIS WITH MYELOPATHY: ICD-10-CM

## 2021-09-21 DIAGNOSIS — M62.81 GENERALIZED MUSCLE WEAKNESS: ICD-10-CM

## 2021-09-21 DIAGNOSIS — M54.2 CERVICAL SPINE PAIN: ICD-10-CM

## 2021-09-21 DIAGNOSIS — M54.16 LUMBAR RADICULOPATHY: ICD-10-CM

## 2021-09-21 DIAGNOSIS — Z98.1 S/P CERVICAL SPINAL FUSION: ICD-10-CM

## 2021-09-21 DIAGNOSIS — Z98.1 S/P CERVICAL SPINAL FUSION: Primary | ICD-10-CM

## 2021-09-21 PROCEDURE — 97140 MANUAL THERAPY 1/> REGIONS: CPT | Mod: GP | Performed by: PHYSICAL THERAPIST

## 2021-09-21 PROCEDURE — 72125 CT NECK SPINE W/O DYE: CPT

## 2021-09-21 NOTE — TELEPHONE ENCOUNTER
----- Message from Jack Curtis DO sent at 9/21/2021  2:29 PM CDT -----  CT scan of the cervical spine was reviewed.  C5-6 fusion hardware is intact with no loosening.  There is mild spinal stenosis remaining at that level.    More concerning is an approximate 1 cm nodule/mass by the larynx.    ENT consult is recommended.  I will place the order.  Please inform the patient and also please contact ENT to ensure they receive the order.

## 2021-09-21 NOTE — TELEPHONE ENCOUNTER
Call placed to patient with provider's results and recommendations. Pt stated understanding. She will await call from ENT to schedule appt.

## 2021-09-24 ENCOUNTER — HOSPITAL ENCOUNTER (OUTPATIENT)
Dept: RADIOLOGY | Facility: HOSPITAL | Age: 56
Discharge: HOME | End: 2021-09-24
Attending: PHYSICAL MEDICINE & REHABILITATION | Admitting: PHYSICAL MEDICINE & REHABILITATION
Payer: COMMERCIAL

## 2021-09-24 ENCOUNTER — OFFICE VISIT (OUTPATIENT)
Dept: OTOLARYNGOLOGY | Facility: CLINIC | Age: 56
End: 2021-09-24
Attending: PHYSICAL MEDICINE & REHABILITATION
Payer: COMMERCIAL

## 2021-09-24 DIAGNOSIS — J38.7 LARYNGEAL MASS: ICD-10-CM

## 2021-09-24 DIAGNOSIS — Z98.1 S/P CERVICAL SPINAL FUSION: ICD-10-CM

## 2021-09-24 DIAGNOSIS — M54.2 CERVICAL SPINE PAIN: ICD-10-CM

## 2021-09-24 PROCEDURE — 72040 X-RAY EXAM NECK SPINE 2-3 VW: CPT

## 2021-09-24 PROCEDURE — 99213 OFFICE O/P EST LOW 20 MIN: CPT | Mod: 25 | Performed by: OTOLARYNGOLOGY

## 2021-09-24 PROCEDURE — 31575 DIAGNOSTIC LARYNGOSCOPY: CPT | Performed by: OTOLARYNGOLOGY

## 2021-09-24 NOTE — LETTER
9/24/2021         RE: Susan Kwan  [address]  [facility] [place] [address]        Dear Colleague,    Thank you for referring your patient, Susan Kwan, to the [facility]. Please see a copy of my visit note below.    CHIEF COMPLAINT:   Diagnoses       Codes Comments    Laryngeal mass     J38.7              HISTORY OF PRESENT ILLNESS    The great and powerful DANILO was seen at the behest of Jack Curtis DO for abnormal CT spine.  Patient is a continue is a smoker and continues to smoke  She did not denies any dysphagia or odynophagia.  No cough or ear pain.  She does have chronic nasal congestion.    Recent CT spine (no contrast)    IMPRESSION:     1. 13 x 11.5 mm axial dimension soft tissue mass right lateral supraglottic larynx. Advise ENT consultation for direct visualization and possible tissue sampling to rule out malignancy.        REVIEW OF SYSTEMS    Review of Systems as per HPI and PMHx, otherwise 10 system review system are negative.     Ceftin and Sulfa drugs     There were no vitals taken for this visit.    HEAD: Normal appearance and symmetry:  No cutaneous lesions.      NECK:  supple     EARS: normal TM, EACs     NOSE:     Dorsum:   straight  Septum:  deviated right  Mucosa:  moist  Inferior turbinates:  2-3+ hypertroph        ORAL CAVITY/OROPHARYNX:     Lips:  Normal.  Tongue: normal, midline  Mucosa:   no lesions  Tonsils:  1+     NECK:  Trachea:  midline.              Thyroid:  normal              Adenopathy:  none        NEURO:   Alert and Oriented     GAIT AND STATION:  normal     RESPIRATORY:   Symmetry and Respiratory effort     PSYCH:  Normal mood and affect     SKIN:   warm and dry         FLEX LARYNGOSCOPY:    After obtaining consent, a flexible laryngoscope is used to examine both nasal cavities, the nasopharynx, pharnx, and larynx.      Nasal cavity: wnl  NP: wnl  Pharnx: wnl  Larynx: TVCs sharp, mobile, 1 cm benign appearing cyst right  supraglottis (immedately above false vocal cord)    IMPRESSION:    Encounter Diagnosis   Name Primary?     Laryngeal mass           RECOMMENDATIONS:    Patient is a with incidental finding of supraglottic lesion on the right-hand side.  On exam today this is appearance of a benign cyst.  Because of the smoking history I think it is prudent to get a CT of the neck with contrast.  I will notify her via MyChart.  If she remains asymptomatic and the CT is not worrisome she can follow-up as needed.  All questions were answered she is agreeable to plan of care        Again, thank you for allowing me to participate in the care of your patient.        Sincerely,        Epi Pleitez MD

## 2021-09-24 NOTE — PROGRESS NOTES
CHIEF COMPLAINT:   Diagnoses       Codes Comments    Laryngeal mass     J38.7              HISTORY OF PRESENT ILLNESS    The great and powerful DANILO was seen at the behest of Jack Curtis DO for abnormal CT spine.  Patient is a continue is a smoker and continues to smoke  She did not denies any dysphagia or odynophagia.  No cough or ear pain.  She does have chronic nasal congestion.    Recent CT spine (no contrast)    IMPRESSION:     1. 13 x 11.5 mm axial dimension soft tissue mass right lateral supraglottic larynx. Advise ENT consultation for direct visualization and possible tissue sampling to rule out malignancy.        REVIEW OF SYSTEMS    Review of Systems as per HPI and PMHx, otherwise 10 system review system are negative.     Ceftin and Sulfa drugs     There were no vitals taken for this visit.    HEAD: Normal appearance and symmetry:  No cutaneous lesions.      NECK:  supple     EARS: normal TM, EACs     NOSE:     Dorsum:   straight  Septum:  deviated right  Mucosa:  moist  Inferior turbinates:  2-3+ hypertroph        ORAL CAVITY/OROPHARYNX:     Lips:  Normal.  Tongue: normal, midline  Mucosa:   no lesions  Tonsils:  1+     NECK:  Trachea:  midline.              Thyroid:  normal              Adenopathy:  none        NEURO:   Alert and Oriented     GAIT AND STATION:  normal     RESPIRATORY:   Symmetry and Respiratory effort     PSYCH:  Normal mood and affect     SKIN:   warm and dry         FLEX LARYNGOSCOPY:    After obtaining consent, a flexible laryngoscope is used to examine both nasal cavities, the nasopharynx, pharnx, and larynx.      Nasal cavity: wnl  NP: wnl  Pharnx: wnl  Larynx: TVCs sharp, mobile, 1 cm benign appearing cyst right supraglottis (immedately above false vocal cord)    IMPRESSION:    Encounter Diagnosis   Name Primary?     Laryngeal mass           RECOMMENDATIONS:    Patient is a with incidental finding of supraglottic lesion on the right-hand side.  On exam today this is appearance of  a benign cyst.  Because of the smoking history I think it is prudent to get a CT of the neck with contrast.  I will notify her via MyChart.  If she remains asymptomatic and the CT is not worrisome she can follow-up as needed.  All questions were answered she is agreeable to plan of care

## 2021-09-26 ENCOUNTER — HEALTH MAINTENANCE LETTER (OUTPATIENT)
Age: 56
End: 2021-09-26

## 2021-09-29 ENCOUNTER — HOSPITAL ENCOUNTER (OUTPATIENT)
Dept: CT IMAGING | Facility: HOSPITAL | Age: 56
Discharge: HOME | End: 2021-09-29
Attending: OTOLARYNGOLOGY | Admitting: OTOLARYNGOLOGY
Payer: COMMERCIAL

## 2021-09-29 DIAGNOSIS — J38.7 LARYNGEAL MASS: ICD-10-CM

## 2021-09-29 PROCEDURE — 70491 CT SOFT TISSUE NECK W/DYE: CPT

## 2021-09-29 PROCEDURE — 250N000011 HC RX IP 250 OP 636: Performed by: OTOLARYNGOLOGY

## 2021-09-29 RX ORDER — IOPAMIDOL 755 MG/ML
100 INJECTION, SOLUTION INTRAVASCULAR ONCE
Status: COMPLETED | OUTPATIENT
Start: 2021-09-29 | End: 2021-09-29

## 2021-09-29 RX ADMIN — IOPAMIDOL 100 ML: 755 INJECTION, SOLUTION INTRAVENOUS at 08:51

## 2021-09-30 ENCOUNTER — PATIENT OUTREACH (OUTPATIENT)
Dept: CARE COORDINATION | Facility: CLINIC | Age: 56
End: 2021-09-30

## 2021-09-30 NOTE — PROGRESS NOTES
Clinic Care Coordination Contact    Situation: Patient chart reviewed by carecoordinator.    Background: MARCELA completed chart review    Assessment: Patient has been unable to be reached by CHW. Patient has not read MyChart message from MARCELA     Plan/Recommendations: Standard Outreach

## 2021-10-06 ENCOUNTER — OFFICE VISIT (OUTPATIENT)
Dept: PHYSICAL MEDICINE AND REHAB | Facility: CLINIC | Age: 56
End: 2021-10-06
Payer: COMMERCIAL

## 2021-10-06 VITALS — DIASTOLIC BLOOD PRESSURE: 57 MMHG | SYSTOLIC BLOOD PRESSURE: 118 MMHG | HEART RATE: 83 BPM

## 2021-10-06 DIAGNOSIS — M54.2 CERVICAL SPINE PAIN: Primary | ICD-10-CM

## 2021-10-06 DIAGNOSIS — M48.02 NEURAL FORAMINAL STENOSIS OF CERVICAL SPINE: ICD-10-CM

## 2021-10-06 DIAGNOSIS — Z98.1 S/P CERVICAL SPINAL FUSION: ICD-10-CM

## 2021-10-06 DIAGNOSIS — M47.812 ARTHROPATHY OF CERVICAL FACET JOINT: ICD-10-CM

## 2021-10-06 DIAGNOSIS — M79.18 MYOFASCIAL PAIN: ICD-10-CM

## 2021-10-06 PROCEDURE — 20552 NJX 1/MLT TRIGGER POINT 1/2: CPT | Performed by: PHYSICAL MEDICINE & REHABILITATION

## 2021-10-06 PROCEDURE — 99214 OFFICE O/P EST MOD 30 MIN: CPT | Mod: 25 | Performed by: PHYSICAL MEDICINE & REHABILITATION

## 2021-10-06 ASSESSMENT — PAIN SCALES - GENERAL: PAINLEVEL: MODERATE PAIN (5)
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
## 2021-10-06 NOTE — PATIENT INSTRUCTIONS
1. Trigger point injection today    2. Please do the stretches shown to you three times a day for the next three days    3. A physical therapy order was provided for you today.  You  will be contacted by physical therapy.  If nobody contacts you within 3 to 5 days, please contact the clinic at [phone].  It will be very important for you to do your physical therapy exercises on a regular basis to decrease your pain and prevent future pain flares.      4. Continue baclofen as needed      Lina Chanel, DO                                                    Cristela Salguero, MANJIT Curtis,  DO                                                                                Jenna Good, CHAD Kc, DO                                                                                       DISCHARGE INSTRUCTIONS  During office hours (8:00 a.m.- 4:30 p.m.) questions or concerns may be answered  by calling Spine Navigation Nurses at [phone]. If you experience any problems after hours please call [phone] and you will be connected to [facility].     All Patients:  ? You may experience an increase in your symptoms or have some soreness from the injection for the first 2 days (It may take anywhere between 2 days- 2 weeks for the steroid to have maximum effect).  ? You may use ice on the injection site, as frequently as 20 minutes each hour if needed.  ? You may continue taking your regular medication.  ? You may shower. No swimming, tub bath or hot tub for 48 hours.  You may remove your   bandaid/bandage as soon as you are home.  ? You may resume light activities, as tolerated unless otherwise directed.  ? Resume your usual diet as tolerated.      POSSIBLE STEROID SIDE EFFECTS   (If steroid/cortisone was used for your procedure)  -If you experience these symptoms, it should only last for a short period  Swelling of the legs        Skin redness (flushing)  Mouth (oral)  irritation   Blood sugar (glucose) levels      Sweats                          Mood changes  Severe headache                  Sleeplessness            POSSIBLE PROCEDURE SIDE EFFECTS  -Call the Spine Center if you are concerned  Increased Pain      Bruising/bleeding at site                  Redness or swelling  Fever greater than 100.5            Diffuse rash            THESE INSTRUCTIONS HAVE BEEN EXPLAINED TO THE PATIENT AND THE PATIENT/PATIENT REPRESENTATITVE HAS VERBALIZED UNDERSTANDING.  A COPY OF THE INSTRUCTIONS HAVE BEEN GIVEN TO THE PATIENT/PATIENT REPRESENTATIVE.
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
## 2021-10-12 ENCOUNTER — PATIENT OUTREACH (OUTPATIENT)
Dept: NURSING | Facility: CLINIC | Age: 56
End: 2021-10-12

## 2021-10-12 NOTE — PROGRESS NOTES
10/12/2021  Clinic Care Coordination Contact    Community Health Worker Follow Up    Care Gaps:     Health Maintenance Due   Topic Date Due     PREVENTIVE CARE VISIT  Never done     ASTHMA ACTION PLAN  Never done     DEPRESSION ACTION PLAN  Never done     Pneumococcal Vaccine: Pediatrics (0 to 5 Years) and At-Risk Patients (6 to 64 Years) (1 of 2 - PPSV23) Never done     ZOSTER IMMUNIZATION (1 of 2) Never done     ASTHMA CONTROL TEST  06/08/2021     INFLUENZA VACCINE (1) 09/01/2021       Care Gaps Last addressed on 10-12-21 leonard discuss with the doctor at next office visit.     Goals:   Goals Addressed as of 10/12/2021 at 11:45 AM                    Today       Psychosocial (pt-stated)   30%    Added 7/19/21 by Bartolome Blanco, [facility]      Goal Statement: I want to complete a [employer] Care Directive within 1-2 months  Date Goal set: 07/19/21  Barriers:   Strengths:   Date to Achieve By: 9/30/2021  Patient expressed understanding of goal: Yes  Action steps to achieve this goal:  1. I will wait to get the Health Care Directive in the mail.  2. I will review and complete the Health Care Directive mailed by [facility]  2. I will update CCC team at outreaches on the status     Updated: 10-12-21 AL                Intervention and Education during outreach:    Called and spoke to patient and follow up on goals.  Patient reported:  -did not get the Health Care Directive form  CHW will mail form.  -daughter is helping to apply for disability benefit.  Patient gave verbal permission to talk to her daughter Juli Kwan [phone]     No consent to communicate with daughter on file       CHW Follow up: Monthly    CHW Plan: Follow up on goal, mail [employer] form and Consent to communicate with daughter    CHW Next Follow Up: 11-12-21

## 2021-10-22 ENCOUNTER — LAB (OUTPATIENT)
Dept: LAB | Facility: CLINIC | Age: 56
End: 2021-10-22
Attending: FAMILY MEDICINE
Payer: COMMERCIAL

## 2021-10-22 DIAGNOSIS — Z20.822 EXPOSURE TO 2019 NOVEL CORONAVIRUS: ICD-10-CM

## 2021-10-22 PROCEDURE — U0003 INFECTIOUS AGENT DETECTION BY NUCLEIC ACID (DNA OR RNA); SEVERE ACUTE RESPIRATORY SYNDROME CORONAVIRUS 2 (SARS-COV-2) (CORONAVIRUS DISEASE [COVID-19]), AMPLIFIED PROBE TECHNIQUE, MAKING USE OF HIGH THROUGHPUT TECHNOLOGIES AS DESCRIBED BY CMS-2020-01-R: HCPCS | Mod: 90

## 2021-10-22 PROCEDURE — 99000 SPECIMEN HANDLING OFFICE-LAB: CPT

## 2021-10-22 PROCEDURE — U0005 INFEC AGEN DETEC AMPLI PROBE: HCPCS | Mod: 90

## 2021-10-24 LAB — SARS-COV-2 RNA RESP QL NAA+PROBE: NOT DETECTED

## 2021-10-27 ENCOUNTER — OFFICE VISIT (OUTPATIENT)
Dept: PHYSICAL MEDICINE AND REHAB | Facility: CLINIC | Age: 56
End: 2021-10-27
Payer: COMMERCIAL

## 2021-10-27 VITALS — HEART RATE: 76 BPM | SYSTOLIC BLOOD PRESSURE: 134 MMHG | DIASTOLIC BLOOD PRESSURE: 62 MMHG

## 2021-10-27 DIAGNOSIS — M54.2 CERVICAL SPINE PAIN: Primary | ICD-10-CM

## 2021-10-27 DIAGNOSIS — M79.18 MYOFASCIAL PAIN: ICD-10-CM

## 2021-10-27 DIAGNOSIS — Z51.81 MEDICATION MONITORING ENCOUNTER: ICD-10-CM

## 2021-10-27 DIAGNOSIS — Z98.1 S/P CERVICAL SPINAL FUSION: ICD-10-CM

## 2021-10-27 PROCEDURE — 20552 NJX 1/MLT TRIGGER POINT 1/2: CPT | Performed by: PHYSICAL MEDICINE & REHABILITATION

## 2021-10-27 PROCEDURE — 99214 OFFICE O/P EST MOD 30 MIN: CPT | Mod: 25 | Performed by: PHYSICAL MEDICINE & REHABILITATION

## 2021-10-27 RX ORDER — BACLOFEN 10 MG/1
5-10 TABLET ORAL 3 TIMES DAILY PRN
Qty: 90 TABLET | Refills: 3 | Status: SHIPPED | OUTPATIENT
Start: 2021-10-27 | End: 2022-01-04

## 2021-10-27 ASSESSMENT — PAIN SCALES - GENERAL: PAINLEVEL: MODERATE PAIN (4)
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
## 2021-10-27 NOTE — PATIENT INSTRUCTIONS
1. Trigger point injection today.  Please Ice and Stretch as directed.    2. Lab work to check liver functions    3. Baclofen (muscle relaxant medication) has been prescribed today. Please take 1/2-1 tb three times daily as needed. This medication may cause drowsiness. Please do not work or drive while taking this medication until you know how it effects you. If it does make you drowsy, you should only take it before bedtime or at times that you do not have to work/drive.    Lina Chanel, DO                                                    Cristela Salguero, MANJIT Curtis,  DO                                                                                CHAD Camacho, DO                                                                                       DISCHARGE INSTRUCTIONS  During office hours (8:00 a.m.- 4:30 p.m.) questions or concerns may be answered  by calling Spine Navigation Nurses at [phone]. If you experience any problems after hours please call [phone] and you will be connected to [facility].     All Patients:  ? You may experience an increase in your symptoms or have some soreness from the injection for the first 2 days (It may take anywhere between 2 days- 2 weeks for the steroid to have maximum effect).  ? You may use ice on the injection site, as frequently as 20 minutes each hour if needed.  ? You may continue taking your regular medication.  ? You may shower. No swimming, tub bath or hot tub for 48 hours.  You may remove your   bandaid/bandage as soon as you are home.  ? You may resume light activities, as tolerated unless otherwise directed.  ? Resume your usual diet as tolerated.      POSSIBLE STEROID SIDE EFFECTS   (If steroid/cortisone was used for your procedure)  -If you experience these symptoms, it should only last for a short period  Swelling of the legs        Skin redness (flushing)  Mouth (oral) irritation   Blood sugar  (glucose) levels      Sweats                          Mood changes  Severe headache                  Sleeplessness            POSSIBLE PROCEDURE SIDE EFFECTS  -Call the Spine Center if you are concerned  Increased Pain      Bruising/bleeding at site                  Redness or swelling  Fever greater than 100.5            Diffuse rash            THESE INSTRUCTIONS HAVE BEEN EXPLAINED TO THE PATIENT AND THE PATIENT/PATIENT REPRESENTATITVE HAS VERBALIZED UNDERSTANDING.  A COPY OF THE INSTRUCTIONS HAVE BEEN GIVEN TO THE PATIENT/PATIENT REPRESENTATIVE.
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
## 2021-11-02 ENCOUNTER — TELEPHONE (OUTPATIENT)
Dept: SLEEP MEDICINE | Facility: CLINIC | Age: 56
End: 2021-11-02
Payer: COMMERCIAL

## 2021-11-02 DIAGNOSIS — G47.33 OBSTRUCTIVE SLEEP APNEA: Primary | ICD-10-CM

## 2021-11-02 NOTE — TELEPHONE ENCOUNTER
Reason for Call:  Other call back    Detailed comments: patient needs a new mask, advised that she will probably have to see the doctor    Phone Number Patient can be reached at: Home number on file [phone] (home)    Best Time: anytime    Can we leave a detailed message on this number? YES    Call taken on 11/2/2021 at 12:02 PM by Kristal Duarte

## 2021-11-03 ENCOUNTER — LAB (OUTPATIENT)
Dept: LAB | Facility: CLINIC | Age: 56
End: 2021-11-03
Payer: COMMERCIAL

## 2021-11-03 ENCOUNTER — PATIENT OUTREACH (OUTPATIENT)
Dept: CARE COORDINATION | Facility: CLINIC | Age: 56
End: 2021-11-03

## 2021-11-03 ENCOUNTER — MYC MEDICAL ADVICE (OUTPATIENT)
Dept: FAMILY MEDICINE | Facility: CLINIC | Age: 56
End: 2021-11-03

## 2021-11-03 DIAGNOSIS — F17.200 TOBACCO USE DISORDER: Primary | ICD-10-CM

## 2021-11-03 DIAGNOSIS — Z51.81 MEDICATION MONITORING ENCOUNTER: ICD-10-CM

## 2021-11-03 LAB
ALT SERPL W P-5'-P-CCNC: 21 U/L (ref 0–45)
AST SERPL W P-5'-P-CCNC: 22 U/L (ref 0–40)

## 2021-11-03 PROCEDURE — 36415 COLL VENOUS BLD VENIPUNCTURE: CPT

## 2021-11-03 PROCEDURE — 84460 ALANINE AMINO (ALT) (SGPT): CPT

## 2021-11-03 PROCEDURE — 84450 TRANSFERASE (AST) (SGOT): CPT

## 2021-11-03 NOTE — PROGRESS NOTES
Clinic Care Coordination Contact    Situation: Patient chart reviewed by care coordinator.    Background: SW completed chart review    Assessment: CHW in contact with patient and daughter    Plan/Recommendations: Standard outreach

## 2021-11-08 ENCOUNTER — HOSPITAL ENCOUNTER (OUTPATIENT)
Dept: PHYSICAL THERAPY | Facility: REHABILITATION | Age: 56
End: 2021-11-08
Attending: PHYSICAL MEDICINE & REHABILITATION
Payer: COMMERCIAL

## 2021-11-08 DIAGNOSIS — M54.2 CERVICAL SPINE PAIN: ICD-10-CM

## 2021-11-08 DIAGNOSIS — Z98.1 S/P CERVICAL SPINAL FUSION: ICD-10-CM

## 2021-11-08 DIAGNOSIS — M47.812 ARTHROPATHY OF CERVICAL FACET JOINT: ICD-10-CM

## 2021-11-08 DIAGNOSIS — M79.18 MYOFASCIAL PAIN: ICD-10-CM

## 2021-11-08 DIAGNOSIS — M48.02 NEURAL FORAMINAL STENOSIS OF CERVICAL SPINE: ICD-10-CM

## 2021-11-08 PROCEDURE — 97161 PT EVAL LOW COMPLEX 20 MIN: CPT | Mod: GP | Performed by: PHYSICAL THERAPIST

## 2021-11-08 PROCEDURE — 97140 MANUAL THERAPY 1/> REGIONS: CPT | Mod: GP | Performed by: PHYSICAL THERAPIST

## 2021-11-08 NOTE — PROGRESS NOTES
[facility]          OUTPATIENT PHYSICAL THERAPY ORTHOPEDIC EVALUATION  PLAN OF TREATMENT FOR OUTPATIENT REHABILITATION  (COMPLETE FOR INITIAL CLAIMS ONLY)  Patient's Last Name, First Name, M.I.  YOB: 1965  Susan Kwan    Provider s Name:  [facility]   Medical Record No.  [id number]   Start of Care Date:  11/08/21   Onset Date:  04/01/21   Type:     _X__PT   ___OT   ___SLP Medical Diagnosis:  Cervical spine pain, Myofascial pain, Arthropathy of cervical facet joint, S/P cervical spinal fusion, Neural foraminal stenosis of cervical spine     PT Diagnosis:  decreased activity tolerance   Visits from SOC:  1      _________________________________________________________________________________  Plan of Treatment/Functional Goals:  joint mobilization,manual therapy,neuromuscular re-education,ROM,strengthening,stretching           Goals  Goal Identifier: HEP  Goal Description: Patient will be independent with HEP and self management   Target Date: 01/07/22    Goal Identifier: ROM  Goal Description: Patient will turn head to check her blind spot while driving with pain 2/10 or less  Target Date: 01/07/22                                                                      Therapy Frequency:  2 times/Week  Predicted Duration of Therapy Intervention:  6 weeks    Gasper Hernandez, PT                 I CERTIFY THE NEED FOR THESE SERVICES FURNISHED UNDER        THIS PLAN OF TREATMENT AND WHILE UNDER MY CARE     (Physician co-signature of this document indicates review and certification of the therapy plan).                       Certification Date From:  11/08/21   Certification Date To:  01/07/22    Referring Provider:  Dr. Jack Curtis    Initial Assessment        See Epic Evaluation Start of Care Date: 11/08/21 11/08/21 0700   General Information   Type of Visit Initial OP Ortho PT Evaluation   Start of Care Date 11/08/21  "  Referring Physician Dr. Jack Curtis   Patient/Family Goals Statement \"move neck better\"   Orders Evaluate and Treat   Certification Required? Yes   Medical Diagnosis Cervical spine pain, Myofascial pain, Arthropathy of cervical facet joint, S/P cervical spinal fusion, Neural foraminal stenosis of cervical spine   Body Part(s)   Body Part(s) Cervical Spine   Presentation and Etiology   Pertinent history of current problem (include personal factors and/or comorbidities that impact the POC) Patient presents with neck pain and stiffness with history cervical spine surgery.   Impairments A. Pain;D. Decreased ROM;E. Decreased flexibility   Symptom Location neck left side> right   Onset date of current episode/exacerbation 04/01/21   Pain rating (0-10 point scale) Best (/10);Worst (/10);Other   Best (/10) 4   Worst (/10) 8   Pain rating comment today 7/10   Fall Risk Screen   Fall screen completed by PT   Have you fallen 2 or more times in the past year? Yes   Have you fallen and had an injury in the past year? Yes   Is patient a fall risk? No   Abuse Screen (yes response referral indicated)   Feels Unsafe at Home or Work/School no   Feels Threatened by Someone no   Does Anyone Try to Keep You From Having Contact with Others or Doing Things Outside Your Home? no   Physical Signs of Abuse Present no   Functional Scales   Functional Scales   (NDI 42%)   Cervical Spine   Cervical Flexion ROM WNL   Cervical Extension ROM 60   Cervical Right Side Bending ROM 30 pain on left   Cervical Left Side Bending ROM 30   Cervical Right Rotation ROM 30   Cervical Left Rotation ROM 40   Thoracic Flexion ROM min   Thoracic Extension ROM mod   Thoracic Right Side Bending ROM mod   Thoracic Left Sidebending ROM  mod   Thoracic Right Rotation mod   Thoracic Left Rotation mod   Shoulder Shrug (C2-C4) Strength 5   Shoulder Abd (C5) Strength 5   Elbow Flexion (C5, C6) Strength 5   Elbow Extension (C7) Strength 5   Wrist Extension (C6) " Strength 5   Wrist Flexion (C7) Strength 5   Thumb Abd (C8) Strength 5   5th Finger Add (T1) Strength 5   Pectoralis Minor Flexibility min   Palpation muscle spasm with myofascial restriction: suboccipitals, upper trap, scalenes  L>R   Planned Therapy Interventions   Planned Therapy Interventions joint mobilization;manual therapy;neuromuscular re-education;ROM;strengthening;stretching   Clinical Impression   Criteria for Skilled Therapeutic Interventions Met yes, treatment indicated   PT Diagnosis decreased activity tolerance   Influenced by the following impairments pain, decreased cervical AROM, decreased flexibility, poor posture   Functional limitations due to impairments difficulty turn head for conversation, difficulty turning her head to check blindspot while driving.   Clinical Decision Making (Complexity) Low complexity   Therapy Frequency 2 times/Week   Predicted Duration of Therapy Intervention (days/wks) 6 weeks   Risk & Benefits of therapy have been explained Yes   Patient, Family & other staff in agreement with plan of care Yes   Clinical Impression Comments Patient presents with left > right neck pain and stiffness s/p cervical spine surgery.   ORTHO GOALS   PT Ortho Eval Goals 1;2;3   Ortho Goal 1   Goal Identifier HEP   Goal Description Patient will be independent with HEP and self management    Target Date 01/07/22   Ortho Goal 2   Goal Identifier ROM   Goal Description Patient will turn head to check her blind spot while driving with pain 2/10 or less   Target Date 01/07/22   Total Evaluation Time   PT Eval, Low Complexity Minutes (12232) 20   Therapy Certification   Certification date from 11/08/21   Certification date to 01/07/22   Medical Diagnosis Cervical spine pain, Myofascial pain, Arthropathy of cervical facet joint, S/P cervical spinal fusion, Neural foraminal stenosis of cervical spine

## 2021-11-10 ENCOUNTER — OFFICE VISIT (OUTPATIENT)
Dept: PHYSICAL MEDICINE AND REHAB | Facility: CLINIC | Age: 56
End: 2021-11-10
Payer: COMMERCIAL

## 2021-11-10 VITALS — HEART RATE: 93 BPM | DIASTOLIC BLOOD PRESSURE: 74 MMHG | SYSTOLIC BLOOD PRESSURE: 120 MMHG

## 2021-11-10 DIAGNOSIS — M75.42 ROTATOR CUFF IMPINGEMENT SYNDROME OF LEFT SHOULDER: ICD-10-CM

## 2021-11-10 DIAGNOSIS — M79.18 MYOFASCIAL PAIN: ICD-10-CM

## 2021-11-10 DIAGNOSIS — M25.512 ACUTE PAIN OF LEFT SHOULDER: Primary | ICD-10-CM

## 2021-11-10 DIAGNOSIS — Z98.1 S/P CERVICAL SPINAL FUSION: ICD-10-CM

## 2021-11-10 PROCEDURE — 20610 DRAIN/INJ JOINT/BURSA W/O US: CPT | Mod: LT | Performed by: PHYSICAL MEDICINE & REHABILITATION

## 2021-11-10 PROCEDURE — 99214 OFFICE O/P EST MOD 30 MIN: CPT | Mod: 25 | Performed by: PHYSICAL MEDICINE & REHABILITATION

## 2021-11-10 RX ORDER — TIZANIDINE 2 MG/1
2-4 TABLET ORAL 3 TIMES DAILY
Qty: 60 TABLET | Refills: 1 | Status: SHIPPED | OUTPATIENT
Start: 2021-11-10 | End: 2021-11-10

## 2021-11-10 RX ORDER — METHYLPREDNISOLONE ACETATE 40 MG/ML
40 INJECTION, SUSPENSION INTRA-ARTICULAR; INTRALESIONAL; INTRAMUSCULAR; SOFT TISSUE ONCE
Status: COMPLETED | OUTPATIENT
Start: 2021-11-10 | End: 2021-11-10

## 2021-11-10 RX ORDER — TIZANIDINE 2 MG/1
2-4 TABLET ORAL 2 TIMES DAILY PRN
Qty: 60 TABLET | Refills: 1 | Status: SHIPPED | OUTPATIENT
Start: 2021-11-10 | End: 2021-12-20

## 2021-11-10 RX ADMIN — METHYLPREDNISOLONE ACETATE 40 MG: 40 INJECTION, SUSPENSION INTRA-ARTICULAR; INTRALESIONAL; INTRAMUSCULAR; SOFT TISSUE at 11:51

## 2021-11-10 ASSESSMENT — PAIN SCALES - GENERAL: PAINLEVEL: SEVERE PAIN (6)

## 2021-11-10 NOTE — PATIENT INSTRUCTIONS
1. Left subacromial bursa injection today    2. Stop baclofen and trial tizanidine.  Tizanidine (muscle relaxant medication) has been prescribed today. Please take 1-2 tabs . This medication may cause drowsiness. Please do not work or drive while taking this medication until you know how it effects you. If it does make you drowsy, you should only take it before bedtime or at times that you do not have to work/drive.  3. Continue with physical therapy    Lina Chanel, DO                                                    Cristela Salguero, MANJIT Curtis,  DO                                                                                CHAD Camacho, DO                                                                                       DISCHARGE INSTRUCTIONS  During office hours (8:00 a.m.- 4:30 p.m.) questions or concerns may be answered  by calling Spine Navigation Nurses at [phone]. If you experience any problems after hours please call [phone] and you will be connected to [facility].     All Patients:  ? You may experience an increase in your symptoms or have some soreness from the injection for the first 2 days (It may take anywhere between 2 days- 2 weeks for the steroid to have maximum effect).  ? You may use ice on the injection site, as frequently as 20 minutes each hour if needed.  ? You may continue taking your regular medication.  ? You may shower. No swimming, tub bath or hot tub for 48 hours.  You may remove your   bandaid/bandage as soon as you are home.  ? You may resume light activities, as tolerated unless otherwise directed.  ? Resume your usual diet as tolerated.      POSSIBLE STEROID SIDE EFFECTS   (If steroid/cortisone was used for your procedure)  -If you experience these symptoms, it should only last for a short period  Swelling of the legs        Skin redness (flushing)  Mouth (oral) irritation   Blood sugar (glucose) levels       Sweats                          Mood changes  Severe headache                  Sleeplessness            POSSIBLE PROCEDURE SIDE EFFECTS  -Call the Spine Center if you are concerned  Increased Pain      Bruising/bleeding at site                  Redness or swelling  Fever greater than 100.5            Diffuse rash            THESE INSTRUCTIONS HAVE BEEN EXPLAINED TO THE PATIENT AND THE PATIENT/PATIENT REPRESENTATITVE HAS VERBALIZED UNDERSTANDING.  A COPY OF THE INSTRUCTIONS HAVE BEEN GIVEN TO THE PATIENT/PATIENT REPRESENTATIVE.
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
## 2021-11-11 ENCOUNTER — HOSPITAL ENCOUNTER (OUTPATIENT)
Dept: PHYSICAL THERAPY | Facility: REHABILITATION | Age: 56
End: 2021-11-11
Payer: COMMERCIAL

## 2021-11-11 DIAGNOSIS — M54.2 CERVICAL SPINE PAIN: Primary | ICD-10-CM

## 2021-11-11 DIAGNOSIS — Z98.1 S/P CERVICAL SPINAL FUSION: ICD-10-CM

## 2021-11-11 DIAGNOSIS — M47.812 ARTHROPATHY OF CERVICAL FACET JOINT: ICD-10-CM

## 2021-11-11 DIAGNOSIS — M79.18 MYOFASCIAL PAIN: ICD-10-CM

## 2021-11-11 PROCEDURE — 97140 MANUAL THERAPY 1/> REGIONS: CPT | Mod: GP | Performed by: PHYSICAL THERAPIST

## 2021-11-15 ENCOUNTER — HOSPITAL ENCOUNTER (OUTPATIENT)
Dept: PHYSICAL THERAPY | Facility: REHABILITATION | Age: 56
End: 2021-11-15
Payer: COMMERCIAL

## 2021-11-15 ENCOUNTER — PATIENT OUTREACH (OUTPATIENT)
Dept: NURSING | Facility: CLINIC | Age: 56
End: 2021-11-15

## 2021-11-15 DIAGNOSIS — M54.2 CERVICAL SPINE PAIN: Primary | ICD-10-CM

## 2021-11-15 DIAGNOSIS — M79.18 MYOFASCIAL PAIN: ICD-10-CM

## 2021-11-15 DIAGNOSIS — M47.812 ARTHROPATHY OF CERVICAL FACET JOINT: ICD-10-CM

## 2021-11-15 PROCEDURE — 97140 MANUAL THERAPY 1/> REGIONS: CPT | Mod: GP | Performed by: PHYSICAL THERAPIST

## 2021-11-15 NOTE — PROGRESS NOTES
11/15/2021  Clinic Care Coordination Contact    Community Health Worker Follow Up    Care Gaps:     Health Maintenance Due   Topic Date Due     PREVENTIVE CARE VISIT  Never done     ASTHMA ACTION PLAN  Never done     DEPRESSION ACTION PLAN  Never done     ASTHMA CONTROL TEST  06/08/2021     INFLUENZA VACCINE (1) 09/01/2021     ZOSTER IMMUNIZATION (2 of 2) 11/24/2021     MAMMO SCREENING  12/08/2021       Care Gaps Last addressed on 11-15-21 did not follwo up on care gaps today     Goals:   Goals Addressed as of 11/15/2021 at 3:49 PM                    Today    10/12/21       Psychosocial (pt-stated)   30%  30%    Added 7/19/21 by Bartolome Blanco, [facility]      Goal Statement: I want to get the [employer] Care Directive forms in the mail and complete it within 1-2 months  Date Goal set: 07/19/21 updated: 11-15-21  Barriers:   Strengths:   Date to Achieve By: 1-2022  Patient expressed understanding of goal: Yes  Action steps to achieve this goal:  1. I will wait to get the Health Care Directive in the mail.  2. I will review and complete the Health Care Directive mailed by [facility]  2. I will update CCC team at outreaches on the status     Updated: 11-15-21 AL                Intervention and Education during outreach:   Called and spoke to patient and follow up on goal.  Patient reported:  -did not get the ACP in the mail  CHW will mail out the short form this Thursday  -daughter helping with disability benefit process and application.      CHW Follow up: Monthly    CHW Plan: mail ACP form shor 2 paged  Follow up on goal    CHW Next Follow Up: 12-16-21

## 2021-11-16 NOTE — TELEPHONE ENCOUNTER
Mask/Supply order has been faxed to [facility] today.   Other documents faxed: Face sheet, copy of insurance card, and last office visit note 2017.     Fax: [phone]    Gena WILKERSON CMA, SLEEP MEDICINE, 11/16/2021 2:23 PM

## 2021-11-18 ENCOUNTER — HOSPITAL ENCOUNTER (OUTPATIENT)
Dept: PHYSICAL THERAPY | Facility: REHABILITATION | Age: 56
End: 2021-11-18
Payer: COMMERCIAL

## 2021-11-18 DIAGNOSIS — M54.2 CERVICAL SPINE PAIN: Primary | ICD-10-CM

## 2021-11-18 DIAGNOSIS — M79.18 MYOFASCIAL PAIN: ICD-10-CM

## 2021-11-18 PROCEDURE — 97140 MANUAL THERAPY 1/> REGIONS: CPT | Mod: GP | Performed by: PHYSICAL THERAPIST

## 2021-11-26 ENCOUNTER — TELEPHONE (OUTPATIENT)
Dept: PHYSICAL THERAPY | Facility: REHABILITATION | Age: 56
End: 2021-11-26

## 2021-11-29 ENCOUNTER — HOSPITAL ENCOUNTER (OUTPATIENT)
Dept: PHYSICAL THERAPY | Facility: REHABILITATION | Age: 56
End: 2021-11-29
Payer: COMMERCIAL

## 2021-11-29 DIAGNOSIS — M79.18 MYOFASCIAL PAIN: ICD-10-CM

## 2021-11-29 DIAGNOSIS — M48.02 NEURAL FORAMINAL STENOSIS OF CERVICAL SPINE: ICD-10-CM

## 2021-11-29 DIAGNOSIS — M54.2 CERVICAL SPINE PAIN: Primary | ICD-10-CM

## 2021-11-29 DIAGNOSIS — M47.812 ARTHROPATHY OF CERVICAL FACET JOINT: ICD-10-CM

## 2021-11-29 DIAGNOSIS — Z98.1 S/P CERVICAL SPINAL FUSION: ICD-10-CM

## 2021-11-29 PROCEDURE — 97140 MANUAL THERAPY 1/> REGIONS: CPT | Mod: GP | Performed by: PHYSICAL THERAPIST

## 2021-12-09 ENCOUNTER — HOSPITAL ENCOUNTER (OUTPATIENT)
Dept: PHYSICAL THERAPY | Facility: REHABILITATION | Age: 56
End: 2021-12-09
Payer: COMMERCIAL

## 2021-12-09 DIAGNOSIS — M47.812 ARTHROPATHY OF CERVICAL FACET JOINT: ICD-10-CM

## 2021-12-09 DIAGNOSIS — M79.18 MYOFASCIAL PAIN: ICD-10-CM

## 2021-12-09 DIAGNOSIS — Z98.1 S/P CERVICAL SPINAL FUSION: ICD-10-CM

## 2021-12-09 DIAGNOSIS — M48.02 NEURAL FORAMINAL STENOSIS OF CERVICAL SPINE: ICD-10-CM

## 2021-12-09 DIAGNOSIS — M54.2 CERVICAL SPINE PAIN: Primary | ICD-10-CM

## 2021-12-09 PROCEDURE — 97140 MANUAL THERAPY 1/> REGIONS: CPT | Mod: GP | Performed by: PHYSICAL THERAPIST

## 2021-12-13 ENCOUNTER — HOSPITAL ENCOUNTER (OUTPATIENT)
Dept: PHYSICAL THERAPY | Facility: REHABILITATION | Age: 56
End: 2021-12-13
Payer: COMMERCIAL

## 2021-12-13 DIAGNOSIS — M79.18 MYOFASCIAL PAIN: ICD-10-CM

## 2021-12-13 DIAGNOSIS — Z98.1 S/P CERVICAL SPINAL FUSION: ICD-10-CM

## 2021-12-13 DIAGNOSIS — M54.2 CERVICAL SPINE PAIN: Primary | ICD-10-CM

## 2021-12-13 DIAGNOSIS — M47.812 ARTHROPATHY OF CERVICAL FACET JOINT: ICD-10-CM

## 2021-12-13 DIAGNOSIS — M48.02 NEURAL FORAMINAL STENOSIS OF CERVICAL SPINE: ICD-10-CM

## 2021-12-13 PROCEDURE — 97140 MANUAL THERAPY 1/> REGIONS: CPT | Mod: GP | Performed by: PHYSICAL THERAPIST

## 2021-12-16 ENCOUNTER — PATIENT OUTREACH (OUTPATIENT)
Dept: CARE COORDINATION | Facility: CLINIC | Age: 56
End: 2021-12-16

## 2021-12-16 ENCOUNTER — HOSPITAL ENCOUNTER (OUTPATIENT)
Dept: PHYSICAL THERAPY | Facility: REHABILITATION | Age: 56
End: 2021-12-16
Payer: COMMERCIAL

## 2021-12-16 DIAGNOSIS — M79.18 MYOFASCIAL PAIN: ICD-10-CM

## 2021-12-16 DIAGNOSIS — M47.812 ARTHROPATHY OF CERVICAL FACET JOINT: ICD-10-CM

## 2021-12-16 DIAGNOSIS — M54.2 CERVICAL SPINE PAIN: Primary | ICD-10-CM

## 2021-12-16 DIAGNOSIS — Z98.1 S/P CERVICAL SPINAL FUSION: ICD-10-CM

## 2021-12-16 DIAGNOSIS — M48.02 NEURAL FORAMINAL STENOSIS OF CERVICAL SPINE: ICD-10-CM

## 2021-12-16 PROCEDURE — 97140 MANUAL THERAPY 1/> REGIONS: CPT | Mod: GP | Performed by: PHYSICAL THERAPIST

## 2021-12-16 NOTE — PROGRESS NOTES
12/16/2021  Clinic Care Coordination Contact  [facility]/Voicemail       Clinical Data: Care Coordinator Outreach  Outreach attempted x 1.  Left message on patient's voicemail with call back information and requested return call.  Plan: Care Coordinator  will try to reach patient again in 10 business days.    CHW sent MyChart message follow up on Health Care Directive forms.    CHW Follow up : 12-30-21

## 2021-12-17 ENCOUNTER — PATIENT OUTREACH (OUTPATIENT)
Dept: CARE COORDINATION | Facility: CLINIC | Age: 56
End: 2021-12-17
Payer: COMMERCIAL

## 2021-12-17 NOTE — PROGRESS NOTES
Clinic Care Coordination Contact    Situation: Patient chart reviewed by carecoordinator.    Background: SW completed chart review    Assessment: Patient was unable to be reached by CHW    Plan/Recommendations: Standard Outreach

## 2021-12-20 ENCOUNTER — VIRTUAL VISIT (OUTPATIENT)
Dept: FAMILY MEDICINE | Facility: CLINIC | Age: 56
End: 2021-12-20
Payer: COMMERCIAL

## 2021-12-20 DIAGNOSIS — G47.00 INSOMNIA, UNSPECIFIED TYPE: Primary | ICD-10-CM

## 2021-12-20 DIAGNOSIS — E03.9 HYPOTHYROIDISM, UNSPECIFIED TYPE: ICD-10-CM

## 2021-12-20 DIAGNOSIS — Z12.31 VISIT FOR SCREENING MAMMOGRAM: ICD-10-CM

## 2021-12-20 PROCEDURE — 99213 OFFICE O/P EST LOW 20 MIN: CPT | Mod: 95 | Performed by: FAMILY MEDICINE

## 2021-12-20 RX ORDER — TRAZODONE HYDROCHLORIDE 50 MG/1
50-100 TABLET, FILM COATED ORAL AT BEDTIME
Qty: 30 TABLET | Refills: 0 | Status: SHIPPED | OUTPATIENT
Start: 2021-12-20 | End: 2022-01-14

## 2021-12-20 ASSESSMENT — ASTHMA QUESTIONNAIRES
QUESTION_3 LAST FOUR WEEKS HOW OFTEN DID YOUR ASTHMA SYMPTOMS (WHEEZING, COUGHING, SHORTNESS OF BREATH, CHEST TIGHTNESS OR PAIN) WAKE YOU UP AT NIGHT OR EARLIER THAN USUAL IN THE MORNING: NOT AT ALL
ACT_TOTALSCORE: 25
QUESTION_4 LAST FOUR WEEKS HOW OFTEN HAVE YOU USED YOUR RESCUE INHALER OR NEBULIZER MEDICATION (SUCH AS ALBUTEROL): NOT AT ALL
QUESTION_5 LAST FOUR WEEKS HOW WOULD YOU RATE YOUR ASTHMA CONTROL: COMPLETELY CONTROLLED
QUESTION_2 LAST FOUR WEEKS HOW OFTEN HAVE YOU HAD SHORTNESS OF BREATH: NOT AT ALL
QUESTION_1 LAST FOUR WEEKS HOW MUCH OF THE TIME DID YOUR ASTHMA KEEP YOU FROM GETTING AS MUCH DONE AT WORK, SCHOOL OR AT HOME: NONE OF THE TIME

## 2021-12-21 ENCOUNTER — HOSPITAL ENCOUNTER (OUTPATIENT)
Dept: PHYSICAL THERAPY | Facility: REHABILITATION | Age: 56
End: 2021-12-21
Payer: COMMERCIAL

## 2021-12-21 DIAGNOSIS — M54.2 CERVICAL SPINE PAIN: Primary | ICD-10-CM

## 2021-12-21 DIAGNOSIS — M47.812 ARTHROPATHY OF CERVICAL FACET JOINT: ICD-10-CM

## 2021-12-21 DIAGNOSIS — M79.18 MYOFASCIAL PAIN: ICD-10-CM

## 2021-12-21 DIAGNOSIS — Z98.1 S/P CERVICAL SPINAL FUSION: ICD-10-CM

## 2021-12-21 PROCEDURE — 97140 MANUAL THERAPY 1/> REGIONS: CPT | Mod: GP | Performed by: PHYSICAL THERAPIST

## 2021-12-21 ASSESSMENT — ASTHMA QUESTIONNAIRES: ACT_TOTALSCORE: 25

## 2021-12-26 ENCOUNTER — OFFICE VISIT (OUTPATIENT)
Dept: FAMILY MEDICINE | Facility: CLINIC | Age: 56
End: 2021-12-26
Payer: COMMERCIAL

## 2021-12-26 ENCOUNTER — HOSPITAL ENCOUNTER (EMERGENCY)
Facility: HOSPITAL | Age: 56
Discharge: LEFT BEFORE DISCHARGE | End: 2021-12-26
Attending: EMERGENCY MEDICINE
Payer: COMMERCIAL

## 2021-12-26 VITALS
HEART RATE: 71 BPM | RESPIRATION RATE: 18 BRPM | SYSTOLIC BLOOD PRESSURE: 96 MMHG | OXYGEN SATURATION: 97 % | DIASTOLIC BLOOD PRESSURE: 66 MMHG | TEMPERATURE: 97.9 F

## 2021-12-26 VITALS
OXYGEN SATURATION: 93 % | BODY MASS INDEX: 38.27 KG/M2 | WEIGHT: 216.05 LBS | SYSTOLIC BLOOD PRESSURE: 118 MMHG | DIASTOLIC BLOOD PRESSURE: 55 MMHG | RESPIRATION RATE: 16 BRPM | HEART RATE: 71 BPM | TEMPERATURE: 98.1 F

## 2021-12-26 DIAGNOSIS — M54.50 ACUTE MIDLINE LOW BACK PAIN WITHOUT SCIATICA: Primary | ICD-10-CM

## 2021-12-26 DIAGNOSIS — R32 URINARY INCONTINENCE, UNSPECIFIED TYPE: ICD-10-CM

## 2021-12-26 DIAGNOSIS — Z87.898 HISTORY OF URINARY INCONTINENCE: ICD-10-CM

## 2021-12-26 PROCEDURE — 99214 OFFICE O/P EST MOD 30 MIN: CPT | Performed by: STUDENT IN AN ORGANIZED HEALTH CARE EDUCATION/TRAINING PROGRAM

## 2021-12-26 RX ORDER — LEVOTHYROXINE SODIUM 175 UG/1
TABLET ORAL
COMMUNITY
Start: 2021-10-07 | End: 2022-02-14

## 2021-12-26 NOTE — PROGRESS NOTES
SUBJECTIVE:  Susan Kwan is an 56 year old female who presents for back pain.  Patient presents with a fall 6 days ago where she feels like she hurt her neck and lower back, where she has had chronic spinal issues requiring surgery in both places. She is some new pain in her bilateral shoulders radiating up into her neck but no numbness or tingling or pain down her arms. Her lower back pain is largely focal in the midline and she does not have any new shooting pains or numbness or tingling down her legs, but she did have a new episode of urinary incontinence today a couple of hours before presentation to our office which was very unusual for her. She does not typically have incontinence and states she was just lifting a very light bag when this happened and lost a significant amount of urine. She does not feel like she had any significantly increased intrathoracic pressure or like she push the urine out. Rather, it just kind of fell out. She has not had saddle anesthesia and has not had problems walking.    PMH:   has a past medical history of Anxiety, Asthma, Carpal tunnel syndrome, Chronic back pain, Depression, Disease of thyroid gland, History of anesthesia complications, Low back pain, Migraine, Narcotic dependence, in remission (H), DESI on CPAP, Pregnancy, and Substance abuse (H).  Patient Active Problem List   Diagnosis     Hypothyroidism     Social History     Socioeconomic History     Marital status:      Spouse name: None     Number of children: None     Years of education: None     Highest education level: None   Occupational History     None   Tobacco Use     Smoking status: Current Every Day Smoker     Packs/day: 1.00     Years: 35.00     Pack years: 35.00     Types: Cigarettes     Last attempt to quit: 3/19/2021     Years since quittin.7     Smokeless tobacco: Never Used   Substance and Sexual Activity     Alcohol use: Not Currently     Comment: Occasionally, Twice per year     Drug use:  Not Currently     Sexual activity: Not Currently     Partners: Female     Birth control/protection: Post-menopausal   Other Topics Concern     Parent/sibling w/ CABG, MI or angioplasty before 65F 55M? No   Social History Narrative     None     Social Determinants of Health     Financial Resource Strain: Low Risk      Difficulty of Paying Living Expenses: Not hard at all   Food Insecurity: No Food Insecurity     Worried About Running Out of Food in the Last Year: Never true     Ran Out of Food in the Last Year: Never true   Transportation Needs: No Transportation Needs     Lack of Transportation (Medical): No     Lack of Transportation (Non-Medical): No   Physical Activity: Inactive     Days of Exercise per Week: 0 days     Minutes of Exercise per Session: 0 min   Stress: No Stress Concern Present     Feeling of Stress : Only a little   Social Connections: Moderately Integrated     Frequency of Communication with Friends and Family: More than three times a week     Frequency of Social Gatherings with Friends and Family: More than three times a week     Attends [employer] Services: Never     Active Member of Clubs or Organizations: Yes     Attends Club or Organization Meetings: 1 to 4 times per year     Marital Status: Living with partner   Intimate Partner Violence: Not At Risk     Fear of Current or Ex-Partner: No     Emotionally Abused: No     Physically Abused: No     Sexually Abused: No   Housing Stability: Low Risk      Unable to Pay for Housing in the Last Year: No     Number of Places Lived in the Last Year: 1     Unstable Housing in the Last Year: No     Family History   Problem Relation Age of Onset     Heart Disease Daughter         WPW,  at age 3     Diabetes Paternal Grandmother      Cerebrovascular Disease Paternal Grandfather      Sleep Apnea Father      Colon Cancer Father      Breast Cancer Maternal Grandmother      Heart Disease Mother      No Known Problems Son        ALLERGIES:  Ceftin and  Sulfa drugs    Current Outpatient Medications   Medication     albuterol 90 MCG/ACT inhaler     baclofen (LIORESAL) 10 MG tablet     buPROPion (WELLBUTRIN XL) 150 MG 24 hr tablet     FLUoxetine (PROZAC) 20 MG capsule     gabapentin (NEURONTIN) 300 MG capsule     levothyroxine (SYNTHROID/LEVOTHROID) 175 MCG tablet     simvastatin (ZOCOR) 20 MG tablet     traZODone (DESYREL) 50 MG tablet     levothyroxine (SYNTHROID, LEVOTHROID) 200 MCG tablet     levothyroxine (SYNTHROID, LEVOTHROID) 50 MCG tablet     nicotine (NICORETTE) 2 MG gum     No current facility-administered medications for this visit.         ROS:  ROS is done and is negative for general/constitutional, eye, ENT, Respiratory, cardiovascular, GI, , Skin, musculoskeletal except as noted elsewhere.  All other review of systems negative except as noted elsewhere.    OBJECTIVE:  BP 96/66   Pulse 71   Temp 97.9  F (36.6  C) (Oral)   Resp 18   LMP 03/09/2010   SpO2 97%   GENERAL APPEARANCE: Alert, in no acute distress.  EYES: Conjunctivae clear.  EARS: External ears normal.  NOSE: Normal, no drainage.  OROPHARYNX: MMM.  NECK: Supple, symmetrical.  RESP: no increased effort.  CV: good capillary refill.  ABDOMEN: nondistended.  SKIN: No ulcers, lesions or rash.  MUSCULOSKELETAL: No gross deformities. Neck range of motion significantly limited and patient states it is less than her baseline. There is some midline tenderness both in her cervical and lumbar spine that the patient states is new.  NEURO: No gross deficits, CN 2-12 grossly intact. Gait, strength, tone intact and symmetric across all extremities.    RESULTS  No results found for any visits on 12/26/21.  No results found for this or any previous visit (from the past 48 hour(s)).    ASSESSMENT/PLAN:  (M54.50) Acute midline low back pain without sciatica  (primary encounter diagnosis)  Comment: Although it is to be expected to have some worsening of chronic neck and back pain with a fall, it is  concerning that she had a new episode of incontinence today in the context of her worsening pain after the fall, even in the absence of other symptoms. As such, it seems that she needs to be worked up in an emergency department as below and patient verbalized understanding. Called [facility] emergency department and handed off to the receiving provider.  Plan: As above.    (R32) Urinary incontinence, unspecified type  Comment: Patient's urinary incontinence does not sound consistent with overflow incontinence or what would be expected with a Valsalva-type maneuver. In the context of her spinal issues I am concerned about a possible spinal cord process with this new incontinence, although she is otherwise neuro intact and does not have saddle anesthesia. Regardless, it seemed that she needs work-up in a higher level of care where imaging may be obtained if needed.  Plan: As above.    PPE worn: N95, goggles.    Options for treatment and follow-up care were reviewed with the patient and/or guardian. Susan Kwan and/or guardian engaged in the decision making process and verbalized understanding of the options discussed and agreed with the final plan.    See [facility] for orders, medications, letters, patient instructions    Kulwinder Londono MD

## 2021-12-26 NOTE — ED PROVIDER NOTES
Expected Patient Referral to ED  5:52 PM    Referring Clinic/Provider:  [facility]    Reason for referral/Clinical facts:  Patient has history of prior cervical and lumbar surgery.  She has some chronic neck and back pain.  Patient was reported to have a fall yesterday with some increase in both neck and lower back pain.  Patient presented to the urgent care with episode of urinary incontinence.  Patient is being referred for decision for imaging/MRI    Recommendations provided:  Send to ED for further evaluation    Caller was informed that this institution does possess the capabilities and/or resources to provide for patient and should be transferred to our facility.        Informed caller that recommendations provided are recommendations based only on the facts provided and that they responsible to accept or reject the advice, or to seek a formal in person consultation as needed and that this ED will see/treat patient should they arrive.      Migue Iverson DO  Emergency Medicine  [facility] EMERGENCY DEPARTMENT  [address] [place] [id number]  [phone]     Migue Iverson DO  12/26/21 4507

## 2021-12-27 ENCOUNTER — PATIENT OUTREACH (OUTPATIENT)
Dept: CARE COORDINATION | Facility: CLINIC | Age: 56
End: 2021-12-27

## 2021-12-27 ENCOUNTER — OFFICE VISIT (OUTPATIENT)
Dept: PHYSICAL MEDICINE AND REHAB | Facility: CLINIC | Age: 56
End: 2021-12-27
Payer: COMMERCIAL

## 2021-12-27 VITALS
SYSTOLIC BLOOD PRESSURE: 101 MMHG | DIASTOLIC BLOOD PRESSURE: 62 MMHG | BODY MASS INDEX: 38.09 KG/M2 | WEIGHT: 215 LBS | HEART RATE: 81 BPM | OXYGEN SATURATION: 96 %

## 2021-12-27 DIAGNOSIS — M79.18 MYOFASCIAL PAIN: ICD-10-CM

## 2021-12-27 DIAGNOSIS — M54.6 PAIN IN THORACIC SPINE: ICD-10-CM
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
## 2021-12-27 DIAGNOSIS — Z98.1 S/P CERVICAL SPINAL FUSION: ICD-10-CM

## 2021-12-27 DIAGNOSIS — M54.50 LUMBAR SPINE PAIN: ICD-10-CM

## 2021-12-27 DIAGNOSIS — Z98.1 S/P LUMBAR FUSION: ICD-10-CM

## 2021-12-27 PROCEDURE — 99214 OFFICE O/P EST MOD 30 MIN: CPT | Performed by: PHYSICAL MEDICINE & REHABILITATION

## 2021-12-27 RX ORDER — TIZANIDINE 2 MG/1
2-4 TABLET ORAL 3 TIMES DAILY PRN
Qty: 60 TABLET | Refills: 0 | Status: SHIPPED | OUTPATIENT
Start: 2021-12-27 | End: 2022-01-25 | Stop reason: ALTCHOICE

## 2021-12-27 ASSESSMENT — PAIN SCALES - GENERAL: PAINLEVEL: EXTREME PAIN (8)
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
## 2021-12-27 NOTE — PATIENT INSTRUCTIONS
1. Try tizanidine in place of baclofen.  Please take 1-2 tabs three times daily. This medication may cause drowsiness. Please do not work or drive while taking this medication until you know how it effects you. If it does make you drowsy, you should only take it before bedtime or at times that you do not have to work/drive.    2.  An CT and xray was ordered for you today.  You will be contacted by scheduling within 3 days.    If you are not contacted, please call Radiology at [phone].      3. Tylenol #3 was prescribed for you today Please lock this medication up when you are not taking it. Do not share this medication with other people. Do not increase the dose without permission from your physician. Do not drink alcohol while you take this medication as this can lead to death. Do not take other pain medications without approval from your physician or this can also lead to death. If you need a refill of this medication, you must come in to clinic by appointment. Please call if you have any questions on how to take this medication.      4. A physical therapy order was provided for you today.  You  will be contacted by physical therapy.  If nobody contacts you within 3 to 5 days, please contact the clinic at [phone].  It will be very important for you to do your physical therapy exercises on a regular basis to decrease your pain and prevent future pain flares.
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
## 2021-12-27 NOTE — ED PROVIDER NOTES
EMERGENCY DEPARTMENT NOTE     Name: Susan Kwan    Age/Sex: 56 year old female   MRN: [id number]   Evaluation Date & Time:  12/26/2021  8:26 PM    PCP:    Earline Jimenez   ED Provider: Migue Iverson D.O.       CHIEF COMPLAINT    Fall and Urinary incontinence       DIAGNOSIS & DISPOSITION     DISPOSITION: Patient was not in the waiting room when I attempted to evaluate the patient and has left without being seen after triage.     Migue Iverson, DO  12/27/21 0138

## 2021-12-27 NOTE — PROGRESS NOTES
Clinic Care Coordination Contact    Situation: Patient chart reviewed by care coordinator.    Background: SW completed chart review    Assessment: Patient had urinary incontinence prior to office visit. Provider suggested to go to ED. Patient went to ED but then was not seen due to not being in the waiting room when called. Patient attended follow up and has follow ups scheduled.     Plan/Recommendations: Standard outreach

## 2021-12-27 NOTE — ED TRIAGE NOTES
Pt states she had a fall last Monday and a fall the previous Thursday due to her knees giving out.  Pt denies hitting head.  Pt denies taking any blood thinners.  Pt also states she had urinary incontinence at 1500 today which she states has never happened before.

## 2021-12-27 NOTE — PROGRESS NOTES
12/27/2021  Clinic Care Coordination Contact  Care Team Conversations    Review chart  Patient connected with CC SW today 12-27-21 post hospital  CHW deferred today's follow up to next month.    CHW Follow up: Monthly    CHW Plan: Follow up on goals    CHW Next Follow Up: 1-18-22

## 2021-12-29 ENCOUNTER — MYC MEDICAL ADVICE (OUTPATIENT)
Dept: PHYSICAL MEDICINE AND REHAB | Facility: CLINIC | Age: 56
End: 2021-12-29
Payer: COMMERCIAL

## 2021-12-29 NOTE — TELEPHONE ENCOUNTER
Called patient to discuss her MyChart message. States her pain is the same as when seen on 12/27/21. Reports the AM is when it hurts the most. Suggested she take 1 ES Tylenol with 1 Tylenol #3 to see if that gives her beneficial pain relief. Stated understanding.     She is scheduled for the ordered imaging tomorrow. Explained she would be called once the images have been reviewed by PSP.

## 2021-12-29 NOTE — TELEPHONE ENCOUNTER
Called patient to explain she can use the Tizanidine as prescribed as well. States she has been using it. Suggested she try the addition of ES Tylenol with the ES Tylenol as was discussed earlier. Stated understanding.

## 2021-12-30 ENCOUNTER — HOSPITAL ENCOUNTER (OUTPATIENT)
Dept: RADIOLOGY | Facility: CLINIC | Age: 56
End: 2021-12-30
Attending: PHYSICAL MEDICINE & REHABILITATION
Payer: COMMERCIAL

## 2021-12-30 ENCOUNTER — TELEPHONE (OUTPATIENT)
Dept: PHYSICAL MEDICINE AND REHAB | Facility: CLINIC | Age: 56
End: 2021-12-30
Payer: COMMERCIAL

## 2021-12-30 ENCOUNTER — HOSPITAL ENCOUNTER (OUTPATIENT)
Dept: CT IMAGING | Facility: CLINIC | Age: 56
End: 2021-12-30
Attending: PHYSICAL MEDICINE & REHABILITATION
Payer: COMMERCIAL

## 2021-12-30 DIAGNOSIS — Z98.1 S/P LUMBAR FUSION: ICD-10-CM
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
## 2021-12-30 DIAGNOSIS — M54.50 LUMBAR SPINE PAIN: ICD-10-CM

## 2021-12-30 DIAGNOSIS — M54.6 PAIN IN THORACIC SPINE: ICD-10-CM

## 2021-12-30 PROCEDURE — 72131 CT LUMBAR SPINE W/O DYE: CPT

## 2021-12-30 PROCEDURE — 72070 X-RAY EXAM THORAC SPINE 2VWS: CPT

## 2021-12-30 NOTE — TELEPHONE ENCOUNTER
Patient was informed and transferred to  to schedule a F/U with Dr. Curtis for med refillreview images.

## 2021-12-30 NOTE — TELEPHONE ENCOUNTER
Phone call to patient to review results and provider's recommendations. Results given and explained. Discussed PSP wanting her to continuing with PT as well as following up with him next week to discuss images as well as medication refill. Schedule shows she had been scheduled earlier for an appointment with Cristela Salguero CNP. Transferred to scheduling to change appointment to be with Jack Curtis DO.

## 2021-12-30 NOTE — TELEPHONE ENCOUNTER
Patient needs an appointment to refill this medication. Patient has had a recent CT scan. Would recommend following up in clinic if she needs more pain medication

## 2021-12-30 NOTE — TELEPHONE ENCOUNTER
----- Message from Jack Curtis DO sent at 12/30/2021  2:37 PM CST -----  CT scan lumbar spine imaging report personally reviewed. No fractures. L4-5 fusion hardware in place without complications. Mild posterior slippage above the fusion at L3-4. This results in some potential mild nerve impingement at that level. He also have degenerative changes of your sacroiliac joints.      Thoracic imaging shows spinal cord stimulator in place. No new fractures    Please continue the plan of physical therapy and follow-up with me next week.

## 2022-01-03 ENCOUNTER — HOSPITAL ENCOUNTER (OUTPATIENT)
Dept: PHYSICAL THERAPY | Facility: REHABILITATION | Age: 57
End: 2022-01-03
Payer: COMMERCIAL

## 2022-01-03 ENCOUNTER — LAB (OUTPATIENT)
Dept: LAB | Facility: CLINIC | Age: 57
End: 2022-01-03

## 2022-01-03 DIAGNOSIS — E03.9 HYPOTHYROIDISM, UNSPECIFIED TYPE: ICD-10-CM

## 2022-01-03 DIAGNOSIS — Z98.1 S/P LUMBAR FUSION: ICD-10-CM

## 2022-01-03 DIAGNOSIS — M54.50 LUMBAR SPINE PAIN: ICD-10-CM

## 2022-01-03 DIAGNOSIS — M54.6 PAIN IN THORACIC SPINE: ICD-10-CM

## 2022-01-03 DIAGNOSIS — M54.16 LUMBAR RADICULOPATHY: Primary | ICD-10-CM

## 2022-01-03 DIAGNOSIS — R29.898 WEAKNESS OF RIGHT LOWER EXTREMITY: ICD-10-CM
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
## 2022-01-03 DIAGNOSIS — M79.18 MYOFASCIAL PAIN: ICD-10-CM

## 2022-01-03 DIAGNOSIS — Z98.1 S/P CERVICAL SPINAL FUSION: ICD-10-CM

## 2022-01-03 LAB
ANION GAP SERPL CALCULATED.3IONS-SCNC: 13 MMOL/L (ref 5–18)
BUN SERPL-MCNC: 16 MG/DL (ref 8–22)
CALCIUM SERPL-MCNC: 9.9 MG/DL (ref 8.5–10.5)
CHLORIDE BLD-SCNC: 105 MMOL/L (ref 98–107)
CHOLEST SERPL-MCNC: 188 MG/DL
CO2 SERPL-SCNC: 24 MMOL/L (ref 22–31)
CREAT SERPL-MCNC: 0.93 MG/DL (ref 0.6–1.1)
FASTING STATUS PATIENT QL REPORTED: YES
GFR SERPL CREATININE-BSD FRML MDRD: 73 ML/MIN/1.73M2
GLUCOSE BLD-MCNC: 113 MG/DL (ref 70–125)
HDLC SERPL-MCNC: 35 MG/DL
LDLC SERPL CALC-MCNC: 77 MG/DL
POTASSIUM BLD-SCNC: 4.6 MMOL/L (ref 3.5–5)
SODIUM SERPL-SCNC: 142 MMOL/L (ref 136–145)
TRIGL SERPL-MCNC: 379 MG/DL
TSH SERPL DL<=0.005 MIU/L-ACNC: 0.51 UIU/ML (ref 0.3–5)

## 2022-01-03 PROCEDURE — 97110 THERAPEUTIC EXERCISES: CPT | Mod: GP | Performed by: PHYSICAL THERAPIST

## 2022-01-03 PROCEDURE — 97161 PT EVAL LOW COMPLEX 20 MIN: CPT | Mod: GP | Performed by: PHYSICAL THERAPIST

## 2022-01-03 PROCEDURE — 84443 ASSAY THYROID STIM HORMONE: CPT

## 2022-01-03 PROCEDURE — 80048 BASIC METABOLIC PNL TOTAL CA: CPT

## 2022-01-03 PROCEDURE — 36415 COLL VENOUS BLD VENIPUNCTURE: CPT

## 2022-01-03 PROCEDURE — 80061 LIPID PANEL: CPT

## 2022-01-03 NOTE — PROGRESS NOTES
1/3/22:  Current HEP review: neck stretches, neck AROM, chin tuck    Date 1/3/22   Exercise    Review of cervical stretches: AROM, doorway pec stretch, rhomboid stretch review   Cervical isometrics: ext, SB and rot Hold 5-10 seconds x 5-10 reps   Stretches: sitting hamstring, SKC, QL Hold 30 seconds x 1-3 reps   Did not feel sitting nerve irritability so did not issue today   Supine LE nerve glide 12-15 reps x 3-5X/day

## 2022-01-03 NOTE — PROGRESS NOTES
[facility]    OUTPATIENT PHYSICAL THERAPY ORTHOPEDIC EVALUATION  PLAN OF TREATMENT FOR OUTPATIENT REHABILITATION  (COMPLETE FOR INITIAL CLAIMS ONLY)  Patient's Last Name, First Name, M.I.  YOB: 1965  Susan Kwan    Provider s Name:  [facility]   Medical Record No.  [id number]   Start of Care Date:  01/03/22   Onset Date:  04/01/21   Type:     _X__PT   ___OT   ___SLP Medical Diagnosis:  cervical, thoracic and lumbar pain, fall risk     PT Diagnosis:  Lumbar, thoracic and cervical pain   Visits from SOC:  1      _________________________________________________________________________________  Plan of Treatment/Functional Goals:  joint mobilization,manual therapy,neuromuscular re-education,ROM,strengthening,stretching           Goals     Goal Description: Patient will be independent with HEP and self management of symptoms  Target Date: 03/03/22    Goal Identifier: ROM  Goal Description: Patient will be abled to turn head to check her blind spot while driving with decrease pain of 0-2/10.    Target Date: 03/03/22               Goal Identifier: stand  Goal Description: able to stand 45 minutes to prep a meal or line at the store with less pain of 2/10  Target Date: 04/02/22    Goal Identifier: walking  Goal Description: able to walk with more ease and less fear of falling as she no longer feels like she wants to use her walker for support with ambulation  Target Date: 04/02/22                           This certification also includes the re-cert for her neck from her 11/8/21 evaluation.  Will continue to see her for neck and add her back for the next couple months.            Therapy Frequency:  2 times/Week  Predicted Duration of Therapy Intervention:  10 visits, davis Ozuna, PT                 I CERTIFY THE NEED FOR THESE SERVICES FURNISHED UNDER         THIS PLAN OF TREATMENT AND WHILE UNDER MY CARE     (Physician co-signature of this document indicates review and certification of the therapy plan).                       Certification Date From:  01/03/22   Certification Date To:  04/02/22    Referring Provider:  Dr. Curtis    Initial Assessment        See Epic Evaluation Start of Care Date: 01/03/22 01/03/22 1300   General Information   Type of Visit Initial OP Ortho PT Evaluation   Start of Care Date 01/03/22   Referring Physician Dr. Curtis   Orders Evaluate and Treat   Certification Required? Yes   Medical Diagnosis Cervical, thoracic and Lumbar pain   Surgical/Medical history reviewed Yes   General Information Comments HX of fusions in neck and back, spinal cord stimulator, fell twice so fall risk   Body Part(s)   Body Part(s) Lumbar Spine/SI   Presentation and Etiology   Pertinent history of current problem (include personal factors and/or comorbidities that impact the POC) Hx of lumbar surgeries in 2017 and 12/2020 in which many symptoms had resolved after the seconds.  She then had neck surgery in 4/2021.  She had intermittent back issues since the surgeries.  She was in therapy for her neck starting 11/2021 but not her back.  In December she had 2 falls and some incontinence and is going to start treating her low back as well.  she did have a CT of her low back.   Impairments A. Pain;D. Decreased ROM;E. Decreased flexibility;F. Decreased strength and endurance;G. Impaired balance;L. Tingling   Functional Limitations perform activities of daily living;perform desired leisure / sports activities   Symptom Location she has cramping/eduardo horse type pain in the posterior R buttocks and posterior thigh. bilateral low back with R>L   How/Where did it occur From insidious onset;Other   Onset date of current episode/exacerbation 04/01/21   Pain rating (0-10 point scale) Best  (/10);Worst (/10)   Best (/10) 6   Worst (/10) 8   Pain quality D. Burning;E. Shooting;G. Cramping   Frequency of pain/symptoms A. Constant   Pain/symptoms are: Worse in the morning   Pain/symptoms exacerbated by A. Sitting;M. Other   Pain exacerbation comment wakes due to pain,    Pain/symptoms eased by I. OTC medication(s);K. Other   Pain eased by comment muscle relaxers, walking,    Progression of symptoms since onset: Unchanged   Current / Previous Interventions   Diagnostic Tests: X-ray;CT scan   Prior Level of Function   Prior Level of Function-Mobility walking was easier, now fears she may fall   Fall Risk Screen   Fall screen completed by PT   Have you fallen 2 or more times in the past year? Yes   Have you fallen and had an injury in the past year? Yes   Is patient a fall risk? Yes   Abuse Screen (yes response referral indicated)   Feels Unsafe at Home or Work/School no   Feels Threatened by Someone no   Does Anyone Try to Keep You From Having Contact with Others or Doing Things Outside Your Home? no   Physical Signs of Abuse Present no   Functional Scales   Functional Scales Other   Other Scales  oswestry: 54, NDI: 48   Lumbar Spine/SI Objective Findings   Posture pes planus, decrease lumbar lordosis   Balance/Proprioception (Single Leg Stance) APTA: 6, no hands, pt stopped with 5 seconds left   Flexion ROM hands to knees, increase pain   Extension ROM 50% limited, slight pain   Right Side Bending ROM pain   Left Side Bending ROM pain   Hip Flexion (L2) Strength R5-, L=5   Hip Adduction Strength B 5-   Knee Flexion Strength R=5-, L=5   Knee Extension (L3) Strength 5+5-, L=5   Ankle Dorsiflexion (L4) Strength struggles with heel walking on right   Ankle Plantar Flexion (S1) Strength able to toe walk   Hamstring Flexibility unable to determine due to pain in posterior thighs   Hip Flexor Flexibility fair to good   Piriformis Flexibility good   SLR + R   Crossover SLR neg   Slump Test pain   Segmental  Mobility unable to determine due to pain   Palpation pain over mid thoracic through lumbar parapsinals, QL R>L   Planned Therapy Interventions   Planned Therapy Interventions joint mobilization;manual therapy;neuromuscular re-education;ROM;strengthening;stretching   Clinical Impression   Criteria for Skilled Therapeutic Interventions Met yes, treatment indicated   PT Diagnosis Lumbar, thoracic and cervical pain   Influenced by the following impairments pain to palpation, decrease LE strength, pain with AROM, decrese AROM, fair flexibility in spine   Functional limitations due to impairments standing, walking, wakes with and because of pain, fell   Clinical Presentation Stable/Uncomplicated   Clinical Decision Making (Complexity) Low complexity   Therapy Frequency 2 times/Week   Predicted Duration of Therapy Intervention (days/wks) 10 visits, prn   Risk & Benefits of therapy have been explained Yes   Patient, Family & other staff in agreement with plan of care Yes   Clinical Impression Comments Pt presents for an evaluation on her low back and falls risk.  She is currently in PT for her neck but is adding the low back.  She demonstrates slight leg weakness, functional leg weakness, decrease AROM of lumbar and pain, and + SLR.  Feel she will benefit from continued treatment of her neck and the addition of low back, core strenghtening as well as LE strength to help prevent falls.    ORTHO GOALS   PT Ortho Eval Goals 4;5   Ortho Goal 1   Goal Description Patient will be independent with HEP and self management of symptoms   Goal Progress she is making progress toward her HEP but has not been as compliant recently.  will continue goal   Target Date 03/03/22   Ortho Goal 2   Goal Identifier ROM   Goal Description Patient will be abled to turn head to check her blind spot while driving with decrease pain of 0-2/10.     Goal Progress AROM slightly decreased due to falls in December.  Will continue goal   Target Date  03/03/22   Ortho Goal 4   Goal Identifier stand   Goal Description able to stand 45 minutes to prep a meal or line at the store with less pain of 2/10   Target Date 04/02/22   Ortho Goal 5   Goal Identifier walking   Goal Description able to walk with more ease and less fear of falling as she no longer feels like she wants to use her walker for support with ambulation   Target Date 04/02/22   Total Evaluation Time   PT Eval, Low Complexity Minutes (27202) 20   Therapy Certification   Certification date from 01/03/22   Certification date to 04/02/22   Medical Diagnosis cervical, thoracic and lumbar pain, fall risk

## 2022-01-04 ENCOUNTER — OFFICE VISIT (OUTPATIENT)
Dept: PHYSICAL MEDICINE AND REHAB | Facility: CLINIC | Age: 57
End: 2022-01-04
Payer: COMMERCIAL

## 2022-01-04 VITALS — SYSTOLIC BLOOD PRESSURE: 110 MMHG | HEART RATE: 67 BPM | TEMPERATURE: 97.9 F | DIASTOLIC BLOOD PRESSURE: 55 MMHG

## 2022-01-04 DIAGNOSIS — Z98.1 S/P LUMBAR FUSION: ICD-10-CM

## 2022-01-04 DIAGNOSIS — M54.16 LUMBAR RADICULAR PAIN: Primary | ICD-10-CM
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
## 2022-01-04 DIAGNOSIS — M79.18 MYOFASCIAL PAIN: ICD-10-CM

## 2022-01-04 PROCEDURE — 99214 OFFICE O/P EST MOD 30 MIN: CPT | Performed by: PHYSICAL MEDICINE & REHABILITATION

## 2022-01-04 RX ORDER — HYDROCODONE BITARTRATE AND ACETAMINOPHEN 5; 325 MG/1; MG/1
.5-1 TABLET ORAL 3 TIMES DAILY PRN
Qty: 20 TABLET | Refills: 0 | Status: SHIPPED | OUTPATIENT
Start: 2022-01-04 | End: 2022-01-11

## 2022-01-04 ASSESSMENT — PAIN SCALES - GENERAL: PAINLEVEL: SEVERE PAIN (7)
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
## 2022-01-04 NOTE — PATIENT INSTRUCTIONS
1. An CT Myelogram was ordered for you today.  You will be contacted by scheduling within 3 days.    If you are not contacted, please call Radiology at [phone].         2. Hydrocodone/acetaminophen  was prescribed for you today Please lock this medication up when you are not taking it. Do not share this medication with other people. Do not increase the dose without permission from your physician. Do not drink alcohol while you take this medication as this can lead to death. Do not take other pain medications without approval from your physician or this can also lead to death. If you need a refill of this medication, you must come in to clinic by appointment. Please call if you have any questions on how to take this medication.
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
## 2022-01-12 DIAGNOSIS — G47.00 INSOMNIA, UNSPECIFIED TYPE: ICD-10-CM

## 2022-01-14 ENCOUNTER — OFFICE VISIT (OUTPATIENT)
Dept: PHYSICAL MEDICINE AND REHAB | Facility: CLINIC | Age: 57
End: 2022-01-14
Payer: COMMERCIAL

## 2022-01-14 VITALS — SYSTOLIC BLOOD PRESSURE: 106 MMHG | HEART RATE: 97 BPM | DIASTOLIC BLOOD PRESSURE: 63 MMHG
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
## 2022-01-14 DIAGNOSIS — M54.16 LUMBAR RADICULAR PAIN: Primary | ICD-10-CM

## 2022-01-14 DIAGNOSIS — Z98.1 S/P LUMBAR FUSION: ICD-10-CM

## 2022-01-14 PROCEDURE — 99214 OFFICE O/P EST MOD 30 MIN: CPT | Performed by: PHYSICAL MEDICINE & REHABILITATION

## 2022-01-14 RX ORDER — TRAZODONE HYDROCHLORIDE 50 MG/1
50-100 TABLET, FILM COATED ORAL AT BEDTIME
Qty: 180 TABLET | Refills: 1 | Status: SHIPPED | OUTPATIENT
Start: 2022-01-14 | End: 2022-08-15

## 2022-01-14 RX ORDER — OXYCODONE AND ACETAMINOPHEN 5; 325 MG/1; MG/1
1 TABLET ORAL 3 TIMES DAILY PRN
Qty: 21 TABLET | Refills: 0 | Status: SHIPPED | OUTPATIENT
Start: 2022-01-14 | End: 2022-01-25

## 2022-01-14 ASSESSMENT — PAIN SCALES - GENERAL: PAINLEVEL: EXTREME PAIN (9)
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
## 2022-01-14 NOTE — TELEPHONE ENCOUNTER
"Last Written Prescription Date:  12/20/21  Last Fill Quantity: 30,  # refills: 0   Last office visit provider:  7/7/21     Requested Prescriptions   Pending Prescriptions Disp Refills     traZODone (DESYREL) 50 MG tablet 30 tablet 0     Sig: Take 1-2 tablets ( mg) by mouth At Bedtime       Serotonin Modulators Passed - 1/12/2022  9:25 AM        Passed - Recent (12 mo) or future (30 days) visit within the authorizing provider's specialty     Patient has had an office visit with the authorizing provider or a provider within the authorizing providers department within the previous 12 mos or has a future within next 30 days. See \"Patient Info\" tab in inbasket, or \"Choose Columns\" in Meds & Orders section of the refill encounter.              Passed - Medication is active on med list        Passed - Patient is age 18 or older        Passed - No active pregnancy on record        Passed - No positive pregnancy test in past 12 months             Severiano Lombardi RN 01/14/22 3:14 PM  "
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
## 2022-01-14 NOTE — PATIENT INSTRUCTIONS
1.    Oxycodone/acetaminophen  was prescribed for you today Please lock this medication up when you are not taking it. Do not share this medication with other people. Do not increase the dose without permission from your physician. Do not drink alcohol while you take this medication as this can lead to death. Do not take other pain medications without approval from your physician or this can also lead to death. If you need a refill of this medication, you must come in to clinic by appointment. Please call if you have any questions on how to take this medication.    2. Continue the plan of CT Myelogram

## 2022-01-17 ENCOUNTER — HOSPITAL ENCOUNTER (OUTPATIENT)
Dept: PHYSICAL THERAPY | Facility: REHABILITATION | Age: 57
End: 2022-01-17
Payer: COMMERCIAL
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
## 2022-01-17 DIAGNOSIS — M54.50 LUMBAR SPINE PAIN: ICD-10-CM

## 2022-01-17 DIAGNOSIS — M79.18 MYOFASCIAL PAIN: ICD-10-CM

## 2022-01-17 DIAGNOSIS — Z98.1 S/P LUMBAR FUSION: ICD-10-CM

## 2022-01-17 DIAGNOSIS — Z98.1 S/P CERVICAL SPINAL FUSION: ICD-10-CM

## 2022-01-17 DIAGNOSIS — M54.6 PAIN IN THORACIC SPINE: ICD-10-CM

## 2022-01-17 PROCEDURE — 97110 THERAPEUTIC EXERCISES: CPT | Mod: GP | Performed by: PHYSICAL THERAPIST

## 2022-01-17 NOTE — PROGRESS NOTES
1/3/22:  Current HEP review: neck stretches, neck AROM, chin tuck    Date 1/17/22 1/3/22   Exercise     Review of cervical stretches: AROM, doorway pec stretch, rhomboid stretch  review   Cervical isometrics: ext, SB and rot  Hold 5-10 seconds x 5-10 reps   Stretches: sitting hamstring, SKC, QL  Hold 30 seconds x 1-3 reps   Did not feel sitting nerve irritability so did not issue  today   Supine LE nerve glide  12-15 reps x 3-5X/day   Core engagement: TAr, kegel and buttocks today                                    :1/17/22:  Edu: posture-sitting and standing: handout issued  Body mechanics: pivot, push, pull, vacuum, sweep, grocery cart, overhead lift, level lift, power position, golfer's lift-handout issued  Assessment: Pt returns and has been compliant with all of her HEP.  She did see the MD and received some medication to help with the pain.  Today we edu pt on proper body mechanics and posture and she found them very helpful.  She also felt the core engagement will be helpful with different daily tasks to help with the core stabilization.

## 2022-01-18 ENCOUNTER — PATIENT OUTREACH (OUTPATIENT)
Dept: NURSING | Facility: CLINIC | Age: 57
End: 2022-01-18
Payer: COMMERCIAL

## 2022-01-18 NOTE — PROGRESS NOTES
1/18/2022  Clinic Care Coordination Contact    Community Health Worker Follow Up    Care Gaps:     Health Maintenance Due   Topic Date Due     PREVENTIVE CARE VISIT  Never done     ASTHMA ACTION PLAN  Never done     DEPRESSION ACTION PLAN  Never done     INFLUENZA VACCINE (1) 09/01/2021     ZOSTER IMMUNIZATION (2 of 2) 11/24/2021     MAMMO SCREENING  12/08/2021     DTAP/TDAP/TD IMMUNIZATION (2 - Td or Tdap) 01/04/2022     PHQ-9  01/07/2022       Care Gap Goal set: No    Goals:   Goals Addressed as of 1/18/2022 at 4:10 PM                    Today    11/15/21       Psychosocial (pt-stated)   30%  30%    Added 7/19/21 by Bartolome Blanco, [facility]      Goal Statement: I want to get the [employer] Care Directive forms in the mail and complete it within 1-2 months  Date Goal set: 07/19/21 updated: 11-15-21  Barriers:   Strengths:   Date to Achieve By: 1-2022  Patient expressed understanding of goal: Yes  Action steps to achieve this goal:  1. I did not receive the ACP form I will wait to get it in the mail   2.CHW will send ACP form again this week 1-10-22  3.  I will review and complete the Health Care Directive mailed by [facility]  4. I will update CCC team at outreaches on the status     Updated: 1-18-22 AL                Intervention and Education during outreach:   Called and spoke to patient and follow up on goal.  Patient reported:  -did not get the ACP form.  Verify address in the chart.  CHW will send ACP form again this week.  Patient can update in [employer] ([employer]) if she received it.      CHW Follow up: Monthly    CHW Plan: Follow up on goals    CHW Next Follow Up: 2-17-22

## 2022-01-20 ENCOUNTER — TELEPHONE (OUTPATIENT)
Dept: INTERVENTIONAL RADIOLOGY/VASCULAR | Facility: HOSPITAL | Age: 57
End: 2022-01-20
Payer: COMMERCIAL

## 2022-01-24 ENCOUNTER — TELEPHONE (OUTPATIENT)
Dept: PHYSICAL THERAPY | Facility: REHABILITATION | Age: 57
End: 2022-01-24

## 2022-01-24 NOTE — TELEPHONE ENCOUNTER
LM on  re NS on 1/24/22.  Asked pt to call and confirm her 9:00am appt for 1/26/22 if she will or will not be attending.  Reminded pt of our NS policy.    Marta Ozuna, PT

## 2022-01-25 ENCOUNTER — VIRTUAL VISIT (OUTPATIENT)
Dept: PHYSICAL MEDICINE AND REHAB | Facility: CLINIC | Age: 57
End: 2022-01-25
Payer: COMMERCIAL

## 2022-01-25 DIAGNOSIS — M54.16 LUMBAR RADICULAR PAIN: ICD-10-CM
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
## 2022-01-25 DIAGNOSIS — Z98.1 S/P LUMBAR FUSION: ICD-10-CM

## 2022-01-25 PROCEDURE — 99214 OFFICE O/P EST MOD 30 MIN: CPT | Mod: 95 | Performed by: PHYSICAL MEDICINE & REHABILITATION

## 2022-01-25 RX ORDER — OXYCODONE AND ACETAMINOPHEN 5; 325 MG/1; MG/1
1 TABLET ORAL 3 TIMES DAILY PRN
Qty: 21 TABLET | Refills: 0 | Status: SHIPPED | OUTPATIENT
Start: 2022-01-25 | End: 2022-02-01

## 2022-01-25 ASSESSMENT — PAIN SCALES - GENERAL: PAINLEVEL: SEVERE PAIN (7)
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
## 2022-01-25 NOTE — PATIENT INSTRUCTIONS
1  Oxycodone/acetaminophen was prescribed for you today Please lock this medication up when you are not taking it. Do not share this medication with other people. Do not increase the dose without permission from your physician. Do not drink alcohol while you take this medication as this can lead to death. Do not take other pain medications without approval from your physician or this can also lead to death. If you need a refill of this medication, you must come in to clinic by appointment. Please call if you have any questions on how to take this medication.    2.  Please keep your appointment for CT myelogram tomorrow.    3.  If your pain becomes uncontrollable or you have any significant new weakness please present to the emergency department.

## 2022-01-26 ENCOUNTER — HOSPITAL ENCOUNTER (OUTPATIENT)
Dept: RADIOLOGY | Facility: HOSPITAL | Age: 57
End: 2022-01-26
Attending: PHYSICAL MEDICINE & REHABILITATION
Payer: COMMERCIAL

## 2022-01-26 ENCOUNTER — HOSPITAL ENCOUNTER (OUTPATIENT)
Dept: PHYSICAL THERAPY | Facility: REHABILITATION | Age: 57
End: 2022-01-26
Payer: COMMERCIAL

## 2022-01-26 ENCOUNTER — HOSPITAL ENCOUNTER (OUTPATIENT)
Dept: CT IMAGING | Facility: HOSPITAL | Age: 57
End: 2022-01-26
Attending: PHYSICAL MEDICINE & REHABILITATION
Payer: COMMERCIAL

## 2022-01-26 VITALS
HEART RATE: 62 BPM | RESPIRATION RATE: 20 BRPM | TEMPERATURE: 97.7 F | OXYGEN SATURATION: 91 % | DIASTOLIC BLOOD PRESSURE: 54 MMHG | SYSTOLIC BLOOD PRESSURE: 100 MMHG

## 2022-01-26 DIAGNOSIS — Z98.1 S/P CERVICAL SPINAL FUSION: ICD-10-CM
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
## 2022-01-26 DIAGNOSIS — M54.50 LUMBAR SPINE PAIN: ICD-10-CM
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
## 2022-01-26 DIAGNOSIS — M79.18 MYOFASCIAL PAIN: ICD-10-CM

## 2022-01-26 DIAGNOSIS — M54.6 PAIN IN THORACIC SPINE: ICD-10-CM

## 2022-01-26 DIAGNOSIS — M54.16 LUMBAR RADICULAR PAIN: ICD-10-CM

## 2022-01-26 DIAGNOSIS — Z98.1 S/P LUMBAR FUSION: ICD-10-CM

## 2022-01-26 PROCEDURE — 72132 CT LUMBAR SPINE W/DYE: CPT

## 2022-01-26 PROCEDURE — 97110 THERAPEUTIC EXERCISES: CPT | Mod: GP | Performed by: PHYSICAL THERAPIST

## 2022-01-26 PROCEDURE — 62304 MYELOGRAPHY LUMBAR INJECTION: CPT

## 2022-01-26 PROCEDURE — 255N000002 HC RX 255 OP 636: Performed by: PHYSICAL MEDICINE & REHABILITATION

## 2022-01-26 RX ORDER — ONDANSETRON 2 MG/ML
4 INJECTION INTRAMUSCULAR; INTRAVENOUS
Status: DISCONTINUED | OUTPATIENT
Start: 2022-01-26 | End: 2022-01-27 | Stop reason: HOSPADM

## 2022-01-26 RX ORDER — ACETAMINOPHEN 325 MG/1
650 TABLET ORAL
Status: DISCONTINUED | OUTPATIENT
Start: 2022-01-26 | End: 2022-01-27 | Stop reason: HOSPADM

## 2022-01-26 RX ADMIN — IOHEXOL 20 ML: 180 INJECTION INTRAVENOUS at 13:56

## 2022-01-26 NOTE — PROGRESS NOTES
1/3/22:  Current HEP review: neck stretches, neck AROM, chin tuck    Date 1/25/22 1/17/22 1/3/22   Exercise      Review of cervical stretches: AROM, doorway pec stretch, rhomboid stretch   review   Cervical isometrics: ext, SB and rot   Hold 5-10 seconds x 5-10 reps   Stretches: sitting hamstring, SKC, QL   Hold 30 seconds x 1-3 reps   Did not feel sitting nerve irritability so did not issue   today   Supine LE nerve glide   12-15 reps x 3-5X/day   Core engagement: TAr, kegel and buttocks  today    Bridges with knees over bolster Hold 10 seconds X 6-12 reps     Supine, abdominals two leg marching (3B) Work up to 20     clamshells X 20     Sitting hip isometric add, squeezing pillow Hold 10 seconds x 6 reps     Reverse wall push-up Hold 10 seconds x 6 reps     Wall bernabe X 20       :1/17/22:  Edu: posture-sitting and standing: handout issued  Body mechanics: pivot, push, pull, vacuum, sweep, grocery cart, overhead lift, level lift, power position, golfer's lift-handout issued    Assessment: Pt returns and has been compliant with all of her HEP.  Her symptoms have progressed with bladder issues so is getting imaging later today. Pt wanted to focus on core ex so was able to add a few that she can do at home.  She felt they were a good challenge and will work on them daily to add stability with the core engagement.

## 2022-01-26 NOTE — PROGRESS NOTES
Pt given verbal discharge instructions over the phone. She states she understood instructions. Pt  Left stable.

## 2022-01-26 NOTE — PROGRESS NOTES
Pt states she has not had any nausea or headache since she has been back from her myelogram.  Her pain level has been at a 5/10.

## 2022-01-26 NOTE — PROCEDURES
[facility]   Post Procedure Note ~    Patient Name: Susan Kwan  MRN: [id number]  Date of Procedure: January 26, 2022    Procedure: Lumbar puncture for myelogram    Radiologist: Kareem Hussein MD    Contrast: 20 ml Omnipaque 180    Fluoro Time: 0.5 minutes    EBL: minimal    Complications: none    Patient reevaluated immediately prior to sedation and prior to procedure.    Preliminary Report:   (See dictation for full detail)  Technically successful lumbar puncture at L2-3 level for lumbar myelogram.    Assess/Plan:  Routine post procedure orders.  To CT for myelogram imaging.  Discharge when meets criteria.    Kareem Hussein MD   Emergency pager: [phone]  Office: [phone]

## 2022-01-27 ENCOUNTER — TELEPHONE (OUTPATIENT)
Dept: PHYSICAL MEDICINE AND REHAB | Facility: CLINIC | Age: 57
End: 2022-01-27
Payer: COMMERCIAL

## 2022-01-27 ENCOUNTER — TELEPHONE (OUTPATIENT)
Dept: INTERVENTIONAL RADIOLOGY/VASCULAR | Facility: HOSPITAL | Age: 57
End: 2022-01-27
Payer: COMMERCIAL

## 2022-01-27 ENCOUNTER — PATIENT OUTREACH (OUTPATIENT)
Dept: CARE COORDINATION | Facility: CLINIC | Age: 57
End: 2022-01-27
Payer: COMMERCIAL

## 2022-01-27 NOTE — TELEPHONE ENCOUNTER
----- Message from Jack Curtis DO sent at 1/27/2022 12:28 PM CST -----  Please contact the patient and inform her that I have reviewed the CT myelogram.  There is a broad-based disc herniation/bulge at L4-5 and L3-4 resulting in moderate spinal stenosis.  Difficult to know if there is any acute component to the disc bulges/herniation.    I would recommend a right L3-4 and L4-5 transforaminal epidural steroid injection given the severity of her pain.

## 2022-01-27 NOTE — TELEPHONE ENCOUNTER
Call placed to patient with provider's results and recommendations.  Pt stated understanding. Pt would like appointment with Dr. Curtis to review her imaging and discuss. Transferred pt to scheduling to make appt.

## 2022-01-27 NOTE — PROGRESS NOTES
Clinic Care Coordination Contact    Situation: Patient chart reviewed by care coordinator.    Background: SW completed chart review    Assessment: CHW in contact with patient. Patient attending follow up visits      Plan/Recommendations: Standard outreach

## 2022-01-31 ENCOUNTER — HOSPITAL ENCOUNTER (OUTPATIENT)
Dept: PHYSICAL THERAPY | Facility: REHABILITATION | Age: 57
End: 2022-01-31
Payer: COMMERCIAL

## 2022-01-31 DIAGNOSIS — M54.6 PAIN IN THORACIC SPINE: ICD-10-CM

## 2022-01-31 DIAGNOSIS — M79.18 MYOFASCIAL PAIN: ICD-10-CM

## 2022-01-31 DIAGNOSIS — Z98.1 S/P LUMBAR FUSION: ICD-10-CM
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
## 2022-01-31 DIAGNOSIS — Z98.1 S/P CERVICAL SPINAL FUSION: ICD-10-CM

## 2022-01-31 DIAGNOSIS — M54.50 LUMBAR SPINE PAIN: ICD-10-CM

## 2022-01-31 PROCEDURE — 97110 THERAPEUTIC EXERCISES: CPT | Mod: GP | Performed by: PHYSICAL THERAPIST

## 2022-01-31 NOTE — PROGRESS NOTES
1/3/22:  Current HEP review: neck stretches, neck AROM, chin tuck    Date 1/31/22 1/25/22 1/17/22 1/3/22   Exercise       Review of cervical stretches: AROM, doorway pec stretch, rhomboid stretch    review   Cervical isometrics: ext, SB and rot    Hold 5-10 seconds x 5-10 reps   Stretches: sitting hamstring, SKC, QL    Hold 30 seconds x 1-3 reps   Did not feel sitting nerve irritability so did not issue    today   Supine LE nerve glide    12-15 reps x 3-5X/day   Nu-step, seat 7 3 min warm up      Core engagement: TAr, kegel and buttocks   today    Bridges with knees over bolster  Hold 10 seconds X 6-12 reps     Supine, abdominals two leg marching (3B)  Work up to 20     clamshells  X 20     Sitting hip isometric add, squeezing pillow  Hold 10 seconds x 6 reps     Reverse wall push-up  Hold 10 seconds x 6 reps     Wall bernabe  X 20     Green tband: scapular retraction X 20      Green tband: shoulder ext X 20      Green tband: PNF D2 shoulder pattern X 20        :1/17/22:  Edu: posture-sitting and standing: handout issued  Body mechanics: pivot, push, pull, vacuum, sweep, grocery cart, overhead lift, level lift, power position, golfer's lift-handout issued    Assessment: Pt returns and has been compliant with all of her HEP. She did not need or want to review ex issued last time but did want some midback strengthening to help with posture.  She had a CT and myelogram but isn't sure on all the results.  She will see the MD tomorrow to get her results. She feels she may be having surgery so may return for 1 more visit then want to discharge.  She will see how the MD visit goes.

## 2022-02-01 ENCOUNTER — OFFICE VISIT (OUTPATIENT)
Dept: PHYSICAL MEDICINE AND REHAB | Facility: CLINIC | Age: 57
End: 2022-02-01
Attending: PHYSICAL MEDICINE & REHABILITATION
Payer: COMMERCIAL

## 2022-02-01 VITALS — SYSTOLIC BLOOD PRESSURE: 93 MMHG | DIASTOLIC BLOOD PRESSURE: 61 MMHG | HEART RATE: 80 BPM

## 2022-02-01 DIAGNOSIS — M54.16 LUMBAR RADICULAR PAIN: Primary | ICD-10-CM

## 2022-02-01 DIAGNOSIS — Z98.1 S/P LUMBAR FUSION: ICD-10-CM
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
## 2022-02-01 DIAGNOSIS — M48.061 STENOSIS OF LATERAL RECESS OF LUMBAR SPINE: ICD-10-CM

## 2022-02-01 PROCEDURE — 99214 OFFICE O/P EST MOD 30 MIN: CPT | Performed by: PHYSICAL MEDICINE & REHABILITATION

## 2022-02-01 RX ORDER — OXYCODONE AND ACETAMINOPHEN 5; 325 MG/1; MG/1
1 TABLET ORAL 3 TIMES DAILY PRN
Qty: 21 TABLET | Refills: 0 | Status: SHIPPED | OUTPATIENT
Start: 2022-02-01 | End: 2022-03-08

## 2022-02-01 ASSESSMENT — PAIN SCALES - GENERAL: PAINLEVEL: SEVERE PAIN (7)

## 2022-02-01 NOTE — PATIENT INSTRUCTIONS
1. A Lumbar episural has been ordered today. Please schedule this injection at least 1-2 weeks from now to allow time for insurance prior authorization. On the day of your injection, you cannot be sick or taking antibiotics. If you become sick and are prescribed, please call the clinic so your injection can be rescheduled for once you have completed your antibiotics. You will need to bring a  with you for your injection. If you have any questions or concerns prior to your injection, please do not hesitate to call the nurse navigation line at [phone].    2. Try to decrease oxycodone to 1 tab in the morning and 1/2 tab in the afternoon and 1/2 in the evening.    3. Complete physical therapy
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
## 2022-02-02 ENCOUNTER — HOSPITAL ENCOUNTER (OUTPATIENT)
Dept: PHYSICAL THERAPY | Facility: REHABILITATION | Age: 57
End: 2022-02-02
Payer: COMMERCIAL

## 2022-02-02 DIAGNOSIS — M54.50 LUMBAR SPINE PAIN: Primary | ICD-10-CM

## 2022-02-02 DIAGNOSIS — M54.2 CERVICAL SPINE PAIN: ICD-10-CM

## 2022-02-02 PROCEDURE — 97110 THERAPEUTIC EXERCISES: CPT | Mod: GP

## 2022-02-02 NOTE — PROGRESS NOTES
"  1/3/22:  Current HEP review: neck stretches, neck AROM, chin tuck    Date 2/2/22 1/31/22 1/25/22 1/17/22 1/3/22   Exercise        Review of cervical stretches: AROM, doorway pec stretch, rhomboid stretch     review   Cervical isometrics: ext, SB and rot     Hold 5-10 seconds x 5-10 reps   Stretches: sitting hamstring, SKC, QL     Hold 30 seconds x 1-3 reps   Did not feel sitting nerve irritability so did not issue     today   LTR* Bx10       Supine LE nerve glide     12-15 reps x 3-5X/day   Nu-step, seat 7 x3 min RL:5 3 min warm up      Core engagement: TAr, kegel and buttocks    today    Bridges with knees over bolster Hold 5\" x15  Hold 10 seconds X 6-12 reps     Supine, abdominals two leg marching (3B) Standing ab set + marches Bx20  Work up to 20     clamshells Bx10  X 20     Seated hip abduction* Yellow theraband x10       Sitting hip isometric add, squeezing pillow   Hold 10 seconds x 6 reps     Reverse wall push-up   Hold 10 seconds x 6 reps     Wall bernabe   X 20     Green tband: scapular retraction Bx20, cues to decrease speed X 20      Green tband: shoulder ext Bx20, cues to decrease speed X 20      Green tband: PNF D2 shoulder pattern Bx20, corrected positioning X 20        *added to HEP    :1/17/22:  Edu: posture-sitting and standing: handout issued  Body mechanics: pivot, push, pull, vacuum, sweep, grocery cart, overhead lift, level lift, power position, golfer's lift-handout issued    Assessment: Pt returns for her final follow-up and has been compliant with all of her HEP. Pt states her HEP has helped with her strength and a little for her pain. Reviewed exercises given last session. Pt reports this will be her last follow-up as she waits for the MDs to figure out what to do with her back. She has 2 injections scheduled in the next week.     Alize Sanchez, SPT  Gasper Hernandez, PT  I attest that I was present in the room, making skilled assessments and directing the service provided today.  I was " responsible for the assessment and treatment of the patient.  During this time, I was not engaged in treating another patient or doing other tasks.

## 2022-02-10 ENCOUNTER — RADIOLOGY INJECTION OFFICE VISIT (OUTPATIENT)
Dept: PHYSICAL MEDICINE AND REHAB | Facility: CLINIC | Age: 57
End: 2022-02-10
Attending: PHYSICAL MEDICINE & REHABILITATION
Payer: COMMERCIAL

## 2022-02-10 VITALS
TEMPERATURE: 97.9 F | RESPIRATION RATE: 16 BRPM | DIASTOLIC BLOOD PRESSURE: 66 MMHG | HEART RATE: 59 BPM | SYSTOLIC BLOOD PRESSURE: 102 MMHG | OXYGEN SATURATION: 97 %
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
## 2022-02-10 DIAGNOSIS — M54.16 LUMBAR RADICULAR PAIN: ICD-10-CM

## 2022-02-10 DIAGNOSIS — Z98.1 S/P LUMBAR FUSION: ICD-10-CM

## 2022-02-10 DIAGNOSIS — M48.061 STENOSIS OF LATERAL RECESS OF LUMBAR SPINE: ICD-10-CM

## 2022-02-10 PROCEDURE — 64483 NJX AA&/STRD TFRM EPI L/S 1: CPT | Mod: RT | Performed by: PHYSICAL MEDICINE & REHABILITATION

## 2022-02-10 PROCEDURE — 64484 NJX AA&/STRD TFRM EPI L/S EA: CPT | Mod: RT | Performed by: PHYSICAL MEDICINE & REHABILITATION

## 2022-02-10 RX ORDER — LIDOCAINE HYDROCHLORIDE 10 MG/ML
INJECTION, SOLUTION EPIDURAL; INFILTRATION; INTRACAUDAL; PERINEURAL
Status: COMPLETED | OUTPATIENT
Start: 2022-02-10 | End: 2022-02-10

## 2022-02-10 RX ORDER — METHYLPREDNISOLONE ACETATE 80 MG/ML
INJECTION, SUSPENSION INTRA-ARTICULAR; INTRALESIONAL; INTRAMUSCULAR; SOFT TISSUE
Status: COMPLETED | OUTPATIENT
Start: 2022-02-10 | End: 2022-02-10

## 2022-02-10 RX ADMIN — METHYLPREDNISOLONE ACETATE 80 MG: 80 INJECTION, SUSPENSION INTRA-ARTICULAR; INTRALESIONAL; INTRAMUSCULAR; SOFT TISSUE at 09:21

## 2022-02-10 RX ADMIN — LIDOCAINE HYDROCHLORIDE 5 ML: 10 INJECTION, SOLUTION EPIDURAL; INFILTRATION; INTRACAUDAL; PERINEURAL at 09:21

## 2022-02-10 ASSESSMENT — PAIN SCALES - GENERAL
PAINLEVEL: SEVERE PAIN (6)
PAINLEVEL: SEVERE PAIN (6)

## 2022-02-10 NOTE — PATIENT INSTRUCTIONS
Follow-up visit with Dr. Curtis in 2 weeks to discuss injection outcome and determine care plan going forward.       DISCHARGE INSTRUCTIONS    During office hours (8:00 a.m.- 4:00 p.m.) questions or concerns may be answered  by calling [facility] Navigation Nurses at  [phone].  Messages received after hours will be returned the following business day.      In the case of an emergency, please dial 911 or seek assistance at the nearest Emergency Room/Urgent Care facility.     All Patients:    ? You may experience an increase in your symptoms for the first 2 days (It may take anywhere between 2 days- 2 weeks for the steroid to have maximum effect).    ? You may use ice on the injection site, as frequently as 20 minutes each hour if needed.    ? You may take your pain medicine.    ? You may continue taking your regular medication after your injection. If you have had a Medial Branch Block you may resume pain medication once your pain diary is completed.    ? You may shower. No swimming, tub bath or hot tub for 48 hours.  You may remove your bandaid/bandage as soon as you are home.    ? You may resume light activities, as tolerated.    ? Resume your usual diet as tolerated.    ? It is strongly advised that you do not drive for 1-3 hours post injection.    ? If you have had oral sedation:  Do not drive for 8 hours post injection.      ? If you have had IV sedation:  Do not drive for 24 hours post injection.  Do not operate hazardous machinery or make important personal/business decisions for 24 hours.      POSSIBLE STEROID SIDE EFFECTS (If steroid/cortisone was used for your procedure)    -If you experience these symptoms, it should only last for a short period      Swelling of the legs                Skin redness (flushing)       Mouth (oral) irritation     Blood sugar (glucose) levels              Sweats                      Mood changes    Headache    Sleeplessness    Weakened immune system for up to 14 days,  which could increase the risk of arik the COVID-19 virus and/or experiencing more severe symptoms of the disease, if exposed.    Decreased effectiveness of the flu vaccine if given within 2 weeks of the steroid.         POSSIBLE PROCEDURE SIDE EFFECTS  -Call the Spine Center if you are concerned    Increased Pain             Increased numbness/tingling        Nausea/Vomiting            Bruising/bleeding at site        Redness or swelling                                                Difficulty walking        Weakness             Fever greater than 100.5    *In the event of a severe headache after an epidural steroid injection that is relieved by lying down, please call the [facility] to speak with a clinical staff member*

## 2022-02-16 ENCOUNTER — PATIENT OUTREACH (OUTPATIENT)
Dept: NURSING | Facility: CLINIC | Age: 57
End: 2022-02-16
Payer: COMMERCIAL

## 2022-02-16 NOTE — PROGRESS NOTES
2/16/2022  Clinic Care Coordination Contact    Community Health Worker Follow Up    Care Gaps:     Health Maintenance Due   Topic Date Due     PREVENTIVE CARE VISIT  Never done     ASTHMA ACTION PLAN  Never done     DEPRESSION ACTION PLAN  Never done     INFLUENZA VACCINE (1) 09/01/2021     MAMMO SCREENING  12/08/2021     DTAP/TDAP/TD IMMUNIZATION (2 - Td or Tdap) 01/04/2022     PHQ-9  01/07/2022     ZOSTER IMMUNIZATION (2 of 2) 11/24/2021       Care Gaps Last addressed on will review/discuss at next office with the doctor    Goals:   Goals Addressed as of 2/16/2022 at 3:48 PM                    Today    1/18/22       Psychosocial (pt-stated)   90%  30%    Added 7/19/21 by Bartolome Blanco, [facility]      Goal Statement: I want to complete the [employer] Care Directive form and get it notarized complete within 1-2 months  Date Goal set: 07/19/21 updated: 11-15-21 Updated 2-16-22  Barriers:   Strengths:   Date to Achieve By: 4-2022  Patient expressed understanding of goal: Yes  Action steps to achieve this goal:  1. Received ACP form and daughter completed the Health Care Directive waiting to get it notarized.  2 I will update CCC team at outreaches on the status     Updated: 2-16-22 AL                Intervention and Education during outreach:   Called and spoke to patient and follow up on goal.  Patient reported:  -got the Health Care Directive form and it is at her daughter.completed it and waiting to get in notarized.      CHW Follow up: Monthly    CHW Plan: Follow up on goal    CHW Next Follow Up: 3-25-22

## 2022-02-23 ENCOUNTER — OFFICE VISIT (OUTPATIENT)
Dept: FAMILY MEDICINE | Facility: CLINIC | Age: 57
End: 2022-02-23
Payer: COMMERCIAL

## 2022-02-23 VITALS
SYSTOLIC BLOOD PRESSURE: 107 MMHG | DIASTOLIC BLOOD PRESSURE: 71 MMHG | HEART RATE: 61 BPM | TEMPERATURE: 97.6 F | RESPIRATION RATE: 16 BRPM | OXYGEN SATURATION: 98 %

## 2022-02-23 DIAGNOSIS — M79.674 PAIN OF TOE OF RIGHT FOOT: Primary | ICD-10-CM

## 2022-02-23 PROCEDURE — 90472 IMMUNIZATION ADMIN EACH ADD: CPT | Performed by: FAMILY MEDICINE

## 2022-02-23 PROCEDURE — 90750 HZV VACC RECOMBINANT IM: CPT | Performed by: FAMILY MEDICINE

## 2022-02-23 PROCEDURE — 90471 IMMUNIZATION ADMIN: CPT | Performed by: FAMILY MEDICINE

## 2022-02-23 PROCEDURE — 90714 TD VACC NO PRESV 7 YRS+ IM: CPT | Performed by: FAMILY MEDICINE

## 2022-02-23 PROCEDURE — 99213 OFFICE O/P EST LOW 20 MIN: CPT | Mod: 25 | Performed by: FAMILY MEDICINE

## 2022-02-23 RX ORDER — ALBUTEROL SULFATE 90 UG/1
AEROSOL, METERED RESPIRATORY (INHALATION)
COMMUNITY
Start: 2021-11-24 | End: 2022-03-04

## 2022-02-23 ASSESSMENT — PATIENT HEALTH QUESTIONNAIRE - PHQ9: SUM OF ALL RESPONSES TO PHQ QUESTIONS 1-9: 0

## 2022-02-23 NOTE — PROGRESS NOTES
OFFICE VISIT - FAMILY MEDICINE     ASSESSMENT AND PLAN       ICD-10-CM    1. Pain of toe of right foot  M79.674 XR Toe Right G/E 2 Views   Right second toe pain following injury at home yesterday, x-ray done and  independently reviewed did not show any acute fracture, symptomatic care discussed with patient, included elevation rest activity modification, follow-up if pain fails to improve in the next 3 to 4 weeks.  Age appropriate immunization and health care maintenance discussed.  CHIEF COMPLAINT   broken second toe on right foot       HPI   Susan Kwan is a 56 year old female.  No Patient Care Coordination Note on file.    Right second toe pain, mild to moderate throbbing, travel coffee mug fell on her right foot and toe last night.  No numbness tingling, has been keeping it elevated.  Depression, hypothyroidism controlled with medication and overall stable.      Review of Systems As per HPI, otherwise negative.    OBJECTIVE   /71 (BP Location: Left arm, Patient Position: Sitting, Cuff Size: Adult Regular)   Pulse 61   Temp 97.6  F (36.4  C) (Oral)   Resp 16   LMP 2010   SpO2 98%   Physical Exam  Constitutional:       Appearance: Normal appearance.   HENT:      Head: Normocephalic and atraumatic.   Cardiovascular:      Rate and Rhythm: Normal rate and regular rhythm.   Pulmonary:      Effort: Pulmonary effort is normal.      Breath sounds: Normal breath sounds.   Musculoskeletal:         General: Tenderness (right 2nd toe) present. No swelling.      Cervical back: Normal range of motion.   Neurological:      General: No focal deficit present.      Mental Status: She is alert.   Psychiatric:         Behavior: Behavior normal.         Thought Content: Thought content normal.         Judgment: Judgment normal.         PFSH     Family History   Problem Relation Age of Onset     Heart Disease Daughter         WPW,  at age 3     Diabetes Paternal Grandmother      Cerebrovascular Disease  Paternal Grandfather      Sleep Apnea Father      Colon Cancer Father      Breast Cancer Maternal Grandmother      Heart Disease Mother      No Known Problems Son      Social History     Socioeconomic History     Marital status:      Spouse name: Not on file     Number of children: Not on file     Years of education: Not on file     Highest education level: Not on file   Occupational History     Not on file   Tobacco Use     Smoking status: Current Every Day Smoker     Packs/day: 1.00     Years: 35.00     Pack years: 35.00     Types: Cigarettes     Last attempt to quit: 3/19/2021     Years since quittin.9     Smokeless tobacco: Never Used   Substance and Sexual Activity     Alcohol use: Not Currently     Comment: Occasionally, Twice per year     Drug use: Not Currently     Sexual activity: Not Currently     Partners: Female     Birth control/protection: Post-menopausal   Other Topics Concern     Parent/sibling w/ CABG, MI or angioplasty before 65F 55M? No   Social History Narrative     Not on file     Social Determinants of Health     Financial Resource Strain: Low Risk      Difficulty of Paying Living Expenses: Not hard at all   Food Insecurity: No Food Insecurity     Worried About Running Out of Food in the Last Year: Never true     Ran Out of Food in the Last Year: Never true   Transportation Needs: No Transportation Needs     Lack of Transportation (Medical): No     Lack of Transportation (Non-Medical): No   Physical Activity: Inactive     Days of Exercise per Week: 0 days     Minutes of Exercise per Session: 0 min   Stress: No Stress Concern Present     Feeling of Stress : Only a little   Social Connections: Moderately Integrated     Frequency of Communication with Friends and Family: More than three times a week     Frequency of Social Gatherings with Friends and Family: More than three times a week     Attends Hindu Services: Never     Active Member of Clubs or Organizations: Yes     Attends  "Club or Organization Meetings: 1 to 4 times per year     Marital Status: Living with partner   Intimate Partner Violence: Not At Risk     Fear of Current or Ex-Partner: No     Emotionally Abused: No     Physically Abused: No     Sexually Abused: No   Housing Stability: Low Risk      Unable to Pay for Housing in the Last Year: No     Number of Places Lived in the Last Year: 1     Unstable Housing in the Last Year: No       PMSH   [unfilled]  Past Surgical History:   Procedure Laterality Date     BACK SURGERY       CERVICAL FUSION N/A 4/5/2021    Procedure: CERVICAL 5-CERVICAL 6 ANTERIOR CERVICAL DECOMPRESSION AND FUSION WITH PLATE;  Surgeon: Leanna Ward MD;  Location: [facility] OR;  Service: Spine     CHOLECYSTECTOMY       HC DILATION/CURETTAGE DIAG/THER NON OB      Description: Dilation And Curettage;  Recorded: 08/01/2011;  Comments: for \"clots\" after one of her pregnancies     HC REMOVAL GALLBLADDER      Description: Cholecystectomy;  Recorded: 08/01/2011;     SPINAL CORD STIMULATOR IMPLANT  03/2018    [facility], Dr. Cerna     TONSILLECTOMY       TUBAL LIGATION       XR MYELOGRAM CERVICAL  3/12/2021     XR MYELOGRAM LUMBAR  11/5/2020     ZZC LIGATE FALLOPIAN TUBE      Description: Tubal Ligation;  Recorded: 08/01/2011;       RESULTS/CONSULTS (Lab/Rad)   No results found for this or any previous visit (from the past 168 hour(s)).  XR Toe Right G/E 2 Views  Narrative: EXAM: XR TOE RIGHT G/E 2 VIEWS  LOCATION: [facility]  DATE/TIME: 2/23/2022 11:18 AM    INDICATION: toe injury and pain  COMPARISON: None.  Impression: IMPRESSION: The right second toe is negative for fracture or dislocation.     [unfilled]    HEALTH MAINTENANCE / SCREENING   [unfilled], [unfilled],[unfilled]  Immunization History   Administered Date(s) Administered     COVID-19,PF,Pfizer (12+ Yrs) 02/27/2021, 03/17/2021, 09/29/2021     FLU 6-35 months 09/22/2015     Flu 65+ Years " 08/31/2017     Flu, Unspecified 11/01/2011, 10/01/2012, 09/05/2016, 09/03/2017, 10/12/2018     Influenza (IIV3) PF 11/01/2011, 10/01/2012, 11/07/2013, 10/26/2014     Influenza Quad, Recombinant, pf(RIV4) (Flublok) 09/10/2019     Influenza Vaccine IM > 6 months Valent IIV4 (Alfuria,Fluzone) 09/21/2020     Influenza Vaccine, 6+MO IM (QUADRIVALENT W/PRESERVATIVES) 11/07/2013, 09/16/2016, 10/06/2018     Pneumococcal 23 valent 09/29/2021     TD (ADULT, 7+) 02/23/2022     Td (Adult), Adsorbed 04/25/2000     Tdap (Adacel,Boostrix) 01/04/2012     Zoster vaccine recombinant adjuvanted (SHINGRIX) 09/29/2021, 02/23/2022     Health Maintenance   Topic     PREVENTIVE CARE VISIT      ASTHMA ACTION PLAN      DEPRESSION ACTION PLAN      INFLUENZA VACCINE (1)     MAMMO SCREENING      DTAP/TDAP/TD IMMUNIZATION (2 - Td or Tdap)     ZOSTER IMMUNIZATION (2 of 2)     LUNG CANCER SCREENING      ASTHMA CONTROL TEST      PHQ-9      ANNUAL REVIEW OF HM ORDERS      ADVANCE CARE PLANNING      HPV TEST      PAP      LIPID      COLORECTAL CANCER SCREENING      Pneumococcal Vaccine: Pediatrics (0 to 5 Years) and At-Risk Patients (6 to 64 Years) (2 of 2 - PPSV23)     HEPATITIS C SCREENING      HIV SCREENING      COVID-19 Vaccine      IPV IMMUNIZATION      MENINGITIS IMMUNIZATION      HEPATITIS B IMMUNIZATION      Review of external notes as documented elsewhere in note  24 minutes spent on the date of the encounter doing chart review, review of outside records, review of test results, interpretation of tests, patient visit and documentation          Lubna Soto MD  [facility]   This transcription uses voice recognition software, which may contain typographical errors.

## 2022-02-24 ENCOUNTER — OFFICE VISIT (OUTPATIENT)
Dept: PHYSICAL MEDICINE AND REHAB | Facility: CLINIC | Age: 57
End: 2022-02-24
Payer: COMMERCIAL

## 2022-02-24 VITALS — DIASTOLIC BLOOD PRESSURE: 58 MMHG | HEART RATE: 72 BPM | SYSTOLIC BLOOD PRESSURE: 107 MMHG

## 2022-02-24 DIAGNOSIS — M54.2 CERVICAL SPINE PAIN: ICD-10-CM

## 2022-02-24 DIAGNOSIS — M48.061 STENOSIS OF LATERAL RECESS OF LUMBAR SPINE: ICD-10-CM

## 2022-02-24 DIAGNOSIS — Z98.1 S/P LUMBAR FUSION: ICD-10-CM

## 2022-02-24 DIAGNOSIS — M54.16 LUMBAR RADICULAR PAIN: Primary | ICD-10-CM

## 2022-02-24 DIAGNOSIS — Z98.1 S/P CERVICAL SPINAL FUSION: ICD-10-CM

## 2022-02-24 DIAGNOSIS — M79.18 MYOFASCIAL PAIN: ICD-10-CM

## 2022-02-24 PROCEDURE — 99214 OFFICE O/P EST MOD 30 MIN: CPT | Mod: 25 | Performed by: PHYSICAL MEDICINE & REHABILITATION

## 2022-02-24 PROCEDURE — 20552 NJX 1/MLT TRIGGER POINT 1/2: CPT | Performed by: PHYSICAL MEDICINE & REHABILITATION

## 2022-02-24 RX ORDER — GABAPENTIN 300 MG/1
900 CAPSULE ORAL 3 TIMES DAILY
Qty: 300 CAPSULE | Refills: 3 | Status: ON HOLD | OUTPATIENT
Start: 2022-02-24 | End: 2022-04-18

## 2022-02-24 ASSESSMENT — PAIN SCALES - GENERAL: PAINLEVEL: MODERATE PAIN (5)
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
## 2022-03-02 DIAGNOSIS — J45.20 MILD INTERMITTENT ASTHMA WITHOUT COMPLICATION: Primary | ICD-10-CM

## 2022-03-04 PROBLEM — J45.20 MILD INTERMITTENT ASTHMA WITHOUT COMPLICATION: Status: ACTIVE | Noted: 2022-03-04

## 2022-03-04 RX ORDER — ALBUTEROL SULFATE 90 UG/1
AEROSOL, METERED RESPIRATORY (INHALATION)
Qty: 18 G | Refills: 0 | Status: SHIPPED | OUTPATIENT
Start: 2022-03-04 | End: 2022-09-02

## 2022-03-04 NOTE — TELEPHONE ENCOUNTER
"  Disp Refills Start End ZULEIMA    albuterol (PROAIR HFA;PROVENTIL HFA;VENTOLIN HFA) 90 mcg/actuation inhaler 18 g 1 2020  No   Sig: INHALE 1 TO 2 PUFFS BY MOUTH EVERY 4 HOURS AS NEEDED FOR WHEEZING   Patient taking differently: Inhale 1-2 puffs every 4 (four) hours as needed for wheezing or shortness of breath.        Sent to pharmacy as: albuterol sulfate HFA 90 mcg/actuation aerosol inhaler (PROAIR HFA;PROVENTIL HFA;VENTOLIN HFA)   E-Prescribing Status: Receipt confirmed by pharmacy (2020  9:22 AM CST)       albuterol (PROAIR HFA;PROVENTIL HFA;VENTOLIN HFA) 90 mcg/actuation inhaler [[id number]]    Electronically signed by: Earline Jimenez MD on 20 Status: Active   Ordering user: Earline Jimenez MD 20 Authorized by: Earline Jimenez MD   Frequency:  20 - Until Discontinued Released by: Earline Jimenez MD 20   Diagnoses  Asthma [J45.909]     Routing refill request to provider for review/approval because:   script / acute dosing      Last office visit provider:  22     Requested Prescriptions   Pending Prescriptions Disp Refills     VENTOLIN  (90 Base) MCG/ACT inhaler [Pharmacy Med Name: VENTOLIN HFA INH W/DOS CTR [id number]] 18 g      Sig: INHALE 1 TO 2 PUFFS BY MOUTH EVERY 4 HOURS AS NEEDED FOR WHEEZING       Asthma Maintenance Inhalers - Anticholinergics Passed - 3/2/2022 10:30 AM        Passed - Patient is age 12 years or older        Passed - Asthma control assessment score within normal limits in last 6 months     Please review ACT score.           Passed - Medication is active on med list        Passed - Recent (6 mo) or future (30 days) visit within the authorizing provider's specialty     Patient had office visit in the last 6 months or has a visit in the next 30 days with authorizing provider or within the authorizing provider's specialty.  See \"Patient Info\" tab in inbasket, or \"Choose Columns\" in Meds & Orders section of the refill encounter.     " "      Short-Acting Beta Agonist Inhalers Protocol  Passed - 3/2/2022 10:30 AM        Passed - Patient is age 12 or older        Passed - Asthma control assessment score within normal limits in last 6 months     Please review ACT score.           Passed - Medication is active on med list        Passed - Recent (6 mo) or future (30 days) visit within the authorizing provider's specialty     Patient had office visit in the last 6 months or has a visit in the next 30 days with authorizing provider or within the authorizing provider's specialty.  See \"Patient Info\" tab in inbasket, or \"Choose Columns\" in Meds & Orders section of the refill encounter.                 Yolanda Sosa RN 03/04/22 9:28 AM  "

## 2022-03-08 ENCOUNTER — OFFICE VISIT (OUTPATIENT)
Dept: NEUROSURGERY | Facility: CLINIC | Age: 57
End: 2022-03-08
Payer: COMMERCIAL

## 2022-03-08 ENCOUNTER — TELEPHONE (OUTPATIENT)
Dept: NEUROSURGERY | Facility: CLINIC | Age: 57
End: 2022-03-08

## 2022-03-08 VITALS
WEIGHT: 215 LBS | BODY MASS INDEX: 38.09 KG/M2 | OXYGEN SATURATION: 96 % | HEIGHT: 63 IN | HEART RATE: 60 BPM | SYSTOLIC BLOOD PRESSURE: 118 MMHG | DIASTOLIC BLOOD PRESSURE: 58 MMHG

## 2022-03-08 DIAGNOSIS — Z98.1 S/P LUMBAR FUSION: Primary | ICD-10-CM

## 2022-03-08 PROCEDURE — 99213 OFFICE O/P EST LOW 20 MIN: CPT | Performed by: SURGERY

## 2022-03-08 NOTE — TELEPHONE ENCOUNTER
Per Dr. Ward, called ans let Susan know as expected she has pseudoarthrosis or lack of bone fusion confirmed with radiology. Dr. Ward put in a surgical request as discussed but needs to be nicotine free prior confirmed with blood test. Susan verbalized understanding and agreement.  Sussy Saldana RN, CNRN

## 2022-03-08 NOTE — PROGRESS NOTES
"NEUROSURGERY FOLLOWUP  NOTE    Susan Kwan comes today in f/u.  Patient is a 56-year-old female who is status post cervical 5-cervical 6 anterior cervical decompression and fusion on April 5, 2021 and lumbar 4-lumbar 5 microdiscectomy and posterior instrumented fusion on 12/17/2020.  Patient continues to have right posterior leg pain down to her knee.  He also has chronic numbness and tingling in her anterior right shin to the top of her foot since prior to her to all her lumbar surgery.  Right L4-5, L5-S1 TFESi on 2/10/22 with some improvement for 6 days. Has some pain coming back and also experiencing weakness in her right leg with fall.  She has tried physical therapy in the past with no long-term improvement and also is on gabapentin and baclofen without complete relief.  She continues to smoke at least 5 cigarettes daily on average.    PHYSICAL EXAM:   Constitutional: /58   Pulse 60   Ht 5' 3\" (1.6 m)   Wt 215 lb (97.5 kg)   LMP 03/09/2010   SpO2 96%   BMI 38.09 kg/m       Mental Status: A & O in no acute distress.  Affect is appropriate.  Speech is fluent.  Recent and remote memory are intact.  Attention span and concentration are normal.     Motor:  Normal bulk and tone all muscle groups of lower extremities.     Sensory: Sensation intact bilaterally to light touch.     Reflexes:  knee/ ankle jerk intact.     IMAGING:   I personally reviewed all radiographic images        CONSULTATION ASSESSMENT AND PLAN:    Confirmed with radiology that she has pseudoarthrosis of lumbar 4-5 with right lateral recess narrowing and likely right L5 nerve impingement on most recent CT myelogram.  We discussed a revision right lumbar 4-5 transforaminal lumbar interbody fusion however patient needs to have smoking cessation prior to surgery confirmed with a nicotine test.  She will work on smoking cessation and schedule surgery in the future.      I spent more than 20 minutes in this apt, examining the pt, reviewing " the scans, reviewing notes from chart, discussing treatment options with risks and benefits and coordinating care.     Leanna Ward MD      CC:     Earline Jimenez  [address] [place] [address]

## 2022-03-08 NOTE — LETTER
"    3/8/2022         RE: Susan Kwan  [address]  [facility] [place] [address]        Dear Colleague,    Thank you for referring your patient, Susan Kwan, to the [facility] Located within [facility]. Please see a copy of my visit note below.    NEUROSURGERY FOLLOWUP  NOTE    Suasn Kwan comes today in f/u.  Patient is a 56-year-old female who is status post cervical 5-cervical 6 anterior cervical decompression and fusion on April 5, 2021 and lumbar 4-lumbar 5 microdiscectomy and posterior instrumented fusion on 12/17/2020.  Patient continues to have right posterior leg pain down to her knee.  He also has chronic numbness and tingling in her anterior right shin to the top of her foot since prior to her to all her lumbar surgery.  Right L4-5, L5-S1 TFESi on 2/10/22 with some improvement for 6 days. Has some pain coming back and also experiencing weakness in her right leg with fall.  She has tried physical therapy in the past with no long-term improvement and also is on gabapentin and baclofen without complete relief.  She continues to smoke at least 5 cigarettes daily on average.    PHYSICAL EXAM:   Constitutional: /58   Pulse 60   Ht 5' 3\" (1.6 m)   Wt 215 lb (97.5 kg)   LMP 03/09/2010   SpO2 96%   BMI 38.09 kg/m       Mental Status: A & O in no acute distress.  Affect is appropriate.  Speech is fluent.  Recent and remote memory are intact.  Attention span and concentration are normal.     Motor:  Normal bulk and tone all muscle groups of lower extremities.     Sensory: Sensation intact bilaterally to light touch.     Reflexes:  knee/ ankle jerk intact.     IMAGING:   I personally reviewed all radiographic images        CONSULTATION ASSESSMENT AND PLAN:    Confirmed with radiology that she has pseudoarthrosis of lumbar 4-5 with right lateral recess narrowing and likely right L5 nerve impingement on most recent CT myelogram.  We discussed a revision right lumbar 4-5 " transforaminal lumbar interbody fusion however patient needs to have smoking cessation prior to surgery confirmed with a nicotine test.  She will work on smoking cessation and schedule surgery in the future.      I spent more than 20 minutes in this apt, examining the pt, reviewing the scans, reviewing notes from chart, discussing treatment options with risks and benefits and coordinating care.     Leanna Ward MD      CC:     Earline Jimenez  [address] [place] [address]        Again, thank you for allowing me to participate in the care of your patient.        Sincerely,        Leanna Ward MD

## 2022-03-08 NOTE — NURSING NOTE
Susan is here to discuss her back pain. Pain is on right side and goes into right buttock and down posterior leg to knee. She denies numbness and tingling, just pain. She is not sure how long the pain has been going on for. She did have a right L45 and L5-S1 TFESI and has had some relief with it.   JACKIE today is 44%  JShowen,CMA

## 2022-03-09 ENCOUNTER — PREP FOR PROCEDURE (OUTPATIENT)
Dept: NEUROSURGERY | Facility: CLINIC | Age: 57
End: 2022-03-09
Payer: COMMERCIAL

## 2022-03-09 ENCOUNTER — TELEPHONE (OUTPATIENT)
Dept: NEUROSURGERY | Facility: CLINIC | Age: 57
End: 2022-03-09
Payer: COMMERCIAL

## 2022-03-09 DIAGNOSIS — S32.009K PSEUDOARTHROSIS OF LUMBAR SPINE: ICD-10-CM

## 2022-03-09 DIAGNOSIS — Z01.818 PRE-OP TESTING: Primary | ICD-10-CM

## 2022-03-09 DIAGNOSIS — M51.26 LUMBAR DISC HERNIATION: ICD-10-CM

## 2022-03-09 DIAGNOSIS — M54.16 LUMBAR RADICULOPATHY: Primary | ICD-10-CM

## 2022-03-09 DIAGNOSIS — M54.16 LUMBAR RADICULOPATHY: ICD-10-CM

## 2022-03-09 NOTE — LETTER
Dear Ms. Kwan,    This letter will help in preparation for your upcoming surgery. Please contact us with any additional questions you may have regarding your surgery. Contact information for your surgery scheduler:   Dr. Ward [phone] ~ Chelsie    You are scheduled for: Lumbar Fusion   With: Leanna Ward M.D.   Date/Time: Monday, April 18, 2022 at 7:20am (time subject to change)  Location: [place] [address]    Check in at the Welcome Desk inside the main doors of the hospital. They will direct you to the surgery waiting area. Please arrive at 5:20am     *CPAP Machine: Please bring with you day of surgery.     In the event of an emergency surgery case, there may be an adjustment to your start time for surgery.     PREPARING FOR YOUR SURGERY    *Pre-op Physical: 4/6/2022 at 11:40am [place] [address].     *Please discuss the necessity of receiving a pneumococcal vaccine prior to surgery at your pre-op physical. Recommended for all patients over the age of 65 or based on certain medical conditions.     *After the pre-op physical is complete, please have your clinic fax the visit note to your surgery scheduler at [phone].    *Nicotine Test: 3/30/2022, orders are in for you. Just walk into [facility]. [address] [place] [address]. Enter through the main doors of the [facility] and someone will direct you to the outpatient lab.     *[place] Brace: 4/14/2022 at 9am at [facility]. [address] [place] [address].    *Covid-19 Pre-procedure Test: 4/14/2022 at 9:45am at [facility]. [address] [place] [address].    *Pre-op Lab Work: 4/14/2022 at 10:00am at [facility]. [address] [place] [address].    *Readiness Visit:  4/14/2022 at 10:30am with a Nurse at [facility] [address] [place] [address]    *Ensure that you have completed your pre-op physical, along with any other necessary tests/appointments (listed above), prior to your Readiness Visit.               ADDITIONAL INFORMATION REGARDING YOUR SURGERY    Medications    Bring a list ALL of your medications, including any over-the-counter vitamins and herbal supplements to your Readiness Visit, and on the day of surgery.    DO NOT bring your medications with you the day of surgery.    Please see attached third sheet for more details on medications/vitamins/herbal supplements that should be discontinued prior to your surgery date.     If you are unsure if you should discontinue a medication/ vitamin/herbal supplement, please call our office and discuss with a nurse.    Continue taking your medications/vitamins/herbal supplements unless they are on the attached list.     Failure to follow the instructions regarding medications/vitamins/herbal supplements will result in cancellation of your surgery.    Day BEFORE Surgery    DO NOT shave near your surgical site. This can cause irritation of the skin    Using a washcloth and provided bottle of Hibiclens, shower the night BEFORE surgery, using a half bottle of Hibiclens to wash your body, avoiding face and genitals. The morning OF surgery, shower and use the second half of the bottle to wash your body, avoiding face and genitals. If you are unable to take a shower the morning of surgery, please discuss your options with the nurse at your readiness visit.     NOTHING  to eat after 11:00 p.m. the night prior to your procedure    CLEAR LIQUIDS: May have the following liquids up to two (2) hours before your arrival time at the hospital: water, plain black coffee (no cream or milk), plain black tea or plain green tea (no cream or milk), Gatorade or Propel Water.    SMOKING: Stop smoking as far  before surgery as possible, or as directed by your surgeon. NO tobacco products of any kind (cigarettes, e-cigarettes, chewing tobacco) beginning at 11:00 p.m. the night prior to your procedure.     ALCOHOL: You should stop drinking alcohol beginning at 11:00 p.m. the night prior to your procedure    Contact our office if you have symptoms of illness such as a fever of 101 or greater, chills, cough, sore throat, or if you develop a rash or any open sore    Day OF Surgery    If you ve been instructed to take a medication(s) on the morning of surgery, please take with a very small sip of water.    Wear loose & comfortable clothing and flat shoes, Leave jewelry/valuables at home. If you wear contact lenses, remove them at home and wear glasses. Remove any body piercings. Remove nail polish.     Planning for Discharge    Start planning for your care after discharge as soon as you receive this letter.    If you have not made arrangements for someone to take you home and stay with you for the first 48 hours after discharge, your surgery may be cancelled.                        PRE-OPERATIVE MEDICATION INSTRUCTIONS    Review this information with your primary care physician prior to discontinuing any of the medications listed below.  Notify your primary care physician that you have been instructed to discontinue these medications.    TEN (10) Days Prior to Surgery, STOP the Following Medications   Arelis[place]  Anacin  Aspirin  Excedrin  Pepto Bismol    **Before taking ANY over-the-counter medications, check the label for Aspirin   Non-steroidal   Anti-inflammatory Medications (NSAIDS)    Celebrex  Diclofenac (Cataflam)  Etodolac (Iodine)  Fenoprofen (Nalfon)  Ibuprofen (Advil, Motrin, Nuprin)  Indomethacin (Indocin)  Ketoprofen  Ketorolac (Toradol)  Meloxicam (Mobic)  Naproxen (AnaProx, Aleve, Naprosyn)  Relafen (Nabumetone)   Herbal Supplements (this is a partial list of herbals to be discontinued)    Madan Hobson  Quai  Ephedra  Feverfew  Fish Oil  Flaxseed Oil  Garlic  Shivani  Gingko  Ginseng  Goldenseal  Imitrex (Sumatriptan)  Kava  Krill Oil  Licorice  Multi Vitamins  Trini s Wort  Valerian  Vitamin E  Yohimbe   CHECK WITH YOUR PRESCRIBING DOCTOR BEFORE STOPPING ANY BLOOD THINNERS (approximately 7 days prior to surgery)  (Coumadin, Plavix, Platel, Aggrenox, Effient (Prasugrel), Ticlid), Xarelto, and Pradaxa      ALWAYS CHECK WITH YOUR PRESCRIBING DOCTOR REGARDING THE MEDICATIONS LISTED BELOW; RECOMMENDED STOP TIME IS ALSO LISTED      If you are taking Lovenox, discontinue 24 HOURS prior to surgery    If you are taking weight loss medication, discontinue 7 days prior to surgery    If you are taking Metformin or Simvastatin, check with your primary care physician (or whoever has prescribed you this medication) regarding when to discontinue prior to surgery

## 2022-03-09 NOTE — TELEPHONE ENCOUNTER
ORDER FROM: Dr. Ward     PRE AUTHORIZATION: [place] pending     METHOD OF PATIENT CONTACT: Spoke to patient over the phone, best number to contact patient #[phone]     PROCEDURE: L4-L5 transforaminal lumbar interbody fusion with revision L4-L5 posterior instrumented fusion and arthrodesis, use of allograft, autograft, stealth     SURGICAL DATE: 4/18/22 at 7:20am [facility]     COVID TEST: 4/14/22 at 9:45am    READINESS VISIT: 4/14/22 at 10:30am     PCP, CLINIC, PHONE#: Dr. Jimenez [facility] #[phone]. Pre-op physical 4/6/22 at 11:40am     FILM INFO: CT Lumbar 1/26/22 at [facility] and XR Lumbar 7/16/21 at [facility]     SURGICAL LETTER: Mailed 3/10/2022

## 2022-03-10 ENCOUNTER — PATIENT OUTREACH (OUTPATIENT)
Dept: CARE COORDINATION | Facility: CLINIC | Age: 57
End: 2022-03-10
Payer: COMMERCIAL

## 2022-03-10 NOTE — LETTER
[facility]  Patient Centered Plan of Care  About Me:        Patient Name:  Susan Olvera    YOB: 1965  Age:         56 year old   Karena MRN:    [id number] Telephone Information:  Home Phone [phone]   Mobile [phone]       Address:  [address] [place] [address] Email address:  [email or web address]      Emergency Contact(s)    Name Relationship Lgl Grd Work Phone Home Phone Mobile Phone   1. KENJI OLVERA Daughter    [phone]   2. PAULA FUNG Friend   [phone]           Primary language:  English     needed? No   [facility]:  [phone] op. 1  Other communication barriers:No data recorded  Preferred Method of Communication:     Current living arrangement: I live in a private home with family; I live in a private home (boyfriend)    Mobility Status/ Medical Equipment: Independent        Health Maintenance  Health Maintenance Reviewed: Not assessed      My Access Plan  Medical Emergency 911   Primary Clinic Line   -     24 Hour Appointment Line [phone] or  [phone]) (toll-free)   24 Hour Nurse Line [phone] (toll-free)   Preferred Urgent Care [facility] [phone]     [facility]  [phone]     Preferred Pharmacy [facility] [place], [place] - [address]     Behavioral Health Crisis Line The National Suicide Prevention Lifeline at 1-417.116.8984 or 911             My Care Team Members  Patient Care Team       Relationship Specialty Notifications Start End    Earline Jimenez MD PCP - General Family Medicine  6/26/21     Phone: [phone] Fax: [phone] [address] [place] [address]    Earline Jimenez MD Assigned PCP   7/16/21     Phone: [phone] Fax: [phone]         [address] [place] [address]    Bartolome Blanco LICSW Lead Care Coordinator   Admissions 7/19/21     Tammi Shah Community Health Worker  Admissions 7/19/21     Phone: [phone] Fax: [phone]         [address] [place] [address]    Epi Pleitez MD Assigned Surgical Provider   10/3/21     Phone: [phone] Fax: [phone] [address] [facility] [address]    Jack Curtis DO Assigned Neuroscience Provider   1/9/22     Phone: [phone] Fax: [phone] [address] [facility] [address]            My Care Plans  Self Management and Treatment Plan  Goals and (Comments)  Goals        General     Psychosocial (pt-stated)      Notes - Note edited  2/16/2022  3:48 PM by Tammi Shah     Goal Statement: I want to complete the [facility] Care Directive form and get it notarized complete within 1-2 months  Date Goal set: 07/19/21 updated: 11-15-21 Updated 2-16-22  Barriers:   Strengths:   Date to Achieve By: 4-2022  Patient expressed understanding of goal: Yes  Action steps to achieve this goal:  1. Received ACP form and daughter completed the Health Care Directive waiting to get it notarized.  2 I will update CCC team at [facility] on the status     Updated: 2-16-22 AL                 Action Plans on File:                       Advance Care Plans/Directives Type:   No data recorded    My Medical and Care Information  Problem List   Patient Active Problem List   Diagnosis     Hypothyroidism     Mild intermittent asthma without complication      Current Medications and Allergies:  See printed Medication Report.    Care Coordination Start Date: 7/19/2021   Frequency of Care Coordination: monthly     Form Last Updated: 03/10/2022

## 2022-03-13 ENCOUNTER — HEALTH MAINTENANCE LETTER (OUTPATIENT)
Age: 57
End: 2022-03-13

## 2022-03-15 NOTE — ADDENDUM NOTE
Encounter addended by: Marta Ozuna, PT on: 3/15/2022 8:13 AM   Actions taken: Episode resolved, Clinical Note Signed

## 2022-03-15 NOTE — PROGRESS NOTES
[facility]    Outpatient Physical Therapy Discharge Note  Patient: Susan Kwan  : 1965    Beginning/End Dates of Reporting Period:  21 to 22    Referring Provider: Dr. Curtis    Therapy Diagnosis: S/P Cervical fusion and low back     Client Self Report: I got my results and I have another bulging disc and other changes.  Will see the MD tomorrow to get my results.      Objective Measurements:  Objective Measure: APTA: 6, stopped with 5 seconds left 1/3/22     Objective Measure: NDI: 48 1/3/22     Objective Measure: OSW: 54 1/3/22, IE     Objective Measure: neck measurements: flex: 27, was WNL, ext: 14, was 60, rot R: 30, was 30, L=22, was 40     Objective Measure: Pain 6/10, same as last visit     Goals:  Goal Identifier     Goal Description Patient will be independent with HEP and self management of symptoms   Target Date 22   Date Met      Progress (detail required for progress note): she is making progress toward her HEP but has not been as compliant recently.  will continue goal     Goal Identifier ROM   Goal Description Patient will be abled to turn head to check her blind spot while driving with decrease pain of 0-2/10.     Target Date 22   Date Met      Progress (detail required for progress note): AROM slightly decreased due to falls in December.  Will continue goal     Goal Identifier     Goal Description     Target Date     Date Met      Progress (detail required for progress note):       Goal Identifier stand   Goal Description able to stand 45 minutes to prep a meal or line at the store with less pain of 2/10   Target Date 22   Date Met      Progress (detail required for progress note):       Goal Identifier walking   Goal Description able to walk with more ease and less fear of falling as she no longer feels like she wants to use her walker for support with  ambulation   Target Date 04/02/22   Date Met      Progress (detail required for progress note):       Goal Identifier     Goal Description     Target Date     Date Met      Progress (detail required for progress note):       Goal Identifier     Goal Description     Target Date     Date Met      Progress (detail required for progress note):       Goal Identifier     Goal Description     Target Date     Date Met      Progress (detail required for progress note):             Plan:  Discharge from therapy.    Discharge:    Reason for Discharge: pt has not been in clinic in over 30 days so is discharged from caseload    Equipment Issued:     Discharge Plan: Patient to continue home program.

## 2022-03-24 ENCOUNTER — OFFICE VISIT (OUTPATIENT)
Dept: PHYSICAL MEDICINE AND REHAB | Facility: CLINIC | Age: 57
End: 2022-03-24
Attending: PHYSICAL MEDICINE & REHABILITATION
Payer: COMMERCIAL

## 2022-03-24 VITALS — DIASTOLIC BLOOD PRESSURE: 51 MMHG | HEART RATE: 65 BPM | SYSTOLIC BLOOD PRESSURE: 94 MMHG

## 2022-03-24 DIAGNOSIS — M79.18 MYOFASCIAL PAIN: ICD-10-CM

## 2022-03-24 DIAGNOSIS — M54.2 CERVICAL SPINE PAIN: Primary | ICD-10-CM

## 2022-03-24 DIAGNOSIS — Z98.1 S/P LUMBAR FUSION: ICD-10-CM

## 2022-03-24 DIAGNOSIS — G24.3 CERVICAL DYSTONIA: ICD-10-CM

## 2022-03-24 DIAGNOSIS — Z98.1 S/P CERVICAL SPINAL FUSION: ICD-10-CM

## 2022-03-24 PROCEDURE — 99214 OFFICE O/P EST MOD 30 MIN: CPT | Performed by: PHYSICAL MEDICINE & REHABILITATION

## 2022-03-24 RX ORDER — TIZANIDINE 2 MG/1
2-4 TABLET ORAL 3 TIMES DAILY PRN
Qty: 60 TABLET | Refills: 1 | Status: ON HOLD | OUTPATIENT
Start: 2022-03-24 | End: 2022-04-20

## 2022-03-24 ASSESSMENT — PAIN SCALES - GENERAL: PAINLEVEL: SEVERE PAIN (6)

## 2022-03-24 NOTE — PATIENT INSTRUCTIONS
1. Botox injections with Dr Curtis    2. Tizanidine (muscle relaxant medication) has been prescribed today. Please take 1-2 tabs three times daily as needed. This medication may cause drowsiness. Please do not work or drive while taking this medication until you know how it effects you. If it does make you drowsy, you should only take it before bedtime or at times that you do not have to work/drive.
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
## 2022-03-25 DIAGNOSIS — Z01.818 PRE-OP TESTING: Primary | ICD-10-CM

## 2022-03-28 ENCOUNTER — TELEPHONE (OUTPATIENT)
Dept: CARE COORDINATION | Facility: CLINIC | Age: 57
End: 2022-03-28

## 2022-03-28 ENCOUNTER — PATIENT OUTREACH (OUTPATIENT)
Dept: NURSING | Facility: CLINIC | Age: 57
End: 2022-03-28
Payer: COMMERCIAL

## 2022-03-28 DIAGNOSIS — Z12.31 VISIT FOR SCREENING MAMMOGRAM: Primary | ICD-10-CM

## 2022-03-28 NOTE — PROGRESS NOTES
3/28/2022  Clinic Care Coordination Contact  Community Health Worker Follow Up    Intervention and Education during outreach:   Called and spoke to patient and follow up on goal.  Patient reported:  -have the ACP form will look for it and bring to appt on 4-6-22 when she has the pre op  -she will talk to the doctor on 4-6-22 at 2pm if I need to schedule Preventive Care Visit.  -forgot to schedule mammogram at last visit  CHW sent message to PCP care team pool order for mammogram  CHW sent PCP to Care Team regarding Preventative care visit on 4-6-22 pre op      CHW Follow up: Monthly  CHW Plan: Follow up on goals  CHW Next Follow Up: 4-29-22      Tammi Shah  Community Health Worker  [facility]  Clinic Care Coordination  [email or web address]   Office: [phone]  Fax: [phone]    Clinic Care Coordination Contact    Community Health Worker Follow Up    Care Gaps:     Health Maintenance Due   Topic Date Due     PREVENTIVE CARE VISIT  Never done     ASTHMA ACTION PLAN  Never done     DEPRESSION ACTION PLAN  Never done     INFLUENZA VACCINE (1) 09/01/2021     MAMMO SCREENING  12/08/2021     LUNG CANCER SCREENING  05/13/2022       Care Gaps Last addressed on 3-28-22 and Care Gap Goal set: Yes will discuss with PCP on 4-6-22 AWV and will wait to schedule for mammogram on 4-6-22      Goals:   Goals Addressed as of 3/28/2022 at 3:19 PM                    Today    2/16/22       Other (pt-stated)         Added 3/28/22 by Tammi Shah      Goal Statement: I will complete my annual wellness visit.  Date Goal set: 3/28/2022  Barriers:   Strengths: support from daughter,  CCC team  Date to Achieve By: 6-2022  Patient expressed understanding of goal: yes  Action steps to achieve this goal:  1. I will talk to the doctor on 4-6-22 at 2pm if I need to schedule my annual wellness visit   2. I will attend my annual wellness visit.  3. I will contact my Care Management or clinic team if I have  barriers to attending my annual wellness visit.            Psychosocial (pt-stated)   90%  90%    Added 7/19/21 by Bartolome Blanco, [facility]      Goal Statement: I want to complete the [employer] Care Directive form and get it notarized complete within 1-2 months  Date Goal set: 07/19/21 updated: 11-15-21 Updated 2-16-22  Barriers:   Strengths:   Date to Achieve By: 4-2022  Patient expressed understanding of goal: Yes  Action steps to achieve this goal:  1. I have the Health Care Directive form will bring it to irina to on 4-6-22  2 I will update CCC team at [facility] on the status     Updated: 3-28-22 AL

## 2022-03-28 NOTE — TELEPHONE ENCOUNTER
3/28/2022  Patient would like to schedule mammogram appt when she comes in to see Dr Jimenez on 4-6-22.  No order for mammogram    Patient is also due for Preventative Care Visit  Patient has upcoming on 4-6-22 with PCP for pre op.    Will she needs to schedule appt for Preventative care visit when she comes in on 4-6-22?

## 2022-03-30 ENCOUNTER — LAB (OUTPATIENT)
Dept: LAB | Facility: HOSPITAL | Age: 57
End: 2022-03-30
Payer: COMMERCIAL

## 2022-03-30 DIAGNOSIS — Z01.818 PRE-OP TESTING: ICD-10-CM

## 2022-03-30 DIAGNOSIS — M54.16 LUMBAR RADICULOPATHY: ICD-10-CM

## 2022-03-30 PROCEDURE — 80323 ALKALOIDS NOS: CPT

## 2022-04-01 NOTE — TELEPHONE ENCOUNTER
Orders placed for mammo.   I don't know can we do a preop and annual physical at the same time? Two separate notes?

## 2022-04-04 LAB
ANABASINE UR-MCNC: <5 NG/ML
COTININE UR-MCNC: <15 NG/ML
NICOTINE UR-MCNC: <15 NG/ML
TRANS-3-OH-COTININE UR-MCNC: <50 NG/ML

## 2022-04-06 ENCOUNTER — OFFICE VISIT (OUTPATIENT)
Dept: FAMILY MEDICINE | Facility: CLINIC | Age: 57
End: 2022-04-06
Payer: COMMERCIAL

## 2022-04-06 VITALS
SYSTOLIC BLOOD PRESSURE: 98 MMHG | HEART RATE: 56 BPM | DIASTOLIC BLOOD PRESSURE: 64 MMHG | WEIGHT: 221 LBS | RESPIRATION RATE: 12 BRPM | BODY MASS INDEX: 39.15 KG/M2

## 2022-04-06 DIAGNOSIS — Z01.818 PRE-OP EVALUATION: Primary | ICD-10-CM

## 2022-04-06 DIAGNOSIS — J45.20 MILD INTERMITTENT ASTHMA WITHOUT COMPLICATION: ICD-10-CM

## 2022-04-06 DIAGNOSIS — E03.9 HYPOTHYROIDISM, UNSPECIFIED TYPE: ICD-10-CM

## 2022-04-06 DIAGNOSIS — E78.5 HYPERLIPIDEMIA, UNSPECIFIED HYPERLIPIDEMIA TYPE: ICD-10-CM

## 2022-04-06 DIAGNOSIS — G47.33 OSA (OBSTRUCTIVE SLEEP APNEA): ICD-10-CM

## 2022-04-06 DIAGNOSIS — M54.16 LUMBAR RADICULOPATHY: ICD-10-CM

## 2022-04-06 LAB
ANION GAP SERPL CALCULATED.3IONS-SCNC: 9 MMOL/L (ref 5–18)
BUN SERPL-MCNC: 11 MG/DL (ref 8–22)
CALCIUM SERPL-MCNC: 8.8 MG/DL (ref 8.5–10.5)
CHLORIDE BLD-SCNC: 106 MMOL/L (ref 98–107)
CO2 SERPL-SCNC: 25 MMOL/L (ref 22–31)
CREAT SERPL-MCNC: 0.86 MG/DL (ref 0.6–1.1)
ERYTHROCYTE [DISTWIDTH] IN BLOOD BY AUTOMATED COUNT: 12.6 % (ref 10–15)
GFR SERPL CREATININE-BSD FRML MDRD: 80 ML/MIN/1.73M2
GLUCOSE BLD-MCNC: 92 MG/DL (ref 70–125)
HCT VFR BLD AUTO: 36.9 % (ref 35–47)
HGB BLD-MCNC: 12.3 G/DL (ref 11.7–15.7)
MCH RBC QN AUTO: 30.2 PG (ref 26.5–33)
MCHC RBC AUTO-ENTMCNC: 33.3 G/DL (ref 31.5–36.5)
MCV RBC AUTO: 91 FL (ref 78–100)
PLATELET # BLD AUTO: 207 10E3/UL (ref 150–450)
POTASSIUM BLD-SCNC: 4.5 MMOL/L (ref 3.5–5)
RBC # BLD AUTO: 4.07 10E6/UL (ref 3.8–5.2)
SODIUM SERPL-SCNC: 140 MMOL/L (ref 136–145)
WBC # BLD AUTO: 8.2 10E3/UL (ref 4–11)

## 2022-04-06 PROCEDURE — 93005 ELECTROCARDIOGRAM TRACING: CPT | Performed by: FAMILY MEDICINE

## 2022-04-06 PROCEDURE — 99214 OFFICE O/P EST MOD 30 MIN: CPT | Performed by: FAMILY MEDICINE

## 2022-04-06 PROCEDURE — 80048 BASIC METABOLIC PNL TOTAL CA: CPT | Performed by: FAMILY MEDICINE

## 2022-04-06 PROCEDURE — 85027 COMPLETE CBC AUTOMATED: CPT | Performed by: FAMILY MEDICINE

## 2022-04-06 PROCEDURE — 36415 COLL VENOUS BLD VENIPUNCTURE: CPT | Performed by: FAMILY MEDICINE

## 2022-04-06 PROCEDURE — 93010 ELECTROCARDIOGRAM REPORT: CPT | Performed by: INTERNAL MEDICINE
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
## 2022-04-07 LAB
ATRIAL RATE - MUSE: 50 BPM
DIASTOLIC BLOOD PRESSURE - MUSE: NORMAL MMHG
INTERPRETATION ECG - MUSE: NORMAL
P AXIS - MUSE: 53 DEGREES
PR INTERVAL - MUSE: 166 MS
QRS DURATION - MUSE: 92 MS
QT - MUSE: 464 MS
QTC - MUSE: 423 MS
R AXIS - MUSE: 37 DEGREES
SYSTOLIC BLOOD PRESSURE - MUSE: NORMAL MMHG
T AXIS - MUSE: 31 DEGREES
VENTRICULAR RATE- MUSE: 50 BPM

## 2022-04-14 ENCOUNTER — LAB (OUTPATIENT)
Dept: FAMILY MEDICINE | Facility: CLINIC | Age: 57
End: 2022-04-14
Attending: SURGERY
Payer: COMMERCIAL

## 2022-04-14 ENCOUNTER — LAB (OUTPATIENT)
Dept: LAB | Facility: CLINIC | Age: 57
End: 2022-04-14
Attending: SURGERY
Payer: COMMERCIAL

## 2022-04-14 ENCOUNTER — ALLIED HEALTH/NURSE VISIT (OUTPATIENT)
Dept: NEUROSURGERY | Facility: CLINIC | Age: 57
End: 2022-04-14
Payer: COMMERCIAL

## 2022-04-14 VITALS
SYSTOLIC BLOOD PRESSURE: 120 MMHG | OXYGEN SATURATION: 99 % | HEART RATE: 63 BPM | RESPIRATION RATE: 18 BRPM | DIASTOLIC BLOOD PRESSURE: 86 MMHG

## 2022-04-14 DIAGNOSIS — Z01.818 PRE-OP TESTING: ICD-10-CM

## 2022-04-14 DIAGNOSIS — M54.16 LUMBAR RADICULOPATHY: Primary | ICD-10-CM

## 2022-04-14 DIAGNOSIS — M54.16 LUMBAR RADICULOPATHY: ICD-10-CM

## 2022-04-14 LAB
ANION GAP SERPL CALCULATED.3IONS-SCNC: 11 MMOL/L (ref 5–18)
APTT PPP: 28 SECONDS (ref 22–38)
BUN SERPL-MCNC: 19 MG/DL (ref 8–22)
CALCIUM SERPL-MCNC: 9.6 MG/DL (ref 8.5–10.5)
CHLORIDE BLD-SCNC: 105 MMOL/L (ref 98–107)
CO2 SERPL-SCNC: 25 MMOL/L (ref 22–31)
CREAT SERPL-MCNC: 0.85 MG/DL (ref 0.6–1.1)
ERYTHROCYTE [DISTWIDTH] IN BLOOD BY AUTOMATED COUNT: 12.9 % (ref 10–15)
GFR SERPL CREATININE-BSD FRML MDRD: 81 ML/MIN/1.73M2
GLUCOSE BLD-MCNC: 95 MG/DL (ref 70–125)
HCT VFR BLD AUTO: 38.9 % (ref 35–47)
HGB BLD-MCNC: 12.9 G/DL (ref 11.7–15.7)
INR PPP: 0.97 (ref 0.9–1.15)
MCH RBC QN AUTO: 30.4 PG (ref 26.5–33)
MCHC RBC AUTO-ENTMCNC: 33.2 G/DL (ref 31.5–36.5)
MCV RBC AUTO: 92 FL (ref 78–100)
PLATELET # BLD AUTO: 217 10E3/UL (ref 150–450)
POTASSIUM BLD-SCNC: 4.6 MMOL/L (ref 3.5–5)
RBC # BLD AUTO: 4.24 10E6/UL (ref 3.8–5.2)
SARS-COV-2 RNA RESP QL NAA+PROBE: NEGATIVE
SODIUM SERPL-SCNC: 141 MMOL/L (ref 136–145)
WBC # BLD AUTO: 7.6 10E3/UL (ref 4–11)

## 2022-04-14 PROCEDURE — U0005 INFEC AGEN DETEC AMPLI PROBE: HCPCS

## 2022-04-14 PROCEDURE — 85730 THROMBOPLASTIN TIME PARTIAL: CPT

## 2022-04-14 PROCEDURE — 80048 BASIC METABOLIC PNL TOTAL CA: CPT

## 2022-04-14 PROCEDURE — 99207 PR NO CHARGE NURSE ONLY: CPT

## 2022-04-14 PROCEDURE — 85610 PROTHROMBIN TIME: CPT

## 2022-04-14 PROCEDURE — 36415 COLL VENOUS BLD VENIPUNCTURE: CPT

## 2022-04-14 PROCEDURE — 85027 COMPLETE CBC AUTOMATED: CPT

## 2022-04-14 PROCEDURE — U0003 INFECTIOUS AGENT DETECTION BY NUCLEIC ACID (DNA OR RNA); SEVERE ACUTE RESPIRATORY SYNDROME CORONAVIRUS 2 (SARS-COV-2) (CORONAVIRUS DISEASE [COVID-19]), AMPLIFIED PROBE TECHNIQUE, MAKING USE OF HIGH THROUGHPUT TECHNOLOGIES AS DESCRIBED BY CMS-2020-01-R: HCPCS

## 2022-04-14 NOTE — PROGRESS NOTES
Preop Assessment: Susan Kwan presents for pre-op review.  Surgeon: Ed  Name of Surgery: Right lumbar 4 - lumbar 5 transforaminal lumbar interbody fusion with revision lumbar 4 - lumbar 5 posterior instrumented fusion and arthrodesis, use of allograft, autograft, stealth  Diagnosis: lumbar radiculopathy, disc herniation, pseudoarthrosis   Date of Surgery: 22  Time of Surgery: 720  Hospital: [facility]   H&P: 22 cleared    History of ASA, NSAIDS, vitamin and/or herbal supplements within 10 days: No  History of blood thinners: No  History of anti-seizure med's: No  Review of systems: WNL for pt's baseline; has spinal cord stimulator, uses CPAP    *Reports last cigarette was 3/10/2022    Diagnostics:  Labs: pending   CXR: n/a   EK22 sinus bradycardia   Other: will bring lumbar support brace and CPAP to the hospital   Films: lumbar CT 22      Nausea or Vomiting - denies  Urinary retention - denies  Pain management - no Red Flags  Home PT Evaluation: ambulates independently, steady gait and stride, no imbalance noted  - no indication for Home PT pre-op  Patient confirmed they have help/assistance in place at home upon discharge, her significant other.     All questions answered regarding surgery and expected pre and postoperative course including rehabilitation phase.     Reviewed with patient: Arrive 2.5 -3 hours prior to scheduled surgery, nothing to eat or drink after midnight the night before surgery and bring all pertinent films to the hospital the day of surgery.  Continue to refrain from NSAIDS (Ibuprofen, Aleve, Naprosyn) ASA or over the counter herbal medication or supplements, anticoagulants and blood thinners. She will hold simvastatin the night before surgery.     Preop skin preparations and instructions provided.    Discussed with patient the following: after your surgery, if you will be staying in-patient, a nursing team will be monitoring you closely throughout your stay and  communicate your health status to your surgeon and other providers.  You will be seen by Advanced Practice Providers (e.g., nurse practitioners, clinic nurse specialist, and physician assistants) who will check on you regularly to assess the status of your surgery.     Sara Arenas RN

## 2022-04-14 NOTE — PATIENT INSTRUCTIONS
To OR as planned. Please arrive at [facility] on Monday morning 4/18/22, at 5:20 am.     Bring your lumbar support brace and your CPAP with you to the hospital.     Nothing to eat or drink after midnight the night before surgery.     Continue to refrain from NSAIDS (Ibuprofen, Aleve, Naprosyn), ASA, Over the counter herbal medications or supplements, anti-coagulants and blood thinners. Do not take your simvastatin on Sunday evening.     Hibiclens shower:  Use one packet on Sunday night and one packet on Monday morning. Wash from the neck down; avoid the face hair and genitals.

## 2022-04-16 ENCOUNTER — ANESTHESIA EVENT (OUTPATIENT)
Dept: SURGERY | Facility: HOSPITAL | Age: 57
End: 2022-04-16
Payer: COMMERCIAL

## 2022-04-18 ENCOUNTER — APPOINTMENT (OUTPATIENT)
Dept: RADIOLOGY | Facility: HOSPITAL | Age: 57
End: 2022-04-18
Attending: PHYSICIAN ASSISTANT
Payer: COMMERCIAL

## 2022-04-18 ENCOUNTER — APPOINTMENT (OUTPATIENT)
Dept: PHYSICAL THERAPY | Facility: HOSPITAL | Age: 57
End: 2022-04-18
Attending: SURGERY
Payer: COMMERCIAL

## 2022-04-18 ENCOUNTER — HOSPITAL ENCOUNTER (INPATIENT)
Facility: HOSPITAL | Age: 57
LOS: 2 days | Discharge: HOME | End: 2022-04-20
Attending: SURGERY | Admitting: SURGERY
Payer: COMMERCIAL

## 2022-04-18 ENCOUNTER — ANESTHESIA (OUTPATIENT)
Dept: SURGERY | Facility: HOSPITAL | Age: 57
End: 2022-04-18
Payer: COMMERCIAL

## 2022-04-18 DIAGNOSIS — M43.16 SPONDYLOLISTHESIS OF LUMBAR REGION: Primary | ICD-10-CM

## 2022-04-18 DIAGNOSIS — K59.09 OTHER CONSTIPATION: ICD-10-CM

## 2022-04-18 PROCEDURE — 272N000001 HC OR GENERAL SUPPLY STERILE: Performed by: SURGERY

## 2022-04-18 PROCEDURE — 999N000141 HC STATISTIC PRE-PROCEDURE NURSING ASSESSMENT: Performed by: SURGERY

## 2022-04-18 PROCEDURE — 250N000011 HC RX IP 250 OP 636: Performed by: ANESTHESIOLOGY

## 2022-04-18 PROCEDURE — 120N000001 HC R&B MED SURG/OB

## 2022-04-18 PROCEDURE — 22853 INSJ BIOMECHANICAL DEVICE: CPT | Mod: AS | Performed by: PHYSICIAN ASSISTANT

## 2022-04-18 PROCEDURE — 22840 INSERT SPINE FIXATION DEVICE: CPT | Performed by: SURGERY

## 2022-04-18 PROCEDURE — 22633 ARTHRD CMBN 1NTRSPC LUMBAR: CPT | Performed by: SURGERY

## 2022-04-18 PROCEDURE — 250N000009 HC RX 250: Performed by: PHYSICIAN ASSISTANT

## 2022-04-18 PROCEDURE — 250N000011 HC RX IP 250 OP 636: Performed by: PHYSICIAN ASSISTANT

## 2022-04-18 PROCEDURE — 22633 ARTHRD CMBN 1NTRSPC LUMBAR: CPT | Mod: AS | Performed by: PHYSICIAN ASSISTANT

## 2022-04-18 PROCEDURE — 61783 SCAN PROC SPINAL: CPT | Mod: AS | Performed by: PHYSICIAN ASSISTANT

## 2022-04-18 PROCEDURE — 97116 GAIT TRAINING THERAPY: CPT | Mod: GP

## 2022-04-18 PROCEDURE — 0SG00AJ FUSION OF LUMBAR VERTEBRAL JOINT WITH INTERBODY FUSION DEVICE, POSTERIOR APPROACH, ANTERIOR COLUMN, OPEN APPROACH: ICD-10-PCS | Performed by: SURGERY

## 2022-04-18 PROCEDURE — 258N000003 HC RX IP 258 OP 636: Performed by: ANESTHESIOLOGY

## 2022-04-18 PROCEDURE — L8699 PROSTHETIC IMPLANT NOS: HCPCS | Performed by: SURGERY

## 2022-04-18 PROCEDURE — 0SB20ZZ EXCISION OF LUMBAR VERTEBRAL DISC, OPEN APPROACH: ICD-10-PCS | Performed by: SURGERY

## 2022-04-18 PROCEDURE — 22840 INSERT SPINE FIXATION DEVICE: CPT | Mod: AS | Performed by: PHYSICIAN ASSISTANT

## 2022-04-18 PROCEDURE — 20930 SP BONE ALGRFT MORSEL ADD-ON: CPT | Performed by: SURGERY

## 2022-04-18 PROCEDURE — 250N000009 HC RX 250: Performed by: SURGERY

## 2022-04-18 PROCEDURE — 360N000085 HC SURGERY LEVEL 5 W/ FLUORO, PER MIN: Performed by: SURGERY

## 2022-04-18 PROCEDURE — 250N000013 HC RX MED GY IP 250 OP 250 PS 637: Performed by: PHYSICIAN ASSISTANT

## 2022-04-18 PROCEDURE — 258N000003 HC RX IP 258 OP 636: Performed by: NURSE ANESTHETIST, CERTIFIED REGISTERED

## 2022-04-18 PROCEDURE — 999N000182 XR SURGERY CARM FLUORO GREATER THAN 5 MIN

## 2022-04-18 PROCEDURE — 61783 SCAN PROC SPINAL: CPT | Mod: 59 | Performed by: SURGERY

## 2022-04-18 PROCEDURE — 0QP004Z REMOVAL OF INTERNAL FIXATION DEVICE FROM LUMBAR VERTEBRA, OPEN APPROACH: ICD-10-PCS | Performed by: SURGERY

## 2022-04-18 PROCEDURE — C1762 CONN TISS, HUMAN(INC FASCIA): HCPCS | Performed by: SURGERY

## 2022-04-18 PROCEDURE — 258N000003 HC RX IP 258 OP 636: Performed by: PHYSICIAN ASSISTANT

## 2022-04-18 PROCEDURE — 370N000017 HC ANESTHESIA TECHNICAL FEE, PER MIN: Performed by: SURGERY

## 2022-04-18 PROCEDURE — 250N000011 HC RX IP 250 OP 636: Performed by: NURSE ANESTHETIST, CERTIFIED REGISTERED

## 2022-04-18 PROCEDURE — 22853 INSJ BIOMECHANICAL DEVICE: CPT | Performed by: SURGERY

## 2022-04-18 PROCEDURE — 278N000051 HC OR IMPLANT GENERAL: Performed by: SURGERY

## 2022-04-18 PROCEDURE — 99232 SBSQ HOSP IP/OBS MODERATE 35: CPT | Performed by: HOSPITALIST

## 2022-04-18 PROCEDURE — C1713 ANCHOR/SCREW BN/BN,TIS/BN: HCPCS | Performed by: SURGERY

## 2022-04-18 PROCEDURE — 999N000180 XR SURGERY CARM FLUORO LESS THAN 5 MIN

## 2022-04-18 PROCEDURE — 250N000009 HC RX 250: Performed by: ANESTHESIOLOGY

## 2022-04-18 PROCEDURE — 97161 PT EVAL LOW COMPLEX 20 MIN: CPT | Mod: GP

## 2022-04-18 PROCEDURE — 8E0WXBZ COMPUTER ASSISTED PROCEDURE OF TRUNK REGION: ICD-10-PCS | Performed by: SURGERY

## 2022-04-18 PROCEDURE — 20936 SP BONE AGRFT LOCAL ADD-ON: CPT | Performed by: SURGERY

## 2022-04-18 PROCEDURE — 0SG0071 FUSION OF LUMBAR VERTEBRAL JOINT WITH AUTOLOGOUS TISSUE SUBSTITUTE, POSTERIOR APPROACH, POSTERIOR COLUMN, OPEN APPROACH: ICD-10-PCS | Performed by: SURGERY

## 2022-04-18 PROCEDURE — 250N000009 HC RX 250: Performed by: NURSE ANESTHETIST, CERTIFIED REGISTERED

## 2022-04-18 PROCEDURE — 710N000009 HC RECOVERY PHASE 1, LEVEL 1, PER MIN: Performed by: SURGERY

## 2022-04-18 PROCEDURE — 250N000025 HC SEVOFLURANE, PER MIN: Performed by: SURGERY

## 2022-04-18 DEVICE — GRAFT BONE PUTTY I-FACTOR PEPTIDE ENHANCED 1ML 700-010: Type: IMPLANTABLE DEVICE | Site: SPINE LUMBAR | Status: FUNCTIONAL

## 2022-04-18 DEVICE — IMP SCR MEDT 4.75MM SOLERA 6.5X45MM MA 54840006545: Type: IMPLANTABLE DEVICE | Site: SPINE LUMBAR | Status: FUNCTIONAL

## 2022-04-18 DEVICE — IMP SCR MEDT 4.75MM SOLERA 6.5X35MM MA 54840006535: Type: IMPLANTABLE DEVICE | Site: SPINE LUMBAR | Status: FUNCTIONAL

## 2022-04-18 DEVICE — SPACER 6068073 CATALYFT PL SHORT 7MM
Type: IMPLANTABLE DEVICE | Site: SPINE LUMBAR | Status: FUNCTIONAL
Brand: CATALYFT PL EXPANDABLE INTERBODY SYSTEM

## 2022-04-18 DEVICE — IMP SCR MEDT 4.75MM SOLERA 6.5X40MM MA 54840006540: Type: IMPLANTABLE DEVICE | Site: SPINE LUMBAR | Status: FUNCTIONAL

## 2022-04-18 DEVICE — GRAFT BONE FIBERS GRAFTON DBM DBF 3ML T50103: Type: IMPLANTABLE DEVICE | Site: SPINE LUMBAR | Status: FUNCTIONAL

## 2022-04-18 DEVICE — IMP SCR MEDT BREAK-OFF SET SOLERA 4.75MM TI 5440030
Type: IMPLANTABLE DEVICE | Site: SPINE LUMBAR | Status: NON-FUNCTIONAL
Removed: 2024-06-12

## 2022-04-18 RX ORDER — PROCHLORPERAZINE MALEATE 10 MG
10 TABLET ORAL EVERY 6 HOURS PRN
Status: DISCONTINUED | OUTPATIENT
Start: 2022-04-18 | End: 2022-04-20 | Stop reason: HOSPADM

## 2022-04-18 RX ORDER — ONDANSETRON 4 MG/1
4 TABLET, ORALLY DISINTEGRATING ORAL EVERY 6 HOURS PRN
Status: DISCONTINUED | OUTPATIENT
Start: 2022-04-18 | End: 2022-04-20 | Stop reason: HOSPADM

## 2022-04-18 RX ORDER — ONDANSETRON 2 MG/ML
INJECTION INTRAMUSCULAR; INTRAVENOUS PRN
Status: DISCONTINUED | OUTPATIENT
Start: 2022-04-18 | End: 2022-04-18

## 2022-04-18 RX ORDER — TRAZODONE HYDROCHLORIDE 50 MG/1
50-100 TABLET, FILM COATED ORAL AT BEDTIME
Status: DISCONTINUED | OUTPATIENT
Start: 2022-04-18 | End: 2022-04-20 | Stop reason: HOSPADM

## 2022-04-18 RX ORDER — OXYCODONE HYDROCHLORIDE 5 MG/1
5 TABLET ORAL EVERY 4 HOURS PRN
Status: DISCONTINUED | OUTPATIENT
Start: 2022-04-18 | End: 2022-04-20 | Stop reason: HOSPADM

## 2022-04-18 RX ORDER — AMOXICILLIN 250 MG
1 CAPSULE ORAL 2 TIMES DAILY
Status: DISCONTINUED | OUTPATIENT
Start: 2022-04-18 | End: 2022-04-20 | Stop reason: HOSPADM

## 2022-04-18 RX ORDER — SODIUM CHLORIDE, SODIUM LACTATE, POTASSIUM CHLORIDE, CALCIUM CHLORIDE 600; 310; 30; 20 MG/100ML; MG/100ML; MG/100ML; MG/100ML
INJECTION, SOLUTION INTRAVENOUS CONTINUOUS
Status: DISCONTINUED | OUTPATIENT
Start: 2022-04-18 | End: 2022-04-18 | Stop reason: HOSPADM

## 2022-04-18 RX ORDER — FENTANYL CITRATE 50 UG/ML
25 INJECTION, SOLUTION INTRAMUSCULAR; INTRAVENOUS EVERY 5 MIN PRN
Status: DISCONTINUED | OUTPATIENT
Start: 2022-04-18 | End: 2022-04-18 | Stop reason: HOSPADM

## 2022-04-18 RX ORDER — BUPROPION HYDROCHLORIDE 150 MG/1
150 TABLET ORAL DAILY
Status: DISCONTINUED | OUTPATIENT
Start: 2022-04-18 | End: 2022-04-20 | Stop reason: HOSPADM

## 2022-04-18 RX ORDER — ONDANSETRON 2 MG/ML
4 INJECTION INTRAMUSCULAR; INTRAVENOUS EVERY 30 MIN PRN
Status: DISCONTINUED | OUTPATIENT
Start: 2022-04-18 | End: 2022-04-18 | Stop reason: HOSPADM

## 2022-04-18 RX ORDER — ACETAMINOPHEN 325 MG/1
975 TABLET ORAL EVERY 8 HOURS
Status: DISCONTINUED | OUTPATIENT
Start: 2022-04-18 | End: 2022-04-20 | Stop reason: HOSPADM

## 2022-04-18 RX ORDER — DEXAMETHASONE SODIUM PHOSPHATE 10 MG/ML
INJECTION, SOLUTION INTRAMUSCULAR; INTRAVENOUS PRN
Status: DISCONTINUED | OUTPATIENT
Start: 2022-04-18 | End: 2022-04-18

## 2022-04-18 RX ORDER — SUMATRIPTAN 50 MG/1
50 TABLET, FILM COATED ORAL
Status: DISCONTINUED | OUTPATIENT
Start: 2022-04-18 | End: 2022-04-20 | Stop reason: HOSPADM

## 2022-04-18 RX ORDER — HYDROMORPHONE HCL IN WATER/PF 6 MG/30 ML
0.2 PATIENT CONTROLLED ANALGESIA SYRINGE INTRAVENOUS
Status: DISCONTINUED | OUTPATIENT
Start: 2022-04-18 | End: 2022-04-20 | Stop reason: HOSPADM

## 2022-04-18 RX ORDER — MULTIVITAMIN,THERAPEUTIC
1 TABLET ORAL DAILY
Status: DISCONTINUED | OUTPATIENT
Start: 2022-04-19 | End: 2022-04-20 | Stop reason: HOSPADM

## 2022-04-18 RX ORDER — POLYETHYLENE GLYCOL 3350 17 G/17G
17 POWDER, FOR SOLUTION ORAL DAILY
Status: DISCONTINUED | OUTPATIENT
Start: 2022-04-19 | End: 2022-04-20 | Stop reason: HOSPADM

## 2022-04-18 RX ORDER — OXYCODONE HYDROCHLORIDE 5 MG/1
10 TABLET ORAL EVERY 4 HOURS PRN
Status: DISCONTINUED | OUTPATIENT
Start: 2022-04-18 | End: 2022-04-20 | Stop reason: HOSPADM

## 2022-04-18 RX ORDER — NALOXONE HYDROCHLORIDE 1 MG/ML
0.4 INJECTION INTRAMUSCULAR; INTRAVENOUS; SUBCUTANEOUS
Status: DISCONTINUED | OUTPATIENT
Start: 2022-04-18 | End: 2022-04-20 | Stop reason: HOSPADM

## 2022-04-18 RX ORDER — LIDOCAINE 40 MG/G
CREAM TOPICAL
Status: DISCONTINUED | OUTPATIENT
Start: 2022-04-18 | End: 2022-04-18 | Stop reason: HOSPADM

## 2022-04-18 RX ORDER — OXYCODONE HYDROCHLORIDE 5 MG/1
5 TABLET ORAL EVERY 4 HOURS PRN
Status: DISCONTINUED | OUTPATIENT
Start: 2022-04-18 | End: 2022-04-18 | Stop reason: HOSPADM

## 2022-04-18 RX ORDER — LIDOCAINE HYDROCHLORIDE 20 MG/ML
INJECTION, SOLUTION INFILTRATION; PERINEURAL PRN
Status: DISCONTINUED | OUTPATIENT
Start: 2022-04-18 | End: 2022-04-18

## 2022-04-18 RX ORDER — GABAPENTIN 300 MG/1
900 CAPSULE ORAL 2 TIMES DAILY
COMMUNITY
End: 2022-09-02

## 2022-04-18 RX ORDER — SIMVASTATIN 10 MG
20 TABLET ORAL AT BEDTIME
Status: DISCONTINUED | OUTPATIENT
Start: 2022-04-18 | End: 2022-04-20 | Stop reason: HOSPADM

## 2022-04-18 RX ORDER — LORATADINE 10 MG/1
10 TABLET ORAL DAILY PRN
Status: DISCONTINUED | OUTPATIENT
Start: 2022-04-18 | End: 2022-04-20 | Stop reason: HOSPADM

## 2022-04-18 RX ORDER — DEXMEDETOMIDINE HYDROCHLORIDE 4 UG/ML
.2-1 INJECTION, SOLUTION INTRAVENOUS CONTINUOUS
Status: DISCONTINUED | OUTPATIENT
Start: 2022-04-18 | End: 2022-04-18 | Stop reason: HOSPADM

## 2022-04-18 RX ORDER — BISACODYL 10 MG
10 SUPPOSITORY, RECTAL RECTAL DAILY PRN
Status: DISCONTINUED | OUTPATIENT
Start: 2022-04-18 | End: 2022-04-20 | Stop reason: HOSPADM

## 2022-04-18 RX ORDER — ONDANSETRON 4 MG/1
4 TABLET, ORALLY DISINTEGRATING ORAL EVERY 30 MIN PRN
Status: DISCONTINUED | OUTPATIENT
Start: 2022-04-18 | End: 2022-04-18 | Stop reason: HOSPADM

## 2022-04-18 RX ORDER — BACITRACIN ZINC 500 [USP'U]/G
OINTMENT TOPICAL PRN
Status: DISCONTINUED | OUTPATIENT
Start: 2022-04-18 | End: 2022-04-18 | Stop reason: HOSPADM

## 2022-04-18 RX ORDER — GABAPENTIN 300 MG/1
900 CAPSULE ORAL 2 TIMES DAILY
Status: DISCONTINUED | OUTPATIENT
Start: 2022-04-18 | End: 2022-04-20 | Stop reason: HOSPADM

## 2022-04-18 RX ORDER — MAGNESIUM SULFATE 4 G/50ML
4 INJECTION INTRAVENOUS ONCE
Status: COMPLETED | OUTPATIENT
Start: 2022-04-18 | End: 2022-04-18

## 2022-04-18 RX ORDER — NALOXONE HYDROCHLORIDE 1 MG/ML
0.2 INJECTION INTRAMUSCULAR; INTRAVENOUS; SUBCUTANEOUS
Status: DISCONTINUED | OUTPATIENT
Start: 2022-04-18 | End: 2022-04-20 | Stop reason: HOSPADM

## 2022-04-18 RX ORDER — DEXMEDETOMIDINE HYDROCHLORIDE 4 UG/ML
INJECTION, SOLUTION INTRAVENOUS
Status: DISCONTINUED
Start: 2022-04-18 | End: 2022-04-18 | Stop reason: HOSPADM

## 2022-04-18 RX ORDER — CLINDAMYCIN PHOSPHATE 900 MG/50ML
900 INJECTION, SOLUTION INTRAVENOUS SEE ADMIN INSTRUCTIONS
Status: DISCONTINUED | OUTPATIENT
Start: 2022-04-18 | End: 2022-04-18 | Stop reason: HOSPADM

## 2022-04-18 RX ORDER — ONDANSETRON 2 MG/ML
4 INJECTION INTRAMUSCULAR; INTRAVENOUS EVERY 6 HOURS PRN
Status: DISCONTINUED | OUTPATIENT
Start: 2022-04-18 | End: 2022-04-20 | Stop reason: HOSPADM

## 2022-04-18 RX ORDER — ACETAMINOPHEN 325 MG/1
650 TABLET ORAL EVERY 4 HOURS PRN
Status: DISCONTINUED | OUTPATIENT
Start: 2022-04-21 | End: 2022-04-20 | Stop reason: HOSPADM

## 2022-04-18 RX ORDER — CLINDAMYCIN PHOSPHATE 900 MG/50ML
900 INJECTION, SOLUTION INTRAVENOUS
Status: COMPLETED | OUTPATIENT
Start: 2022-04-18 | End: 2022-04-18

## 2022-04-18 RX ORDER — DIAZEPAM 5 MG
5 TABLET ORAL EVERY 4 HOURS PRN
Status: DISCONTINUED | OUTPATIENT
Start: 2022-04-18 | End: 2022-04-20 | Stop reason: HOSPADM

## 2022-04-18 RX ORDER — SODIUM CHLORIDE 9 MG/ML
INJECTION, SOLUTION INTRAVENOUS CONTINUOUS
Status: DISCONTINUED | OUTPATIENT
Start: 2022-04-18 | End: 2022-04-19

## 2022-04-18 RX ORDER — GABAPENTIN 300 MG/1
1200 CAPSULE ORAL
Status: DISCONTINUED | OUTPATIENT
Start: 2022-04-18 | End: 2022-04-20 | Stop reason: HOSPADM

## 2022-04-18 RX ORDER — FENTANYL CITRATE 50 UG/ML
INJECTION, SOLUTION INTRAMUSCULAR; INTRAVENOUS PRN
Status: DISCONTINUED | OUTPATIENT
Start: 2022-04-18 | End: 2022-04-18

## 2022-04-18 RX ORDER — GABAPENTIN 300 MG/1
1200 CAPSULE ORAL
COMMUNITY
End: 2022-09-02

## 2022-04-18 RX ORDER — GLYCOPYRROLATE 0.2 MG/ML
INJECTION, SOLUTION INTRAMUSCULAR; INTRAVENOUS PRN
Status: DISCONTINUED | OUTPATIENT
Start: 2022-04-18 | End: 2022-04-18

## 2022-04-18 RX ORDER — PROPOFOL 10 MG/ML
INJECTION, EMULSION INTRAVENOUS PRN
Status: DISCONTINUED | OUTPATIENT
Start: 2022-04-18 | End: 2022-04-18

## 2022-04-18 RX ORDER — ALBUTEROL SULFATE 90 UG/1
1-2 AEROSOL, METERED RESPIRATORY (INHALATION) EVERY 4 HOURS PRN
Status: DISCONTINUED | OUTPATIENT
Start: 2022-04-18 | End: 2022-04-20 | Stop reason: HOSPADM

## 2022-04-18 RX ORDER — HYDROMORPHONE HYDROCHLORIDE 1 MG/ML
0.2 INJECTION, SOLUTION INTRAMUSCULAR; INTRAVENOUS; SUBCUTANEOUS EVERY 5 MIN PRN
Status: DISCONTINUED | OUTPATIENT
Start: 2022-04-18 | End: 2022-04-18 | Stop reason: HOSPADM

## 2022-04-18 RX ORDER — HYDROMORPHONE HCL IN WATER/PF 6 MG/30 ML
0.4 PATIENT CONTROLLED ANALGESIA SYRINGE INTRAVENOUS
Status: DISCONTINUED | OUTPATIENT
Start: 2022-04-18 | End: 2022-04-20 | Stop reason: HOSPADM

## 2022-04-18 RX ORDER — MULTIVITAMIN,THERAPEUTIC
1 TABLET ORAL DAILY
COMMUNITY

## 2022-04-18 RX ADMIN — FENTANYL CITRATE 25 MCG: 50 INJECTION INTRAMUSCULAR; INTRAVENOUS at 11:26

## 2022-04-18 RX ADMIN — GABAPENTIN 1200 MG: 300 CAPSULE ORAL at 16:11

## 2022-04-18 RX ADMIN — FLUOXETINE 60 MG: 20 CAPSULE ORAL at 16:10

## 2022-04-18 RX ADMIN — SODIUM CHLORIDE, POTASSIUM CHLORIDE, SODIUM LACTATE AND CALCIUM CHLORIDE: 600; 310; 30; 20 INJECTION, SOLUTION INTRAVENOUS at 09:35

## 2022-04-18 RX ADMIN — LIDOCAINE HYDROCHLORIDE 60 MG: 20 INJECTION, SOLUTION INFILTRATION; PERINEURAL at 07:33

## 2022-04-18 RX ADMIN — ACETAMINOPHEN 975 MG: 325 TABLET ORAL at 13:59

## 2022-04-18 RX ADMIN — FENTANYL CITRATE 100 MCG: 50 INJECTION, SOLUTION INTRAMUSCULAR; INTRAVENOUS at 07:33

## 2022-04-18 RX ADMIN — TRAZODONE HYDROCHLORIDE 100 MG: 50 TABLET ORAL at 21:46

## 2022-04-18 RX ADMIN — OXYCODONE HYDROCHLORIDE 10 MG: 5 TABLET ORAL at 13:06

## 2022-04-18 RX ADMIN — SENNOSIDES AND DOCUSATE SODIUM 1 TABLET: 50; 8.6 TABLET ORAL at 21:48

## 2022-04-18 RX ADMIN — DEXAMETHASONE SODIUM PHOSPHATE 10 MG: 10 INJECTION, SOLUTION INTRAMUSCULAR; INTRAVENOUS at 07:33

## 2022-04-18 RX ADMIN — PHENYLEPHRINE HYDROCHLORIDE 0.4 MCG/KG/MIN: 10 INJECTION INTRAVENOUS at 08:42

## 2022-04-18 RX ADMIN — PHENYLEPHRINE HYDROCHLORIDE 25 MCG: 10 INJECTION INTRAVENOUS at 08:37

## 2022-04-18 RX ADMIN — ONDANSETRON 4 MG: 2 INJECTION INTRAMUSCULAR; INTRAVENOUS at 10:18

## 2022-04-18 RX ADMIN — GABAPENTIN 900 MG: 300 CAPSULE ORAL at 21:49

## 2022-04-18 RX ADMIN — ACETAMINOPHEN 975 MG: 325 TABLET ORAL at 21:47

## 2022-04-18 RX ADMIN — BUPROPION HYDROCHLORIDE 150 MG: 150 TABLET, FILM COATED, EXTENDED RELEASE ORAL at 16:11

## 2022-04-18 RX ADMIN — GLYCOPYRROLATE 0.2 MG: 0.2 INJECTION, SOLUTION INTRAMUSCULAR; INTRAVENOUS at 07:33

## 2022-04-18 RX ADMIN — SUGAMMADEX 200 MG: 100 INJECTION, SOLUTION INTRAVENOUS at 10:39

## 2022-04-18 RX ADMIN — FENTANYL CITRATE 25 MCG: 50 INJECTION INTRAMUSCULAR; INTRAVENOUS at 11:10

## 2022-04-18 RX ADMIN — MIDAZOLAM 2 MG: 1 INJECTION INTRAMUSCULAR; INTRAVENOUS at 07:25

## 2022-04-18 RX ADMIN — DEXMEDETOMIDINE HYDROCHLORIDE 0.4 MCG/KG/HR: 400 INJECTION INTRAVENOUS at 07:50

## 2022-04-18 RX ADMIN — OXYCODONE HYDROCHLORIDE 10 MG: 5 TABLET ORAL at 17:19

## 2022-04-18 RX ADMIN — SODIUM CHLORIDE, POTASSIUM CHLORIDE, SODIUM LACTATE AND CALCIUM CHLORIDE: 600; 310; 30; 20 INJECTION, SOLUTION INTRAVENOUS at 06:25

## 2022-04-18 RX ADMIN — ROCURONIUM BROMIDE 20 MG: 50 INJECTION, SOLUTION INTRAVENOUS at 09:15

## 2022-04-18 RX ADMIN — LEVOTHYROXINE SODIUM 175 MCG: 0.03 TABLET ORAL at 16:11

## 2022-04-18 RX ADMIN — SODIUM CHLORIDE: 9 INJECTION, SOLUTION INTRAVENOUS at 13:07

## 2022-04-18 RX ADMIN — DIAZEPAM 5 MG: 5 TABLET ORAL at 18:42

## 2022-04-18 RX ADMIN — FENTANYL CITRATE 50 MCG: 50 INJECTION, SOLUTION INTRAMUSCULAR; INTRAVENOUS at 10:31

## 2022-04-18 RX ADMIN — CLINDAMYCIN PHOSPHATE 900 MG: 900 INJECTION, SOLUTION INTRAVENOUS at 07:25

## 2022-04-18 RX ADMIN — HYDROMORPHONE HYDROCHLORIDE 0.2 MG: 1 INJECTION, SOLUTION INTRAMUSCULAR; INTRAVENOUS; SUBCUTANEOUS at 11:56

## 2022-04-18 RX ADMIN — HYDROMORPHONE HYDROCHLORIDE 0.2 MG: 1 INJECTION, SOLUTION INTRAMUSCULAR; INTRAVENOUS; SUBCUTANEOUS at 12:01

## 2022-04-18 RX ADMIN — DIAZEPAM 5 MG: 5 TABLET ORAL at 14:39

## 2022-04-18 RX ADMIN — PROPOFOL 150 MG: 10 INJECTION, EMULSION INTRAVENOUS at 07:33

## 2022-04-18 RX ADMIN — FENTANYL CITRATE 25 MCG: 50 INJECTION INTRAMUSCULAR; INTRAVENOUS at 11:18

## 2022-04-18 RX ADMIN — PHENYLEPHRINE HYDROCHLORIDE 100 MCG: 10 INJECTION INTRAVENOUS at 08:19

## 2022-04-18 RX ADMIN — PHENYLEPHRINE HYDROCHLORIDE 50 MCG: 10 INJECTION INTRAVENOUS at 08:28

## 2022-04-18 RX ADMIN — SIMVASTATIN 20 MG: 10 TABLET, FILM COATED ORAL at 21:44

## 2022-04-18 RX ADMIN — DIAZEPAM 5 MG: 5 TABLET ORAL at 22:53

## 2022-04-18 RX ADMIN — MAGNESIUM SULFATE HEPTAHYDRATE 4 G: 80 INJECTION, SOLUTION INTRAVENOUS at 06:37

## 2022-04-18 RX ADMIN — FENTANYL CITRATE 50 MCG: 50 INJECTION, SOLUTION INTRAMUSCULAR; INTRAVENOUS at 10:27

## 2022-04-18 RX ADMIN — ROCURONIUM BROMIDE 50 MG: 50 INJECTION, SOLUTION INTRAVENOUS at 07:33

## 2022-04-18 RX ADMIN — OXYCODONE HYDROCHLORIDE 10 MG: 5 TABLET ORAL at 21:54

## 2022-04-18 ASSESSMENT — ACTIVITIES OF DAILY LIVING (ADL)
ADLS_ACUITY_SCORE: 4
ADLS_ACUITY_SCORE: 4
ADLS_ACUITY_SCORE: 6
ADLS_ACUITY_SCORE: 4
ADLS_ACUITY_SCORE: 4
ADLS_ACUITY_SCORE: 6
ADLS_ACUITY_SCORE: 4
ADLS_ACUITY_SCORE: 6
ADLS_ACUITY_SCORE: 4
ADLS_ACUITY_SCORE: 4
ADLS_ACUITY_SCORE: 12
ADLS_ACUITY_SCORE: 4

## 2022-04-18 NOTE — ANESTHESIA PROCEDURE NOTES
Airway       Patient location during procedure: OR       Procedure Start/Stop Times: 4/18/2022 7:35 AM  Staff -        Anesthesiologist:  Kulwinder Trujillo MD       CRNA: Awa Chris APRN CRNA       Performed By: CRNA  Consent for Airway        Urgency: elective  Indications and Patient Condition       Indications for airway management: mireya-procedural       Induction type:intravenous       Mask difficulty assessment: 2 - vent by mask + OA or adjuvant +/- NMBA    Final Airway Details       Final airway type: endotracheal airway       Successful airway: ETT - single  Endotracheal Airway Details        ETT size (mm): 7.0       Cuffed: yes       Successful intubation technique: video laryngoscopy       VL Blade Size: Glidescope 3       Grade View of Cords: 1       Adjucts: stylet       Position: Right       Measured from: lips       Secured at (cm): 22       Bite block used: None    Post intubation assessment        Placement verified by: capnometry, equal breath sounds and chest rise        Number of attempts at approach: 1       Secured with: silk tape       Ease of procedure: easy       Dentition: Intact and Unchanged    Medication(s) Administered   Medication Administration Time: 4/18/2022 7:35 AM

## 2022-04-18 NOTE — PHARMACY-ADMISSION MEDICATION HISTORY
Pharmacy Note - Admission Medication History    Pertinent Provider Information: None   ______________________________________________________________________    Prior To Admission (PTA) med list completed and updated in EMR.       Current Facility-Administered Medications for the 4/18/22 encounter (Hospital Encounter)   Medication     lidocaine 1 % 2 mL     PTA Med List   Medication Sig Last Dose     buPROPion (WELLBUTRIN XL) 150 MG 24 hr tablet TAKE 1 TABLET(150 MG) BY MOUTH EVERY MORNING 4/17/2022 at Unknown time     FLUoxetine (PROZAC) 20 MG capsule TAKE 3 CAPSULES(60 MG) BY MOUTH DAILY 4/17/2022 at Unknown time     gabapentin (NEURONTIN) 300 MG capsule Take 900 mg by mouth 2 times daily Breakfast and lunch 4/17/2022 at Unknown time     gabapentin (NEURONTIN) 300 MG capsule Take 1,200 mg by mouth daily (with dinner) 4/17/2022 at Unknown time     levothyroxine (SYNTHROID/LEVOTHROID) 175 MCG tablet TAKE 1 TABLET(175 MCG) BY MOUTH DAILY 4/17/2022 at Unknown time     multivitamin, therapeutic (THERA-VIT) TABS tablet Take 1 tablet by mouth daily 4/17/2022 at Unknown time     simvastatin (ZOCOR) 20 MG tablet Take 20 mg by mouth At Bedtime 4/17/2022 at Unknown time     SUMAtriptan (IMITREX) 50 MG tablet TAKE 1 TABLET BY MOUTH EVERY 2 HOURS AS NEEDED FOR MIGRAINE. MAY REPEAT IN 2 HOURS AS NEEDED      tiZANidine (ZANAFLEX) 2 MG tablet Take 1-2 tablets (2-4 mg) by mouth 3 times daily as needed for muscle spasms      traZODone (DESYREL) 50 MG tablet Take 1-2 tablets ( mg) by mouth At Bedtime 4/17/2022 at Unknown time     VENTOLIN  (90 Base) MCG/ACT inhaler INHALE 1 TO 2 PUFFS BY MOUTH EVERY 4 HOURS AS NEEDED FOR WHEEZING  at PRN       Information source(s): Patient, Hospital records and [facility]    Method of interview communication: in-person    Patient was asked about OTC/herbal products specifically.  PTA med list reflects this.    Based on the pharmacist's assessment, the PTA med list  information appears reliable    Allergies were reviewed, assessed, and updated with the patient.      Patient does not anticipate needing any multi-use medications during admission.     Thank you for the opportunity to participate in the care of this patient.      Aisha Duran [facility]     4/18/2022     7:12 AM

## 2022-04-18 NOTE — PROGRESS NOTES
[facility] Hospitalist Progress Note  [facility]  Summary:    56F with DESI on CPAP, hypothyroid, depression,  tobacco abuse POD#0 L4-5 fusion    Assessment/Plan    #s/p L4-5 fusion - per neurosx.  Pain control, PT/OT, mechanical DVT px.  #DESI - CPAP  #depression - prozac, wellbutrin  #asthma - no exacerbation.  Albuterol PRN  #HLD - statin  #hypothyroid - synthroid    Clinically Significant Risk Factors Present on Admission                          Checklist:  Code Status: Full Code    Diet: Advance Diet as Tolerated: Regular Diet Adult    Ceballos Catheter: PRESENT, indication:    Central Lines: None          Expected Discharge: 04/20/2022     Anticipated discharge location:  Awaiting care coordination huddle  Delays: { TIP  Update expected discharge date and delays and refresh note (tipsheet)     :[id number]}            Overnight Events/Subjective/Notable results:  Ceballos removed and voided.  Pain controlled  Discussed importance of CPAP when taking pain meds    4 point ROS otherwise negative    Objective    Vital signs in last 24 hours  Temp:  [96.9  F (36.1  C)-98  F (36.7  C)] 97.5  F (36.4  C)  Pulse:  [61-78] 65  Resp:  [8-24] 18  BP: ()/(57-76) 123/66  SpO2:  [92 %-98 %] 95 % O2 Device: Nasal cannula    Weight:   210 lbs 3.2 oz    Intake/Output last 3 shifts  No intake/output data recorded.  Body mass index is 37.24 kg/m .    Physical Exam  General:  Alert, cooperative, no distress,  Appears stated age  Neurologic:  oriented, facial symmetry preserved, fluent speech.   Psych: calm, mood and affect appropriate to situation  HEENT:  Anicteric, MMM, unremarkable dentition  CV: no edema  Lungs:   Easy respirations  Skin: no rashes noted on exposed skin. Color and turgor normal  Central Lines and Tubes: MALCOLM with sanguinous output    Pre-op lab reviewed    Data        Monique Esquivel MD  Internal Medicine Hospitalist

## 2022-04-18 NOTE — PROGRESS NOTES
04/18/22 1525   Quick Adds   Type of Visit Initial PT Evaluation   Living Environment   People in Home significant other   Current Living Arrangements apartment   Home Accessibility no concerns   Living Environment Comments no stairs   Self-Care   Usual Activity Tolerance moderate   Current Activity Tolerance good   Equipment Currently Used at Home none   General Information   Onset of Illness/Injury or Date of Surgery 04/18/22   Referring Physician Leanna Ward MD   Patient/Family Therapy Goals Statement (PT) go home soon   Pertinent History of Current Problem (include personal factors and/or comorbidities that impact the POC) DESI on CPAP, hypothyroid, depression,  tobacco abuse POD#0 L4-5 fusion   Existing Precautions/Restrictions brace worn when out of bed;spinal   Cognition   Affect/Mental Status (Cognition) WNL   Pain Assessment   Patient Currently in Pain No   Range of Motion (ROM)   Range of Motion ROM deficits secondary to surgical procedure   Strength (Manual Muscle Testing)   Strength (Manual Muscle Testing) strength is WFL   Bed Mobility   Comment, (Bed Mobility) up in chair before PT arrived, after PT. pt recalls from previous back surgery proper technique. Educated again on log roll.   Transfers   Transfers sit-stand transfer   Sit-Stand Transfer   Sit-Stand [place] (Transfers) independent   Assistive Device (Sit-Stand Transfers) other (see comments)  (none)   Comment, (Sit-Stand Transfer) cues for slowing down. Did get slightly dizzy with quick stand   Gait/Stairs (Locomotion)   [place] Level (Gait) supervision   Assistive Device (Gait)   (pushing IV pole)   Distance in Feet (Required for LE Total Joints) 400'   Comment, (Gait/Stairs) no stairs at home, good stability with use of pushing IV pole. No LOB noted   Balance   Balance no deficits were identified   Clinical Impression   Criteria for Skilled Therapeutic Intervention Yes, treatment indicated   PT Diagnosis (PT) impaired  functional mobility   Influenced by the following impairments orthopedic restrictions   Functional limitations due to impairments gait   Clinical Presentation (PT Evaluation Complexity) Stable/Uncomplicated   Clinical Presentation Rationale pt presents as medically diagnosed   Clinical Decision Making (Complexity) low complexity   Planned Therapy Interventions (PT) bed mobility training;gait training;transfer training   Anticipated Equipment Needs at Discharge (PT) other (see comments)  (none)   Risk & Benefits of therapy have been explained patient;spouse/significant other   PT Discharge Planning   PT Discharge Recommendation (DC Rec) home with assist   PT Rationale for DC Rec assist from S.O. as needed at home   Total Evaluation Time   Total Evaluation Time (Minutes) 10   Physical Therapy Goals   PT Frequency One time eval and treatment only   PT Predicted Duration/Target Date for Goal Attainment 04/18/22   PT Goals Transfers;Gait   PT: Transfers Independent;Sit to/from stand   PT: Gait Supervision/stand-by assist;Greater than 200 feet

## 2022-04-18 NOTE — ANESTHESIA POSTPROCEDURE EVALUATION
Patient: Susan Kwan    Procedure: Procedure(s):  Right lumbar 4 - lumbar 5 transforaminal lumbar interbody fusion with revision lumbar 4 - lumbar 5 posterior instrumented fusion and arthrodesis, use of allograft, autograft, stealth       Anesthesia Type:  General    Note:     Postop Pain Control: Uneventful            Sign Out: Well controlled pain   PONV: No   Neuro/Psych: Uneventful            Sign Out: Acceptable/Baseline neuro status   Airway/Respiratory: Uneventful            Sign Out: Acceptable/Baseline resp. status   CV/Hemodynamics: Uneventful            Sign Out: Acceptable CV status; No obvious hypovolemia; No obvious fluid overload   Other NRE: NONE   DID A NON-ROUTINE EVENT OCCUR? No           Last vitals:  Vitals Value Taken Time   BP 99/58 04/18/22 1145   Temp 36.4  C (97.6  F) 04/18/22 1055   Pulse 61 04/18/22 1151   Resp 10 04/18/22 1151   SpO2 96 % 04/18/22 1151   Vitals shown include unvalidated device data.    Electronically Signed By: Kulwinder Trujillo MD  April 18, 2022  11:53 AM

## 2022-04-18 NOTE — PLAN OF CARE
"PRIMARY DIAGNOSIS: \"GENERIC\" NURSING  OUTPATIENT/OBSERVATION GOALS TO BE MET BEFORE DISCHARGE:  ADLs back to baseline: Yes    Activity and level of assistance:     Pt total care and ian lift.  Pain status: Pain free.    Return to near baseline physical activity: Yes     Discharge Planner Nurse   Safe discharge environment identified: No  Barriers to discharge: Yes       Entered by: Michelle Arthur 04/18/2022 2:59 PM    Pt still awaiting placement.     Please review provider order for any additional goals.   Nurse to notify provider when observation goals have been met and patient is ready for discharge.Goal Outcome Evaluation:                      "

## 2022-04-18 NOTE — PLAN OF CARE
Problem: Pain Acute  Goal: Acceptable Pain Control and Functional Ability  Outcome: Ongoing, Progressing    Pt alert and orientated X4. Pain well managed with prn pain medications. Surgical dressing CDI. MALCOLM drain patent and draining serosanguinous drainage. Corset on when out of bed. Ceballos discontinued at 1430. Pt up to chair. C/o baseline numbness from right knee down to toes. Strength equal and strong in all extremities. Pulmonary toilet encouarged.
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
## 2022-04-18 NOTE — PLAN OF CARE
Physical Therapy Discharge Summary    Reason for therapy discharge:    All goals and outcomes met, no further needs identified.    Progress towards therapy goal(s). See goals on Care Plan in Epic electronic health record for goal details.  Goals met    Therapy recommendation(s):    Continue home exercise program. No further skilled PT needs at this time while hospitalized. Should pt status change, please re-order PT as appropriate. Thank you.    Angelia Garland, PT, DPT  4/18/2022

## 2022-04-18 NOTE — ADDENDUM NOTE
Addendum  created 04/18/22 1204 by Awa Chris APRN CRNA    Flowsheet accepted, Intraprocedure Flowsheets edited

## 2022-04-18 NOTE — ANESTHESIA CARE TRANSFER NOTE
Patient: Susan Kwan    Procedure: Procedure(s):  Right lumbar 4 - lumbar 5 transforaminal lumbar interbody fusion with revision lumbar 4 - lumbar 5 posterior instrumented fusion and arthrodesis, use of allograft, autograft, stealth       Diagnosis: Lumbar radiculopathy [M54.16]  Lumbar disc herniation [M51.26]  Pseudoarthrosis of lumbar spine [S32.009K]  Diagnosis Additional Information: No value filed.    Anesthesia Type:   General     Note:    Oropharynx: oropharynx clear of all foreign objects and spontaneously breathing  Level of Consciousness: awake and drowsy  Oxygen Supplementation: face mask  Level of Supplemental Oxygen (L/min / FiO2): 6  Independent Airway: airway patency satisfactory and stable  Dentition: dentition unchanged  Vital Signs Stable: post-procedure vital signs reviewed and stable  Report to RN Given: handoff report given  Patient transferred to: PACU    Handoff Report: Identifed the Patient, Identified the Reponsible Provider, Reviewed the pertinent medical history, Discussed the surgical course, Reviewed Intra-OP anesthesia mangement and issues during anesthesia, Set expectations for post-procedure period and Allowed opportunity for questions and acknowledgement of understanding      Vitals:  Vitals Value Taken Time   BP 98/57 04/18/22 1054   Temp 36.4  C (97.6  F) 04/18/22 1054   Pulse 71 04/18/22 1054   Resp 15 04/18/22 1054   SpO2 97 % 04/18/22 1054       Electronically Signed By: EITAN Stock CRNA  April 18, 2022  10:55 AM

## 2022-04-18 NOTE — BRIEF OP NOTE
[facility] Brief Operative Note    Pre-operative diagnosis: Lumbar radiculopathy [M54.16]  Lumbar disc herniation [M51.26]  Pseudoarthrosis of lumbar spine [S32.009K]   Post-operative diagnosis Same   Procedure: Procedure(s):  Right lumbar 4 - lumbar 5 transforaminal lumbar interbody fusion with revision lumbar 4 - lumbar 5 posterior instrumented fusion and arthrodesis, use of allograft, autograft, stealth   Surgeon(s): Surgeon(s) and Role:     * Leanna Ward MD - Primary     * Margo Bang PA-C - Assisting   Estimated blood loss: 50 mL    Specimens: None   Findings: Post op xr showed adequate hardware placement      Implant Name Type Inv. Item Serial No.  Lot No. LRB No. Used Action   IMP SCR MEDT 4.75MM SOLERA 5.5X35MM MA [id number] - SNA Metallic Hardware/[place]  NA [employer] NA N/A 1 Explanted   IMP SCR MEDT 4.75MM SOLERA 5.0X40MM MA [id number] - SNA [employer]ware[employer]  NA [employer] NA N/A 1 Explanted   IMP SCR MEDT 4.75MM SOLERA 5.5X40MM MA [id number] - SNA [employer]allic Hardware/[place]  NA [employer] NA N/A 2 Explanted   IMP SCR MEDT BREAK-OFF SET SOLERA 4.75MM TI [id number] - SNA Metallic Hardware/[place]  NA [employer] NA N/A 4 Explanted   GRAFT BONE FIBERS ALLEN DBM DBF 3ML [id number] Bone/Tissue/Biologic GRAFT BONE FIBERS ALLEN DBM DBF 3ML [id number] [employer] NA Right 1 Implanted   GRAFT BONE PUTTY I-FACTOR PEPTIDE ENHANCED 1ML 700-010 - SNA Bone/Tissue/Biologic GRAFT BONE PUTTY I-FACTOR PEPTIDE ENHANCED 1ML 700-010 NA [employer] [id number] Right 1 Implanted   IMP SPI INTERBODY MEDT CATALYFT PL SHORT 7MM [id number] - [id number] Metallic Hardware/[place] IMP SPI INTERBODY MEDT CATALYFT PL SHORT 7MM [id number]  [employer] [id number] Right 1 Implanted   IMP SCR MEDT BREAK-OFF SET SOLERA 4.75MM TI [id number] - [id number] Metallic Hardware/[place] IMP SCR MEDT BREAK-OFF SET SOLERA 4.75MM TI [id number]  [employer]ON[employer] [facility]  Right 4 Implanted   IMP SCR MEDT  4.75MM SOLERA 6.5X40MM MA [id number] - SNA Metallic Hardware/[place] IMP SCR MEDT 4.75MM SOLERA 6.5X40MM MA [id number] NA [employer] [facility] NA Right 2 Implanted   IMP SCR MEDT 4.75MM SOLERA 6.5X45MM MA [id number] - SNA Metallic Hardware/[place] IMP SCR MEDT 4.75MM SOLERA 6.5X45MM MA [id number] NA [employer] NA Right 1 Implanted   GRAFT BONE FIBERS ALLEN DBM DBF 3ML [id number] - [id number] Bone/Tissue/Biologic GRAFT BONE FIBERS ALLEN DBM DBF 3ML [id number] [employer] NA Right 1 Implanted

## 2022-04-19 ENCOUNTER — APPOINTMENT (OUTPATIENT)
Dept: OCCUPATIONAL THERAPY | Facility: HOSPITAL | Age: 57
End: 2022-04-19
Attending: PHYSICIAN ASSISTANT
Payer: COMMERCIAL

## 2022-04-19 ENCOUNTER — APPOINTMENT (OUTPATIENT)
Dept: RADIOLOGY | Facility: HOSPITAL | Age: 57
End: 2022-04-19
Attending: PHYSICIAN ASSISTANT
Payer: COMMERCIAL

## 2022-04-19 PROCEDURE — 97535 SELF CARE MNGMENT TRAINING: CPT | Mod: GO

## 2022-04-19 PROCEDURE — 250N000013 HC RX MED GY IP 250 OP 250 PS 637: Performed by: PHYSICIAN ASSISTANT

## 2022-04-19 PROCEDURE — 72100 X-RAY EXAM L-S SPINE 2/3 VWS: CPT

## 2022-04-19 PROCEDURE — 99232 SBSQ HOSP IP/OBS MODERATE 35: CPT | Performed by: INTERNAL MEDICINE

## 2022-04-19 PROCEDURE — 120N000001 HC R&B MED SURG/OB

## 2022-04-19 PROCEDURE — 97166 OT EVAL MOD COMPLEX 45 MIN: CPT | Mod: GO

## 2022-04-19 RX ADMIN — GABAPENTIN 1200 MG: 300 CAPSULE ORAL at 17:17

## 2022-04-19 RX ADMIN — FLUOXETINE 60 MG: 20 CAPSULE ORAL at 08:38

## 2022-04-19 RX ADMIN — GABAPENTIN 900 MG: 300 CAPSULE ORAL at 08:37

## 2022-04-19 RX ADMIN — SENNOSIDES AND DOCUSATE SODIUM 1 TABLET: 50; 8.6 TABLET ORAL at 21:32

## 2022-04-19 RX ADMIN — OXYCODONE HYDROCHLORIDE 10 MG: 5 TABLET ORAL at 06:07

## 2022-04-19 RX ADMIN — DIAZEPAM 5 MG: 5 TABLET ORAL at 17:16

## 2022-04-19 RX ADMIN — POLYETHYLENE GLYCOL 3350 17 G: 17 POWDER, FOR SOLUTION ORAL at 13:09

## 2022-04-19 RX ADMIN — BUPROPION HYDROCHLORIDE 150 MG: 150 TABLET, FILM COATED, EXTENDED RELEASE ORAL at 08:39

## 2022-04-19 RX ADMIN — TRAZODONE HYDROCHLORIDE 100 MG: 50 TABLET ORAL at 21:32

## 2022-04-19 RX ADMIN — ACETAMINOPHEN 975 MG: 325 TABLET ORAL at 13:02

## 2022-04-19 RX ADMIN — GABAPENTIN 900 MG: 300 CAPSULE ORAL at 21:31

## 2022-04-19 RX ADMIN — OXYCODONE HYDROCHLORIDE 10 MG: 5 TABLET ORAL at 17:10

## 2022-04-19 RX ADMIN — OXYCODONE HYDROCHLORIDE 10 MG: 5 TABLET ORAL at 21:32

## 2022-04-19 RX ADMIN — DIAZEPAM 5 MG: 5 TABLET ORAL at 12:23

## 2022-04-19 RX ADMIN — ACETAMINOPHEN 975 MG: 325 TABLET ORAL at 06:07

## 2022-04-19 RX ADMIN — DIAZEPAM 5 MG: 5 TABLET ORAL at 21:31

## 2022-04-19 RX ADMIN — THERA TABS 1 TABLET: TAB at 08:38

## 2022-04-19 RX ADMIN — OXYCODONE HYDROCHLORIDE 10 MG: 5 TABLET ORAL at 02:01

## 2022-04-19 RX ADMIN — OXYCODONE HYDROCHLORIDE 10 MG: 5 TABLET ORAL at 12:22

## 2022-04-19 RX ADMIN — SIMVASTATIN 20 MG: 10 TABLET, FILM COATED ORAL at 21:32

## 2022-04-19 RX ADMIN — LEVOTHYROXINE SODIUM 175 MCG: 0.03 TABLET ORAL at 08:38

## 2022-04-19 RX ADMIN — ACETAMINOPHEN 975 MG: 325 TABLET ORAL at 21:31

## 2022-04-19 RX ADMIN — SENNOSIDES AND DOCUSATE SODIUM 1 TABLET: 50; 8.6 TABLET ORAL at 13:09

## 2022-04-19 ASSESSMENT — ACTIVITIES OF DAILY LIVING (ADL)
ADLS_ACUITY_SCORE: 8
DEPENDENT_IADLS:: TRANSPORTATION
ADLS_ACUITY_SCORE: 8
ADLS_ACUITY_SCORE: 6
ADLS_ACUITY_SCORE: 6
ADLS_ACUITY_SCORE: 8
ADLS_ACUITY_SCORE: 6
ADLS_ACUITY_SCORE: 6
ADLS_ACUITY_SCORE: 8
ADLS_ACUITY_SCORE: 8
PREVIOUS_RESPONSIBILITIES: MEAL PREP;LAUNDRY;HOUSEKEEPING;MEDICATION MANAGEMENT
ADLS_ACUITY_SCORE: 8
ADLS_ACUITY_SCORE: 6

## 2022-04-19 NOTE — PROGRESS NOTES
Neurosurgery Progress Note:  4/19/2022     A/P: Ms. Kwan is a pleasant 56 year old right handed female POD#1 Right lumbar 4 - lumbar 5 transforaminal lumbar interbody fusion with revision lumbar 4 - lumbar 5 posterior instrumented fusion and arthrodesis, use of allograft, autograft, stealth by Dr. Ward.     She states overall she is doing very well and is wanting to go home. She notes incisional pain that was severe last night but has improved with prn medications. She denies radicular leg pain presently and notes unchanged numbness of anterior right shin to top of foot that has been present since 2017, voiding without difficulty, had small BM following surgery      Plan:  1. Keep drain in place  2. Increase activity as tolerated with PT/OT  3. Continue pain management with prn medications Valium and Oxycodone   4. Lumbar xray to be completed today   5. Probable discharge home tomorrow pending recovery      Neurosurgery Attending: The patient's clinical examination, laboratory data, and plan was discussed with Dr. Ward     HPI: Patient is a 56-year-old female who is status post cervical 5-cervical 6 anterior cervical decompression and fusion on April 5, 2021 and lumbar 4-lumbar 5 microdiscectomy and posterior instrumented fusion on 12/17/2020.  Patient continues to have right posterior leg pain down to her knee.  He also has chronic numbness and tingling in her anterior right shin to the top of her foot since prior to her to all her lumbar surgery.  Right L4-5, L5-S1 TFESi on 2/10/22 with some improvement for 6 days. Has some pain coming back and also experiencing weakness in her right leg with fall.  She has tried physical therapy in the past with no long-term improvement and also is on gabapentin and baclofen without complete relief.  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  We discussed a revision right lumbar 4-5 transforaminal lumbar interbody fusion however patient needs to have smoking cessation prior to surgery confirmed with a nicotine test.      S:   Presently she states that she is doing well. Denies radicular leg pain presently note incisional pain and continued right radicular numbness      O:  BP (!) 159/65 (BP Location: Right arm)   Pulse 74   Temp 97.9  F (36.6  C) (Oral)   Resp 18   Wt 95.3 kg (210 lb 3.2 oz)   LMP 03/09/2010   SpO2 98%   BMI 37.24 kg/m          General: Awake, alert, NAD. Sitting in bedside chair      Motor: normal bulk and tone     Strength: full strength in all extremities throughout, bilaterally.     Sensation: intact to light touch throughout both upper and lower extremities     Incision: dressing dry and intact     Xray to be completed today     MALCOLM drain: 45mL/8hrs      Margo Bang PA-C  [facility] Neurosurgery  O: [phone]

## 2022-04-19 NOTE — PROGRESS NOTES
[facility]    Medicine Progress Note - Hospitalist Service    Date of Admission:  4/18/2022     Assessment & Plan   SUMMARY:  56 year old female with history of DESI on CPAP, obesity, hypothyroid, depression,  tobacco abuse POD#1 L4-5 fusion. Consulted for hypertension management - patient offended with implication that she has hypertension, not taking meds, feels her BP's are well controlled at home.     ACTIVE PROBLEM LIST  #s/p L4-5 fusion - per neurosx.  Pain control, PT/OT, mechanical DVT px.  #DESI - CPAP  #depression - prozac, wellbutrin  #asthma - no exacerbation.  Albuterol PRN  #HLD - statin  #hypothyroid - synthroid    Active Problems:    Spondylolisthesis of lumbar region    Class II Obesity w BMI=35-39.9: Body mass index is 37.24 kg/m .     DVT prophylaxis:    Medical:  early ambulation    Mechanical:  PCD's    Disposition Plan: Expected discharge: 04/20/2022   recommended to Home once surgical wound is stable.    Diet: Orders Placed This Encounter      Advance Diet as Tolerated: Regular Diet Adult    Ceballos Catheter: Not present  Central Lines/Port-a-cath: Not present  Drains: Present     --------------------------------------------    The patient's care was discussed with the Patient.    Migue Perez MD  Hospitalist Service  [facility]  Securely message with the Vocera Web Console (learn more here)  Text page via [employer] Paging/Directory    Clinically Significant Risk Factors Present on Admission            ______________________________________________________________________    Interval History   Patient feels well today, pain controlled with meds. Anxious to go home tomorrow.     ROS: Denies chest pain, denies shortness of breath, passing urine well and good appetite    Data personally reviewed from the last 24 hours:   Reviewed Laboratory results, Consultant recommendations and medications     Physical Exam   BP (!) 159/65 (BP Location: Right arm)    Pulse 74   Temp 97.9  F (36.6  C) (Oral)   Resp 18   Wt 95.3 kg (210 lb 3.2 oz)   LMP 03/09/2010   SpO2 98%   BMI 37.24 kg/m      Physical Exam    General Appearance:    HEENT:  Awake, Alert, Cooperative, in no distress and appears stated age   no cervical adenopathy, Normocephalic, atraumatic, conjunctiva clear without icterus and ears without discharge   Lungs:   Clear to auscultation bilaterally, no wheezing, good air exchange, normal work of breathing   Cardiovascular:  Regular Rate and Rythm, no significant cardiac murmurs heard, normal apical impulse, normal S1 and S2, no lower extremity edema bilaterally   Abdomen: Soft, non-tender and Non-distended, active bowel sounds   Skin:  Skin color, texture normal and bruising or bleeding. No rashes or lesions over face, neck, arms and legs, turgor normal.   Neurologic:    Neuropsychiatric: Alert & Oriented X 3, Facial symmetry preserved and upper & lower extremities moving well with symmetry  Calm, normal eye contact, Affect normal     Data   Recent Labs   Lab 04/14/22  0927   WBC 7.6   HGB 12.9   MCV 92      INR 0.97      POTASSIUM 4.6   CHLORIDE 105   CO2 25   BUN 19   CR 0.85   ANIONGAP 11   VANESSA 9.6   GLC 95     Recent Results (from the past 24 hour(s))   XR Lumbar Spine 2/3 Views    Narrative    EXAM: XR LUMBAR SPINE 2-3 VIEWS  LOCATION: [facility]  DATE/TIME: 4/19/2022 11:27 AM    INDICATION: lumbar fusion  COMPARISON: Lumbar spine CT 01/26/2022.  TECHNIQUE: CR Lumbar Spine.      Impression    IMPRESSION: There are postsurgical changes related to prior posterior instrumented fusion at L4-L5. An interbody graft has been placed along the right lateral aspect of the L4-L5 intervertebral disc space in the interval. The hardware appears intact. It   is unclear if the interbody graft was intentionally placed laterally within the L4-L5 intervertebral disc space. Clinical correlation is suggested. A surgical drain is noted in  the posterior paraspinal soft tissues at the L4 level. Again seen is a spinal   cord stimulator with 2 leads entering the spinal canal at the T11-T12 level and terminating at the T9 level. The leads appear intact. There is mild broad-based levoconvex curvature of the lumbar spine. The vertebral body heights are maintained. There is   mild multilevel intervertebral disc space narrowing and endplate degenerative change. There is atherosclerotic calcification of the abdominal aorta. Surgical clips are noted in the right upper quadrant of the abdomen.

## 2022-04-19 NOTE — CONSULTS
Care Management Initial Consult    General Information  Assessment completed with: PatientSusan  Type of CM/SW Visit: Initial Assessment    Primary Care Provider verified and updated as needed: Yes   Readmission within the last 30 days: no previous admission in last 30 days      Reason for Consult: discharge planning  Advance Care Planning: Advance Care Planning Reviewed: no concerns identified, verified with patient (Pt states she has HCD at home, not sure where it is at this time)          Communication Assessment  Patient's communication style: spoken language (English or Bilingual)    Hearing Difficulty or Deaf: no   Wear Glasses or Blind: yes    Cognitive  Cognitive/Neuro/Behavioral: WDL                      Living Environment:   People in home: significant other     Current living Arrangements: apartment (lives on ground level, 5 steps into building from garage, hand rails on both sides)      Able to return to prior arrangements: yes       Family/Social Support:  Care provided by: self  Provides care for: no one  Marital Status: Lives with Significant Other  Significant Other          Description of Support System: Supportive, Involved    Support Assessment: Adequate family and caregiver support    Current Resources:   Patient receiving home care services: No     Community Resources: None  Equipment currently used at home: cane, straight, walker, rolling  Supplies currently used at home: Other (CPAP)    Employment/Financial:  Employment Status:       Employment/ Comments: No  Financial Concerns: No concerns identified   Referral to Financial Worker: No       Lifestyle & Psychosocial Needs:  Social Determinants of Health     Tobacco Use: Medium Risk     Smoking Tobacco Use: Former Smoker     Smokeless Tobacco Use: Never Used   Alcohol Use: Not At Risk     Frequency of Alcohol Consumption: Never     Average Number of Drinks: Not on file     Frequency of Binge Drinking: Never   Financial Resource  Strain: Low Risk      Difficulty of Paying Living Expenses: Not hard at all   Food Insecurity: No Food Insecurity     Worried About Running Out of Food in the Last Year: Never true     Ran Out of Food in the Last Year: Never true   Transportation Needs: No Transportation Needs     Lack of Transportation (Medical): No     Lack of Transportation (Non-Medical): No   Physical Activity: Inactive     Days of Exercise per Week: 0 days     Minutes of Exercise per Session: 0 min   Stress: No Stress Concern Present     Feeling of Stress : Only a little   Social Connections: Moderately Integrated     Frequency of Communication with Friends and Family: More than three times a week     Frequency of Social Gatherings with Friends and Family: More than three times a week     Attends Church Services: Never     Active Member of Clubs or Organizations: Yes     Attends Club or Organization Meetings: 1 to 4 times per year     Marital Status: Living with partner   Intimate Partner Violence: Not At Risk     Fear of Current or Ex-Partner: No     Emotionally Abused: No     Physically Abused: No     Sexually Abused: No   Depression: Not at risk     PHQ-2 Score: 0   Housing Stability: Low Risk      Unable to Pay for Housing in the Last Year: No     Number of Places Lived in the Last Year: 1     Unstable Housing in the Last Year: No       Functional Status:  Prior to admission patient needed assistance:   Dependent ADLs:: Independent  Dependent IADLs:: Transportation       Mental Health Status:  Mental Health Status: No Current Concerns       Chemical Dependency Status:  Chemical Dependency Status: No Current Concerns             Values/Beliefs:  Spiritual, Cultural Beliefs, Church Practices, Values that affect care: no               Additional Information:  Met with patient in room to introduce care manager role and discuss discharge planning. From home with significant other in an apartment where she lives on main level; mostly  independent at baseline. Patient does not drive; family and friends transport and can likely transport at discharge. Has a cane and walker available for use as needed. Has Home CPAP. Patient reports she has a healthcare directive, but is not sure where it is located at this time. Encouraged patient to bring a copy of Healthcare directive into her primary care provider to place on file once located. Goal to return home at discharge, no needs anticipated. Care manager to follow.     Lina De La Rosa RN

## 2022-04-19 NOTE — PLAN OF CARE
Problem: Pain (Spinal Surgery)  Goal: Acceptable Pain Control  Outcome: Ongoing, Progressing  Intervention: Prevent or Manage Pain  Recent Flowsheet Documentation  Taken 4/18/2022 1842 by Elvie Quinonez RN  Pain Management Interventions: medication (see MAR)  Taken 4/18/2022 1719 by Elvie Quinonez RN  Pain Management Interventions: medication (see MAR)  Taken 4/18/2022 1600 by Elvie Quinonez RN  Pain Management Interventions: rest   Goal Outcome Evaluation:    Pain control ongoing throughout shift using PRN medication oxycodone and diazepam. Patient was able to meet pain control goal of 5 with the use of medications. Incentive spirometry was implemented and the patient tolerated it well. The patient was able to void a fair amount, multiple times during shift following removal of catheter on previous shift. Neuro's remained stable throughout shift.

## 2022-04-19 NOTE — PROGRESS NOTES
04/19/22 0850   Quick Adds   Type of Visit Initial Occupational Therapy Evaluation   Living Environment   People in Home significant other   Current Living Arrangements apartment   Living Environment Comments no stairs   Self-Care   Usual Activity Tolerance moderate   Current Activity Tolerance good   Fall history within last six months   (pt. states several months ago)   Number of times patient has fallen within last six months 4  (knees give out)   Instrumental Activities of Daily Living (IADL)   Previous Responsibilities meal prep;laundry;housekeeping;medication management   General Information   Onset of Illness/Injury or Date of Surgery 04/18/22   Cognitive Status Examination   Orientation Status orientation to person, place and time   Follows Commands WNL   Pain Assessment   Patient Currently in Pain   (minimal)   Range of Motion Comprehensive   Comment, General Range of Motion states she does not drive due to limited neck ROM   Bed Mobility   Bed Mobility supine-sit;sit-supine;rolling right   Rolling Right [place] (Bed Mobility) modified independence   Supine-Sit [place] (Bed Mobility) modified independence   Sit-Supine [place] (Bed Mobility) modified independence   Transfers   Transfers sit-stand transfer   Sit-Stand Transfer   Sit-Stand [place] (Transfers) supervision;modified independence   Balance   Balance Assessment no deficits were identified   Clinical Impression   Criteria for Skilled Therapeutic Interventions Met (OT) Yes, treatment indicated   OT Diagnosis decreased ADL due to back surgery   OT Problem List-Impairments impacting ADL post-surgical precautions   Assessment of Occupational Performance 1-3 Performance Deficits   Identified Performance Deficits back prec. during dressing, advanced transfers   Planned Therapy Interventions (OT) ADL retraining;transfer training;risk factor education;home program guidelines   Clinical Decision Making Complexity (OT) moderate  complexity   Risk & Benefits of therapy have been explained evaluation/treatment results reviewed;patient   Clinical Impression Comments Pt. safe for return home when medically stable, Pt. able to perform all ADL/transfers with back prec.   OT Discharge Planning   OT Discharge Recommendation (DC Rec) (S)  home with assist   OT Goals   Therapy Frequency (OT) Daily   OT Predicted Duration/Target Date for Goal Attainment 04/19/22   OT Goals Lower Body Dressing;Bed Mobility;Transfers   OT: Lower Body Dressing Modified independent;within precautions   OT: Bed Mobility Modified independent;within precautions   OT: Transfer Modified independent;within precautions  (tub/shower, car, bed and chair)

## 2022-04-19 NOTE — PLAN OF CARE
Problem: Pain Acute  Goal: Acceptable Pain Control and Functional Ability  Outcome: Ongoing, Progressing  Intervention: Develop Pain Management Plan  Recent Flowsheet Documentation  Taken 4/19/2022 0201 by Michelle Carson RN  Pain Management Interventions: medication (see MAR)  Taken 4/19/2022 0130 by Michelle Carson RN  Pain Management Interventions:   cold applied   emotional support   essential oils  Intervention: Prevent or Manage Pain  Recent Flowsheet Documentation  Taken 4/19/2022 0131 by Michelle Crason RN  Medication Review/Management: medications reviewed     Problem: Functional Ability Impaired (Spinal Surgery)  Goal: Optimal Functional Ability  Outcome: Ongoing, Progressing     Problem: Postoperative Urinary Retention (Spinal Surgery)  Goal: Effective Urinary Elimination  Outcome: Ongoing, Progressing   Goal Outcome Evaluation:        POD 1: L4-L5 fusion and revision: Moderate pain managed with scheduled Tylenol, PRN oxycodone 10 mg x 2, ice packs and essential oils. Pain controlled better during night. Up to bathroom without difficulty. Neuros intact. MALCOLM drain = 40 ml bloody drainage. Corset brace to be worn out of bed. No nausea or vomiting. Adequate fluid intake.

## 2022-04-19 NOTE — PLAN OF CARE
Problem: Pain (Spinal Surgery)  Goal: Acceptable Pain Control  Outcome: Ongoing, Progressing   Goal Outcome Evaluation:        Pt. C/o 8/10 pain to back, PRN oxy and valium given with relief, independent in room pleasant and cooperative, MALCOLM drain patent clear pink drainage, eating and voiding well, wears back brace when up out of bed, pt. To possible discharge tomorrow, will cont to monitor.

## 2022-04-19 NOTE — PLAN OF CARE
Occupational Therapy Discharge Summary    Reason for therapy discharge:    All goals and outcomes met, no further needs identified.    Progress towards therapy goal(s). See goals on Care Plan in [facility] electronic health record for goal details.  Goals met    Therapy recommendation(s):    No further therapy is recommended.

## 2022-04-19 NOTE — OP NOTE
NEUROSURGERY OPERATIVE REPORT    PROCEDURE DATE:4/18/2022     PREOPERATIVE DIAGNOSIS:  Lumbar radiculopathy   Lumbar disc herniation   Pseudoarthrosis of lumbar spine     POSTOPERATIVE DIAGNOSIS:  Same    PROCEDURE:  1.  Right lumbar 4 - lumbar 5 transforaminal lumbar interbody fusion with revision lumbar 4 - lumbar 5 posterior instrumented fusion and posterolateral arthrodesis  2.  Use of allograft and use of autograft  3.  Use of stealth navigation     SURGEON: Leanna Ward MD     ASSISTANT: Margo Bang PA-C     INDICATIONS:  Susan Kwan is a 56-year-old female who is status post cervical 5-cervical 6 anterior cervical decompression and fusion on April 5, 2021 and lumbar 4-lumbar 5 microdiscectomy and posterior instrumented fusion on 12/17/2020.  Patient continues to have right posterior leg pain down to her knee.  He also has chronic numbness and tingling in her anterior right shin to the top of her foot since prior to her to all her lumbar surgery.  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  We discussed a  right lumbar 4-5 transforaminal lumbar interbody fusion and revision posterior fusion, however, patient needs to have smoking cessation prior to surgery confirmed with a nicotine test.  She will work on smoking cessation and schedule surgery in the future. Risks and benefits were discussed in detail including but not limited to infection, hematoma, nerve damage including paralysis, post op radiculitis, durotomy, lack of a sold bone fusion, hardware malfunction, risks associated with the use of general anesthesia, blood clots in the lungs or legs. She agreed to proceed    PROCEDURE:  After obtaining informed consent, the patient was brought to the operating room with TEDs and pneumatic stockings in place. IV antibiotics was administered.  She was intubated under general  endotracheal anesthesia.   A jones catheter was placed and the pt was turned into the prone position on the radiolucent laminectomy frame on the Charles table. She was prepped and draped in the usual sterile fashion.         A preincisional infiltration of local anesthetic was performed along the midline and the incision was opened sharply and extended down to the fascia.  The fascia and scar were opened in one layer with monopolar electrocautery.  A subperiosteal dissection was undertaken to expose the remaining lamina and posterior lateral elements and previous hardware at lumbar 4-5.        Next we removed, her prior rods and screw caps and 4 pedicle screws. We made note of the screw diameters so we could replace 1 mm diameter larger screws. At this point, we brought in intraoperative O-arm for navigation. We did an intraoperative CT with a Stealth localization guide attached to the L5 spinous process and after confirming navigation of all instruments (registered separately on the navigational star). Using navigation we then replaced our 4 pedicle screws in their prior trajectories. We then repeated CT to confirm appropriate hardware placement. We then proceeded to copiously irrigate the incision with antibiotic-containing saline and achieved hemostasis.  Next we began our right lumbar 4-5 transforaminal lumbar interbody fusion. We first expanded our prior medial facetectomies and right L5 foraminotomy. Next we drilled through the pars and removed the IAP. We next trimmed the SAP of lumbar 5 to expose the underlying disc space. We next performed our annulotomy with a #15 blade and started our preparation of the disc space. We used a combination of curettes, pituitaries and remedios to remove the disc material. We then tiraled our interbody implant. It was difficult getting the graft medialized given the scar tissue from her previous surgery and tethering of her dura. Next we placed an expandable cage prepacked  with autograft and allograft (martell and I-factor). We then expanded the cage to distract her disc space. We next decorticated the transverse processes a placed are previously obtained autograft mixed with Martell and I-factor across the transverse processes for bone fusion.  Once this was in position, we then placed rods bilaterally at lumbar 4-5 and locked into place in the top-loaded heads of the pedicle screws and secured with set screws tightened to break off torque.  A drain was tunneled out of the wound.  Wound was again irrigated with antibiotic containing irrigation and hemostasis was achieved.     Due to the nature and complexity of the case an assistant was required for the duration of the case for positioning, retraction, hemostasis, and closure.       The incision was closed in layers with 0 Vicryl for the fascia and muscle layer, inverted 2-0 Vicryl for subcutaneous tissues and staples for the skin edges. Sponge and needle counts were correct prior to closure x2. Sterile dressings were placed. Patient tolerated the procedure well.      The patient  was turned from the radiolucent frame onto a gurney, extubated and taken to the recovery room at their neurological baseline with no new deficits appreciated.     Estimated blood loss: 50 ml    Specimens: none    Findings:    Implants:   Implant Name Type Inv. Item Serial No.  Lot No. LRB No. Used Action   IMP SCR MEDT 4.75MM SOLERA 5.5X35MM MA [id number] - SNA Metallic Hardware/Boss  NA [employer] NA N/A 1 Explanted   IMP SCR MEDT 4.75MM SOLERA 5.0X40MM MA [id number] - SNA Metallic Hardware/Boss  NA [employer] NA N/A 1 Explanted   IMP SCR MEDT 4.75MM SOLERA 5.5X40MM MA [id number] - SNA Metallic Hardware/Boss  NA [employer] NA N/A 2 Explanted   IMP SCR MEDT BREAK-OFF SET SOLERA 4.75MM TI [id number] - SNA Metallic Hardware/Boss  NA [employer] NA N/A 4 Explanted   GRAFT BONE FIBERS MARTELL DBM DBF 3ML [id number] - [id number]  Bone/Tissue/Biologic GRAFT BONE FIBERS ALLEN DBM DBF 3ML [id number] [employer] NA Right 1 Implanted   GRAFT BONE PUTTY I-FACTOR PEPTIDE ENHANCED 1ML 700-010 - SNA Bone/Tissue/Biologic GRAFT BONE PUTTY I-FACTOR PEPTIDE ENHANCED 1ML 700-010 NA [employer] [id number] Right 1 Implanted   IMP SPI INTERBODY MEDT CATALYFT PL SHORT 7MM [id number] - [id number] Metallic Hardware/[place] IMP SPI INTERBODY MEDT CATALYFT PL SHORT 7MM [id number]  [employer] [id number] Right 1 Implanted   IMP SCR MEDT BREAK-OFF SET SOLERA 4.75MM TI [id number] - [id number] Metallic Hardware/[place] IMP SCR MEDT BREAK-OFF SET SOLERA 4.75MM TI [id number]  [employer]-DANEK  Right 4 Implanted   IMP SCR MEDT 4.75MM SOLERA 6.5X40MM MA [id number] - SNA Metallic Hardware/[place] IMP SCR MEDT 4.75MM SOLERA 6.5X40MM MA [id number] NA [employer]DANE[employer] NA Right 2 Implanted   IMP SCR MEDT 4.75MM SOLERA 6.5X45MM MA [id number] - SNA Metallic Hardware/[place] IMP SCR MEDT 4.75MM SOLERA 6.5X45MM MA [id number] NA [employer] NA Right 1 Implanted   IMP SCR MEDT 4.75MM SOLERA 6.5X35MM MA [id number] - [id number] Metallic Hardware/[place] IMP SCR MEDT 4.75MM SOLERA 6.5X35MM MA [id number]  [employer]DANEK NA Right 1 Implanted   GRAFT BONE FIBERS ALLEN DBM DBF 3ML [id number] - [id number] Bone/Tissue/Biologic GRAFT BONE FIBERS ALLEN DBM DBF 3ML [id number] [employer] NA Right 1 Implanted       Leanna Ward MD

## 2022-04-20 ENCOUNTER — TELEPHONE (OUTPATIENT)
Dept: NEUROSURGERY | Facility: CLINIC | Age: 57
End: 2022-04-20
Payer: COMMERCIAL

## 2022-04-20 VITALS
HEART RATE: 85 BPM | DIASTOLIC BLOOD PRESSURE: 63 MMHG | RESPIRATION RATE: 18 BRPM | WEIGHT: 210.2 LBS | BODY MASS INDEX: 37.24 KG/M2 | TEMPERATURE: 98.7 F | SYSTOLIC BLOOD PRESSURE: 108 MMHG | OXYGEN SATURATION: 96 %

## 2022-04-20 DIAGNOSIS — S32.009K PSEUDOARTHROSIS OF LUMBAR SPINE: Primary | ICD-10-CM

## 2022-04-20 PROCEDURE — 99231 SBSQ HOSP IP/OBS SF/LOW 25: CPT | Performed by: HOSPITALIST

## 2022-04-20 PROCEDURE — 250N000013 HC RX MED GY IP 250 OP 250 PS 637: Performed by: PHYSICIAN ASSISTANT

## 2022-04-20 RX ORDER — OXYCODONE HYDROCHLORIDE 5 MG/1
5-10 TABLET ORAL EVERY 4 HOURS PRN
Qty: 45 TABLET | Refills: 0 | Status: SHIPPED | OUTPATIENT
Start: 2022-04-20 | End: 2022-05-02

## 2022-04-20 RX ORDER — AMOXICILLIN 250 MG
1 CAPSULE ORAL 2 TIMES DAILY
COMMUNITY
Start: 2022-04-20 | End: 2022-05-31

## 2022-04-20 RX ORDER — DIAZEPAM 5 MG
5 TABLET ORAL EVERY 6 HOURS PRN
Qty: 30 TABLET | Refills: 0 | Status: SHIPPED | OUTPATIENT
Start: 2022-04-20 | End: 2022-05-27

## 2022-04-20 RX ORDER — ACETAMINOPHEN 325 MG/1
975 TABLET ORAL EVERY 8 HOURS
COMMUNITY
Start: 2022-04-20 | End: 2024-01-05

## 2022-04-20 RX ORDER — POLYETHYLENE GLYCOL 3350 17 G/17G
17 POWDER, FOR SOLUTION ORAL DAILY
Qty: 510 G | COMMUNITY
Start: 2022-04-20 | End: 2022-09-02

## 2022-04-20 RX ADMIN — SENNOSIDES AND DOCUSATE SODIUM 1 TABLET: 50; 8.6 TABLET ORAL at 08:48

## 2022-04-20 RX ADMIN — BUPROPION HYDROCHLORIDE 150 MG: 150 TABLET, FILM COATED, EXTENDED RELEASE ORAL at 08:47

## 2022-04-20 RX ADMIN — OXYCODONE HYDROCHLORIDE 10 MG: 5 TABLET ORAL at 08:48

## 2022-04-20 RX ADMIN — DIAZEPAM 5 MG: 5 TABLET ORAL at 04:11

## 2022-04-20 RX ADMIN — POLYETHYLENE GLYCOL 3350 17 G: 17 POWDER, FOR SOLUTION ORAL at 08:46

## 2022-04-20 RX ADMIN — DIAZEPAM 5 MG: 5 TABLET ORAL at 14:47

## 2022-04-20 RX ADMIN — FLUOXETINE 60 MG: 20 CAPSULE ORAL at 08:46

## 2022-04-20 RX ADMIN — GABAPENTIN 900 MG: 300 CAPSULE ORAL at 08:46

## 2022-04-20 RX ADMIN — OXYCODONE HYDROCHLORIDE 10 MG: 5 TABLET ORAL at 04:10

## 2022-04-20 RX ADMIN — THERA TABS 1 TABLET: TAB at 08:47

## 2022-04-20 RX ADMIN — OXYCODONE HYDROCHLORIDE 10 MG: 5 TABLET ORAL at 14:47

## 2022-04-20 RX ADMIN — LEVOTHYROXINE SODIUM 175 MCG: 0.03 TABLET ORAL at 08:47

## 2022-04-20 RX ADMIN — DIAZEPAM 5 MG: 5 TABLET ORAL at 08:48

## 2022-04-20 RX ADMIN — ACETAMINOPHEN 975 MG: 325 TABLET ORAL at 05:47

## 2022-04-20 RX ADMIN — ACETAMINOPHEN 975 MG: 325 TABLET ORAL at 13:48

## 2022-04-20 ASSESSMENT — ACTIVITIES OF DAILY LIVING (ADL)
ADLS_ACUITY_SCORE: 8

## 2022-04-20 NOTE — PLAN OF CARE
Goal Outcome Evaluation:    Problem: Pain Acute  Goal: Acceptable Pain Control and Functional Ability  Outcome: Ongoing, Progressing  Intervention: Prevent or Manage Pain  Recent Flowsheet Documentation  Taken 4/19/2022 1710 by Elvie Quinonez RN  Medication Review/Management: medications reviewed   Pain management was ongoing throughout shift. Pain was managed through PRN medications oxycodone and diazepam. The patient also used nonpharmacologic methods of ice application and essential oils for pain management during shift. The patient was able to ambulate in berry with corset brace on and tolerated this well.

## 2022-04-20 NOTE — CONFIDENTIAL NOTE
Follow up two weeks for staple removal   Six weeks with XR and NOÉ on Dr Ed barber     S/P Fusion 4/18/2022     TOMI Lees-CNP  [facility] Neurosurgery  O: [phone]

## 2022-04-20 NOTE — PROGRESS NOTES
04/20/22 0319   Home O2/Equipment Set-Up   Home O2 Device CPAP   Pt Owned Device Yes;Clean;Functional;Pt. Demonstrates Use

## 2022-04-20 NOTE — PLAN OF CARE
Problem: Plan of Care - These are the overarching goals to be used throughout the patient stay.    Goal: Readiness for Transition of Care  4/20/2022 1555 by Juli Puente RN  Outcome: Adequate for Care Transition     Goal Outcome Evaluation: Patient MALCOLM drain pulled by day shift, and is okay to discharge home per neurosurgery. Discharge instructions gone over with patient including restrictions, medications, incision care. Patient verbalized understanding. Home oxycodone and diazepam given to patient. Patient S.O to transport.

## 2022-04-20 NOTE — PROGRESS NOTES
Neurosurgery Progress Note:  4/20/2022     A/P: Ms. Kwan is a pleasant 56 year old right handed female POD#2 Right lumbar 4 - lumbar 5 transforaminal lumbar interbody fusion with revision lumbar 4 - lumbar 5 posterior instrumented fusion and arthrodesis, use of allograft, autograft, stealth by Dr. Ward.     She states overall she is doing very well and is wanting to go home. Has expected incisional pain - finds current pain regimen adequate. Reports unchanged numbness of anterior right shin to top of foot that has been present since 2017.     Possible discharge later today depending on drain output.      1400: drain output 20 ml this past shift. Drain can be discontinued and she may discharge home.     Plan:  1. Keep drain in place - consider discontinuing end of day shift if volume meets criteria   2. Increase activity as tolerated with PT/OT  3. Continue pain management with prn medications Valium and Oxycodone   4. Lumbar xray completed   5. Monitor BP - 90's this am - not symptomatic   6. PVR completed and normal  7. Bowel prophylaxis      Neurosurgery Attending: The patient's clinical examination, laboratory data, and plan was discussed with Dr. Ward     HPI: Patient is a 56-year-old female who is status post cervical 5-cervical 6 anterior cervical decompression and fusion on April 5, 2021 and lumbar 4-lumbar 5 microdiscectomy and posterior instrumented fusion on 12/17/2020.  Patient continues to have right posterior leg pain down to her knee.  He also has chronic numbness and tingling in her anterior right shin to the top of her foot since prior to her to all her lumbar surgery.  Right L4-5, L5-S1 TFESi on 2/10/22 with some improvement for 6 days. Has some pain coming back and also experiencing weakness in her right leg with fall.  She has tried physical therapy in the past with no long-term improvement and also is on gabapentin and baclofen without complete relief.  She continues to smoke at least 5  cigarettes daily on average.Confirmed with radiology that she has pseudoarthrosis of lumbar 4-5 with right lateral recess narrowing and likely right L5 nerve impingement on most recent CT myelogram.  We discussed a revision right lumbar 4-5 transforaminal lumbar interbody fusion however patient needs to have smoking cessation prior to surgery confirmed with a nicotine test.      S:   Susan is doing well. Denies leg pain. Chronic numbness anterior right shin to foot since 2017. Passing flatus. No BM. No nausea. No dizziness. Would like to go home.      O:  BP 99/51 (BP Location: Left arm)   Pulse 79   Temp 98.8  F (37.1  C) (Oral)   Resp 17   Wt 95.3 kg (210 lb 3.2 oz)   LMP 03/09/2010   SpO2 91%   BMI 37.24 kg/m          General: Alert, oriented. MARTINEZ. Speech normal      Motor: normal bulk and tone     Strength: full strength in all extremities throughout, bilaterally.     Sensation: intact to light touch throughout both upper and lower extremities     Incision: dressing dry and intact; staples well approximated     Xray: completed and reviewed     MALCOLM drain: 45 ml evening, 20 ml nights     Sultana Montes, TOMI-CNP  [facility] Neurosurgery  O: [phone]

## 2022-04-20 NOTE — PROGRESS NOTES
[facility]    Medicine Progress Note - Hospitalist Service    Date of Admission:  4/18/2022     Assessment & Plan   SUMMARY:  56 year old female with history of DESI on CPAP, obesity, hypothyroid, depression,  tobacco abuse POD#1 L4-5 fusion.     ACTIVE PROBLEM LIST  #s/p L4-5 fusion - per neurosx.  Pain control, PT/OT, mechanical DVT px.  #DESI - CPAP  #depression - prozac, wellbutrin  #asthma - no exacerbation.  Albuterol PRN  #HLD - statin  #hypothyroid - synthroid    Possible discharge later today depending on MALCOLM drain output.    Active Problems:    Spondylolisthesis of lumbar region    Class II Obesity w BMI=35-39.9: Body mass index is 37.24 kg/m .     DVT prophylaxis:    Medical:  early ambulation    Mechanical:  PCD's    Disposition Plan: Expected discharge: 04/20/2022   recommended to Home once surgical wound is stable.    Diet: Orders Placed This Encounter      Advance Diet as Tolerated: Regular Diet Adult    Ceballos Catheter: Not present  Central Lines/Port-a-cath: Not present  Drains: Present     --------------------------------------------      Monique Esquivel MD  Internal Medicine Hospitalist  4/20/2022              ______________________________________________________________________    Interval History   Patient feels well today.  No dysuria or cough and pain controlled.  Eager to go home.        Physical Exam   BP 99/51 (BP Location: Left arm)   Pulse 79   Temp 98.8  F (37.1  C) (Oral)   Resp 17   Wt 95.3 kg (210 lb 3.2 oz)   LMP 03/09/2010   SpO2 91%   BMI 37.24 kg/m      Physical Exam    General:  Alert, cooperative, no distress,  Appears stated age  Neurologic:  oriented, facialsymmetry preserved, fluent speech. Moves all 4 spontaneously  Psych: calm, mood and affect appropriate to situation  HEENT:  Anicteric, MMM, unremarkable dentition  CV: no edema  Lungs:   Easyrespirations  Abd: soft, NT, normoactive BS  MALCOLM drain with sanguinous output in bulb                                 Data   Recent Labs   Lab 04/14/22  0927   WBC 7.6   HGB 12.9   MCV 92      INR 0.97      POTASSIUM 4.6   CHLORIDE 105   CO2 25   BUN 19   CR 0.85   ANIONGAP 11   VANESSA 9.6   GLC 95     Recent Results (from the past 24 hour(s))   XR Lumbar Spine 2/3 Views    Narrative    EXAM: XR LUMBAR SPINE 2-3 VIEWS  LOCATION: [facility]  DATE/TIME: 4/19/2022 11:27 AM    INDICATION: lumbar fusion  COMPARISON: Lumbar spine CT 01/26/2022.  TECHNIQUE: CR Lumbar Spine.      Impression    IMPRESSION: There are postsurgical changes related to prior posterior instrumented fusion at L4-L5. An interbody graft has been placed along the right lateral aspect of the L4-L5 intervertebral disc space in the interval. The hardware appears intact. It   is unclear if the interbody graft was intentionally placed laterally within the L4-L5 intervertebral disc space. Clinical correlation is suggested. A surgical drain is noted in the posterior paraspinal soft tissues at the L4 level. Again seen is a spinal   cord stimulator with 2 leads entering the spinal canal at the T11-T12 level and terminating at the T9 level. The leads appear intact. There is mild broad-based levoconvex curvature of the lumbar spine. The vertebral body heights are maintained. There is   mild multilevel intervertebral disc space narrowing and endplate degenerative change. There is atherosclerotic calcification of the abdominal aorta. Surgical clips are noted in the right upper quadrant of the abdomen.

## 2022-04-20 NOTE — PLAN OF CARE
Problem: Pain Acute  Goal: Acceptable Pain Control and Functional Ability  Outcome: Adequate for Care Transition  Intervention: Develop Pain Management Plan  Recent Flowsheet Documentation  Taken 4/20/2022 0848 by Juli Perez RN  Pain Management Interventions: medication (see MAR)     Problem: Bleeding (Spinal Surgery)  Goal: Absence of Bleeding  Outcome: Adequate for Care Transition     Problem: Bowel Motility Impaired (Spinal Surgery)  Goal: Effective Bowel Elimination  Outcome: Adequate for Care Transition     Problem: Fluid and Electrolyte Imbalance (Spinal Surgery)  Goal: Fluid and Electrolyte Balance  Outcome: Adequate for Care Transition     Problem: Functional Ability Impaired (Spinal Surgery)  Goal: Optimal Functional Ability  Outcome: Adequate for Care Transition     Problem: Infection (Spinal Surgery)  Goal: Absence of Infection Signs and Symptoms  Outcome: Adequate for Care Transition     Problem: Neurologic Impairment (Spinal Surgery)  Goal: Optimal Neurologic Function  Outcome: Adequate for Care Transition     Problem: Ongoing Anesthesia Effects (Spinal Surgery)  Goal: Anesthesia/Sedation Recovery  Outcome: Adequate for Care Transition     Problem: Pain (Spinal Surgery)  Goal: Acceptable Pain Control  Outcome: Adequate for Care Transition  Intervention: Prevent or Manage Pain  Recent Flowsheet Documentation  Taken 4/20/2022 0848 by Juli Perez RN  Pain Management Interventions: medication (see MAR)     Problem: Postoperative Nausea and Vomiting (Spinal Surgery)  Goal: Nausea and Vomiting Relief  Outcome: Adequate for Care Transition     Problem: Postoperative Urinary Retention (Spinal Surgery)  Goal: Effective Urinary Elimination  Outcome: Adequate for Care Transition     Problem: Respiratory Compromise (Spinal Surgery)  Goal: Effective Oxygenation and Ventilation  Outcome: Adequate for Care Transition     Problem: Plan of Care - These are the overarching goals to be used throughout  "the patient stay.    Goal: Plan of Care Review/Shift Note  Description: The Plan of Care Review/Shift note should be completed every shift.  The Outcome Evaluation is a brief statement about your assessment that the patient is improving, declining, or no change.  This information will be displayed automatically on your shift note.  Outcome: Adequate for Care Transition  Goal: Patient-Specific Goal (Individualized)  Description: You can add care plan individualizations to a care plan. Examples of Individualization might be:  \"Parent requests to be called daily at 9am for status\", \"I have a hard time hearing out of my right ear\", or \"Do not touch me to wake me up as it startles me\".  Outcome: Adequate for Care Transition  Goal: Absence of Hospital-Acquired Illness or Injury  Outcome: Adequate for Care Transition  Goal: Optimal Comfort and Wellbeing  Outcome: Adequate for Care Transition  Intervention: Monitor Pain and Promote Comfort  Recent Flowsheet Documentation  Taken 4/20/2022 08 by Juli Perez RN  Pain Management Interventions: medication (see MAR)  Goal: Readiness for Transition of Care  Outcome: Adequate for Care Transition   Goal Outcome Evaluation:        Patient has been alert and oriented. Continues to have surgical pain in lower back. Rating between a 4 - 8/10. PRN oxycodone and valium were given and effective, along with scheduled tylenol. MALCOLM drain had 20 ml of pink drainage. MALCOLM drain was removed at the end of the shift and will be discharging home this evening. Juli Perez RN               "

## 2022-04-20 NOTE — PLAN OF CARE
Problem: Pain Acute  Goal: Acceptable Pain Control and Functional Ability  Outcome: Ongoing, Progressing  Intervention: Develop Pain Management Plan  Recent Flowsheet Documentation  Taken 4/20/2022 0410 by Michelle Carson RN  Pain Management Interventions:   medication (see MAR)   essential oils  Intervention: Prevent or Manage Pain  Recent Flowsheet Documentation  Taken 4/20/2022 0030 by Michelle Carson RN  Medication Review/Management: medications reviewed     Problem: Bleeding (Spinal Surgery)  Goal: Absence of Bleeding  Outcome: Ongoing, Progressing     Problem: Neurologic Impairment (Spinal Surgery)  Goal: Optimal Neurologic Function  Outcome: Ongoing, Progressing   Goal Outcome Evaluation:    POD 2 L4-L5 fusion with revision: Pain improving; less pain medication taken overnight. Post surgical pain managed with PRN oxycodone & Valium given with noted effectiveness. Essential oils also used to help promote comfort and relaxation. Ice packs offered but declined. MALCOLM drain output decreasing; had 20 ml cherry colored drainage. Neuros intact.

## 2022-04-21 ENCOUNTER — TELEPHONE (OUTPATIENT)
Dept: NEUROSURGERY | Facility: CLINIC | Age: 57
End: 2022-04-21
Payer: COMMERCIAL

## 2022-04-21 DIAGNOSIS — R11.0 NAUSEA: Primary | ICD-10-CM

## 2022-04-21 RX ORDER — ONDANSETRON 4 MG/1
4 TABLET, FILM COATED ORAL EVERY 8 HOURS PRN
Qty: 10 TABLET | Refills: 0 | Status: SHIPPED | OUTPATIENT
Start: 2022-04-21 | End: 2022-05-31

## 2022-04-21 NOTE — TELEPHONE ENCOUNTER
Patient had surgery on Monday. This morning she was changing the bandage and the wound is bloody.    Best number to return call: [phone]

## 2022-04-21 NOTE — TELEPHONE ENCOUNTER
Returned call to pt who reports very minimal drainage coming from the drain site. Nausea improved with zofran. She has no further concerns.     Transferred to scheduling to set up post-op appts.     Sara Arenas RN

## 2022-04-21 NOTE — TELEPHONE ENCOUNTER
Pt is s/p Right lumbar 4 - lumbar 5 transforaminal lumbar interbody fusion with revision lumbar 4 - lumbar 5 posterior instrumented fusion and arthrodesis, use of allograft, autograft, stealth on 4/18/2022 with Dr. Ward.     She changed her bandage today and there was bloody discharge that caused her concern as it saturated the bandage. The drainage has since decreased significantly. She reported that all of the staples appeared to be intact (per her helper at home). Denied any swelling, warmth or redness at the incision site. Pt denied fever chills or sweats.     She did report nausea and requested medication for this. Reviewed causes of nausea to include side effects of pain medication. Instructed her to take oxycodone and valium with food and warned about constipation with oxycodone and Zofran - pt verbalized understanding. Will send small rx for nausea.     RN will call pt this afternoon to assess wound drainage.     Sara Arenas RN

## 2022-04-26 ENCOUNTER — PATIENT OUTREACH (OUTPATIENT)
Dept: NURSING | Facility: CLINIC | Age: 57
End: 2022-04-26
Payer: COMMERCIAL

## 2022-04-26 NOTE — PROGRESS NOTES
4/26/2022  Clinic Care Coordination Contact  Community Health Worker Follow Up    Intervention and Education during outreach:   Called and spoke to patient's male family member stated she is not available she is not feeling and is sleeping.    CHW will follow up: 5-10-22    Tammi Shah  Community Health Worker  [facility]  Clinic Care Coordination  [email or web address]   Office: [phone]  Fax: [phone]

## 2022-05-02 ENCOUNTER — ALLIED HEALTH/NURSE VISIT (OUTPATIENT)
Dept: NEUROSURGERY | Facility: CLINIC | Age: 57
End: 2022-05-02
Payer: COMMERCIAL

## 2022-05-02 VITALS — DIASTOLIC BLOOD PRESSURE: 80 MMHG | RESPIRATION RATE: 18 BRPM | HEART RATE: 78 BPM | SYSTOLIC BLOOD PRESSURE: 124 MMHG

## 2022-05-02 DIAGNOSIS — M43.16 SPONDYLOLISTHESIS OF LUMBAR REGION: ICD-10-CM

## 2022-05-02 DIAGNOSIS — M54.16 LUMBAR RADICULOPATHY: Primary | ICD-10-CM

## 2022-05-02 PROCEDURE — 99207 PR NO CHARGE NURSE ONLY: CPT

## 2022-05-02 RX ORDER — METHOCARBAMOL 750 MG/1
750 TABLET, FILM COATED ORAL 4 TIMES DAILY PRN
Qty: 40 TABLET | Refills: 0 | Status: SHIPPED | OUTPATIENT
Start: 2022-05-02 | End: 2022-05-19

## 2022-05-02 RX ORDER — OXYCODONE HYDROCHLORIDE 5 MG/1
5-10 TABLET ORAL EVERY 8 HOURS PRN
Qty: 30 TABLET | Refills: 0 | Status: SHIPPED | OUTPATIENT
Start: 2022-05-02 | End: 2022-05-27

## 2022-05-02 NOTE — PROGRESS NOTES
"Susan Kwan is status post  Right lumbar 4 - lumbar 5 transforaminal lumbar interbody fusion with revision lumbar 4 - lumbar 5 posterior instrumented fusion and posterolateral arthrodesis on 04/18/2022 with Dr. Ward.  Preoperatively presented with a recurrent lumbar radiculopathy including right posterior leg pain, numbness and weakness.  Today she returns in follow up for staples removal. She is pleased with her outcome - reports much improved leg pain. Back pain is minimal. Denies numbness or tingling. States legs feel \"more stronger\". Gait and balance are stable.   Takes oxycodone 5 mg - 10 mg in AM and 5 mg at HS. Last oxycodone 5 mg Rx #45 on 04/20/2022.  eRx Robaxin 750 mg.         Surgical wound WNL - CDI, no signs of infection or skin breakdown.  Incision well-healed: good skin approximation, no redness or visible/palpable edema, no tenderness to palpation.  PT. AF, denies fever, chills or sweats.  Pt. reports that the symptoms are improved from pre-op.    Staples - intact removed without difficulty. Wound prepped with Betadine before and after removal.  Surrounding skin has no signs of breakdown.  Verbal instructions regarding incision care are given.  Pt. advised to call us if any s/s of infection noted - all discussed in details.      Sussy Saldana, RN, CNRN      "

## 2022-05-02 NOTE — PATIENT INSTRUCTIONS
A dressing is not required. Your wound is covered with steri-strips.  The steri-strips should fall off in 7-10 days.  If they have not fallen off in 14 days, remove them.    Keep the wound clean.    Wash your hands before touching the wound.  Ensure that anyone assisting you in the care of your wound washes her/his hands before touching the wound. Good handwashing can decrease the risk of serious infection.    If you are unable to see your wound, have someone check the wound daily for redness, swelling,or drainage. A small amount of drainage is normal.    You may shower. Cover the wound with Tegaderm for 7-10 days.  Do not allow shower to beat directly on the wound.  Pat the wound dry. Do not rub.    No tub baths until the wound is well healed.  Usually 3-4 weeks.     If you develop redness, swelling, drainage, or temp 101 or greater, call our office at         * No lifting, pushing or pulling greater than 5-10 pound (this is about a gallon of milk) for the first 6 weeks after surgery .  *No repetitive bending, twisting, or jarring activities for 6 weeks.  *No overhead work  *No aerobic or strenuous activity  *No activities with increased risk of falls  *You may move about your home as tolerated  *You may walk up and down stairs as tolerated  *You may increase your activity slowly over the next 6 weeks    WALKING PROGRAM: As you can tolerate, walk daily-start with 5-10 minutes of continuous walking. This is in addition to the walking that you do as part of your daily activities. Increase the time that you walk by 5 minutes every couple of days. Do not exceed 30-45 minutes of continuous walking until seen in follow-up. Walking is the best exercise after surgery.  **Listen to your body, if you find that you are more painful or fatigued, you may need to proceed more slowly.    **Do not smoke or expose yourself to second hand smoke. Cigarette smoke can delay healing and cause complications.     DRIVING:  We recommend  that you do not drive while taking medications for pain or muscle spasms. Always read and follow the advice on your prescription bottle. If you have questions, speak with your pharmacist.  We recommend that you do not drive while wearing a brace, as it could limit your range of motion.    WORK: If you plan to return to work before your 6 week post-op appointment, call and discuss with one of the nurses in the neurosurgery office.

## 2022-05-04 NOTE — DISCHARGE SUMMARY
HOSPITAL DISCHARGE SUMMARY         Patient Name: Susan Kwan  YOB: 1965  Age: 56 year old  Medical Record Number: [id number]  Primary Physician: Earline Jimenez  Admission Date: 4/18/2022.  Discharge Date: 4/20/2022      Susan Kwan will be discharged from [facility] to Home.     PRINCIPAL DIAGNOSIS CAUSING ADMISSION: <principal problem not specified>    Discharge Diagnoses:  Active Problems:    Spondylolisthesis of lumbar region      HPI: Patient is a 56-year-old female who is status post cervical 5-cervical 6 anterior cervical decompression and fusion on April 5, 2021 and lumbar 4-lumbar 5 microdiscectomy and posterior instrumented fusion on 12/17/2020.  Patient continues to have right posterior leg pain down to her knee.  He also has chronic numbness and tingling in her anterior right shin to the top of her foot since prior to her to all her lumbar surgery.  Right L4-5, L5-S1 TFESi on 2/10/22 with some improvement for 6 days. Has some pain coming back and also experiencing weakness in her right leg with fall.  She has tried physical therapy in the past with no long-term improvement and also is on gabapentin and baclofen without complete relief.  She continues to smoke at least 5 cigarettes daily on average.Confirmed with radiology that she has pseudoarthrosis of lumbar 4-5 with right lateral recess narrowing and likely right L5 nerve impingement on most recent CT myelogram.  We discussed a revision right lumbar 4-5 transforaminal lumbar interbody fusion however patient needs to have smoking cessation prior to surgery confirmed with a nicotine test.      BRIEF HOSPITAL COURSE: Susan Kwan is a 56 year old female who was admitted on day of surgery and underwent Procedure(s):  Right lumbar 4 - lumbar 5 transforaminal lumbar interbody fusion with revision lumbar 4 - lumbar 5 posterior instrumented fusion and arthrodesis, use of allograft, autograft, stealth.  Surgery was without  complications.  Postoperatively she reported feeling well. Denied leg pain. Chronic numbness anterior right shin to foot since 2017.     On exam on day of discharge, her strength was full  with no new focal deficits.  PT/OT consultations were obtained to assess function, assist with mobilization, and evaluate for discharge recommendations.  By POD #2 she was tolerating a regular diet, ambulating, voiding without difficulty, and obtaining good pain control on oral medication. Her incision was clean, dry and intact.  She met all discharge criteria and was stable for discharge.      PROCEDURES PERFORMED DURING HOSPITALIZATION: Procedure/Surgery Information   Procedure: Procedure(s):  Right lumbar 4 - lumbar 5 transforaminal lumbar interbody fusion with revision lumbar 4 - lumbar 5 posterior instrumented fusion and arthrodesis, use of allograft, autograft, stealth   Surgeon(s): Surgeon(s) and Role:     * Leanna Ward MD - Primary     * Margo Bang PA-C - Assisting             COMPLICATIONS IN HOSPITAL:  None.    IMPORTANT PENDING TEST RESULTS: None.    PERTINENT FINDINGS/RESULTS AT DISCHARGE:  None.    CONDITION AT DISCHARGE:  improving    DISCHARGE MEDICATIONS  No current facility-administered medications for this encounter.    Current Outpatient Medications:      acetaminophen (TYLENOL) 325 MG tablet, Take 3 tablets (975 mg) by mouth every 8 hours, Disp: , Rfl:      buPROPion (WELLBUTRIN XL) 150 MG 24 hr tablet, TAKE 1 TABLET(150 MG) BY MOUTH EVERY MORNING, Disp: 30 tablet, Rfl: 11     diazepam (VALIUM) 5 MG tablet, Take 1 tablet (5 mg) by mouth every 6 hours as needed for muscle spasms or pain Take 1-2 hours separate from oxycodone, Disp: 30 tablet, Rfl: 0     FLUoxetine (PROZAC) 20 MG capsule, TAKE 3 CAPSULES(60 MG) BY MOUTH DAILY, Disp: 270 capsule, Rfl: 3     gabapentin (NEURONTIN) 300 MG capsule, Take 900 mg by mouth 2 times daily Breakfast and lunch, Disp: , Rfl:      gabapentin (NEURONTIN) 300 MG  capsule, Take 1,200 mg by mouth daily (with dinner), Disp: , Rfl:      levothyroxine (SYNTHROID/LEVOTHROID) 175 MCG tablet, TAKE 1 TABLET(175 MCG) BY MOUTH DAILY, Disp: 90 tablet, Rfl: 3     multivitamin, therapeutic (THERA-VIT) TABS tablet, Take 1 tablet by mouth daily, Disp: , Rfl:      polyethylene glycol (MIRALAX) 17 GM/Dose powder, Take 17 g by mouth daily, Disp: 510 g, Rfl:      senna-docusate (SENOKOT-S/PERICOLACE) 8.6-50 MG tablet, Take 1 tablet by mouth 2 times daily, Disp: , Rfl:      simvastatin (ZOCOR) 20 MG tablet, Take 20 mg by mouth At Bedtime, Disp: , Rfl:      SUMAtriptan (IMITREX) 50 MG tablet, TAKE 1 TABLET BY MOUTH EVERY 2 HOURS AS NEEDED FOR MIGRAINE. MAY REPEAT IN 2 HOURS AS NEEDED, Disp: 9 tablet, Rfl: 2     traZODone (DESYREL) 50 MG tablet, Take 1-2 tablets ( mg) by mouth At Bedtime, Disp: 180 tablet, Rfl: 1     VENTOLIN  (90 Base) MCG/ACT inhaler, INHALE 1 TO 2 PUFFS BY MOUTH EVERY 4 HOURS AS NEEDED FOR WHEEZING, Disp: 18 g, Rfl: 0     methocarbamol (ROBAXIN) 750 MG tablet, Take 1 tablet (750 mg) by mouth 4 times daily as needed for muscle spasms, Disp: 40 tablet, Rfl: 0     ondansetron (ZOFRAN) 4 MG tablet, Take 1 tablet (4 mg) by mouth every 8 hours as needed for nausea, Disp: 10 tablet, Rfl: 0     oxyCODONE (ROXICODONE) 5 MG tablet, Take 1-2 tablets (5-10 mg) by mouth every 8 hours as needed for pain, Disp: 30 tablet, Rfl: 0       AFTER DISCHARGE ORDERS AND INSTRUCTIONS:     Follow up with Neurosurgery: in 2 weeks, 6 weeks with XR   Follow up appointment with Primary Care Physician: Earline Jimenez as needed  Diet: Orders Placed This Encounter      Diet     Activity: *No lifting, pushing or pulling greater than 5-10 pound (this is about a gallon of milk).  *No repetitive bending, twisting, or jarring activities  *No overhead work  *No aerobic or strenuous activity  *No activities with increased risk of falls  *You may move about your home as tolerated  *You may walk up and down  "stairs as tolerated  *You may increase your activity slowly over the next 4-6 weeks    Warnings: Call [phone] with the following symptoms:    *Temperature 101(fever) or greater or chills  *Redness, swelling or increased drainage from the wound  *Worsening pain not relieved by the pain prescription given  *Worsening or new onset of weakness, or numbness and tingling  *Loss or change in your ability to control bowel or bladder function  *Change in your ability to walk, talk, see or think  *If you did not have a bowel movement before leaving the hospital and your bowels have not moved within 48 hours of your discharge    Wound care: WOUND CARE WITH STAPLES:  * Staples are usually removed in 1-3 weeks.  They are sometimes left longer.  * Keep a dressing on the wound until the staples are removed. You may use an extra large band-aid or 4x4 and tape.  Both can be purchased at your pharmacy.  * No lotions, soaps, powders, ointments, creams, or patches on incision until the wound   is well healed.  The incision does not need to be \"cleaned\" with soap and water.    * * Change the dressing at least daily, more frequently if necessary to keep the wound clean and dry.      EITAN Eddy [address] [place] [address]  Office: [phone]    "

## 2022-05-05 ENCOUNTER — PATIENT OUTREACH (OUTPATIENT)
Dept: CARE COORDINATION | Facility: CLINIC | Age: 57
End: 2022-05-05
Payer: COMMERCIAL

## 2022-05-09 ENCOUNTER — MYC MEDICAL ADVICE (OUTPATIENT)
Dept: NEUROSURGERY | Facility: CLINIC | Age: 57
End: 2022-05-09
Payer: COMMERCIAL

## 2022-05-09 ENCOUNTER — TELEPHONE (OUTPATIENT)
Dept: NEUROSURGERY | Facility: CLINIC | Age: 57
End: 2022-05-09
Payer: COMMERCIAL

## 2022-05-09 DIAGNOSIS — M54.16 LUMBAR RADICULOPATHY: Primary | ICD-10-CM

## 2022-05-09 DIAGNOSIS — M43.16 SPONDYLOLISTHESIS OF LUMBAR REGION: ICD-10-CM

## 2022-05-09 RX ORDER — HYDROCODONE BITARTRATE AND ACETAMINOPHEN 5; 325 MG/1; MG/1
1 TABLET ORAL EVERY 4 HOURS PRN
Qty: 40 TABLET | Refills: 0 | Status: SHIPPED | OUTPATIENT
Start: 2022-05-09 | End: 2022-05-19

## 2022-05-09 RX ORDER — TIZANIDINE HYDROCHLORIDE 4 MG/1
4 CAPSULE, GELATIN COATED ORAL 3 TIMES DAILY PRN
Qty: 25 CAPSULE | Refills: 0 | Status: SHIPPED | OUTPATIENT
Start: 2022-05-09 | End: 2022-05-19

## 2022-05-09 NOTE — CONFIDENTIAL NOTE
Patient requests stepdown to [place] which I will fill. Stop oxycodone. Monitor tylenol intake not to exceed 3000 mg 24 hours.  reviewed. TLIF Surgery was 4/18/2022. Reporting eduardo hoarse in her legs. Requesting valium vs methocarbomol. Willing to fill trial of tizanidine. Possible she should have labs for electrolytes. Advise PCP irinat,     Sultana Montes, TOMI-CNP  [facility] Neurosurgery  O: [phone]

## 2022-05-09 NOTE — TELEPHONE ENCOUNTER
"Susan Kwan is status post  Right lumbar 4 - lumbar 5 transforaminal lumbar interbody fusion with revision lumbar 4 - lumbar 5 posterior instrumented fusion and posterolateral arthrodesis on 04/18/2022 with Dr. Ward.  Last seen on 05/02/2022, staples were removed.  Today she c/o exacerbated eduardo horses in her legs, states Robaxin is not beneficial. Back pain has improved. No weakness, numbness, or tingling in LE.  She inquired about switching back to Valium 5 mg. Also, would like to try Norco instead of oxycodone as it is \"too heavy\" on her (last oxycodone 5 mg Rx #30 on 05/02/2022  - no discrepancies).   Follow up 05/31/2022.  Sussy Saldana, RN, CNRN        "

## 2022-05-10 ENCOUNTER — PATIENT OUTREACH (OUTPATIENT)
Dept: NURSING | Facility: CLINIC | Age: 57
End: 2022-05-10
Payer: COMMERCIAL

## 2022-05-10 NOTE — PROGRESS NOTES
5/10/2022  Clinic Care Coordination Contact  Community Health Worker Follow Up    Intervention and Education during outreach:   Called and spoke to patient and follow up on goals.  Patient reported:  -missed mammogram appt on 4-6-22 due to surgery.  Offered to support reschedule mammogram   Patient agreed to reschedule today.  Conference called with patient and supported to connect with  to reschedule mammogram appt  Appt scheduled for 5-13-22 at 9:30am at [facility].   -has completed the Health Care Directive form will bring to clinic     Review care gaps due for Preventative care visit and 4th COVID booster  Patient agreed to schedule AWV when she comes to the clinic on 5-13-22 for her mammogram.  Declined COVID 4th booster.    CHW Follow up: Monthly  CHW Plan: Follow up on goal (s)  CHW Next Follow Up: 6-15-22    Tammi Shah  Community Health Worker  [facility] Care Coordination  [email or web address]   Office: [phone]  Fax: [phone]    Clinic Care Coordination Contact    Community Health Worker Follow Up    Care Gaps:     Health Maintenance Due   Topic Date Due     PREVENTIVE CARE VISIT  Never done     ASTHMA ACTION PLAN  Never done     DEPRESSION ACTION PLAN  Never done     MAMMO SCREENING  12/08/2021     COVID-19 Vaccine (4 - Booster for Pfizer series) 01/29/2022     LUNG CANCER SCREENING  05/13/2022       Care Gap Goal set: Yes and Requested a  from Care Team to call patient. patient will schedule on 5-13-22 for her mammogram at the clinic  Declined COVID 4th booster    Goals:    Goals Addressed as of 5/10/2022 at 10:43 AM                    Today    3/28/22       Other (pt-stated)   60%      Added 3/28/22 by Tammi Shah      Goal Statement: I will attend and complete my mammogram visit on 5-13-22.  Date Goal set: 3/28/2022  Barriers:   Strengths: support from CCC team  Date to Achieve By: 5-  Patient expressed understanding of  goal: yes  Action steps to achieve this goal:  1.  I will attend mammogram appt on 5-13-22 at 9:30am   2. I will contact my Care Management or clinic team if I have barriers to attending my annual wellness visit.     Updated 5-10-22 AL             Other (pt-stated)         Added 5/10/22 by Tammi Shah      Goal Statement: I will complete my annual wellness visit.  Date Goal set: 5/10/2022  Barriers:   Strengths: support from CCC team  Date to Achieve By: 7-2022  Patient expressed understanding of goal: yes  Action steps to achieve this goal:  1. I will schedule my annual wellness visit when I come to the clinic on 5-13-22.  2. I will attend my annual wellness visit.  3. I will contact my Care Management or clinic team if I have barriers to attending my annual wellness visit.              Psychosocial (pt-stated)   90%  90%    Added 7/19/21 by Bartolome Blanco, [facility]      Goal Statement: I completed the [facility] Care Directive form will get form to the clinic  within 1-2 months  Date Goal set: 07/19/21 updated: 11-15-21 Updated 2-16-22 updated 5-10-22  Barriers:   Strengths:   Date to Achieve By: 6-2022  Patient expressed understanding of goal: Yes  Action steps to achieve this goal:  1. I have the completed Health Care Directive form will bring it to the clinic at next office visit.  2 I will update CCC team at [facility] on the status     Updated: 5-10-22 AL

## 2022-05-18 ENCOUNTER — MYC MEDICAL ADVICE (OUTPATIENT)
Dept: NEUROSURGERY | Facility: CLINIC | Age: 57
End: 2022-05-18
Payer: COMMERCIAL

## 2022-05-18 DIAGNOSIS — M54.16 LUMBAR RADICULOPATHY: ICD-10-CM

## 2022-05-18 DIAGNOSIS — M43.16 SPONDYLOLISTHESIS OF LUMBAR REGION: ICD-10-CM

## 2022-05-19 DIAGNOSIS — M54.16 LUMBAR RADICULOPATHY: ICD-10-CM

## 2022-05-19 DIAGNOSIS — M43.16 SPONDYLOLISTHESIS OF LUMBAR REGION: ICD-10-CM

## 2022-05-19 RX ORDER — TIZANIDINE HYDROCHLORIDE 4 MG/1
4 CAPSULE, GELATIN COATED ORAL 3 TIMES DAILY PRN
Qty: 30 CAPSULE | Refills: 0 | Status: SHIPPED | OUTPATIENT
Start: 2022-05-19 | End: 2022-08-02

## 2022-05-19 RX ORDER — HYDROCODONE BITARTRATE AND ACETAMINOPHEN 5; 325 MG/1; MG/1
1-2 TABLET ORAL EVERY 12 HOURS PRN
Qty: 28 TABLET | Refills: 0 | Status: SHIPPED | OUTPATIENT
Start: 2022-05-19 | End: 2022-05-27

## 2022-05-19 NOTE — TELEPHONE ENCOUNTER
"Returned call to pt who is s/p Right lumbar 4 - lumbar 5 transforaminal lumbar interbody fusion with revision lumbar 4 - lumbar 5 posterior instrumented fusion and arthrodesis, use of allograft, autograft, stealth with Dr. Ward on 4/18/22.    She continues to have pain in her low back with activity. The cramping in her calves has improved.     She is currently taking 1 tab of Norco during the day and 2 at night \"if the pain is really bad\", discussed spacing doses of tizanidine by 90 minutes (as pt was not doing this) and supplementing with tylenol.     Next follow up 5/31/22.    Per : Norco last filled on 5/9/22, 40#, 6 day supply. Zanaflex 4mg refilled today as well.     Sara Arenas RN   "

## 2022-05-20 ENCOUNTER — MEDICAL CORRESPONDENCE (OUTPATIENT)
Dept: NEUROSURGERY | Facility: CLINIC | Age: 57
End: 2022-05-20
Payer: COMMERCIAL

## 2022-05-26 ENCOUNTER — MYC MEDICAL ADVICE (OUTPATIENT)
Dept: NEUROSURGERY | Facility: CLINIC | Age: 57
End: 2022-05-26
Payer: COMMERCIAL

## 2022-05-26 DIAGNOSIS — M43.16 SPONDYLOLISTHESIS OF LUMBAR REGION: ICD-10-CM

## 2022-05-26 DIAGNOSIS — M54.16 LUMBAR RADICULOPATHY: ICD-10-CM

## 2022-05-27 ENCOUNTER — MEDICAL CORRESPONDENCE (OUTPATIENT)
Dept: NEUROSURGERY | Facility: CLINIC | Age: 57
End: 2022-05-27
Payer: COMMERCIAL

## 2022-05-27 RX ORDER — HYDROCODONE BITARTRATE AND ACETAMINOPHEN 5; 325 MG/1; MG/1
1 TABLET ORAL EVERY 12 HOURS PRN
Qty: 14 TABLET | Refills: 0 | Status: SHIPPED | OUTPATIENT
Start: 2022-05-27 | End: 2022-06-01

## 2022-05-27 NOTE — TELEPHONE ENCOUNTER
Per : Norco last filled on 5/19/22, 28#, 7 day supply. No discrepancies.     Pt's follow up scheduled on 5/31/22    Sara Arenas RN

## 2022-05-31 ENCOUNTER — ANCILLARY PROCEDURE (OUTPATIENT)
Dept: GENERAL RADIOLOGY | Facility: CLINIC | Age: 57
End: 2022-05-31
Attending: NURSE PRACTITIONER
Payer: COMMERCIAL

## 2022-05-31 ENCOUNTER — TELEPHONE (OUTPATIENT)
Dept: FAMILY MEDICINE | Facility: CLINIC | Age: 57
End: 2022-05-31

## 2022-05-31 ENCOUNTER — OFFICE VISIT (OUTPATIENT)
Dept: NEUROSURGERY | Facility: CLINIC | Age: 57
End: 2022-05-31
Payer: COMMERCIAL

## 2022-05-31 VITALS
HEART RATE: 65 BPM | BODY MASS INDEX: 37.21 KG/M2 | OXYGEN SATURATION: 98 % | DIASTOLIC BLOOD PRESSURE: 58 MMHG | SYSTOLIC BLOOD PRESSURE: 93 MMHG | HEIGHT: 63 IN | WEIGHT: 210 LBS

## 2022-05-31 DIAGNOSIS — M54.16 LUMBAR RADICULOPATHY: ICD-10-CM

## 2022-05-31 DIAGNOSIS — M43.16 SPONDYLOLISTHESIS OF LUMBAR REGION: Primary | ICD-10-CM

## 2022-05-31 DIAGNOSIS — S32.009K PSEUDOARTHROSIS OF LUMBAR SPINE: ICD-10-CM

## 2022-05-31 PROCEDURE — 72100 X-RAY EXAM L-S SPINE 2/3 VWS: CPT | Mod: TC | Performed by: RADIOLOGY

## 2022-05-31 PROCEDURE — 99024 POSTOP FOLLOW-UP VISIT: CPT | Performed by: PHYSICIAN ASSISTANT

## 2022-05-31 NOTE — PROGRESS NOTES
Neurosurgery Progress Note:   May 31, 2022     A/P:   Postoperative right lumbar 4 - lumbar 5 transforaminal lumbar interbody fusion with revision lumbar 4 - lumbar 5 posterior instrumented fusion and posterolateral arthrodesis on April 18, 2022    Plan:   doing well. She has been advised to wean out of her brace by removing it 1 hour per day until reaching 8 hours then stop wearing it completely. She will begin to wear a bone growth stimulator and work with physical therapy focusing on core strength and mobility. Strongly advised to not smoke as increased risk of fusion failure. She will increase her activity and weight restrictions by 5 pounds per week until back to normal. Follow up will be in 6 weeks with repeat xray and she understands to contact the office sooner should any symptoms arise.      Ms. Kwan is a pleasant 56 year old right handed female who presents postoperative right lumbar 4 - lumbar 5 transforaminal lumbar interbody fusion with revision lumbar 4 - lumbar 5 posterior instrumented fusion and posterolateral arthrodesis on April 18, 2022 by . Presently she states that she is doing okay. She notes intermittent right buttocks and right posterior thigh cramping pain that will occur at random. She continues to ambulate with a cane for concern of her right leg giving out. She also notes burning pain of her right foot. She also notes continued right radicular numbness that remains unchanged since her prior surgery in 2017. She states that overall she feels improvement since surgery but knows that it will take time. She has not begun wearing the bone growth stimulator yet as she is wearing her brace. She also had 2 days where she began to smoke again but states that she has since stopped.     HPI: from preop  Susan KEN Aniya is a 56-year-old female who is status post cervical 5-cervical 6 anterior cervical decompression and fusion on April 5, 2021 and lumbar 4-lumbar 5 microdiscectomy and  "posterior instrumented fusion on 12/17/2020.  Patient continues to have right posterior leg pain down to her knee.  He also has chronic numbness and tingling in her anterior right shin to the top of her foot since prior to her to all her lumbar surgery.  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  We discussed a  right lumbar 4-5 transforaminal lumbar interbody fusion and revision posterior fusion, however, patient needs to have smoking cessation prior to surgery confirmed with a nicotine test.  She will work on smoking cessation and schedule surgery in the future. Risks and benefits were discussed in detail including but not limited to infection, hematoma, nerve damage including paralysis, post op radiculitis, durotomy, lack of a sold bone fusion, hardware malfunction, risks associated with the use of general anesthesia, blood clots in the lungs or legs. She agreed to proceed      Exam:  BP 93/58   Pulse 65   Ht 1.6 m (5' 3\")   Wt 95.3 kg (210 lb)   LMP 03/09/2010   SpO2 98%   BMI 37.20 kg/m      General: alert and oriented x3     Strength is 5/5 throughout both lower extremities throughout.    Sensation is intact throughout both upper and lower extremities    Gait is smooth ambulates with cane    Incision: well healed without erythema, induration, or drainage      Imaging:  Xray reviewed personally stable lumbar alignment with good hardware position        Margo Klein PA-C  [facility] Neurosurgery  O: [phone]    "

## 2022-05-31 NOTE — PATIENT INSTRUCTIONS
doing well. She has been advised to wean out of her brace by removing it 1 hour per day until reaching 8 hours then stop wearing it completely. She will begin to wear a bone growth stimulator and work with physical therapy focusing on core strength and mobility. Strongly advised to not smoke as increased risk of fusion failure. She will increase her activity and weight restrictions by 5 pounds per week until back to normal. Follow up will be in 6 weeks with repeat xray and she understands to contact the office sooner should any symptoms arise.

## 2022-05-31 NOTE — TELEPHONE ENCOUNTER
Reason for Call:  Form, our goal is to have forms completed with 72 hours, however, some forms may require a visit or additional information.     Type of letter, form or note:  disability    Who is the form from?: [employer] (if other please explain)    Where did the form come from: form was faxed in    What clinic location was the form placed at?:  [facility]     Where the form was placed: DR. Jimenez  Box/Folder    What number is listed as a contact on the form?: [phone] ext. [phone] (Cristela)        Additional comments:  Caller stated forms was faxed sometime in March with no response from provider. I had them refaxed into me and place in provider's mailbox. Please look into forms and complete if applicable. Thank you.       Call taken on 5/31/2022 at 12:27 PM by Valencia Machado

## 2022-05-31 NOTE — LETTER
5/31/2022         RE: Susan Kwan  [address]  [facility] [address]        Dear Colleague,    Thank you for referring your patient, Susan Kwan, to the [facility]UROSURG[facility]. Please see a copy of my visit note below.    Neurosurgery Progress Note:   May 31, 2022     A/P:   Postoperative right lumbar 4 - lumbar 5 transforaminal lumbar interbody fusion with revision lumbar 4 - lumbar 5 posterior instrumented fusion and posterolateral arthrodesis on April 18, 2022    Plan:   doing well. She has been advised to wean out of her brace by removing it 1 hour per day until reaching 8 hours then stop wearing it completely. She will begin to wear a bone growth stimulator and work with physical therapy focusing on core strength and mobility. Strongly advised to not smoke as increased risk of fusion failure. She will increase her activity and weight restrictions by 5 pounds per week until back to normal. Follow up will be in 6 weeks with repeat xray and she understands to contact the office sooner should any symptoms arise.      Ms. Kwan is a pleasant 56 year old right handed female who presents postoperative right lumbar 4 - lumbar 5 transforaminal lumbar interbody fusion with revision lumbar 4 - lumbar 5 posterior instrumented fusion and posterolateral arthrodesis on April 18, 2022 by . Presently she states that she is doing okay. She notes intermittent right buttocks and right posterior thigh cramping pain that will occur at random. She continues to ambulate with a cane for concern of her right leg giving out. She also notes burning pain of her right foot. She also notes continued right radicular numbness that remains unchanged since her prior surgery in 2017. She states that overall she feels improvement since surgery but knows that it will take time. She has not begun wearing the bone growth stimulator yet as she is wearing her brace. She also had 2  "days where she began to smoke again but states that she has since stopped.     HPI: from preop  Susan Kwan is a 56-year-old female who is status post cervical 5-cervical 6 anterior cervical decompression and fusion on April 5, 2021 and lumbar 4-lumbar 5 microdiscectomy and posterior instrumented fusion on 12/17/2020.  Patient continues to have right posterior leg pain down to her knee.  He also has chronic numbness and tingling in her anterior right shin to the top of her foot since prior to her to all her lumbar surgery.  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  We discussed a  right lumbar 4-5 transforaminal lumbar interbody fusion and revision posterior fusion, however, patient needs to have smoking cessation prior to surgery confirmed with a nicotine test.  She will work on smoking cessation and schedule surgery in the future. Risks and benefits were discussed in detail including but not limited to infection, hematoma, nerve damage including paralysis, post op radiculitis, durotomy, lack of a sold bone fusion, hardware malfunction, risks associated with the use of general anesthesia, blood clots in the lungs or legs. She agreed to proceed      Exam:  BP 93/58   Pulse 65   Ht 1.6 m (5' 3\")   Wt 95.3 kg (210 lb)   LMP 03/09/2010   SpO2 98%   BMI 37.20 kg/m      General: alert and oriented x3     Strength is 5/5 throughout both lower extremities throughout.    Sensation is intact throughout both upper and lower extremities    Gait is smooth ambulates with cane    Incision: well healed without erythema, induration, or drainage      Imaging:  Xray reviewed personally stable lumbar alignment with good hardware position        Margo Klein PA-C  [facility] Neurosurgery  O: [phone]        Again, thank you for allowing me to participate in the care of your patient.  "       Sincerely,        Margo Klein PA-C

## 2022-06-01 ENCOUNTER — MYC MEDICAL ADVICE (OUTPATIENT)
Dept: NEUROSURGERY | Facility: CLINIC | Age: 57
End: 2022-06-01
Payer: COMMERCIAL

## 2022-06-01 DIAGNOSIS — Z98.1 S/P LUMBAR FUSION: ICD-10-CM

## 2022-06-01 DIAGNOSIS — S32.009K PSEUDOARTHROSIS OF LUMBAR SPINE: Primary | ICD-10-CM

## 2022-06-01 RX ORDER — HYDROCODONE BITARTRATE AND ACETAMINOPHEN 5; 325 MG/1; MG/1
1 TABLET ORAL EVERY 12 HOURS PRN
Qty: 14 TABLET | Refills: 0 | Status: SHIPPED | OUTPATIENT
Start: 2022-06-01 | End: 2022-09-02

## 2022-06-06 ENCOUNTER — PATIENT OUTREACH (OUTPATIENT)
Dept: CARE COORDINATION | Facility: CLINIC | Age: 57
End: 2022-06-06
Payer: COMMERCIAL

## 2022-06-06 ASSESSMENT — ACTIVITIES OF DAILY LIVING (ADL): DEPENDENT_IADLS:: TRANSPORTATION

## 2022-06-06 NOTE — PROGRESS NOTES
Care Coordination Clinician Chart Review     Situation: Patient chart reviewed by SW/care coordinator.?     Background: Initial assessment and enrollment to Care Coordination was 7-19-22.?? Patient centered goals were developed with participation from patient.? Lead CC handed patient off to CHW for continued outreach every 30 days.??     Assessment: Per SW/CC chart review, patient outreach completed by CC CHW on 5-10-22.  It does not appear that pt completed her mammogram appt on 5-13-22 though pt can reschedule at her convenience.  Also, pt did not schedule her wellness visit though pt can schedule that, as well, at her convenience.  Pt has completed her Health Care Directive and will provide it to her clinic at next clinic visit.         Plan:  Pt will no longer be enrolled in Care Coordination though Care Coordination is available for future needs of pt.     Thomas Gao, SW/CC  [facility]

## 2022-06-08 ENCOUNTER — HOSPITAL ENCOUNTER (OUTPATIENT)
Dept: PHYSICAL THERAPY | Facility: REHABILITATION | Age: 57
Discharge: HOME | End: 2022-06-08
Attending: PHYSICIAN ASSISTANT
Payer: COMMERCIAL

## 2022-06-08 DIAGNOSIS — M43.16 SPONDYLOLISTHESIS OF LUMBAR REGION: ICD-10-CM

## 2022-06-08 PROCEDURE — 97110 THERAPEUTIC EXERCISES: CPT | Mod: GP | Performed by: PHYSICAL THERAPIST

## 2022-06-08 PROCEDURE — 97161 PT EVAL LOW COMPLEX 20 MIN: CPT | Mod: GP | Performed by: PHYSICAL THERAPIST

## 2022-06-08 NOTE — PROGRESS NOTES
Date 6/8/22   Exercise    Stretches: sitting hamstring, SKC, piriformis below 90, QL Hold 30 seconds x 1-3 reps

## 2022-06-08 NOTE — PROGRESS NOTES
[facility]    OUTPATIENT PHYSICAL THERAPY ORTHOPEDIC EVALUATION  PLAN OF TREATMENT FOR OUTPATIENT REHABILITATION  (COMPLETE FOR INITIAL CLAIMS ONLY)  Patient's Last Name, First Name, M.I.  YOB: 1965  Susan Kwan    Provider s Name:  [facility]   Medical Record No.  [id number]   Start of Care Date:  06/08/22   Onset Date:  04/18/22   Type:     _X__PT   ___OT   ___SLP Medical Diagnosis:  spondylolisthesis of lumbar region     PT Diagnosis:  core weakness, S/P Lumbar fusion   Visits from SOC:  1      _________________________________________________________________________________  Plan of Treatment/Functional Goals:  joint mobilization, manual therapy, neuromuscular re-education, ROM, strengthening, stretching     TENS     Goals  Goal Identifier: stand  Goal Description: Pt will be able to stand for 30+ minutes to help prep a meal  Target Date: 09/05/22    Goal Identifier: sit  Goal Description: Pt will be able to sit for 30 minutes without having to reposition due to pain in buttocks  Target Date: 09/05/22                                                                      Therapy Frequency:  2 times/Week  Predicted Duration of Therapy Intervention:  8-10, prn    Marta Ozuna, AJITH                 I CERTIFY THE NEED FOR THESE SERVICES FURNISHED UNDER        THIS PLAN OF TREATMENT AND WHILE UNDER MY CARE     (Physician co-signature of this document indicates review and certification of the therapy plan).                     Certification Date From:  06/08/22   Certification Date To:  09/05/22    Referring Provider:  Margo Klein PA-C    Initial Assessment        See Epic Evaluation Start of Care Date: 06/08/22 06/08/22 1031   General Information   Type of Visit Initial OP Ortho  PT Evaluation   Start of Care Date 06/08/22   Referring Physician Margo Klein PA-C   Orders Evaluate and Treat   Certification Required? Yes   Medical Diagnosis Spondylolisthesis of lumbar   Surgical/Medical history reviewed Yes   Precautions/Limitations no known precautions/limitations   Body Part(s)   Body Part(s) Lumbar Spine/SI   Presentation and Etiology   Pertinent history of current problem (include personal factors and/or comorbidities that impact the POC) Pt had back pain for quite some time.  She had physical therapy this past spring which did not help.  She had back surgery in April and is now here for therapy.  She is S/P Right L4-5 transforaminal lumbar interbody fusion with revision, L4-5 post instrument fusion and ppsterolateral arthrodesis.  After the first  back surgery 12/2020, she did not heal as well as she should have so due to continued pain levels and the lack of healing she had this second surgery/revision.  She has a bone stimulator, which she received this AM, and will start using it today.  She is still wearing the lumbar back brace and had the weaning out instructions a little off.   Impairments A. Pain;D. Decreased ROM;E. Decreased flexibility;F. Decreased strength and endurance   Functional Limitations perform activities of daily living;perform desired leisure / sports activities   Symptom Location right low back and buttocks.   Onset date of current episode/exacerbation 04/18/22   Pain rating (0-10 point scale) Best (/10);Worst (/10)   Best (/10) 5   Worst (/10) 9   Pain quality G. Cramping;E. Shooting   Frequency of pain/symptoms A. Constant   Pain/symptoms are: Worse during the night   Pain/symptoms exacerbated by M. Other   Pain exacerbation comment all prolonged positions, difficult falling asleep   Pain/symptoms eased by E. Changing positions   Progression of symptoms since onset: Unchanged   Fall Risk Screen   Fall screen completed by PT   Have you fallen 2 or more times in the  "past year? Yes   Have you fallen and had an injury in the past year? No   Is patient a fall risk? No   Fall screen comments falling was this winter prior to her last physical therapy and surgery   Abuse Screen (yes response referral indicated)   Feels Unsafe at Home or Work/School no   Feels Threatened by Someone no   Does Anyone Try to Keep You From Having Contact with Others or Doing Things Outside Your Home? no   Physical Signs of Abuse Present no   Patient needs abuse support services and resources No   Functional Scales   Functional Scales Other   Other Scales  Oswestry: 48   Lumbar Spine/SI Objective Findings   Balance/Proprioception (Single Leg Stance) APTA:  no arms, pt stopped at 6 reps, 15 seconds,  due to right knee instability and weakness.  Pt felt her leg may give out   Hamstring Flexibility good   Hip Flexor Flexibility good   Piriformis Flexibility fair   Flexion ROM 7 in finger tips to floor   Extension ROM painful and limited   Right Side Bending ROM 24 finger tips to floor   Left Side Bending ROM 22 1/2 finger tips to floor   Lumbar/Hip/Knee/Foot Strength Comments unalbe to heel walk on right, other MMT normal throughout B LE   SLR \"zing\" on R at ~80 degrees   Crossover SLR negative   Segmental Mobility painful R>L   Palpation pain R>L lumbar paraspinals, QL, R piriformis and glutes   Slump Test negative   Posture fair   Planned Therapy Interventions   Planned Therapy Interventions joint mobilization;manual therapy;neuromuscular re-education;ROM;strengthening;stretching   Planned Modality Interventions   Planned Modality Interventions TENS   Clinical Impression   Criteria for Skilled Therapeutic Interventions Met yes, treatment indicated   PT Diagnosis core weakness, S/P Lumbar fusion   Influenced by the following impairments pain with AROM, pain to palpation, core weakness-wearing back brace   Functional limitations due to impairments standing, sitting-any prolonged positions, walking   Clinical " Presentation Stable/Uncomplicated   Clinical Decision Making (Complexity) Low complexity   Therapy Frequency 2 times/Week   Predicted Duration of Therapy Intervention (days/wks) 8-10, prn   Risk & Benefits of therapy have been explained Yes   Patient, Family & other staff in agreement with plan of care Yes   Clinical Impression Comments Pt presents with core weakness and pain following lumbar fusion/revision 4/18/2022.  She is still wearing the back brace, demostrates core weakness, leg/knee weakness during APTA test and pain.  Feel she will benefit from physical therapy to work on limitations to improve her functional abilities.   ORTHO GOALS   PT Ortho Eval Goals 1;2   Ortho Goal 1   Goal Identifier stand   Goal Description Pt will be able to stand for 30+ minutes to help prep a meal   Target Date 09/05/22   Ortho Goal 2   Goal Identifier sit   Goal Description Pt will be able to sit for 30 minutes without having to reposition due to pain in buttocks   Target Date 09/05/22   Total Evaluation Time   PT Eval, Low Complexity Minutes (83174) 20   Therapy Certification   Certification date from 06/08/22   Certification date to 09/05/22   Medical Diagnosis spondylolisthesis of lumbar region

## 2022-06-13 ENCOUNTER — HOSPITAL ENCOUNTER (OUTPATIENT)
Dept: PHYSICAL THERAPY | Facility: REHABILITATION | Age: 57
Discharge: HOME | End: 2022-06-13
Payer: COMMERCIAL

## 2022-06-13 DIAGNOSIS — M43.16 SPONDYLOLISTHESIS OF LUMBAR REGION: Primary | ICD-10-CM

## 2022-06-13 DIAGNOSIS — M54.50 LUMBAR SPINE PAIN: ICD-10-CM

## 2022-06-13 DIAGNOSIS — M54.2 CERVICAL SPINE PAIN: ICD-10-CM

## 2022-06-13 PROCEDURE — 97110 THERAPEUTIC EXERCISES: CPT | Mod: GP | Performed by: PHYSICAL THERAPIST

## 2022-06-13 NOTE — PROGRESS NOTES
Date 6/13/22 6/8/22   Exercise     Stretches: sitting hamstring, SKC, piriformis below 90, QL  Hold 30 seconds x 1-3 reps   Nu-step, seat 7 Workload 5  5 min    Core engagement: TAr, buttocks and kegel today    bridge Hold 10 seconds X 6 reps    Supine abdominal, 1 leg marching X 15-20    Clamshells  X 20                                    Assessment:  Pt returns for her first follow-up and has weaned herself out of her brace.  She did get her new bone stimulator and is wearing it today during the session.  Added/reviewed core engagement to get more stabilization with different daily tasks as well and with the exercises for strengthening.  She did mention she is unable to do a kegel and has not been able to stop her flow of urine.  Discussed talking with her MD about pelvic floor therapy to see if she can strengthen this area and she is interested so she will. Able to add some core strengthening ex today, which pt felt will be helpful.  Feel she will benefit from continued therapy to gain more strength and stabilization to improve her quality of life.

## 2022-06-20 ENCOUNTER — HOSPITAL ENCOUNTER (OUTPATIENT)
Dept: PHYSICAL THERAPY | Facility: REHABILITATION | Age: 57
Discharge: HOME | End: 2022-06-20
Payer: COMMERCIAL
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
## 2022-06-20 DIAGNOSIS — M43.16 SPONDYLOLISTHESIS OF LUMBAR REGION: Primary | ICD-10-CM

## 2022-06-20 DIAGNOSIS — M54.50 LUMBAR SPINE PAIN: ICD-10-CM

## 2022-06-20 DIAGNOSIS — M54.2 CERVICAL SPINE PAIN: ICD-10-CM

## 2022-06-20 PROCEDURE — 97110 THERAPEUTIC EXERCISES: CPT | Mod: GP | Performed by: PHYSICAL THERAPIST

## 2022-06-20 PROCEDURE — 97112 NEUROMUSCULAR REEDUCATION: CPT | Mod: GP | Performed by: PHYSICAL THERAPIST

## 2022-06-20 NOTE — TELEPHONE ENCOUNTER
Cristela from  called back to check on the status of this request. Has this been completed?  Please complete the form and call Cristela at [phone] to confirm this.

## 2022-06-20 NOTE — PROGRESS NOTES
Date 6/20/22 6/13/22 6/8/22   Exercise      Stretches: sitting hamstring, SKC, piriformis below 90, QL   Hold 30 seconds x 1-3 reps   Nu-step, seat 7 Workload 5  5 min Workload 5  5 min    Core engagement: TAr, buttocks and kegel Review today today    bridge Hold 10 seconds X 6 Hold 10 seconds X 6 reps    Supine abdominal, 1 leg marching X 20 X 15-20    Clamshells  X 20 X 20    Hip add isometrics, squeezing pillow Hold 10 seconds x 6                                     Neuro re-edu  Date 6/20/22   Exercise    Waist to floor with pivot (power position) with crate 5#  X 5 pivot each direction   Carry crate, pivot with mini-squat to put on lower table 5#  30' X 4   Waist to overhead lift (with crate) 5#  X 5                                           Assessment:  Pt returns and feels everything is going fine.  She has been working on the core engagement and the ex at home.  The pain in the back doesn't hurt as much as last visit but continues to have the pain into the buttocks. Started some lifting and carrying tasks to help pt gain strength in her legs and core with basic tasks around the house. Demonstrated good understanding and did not need many cues. She feels this will be helpful.  Feel she will benefit from continued therapy to gain more strength and stabilization to improve her quality of life.     Marta Ozuna, PT  6/20/22

## 2022-06-21 ENCOUNTER — MYC MEDICAL ADVICE (OUTPATIENT)
Dept: PHYSICAL MEDICINE AND REHAB | Facility: CLINIC | Age: 57
End: 2022-06-21

## 2022-06-21 NOTE — TELEPHONE ENCOUNTER
Please contact the patient and see if we can figure out where she had the spinal cord stimulator placed.  It may have been done at [facility] by Dr. Cerna.  If her spinal cord stimulator is beeping, we would need to discuss this with Dr. Kc to see if this is something that he could evaluate or if she would have to go back to the provider who implanted the device.

## 2022-06-21 NOTE — TELEPHONE ENCOUNTER
Pt returned call. She reports Dr. Cerna was her surgeon but he was not the one that put the SCS in. She states it is a [employer] stimulator (now [employer]). She states she thinks she does have a card at home with the phone # for [employer] rep.    She is going to call them and ask them about this to see if they have any recommendations for her. She will call us back to update us.

## 2022-06-30 NOTE — TELEPHONE ENCOUNTER
I think they mean the disability letter, there are no forms that I found I will work on this and bring in tomorrow.

## 2022-07-01 ENCOUNTER — TELEPHONE (OUTPATIENT)
Dept: FAMILY MEDICINE | Facility: CLINIC | Age: 57
End: 2022-07-01

## 2022-07-01 ENCOUNTER — MEDICAL CORRESPONDENCE (OUTPATIENT)
Dept: FAMILY MEDICINE | Facility: CLINIC | Age: 57
End: 2022-07-01

## 2022-07-01 NOTE — PROGRESS NOTES
I spoke to Susan today.   She was able to help me complete the physical medical source statement.   A letter was written also to support her disability process.    Last seen 12/27/21 refilled as directed

## 2022-07-01 NOTE — LETTER
July 1, 2022      Susan KEN Aniya  [address] [place] [id number]        To Whom It May Concern:    Susan KEN is a patient of the [facility].     I have been her primary care provider since 2015.   She has history of anxiety, hypothyroidism, migraine, asthma, obstructive sleep apnea, chronic back and neck pain. She has had multiple imaging tests illustrating the level of disc disease and vertebral stenosis and spondylosis. She has been working with neurosurgery since 2017 s/p multiple surgeries including a spinal cord stimulator placement, disc fusion, decompression, microdiscectomy. She is not physical fit at this time due to significant paresthesia and pain in her extremities, neck and back. She is not able to sustain employment where duties including physical activity such as lifting, twisting, reaching, standing or sitting for more than a few minutes. This is due to the above chronic conditions and there is upcoming planned treatment and further surgery as per neurosurgery recommendations. I do not see her physical capabilities changing in the near future.           Sincerely,        Earline Jimenez MD

## 2022-07-01 NOTE — TELEPHONE ENCOUNTER
Form completed.   Letter written-print from the letter section.   Please fax or mail documentation where it needs to go. The patient is aware this is completed.

## 2022-07-06 ENCOUNTER — OFFICE VISIT (OUTPATIENT)
Dept: PHYSICAL MEDICINE AND REHAB | Facility: CLINIC | Age: 57
End: 2022-07-06
Payer: COMMERCIAL

## 2022-07-06 ENCOUNTER — OFFICE VISIT (OUTPATIENT)
Dept: PHYSICAL MEDICINE AND REHAB | Facility: CLINIC | Age: 57
End: 2022-07-06
Attending: PHYSICAL MEDICINE & REHABILITATION
Payer: COMMERCIAL

## 2022-07-06 VITALS
HEART RATE: 72 BPM | TEMPERATURE: 98 F | DIASTOLIC BLOOD PRESSURE: 58 MMHG | OXYGEN SATURATION: 94 % | SYSTOLIC BLOOD PRESSURE: 102 MMHG

## 2022-07-06 DIAGNOSIS — M54.2 CERVICAL SPINE PAIN: ICD-10-CM

## 2022-07-06 DIAGNOSIS — T85.192A MALFUNCTION OF SPINAL CORD STIMULATOR, INITIAL ENCOUNTER (H): Primary | ICD-10-CM

## 2022-07-06 DIAGNOSIS — M79.18 MYOFASCIAL PAIN: ICD-10-CM

## 2022-07-06 PROCEDURE — 99214 OFFICE O/P EST MOD 30 MIN: CPT | Mod: 25 | Performed by: PHYSICAL MEDICINE & REHABILITATION

## 2022-07-06 PROCEDURE — 20552 NJX 1/MLT TRIGGER POINT 1/2: CPT | Performed by: PHYSICAL MEDICINE & REHABILITATION

## 2022-07-06 RX ORDER — BACLOFEN 10 MG/1
10 TABLET ORAL 3 TIMES DAILY PRN
Qty: 90 TABLET | Refills: 1 | Status: SHIPPED | OUTPATIENT
Start: 2022-07-06 | End: 2022-08-30

## 2022-07-06 ASSESSMENT — PAIN SCALES - GENERAL
PAINLEVEL: MILD PAIN (3)
PAINLEVEL: SEVERE PAIN (7)

## 2022-07-06 NOTE — TELEPHONE ENCOUNTER
Called and spoke to pt and let her know forms are completed [phone]. Originals sent to home address
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
## 2022-07-06 NOTE — PATIENT INSTRUCTIONS
I would recommend calling customer care at [facility]:  start with [phone] or go to [email or web address]    2. Also call Dr Gianni Cerna at [facility] for an opinion.     3. Schedule a trigger point injection with Dr Curtis
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
## 2022-07-11 ENCOUNTER — HOSPITAL ENCOUNTER (OUTPATIENT)
Dept: PHYSICAL THERAPY | Facility: REHABILITATION | Age: 57
Discharge: HOME | End: 2022-07-11
Payer: COMMERCIAL

## 2022-07-11 DIAGNOSIS — M54.50 LUMBAR SPINE PAIN: ICD-10-CM

## 2022-07-11 DIAGNOSIS — M43.16 SPONDYLOLISTHESIS OF LUMBAR REGION: Primary | ICD-10-CM

## 2022-07-11 PROCEDURE — 97112 NEUROMUSCULAR REEDUCATION: CPT | Mod: GP | Performed by: PHYSICAL THERAPIST

## 2022-07-11 PROCEDURE — 97110 THERAPEUTIC EXERCISES: CPT | Mod: GP | Performed by: PHYSICAL THERAPIST

## 2022-07-11 NOTE — PROGRESS NOTES
Date 7/11/22 6/20/22 6/13/22 6/8/22   Exercise       Stretches: sitting hamstring, SKC, piriformis below 90, QL    Hold 30 seconds x 1-3 reps   Nu-step, seat 7 Workload 5  5 min Workload 5  5 min Workload 5  5 min    Core engagement: TAr, buttocks and kegel  Review today today    bridge Review-hold 10 seconds X 6 Hold 10 seconds X 6 Hold 10 seconds X 6 reps    Supine abdominal, 1 leg marching PROGRESSED to two leg marching (3B) X 6 reps X 20 X 15-20    Clamshells  Add orange (L2) tband  X 20 X 20 X 20    Hip add isometrics, squeezing pillow  Hold 10 seconds x 6                                          Neuro re-edu  Date 7/11/22 6/20/22   Exercise     Waist to floor with pivot (power position) with crate (and pivot) 8#  X 5 each direction  (on last couple reps pt felt strain in L low back) 5#  X 5 pivot each direction   Carry crate, pivot with mini-squat to put on lower table 8#  30'X4 5#  30' X 4   Waist to overhead lift (with crate)  5#  X 5                                                    Assessment:  Pt returns and continues to have problems with her spinal stimulator.  She is trying to get in touch with the MD who suggested it and will work on contactiing him again later today.  She continues to be compliant and perform the ex at home although needed cues to engage all mm correctly.  Able to progress the clamshell with a tband to make it more challenging to gain more strength. Performed some of the lifting and pt felt a strain in her left low back on the last couple reps.  She felt she was using her core engagement techniques but after review she realized she didn't have her hips under her and core was not engaged as it should have been.  She will continue to review this at home and in the clinic for max stabilization and support from her mm. She feels she can tell when her core is not engaged and that is when the pain occurs. Feel she will benefit from continued therapy to gain more strength and  stabilization to improve her quality of life.     Marta Ozuna, PT  7/11/22

## 2022-07-12 ENCOUNTER — HOSPITAL ENCOUNTER (OUTPATIENT)
Dept: RADIOLOGY | Facility: CLINIC | Age: 57
Discharge: HOME | End: 2022-07-12
Attending: PHYSICIAN ASSISTANT | Admitting: PHYSICIAN ASSISTANT
Payer: COMMERCIAL

## 2022-07-12 DIAGNOSIS — M54.2 NECK PAIN: ICD-10-CM

## 2022-07-12 DIAGNOSIS — M43.16 SPONDYLOLISTHESIS OF LUMBAR REGION: ICD-10-CM

## 2022-07-12 PROCEDURE — 72040 X-RAY EXAM NECK SPINE 2-3 VW: CPT

## 2022-07-15 ENCOUNTER — HOSPITAL ENCOUNTER (OUTPATIENT)
Dept: PHYSICAL THERAPY | Facility: REHABILITATION | Age: 57
Discharge: HOME | End: 2022-07-15
Payer: COMMERCIAL

## 2022-07-15 DIAGNOSIS — M54.2 CERVICAL SPINE PAIN: ICD-10-CM

## 2022-07-15 DIAGNOSIS — Z98.1 S/P CERVICAL SPINAL FUSION: ICD-10-CM

## 2022-07-15 DIAGNOSIS — M54.6 PAIN IN THORACIC SPINE: ICD-10-CM

## 2022-07-15 DIAGNOSIS — Z98.1 S/P LUMBAR FUSION: ICD-10-CM

## 2022-07-15 DIAGNOSIS — N39.46 MIXED INCONTINENCE: ICD-10-CM

## 2022-07-15 DIAGNOSIS — M43.16 SPONDYLOLISTHESIS OF LUMBAR REGION: Primary | ICD-10-CM

## 2022-07-15 DIAGNOSIS — M54.50 LUMBAR SPINE PAIN: ICD-10-CM

## 2022-07-15 DIAGNOSIS — M54.16 LUMBAR RADICULOPATHY: ICD-10-CM

## 2022-07-15 DIAGNOSIS — M47.812 ARTHROPATHY OF CERVICAL FACET JOINT: ICD-10-CM

## 2022-07-15 DIAGNOSIS — M79.18 MYOFASCIAL PAIN: ICD-10-CM
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
## 2022-07-15 DIAGNOSIS — R29.898 WEAKNESS OF RIGHT LOWER EXTREMITY: ICD-10-CM

## 2022-07-15 PROCEDURE — 97110 THERAPEUTIC EXERCISES: CPT | Mod: GP | Performed by: PHYSICAL THERAPIST

## 2022-07-17 ENCOUNTER — OFFICE VISIT (OUTPATIENT)
Dept: URGENT CARE | Facility: URGENT CARE | Age: 57
End: 2022-07-17
Payer: COMMERCIAL

## 2022-07-17 VITALS
DIASTOLIC BLOOD PRESSURE: 68 MMHG | OXYGEN SATURATION: 96 % | HEART RATE: 75 BPM | TEMPERATURE: 98.7 F | SYSTOLIC BLOOD PRESSURE: 102 MMHG

## 2022-07-17 DIAGNOSIS — H60.392 INFECTIVE OTITIS EXTERNA, LEFT: Primary | ICD-10-CM

## 2022-07-17 PROCEDURE — 99213 OFFICE O/P EST LOW 20 MIN: CPT | Performed by: FAMILY MEDICINE

## 2022-07-17 RX ORDER — NEOMYCIN SULFATE, POLYMYXIN B SULFATE, HYDROCORTISONE 3.5; 10000; 1 MG/ML; [USP'U]/ML; MG/ML
3 SOLUTION/ DROPS AURICULAR (OTIC) 4 TIMES DAILY
Qty: 5 ML | Refills: 1 | Status: SHIPPED | OUTPATIENT
Start: 2022-07-17 | End: 2022-07-24

## 2022-07-17 ASSESSMENT — PAIN SCALES - GENERAL: PAINLEVEL: SEVERE PAIN (7)
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
## 2022-07-17 NOTE — PATIENT INSTRUCTIONS
Take Tylenol for the pain.      Follow up with a primary care provider if not better in 7-10 days.

## 2022-07-18 ENCOUNTER — HOSPITAL ENCOUNTER (OUTPATIENT)
Dept: PHYSICAL THERAPY | Facility: REHABILITATION | Age: 57
Discharge: HOME | End: 2022-07-18
Payer: COMMERCIAL

## 2022-07-18 DIAGNOSIS — M54.50 LUMBAR SPINE PAIN: ICD-10-CM

## 2022-07-18 DIAGNOSIS — M54.2 CERVICAL SPINE PAIN: ICD-10-CM

## 2022-07-18 DIAGNOSIS — M43.16 SPONDYLOLISTHESIS OF LUMBAR REGION: Primary | ICD-10-CM

## 2022-07-18 PROCEDURE — 97110 THERAPEUTIC EXERCISES: CPT | Mod: GP | Performed by: PHYSICAL THERAPIST

## 2022-07-18 NOTE — PROGRESS NOTES
Date 7/18/22 7/11/22 6/20/22 6/13/22 6/8/22   Exercise        Stretches: sitting hamstring, SKC, piriformis below 90, QL     Hold 30 seconds x 1-3 reps   Nu-step, seat 7 Workload 5  5 min Workload 5  5 min Workload 5  5 min Workload 5  5 min    Core engagement: TAr, buttocks and kegel   Review today today    bridge Hold 10 seconds X 6 Review-hold 10 seconds X 6 Hold 10 seconds X 6 Hold 10 seconds X 6 reps    Supine abdominal, 1 leg marching Two leg marching:  X 5 PROGRESSED to two leg marching (3B) X 6 reps X 20 X 15-20    Clamshells  X 20 Add orange (L2) tband  X 20 X 20 X 20    Hip add isometrics, squeezing pillow Hold 10 seconds X 6  Hold 10 seconds x 6                                               Neuro re-edu  Date 7/18/22 7/11/22 6/20/22   Exercise      Waist to floor with pivot (power position) with crate (and pivot) 10#  X5 pivoting each direction 8#  X 5 each direction  (on last couple reps pt felt strain in L low back) 5#  X 5 pivot each direction   Carry crate, pivot with mini-squat to put on lower table 8#  30' X 4 8#  30'X4 5#  30' X 4   Waist to overhead lift (with crate) 7#  X5  5#  X 5                                                             Assessment:  Pt returns and has not been able to contact the MD yet about the spinal stimulator.  She started some therapy to work on her bladder and feels she is making progress. Will continue to work on those exercises daily.  Susan also feels she is making progress with her overall core strength.  She is more mindful in how she bends or picks objects up from the floor after working on lifting and carrying the crates.  She feels she has a good number of exercises and feels it will take time to gain the strength she needs for core strength and stability.  She will have one more visit for her low back in a couple weeks and make sure she continues to be on the right track with her strength gains.  If she continues to do well, may discharge her at that time.  She will currently continue to see a therapist for her pelvic floor until she is at a point where she can manage that on her own.     Marta Ozuna, PT  7/18/22

## 2022-07-22 ENCOUNTER — HOSPITAL ENCOUNTER (OUTPATIENT)
Dept: PHYSICAL THERAPY | Facility: REHABILITATION | Age: 57
Discharge: HOME | End: 2022-07-22
Payer: COMMERCIAL

## 2022-07-22 DIAGNOSIS — Z98.1 S/P CERVICAL SPINAL FUSION: ICD-10-CM

## 2022-07-22 DIAGNOSIS — M43.16 SPONDYLOLISTHESIS OF LUMBAR REGION: Primary | ICD-10-CM

## 2022-07-22 DIAGNOSIS — M54.50 LUMBAR SPINE PAIN: ICD-10-CM

## 2022-07-22 DIAGNOSIS — M79.18 MYOFASCIAL PAIN: ICD-10-CM

## 2022-07-22 DIAGNOSIS — Z98.1 S/P LUMBAR FUSION: ICD-10-CM
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
## 2022-07-22 DIAGNOSIS — R29.898 WEAKNESS OF RIGHT LOWER EXTREMITY: ICD-10-CM

## 2022-07-22 DIAGNOSIS — M54.2 CERVICAL SPINE PAIN: ICD-10-CM

## 2022-07-22 DIAGNOSIS — M54.16 LUMBAR RADICULOPATHY: ICD-10-CM

## 2022-07-22 DIAGNOSIS — M54.6 PAIN IN THORACIC SPINE: ICD-10-CM

## 2022-07-22 PROCEDURE — 97110 THERAPEUTIC EXERCISES: CPT | Mod: GP | Performed by: PHYSICAL THERAPIST

## 2022-07-25 DIAGNOSIS — M43.16 SPONDYLOLISTHESIS OF LUMBAR REGION: Primary | ICD-10-CM

## 2022-07-25 DIAGNOSIS — Z98.1 S/P CERVICAL SPINAL FUSION: ICD-10-CM

## 2022-08-01 ENCOUNTER — HOSPITAL ENCOUNTER (OUTPATIENT)
Dept: PHYSICAL THERAPY | Facility: REHABILITATION | Age: 57
Discharge: HOME | End: 2022-08-01
Payer: COMMERCIAL

## 2022-08-01 DIAGNOSIS — M54.50 LUMBAR SPINE PAIN: ICD-10-CM

## 2022-08-01 DIAGNOSIS — M79.18 MYOFASCIAL PAIN: ICD-10-CM

## 2022-08-01 DIAGNOSIS — M54.16 LUMBAR RADICULOPATHY: ICD-10-CM

## 2022-08-01 DIAGNOSIS — M43.16 SPONDYLOLISTHESIS OF LUMBAR REGION: Primary | ICD-10-CM

## 2022-08-01 DIAGNOSIS — M54.6 PAIN IN THORACIC SPINE: ICD-10-CM
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
## 2022-08-01 DIAGNOSIS — M47.812 ARTHROPATHY OF CERVICAL FACET JOINT: ICD-10-CM

## 2022-08-01 DIAGNOSIS — M54.2 CERVICAL SPINE PAIN: ICD-10-CM

## 2022-08-01 DIAGNOSIS — Z98.1 S/P LUMBAR FUSION: ICD-10-CM

## 2022-08-01 DIAGNOSIS — Z98.1 S/P CERVICAL SPINAL FUSION: ICD-10-CM

## 2022-08-01 DIAGNOSIS — R29.898 WEAKNESS OF RIGHT LOWER EXTREMITY: ICD-10-CM

## 2022-08-01 PROCEDURE — 97110 THERAPEUTIC EXERCISES: CPT | Mod: GP | Performed by: PHYSICAL THERAPIST

## 2022-08-02 ENCOUNTER — HOSPITAL ENCOUNTER (OUTPATIENT)
Dept: RADIOLOGY | Facility: HOSPITAL | Age: 57
Discharge: HOME | End: 2022-08-02
Attending: SURGERY | Admitting: SURGERY
Payer: COMMERCIAL

## 2022-08-02 ENCOUNTER — OFFICE VISIT (OUTPATIENT)
Dept: NEUROSURGERY | Facility: CLINIC | Age: 57
End: 2022-08-02
Payer: COMMERCIAL

## 2022-08-02 VITALS
DIASTOLIC BLOOD PRESSURE: 61 MMHG | HEART RATE: 71 BPM | WEIGHT: 210 LBS | BODY MASS INDEX: 37.21 KG/M2 | SYSTOLIC BLOOD PRESSURE: 119 MMHG | HEIGHT: 63 IN | OXYGEN SATURATION: 93 %

## 2022-08-02 DIAGNOSIS — Z98.1 S/P CERVICAL SPINAL FUSION: Primary | ICD-10-CM

## 2022-08-02 DIAGNOSIS — M43.16 SPONDYLOLISTHESIS OF LUMBAR REGION: ICD-10-CM

## 2022-08-02 PROCEDURE — 99213 OFFICE O/P EST LOW 20 MIN: CPT | Performed by: PHYSICIAN ASSISTANT

## 2022-08-02 PROCEDURE — 72100 X-RAY EXAM L-S SPINE 2/3 VWS: CPT

## 2022-08-02 NOTE — PATIENT INSTRUCTIONS
overall doing okay she notes continued cervical pain and decreased ROM. Discussed with Dr. Ward and has been recommended to complete cervical CT for further evaluation will plan to have a follow up appointment upon completion of CT. In regards to her lumbar fusion surgery will plan follow up in 3 months with repeat lumbar xray. She has been advised to continue with at home exercises and follow up with Dr. Curtis.

## 2022-08-02 NOTE — LETTER
8/2/2022         RE: Susan Kwan  [address]  [facility] [place] [address]        Dear Colleague,    Thank you for referring your patient, Susan Kwan, to the [facility]. Please see a copy of my visit note below.    Neurosurgery Progress Note:   8/2/2022     A/P:   Postoperative right lumbar 4 - lumbar 5 transforaminal lumbar interbody fusion with revision lumbar 4 - lumbar 5 posterior instrumented fusion and posterolateral arthrodesis on April 18, 2022  Status post Anterior cervical discectomy and arthrodesis cervical 5-cervical 6 on April 5, 2021     Plan:   overall doing okay she notes continued cervical pain and decreased ROM. Discussed with Dr. Ward and has been recommended to complete cervical CT for further evaluation will plan to have a follow up appointment upon completion of CT. In regards to her lumbar fusion surgery will plan follow up in 3 months with repeat lumbar xray. She has been advised to continue with at home exercises and follow up with Dr. Curtis.     Ms. Kwan is a pleasant 56 year old right handed female who presents postoperative right lumbar 4 - lumbar 5 transforaminal lumbar interbody fusion with revision lumbar 4 - lumbar 5 posterior instrumented fusion and posterolateral arthrodesis on April 18, 2022 and anterior cervical discectomy and arthrodesis cervical 5-cervical 6 on April 5, 2021 by . Presently she states that she is doing okay. She notes continued posterior cervical pain and tightness that is worsened with activity. She denies any radicular upper extremity pain numbness tingling or weakness. She states that her low back is doing well she will note intermittent tightness to the right of her incision and into her buttocks on occasion but is not nearly as severe as it once was. She denies any radicular lower extremity pain, numbness, tingling or weakness. She had trigger point injections of her neck and noted  "2-3 days of relief of her neck pain and states that she could attempt Botox injections in the future.       HPI: from preop  Susan Kwan is a 56-year-old female who is status post cervical 5-cervical 6 anterior cervical decompression and fusion on April 5, 2021 and lumbar 4-lumbar 5 microdiscectomy and posterior instrumented fusion on 12/17/2020.  Patient continues to have right posterior leg pain down to her knee.  He also has chronic numbness and tingling in her anterior right shin to the top of her foot since prior to her to all her lumbar surgery.  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  We discussed a  right lumbar 4-5 transforaminal lumbar interbody fusion and revision posterior fusion, however, patient needs to have smoking cessation prior to surgery confirmed with a nicotine test.  She will work on smoking cessation and schedule surgery in the future. Risks and benefits were discussed in detail including but not limited to infection, hematoma, nerve damage including paralysis, post op radiculitis, durotomy, lack of a sold bone fusion, hardware malfunction, risks associated with the use of general anesthesia, blood clots in the lungs or legs. She agreed to proceed      Exam:  /61   Pulse 71   Ht 1.6 m (5' 3\")   Wt 95.3 kg (210 lb)   LMP 03/09/2010   SpO2 93%   BMI 37.20 kg/m      General: alert and oriented x3     Strength is 5/5 throughout both upper and lower extremities throughout.    Sensation is intact throughout both upper and lower extremities    Gait is smooth and coordinated    Incisions well healed without erythema, induration, or drainage      Imaging:  Xray reviewed personally stable lumbar alignment with good hardware position cervical xray with ACDF changes of C5-C6 with evidence of interbody ankylosis. The surgical hardware appear intact without " evidence for fracture or loosening        Margo Klein PA-C  [facility]surgery  O: [phone]        Again, thank you for allowing me to participate in the care of your patient.        Sincerely,        Margo Klein PA-C

## 2022-08-02 NOTE — PROGRESS NOTES
Neurosurgery Progress Note:   8/2/2022     A/P:   Postoperative right lumbar 4 - lumbar 5 transforaminal lumbar interbody fusion with revision lumbar 4 - lumbar 5 posterior instrumented fusion and posterolateral arthrodesis on April 18, 2022  Status post Anterior cervical discectomy and arthrodesis cervical 5-cervical 6 on April 5, 2021     Plan:   overall doing okay she notes continued cervical pain and decreased ROM. Discussed with Dr. Ward and has been recommended to complete cervical CT for further evaluation will plan to have a follow up appointment upon completion of CT. In regards to her lumbar fusion surgery will plan follow up in 3 months with repeat lumbar xray. She has been advised to continue with at home exercises and follow up with Dr. Curtis.     Ms. Kwan is a pleasant 56 year old right handed female who presents postoperative right lumbar 4 - lumbar 5 transforaminal lumbar interbody fusion with revision lumbar 4 - lumbar 5 posterior instrumented fusion and posterolateral arthrodesis on April 18, 2022 and anterior cervical discectomy and arthrodesis cervical 5-cervical 6 on April 5, 2021 by . Presently she states that she is doing okay. She notes continued posterior cervical pain and tightness that is worsened with activity. She denies any radicular upper extremity pain numbness tingling or weakness. She states that her low back is doing well she will note intermittent tightness to the right of her incision and into her buttocks on occasion but is not nearly as severe as it once was. She denies any radicular lower extremity pain, numbness, tingling or weakness. She had trigger point injections of her neck and noted 2-3 days of relief of her neck pain and states that she could attempt Botox injections in the future.       HPI: from preop  Susan JESUS MANUEL Kwan is a 56-year-old female who is status post cervical 5-cervical 6 anterior cervical decompression and fusion on April 5, 2021 and  "lumbar 4-lumbar 5 microdiscectomy and posterior instrumented fusion on 12/17/2020.  Patient continues to have right posterior leg pain down to her knee.  He also has chronic numbness and tingling in her anterior right shin to the top of her foot since prior to her to all her lumbar surgery.  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  We discussed a  right lumbar 4-5 transforaminal lumbar interbody fusion and revision posterior fusion, however, patient needs to have smoking cessation prior to surgery confirmed with a nicotine test.  She will work on smoking cessation and schedule surgery in the future. Risks and benefits were discussed in detail including but not limited to infection, hematoma, nerve damage including paralysis, post op radiculitis, durotomy, lack of a sold bone fusion, hardware malfunction, risks associated with the use of general anesthesia, blood clots in the lungs or legs. She agreed to proceed      Exam:  /61   Pulse 71   Ht 1.6 m (5' 3\")   Wt 95.3 kg (210 lb)   LMP 03/09/2010   SpO2 93%   BMI 37.20 kg/m      General: alert and oriented x3     Strength is 5/5 throughout both upper and lower extremities throughout.    Sensation is intact throughout both upper and lower extremities    Gait is smooth and coordinated    Incisions well healed without erythema, induration, or drainage      Imaging:  Xray reviewed personally stable lumbar alignment with good hardware position cervical xray with ACDF changes of C5-C6 with evidence of interbody ankylosis. The surgical hardware appear intact without evidence for fracture or loosening        Margo Klein PA-C  [facility] Neurosurgery  O: [phone]    "

## 2022-08-03 ENCOUNTER — APPOINTMENT (OUTPATIENT)
Dept: CT IMAGING | Facility: HOSPITAL | Age: 57
End: 2022-08-03
Attending: EMERGENCY MEDICINE
Payer: COMMERCIAL

## 2022-08-03 ENCOUNTER — HOSPITAL ENCOUNTER (EMERGENCY)
Facility: HOSPITAL | Age: 57
Discharge: HOME | End: 2022-08-03
Attending: EMERGENCY MEDICINE | Admitting: EMERGENCY MEDICINE
Payer: COMMERCIAL

## 2022-08-03 ENCOUNTER — OFFICE VISIT (OUTPATIENT)
Dept: FAMILY MEDICINE | Facility: CLINIC | Age: 57
End: 2022-08-03

## 2022-08-03 VITALS
BODY MASS INDEX: 35.32 KG/M2 | HEART RATE: 111 BPM | RESPIRATION RATE: 26 BRPM | OXYGEN SATURATION: 92 % | SYSTOLIC BLOOD PRESSURE: 130 MMHG | TEMPERATURE: 98 F | DIASTOLIC BLOOD PRESSURE: 68 MMHG | WEIGHT: 199.4 LBS

## 2022-08-03 VITALS
HEART RATE: 77 BPM | SYSTOLIC BLOOD PRESSURE: 113 MMHG | DIASTOLIC BLOOD PRESSURE: 67 MMHG | OXYGEN SATURATION: 94 % | RESPIRATION RATE: 43 BRPM

## 2022-08-03 DIAGNOSIS — R07.9 CHEST PAIN, UNSPECIFIED TYPE: ICD-10-CM

## 2022-08-03 DIAGNOSIS — R07.9 CHEST PAIN, UNSPECIFIED TYPE: Primary | ICD-10-CM

## 2022-08-03 LAB
ALBUMIN SERPL-MCNC: 5.1 G/DL (ref 3.5–5)
ALP SERPL-CCNC: 96 U/L (ref 45–120)
ALT SERPL W P-5'-P-CCNC: 36 U/L (ref 0–45)
ANION GAP SERPL CALCULATED.3IONS-SCNC: 14 MMOL/L (ref 5–18)
APTT PPP: 28 SECONDS (ref 22–38)
AST SERPL W P-5'-P-CCNC: 35 U/L (ref 0–40)
BASOPHILS # BLD MANUAL: 0 10E3/UL (ref 0–0.2)
BASOPHILS NFR BLD MANUAL: 0 %
BILIRUB DIRECT SERPL-MCNC: 0.1 MG/DL
BILIRUB SERPL-MCNC: 0.4 MG/DL (ref 0–1)
BUN SERPL-MCNC: 17 MG/DL (ref 8–22)
CALCIUM SERPL-MCNC: 10 MG/DL (ref 8.5–10.5)
CHLORIDE BLD-SCNC: 103 MMOL/L (ref 98–107)
CO2 SERPL-SCNC: 24 MMOL/L (ref 22–31)
CREAT SERPL-MCNC: 1.22 MG/DL (ref 0.6–1.1)
EOSINOPHIL # BLD MANUAL: 0 10E3/UL (ref 0–0.7)
EOSINOPHIL NFR BLD MANUAL: 0 %
ERYTHROCYTE [DISTWIDTH] IN BLOOD BY AUTOMATED COUNT: 13.2 % (ref 10–15)
GFR SERPL CREATININE-BSD FRML MDRD: 52 ML/MIN/1.73M2
GLUCOSE BLD-MCNC: 111 MG/DL (ref 70–125)
HCT VFR BLD AUTO: 45.4 % (ref 35–47)
HGB BLD-MCNC: 15.1 G/DL (ref 11.7–15.7)
HOLD SPECIMEN: NORMAL
HOLD SPECIMEN: NORMAL
INR PPP: 0.94 (ref 0.85–1.15)
LIPASE SERPL-CCNC: 24 U/L (ref 0–52)
LYMPHOCYTES # BLD MANUAL: 6.1 10E3/UL (ref 0.8–5.3)
LYMPHOCYTES NFR BLD MANUAL: 41 %
MCH RBC QN AUTO: 30.1 PG (ref 26.5–33)
MCHC RBC AUTO-ENTMCNC: 33.3 G/DL (ref 31.5–36.5)
MCV RBC AUTO: 90 FL (ref 78–100)
MONOCYTES # BLD MANUAL: 0.6 10E3/UL (ref 0–1.3)
MONOCYTES NFR BLD MANUAL: 4 %
NEUTROPHILS # BLD MANUAL: 8.1 10E3/UL (ref 1.6–8.3)
NEUTROPHILS NFR BLD MANUAL: 55 %
PLAT MORPH BLD: ABNORMAL
PLATELET # BLD AUTO: 330 10E3/UL (ref 150–450)
POTASSIUM BLD-SCNC: 4.3 MMOL/L (ref 3.5–5)
PROT SERPL-MCNC: 8.9 G/DL (ref 6–8)
RBC # BLD AUTO: 5.02 10E6/UL (ref 3.8–5.2)
RBC MORPH BLD: ABNORMAL
SODIUM SERPL-SCNC: 141 MMOL/L (ref 136–145)
TROPONIN I SERPL-MCNC: <0.01 NG/ML (ref 0–0.29)
WBC # BLD AUTO: 14.8 10E3/UL (ref 4–11)

## 2022-08-03 PROCEDURE — 74174 CTA ABD&PLVS W/CONTRAST: CPT

## 2022-08-03 PROCEDURE — 96375 TX/PRO/DX INJ NEW DRUG ADDON: CPT

## 2022-08-03 PROCEDURE — 85027 COMPLETE CBC AUTOMATED: CPT | Performed by: EMERGENCY MEDICINE

## 2022-08-03 PROCEDURE — 250N000013 HC RX MED GY IP 250 OP 250 PS 637: Performed by: EMERGENCY MEDICINE

## 2022-08-03 PROCEDURE — 85007 BL SMEAR W/DIFF WBC COUNT: CPT | Performed by: EMERGENCY MEDICINE

## 2022-08-03 PROCEDURE — 83690 ASSAY OF LIPASE: CPT | Performed by: EMERGENCY MEDICINE

## 2022-08-03 PROCEDURE — 84484 ASSAY OF TROPONIN QUANT: CPT | Performed by: EMERGENCY MEDICINE

## 2022-08-03 PROCEDURE — 85730 THROMBOPLASTIN TIME PARTIAL: CPT | Performed by: EMERGENCY MEDICINE

## 2022-08-03 PROCEDURE — 250N000011 HC RX IP 250 OP 636: Performed by: EMERGENCY MEDICINE

## 2022-08-03 PROCEDURE — 96374 THER/PROPH/DIAG INJ IV PUSH: CPT

## 2022-08-03 PROCEDURE — 99215 OFFICE O/P EST HI 40 MIN: CPT | Performed by: FAMILY MEDICINE

## 2022-08-03 PROCEDURE — 99285 EMERGENCY DEPT VISIT HI MDM: CPT | Mod: 25

## 2022-08-03 PROCEDURE — 85610 PROTHROMBIN TIME: CPT | Performed by: EMERGENCY MEDICINE

## 2022-08-03 PROCEDURE — 93005 ELECTROCARDIOGRAM TRACING: CPT | Performed by: EMERGENCY MEDICINE

## 2022-08-03 PROCEDURE — 82248 BILIRUBIN DIRECT: CPT | Performed by: EMERGENCY MEDICINE

## 2022-08-03 PROCEDURE — 36415 COLL VENOUS BLD VENIPUNCTURE: CPT | Performed by: EMERGENCY MEDICINE

## 2022-08-03 PROCEDURE — 80053 COMPREHEN METABOLIC PANEL: CPT | Performed by: EMERGENCY MEDICINE

## 2022-08-03 RX ORDER — IOPAMIDOL 755 MG/ML
75 INJECTION, SOLUTION INTRAVASCULAR ONCE
Status: COMPLETED | OUTPATIENT
Start: 2022-08-03 | End: 2022-08-03

## 2022-08-03 RX ORDER — DROPERIDOL 2.5 MG/ML
0.62 INJECTION, SOLUTION INTRAMUSCULAR; INTRAVENOUS ONCE
Status: COMPLETED | OUTPATIENT
Start: 2022-08-03 | End: 2022-08-03

## 2022-08-03 RX ORDER — NITROGLYCERIN 0.4 MG/1
0.4 TABLET SUBLINGUAL EVERY 5 MIN PRN
Status: DISCONTINUED | OUTPATIENT
Start: 2022-08-03 | End: 2022-08-03 | Stop reason: HOSPADM

## 2022-08-03 RX ORDER — MORPHINE SULFATE 4 MG/ML
4 INJECTION, SOLUTION INTRAMUSCULAR; INTRAVENOUS ONCE
Status: COMPLETED | OUTPATIENT
Start: 2022-08-03 | End: 2022-08-03

## 2022-08-03 RX ADMIN — MORPHINE SULFATE 4 MG: 4 INJECTION INTRAVENOUS at 14:45

## 2022-08-03 RX ADMIN — NITROGLYCERIN 0.4 MG: 0.4 TABLET, ORALLY DISINTEGRATING SUBLINGUAL at 15:27

## 2022-08-03 RX ADMIN — IOPAMIDOL 75 ML: 755 INJECTION, SOLUTION INTRAVENOUS at 15:00

## 2022-08-03 RX ADMIN — NITROGLYCERIN 0.4 MG: 0.4 TABLET, ORALLY DISINTEGRATING SUBLINGUAL at 15:12

## 2022-08-03 RX ADMIN — DROPERIDOL 0.62 MG: 2.5 INJECTION, SOLUTION INTRAMUSCULAR; INTRAVENOUS at 17:04

## 2022-08-03 ASSESSMENT — ENCOUNTER SYMPTOMS
SHORTNESS OF BREATH: 0
DIFFICULTY URINATING: 0
COLOR CHANGE: 0
COUGH: 0
HEMATURIA: 0
UNEXPECTED WEIGHT CHANGE: 0
FEVER: 0
CHILLS: 0
ABDOMINAL PAIN: 0
ABDOMINAL PAIN: 0
HEARTBURN: 0
EYE REDNESS: 0
COUGH: 0
VOMITING: 1
WHEEZING: 0
PALPITATIONS: 0
DYSURIA: 0
CONFUSION: 0
FEVER: 0
DIARRHEA: 1
NECK STIFFNESS: 0
SHORTNESS OF BREATH: 0
ARTHRALGIAS: 0
RHINORRHEA: 0
HEADACHES: 0

## 2022-08-03 NOTE — ED NOTES
The pt is much improved following IV medication administration. She reports minor discomfort, but appears in no distress at this time.

## 2022-08-03 NOTE — DISCHARGE INSTRUCTIONS
As we discussed, fortunately your work-up in the ER has been negative.  No signs of a heart attack, torn blood vessel, blood clot in your lungs, pancreas problem.  You feel much better after the medication we gave, and because we still do not know exactly what is causing your symptoms I am going to have you see one of our cardiologists to see if they do need to do further tests on your heart.  They should be calling you tomorrow to schedule this appointment.

## 2022-08-03 NOTE — ED NOTES
Chest pain coming from across the street for chest pain rule out     Amarjit Payan MD  08/03/22 3124

## 2022-08-03 NOTE — ED TRIAGE NOTES
Presents to ED via EMS for chest pain. 10/10. Pt reports she wasn't doing anything when this started.  On-going for 4 days intermittently but started getting worse this morning. Is diaphoretic upon arrival. Sinus tach in EMS.  324mg aspirin given by EMS.
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
## 2022-08-03 NOTE — PROGRESS NOTES
Assessment & Plan     Susan was seen today for extremity pain.    Diagnoses and all orders for this visit:    Chest pain, unspecified type.  The patient presents with 10/10 pain involving her left lateral chest and ribs, which has been constant since this morning.  Rule out cardiac etiology, kidney stone, and other severe/emergent etiology.    -Case was discussed with [facility] ER provider at 14:10, who agreed to accept the patient in transfer.  -Patient was transferred based on the severity of her pain, as well as need for a further work-up.  -Case was discussed with ambulance personnel prior to patient transfer.    Patient Instructions   Transfer to [facility] ER for further evaluation, as discussed.     Return for Follow up, as noted in Patient Instructions.    Angelia Ken MD  [facility]   Susan is a 56 year old female who presents to clinic today for the following health issues:  Chief Complaint   Patient presents with     Extremity Pain     X 5 days. Vomited yesterday. Constant pain. Pt states that hurt on side of chest.     HPI    The patient presents with pain involving her left chest/ribs (beneath her left breast), starting 4 days ago.  Pain was intermittent the initial 2 days, but it has been constant the past 24 to 48 hours.  Pain has been 10/10 pain since awakening this morning.  No cough, wheezing, or breathing concerns reported.  The patient had a single episode of vomiting yesterday, but she denies fever or abdominal pain.  No trauma/injury, but the patient states she does stretching exercises with a towel/band.    Exposures: No COVID-19 exposures reported.    Treatments Tried:   -The patient took Trazodone for pain last night, which helped her to sleep.    -Patient reports taking Baclofen for neck and back pain, post previous surgeries.    Review of Systems   Constitutional: Negative for chills, fever and unexpected weight change.   Respiratory:  Negative for cough, shortness of breath and wheezing.    Cardiovascular: Negative for chest pain, palpitations and peripheral edema.   Gastrointestinal: Positive for diarrhea (Chronic (6-7 times/day), post cholecystectomy) and vomiting (Once last night). Negative for abdominal pain and heartburn.   Genitourinary: Negative for dysuria and hematuria.         Objective    /68 (BP Location: Right arm, Patient Position: Sitting, Cuff Size: Adult Large)   Pulse 111   Temp 98  F (36.7  C) (Oral)   Resp 26   Wt 90.4 kg (199 lb 6.4 oz)   LMP 03/09/2010   SpO2 92%   BMI 35.32 kg/m    Physical Exam   GENERAL APPEARANCE:  Awake and alert.  Diaphoretic and writhing in pain.  Kneeling on the floor and pacing at intervals.  NECK:  Spontaneous full range of motion.    HEART:  Normal S1, S2.  Regular rate and rhythm.  No murmurs, rubs, or gallops.  LUNGS:  No respiratory distress.  Good air entry throughout the lung fields.  No wheezes, rales, or rhonchi.  ABDOMEN:  Unable to complete exam, due to the severity of the patient's pain.   BACK:  No CVA tenderness.  EXTREMITIES:  Moves 4 extremities.     NEUROLOGIC:  Gait within normal limits.  No facial droop or acute neurologic deficits.  SKIN:  No rash, but diaphoretic.    EKG: An EKG was not able to be obtained, based on the severity of the patient's pain.

## 2022-08-04 LAB
ATRIAL RATE - MUSE: 110 BPM
DIASTOLIC BLOOD PRESSURE - MUSE: NORMAL MMHG
INTERPRETATION ECG - MUSE: NORMAL
P AXIS - MUSE: 53 DEGREES
PR INTERVAL - MUSE: 168 MS
QRS DURATION - MUSE: 78 MS
QT - MUSE: 308 MS
QTC - MUSE: 416 MS
R AXIS - MUSE: 40 DEGREES
SYSTOLIC BLOOD PRESSURE - MUSE: NORMAL MMHG
T AXIS - MUSE: 44 DEGREES
VENTRICULAR RATE- MUSE: 110 BPM

## 2022-08-05 ENCOUNTER — OFFICE VISIT (OUTPATIENT)
Dept: CARDIOLOGY | Facility: CLINIC | Age: 57
End: 2022-08-05
Attending: EMERGENCY MEDICINE
Payer: COMMERCIAL

## 2022-08-05 VITALS
OXYGEN SATURATION: 96 % | DIASTOLIC BLOOD PRESSURE: 63 MMHG | SYSTOLIC BLOOD PRESSURE: 103 MMHG | HEART RATE: 77 BPM | WEIGHT: 200 LBS | BODY MASS INDEX: 35.43 KG/M2 | RESPIRATION RATE: 18 BRPM

## 2022-08-05 DIAGNOSIS — E89.0 POSTABLATIVE HYPOTHYROIDISM: ICD-10-CM

## 2022-08-05 DIAGNOSIS — F17.200 TOBACCO USE DISORDER: Primary | ICD-10-CM

## 2022-08-05 DIAGNOSIS — Z72.0 TOBACCO ABUSE DISORDER: ICD-10-CM

## 2022-08-05 DIAGNOSIS — R07.9 CHEST PAIN, UNSPECIFIED TYPE: Primary | ICD-10-CM

## 2022-08-05 DIAGNOSIS — E78.00 PURE HYPERCHOLESTEROLEMIA: ICD-10-CM

## 2022-08-05 DIAGNOSIS — E66.09 CLASS 2 OBESITY DUE TO EXCESS CALORIES WITHOUT SERIOUS COMORBIDITY WITH BODY MASS INDEX (BMI) OF 35.0 TO 35.9 IN ADULT: ICD-10-CM

## 2022-08-05 DIAGNOSIS — E66.812 CLASS 2 OBESITY DUE TO EXCESS CALORIES WITHOUT SERIOUS COMORBIDITY WITH BODY MASS INDEX (BMI) OF 35.0 TO 35.9 IN ADULT: ICD-10-CM

## 2022-08-05 PROCEDURE — 99204 OFFICE O/P NEW MOD 45 MIN: CPT | Performed by: INTERNAL MEDICINE

## 2022-08-05 RX ORDER — SIMVASTATIN 20 MG
20 TABLET ORAL AT BEDTIME
COMMUNITY
End: 2022-08-08
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
## 2022-08-05 NOTE — PATIENT INSTRUCTIONS
It was a pleasure to meet with you today.      Below is a summary of your visit.   I agree that your chest is likely not from your heart.  We won't pursue additional testing at this time.  If you develop exertional discomfort we will need to perform additional heart testing.  You should quit smoking.   Follow up as needed.     Please do not hesitate to call the [facility] Heart Care clinic with any questions or concerns at [phone].     Sincerely,
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
## 2022-08-07 NOTE — TELEPHONE ENCOUNTER
"Routing refill request to provider for review/approval because:  Medication is reported/historical    Last Written Prescription Date:    Last Fill Quantity: ,  # refills:    Last office visit provider:  4/6/22     Requested Prescriptions   Pending Prescriptions Disp Refills     simvastatin (ZOCOR) 20 MG tablet [Pharmacy Med Name: SIMVASTATIN 20MG TABLETS] 90 tablet      Sig: TAKE 1 TABLET(20 MG) BY MOUTH AT BEDTIME       Statins Protocol Passed - 8/5/2022 10:32 PM        Passed - LDL on file in past 12 months     Recent Labs   Lab Test 01/03/22  0831   LDL 77             Passed - No abnormal creatine kinase in past 12 months     No lab results found.             Passed - Recent (12 mo) or future (30 days) visit within the authorizing provider's specialty     Patient has had an office visit with the authorizing provider or a provider within the authorizing providers department within the previous 12 mos or has a future within next 30 days. See \"Patient Info\" tab in inbasket, or \"Choose Columns\" in Meds & Orders section of the refill encounter.              Passed - Medication is active on med list        Passed - Patient is age 18 or older        Passed - No active pregnancy on record        Passed - No positive pregnancy test in past 12 months             Roshni Moreau, RN 08/06/22 7:38 PM  "

## 2022-08-08 RX ORDER — SIMVASTATIN 20 MG
TABLET ORAL
Qty: 90 TABLET | Refills: 0 | Status: SHIPPED | OUTPATIENT
Start: 2022-08-08 | End: 2022-09-02

## 2022-08-10 DIAGNOSIS — E03.9 HYPOTHYROIDISM, UNSPECIFIED TYPE: ICD-10-CM

## 2022-08-10 RX ORDER — LEVOTHYROXINE SODIUM 175 UG/1
175 TABLET ORAL DAILY
Qty: 90 TABLET | Refills: 1 | Status: SHIPPED | OUTPATIENT
Start: 2022-08-10 | End: 2023-02-20

## 2022-08-10 NOTE — TELEPHONE ENCOUNTER
Medication Question or Refill    Patient called to request a refill of this medication. She is completely out. Please send refills to pharmacy.    What medication are you calling about (include dose and sig)?: levothyroxine (SYNTHROID/LEVOTHROID) 175 MCG tablet    Controlled Substance Agreement on file:   CSA -- Patient Level:    CSA: None found at the patient level.       Who prescribed the medication?: Dr. Jimenez    Do you need a refill? Yes:     When did you use the medication last?     Patient offered an appointment? No    Do you have any questions or concerns?  No    Preferred Pharmacy:     [id number] - [place], [place] - [address]  [address] [facility] [address]  Phone: [phone] Fax: [phone]        [facility] we [facility] this [facility] you [facility] or would you prefer to receive a phone call?:   No preference   Okay to leave a detailed message?: Yes at Home number on file [phone] (home)    **ROUTING TO DOD**

## 2022-08-12 ENCOUNTER — MEDICAL CORRESPONDENCE (OUTPATIENT)
Dept: NEUROSURGERY | Facility: CLINIC | Age: 57
End: 2022-08-12

## 2022-08-12 DIAGNOSIS — G47.00 INSOMNIA, UNSPECIFIED TYPE: ICD-10-CM

## 2022-08-14 NOTE — TELEPHONE ENCOUNTER
"Drug interaction warning    Last Written Prescription Date:  1/14/22  Last Fill Quantity: 180,  # refills: 1   Last office visit provider:  4/6/22     Requested Prescriptions   Pending Prescriptions Disp Refills     traZODone (DESYREL) 50 MG tablet [Pharmacy Med Name: TRAZODONE 50MG TABLETS] 180 tablet 1     Sig: TAKE 1 TO 2 TABLETS(50  MG) BY MOUTH AT BEDTIME       Serotonin Modulators Passed - 8/12/2022  3:41 PM        Passed - Recent (12 mo) or future (30 days) visit within the authorizing provider's specialty     Patient has had an office visit with the authorizing provider or a provider within the authorizing providers department within the previous 12 mos or has a future within next 30 days. See \"Patient Info\" tab in inbasket, or \"Choose Columns\" in Meds & Orders section of the refill encounter.              Passed - Medication is active on med list        Passed - Patient is age 18 or older        Passed - No active pregnancy on record        Passed - No positive pregnancy test in past 12 months             Roshni Moreau RN 08/13/22 9:01 PM  "

## 2022-08-15 RX ORDER — TRAZODONE HYDROCHLORIDE 50 MG/1
TABLET, FILM COATED ORAL
Qty: 180 TABLET | Refills: 1 | Status: SHIPPED | OUTPATIENT
Start: 2022-08-15 | End: 2023-02-20

## 2022-08-15 NOTE — TELEPHONE ENCOUNTER
No future appointments.   Health Maintenance Due   Topic Date Due     NICOTINE/TOBACCO CESSATION COUNSELING Q 1 YR  Never done     PREVENTIVE CARE VISIT  Never done     ASTHMA ACTION PLAN  Never done     DEPRESSION ACTION PLAN  Never done     MAMMO SCREENING  12/08/2021     COVID-19 Vaccine (4 - Booster for Pfizer series) 01/29/2022     ASTHMA CONTROL TEST  06/20/2022     PHQ-9  08/23/2022     BP Readings from Last 3 Encounters:   08/05/22 103/63   08/03/22 113/67   08/03/22 130/68     Susan was seen today for medication refill.    Diagnoses and all orders for this visit:    Insomnia, unspecified type  -     traZODone (DESYREL) 50 MG tablet; TAKE 1 TO 2 TABLETS(50  MG) BY MOUTH AT BEDTIME

## 2022-08-17 ENCOUNTER — HOSPITAL ENCOUNTER (OUTPATIENT)
Dept: RADIOLOGY | Facility: CLINIC | Age: 57
Discharge: HOME | End: 2022-08-17
Attending: SURGERY | Admitting: SURGERY
Payer: COMMERCIAL

## 2022-08-17 DIAGNOSIS — Z98.1 S/P CERVICAL SPINAL FUSION: ICD-10-CM

## 2022-08-17 PROCEDURE — 72040 X-RAY EXAM NECK SPINE 2-3 VW: CPT

## 2022-08-18 ENCOUNTER — TELEPHONE (OUTPATIENT)
Dept: PHYSICAL MEDICINE AND REHAB | Facility: CLINIC | Age: 57
End: 2022-08-18

## 2022-08-18 NOTE — TELEPHONE ENCOUNTER
[facility] Call Center    Phone Message    May a detailed message be left on voicemail: yes     Reason for Call Cristela asking about a fax She sent on June27 ( Medical Source Statement ) She will Refax it Today. She also would like a call back   Action Taken: Other: [facility]       Travel Screening: Not Applicable

## 2022-08-18 NOTE — TELEPHONE ENCOUNTER
Spoke to patient.  She does not know anything regarding this. I believe it could have been documented in the wrong chart.

## 2022-08-28 ENCOUNTER — HEALTH MAINTENANCE LETTER (OUTPATIENT)
Age: 57
End: 2022-08-28

## 2022-08-30 ENCOUNTER — MYC MEDICAL ADVICE (OUTPATIENT)
Dept: PHYSICAL MEDICINE AND REHAB | Facility: CLINIC | Age: 57
End: 2022-08-30

## 2022-08-30 DIAGNOSIS — E78.00 PURE HYPERCHOLESTEROLEMIA: ICD-10-CM

## 2022-08-30 DIAGNOSIS — M79.18 MYOFASCIAL PAIN: ICD-10-CM

## 2022-08-30 DIAGNOSIS — F17.200 TOBACCO USE DISORDER: ICD-10-CM

## 2022-08-30 RX ORDER — BACLOFEN 10 MG/1
10 TABLET ORAL 3 TIMES DAILY PRN
Qty: 90 TABLET | Refills: 1 | Status: SHIPPED | OUTPATIENT
Start: 2022-08-30 | End: 2022-10-21

## 2022-08-30 RX ORDER — SIMVASTATIN 20 MG
20 TABLET ORAL AT BEDTIME
Qty: 90 TABLET | Refills: 0 | OUTPATIENT
Start: 2022-08-30

## 2022-08-31 ENCOUNTER — MYC MEDICAL ADVICE (OUTPATIENT)
Dept: FAMILY MEDICINE | Facility: CLINIC | Age: 57
End: 2022-08-31

## 2022-08-31 NOTE — TELEPHONE ENCOUNTER
RN called patient regarding her mychart message.     Patient is looking for refill for her Gabapentin. However, the medication is on historical.     Patient stated that she is taking 3 caps of 300 mg gabapentin in the morning, 3 caps of 300 mg gabapentin in the afternoon, and 4 caps of 300 mg gabapentin at night.    Patient's last office visit with  was on 4/6/2022. RN advised patient to make an appointment with a provider to do the med check.    RN assisted patient to make an appointment.  Appointments in Next   Sep 02, 2022  9:40 AM  (Arrive by 9:20 AM)  Provider Visit with Kenna Elkins MD  [facility] ([facility] ) [phone]        Patient verbalized understanding and agreed with the plan.        Lizette Ruiz RN  [facility]

## 2022-09-02 ENCOUNTER — OFFICE VISIT (OUTPATIENT)
Dept: FAMILY MEDICINE | Facility: CLINIC | Age: 57
End: 2022-09-02
Payer: COMMERCIAL

## 2022-09-02 VITALS
RESPIRATION RATE: 20 BRPM | DIASTOLIC BLOOD PRESSURE: 66 MMHG | HEART RATE: 77 BPM | WEIGHT: 199 LBS | BODY MASS INDEX: 35.25 KG/M2 | SYSTOLIC BLOOD PRESSURE: 93 MMHG

## 2022-09-02 DIAGNOSIS — G43.809 OTHER MIGRAINE WITHOUT STATUS MIGRAINOSUS, NOT INTRACTABLE: ICD-10-CM

## 2022-09-02 DIAGNOSIS — F33.2 SEVERE EPISODE OF RECURRENT MAJOR DEPRESSIVE DISORDER, WITHOUT PSYCHOTIC FEATURES (H): ICD-10-CM

## 2022-09-02 DIAGNOSIS — L98.9 SKIN LESION: ICD-10-CM

## 2022-09-02 DIAGNOSIS — F17.200 TOBACCO USE DISORDER: ICD-10-CM

## 2022-09-02 DIAGNOSIS — E78.00 PURE HYPERCHOLESTEROLEMIA: ICD-10-CM

## 2022-09-02 DIAGNOSIS — J45.20 MILD INTERMITTENT ASTHMA WITHOUT COMPLICATION: Primary | ICD-10-CM

## 2022-09-02 PROCEDURE — 99214 OFFICE O/P EST MOD 30 MIN: CPT | Performed by: FAMILY MEDICINE

## 2022-09-02 RX ORDER — SIMVASTATIN 20 MG
20 TABLET ORAL AT BEDTIME
Qty: 90 TABLET | Refills: 1 | Status: SHIPPED | OUTPATIENT
Start: 2022-09-02 | End: 2023-05-27

## 2022-09-02 RX ORDER — SUMATRIPTAN 50 MG/1
TABLET, FILM COATED ORAL
Qty: 9 TABLET | Refills: 2 | Status: SHIPPED | OUTPATIENT
Start: 2022-09-02 | End: 2023-05-27

## 2022-09-02 RX ORDER — ALBUTEROL SULFATE 90 UG/1
AEROSOL, METERED RESPIRATORY (INHALATION)
Qty: 18 G | Refills: 1 | Status: SHIPPED | OUTPATIENT
Start: 2022-09-02 | End: 2023-05-27

## 2022-09-02 ASSESSMENT — ASTHMA QUESTIONNAIRES
QUESTION_1 LAST FOUR WEEKS HOW MUCH OF THE TIME DID YOUR ASTHMA KEEP YOU FROM GETTING AS MUCH DONE AT WORK, SCHOOL OR AT HOME: NONE OF THE TIME
ACT_TOTALSCORE: 24
QUESTION_2 LAST FOUR WEEKS HOW OFTEN HAVE YOU HAD SHORTNESS OF BREATH: NOT AT ALL
QUESTION_3 LAST FOUR WEEKS HOW OFTEN DID YOUR ASTHMA SYMPTOMS (WHEEZING, COUGHING, SHORTNESS OF BREATH, CHEST TIGHTNESS OR PAIN) WAKE YOU UP AT NIGHT OR EARLIER THAN USUAL IN THE MORNING: NOT AT ALL
QUESTION_5 LAST FOUR WEEKS HOW WOULD YOU RATE YOUR ASTHMA CONTROL: WELL CONTROLLED
ACT_TOTALSCORE: 24
QUESTION_4 LAST FOUR WEEKS HOW OFTEN HAVE YOU USED YOUR RESCUE INHALER OR NEBULIZER MEDICATION (SUCH AS ALBUTEROL): NOT AT ALL

## 2022-09-02 ASSESSMENT — PATIENT HEALTH QUESTIONNAIRE - PHQ9
10. IF YOU CHECKED OFF ANY PROBLEMS, HOW DIFFICULT HAVE THESE PROBLEMS MADE IT FOR YOU TO DO YOUR WORK, TAKE CARE OF THINGS AT HOME, OR GET ALONG WITH OTHER PEOPLE: NOT DIFFICULT AT ALL
SUM OF ALL RESPONSES TO PHQ QUESTIONS 1-9: 2
SUM OF ALL RESPONSES TO PHQ QUESTIONS 1-9: 2

## 2022-09-02 NOTE — PATIENT INSTRUCTIONS
[facility]  [address] [place] [address]  [address]  [place] [address]  Office: [phone]

## 2022-09-02 NOTE — PROGRESS NOTES
1. Mild intermittent asthma without complication  This is a 58 yo female with mild asthma - uses Albuterol inhaler as needed; ACT reflects good control.   ACT Total Scores 12/8/2020 12/20/2021 9/2/2022   ACT TOTAL SCORE (Goal Greater than or Equal to 20) 20 25 24   In the past 12 months, how many times did you visit the emergency room for your asthma without being admitted to the hospital? 0 0 0   In the past 12 months, how many times were you hospitalized overnight because of your asthma? 0 0 0       - albuterol (VENTOLIN HFA) 108 (90 Base) MCG/ACT inhaler; INHALE 1 TO 2 PUFFS BY MOUTH EVERY 4 HOURS AS NEEDED FOR WHEEZING  Dispense: 18 g; Refill: 1    2. Pure hypercholesterolemia  H/o elevated lipids - on statin therapy - renew Simvastatin - check labs   - simvastatin (ZOCOR) 20 MG tablet; Take 1 tablet (20 mg) by mouth At Bedtime  Dispense: 90 tablet; Refill: 1    3. Severe episode of recurrent major depressive disorder, without psychotic features (H)  H/o depression - overall controlled -   PHQ 7/7/2021 2/23/2022 9/2/2022   PHQ-9 Total Score 0 0 2   Q9: Thoughts of better off dead/self-harm past 2 weeks Not at all Not at all Not at all         4. Other migraine without status migrainosus, not intractable  Uses Sumatriptan as needed - stable.   - SUMAtriptan (IMITREX) 50 MG tablet; TAKE 1 TABLET BY MOUTH EVERY 2 HOURS AS NEEDED FOR MIGRAINE. MAY REPEAT IN 2 HOURS AS NEEDED  Dispense: 9 tablet; Refill: 2    5. Tobacco use disorder  Not ready to quit smoking     6. Skin lesion  Patient has skin lesions - especially on face - needs skin check - agrees - will refer to Dermatology  - Adult Dermatology Referral; Future      Subjective   Susan is a 56 year old accompanied by her self, presenting for the following health issues:  medication check      History of Present Illness       Reason for visit:  Medication check    She eats 0-1 servings of fruits and vegetables daily.She consumes 1 sweetened beverage(s) daily.She  exercises with enough effort to increase her heart rate 20 to 29 minutes per day.  She exercises with enough effort to increase her heart rate 3 or less days per week.   She is taking medications regularly.    Today's PHQ-9         PHQ-9 Total Score: 2    PHQ-9 Q9 Thoughts of better off dead/self-harm past 2 weeks :   Not at all    How difficult have these problems made it for you to do your work, take care of things at home, or get along with other people: Not difficult at all             Review of Systems   Constitutional: Negative.  Negative for chills, fever and unexpected weight change.   HENT: Negative for congestion and sore throat.    Eyes: Negative for visual disturbance.   Respiratory: Negative.  Negative for cough, shortness of breath and wheezing.    Gastrointestinal: Negative for abdominal pain, nausea and vomiting.   Endocrine: Negative for polydipsia and polyuria.   Breasts:  negative.    Genitourinary: Negative.    Musculoskeletal: Positive for arthralgias.   Skin: Negative.    Allergic/Immunologic: Negative.    Neurological: Negative.    Hematological: Does not bruise/bleed easily.   Psychiatric/Behavioral: Negative.    All other systems reviewed and are negative.           Objective    BP 93/66 (BP Location: Left arm, Patient Position: Sitting, Cuff Size: Adult Large)   Pulse 77   Resp 20   Wt 90.3 kg (199 lb)   LMP 03/09/2010   BMI 35.25 kg/m    Body mass index is 35.25 kg/m .  Physical Exam  Constitutional:       General: She is not in acute distress.     Appearance: She is well-developed.   HENT:      Right Ear: Tympanic membrane and external ear normal.      Left Ear: Tympanic membrane and external ear normal.      Nose: Nose normal.      Mouth/Throat:      Pharynx: No oropharyngeal exudate.   Eyes:      General:         Right eye: No discharge.         Left eye: No discharge.      Conjunctiva/sclera: Conjunctivae normal.      Pupils: Pupils are equal, round, and reactive to light.   Neck:       Thyroid: No thyromegaly.      Trachea: No tracheal deviation.   Cardiovascular:      Rate and Rhythm: Normal rate and regular rhythm.      Pulses: Normal pulses.      Heart sounds: Normal heart sounds, S1 normal and S2 normal. No murmur heard.    No friction rub. No S3 or S4 sounds.   Pulmonary:      Effort: Pulmonary effort is normal. No respiratory distress.      Breath sounds: Normal breath sounds. No wheezing or rales.   Abdominal:      General: Bowel sounds are normal.      Palpations: Abdomen is soft. There is no mass.      Tenderness: There is no abdominal tenderness.   Musculoskeletal:         General: Normal range of motion.      Cervical back: Neck supple.   Lymphadenopathy:      Cervical: No cervical adenopathy.   Skin:     General: Skin is warm and dry.      Findings: No rash.   Neurological:      General: No focal deficit present.      Mental Status: She is alert and oriented to person, place, and time.      Motor: No abnormal muscle tone.      Deep Tendon Reflexes: Reflexes are normal and symmetric.   Psychiatric:         Mood and Affect: Mood normal.         Thought Content: Thought content normal.            No results found for any visits on 09/02/22.                [unfilled]  @Nemours [email or web address]@

## 2022-09-04 ASSESSMENT — ENCOUNTER SYMPTOMS
BRUISES/BLEEDS EASILY: 0
VOMITING: 0
SORE THROAT: 0
NAUSEA: 0
UNEXPECTED WEIGHT CHANGE: 0
NEUROLOGICAL NEGATIVE: 1
FEVER: 0
COUGH: 0
SHORTNESS OF BREATH: 0
ARTHRALGIAS: 1
WHEEZING: 0
POLYDIPSIA: 0
ALLERGIC/IMMUNOLOGIC NEGATIVE: 1
PSYCHIATRIC NEGATIVE: 1
RESPIRATORY NEGATIVE: 1
CHILLS: 0
CONSTITUTIONAL NEGATIVE: 1
ABDOMINAL PAIN: 0

## 2022-09-23 NOTE — ADDENDUM NOTE
Encounter addended by: Marta Ozuna, PT on: 9/23/2022 12:15 PM   Actions taken: Episode resolved, Clinical Note Signed

## 2022-09-23 NOTE — PROGRESS NOTES
[facility]    Outpatient Physical Therapy Discharge Note  Patient: Susan Kwan  : 1965    Beginning/End Dates of Reporting Period:  22 to 22    Referring Provider: Margo Klein PA-C    Therapy Diagnosis: spondylolisthesis of lumbar, pelvic floor     Client Self Report: Getting a lot better with the urinary urgency.  Doing pelvic floor/core work frequently.  Had a little leakage when she put her hands in running water.  No longer wearing a pad.    Objective Measurements:  Objective Measure: OSW: 48 on IE: 22     Objective Measure: pain 7 /10     Objective Measure: infrasternal angle = 112 degrees       Goals:  Goal Identifier stand   Goal Description Pt will be able to stand for 30+ minutes to help prep a meal   Target Date 22   Date Met      Progress (detail required for progress note): able to stand 20 minutes currently, feels there isn't much progress with the standing.  The mechanics in how to stand properly have helped     Goal Identifier sit   Goal Description Pt will be able to sit for 30 minutes without having to reposition due to pain in buttocks   Target Date 22   Date Met      Progress (detail required for progress note): still the biggest problem  Has not changed much     Goal Identifier     Goal Description     Target Date     Date Met      Progress (detail required for progress note):       Goal Identifier     Goal Description     Target Date     Date Met      Progress (detail required for progress note):       Goal Identifier     Goal Description     Target Date     Date Met      Progress (detail required for progress note):       Goal Identifier     Goal Description     Target Date     Date Met      Progress (detail required for progress note):       Goal Identifier     Goal Description     Target Date     Date Met      Progress (detail required for progress note):        Goal Identifier     Goal Description     Target Date     Date Met      Progress (detail required for progress note):             Plan:  Discharge from therapy.    Discharge:    Reason for Discharge: Pt has not been seen in over 7 weeks in clinic so is discharged from caseload.      Equipment Issued:     Discharge Plan: Patient to continue home program.

## 2022-09-27 ENCOUNTER — OFFICE VISIT (OUTPATIENT)
Dept: PHYSICAL MEDICINE AND REHAB | Facility: CLINIC | Age: 57
End: 2022-09-27
Payer: COMMERCIAL

## 2022-09-27 VITALS — SYSTOLIC BLOOD PRESSURE: 117 MMHG | HEART RATE: 64 BPM | DIASTOLIC BLOOD PRESSURE: 59 MMHG

## 2022-09-27 DIAGNOSIS — M54.2 CERVICAL SPINE PAIN: ICD-10-CM

## 2022-09-27 DIAGNOSIS — G24.3 CERVICAL DYSTONIA: ICD-10-CM

## 2022-09-27 DIAGNOSIS — M79.18 MYOFASCIAL PAIN: ICD-10-CM

## 2022-09-27 DIAGNOSIS — Z98.1 S/P CERVICAL SPINAL FUSION: Primary | ICD-10-CM

## 2022-09-27 PROCEDURE — 99214 OFFICE O/P EST MOD 30 MIN: CPT | Performed by: PHYSICAL MEDICINE & REHABILITATION

## 2022-09-27 ASSESSMENT — PAIN SCALES - GENERAL: PAINLEVEL: SEVERE PAIN (6)
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
## 2022-09-27 NOTE — PATIENT INSTRUCTIONS
An CT was ordered for you today.  You will be contacted by scheduling within 3 days.    If you are not contacted, please call Radiology at [phone].     2. Schedule Botox injection with Dr Curtis.  Please schedule for after your CT scan has been completed
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
## 2022-10-02 ENCOUNTER — HOSPITAL ENCOUNTER (OUTPATIENT)
Dept: CT IMAGING | Facility: CLINIC | Age: 57
Discharge: HOME | End: 2022-10-02
Attending: PHYSICAL MEDICINE & REHABILITATION | Admitting: PHYSICAL MEDICINE & REHABILITATION
Payer: COMMERCIAL

## 2022-10-02 DIAGNOSIS — Z98.1 S/P CERVICAL SPINAL FUSION: ICD-10-CM

## 2022-10-02 DIAGNOSIS — M54.2 CERVICAL SPINE PAIN: ICD-10-CM

## 2022-10-02 PROCEDURE — 72125 CT NECK SPINE W/O DYE: CPT

## 2022-10-21 DIAGNOSIS — M79.18 MYOFASCIAL PAIN: ICD-10-CM

## 2022-10-21 RX ORDER — BACLOFEN 10 MG/1
TABLET ORAL
Qty: 90 TABLET | Refills: 1 | Status: SHIPPED | OUTPATIENT
Start: 2022-10-21 | End: 2022-11-03 | Stop reason: ALTCHOICE

## 2022-11-03 ENCOUNTER — OFFICE VISIT (OUTPATIENT)
Dept: PHYSICAL MEDICINE AND REHAB | Facility: CLINIC | Age: 57
End: 2022-11-03
Payer: COMMERCIAL

## 2022-11-03 VITALS — DIASTOLIC BLOOD PRESSURE: 72 MMHG | SYSTOLIC BLOOD PRESSURE: 112 MMHG

## 2022-11-03 DIAGNOSIS — G24.3 CERVICAL DYSTONIA: Primary | ICD-10-CM

## 2022-11-03 DIAGNOSIS — M54.2 CERVICAL SPINE PAIN: ICD-10-CM

## 2022-11-03 DIAGNOSIS — Z98.1 S/P CERVICAL SPINAL FUSION: ICD-10-CM

## 2022-11-03 PROCEDURE — 64616 CHEMODENERV MUSC NECK DYSTON: CPT | Mod: 50 | Performed by: PHYSICAL MEDICINE & REHABILITATION

## 2022-11-03 PROCEDURE — 95874 GUIDE NERV DESTR NEEDLE EMG: CPT | Performed by: PHYSICAL MEDICINE & REHABILITATION

## 2022-11-03 RX ORDER — TIZANIDINE 2 MG/1
2-4 TABLET ORAL 3 TIMES DAILY PRN
Qty: 90 TABLET | Refills: 1 | Status: SHIPPED | OUTPATIENT
Start: 2022-11-03 | End: 2022-11-22

## 2022-11-03 ASSESSMENT — PAIN SCALES - GENERAL: PAINLEVEL: SEVERE PAIN (7)
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
## 2022-11-03 NOTE — PATIENT INSTRUCTIONS
A physical therapy order was provided for you today.  You will be contacted by physical therapy.  If nobody contacts you within 3 to 5 days, please contact the clinic at [phone].  It will be very important for you to do your physical therapy exercises on a regular basis to decrease your pain and prevent future pain flares.   2. Tizanidine  (muscle relaxant medication) has been prescribed today. Please take 1-2 tabs three times daily as needed. This medication may cause drowsiness. Please do not work or drive while taking this medication until you know how it effects you. If it does make you drowsy, you should only take it before bedtime or at times that you do not have to work/drive.     Lina Chanel, DO Cristela Salguero, MANJIT Curtis,  CHAD Goodman, DO                                                                                       DISCHARGE INSTRUCTIONS  During office hours (8:00 a.m.- 4:30 p.m.) questions or concerns may be answered  by calling Spine Navigation Nurses at [phone]. If you experience any problems after hours please call [phone] and you will be connected to [facility].     All Patients:  You may experience an increase in your symptoms or have some soreness from the injection for the first 2 days (It may take anywhere between 2 days- 2 weeks for the steroid to have maximum effect).  You may use ice on the injection site, as frequently as 20 minutes each hour if needed.  You may continue taking your regular medication.  You may shower. No swimming, tub bath or hot tub for 48 hours.  You may remove your   bandaid/bandage as soon as you are home.  You may resume light activities, as tolerated unless otherwise directed.  Resume your usual diet as tolerated.      POSSIBLE STEROID SIDE EFFECTS   (If  steroid/cortisone was used for your procedure)  -If you experience these symptoms, it should only last for a short period  Swelling of the legs        Skin redness (flushing)  Mouth (oral) irritation   Blood sugar (glucose) levels      Sweats                          Mood changes  Severe headache                  Sleeplessness            POSSIBLE PROCEDURE SIDE EFFECTS  -Call the Spine Center if you are concerned  Increased Pain      Bruising/bleeding at site                  Redness or swelling  Fever greater than 100.5            Diffuse rash            THESE INSTRUCTIONS HAVE BEEN EXPLAINED TO THE PATIENT AND THE PATIENT/PATIENT REPRESENTATITVE HAS VERBALIZED UNDERSTANDING.  A COPY OF THE INSTRUCTIONS HAVE BEEN GIVEN TO THE PATIENT/PATIENT REPRESENTATIVE.
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
## 2022-11-18 DIAGNOSIS — F33.2 SEVERE EPISODE OF RECURRENT MAJOR DEPRESSIVE DISORDER, WITHOUT PSYCHOTIC FEATURES (H): ICD-10-CM

## 2022-11-20 NOTE — TELEPHONE ENCOUNTER
"Drug interaction warning    Last Written Prescription Date:  11/26/21  Last Fill Quantity: 270,  # refills: 3   Last office visit provider:  9/2/22     Requested Prescriptions   Pending Prescriptions Disp Refills     FLUoxetine (PROZAC) 20 MG capsule [Pharmacy Med Name: FLUOXETINE 20MG CAPSULES] 270 capsule 3     Sig: TAKE 3 CAPSULES(60 MG) BY MOUTH DAILY       SSRIs Protocol Passed - 11/18/2022 12:06 PM        Passed - PHQ-9 score less than 5 in past 6 months     Please review last PHQ-9 score.           Passed - Medication is active on med list        Passed - Patient is age 18 or older        Passed - No active pregnancy on record        Passed - No positive pregnancy test in last 12 months        Passed - Recent (6 mo) or future (30 days) visit within the authorizing provider's specialty     Patient had office visit in the last 6 months or has a visit in the next 30 days with authorizing provider or within the authorizing provider's specialty.  See \"Patient Info\" tab in inbasket, or \"Choose Columns\" in Meds & Orders section of the refill encounter.                 Roshni Moreau RN 11/19/22 8:49 PM  "

## 2022-11-22 DIAGNOSIS — G24.3 CERVICAL DYSTONIA: ICD-10-CM

## 2022-11-22 DIAGNOSIS — Z98.1 S/P CERVICAL SPINAL FUSION: ICD-10-CM

## 2022-11-22 RX ORDER — TIZANIDINE 2 MG/1
TABLET ORAL
Qty: 90 TABLET | Refills: 1 | Status: SHIPPED | OUTPATIENT
Start: 2022-11-22 | End: 2023-01-24

## 2022-11-28 NOTE — TELEPHONE ENCOUNTER
Please determine the current dosage of gabapentin.  As she slowly increase of the gabapentin my hope is that the back pain will also improve.  I would like her to slowly increase gabapentin to 600 mg 3 times daily before adding any additional medication such as muscle relaxers.  She can contact us early next week and update us on her progress.   Protocol not met: controlled med    Rx requested:  tramadol    Last filled: 10/28/22    Last appointment date: 04/08/22    Provider seen: Dr Hawkins     Next appointment date: 12/06/22 with: Dr Hawkins

## 2022-12-05 ENCOUNTER — HOSPITAL ENCOUNTER (OUTPATIENT)
Dept: PHYSICAL THERAPY | Facility: REHABILITATION | Age: 57
Discharge: HOME | End: 2022-12-05
Attending: PHYSICAL MEDICINE & REHABILITATION
Payer: COMMERCIAL

## 2022-12-05 DIAGNOSIS — M54.2 NECK PAIN: ICD-10-CM

## 2022-12-05 DIAGNOSIS — Z98.1 S/P CERVICAL SPINAL FUSION: ICD-10-CM

## 2022-12-05 DIAGNOSIS — G24.3 CERVICAL DYSTONIA: Primary | ICD-10-CM

## 2022-12-05 PROCEDURE — 97161 PT EVAL LOW COMPLEX 20 MIN: CPT | Mod: GP

## 2022-12-05 PROCEDURE — 97112 NEUROMUSCULAR REEDUCATION: CPT | Mod: GP
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
## 2022-12-13 ENCOUNTER — HOSPITAL ENCOUNTER (OUTPATIENT)
Dept: PHYSICAL THERAPY | Facility: REHABILITATION | Age: 57
Discharge: HOME | End: 2022-12-13
Payer: COMMERCIAL

## 2022-12-13 DIAGNOSIS — M54.2 CERVICAL SPINE PAIN: ICD-10-CM

## 2022-12-13 DIAGNOSIS — M79.18 MYOFASCIAL PAIN: ICD-10-CM

## 2022-12-13 DIAGNOSIS — Z98.1 S/P CERVICAL SPINAL FUSION: ICD-10-CM

## 2022-12-13 DIAGNOSIS — G24.3 CERVICAL DYSTONIA: Primary | ICD-10-CM

## 2022-12-13 DIAGNOSIS — M54.6 PAIN IN THORACIC SPINE: ICD-10-CM

## 2022-12-13 DIAGNOSIS — M54.2 NECK PAIN: ICD-10-CM

## 2022-12-13 PROCEDURE — 97112 NEUROMUSCULAR REEDUCATION: CPT | Mod: GP

## 2022-12-13 PROCEDURE — 97110 THERAPEUTIC EXERCISES: CPT | Mod: GP

## 2022-12-13 PROCEDURE — 97140 MANUAL THERAPY 1/> REGIONS: CPT | Mod: GP

## 2022-12-15 NOTE — ADDENDUM NOTE
Encounter addended by: Iggy Yu PT on: 12/15/2022 10:21 AM   Actions taken: Charge Capture section accepted

## 2022-12-22 ENCOUNTER — TELEPHONE (OUTPATIENT)
Dept: PHYSICAL THERAPY | Facility: REHABILITATION | Age: 57
End: 2022-12-22
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
## 2022-12-28 ENCOUNTER — HOSPITAL ENCOUNTER (OUTPATIENT)
Dept: PHYSICAL THERAPY | Facility: REHABILITATION | Age: 57
Discharge: HOME | End: 2022-12-28
Payer: COMMERCIAL

## 2022-12-28 DIAGNOSIS — M54.2 NECK PAIN: ICD-10-CM

## 2022-12-28 DIAGNOSIS — Z98.1 S/P CERVICAL SPINAL FUSION: ICD-10-CM

## 2022-12-28 DIAGNOSIS — G24.3 CERVICAL DYSTONIA: Primary | ICD-10-CM

## 2022-12-28 DIAGNOSIS — M79.18 MYOFASCIAL PAIN: ICD-10-CM

## 2022-12-28 DIAGNOSIS — M54.6 PAIN IN THORACIC SPINE: ICD-10-CM

## 2022-12-28 PROCEDURE — 97140 MANUAL THERAPY 1/> REGIONS: CPT | Mod: GP

## 2022-12-28 PROCEDURE — 97110 THERAPEUTIC EXERCISES: CPT | Mod: GP

## 2022-12-28 PROCEDURE — 97112 NEUROMUSCULAR REEDUCATION: CPT | Mod: GP

## 2023-01-10 ENCOUNTER — HOSPITAL ENCOUNTER (OUTPATIENT)
Dept: PHYSICAL THERAPY | Facility: REHABILITATION | Age: 58
Discharge: HOME | End: 2023-01-10
Payer: COMMERCIAL

## 2023-01-10 DIAGNOSIS — M79.18 MYOFASCIAL PAIN: ICD-10-CM

## 2023-01-10 DIAGNOSIS — M54.6 PAIN IN THORACIC SPINE: ICD-10-CM

## 2023-01-10 DIAGNOSIS — M54.2 CERVICAL SPINE PAIN: ICD-10-CM

## 2023-01-10 DIAGNOSIS — G24.3 CERVICAL DYSTONIA: Primary | ICD-10-CM

## 2023-01-10 DIAGNOSIS — M54.2 NECK PAIN: ICD-10-CM

## 2023-01-10 PROCEDURE — 97112 NEUROMUSCULAR REEDUCATION: CPT | Mod: GP

## 2023-01-10 PROCEDURE — 97110 THERAPEUTIC EXERCISES: CPT | Mod: GP

## 2023-01-10 PROCEDURE — 97140 MANUAL THERAPY 1/> REGIONS: CPT | Mod: GP

## 2023-01-10 NOTE — PROGRESS NOTES
Beginning/End Dates of Progress Note Reporting Period:  12/5/22 to 1/10/23    Progress Toward Goals:   Progress this reporting period: Susan continues to report largely unchanged symptoms. She did improve (decrease) her NDI score to 54%, though, indicating moderately improved function. She demonstrates minimally improved cervical AROM since last visit. She continues to tolerate exercises well with cueing for proper form. She still is unable to deive or cook/prepare food for any extended period of time without high pain.    Client Self (Subjective) Report for Progress Note Reporting Period: Susan reports that her neck is the same overall. She doesn't feel like she has seen any improvements.    Outcome Measures (Most Recent Score):    NDI: 54%    Objective Measurements:   Objective Measure: Cervical Extension AROM  Details: 0% to 25% (painful)  Objective Measure: Cervical Flexion AROM  Details: 75% to 99% (tight)  Objective Measure: Cervical Side-bending AROM  Details: Right: 50% to 75%. Left: 25% to 50%.  Objective Measure: Cervical Rotation AROM  Details: Bilateral: 25% to 50%  Objective Measure: Thoracic Rotation AROM  Details: 50% to 75%  Objective Measure: Pain  Details: Worst preceeding week: 7/10. At start of session: 5/10. At end of session: 5/10.

## 2023-01-17 ENCOUNTER — HOSPITAL ENCOUNTER (OUTPATIENT)
Dept: PHYSICAL THERAPY | Facility: REHABILITATION | Age: 58
Discharge: HOME | End: 2023-01-17
Payer: COMMERCIAL

## 2023-01-17 DIAGNOSIS — G24.3 CERVICAL DYSTONIA: Primary | ICD-10-CM

## 2023-01-17 DIAGNOSIS — M54.6 PAIN IN THORACIC SPINE: ICD-10-CM

## 2023-01-17 DIAGNOSIS — M54.2 NECK PAIN: ICD-10-CM

## 2023-01-17 DIAGNOSIS — M79.18 MYOFASCIAL PAIN: ICD-10-CM

## 2023-01-17 DIAGNOSIS — M54.2 CERVICAL SPINE PAIN: ICD-10-CM

## 2023-01-17 PROCEDURE — 97110 THERAPEUTIC EXERCISES: CPT | Mod: GP

## 2023-01-17 PROCEDURE — 97140 MANUAL THERAPY 1/> REGIONS: CPT | Mod: GP

## 2023-01-17 PROCEDURE — 97112 NEUROMUSCULAR REEDUCATION: CPT | Mod: GP

## 2023-01-23 DIAGNOSIS — M79.18 MYOFASCIAL PAIN: ICD-10-CM

## 2023-01-23 RX ORDER — GABAPENTIN 300 MG/1
CAPSULE ORAL
Qty: 300 CAPSULE | Refills: 3 | Status: SHIPPED | OUTPATIENT
Start: 2023-01-23 | End: 2023-07-12

## 2023-01-23 RX ORDER — GABAPENTIN 300 MG/1
CAPSULE ORAL
Qty: 300 CAPSULE | Refills: 3 | Status: SHIPPED | OUTPATIENT
Start: 2023-01-23 | End: 2023-04-21

## 2023-01-23 NOTE — DISCHARGE INSTRUCTIONS
The contrast used during myelography is water-based and will be absorbed by your body and removed in your urine. It may cause some side effects including nausea, vomiting, headaches, and rarely a seizure. If a headache develops, it is usually mild by can be more severe, and can last a few days to a week.     The day of the myelogram:   1. A responsible adult must stay with you overnight. It is very important that someone is with you or is available if you become ill.   2. Do not drive or operate mechanical equipment.   3. Stay in bed with your head elevated 20-30 degrees (two pillows) for the rest of the day. You may get up to go to the bathroom, but immediately return to bed.   4. Refrain from excessive exertion and movements. Excessive movement increases the possibility of severity of the headache.   5. Return to your normal diet.   6. Drink plenty of fluids. This helps to flush the contrast through your kidneys. You are encouraged to drink several glasses of fluid each hour.   7. Do not drink alcoholic beverages.     The day after the myelogram:   1. You may return to normal daily activities, but avoid strenuous work or exercise.   2. If you develop a severe headache, lie absolutely flat for 4-6 hours. If it continues or worsens, please call your referring physician.    The radiologist will study and interpret the films, and will report these findings to your doctor. Your own personal doctor will explain the results with you.    If you have questions or concerns about the myelogram, you may call the [facility] Radiology Department listed below:    [facility]    [phone]     no

## 2023-01-24 DIAGNOSIS — G24.3 CERVICAL DYSTONIA: ICD-10-CM

## 2023-01-24 DIAGNOSIS — Z98.1 S/P CERVICAL SPINAL FUSION: ICD-10-CM

## 2023-01-24 RX ORDER — TIZANIDINE 2 MG/1
TABLET ORAL
Qty: 90 TABLET | Refills: 1 | Status: SHIPPED | OUTPATIENT
Start: 2023-01-24 | End: 2023-02-01

## 2023-01-26 ENCOUNTER — HOSPITAL ENCOUNTER (OUTPATIENT)
Dept: PHYSICAL THERAPY | Facility: REHABILITATION | Age: 58
Discharge: HOME | End: 2023-01-26
Payer: COMMERCIAL

## 2023-01-26 DIAGNOSIS — M54.50 LUMBAR SPINE PAIN: ICD-10-CM

## 2023-01-26 DIAGNOSIS — M54.2 NECK PAIN: ICD-10-CM

## 2023-01-26 DIAGNOSIS — G24.3 CERVICAL DYSTONIA: Primary | ICD-10-CM

## 2023-01-26 DIAGNOSIS — M54.2 CERVICAL SPINE PAIN: ICD-10-CM

## 2023-01-26 DIAGNOSIS — M79.18 MYOFASCIAL PAIN: ICD-10-CM

## 2023-01-26 PROCEDURE — 20561 NDL INSJ W/O NJX 3+ MUSC: CPT | Performed by: PHYSICAL THERAPIST

## 2023-01-26 PROCEDURE — 97140 MANUAL THERAPY 1/> REGIONS: CPT | Mod: GP | Performed by: PHYSICAL THERAPIST

## 2023-01-26 PROCEDURE — 97110 THERAPEUTIC EXERCISES: CPT | Mod: GP | Performed by: PHYSICAL THERAPIST

## 2023-01-26 NOTE — PROGRESS NOTES
[facility]    OUTPATIENT PHYSICAL THERAPY  PLAN OF TREATMENT FOR OUTPATIENT REHABILITATION AND PROGRESS NOTE           Patient's Last Name, First Name, Susan Mchugh Date of Birth  1965   Provider's Name  [facility] Medical Record No.  [id number]    Onset Date  11/3/22 Start of Care Date  12/5/22   Type:     _X_PT   ___OT   ___SLP Medical Diagnosis  Cervical dystonia  S/P cervical spinal fusion   PT Diagnosis  Neck pain with mobility deficits Plan of Treatment  Frequency/Duration: 1 x/week for 8 weeks  Certification date from 1/26/23 to 3/31/23     Goals:  Goal Identifier NDI - Short Term   Goal Description Susan will demonstrate improved function as evidenced by an improved (decreased) NDI score of less than or equal to 52.22%.   Target Date 01/02/23   Date Met      Progress (detail required for progress note): 54%     Goal Identifier NDI - Long Term   Goal Description Susan will demonstrate improved function as evidenced by an improved (decreased) NDI score of less than or equal to 42.22%.   Target Date 01/30/23   Date Met      Progress (detail required for progress note): 54%     Goal Identifier Driving   Goal Description Susan will demonstrate improved tolerance to functional activity as evidenced by her ability to drive up to 10 minutes with self-report pain no higher than 4/10.   Target Date 01/30/23   Date Met      Progress (detail required for progress note): Low pain with modifications     Goal Identifier Cooking   Goal Description Susan will demonstrate improved tolerance to functional activity as evidenced by her ability to cook up to 15 minutes with self-report pain no higher than 3/10.   Target Date 01/30/23   Date Met      Progress (detail required for progress note): In progress         Beginning/End Dates of Progress Note Reporting  Period:  12/5/23 to 1/26/23    Progress Toward Goals:   Progress this reporting period: Making slight progress    Client Self (Subjective) Report for Progress Note Reporting Period: Susan continues to have significant stiffness and pain in her cervical spine with little to no relief from her stretches.    Outcome Measures (Most Recent Score):        Objective Measurements:   Objective Measure: Cervical Extension AROM  Details: Not measured today  Objective Measure: Cervical Flexion AROM  Details: Not measured today  Objective Measure: Cervical Side-bending AROM  Details: Not measured today  Objective Measure: Cervical Rotation AROM  Details: Not measured today  Objective Measure: Thoracic Rotation AROM  Details: Not measured today  Objective Measure: Pain  Details: Worst preceeding week: 8/10. At start of session: 6/10. At end of session: 5/10.            I CERTIFY THE NEED FOR THESE SERVICES FURNISHED UNDER        THIS PLAN OF TREATMENT AND WHILE UNDER MY CARE     (Physician co-signature of this document indicates review and certification of the therapy plan).                Referring Provider: DO LORENA Kelley, PT

## 2023-02-01 DIAGNOSIS — Z98.1 S/P CERVICAL SPINAL FUSION: ICD-10-CM

## 2023-02-01 DIAGNOSIS — G24.3 CERVICAL DYSTONIA: ICD-10-CM

## 2023-02-01 RX ORDER — TIZANIDINE 2 MG/1
TABLET ORAL
Qty: 90 TABLET | Refills: 1 | Status: SHIPPED | OUTPATIENT
Start: 2023-02-01 | End: 2023-04-27

## 2023-02-07 ENCOUNTER — HOSPITAL ENCOUNTER (OUTPATIENT)
Dept: PHYSICAL THERAPY | Facility: REHABILITATION | Age: 58
Discharge: HOME | End: 2023-02-07
Payer: COMMERCIAL

## 2023-02-07 DIAGNOSIS — M54.2 CERVICAL SPINE PAIN: ICD-10-CM

## 2023-02-07 DIAGNOSIS — M79.18 MYOFASCIAL PAIN: ICD-10-CM

## 2023-02-07 DIAGNOSIS — G24.3 CERVICAL DYSTONIA: Primary | ICD-10-CM

## 2023-02-07 DIAGNOSIS — M54.2 NECK PAIN: ICD-10-CM

## 2023-02-07 PROCEDURE — 97110 THERAPEUTIC EXERCISES: CPT | Mod: GP | Performed by: PHYSICAL THERAPIST

## 2023-02-07 PROCEDURE — 20561 NDL INSJ W/O NJX 3+ MUSC: CPT | Performed by: PHYSICAL THERAPIST

## 2023-02-07 PROCEDURE — 97140 MANUAL THERAPY 1/> REGIONS: CPT | Mod: GP | Performed by: PHYSICAL THERAPIST

## 2023-02-19 DIAGNOSIS — G47.00 INSOMNIA, UNSPECIFIED TYPE: ICD-10-CM

## 2023-02-20 RX ORDER — TRAZODONE HYDROCHLORIDE 50 MG/1
TABLET, FILM COATED ORAL
Qty: 180 TABLET | Refills: 1 | Status: SHIPPED | OUTPATIENT
Start: 2023-02-20 | End: 2023-05-25

## 2023-02-23 NOTE — ADDENDUM NOTE
Encounter addended by: Yg Mobley, PT on: 2/23/2023 12:41 PM   Actions taken: Episode resolved, Clinical Note Signed

## 2023-03-09 NOTE — TELEPHONE ENCOUNTER
Patient called to report little success with PT and the injection improvements only lasted about 2 weeks. Per Dr. Ward's note, we should proceed with CT myelogram (patient has a stimulator).     Please enter order for the myelogram.  
None

## 2023-03-28 DIAGNOSIS — F33.2 SEVERE EPISODE OF RECURRENT MAJOR DEPRESSIVE DISORDER, WITHOUT PSYCHOTIC FEATURES (H): ICD-10-CM

## 2023-03-30 RX ORDER — BUPROPION HYDROCHLORIDE 150 MG/1
TABLET ORAL
Qty: 30 TABLET | Refills: 11 | Status: SHIPPED | OUTPATIENT
Start: 2023-03-30 | End: 2023-04-27

## 2023-03-30 NOTE — TELEPHONE ENCOUNTER
"Routing refill request to provider for review/approval because:  PHQ-9 score      Last Written Prescription Date:  2/14/2022  Last Fill Quantity: 30,  # refills: 11   Last office visit provider:  9/2/2022     Requested Prescriptions   Pending Prescriptions Disp Refills     buPROPion (WELLBUTRIN XL) 150 MG 24 hr tablet [Pharmacy Med Name: BUPROPION XL 150MG TABLETS (24 H)] 30 tablet 11     Sig: TAKE 1 TABLET(150 MG) BY MOUTH EVERY MORNING       SSRIs Protocol Failed - 3/28/2023  7:50 PM        Failed - PHQ-9 score less than 5 in past 6 months     Please review last PHQ-9 score.           Passed - Medication is Bupropion     If the medication is Bupropion (Wellbutrin), and the patient is taking for smoking cessation; OK to refill.          Passed - Medication is active on med list        Passed - Patient is age 18 or older        Passed - No active pregnancy on record        Passed - No positive pregnancy test in last 12 months        Passed - Recent (6 mo) or future (30 days) visit within the authorizing provider's specialty     Patient had office visit in the last 6 months or has a visit in the next 30 days with authorizing provider or within the authorizing provider's specialty.  See \"Patient Info\" tab in inbasket, or \"Choose Columns\" in Meds & Orders section of the refill encounter.                 Lina Bland RN 03/29/23 9:07 PM  "

## 2023-04-07 ENCOUNTER — OFFICE VISIT (OUTPATIENT)
Dept: PHYSICAL MEDICINE AND REHAB | Facility: CLINIC | Age: 58
End: 2023-04-07
Payer: COMMERCIAL

## 2023-04-07 VITALS — DIASTOLIC BLOOD PRESSURE: 62 MMHG | SYSTOLIC BLOOD PRESSURE: 108 MMHG | HEART RATE: 77 BPM

## 2023-04-07 DIAGNOSIS — G89.4 CHRONIC PAIN SYNDROME: ICD-10-CM

## 2023-04-07 DIAGNOSIS — M54.41 CHRONIC LOW BACK PAIN WITH RIGHT-SIDED SCIATICA, UNSPECIFIED BACK PAIN LATERALITY: ICD-10-CM

## 2023-04-07 DIAGNOSIS — G24.3 CERVICAL DYSTONIA: Primary | ICD-10-CM

## 2023-04-07 DIAGNOSIS — M54.2 CERVICAL SPINE PAIN: ICD-10-CM

## 2023-04-07 DIAGNOSIS — G89.29 CHRONIC LOW BACK PAIN WITH RIGHT-SIDED SCIATICA, UNSPECIFIED BACK PAIN LATERALITY: ICD-10-CM

## 2023-04-07 DIAGNOSIS — Z98.1 S/P CERVICAL SPINAL FUSION: ICD-10-CM

## 2023-04-07 PROCEDURE — 99213 OFFICE O/P EST LOW 20 MIN: CPT | Performed by: PHYSICAL MEDICINE & REHABILITATION

## 2023-04-07 ASSESSMENT — PAIN SCALES - GENERAL: PAINLEVEL: SEVERE PAIN (6)
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
## 2023-04-07 NOTE — PATIENT INSTRUCTIONS
Trial Botox for your cervical spine. Please schedule this injection at least  2 weeks from now to allow time for insurance prior authorization. On the day of your injection, you cannot be sick or taking antibiotics. If you become sick and are prescribed, please call the clinic so your injection can be rescheduled for once you have completed your antibiotics.  If you have any questions or concerns prior to your injection, please do not hesitate to call the nurse navigation line at [phone].   I would like you to see the pain clinic for an evaluation and further management of your pain.
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
## 2023-04-21 ENCOUNTER — OFFICE VISIT (OUTPATIENT)
Dept: PALLIATIVE MEDICINE | Facility: OTHER | Age: 58
End: 2023-04-21
Attending: PHYSICAL MEDICINE & REHABILITATION
Payer: COMMERCIAL

## 2023-04-21 VITALS
SYSTOLIC BLOOD PRESSURE: 109 MMHG | WEIGHT: 182 LBS | OXYGEN SATURATION: 95 % | HEART RATE: 65 BPM | DIASTOLIC BLOOD PRESSURE: 58 MMHG | BODY MASS INDEX: 32.24 KG/M2

## 2023-04-21 DIAGNOSIS — M54.2 CERVICAL SPINE PAIN: ICD-10-CM

## 2023-04-21 DIAGNOSIS — G24.3 CERVICAL DYSTONIA: ICD-10-CM

## 2023-04-21 DIAGNOSIS — F32.A ANXIETY AND DEPRESSION: ICD-10-CM

## 2023-04-21 DIAGNOSIS — F41.9 ANXIETY AND DEPRESSION: ICD-10-CM

## 2023-04-21 DIAGNOSIS — M54.41 CHRONIC LOW BACK PAIN WITH RIGHT-SIDED SCIATICA, UNSPECIFIED BACK PAIN LATERALITY: ICD-10-CM

## 2023-04-21 DIAGNOSIS — Z98.1 S/P CERVICAL SPINAL FUSION: ICD-10-CM

## 2023-04-21 DIAGNOSIS — M79.18 MYOFASCIAL PAIN: Primary | ICD-10-CM

## 2023-04-21 DIAGNOSIS — G89.4 CHRONIC PAIN SYNDROME: ICD-10-CM

## 2023-04-21 DIAGNOSIS — G89.29 CHRONIC LOW BACK PAIN WITH RIGHT-SIDED SCIATICA, UNSPECIFIED BACK PAIN LATERALITY: ICD-10-CM

## 2023-04-21 DIAGNOSIS — M43.16 SPONDYLOLISTHESIS OF LUMBAR REGION: ICD-10-CM

## 2023-04-21 PROCEDURE — 99205 OFFICE O/P NEW HI 60 MIN: CPT | Performed by: NURSE PRACTITIONER

## 2023-04-21 PROCEDURE — 99417 PROLNG OP E/M EACH 15 MIN: CPT | Performed by: NURSE PRACTITIONER

## 2023-04-21 PROCEDURE — G0463 HOSPITAL OUTPT CLINIC VISIT: HCPCS

## 2023-04-21 ASSESSMENT — ANXIETY QUESTIONNAIRES
1. FEELING NERVOUS, ANXIOUS, OR ON EDGE: NOT AT ALL
3. WORRYING TOO MUCH ABOUT DIFFERENT THINGS: SEVERAL DAYS
6. BECOMING EASILY ANNOYED OR IRRITABLE: NOT AT ALL
7. FEELING AFRAID AS IF SOMETHING AWFUL MIGHT HAPPEN: NOT AT ALL
GAD7 TOTAL SCORE: 6
IF YOU CHECKED OFF ANY PROBLEMS ON THIS QUESTIONNAIRE, HOW DIFFICULT HAVE THESE PROBLEMS MADE IT FOR YOU TO DO YOUR WORK, TAKE CARE OF THINGS AT HOME, OR GET ALONG WITH OTHER PEOPLE: NOT DIFFICULT AT ALL
7. FEELING AFRAID AS IF SOMETHING AWFUL MIGHT HAPPEN: NOT AT ALL
2. NOT BEING ABLE TO STOP OR CONTROL WORRYING: SEVERAL DAYS
8. IF YOU CHECKED OFF ANY PROBLEMS, HOW DIFFICULT HAVE THESE MADE IT FOR YOU TO DO YOUR WORK, TAKE CARE OF THINGS AT HOME, OR GET ALONG WITH OTHER PEOPLE?: NOT DIFFICULT AT ALL
5. BEING SO RESTLESS THAT IT IS HARD TO SIT STILL: MORE THAN HALF THE DAYS
4. TROUBLE RELAXING: MORE THAN HALF THE DAYS
GAD7 TOTAL SCORE: 6

## 2023-04-21 ASSESSMENT — PAIN SCALES - GENERAL: PAINLEVEL: SEVERE PAIN (6)

## 2023-04-21 ASSESSMENT — PAIN SCALES - PAIN ENJOYMENT GENERAL ACTIVITY SCALE (PEG)
PEG_TOTALSCORE: 6.33
AVG_PAIN_PASTWEEK: 5
PEG_TOTALSCORE: 6.33
INTERFERED_GENERAL_ACTIVITY: 7
INTERFERED_ENJOYMENT_LIFE: 7
INTERFERED_ENJOYMENT_LIFE: 7
AVG_PAIN_PASTWEEK: 5
INTERFERED_GENERAL_ACTIVITY: 7

## 2023-04-21 NOTE — PROGRESS NOTES
Patient presents to the clinic today for a follow up with EITAN Graves CNP regarding Pain Management.    Screenings done ahead of the visit    UDS/CSA- na    Medications- na    QUESTIONS:    Bhumika SY [facility] Visit Facilitator

## 2023-04-21 NOTE — PATIENT INSTRUCTIONS
Clinic Number:  [phone]   Call with any questions about your care and for scheduling assistance.   Calls are returned Monday through Friday between 8 AM and 4:30 PM. We usually get back to you within 2 business days depending on the issue/request.    If we are prescribing your medications:  For opioid medication refills, call the clinic or send a [employer] message 7 days in advance.  Please include:  Name of requested medication  Name of the pharmacy.  For non-opioid medications, call your pharmacy directly to request a refill. Please allow 3-4 days to be processed.   Per [place] State Law:  All controlled substance prescriptions must be filled within 30 days of being written.    For those controlled substances allowing refills, pickup must occur within 30 days of last fill.      We believe regular attendance is key to your success in our program!    Any time you are unable to keep your appointment we ask that you call us at least 24 hours in advance to cancel.This will allow us to offer the appointment time to another patient.   Multiple missed appointments may lead to dismissal from the clinic.    [facility] Pt's are always seen in [facility], near orthopedics.    Radiology injection Pt's are always seen in [facility], Emergency/Same Day Surgery entrance. Go through double doors of Emergency entrance and the imaging suite is the very first door on the right. You will check in for radiology injections here.        Plan:  A multimodal approach to treat her pain.    Medications:  Consider duloxetine - will defer to PCP  Supplements:  Magnesium glycinate 400mg daily to help relax muscles, curcumin OTC daily to decrease inflammation, Epson-It    The following OTC pain medications may be helpful, use as directed:  Voltaren gel 1%, Lidocaine Patch, Solanpas, Biofreeze, Aspercream, Tiger Balm and Santosh Emu cream.  Apply heat or cold PRN.      Therapies:  Refer for Acupuncture [facility].  Continue to do exercises learned at PT on a daily basis to obtain maximum benefit  Refer for home TENS unit  Handout given regarding grounding mat.  Continue epson salt baths  Continue massage    Try to follow an anti-inflammatory diet.    In the future may consider referral for Pain Psychology [facility].      Interventions:  Continue botox injections with Dr. Bray.      Follow up:   EITAN Bray, RN, CNP, FNP  [facility]

## 2023-04-23 ENCOUNTER — HEALTH MAINTENANCE LETTER (OUTPATIENT)
Age: 58
End: 2023-04-23

## 2023-04-27 ENCOUNTER — OFFICE VISIT (OUTPATIENT)
Dept: FAMILY MEDICINE | Facility: CLINIC | Age: 58
End: 2023-04-27
Payer: COMMERCIAL

## 2023-04-27 VITALS
WEIGHT: 177 LBS | HEART RATE: 69 BPM | RESPIRATION RATE: 20 BRPM | TEMPERATURE: 97.7 F | OXYGEN SATURATION: 98 % | HEIGHT: 64 IN | DIASTOLIC BLOOD PRESSURE: 65 MMHG | SYSTOLIC BLOOD PRESSURE: 100 MMHG | BODY MASS INDEX: 30.22 KG/M2

## 2023-04-27 DIAGNOSIS — Z00.00 ROUTINE GENERAL MEDICAL EXAMINATION AT A HEALTH CARE FACILITY: Primary | ICD-10-CM

## 2023-04-27 DIAGNOSIS — F33.2 SEVERE EPISODE OF RECURRENT MAJOR DEPRESSIVE DISORDER, WITHOUT PSYCHOTIC FEATURES (H): ICD-10-CM

## 2023-04-27 DIAGNOSIS — J45.20 MILD INTERMITTENT ASTHMA WITHOUT COMPLICATION: ICD-10-CM

## 2023-04-27 DIAGNOSIS — F17.200 TOBACCO USE DISORDER: ICD-10-CM

## 2023-04-27 DIAGNOSIS — Z12.31 VISIT FOR SCREENING MAMMOGRAM: ICD-10-CM

## 2023-04-27 DIAGNOSIS — L03.311 CELLULITIS OF ABDOMINAL WALL: ICD-10-CM

## 2023-04-27 DIAGNOSIS — E03.9 HYPOTHYROIDISM, UNSPECIFIED TYPE: ICD-10-CM

## 2023-04-27 DIAGNOSIS — E78.00 PURE HYPERCHOLESTEROLEMIA: ICD-10-CM

## 2023-04-27 PROCEDURE — 90472 IMMUNIZATION ADMIN EACH ADD: CPT | Performed by: FAMILY MEDICINE

## 2023-04-27 PROCEDURE — 90471 IMMUNIZATION ADMIN: CPT | Performed by: FAMILY MEDICINE

## 2023-04-27 PROCEDURE — 99214 OFFICE O/P EST MOD 30 MIN: CPT | Mod: 25 | Performed by: FAMILY MEDICINE

## 2023-04-27 PROCEDURE — 90746 HEPB VACCINE 3 DOSE ADULT IM: CPT | Performed by: FAMILY MEDICINE

## 2023-04-27 PROCEDURE — 99396 PREV VISIT EST AGE 40-64: CPT | Mod: 25 | Performed by: FAMILY MEDICINE

## 2023-04-27 PROCEDURE — 90677 PCV20 VACCINE IM: CPT | Performed by: FAMILY MEDICINE

## 2023-04-27 RX ORDER — CLINDAMYCIN HCL 300 MG
300 CAPSULE ORAL 4 TIMES DAILY
Qty: 28 CAPSULE | Refills: 0 | Status: SHIPPED | OUTPATIENT
Start: 2023-04-27 | End: 2023-05-04

## 2023-04-27 ASSESSMENT — ASTHMA QUESTIONNAIRES
QUESTION_2 LAST FOUR WEEKS HOW OFTEN HAVE YOU HAD SHORTNESS OF BREATH: ONCE OR TWICE A WEEK
QUESTION_4 LAST FOUR WEEKS HOW OFTEN HAVE YOU USED YOUR RESCUE INHALER OR NEBULIZER MEDICATION (SUCH AS ALBUTEROL): TWO OR THREE TIMES PER WEEK
QUESTION_5 LAST FOUR WEEKS HOW WOULD YOU RATE YOUR ASTHMA CONTROL: WELL CONTROLLED
QUESTION_1 LAST FOUR WEEKS HOW MUCH OF THE TIME DID YOUR ASTHMA KEEP YOU FROM GETTING AS MUCH DONE AT WORK, SCHOOL OR AT HOME: A LITTLE OF THE TIME
ACT_TOTALSCORE: 20
ACT_TOTALSCORE: 20
QUESTION_3 LAST FOUR WEEKS HOW OFTEN DID YOUR ASTHMA SYMPTOMS (WHEEZING, COUGHING, SHORTNESS OF BREATH, CHEST TIGHTNESS OR PAIN) WAKE YOU UP AT NIGHT OR EARLIER THAN USUAL IN THE MORNING: NOT AT ALL

## 2023-04-27 ASSESSMENT — PATIENT HEALTH QUESTIONNAIRE - PHQ9
10. IF YOU CHECKED OFF ANY PROBLEMS, HOW DIFFICULT HAVE THESE PROBLEMS MADE IT FOR YOU TO DO YOUR WORK, TAKE CARE OF THINGS AT HOME, OR GET ALONG WITH OTHER PEOPLE: NOT DIFFICULT AT ALL
SUM OF ALL RESPONSES TO PHQ QUESTIONS 1-9: 7
SUM OF ALL RESPONSES TO PHQ QUESTIONS 1-9: 7

## 2023-04-27 ASSESSMENT — ENCOUNTER SYMPTOMS
EYE PAIN: 0
HEADACHES: 0
JOINT SWELLING: 0
HEARTBURN: 1
BREAST MASS: 0
MYALGIAS: 1
NAUSEA: 0
HEMATOCHEZIA: 0
FEVER: 0
CONSTIPATION: 0
ABDOMINAL PAIN: 0
SHORTNESS OF BREATH: 0
DYSURIA: 0
WEAKNESS: 0
COUGH: 0
ARTHRALGIAS: 1
NERVOUS/ANXIOUS: 1
HEMATURIA: 0
DIARRHEA: 1
FREQUENCY: 0
PALPITATIONS: 0
CHILLS: 0
SORE THROAT: 0
DIZZINESS: 0
PARESTHESIAS: 0

## 2023-04-27 NOTE — LETTER
My Asthma Action Plan    Name: Susan Kwan   YOB: 1965  Date: 4/27/2023   My doctor: Earline Jimenez MD   My clinic: [facility]        My Rescue Medicine:   Albuterol inhaler (Proair/Ventolin/Proventil HFA)  2-4 puffs EVERY 4 HOURS as needed. Use a spacer if recommended by your provider.   My Asthma Severity:   Intermittent / Exercise Induced  Know your asthma triggers: exercise or sports             GREEN ZONE   Good Control  I feel good  No cough or wheeze  Can work, sleep and play without asthma symptoms       Take your asthma control medicine every day.     If exercise triggers your asthma, take your rescue medication  15 minutes before exercise or sports, and  During exercise if you have asthma symptoms  Spacer to use with inhaler: If you have a spacer, make sure to use it with your inhaler             YELLOW ZONE Getting Worse  I have ANY of these:  I do not feel good  Cough or wheeze  Chest feels tight  Wake up at night   Keep taking your Green Zone medications  Start taking your rescue medicine:  every 20 minutes for up to 1 hour. Then every 4 hours for 24-48 hours.  If you stay in the Yellow Zone for more than 12-24 hours, contact your doctor.  If you do not return to the Green Zone in 12-24 hours or you get worse, start taking your oral steroid medicine if prescribed by your provider.           RED ZONE Medical Alert - Get Help  I have ANY of these:  I feel awful  Medicine is not helping  Breathing getting harder  Trouble walking or talking  Nose opens wide to breathe       Take your rescue medicine NOW  If your provider has prescribed an oral steroid medicine, start taking it NOW  Call your doctor NOW  If you are still in the Red Zone after 20 minutes and you have not reached your doctor:  Take your rescue medicine again and  Call 911 or go to the emergency room right away    See your regular doctor within 2 weeks of an Emergency Room or Urgent Care visit for  follow-up treatment.          Annual Reminders:  Meet with Asthma Educator,  Flu Shot in the Fall, consider Pneumonia Vaccination for patients with asthma (aged 19 and older).    Pharmacy:    [facility], [place] - [address]  [facility] - [facility] [place] - [address] [id number] - [place], [place] - [address] [facility] [address]  [id number] - [place], [place] - [address] [facility] [place] - [address] [id number] - [place], [place] - [address] [facility]  AT [facility]    Electronically signed by Earline Jimenez MD   Date: 04/27/23                    Asthma Triggers  How To Control Things That Make Your Asthma Worse    Triggers are things that make your asthma worse.  Look at the list below to help you find your triggers and   what you can do about them. You can help prevent asthma flare-ups by staying away from your triggers.      Trigger                                                          What you can do   Cigarette Smoke  Tobacco smoke can make asthma worse. Do not allow smoking in your home, car or around you.  Be sure no one smokes at a child s day care or school.  If you smoke, ask your health care provider for ways to help you quit.  Ask family members to quit too.  Ask your health care provider for a referral to Quit Plan to help you quit smoking, or call 6-773-272-PLAN.     Colds, Flu, Bronchitis  These are common triggers of asthma. Wash your hands often.  Don t touch your eyes, nose or mouth.  Get a flu shot every year.     Dust Mites  These are tiny bugs that live in cloth or carpet. They are too small to see. Wash sheets and blankets in hot water every week.   Encase pillows and mattress in dust mite proof covers.  Avoid having carpet if you can. If you have carpet, vacuum weekly.    Use a dust mask and HEPA vacuum.   Pollen and Outdoor Mold  Some people are allergic to trees, grass, or weed pollen, or molds. Try to keep your windows closed.  Limit time out doors when pollen count is high.   Ask you health care provider about taking medicine during allergy season.     Animal Dander  Some people are allergic to skin flakes, urine or saliva from pets with fur or feathers. Keep pets with fur or feathers out of your home.    If you can t keep the pet outdoors, then keep the pet out of your bedroom.  Keep the bedroom door closed.  Keep pets off cloth furniture and away from stuffed toys.     Mice, Rats, and Cockroaches  Some people are allergic to the waste from these pests.   Cover food and garbage.  Clean up spills and food crumbs.  Store grease in the refrigerator.   Keep food out of the bedroom.   Indoor Mold  This can be a trigger if your home has high moisture. Fix leaking faucets, pipes, or other sources of water.   Clean moldy surfaces.  Dehumidify basement if it is damp and smelly.   Smoke, Strong Odors, and Sprays  These can reduce air quality. Stay away from strong odors and sprays, such as perfume, powder, hair spray, paints, smoke incense, paint, cleaning products, candles and new carpet.   Exercise or Sports  Some people with asthma have this trigger. Be active!  Ask your doctor about taking medicine before sports or exercise to prevent symptoms.    Warm up for 5-10 minutes before and after sports or exercise.     Other Triggers of Asthma  Cold air:  Cover your nose and mouth with a scarf.  Sometimes laughing or crying can be a trigger.  Some medicines and food can trigger asthma.
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
## 2023-04-28 ENCOUNTER — LAB (OUTPATIENT)
Dept: LAB | Facility: CLINIC | Age: 58
End: 2023-04-28
Payer: COMMERCIAL

## 2023-04-28 DIAGNOSIS — E03.9 HYPOTHYROIDISM, UNSPECIFIED TYPE: ICD-10-CM

## 2023-04-28 DIAGNOSIS — F33.2 SEVERE EPISODE OF RECURRENT MAJOR DEPRESSIVE DISORDER, WITHOUT PSYCHOTIC FEATURES (H): ICD-10-CM

## 2023-04-28 DIAGNOSIS — E78.00 PURE HYPERCHOLESTEROLEMIA: ICD-10-CM

## 2023-04-28 LAB
ANION GAP SERPL CALCULATED.3IONS-SCNC: 13 MMOL/L (ref 7–15)
BUN SERPL-MCNC: 16.3 MG/DL (ref 6–20)
CALCIUM SERPL-MCNC: 10 MG/DL (ref 8.6–10)
CHLORIDE SERPL-SCNC: 103 MMOL/L (ref 98–107)
CHOLEST SERPL-MCNC: 187 MG/DL
CREAT SERPL-MCNC: 0.94 MG/DL (ref 0.51–0.95)
DEPRECATED CALCIDIOL+CALCIFEROL SERPL-MC: 91 UG/L (ref 20–75)
DEPRECATED HCO3 PLAS-SCNC: 25 MMOL/L (ref 22–29)
ERYTHROCYTE [DISTWIDTH] IN BLOOD BY AUTOMATED COUNT: 12.6 % (ref 10–15)
GFR SERPL CREATININE-BSD FRML MDRD: 71 ML/MIN/1.73M2
GLUCOSE SERPL-MCNC: 94 MG/DL (ref 70–99)
HCT VFR BLD AUTO: 42.2 % (ref 35–47)
HDLC SERPL-MCNC: 35 MG/DL
HGB BLD-MCNC: 13.8 G/DL (ref 11.7–15.7)
LDLC SERPL CALC-MCNC: 120 MG/DL
MCH RBC QN AUTO: 30.6 PG (ref 26.5–33)
MCHC RBC AUTO-ENTMCNC: 32.7 G/DL (ref 31.5–36.5)
MCV RBC AUTO: 94 FL (ref 78–100)
NONHDLC SERPL-MCNC: 152 MG/DL
PLATELET # BLD AUTO: 233 10E3/UL (ref 150–450)
POTASSIUM SERPL-SCNC: 5 MMOL/L (ref 3.4–5.3)
RBC # BLD AUTO: 4.51 10E6/UL (ref 3.8–5.2)
SODIUM SERPL-SCNC: 141 MMOL/L (ref 136–145)
T4 FREE SERPL-MCNC: 1.74 NG/DL (ref 0.9–1.7)
TRIGL SERPL-MCNC: 162 MG/DL
TSH SERPL DL<=0.005 MIU/L-ACNC: 0.02 UIU/ML (ref 0.3–4.2)
WBC # BLD AUTO: 9.4 10E3/UL (ref 4–11)

## 2023-04-28 PROCEDURE — 84439 ASSAY OF FREE THYROXINE: CPT

## 2023-04-28 PROCEDURE — 82306 VITAMIN D 25 HYDROXY: CPT

## 2023-04-28 PROCEDURE — 85027 COMPLETE CBC AUTOMATED: CPT

## 2023-04-28 PROCEDURE — 84443 ASSAY THYROID STIM HORMONE: CPT

## 2023-04-28 PROCEDURE — 80061 LIPID PANEL: CPT

## 2023-04-28 PROCEDURE — 80048 BASIC METABOLIC PNL TOTAL CA: CPT

## 2023-04-28 PROCEDURE — 36415 COLL VENOUS BLD VENIPUNCTURE: CPT

## 2023-04-29 RX ORDER — LEVOTHYROXINE SODIUM 150 UG/1
150 TABLET ORAL DAILY
Qty: 30 TABLET | Refills: 2 | Status: SHIPPED | OUTPATIENT
Start: 2023-04-29 | End: 2023-05-27

## 2023-05-01 ENCOUNTER — OFFICE VISIT (OUTPATIENT)
Dept: PHYSICAL MEDICINE AND REHAB | Facility: CLINIC | Age: 58
End: 2023-05-01
Payer: COMMERCIAL

## 2023-05-01 ENCOUNTER — TELEPHONE (OUTPATIENT)
Dept: FAMILY MEDICINE | Facility: CLINIC | Age: 58
End: 2023-05-01

## 2023-05-01 VITALS — HEART RATE: 70 BPM | DIASTOLIC BLOOD PRESSURE: 62 MMHG | SYSTOLIC BLOOD PRESSURE: 115 MMHG

## 2023-05-01 DIAGNOSIS — G24.3 CERVICAL DYSTONIA: Primary | ICD-10-CM

## 2023-05-01 PROCEDURE — 95874 GUIDE NERV DESTR NEEDLE EMG: CPT | Performed by: PHYSICAL MEDICINE & REHABILITATION

## 2023-05-01 PROCEDURE — 64616 CHEMODENERV MUSC NECK DYSTON: CPT | Mod: 50 | Performed by: PHYSICAL MEDICINE & REHABILITATION

## 2023-05-01 ASSESSMENT — PAIN SCALES - GENERAL: PAINLEVEL: MODERATE PAIN (5)
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
## 2023-05-01 NOTE — PATIENT INSTRUCTIONS
Lina Chanel, DO                                                    Cristela Salguero, MANJIT Curtis,  DO                                                                                CHAD Camacho, DO                                                                                       DISCHARGE INSTRUCTIONS  During office hours (8:00 a.m.- 4:30 p.m.) questions or concerns may be answered  by calling Spine Navigation Nurses at [phone]. If you experience any problems after hours please call [phone] and you will be connected to [facility].     All Patients:  You may experience an increase in your symptoms or have some soreness from the injection for the first 2 days (It may take anywhere between 2 days- 2 weeks for the steroid to have maximum effect).  You may use ice on the injection site, as frequently as 20 minutes each hour if needed.  You may continue taking your regular medication.  You may shower. No swimming, tub bath or hot tub for 48 hours.  You may remove your   bandaid/bandage as soon as you are home.  You may resume light activities, as tolerated unless otherwise directed.  Resume your usual diet as tolerated.      POSSIBLE STEROID SIDE EFFECTS   (If steroid/cortisone was used for your procedure)  -If you experience these symptoms, it should only last for a short period  Swelling of the legs        Skin redness (flushing)  Mouth (oral) irritation   Blood sugar (glucose) levels      Sweats                          Mood changes  Severe headache                  Sleeplessness            POSSIBLE PROCEDURE SIDE EFFECTS  -Call the Spine Center if you are concerned  Increased Pain      Bruising/bleeding at site                  Redness or swelling  Fever greater than 100.5            Diffuse rash            THESE INSTRUCTIONS HAVE BEEN EXPLAINED TO THE PATIENT AND THE PATIENT/PATIENT REPRESENTATITVE HAS VERBALIZED UNDERSTANDING.  A COPY OF THE  INSTRUCTIONS HAVE BEEN GIVEN TO THE PATIENT/PATIENT REPRESENTATIVE.
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
## 2023-05-01 NOTE — TELEPHONE ENCOUNTER
Called and discussed labs with patient. She verbalized understanding. Will call back at a later time to schedule lab appt for thyroid recheck.     ----- Message -----  From: Earline Jimenez MD  Sent: 4/29/2023  12:38 AM CDT  To: [facility] Care Team Pool    Please call. Her thyroid is too high. We have to decrease her synthroid dose. New rx sent. Please take new dose and recheck this in 4 weeks. Orders placed for lab only, please help schedule.

## 2023-05-25 DIAGNOSIS — E03.9 HYPOTHYROIDISM, UNSPECIFIED TYPE: ICD-10-CM

## 2023-05-25 DIAGNOSIS — E78.00 PURE HYPERCHOLESTEROLEMIA: ICD-10-CM

## 2023-05-25 DIAGNOSIS — J45.20 MILD INTERMITTENT ASTHMA WITHOUT COMPLICATION: ICD-10-CM

## 2023-05-25 DIAGNOSIS — F33.2 SEVERE EPISODE OF RECURRENT MAJOR DEPRESSIVE DISORDER, WITHOUT PSYCHOTIC FEATURES (H): ICD-10-CM

## 2023-05-25 DIAGNOSIS — G47.00 INSOMNIA, UNSPECIFIED TYPE: ICD-10-CM

## 2023-05-25 DIAGNOSIS — G43.809 OTHER MIGRAINE WITHOUT STATUS MIGRAINOSUS, NOT INTRACTABLE: ICD-10-CM

## 2023-05-25 NOTE — TELEPHONE ENCOUNTER
Medication Question or Refill        What medication are you calling about (include dose and sig)?: All medications pinned    Preferred Pharmacy:      Controlled Substance Agreement on file:   CSA -- Patient Level:    CSA: None found at the patient level.       Who prescribed the medication?: PCP    Do you need a refill? Yes    When did you use the medication last? N/A    Patient offered an appointment? No    Do you have any questions or concerns?  No      Could we send this information to you in Upptalk or would you prefer to receive a phone call?:   Patient would prefer a phone call   Okay to leave a detailed message?: Yes at Home number on file [phone] (home)

## 2023-05-26 NOTE — TELEPHONE ENCOUNTER
"Routing refill request to provider for review/approval because:  SIG needs clarification    Last Written Prescription Date:  2/20/23  Last Fill Quantity: 180,  # refills: 1   Last office visit provider:  4/27/23, PCP     Requested Prescriptions   Pending Prescriptions Disp Refills     traZODone (DESYREL) 50 MG tablet 180 tablet 1     Sig: Take 1-2 tablets ( mg) by mouth At Bedtime       Serotonin Modulators Passed - 5/25/2023 10:02 AM        Passed - Recent (12 mo) or future (30 days) visit within the authorizing provider's specialty     Patient has had an office visit with the authorizing provider or a provider within the authorizing providers department within the previous 12 mos or has a future within next 30 days. See \"Patient Info\" tab in inbasket, or \"Choose Columns\" in Meds & Orders section of the refill encounter.              Passed - Medication is active on med list        Passed - Patient is age 18 or older        Passed - No active pregnancy on record        Passed - No positive pregnancy test in past 12 months        Routing refill request to provider for review/approval because:  Needs review per protocol    Last Written Prescription Date:  9/2/22  Last Fill Quantity: 9,  # refills: 2  Last office visit provider:  4/27/23, PCP        SUMAtriptan (IMITREX) 50 MG tablet 9 tablet 2     Sig: TAKE 1 TABLET BY MOUTH EVERY 2 HOURS AS NEEDED FOR MIGRAINE. MAY REPEAT IN 2 HOURS AS NEEDED       Serotonin Agonists Failed - 5/25/2023 10:02 AM        Failed - Serotonin Agonist request needs review.     Please review patient's record. If patient has had 8 or more treatments in the past month, please forward to provider.          Passed - Blood pressure under 140/90 in past 12 months     BP Readings from Last 3 Encounters:   05/01/23 115/62   04/27/23 100/65   04/21/23 109/58                 Passed - Recent (12 mo) or future (30 days) visit within the authorizing provider's specialty     Patient has had an " "office visit with the authorizing provider or a provider within the authorizing providers department within the previous 12 mos or has a future within next 30 days. See \"Patient Info\" tab in inbasket, or \"Choose Columns\" in Meds & Orders section of the refill encounter.              Passed - Medication is active on med list        Passed - Patient is age 18 or older        Passed - No active pregnancy on record        Passed - No positive pregnancy test in past 12 months      Routing refill request to provider for review/approval because:  Needs review per protocol    Last Written Prescription Date:  9/2/22  Last Fill Quantity: 90,  # refills: 1  Last office visit provider:  4/27/23, PCP          simvastatin (ZOCOR) 20 MG tablet 90 tablet 1     Sig: Take 1 tablet (20 mg) by mouth At Bedtime       Statins Protocol Passed - 5/25/2023 10:02 AM        Passed - LDL on file in past 12 months     Recent Labs   Lab Test 04/28/23  0843   *             Passed - No abnormal creatine kinase in past 12 months     No lab results found.             Passed - Recent (12 mo) or future (30 days) visit within the authorizing provider's specialty     Patient has had an office visit with the authorizing provider or a provider within the authorizing providers department within the previous 12 mos or has a future within next 30 days. See \"Patient Info\" tab in inbasket, or \"Choose Columns\" in Meds & Orders section of the refill encounter.              Passed - Medication is active on med list        Passed - Patient is age 18 or older        Passed - No active pregnancy on record        Passed - No positive pregnancy test in past 12 months        Routing refill request to provider for review/approval because:  Needs review per protocol    Last Written Prescription Date:  9/2/22  Last Fill Quantity: 18 g,  # refills: 1  Last office visit provider:  4/27/23, PCP        albuterol (VENTOLIN HFA) 108 (90 Base) MCG/ACT inhaler 18 g 1     " "Sig: INHALE 1 TO 2 PUFFS BY MOUTH EVERY 4 HOURS AS NEEDED FOR WHEEZING       Asthma Maintenance Inhalers - Anticholinergics Passed - 5/25/2023 10:02 AM        Passed - Patient is age 12 years or older        Passed - Asthma control assessment score within normal limits in last 6 months     Please review ACT score.           Passed - Medication is active on med list        Passed - Recent (6 mo) or future (30 days) visit within the authorizing provider's specialty     Patient had office visit in the last 6 months or has a visit in the next 30 days with authorizing provider or within the authorizing provider's specialty.  See \"Patient Info\" tab in inbasket, or \"Choose Columns\" in Meds & Orders section of the refill encounter.           Short-Acting Beta Agonist Inhalers Protocol  Passed - 5/25/2023 10:02 AM        Passed - Patient is age 12 or older        Passed - Asthma control assessment score within normal limits in last 6 months     Please review ACT score.           Passed - Medication is active on med list        Passed - Recent (6 mo) or future (30 days) visit within the authorizing provider's specialty     Patient had office visit in the last 6 months or has a visit in the next 30 days with authorizing provider or within the authorizing provider's specialty.  See \"Patient Info\" tab in inbasket, or \"Choose Columns\" in Meds & Orders section of the refill encounter.          Routing refill request to provider for review/approval because:  Labs out of range, TSH: 0.02 on 4/28/23    Last Written Prescription Date:  4/29/23  Last Fill Quantity: 30,  # refills: 2  Last office visit provider:  4/27/23, PCP            levothyroxine (SYNTHROID/LEVOTHROID) 150 MCG tablet 30 tablet 2     Sig: Take 1 tablet (150 mcg) by mouth daily       Thyroid Protocol Failed - 5/25/2023 10:02 AM        Failed - Normal TSH on file in past 12 months     Recent Labs   Lab Test 04/28/23  0843   TSH 0.02*              Passed - Patient is 12 " "years or older        Passed - Recent (12 mo) or future (30 days) visit within the authorizing provider's specialty     Patient has had an office visit with the authorizing provider or a provider within the authorizing providers department within the previous 12 mos or has a future within next 30 days. See \"Patient Info\" tab in inbasket, or \"Choose Columns\" in Meds & Orders section of the refill encounter.              Passed - Medication is active on med list        Passed - No active pregnancy on record     If patient is pregnant or has had a positive pregnancy test, please check TSH.          Passed - No positive pregnancy test in past 12 months     If patient is pregnant or has had a positive pregnancy test, please check TSH.          Routing refill request to provider for review/approval because:  No PHQ-9 <5 in last 6 months    Last Written Prescription Date:  11/21/22  Last Fill Quantity: 270,  # refills: 2  Last office visit provider:  4/27/23, PCP           FLUoxetine (PROZAC) 20 MG capsule 270 capsule 2     Sig: Take 3 capsules (60 mg) by mouth daily       SSRIs Protocol Failed - 5/25/2023 10:02 AM        Failed - PHQ-9 score less than 5 in past 6 months     Please review last PHQ-9 score.           Passed - Medication is active on med list        Passed - Patient is age 18 or older        Passed - No active pregnancy on record        Passed - No positive pregnancy test in last 12 months        Passed - Recent (6 mo) or future (30 days) visit within the authorizing provider's specialty     Patient had office visit in the last 6 months or has a visit in the next 30 days with authorizing provider or within the authorizing provider's specialty.  See \"Patient Info\" tab in inbasket, or \"Choose Columns\" in Meds & Orders section of the refill encounter.                 Penny Leigh RN 05/26/23 11:52 AM  "

## 2023-05-27 RX ORDER — ALBUTEROL SULFATE 90 UG/1
AEROSOL, METERED RESPIRATORY (INHALATION)
Qty: 18 G | Refills: 1 | Status: SHIPPED | OUTPATIENT
Start: 2023-05-27 | End: 2024-02-12

## 2023-05-27 RX ORDER — SIMVASTATIN 20 MG
20 TABLET ORAL AT BEDTIME
Qty: 90 TABLET | Refills: 1 | Status: SHIPPED | OUTPATIENT
Start: 2023-05-27 | End: 2023-12-06

## 2023-05-27 RX ORDER — TRAZODONE HYDROCHLORIDE 50 MG/1
50-100 TABLET, FILM COATED ORAL AT BEDTIME
Qty: 180 TABLET | Refills: 1 | Status: SHIPPED | OUTPATIENT
Start: 2023-05-27 | End: 2023-12-11

## 2023-05-27 RX ORDER — SUMATRIPTAN 50 MG/1
TABLET, FILM COATED ORAL
Qty: 9 TABLET | Refills: 2 | Status: SHIPPED | OUTPATIENT
Start: 2023-05-27 | End: 2024-05-09

## 2023-05-27 RX ORDER — LEVOTHYROXINE SODIUM 150 UG/1
150 TABLET ORAL DAILY
Qty: 30 TABLET | Refills: 2 | Status: SHIPPED | OUTPATIENT
Start: 2023-05-27 | End: 2023-09-11

## 2023-06-15 ENCOUNTER — MYC MEDICAL ADVICE (OUTPATIENT)
Dept: FAMILY MEDICINE | Facility: CLINIC | Age: 58
End: 2023-06-15
Payer: COMMERCIAL

## 2023-06-15 DIAGNOSIS — F33.2 SEVERE EPISODE OF RECURRENT MAJOR DEPRESSIVE DISORDER, WITHOUT PSYCHOTIC FEATURES (H): Primary | ICD-10-CM

## 2023-06-15 RX ORDER — BUPROPION HYDROCHLORIDE 150 MG/1
150 TABLET ORAL EVERY MORNING
Qty: 30 TABLET | Refills: 11 | Status: SHIPPED | OUTPATIENT
Start: 2023-06-15 | End: 2024-07-18

## 2023-06-15 NOTE — TELEPHONE ENCOUNTER
Contacted patient and relayed message below from Dr Jimenez. Also scheduled lab only appointment for tomorrow, 6/16/23.  No further action needed.    Marva Harman RN  [facility]

## 2023-06-15 NOTE — TELEPHONE ENCOUNTER
Routing refill request to provider for review/approval because:  Drug not active on patient's medication list  buPROPion (WELLBUTRIN XL) 150 MG 24 hr tablet (Discontinued)  Patient was last seen on 4/27/23  Message routed to Dr Barbara Harman RN  [facility]    [facility] (supporting Earline Jimenez MD)     I know I said that I  wasn't getting any benefits from this but, I have noticed that my smoking has increased.    Can you please put me back on it?  I am almost up to 2 packs a day.   HELP    buPROPion (WELLBUTRIN XL) 150 MG 24 hr tablet (Discontinued) 30 tablet 11 3/30/2023 4/27/2023 No   Sig: TAKE 1 TABLET(150 MG) BY MOUTH EVERY MORNING

## 2023-06-16 ENCOUNTER — LAB (OUTPATIENT)
Dept: LAB | Facility: CLINIC | Age: 58
End: 2023-06-16
Payer: COMMERCIAL

## 2023-06-16 DIAGNOSIS — E03.9 HYPOTHYROIDISM, UNSPECIFIED TYPE: ICD-10-CM

## 2023-06-16 LAB
T4 FREE SERPL-MCNC: 1.16 NG/DL (ref 0.9–1.7)
TSH SERPL DL<=0.005 MIU/L-ACNC: 1.73 UIU/ML (ref 0.3–4.2)

## 2023-06-16 PROCEDURE — 84443 ASSAY THYROID STIM HORMONE: CPT

## 2023-06-16 PROCEDURE — 36415 COLL VENOUS BLD VENIPUNCTURE: CPT

## 2023-06-16 PROCEDURE — 84439 ASSAY OF FREE THYROXINE: CPT

## 2023-06-29 ENCOUNTER — MYC MEDICAL ADVICE (OUTPATIENT)
Dept: FAMILY MEDICINE | Facility: CLINIC | Age: 58
End: 2023-06-29
Payer: COMMERCIAL

## 2023-07-18 NOTE — TELEPHONE ENCOUNTER
Form mailed to home address on file per patient request. Copy retained. Informed patient via [employer].

## 2023-09-10 DIAGNOSIS — E03.9 HYPOTHYROIDISM, UNSPECIFIED TYPE: ICD-10-CM

## 2023-09-11 RX ORDER — LEVOTHYROXINE SODIUM 150 UG/1
150 TABLET ORAL DAILY
Qty: 90 TABLET | Refills: 1 | Status: SHIPPED | OUTPATIENT
Start: 2023-09-11 | End: 2024-03-04

## 2023-10-18 ENCOUNTER — TELEPHONE (OUTPATIENT)
Dept: FAMILY MEDICINE | Facility: CLINIC | Age: 58
End: 2023-10-18
Payer: COMMERCIAL

## 2023-10-18 NOTE — TELEPHONE ENCOUNTER
Patient Quality Outreach    Patient is due for the following:   Breast Cancer Screening - Mammogram    Next Steps:   Schedule a Adult Preventative    Type of outreach:    Sent Bomberbot message.      Questions for provider review:    None           Andrea Behrend
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
## 2023-11-17 ENCOUNTER — OFFICE VISIT (OUTPATIENT)
Dept: PHYSICAL MEDICINE AND REHAB | Facility: CLINIC | Age: 58
End: 2023-11-17
Payer: COMMERCIAL

## 2023-11-17 VITALS
DIASTOLIC BLOOD PRESSURE: 68 MMHG | SYSTOLIC BLOOD PRESSURE: 108 MMHG | HEART RATE: 79 BPM | BODY MASS INDEX: 30.59 KG/M2 | WEIGHT: 177 LBS

## 2023-11-17 DIAGNOSIS — G24.3 CERVICAL DYSTONIA: Primary | ICD-10-CM

## 2023-11-17 DIAGNOSIS — Z98.1 S/P LUMBAR FUSION: ICD-10-CM

## 2023-11-17 DIAGNOSIS — M79.18 MYOFASCIAL PAIN: ICD-10-CM

## 2023-11-17 DIAGNOSIS — M54.41 CHRONIC LOW BACK PAIN WITH RIGHT-SIDED SCIATICA, UNSPECIFIED BACK PAIN LATERALITY: ICD-10-CM

## 2023-11-17 DIAGNOSIS — G89.29 CHRONIC LOW BACK PAIN WITH RIGHT-SIDED SCIATICA, UNSPECIFIED BACK PAIN LATERALITY: ICD-10-CM

## 2023-11-17 PROCEDURE — 99214 OFFICE O/P EST MOD 30 MIN: CPT | Performed by: PHYSICAL MEDICINE & REHABILITATION

## 2023-11-17 RX ORDER — TIZANIDINE 2 MG/1
2-4 TABLET ORAL 3 TIMES DAILY PRN
Qty: 60 TABLET | Refills: 0 | Status: SHIPPED | OUTPATIENT
Start: 2023-11-17 | End: 2023-12-08

## 2023-11-17 ASSESSMENT — PAIN SCALES - GENERAL: PAINLEVEL: SEVERE PAIN (6)
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
## 2023-11-17 NOTE — PATIENT INSTRUCTIONS
Schedule Botox injection with EMG with Dr Curtis.  Tizanidine (muscle relaxant medication) has been prescribed today. Please take 1-2 tabs twice daily as needed. This medication may cause drowsiness. Please do not work or drive while taking this medication until you know how it effects you. If it does make you drowsy, you should only take it before bedtime or at times that you do not have to work/drive.

## 2023-11-21 ENCOUNTER — ANCILLARY PROCEDURE (OUTPATIENT)
Dept: MAMMOGRAPHY | Facility: CLINIC | Age: 58
End: 2023-11-21
Attending: FAMILY MEDICINE
Payer: COMMERCIAL

## 2023-11-21 ENCOUNTER — OFFICE VISIT (OUTPATIENT)
Dept: FAMILY MEDICINE | Facility: CLINIC | Age: 58
End: 2023-11-21
Payer: COMMERCIAL

## 2023-11-21 ENCOUNTER — ANCILLARY PROCEDURE (OUTPATIENT)
Dept: GENERAL RADIOLOGY | Facility: CLINIC | Age: 58
End: 2023-11-21
Attending: FAMILY MEDICINE
Payer: COMMERCIAL

## 2023-11-21 VITALS
HEIGHT: 63 IN | RESPIRATION RATE: 20 BRPM | HEART RATE: 80 BPM | SYSTOLIC BLOOD PRESSURE: 116 MMHG | TEMPERATURE: 98.5 F | WEIGHT: 174 LBS | BODY MASS INDEX: 30.83 KG/M2 | DIASTOLIC BLOOD PRESSURE: 70 MMHG | OXYGEN SATURATION: 98 %

## 2023-11-21 DIAGNOSIS — H69.93 DYSFUNCTION OF BOTH EUSTACHIAN TUBES: ICD-10-CM

## 2023-11-21 DIAGNOSIS — Z12.31 VISIT FOR SCREENING MAMMOGRAM: ICD-10-CM

## 2023-11-21 DIAGNOSIS — G89.29 CHRONIC PAIN OF LEFT KNEE: ICD-10-CM

## 2023-11-21 DIAGNOSIS — M25.562 CHRONIC PAIN OF LEFT KNEE: ICD-10-CM

## 2023-11-21 DIAGNOSIS — M25.562 CHRONIC PAIN OF LEFT KNEE: Primary | ICD-10-CM

## 2023-11-21 DIAGNOSIS — G89.29 CHRONIC PAIN OF LEFT KNEE: Primary | ICD-10-CM

## 2023-11-21 DIAGNOSIS — G95.20 CERVICAL CORD COMPRESSION WITH MYELOPATHY (H): ICD-10-CM

## 2023-11-21 DIAGNOSIS — E03.9 HYPOTHYROIDISM, UNSPECIFIED TYPE: ICD-10-CM

## 2023-11-21 DIAGNOSIS — Z23 NEED FOR PROPHYLACTIC VACCINATION AGAINST HEPATITIS B VIRUS: ICD-10-CM

## 2023-11-21 PROCEDURE — G0010 ADMIN HEPATITIS B VACCINE: HCPCS | Performed by: FAMILY MEDICINE

## 2023-11-21 PROCEDURE — 84443 ASSAY THYROID STIM HORMONE: CPT | Performed by: FAMILY MEDICINE

## 2023-11-21 PROCEDURE — 84439 ASSAY OF FREE THYROXINE: CPT | Performed by: FAMILY MEDICINE

## 2023-11-21 PROCEDURE — 36415 COLL VENOUS BLD VENIPUNCTURE: CPT | Performed by: FAMILY MEDICINE

## 2023-11-21 PROCEDURE — 99214 OFFICE O/P EST MOD 30 MIN: CPT | Mod: 25 | Performed by: FAMILY MEDICINE

## 2023-11-21 PROCEDURE — 77067 SCR MAMMO BI INCL CAD: CPT | Mod: TC | Performed by: RADIOLOGY

## 2023-11-21 PROCEDURE — 73562 X-RAY EXAM OF KNEE 3: CPT | Mod: TC | Performed by: RADIOLOGY

## 2023-11-21 PROCEDURE — 90746 HEPB VACCINE 3 DOSE ADULT IM: CPT | Performed by: FAMILY MEDICINE

## 2023-11-21 RX ORDER — FLUTICASONE PROPIONATE 50 MCG
1 SPRAY, SUSPENSION (ML) NASAL DAILY
COMMUNITY
End: 2023-11-29

## 2023-11-21 RX ORDER — CLINDAMYCIN HCL 150 MG
150 CAPSULE ORAL DAILY
COMMUNITY
End: 2024-04-02

## 2023-11-21 RX ORDER — CYCLOBENZAPRINE HCL 5 MG
5 TABLET ORAL EVERY EVENING
COMMUNITY
End: 2024-01-05

## 2023-11-21 RX ORDER — IBUPROFEN 800 MG/1
800 TABLET, FILM COATED ORAL EVERY 8 HOURS PRN
COMMUNITY
End: 2024-01-05

## 2023-11-21 ASSESSMENT — ASTHMA QUESTIONNAIRES
QUESTION_4 LAST FOUR WEEKS HOW OFTEN HAVE YOU USED YOUR RESCUE INHALER OR NEBULIZER MEDICATION (SUCH AS ALBUTEROL): NOT AT ALL
QUESTION_2 LAST FOUR WEEKS HOW OFTEN HAVE YOU HAD SHORTNESS OF BREATH: NOT AT ALL
QUESTION_5 LAST FOUR WEEKS HOW WOULD YOU RATE YOUR ASTHMA CONTROL: WELL CONTROLLED
ACT_TOTALSCORE: 24
ACT_TOTALSCORE: 24
QUESTION_1 LAST FOUR WEEKS HOW MUCH OF THE TIME DID YOUR ASTHMA KEEP YOU FROM GETTING AS MUCH DONE AT WORK, SCHOOL OR AT HOME: NONE OF THE TIME
QUESTION_3 LAST FOUR WEEKS HOW OFTEN DID YOUR ASTHMA SYMPTOMS (WHEEZING, COUGHING, SHORTNESS OF BREATH, CHEST TIGHTNESS OR PAIN) WAKE YOU UP AT NIGHT OR EARLIER THAN USUAL IN THE MORNING: NOT AT ALL

## 2023-11-21 ASSESSMENT — PATIENT HEALTH QUESTIONNAIRE - PHQ9
10. IF YOU CHECKED OFF ANY PROBLEMS, HOW DIFFICULT HAVE THESE PROBLEMS MADE IT FOR YOU TO DO YOUR WORK, TAKE CARE OF THINGS AT HOME, OR GET ALONG WITH OTHER PEOPLE: SOMEWHAT DIFFICULT
SUM OF ALL RESPONSES TO PHQ QUESTIONS 1-9: 6
SUM OF ALL RESPONSES TO PHQ QUESTIONS 1-9: 6

## 2023-11-21 NOTE — PROGRESS NOTES
"  Assessment & Plan   1. Chronic pain of left knee  - XR Knee Left 3 Views; Future  2. Cervical cord compression with myelopathy (H)  3. Hypothyroidism, unspecified type  - TSH  - T4, free  4. Dysfunction of both eustachian tubes  - fluticasone (FLONASE) 50 MCG/ACT nasal spray; Spray 1 spray into both nostrils daily  Dispense: 16 g; Refill: 1  5. Need for prophylactic vaccination against hepatitis B virus  - hepatitis b vaccine recombinant (RECOMBIVAX-HB) 10 MCG/ML injection; Inject 0.5 mLs (5 mcg) into the muscle once for 1 dose  Dispense: 1 mL; Refill: 0  6. Visit for screening mammogram  - *MA Screening Digital Bilateral; Future    EHR reviewed.   Past medical history, problem list, past surgical history, family history, social history, medications reviewed, updated, reconciled.   No old records were noted for prior knee surgery or treatment.   We discussed possible etiology including osteoarthritis, meniscal injury, less likely ACL injury. Will start with xrays here today. After this was reviewed, there is no acute changes. Due to prior history, discussed option of further imaging. MRI to be scheduled at her earliest convenience. Will then be seen by orthopedic surgery.   Check on thyroid function. Continue same dose of levothyroxine.   Reviewed possible etiology of ear symptoms. No signs of infection. Trial of intranasal steroid, has used this before. Follow up in four weeks if no better.   Mammogram today.   Updated vaccines.     BMI:   Estimated body mass index is 30.64 kg/m  as calculated from the following:    Height as of this encounter: 1.605 m (5' 3.19\").    Weight as of this encounter: 78.9 kg (174 lb).       Earline Jimenez MD  [facility]    Nohelia Davis is a 58 year old, presenting for the following health issues:  Knee Pain (Left knee, x2 months, has been a problem previously/)        11/21/2023     1:08 PM   Additional Questions   Roomed by Tia       History of Present " "Illness       Reason for visit:  Knee    She eats 0-1 servings of fruits and vegetables daily.She consumes 2 sweetened beverage(s) daily.She exercises with enough effort to increase her heart rate 9 or less minutes per day.  She exercises with enough effort to increase her heart rate 3 or less days per week.   She is taking medications regularly.     [age] old female here with concerns of left knee pain.   This first started many years ago, but is worse over the last two months.   She recalls surgery on her left knee a few years ago. Not sure what the diagnosis was, but was had a scope to clean the inside out. She was well for some time. Her knee is not swollen but can be. It seems to catch once and while. There is no redness. Has not really tried any pain medication. Has long history of back and neck pain, taking much treatment for that. Of note, she says she just had her botox injection for her neck, it is better. She has not participated in physical therapy lately, would consider this. Would like to just see the specialist she saw before.   Additionally has some 'water' in her ear. Wants the drops she used before for this. There is no pain or drainage, she just feels fullness in both ears, feels pressure moving her head around. No hearing changes. No fever.   Would like to update date her vaccines.   Is taking her thyroid as directed at the new dose from last time. Energy is ok. Weight is stable.             Objective    /70 (BP Location: Left arm, Patient Position: Sitting, Cuff Size: Adult Regular)   Pulse 80   Temp 98.5  F (36.9  C) (Oral)   Resp 20   Ht 1.605 m (5' 3.19\")   Wt 78.9 kg (174 lb)   LMP 03/09/2010   SpO2 98%   BMI 30.64 kg/m    Body mass index is 30.64 kg/m .  Physical Exam   GENERAL: healthy, alert and no distress  NECK: no adenopathy, no asymmetry, masses, or scars and thyroid normal to palpation  RESP: lungs clear to auscultation - no rales, rhonchi or wheezes  CV: " regular rate and rhythm, normal S1 S2, no S3 or S4, no murmur, click or rub, no peripheral edema and peripheral pulses strong  MS: normal muscle tone, normal range of motion, no cyanosis, clubbing, or edema, and LLE exam shows normal strength and muscle mass, no deformities, no erythema, induration, or nodules, and normal knee flexion and extension, some crepitus, no signs of internal derangement but mild pain on varus/valgus stress    Results for orders placed or performed in visit on 11/21/23   XR Knee Left 3 Views     Status: None    Narrative    EXAM: XR KNEE LEFT 3 VIEWS  LOCATION: [facility]  DATE: 11/21/2023    INDICATION:  Chronic pain of left knee, Chronic pain of left knee  COMPARISON: None.      Impression    IMPRESSION: Normal joint spaces and alignment. No fracture. Borderline joint effusion.   Results for orders placed or performed in visit on 11/21/23   MA SCREENING DIGITAL BILAT - Future  (s+30)     Status: None    Narrative    BILATERAL FULL FIELD DIGITAL SCREENING MAMMOGRAM    Performed on: 11/21/23    Compared to: 12/08/2020, 06/28/2019, and 09/21/2017    Technique: This study was evaluated with the assistance of Computer-Aided   Detection.    Findings: The breasts have scattered areas of fibroglandular density.    There is no radiographic evidence of malignancy.     Impression    IMPRESSION: ACR BI-RADS Category 1: Negative    RECOMMENDED FOLLOW-UP: Annual routine screening mammogram    The results and recommendations of this examination will be communicated   to the patient.        Ludy Jain DO         Results for orders placed or performed in visit on 11/21/23   TSH     Status: Normal   Result Value Ref Range    TSH 3.51 0.30 - 4.20 uIU/mL   T4, free     Status: Normal   Result Value Ref Range    Free T4 1.35 0.90 - 1.70 ng/dL         Prior to immunization administration, verified patients identity using patient s name and date of birth. Please see Immunization  Activity for additional information.     Screening Questionnaire for Adult Immunization    Are you sick today?   Yes   Do you have allergies to medications, food, a vaccine component or latex?   No   Have you ever had a serious reaction after receiving a vaccination?   No   Do you have a long-term health problem with heart, lung, kidney, or metabolic disease (e.g., diabetes), asthma, a blood disorder, no spleen, complement component deficiency, a cochlear implant, or a spinal fluid leak?  Are you on long-term aspirin therapy?   Yes   Do you have cancer, leukemia, HIV/AIDS, or any other immune system problem?   No   Do you have a parent, brother, or sister with an immune system problem?   No   In the past 3 months, have you taken medications that affect  your immune system, such as prednisone, other steroids, or anticancer drugs; drugs for the treatment of rheumatoid arthritis, Crohn s disease, or psoriasis; or have you had radiation treatments?   No   Have you had a seizure, or a brain or other nervous system problem?   No   During the past year, have you received a transfusion of blood or blood    products, or been given immune (gamma) globulin or antiviral drug?   No   For women: Are you pregnant or is there a chance you could become       pregnant during the next month?   No   Have you received any vaccinations in the past 4 weeks?   No     Immunization questionnaire was positive for at least one answer.  Notified .      Patient instructed to remain in clinic for 15 minutes afterwards, and to report any adverse reactions.     Screening performed by Mey Vasquez CMA on 11/21/2023 at 1:10 PM.

## 2023-11-22 LAB
T4 FREE SERPL-MCNC: 1.35 NG/DL (ref 0.9–1.7)
TSH SERPL DL<=0.005 MIU/L-ACNC: 3.51 UIU/ML (ref 0.3–4.2)

## 2023-11-29 RX ORDER — FLUTICASONE PROPIONATE 50 MCG
1 SPRAY, SUSPENSION (ML) NASAL DAILY
Qty: 16 G | Refills: 1 | Status: SHIPPED | OUTPATIENT
Start: 2023-11-29

## 2023-12-06 DIAGNOSIS — E78.00 PURE HYPERCHOLESTEROLEMIA: ICD-10-CM

## 2023-12-06 RX ORDER — SIMVASTATIN 20 MG
20 TABLET ORAL DAILY
Qty: 90 TABLET | Refills: 1 | Status: SHIPPED | OUTPATIENT
Start: 2023-12-06 | End: 2024-06-07

## 2023-12-08 ENCOUNTER — MYC REFILL (OUTPATIENT)
Dept: PHYSICAL MEDICINE AND REHAB | Facility: CLINIC | Age: 58
End: 2023-12-08
Payer: COMMERCIAL

## 2023-12-08 DIAGNOSIS — M79.18 MYOFASCIAL PAIN: ICD-10-CM

## 2023-12-08 DIAGNOSIS — G24.3 CERVICAL DYSTONIA: ICD-10-CM

## 2023-12-08 RX ORDER — TIZANIDINE 2 MG/1
2-4 TABLET ORAL 3 TIMES DAILY PRN
Qty: 60 TABLET | Refills: 0 | Status: SHIPPED | OUTPATIENT
Start: 2023-12-08 | End: 2024-01-04

## 2023-12-10 DIAGNOSIS — G47.00 INSOMNIA, UNSPECIFIED TYPE: ICD-10-CM

## 2023-12-11 RX ORDER — TRAZODONE HYDROCHLORIDE 50 MG/1
TABLET, FILM COATED ORAL
Qty: 360 TABLET | Refills: 0 | Status: SHIPPED | OUTPATIENT
Start: 2023-12-11 | End: 2024-06-07

## 2023-12-21 ENCOUNTER — MYC REFILL (OUTPATIENT)
Dept: FAMILY MEDICINE | Facility: CLINIC | Age: 58
End: 2023-12-21
Payer: COMMERCIAL

## 2023-12-21 DIAGNOSIS — G47.00 INSOMNIA, UNSPECIFIED TYPE: ICD-10-CM

## 2023-12-21 RX ORDER — TRAZODONE HYDROCHLORIDE 50 MG/1
TABLET, FILM COATED ORAL
Qty: 360 TABLET | Refills: 0 | OUTPATIENT
Start: 2023-12-21

## 2024-01-04 ENCOUNTER — MYC REFILL (OUTPATIENT)
Dept: PHYSICAL MEDICINE AND REHAB | Facility: CLINIC | Age: 59
End: 2024-01-04
Payer: COMMERCIAL

## 2024-01-04 DIAGNOSIS — M79.18 MYOFASCIAL PAIN: ICD-10-CM

## 2024-01-04 DIAGNOSIS — G24.3 CERVICAL DYSTONIA: ICD-10-CM

## 2024-01-04 RX ORDER — TIZANIDINE 2 MG/1
2-4 TABLET ORAL 3 TIMES DAILY PRN
Qty: 60 TABLET | Refills: 0 | Status: SHIPPED | OUTPATIENT
Start: 2024-01-04 | End: 2024-01-04

## 2024-01-05 ENCOUNTER — OFFICE VISIT (OUTPATIENT)
Dept: ORTHOPEDICS | Facility: CLINIC | Age: 59
End: 2024-01-05
Payer: COMMERCIAL

## 2024-01-05 VITALS — BODY MASS INDEX: 30.12 KG/M2 | WEIGHT: 170 LBS | HEIGHT: 63 IN

## 2024-01-05 DIAGNOSIS — G89.29 CHRONIC PAIN OF LEFT KNEE: ICD-10-CM

## 2024-01-05 DIAGNOSIS — M25.562 CHRONIC PAIN OF LEFT KNEE: ICD-10-CM

## 2024-01-05 PROCEDURE — 20610 DRAIN/INJ JOINT/BURSA W/O US: CPT | Mod: LT | Performed by: STUDENT IN AN ORGANIZED HEALTH CARE EDUCATION/TRAINING PROGRAM

## 2024-01-05 PROCEDURE — 99203 OFFICE O/P NEW LOW 30 MIN: CPT | Mod: 25 | Performed by: STUDENT IN AN ORGANIZED HEALTH CARE EDUCATION/TRAINING PROGRAM

## 2024-01-05 RX ORDER — LIDOCAINE HYDROCHLORIDE 10 MG/ML
5 INJECTION, SOLUTION INFILTRATION; PERINEURAL
Status: DISCONTINUED | OUTPATIENT
Start: 2024-01-05 | End: 2024-06-12

## 2024-01-05 RX ORDER — LIDOCAINE HYDROCHLORIDE 20 MG/ML
2 INJECTION, SOLUTION INFILTRATION; PERINEURAL
Status: DISCONTINUED | OUTPATIENT
Start: 2024-01-05 | End: 2024-06-12

## 2024-01-05 RX ORDER — TRIAMCINOLONE ACETONIDE 40 MG/ML
40 INJECTION, SUSPENSION INTRA-ARTICULAR; INTRAMUSCULAR
Status: DISCONTINUED | OUTPATIENT
Start: 2024-01-05 | End: 2024-06-12

## 2024-01-05 RX ORDER — TIZANIDINE 2 MG/1
2-4 TABLET ORAL 3 TIMES DAILY PRN
Qty: 60 TABLET | Refills: 0 | Status: SHIPPED | OUTPATIENT
Start: 2024-01-05 | End: 2024-01-30

## 2024-01-05 RX ADMIN — LIDOCAINE HYDROCHLORIDE 2 ML: 20 INJECTION, SOLUTION INFILTRATION; PERINEURAL at 13:41

## 2024-01-05 RX ADMIN — LIDOCAINE HYDROCHLORIDE 5 ML: 10 INJECTION, SOLUTION INFILTRATION; PERINEURAL at 13:41

## 2024-01-05 RX ADMIN — TRIAMCINOLONE ACETONIDE 40 MG: 40 INJECTION, SUSPENSION INTRA-ARTICULAR; INTRAMUSCULAR at 13:41

## 2024-01-05 ASSESSMENT — KOOS JR
GOING UP OR DOWN STAIRS: MODERATE
STRAIGHTENING KNEE FULLY: MILD
HOW SEVERE IS YOUR KNEE STIFFNESS AFTER FIRST WAKING IN MORNING: MODERATE
TWISING OR PIVOTING ON KNEE: SEVERE
KOOS JR SCORING: 65.99
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
## 2024-01-05 NOTE — PATIENT INSTRUCTIONS
[facility]   [address], [address]  [place] [address] [address] [place] [address]   Appointments: [phone] Appointments: [phone]   Fax: [phone] Fax: [phone]       1. Chronic pain of left knee        INJECTION:  Steroid injection of the left knee was performed today in clinic.  - Do not soak in a hot tub, bath or swimming pool for 48 hours.  - Ok to shower.  - Ice today and only do your normal amounts of activity.  - The lidocaine (what is giving you pain relief right now) will likely stop working in 1-2 hours.  You may then have pain again, similar to before you received the injection. The corticosteroid will not start working until approximately 1-2 weeks from now.  - In a small percentage of people, cortisone can cause flushing/redness in the face. This usually lasts for 1-3 days and resolves. Cool compress and Ibuprofen/Tylenol can help if this happens.  - The steroid can raise your blood sugar for 3-5 days after injection. Diabetic patients are advised to monitor their blood glucose and adjust medication as needed during this time frame. Please call your PCP with further questions regarding medication adjustments.      Follow up with Dr. Dior as needed     Call my office with any questions or concerns, [phone].

## 2024-01-08 ENCOUNTER — OFFICE VISIT (OUTPATIENT)
Dept: PHYSICAL MEDICINE AND REHAB | Facility: CLINIC | Age: 59
End: 2024-01-08
Payer: COMMERCIAL

## 2024-01-08 VITALS — HEART RATE: 72 BPM | DIASTOLIC BLOOD PRESSURE: 62 MMHG | SYSTOLIC BLOOD PRESSURE: 117 MMHG

## 2024-01-08 DIAGNOSIS — G24.3 CERVICAL DYSTONIA: Primary | ICD-10-CM

## 2024-01-08 PROCEDURE — 64616 CHEMODENERV MUSC NECK DYSTON: CPT | Mod: 50 | Performed by: PHYSICAL MEDICINE & REHABILITATION

## 2024-01-08 PROCEDURE — 95874 GUIDE NERV DESTR NEEDLE EMG: CPT | Performed by: PHYSICAL MEDICINE & REHABILITATION

## 2024-01-08 NOTE — PATIENT INSTRUCTIONS
Lina Chanel, DO                                                    Cristela Salguero, MANJIT Curtis,  DO                                                                                CHAD Camacho, DO                                                                                       DISCHARGE INSTRUCTIONS  During office hours (8:00 a.m.- 4:30 p.m.) questions or concerns may be answered  by calling Spine Navigation Nurses at [phone]. If you experience any problems after hours please call [phone] and you will be connected to [facility].     All Patients:  You may experience an increase in your symptoms or have some soreness from the injection for the first 2 days (It may take anywhere between 2 days- 2 weeks for the steroid to have maximum effect).  You may use ice on the injection site, as frequently as 20 minutes each hour if needed.  You may continue taking your regular medication.  You may shower. No swimming, tub bath or hot tub for 48 hours.  You may remove your   bandaid/bandage as soon as you are home.  You may resume light activities, as tolerated unless otherwise directed.  Resume your usual diet as tolerated.      POSSIBLE STEROID SIDE EFFECTS   (If steroid/cortisone was used for your procedure)  -If you experience these symptoms, it should only last for a short period  Swelling of the legs        Skin redness (flushing)  Mouth (oral) irritation   Blood sugar (glucose) levels      Sweats                          Mood changes  Severe headache                  Sleeplessness            POSSIBLE PROCEDURE SIDE EFFECTS  -Call the Spine Center if you are concerned  Increased Pain      Bruising/bleeding at site                  Redness or swelling  Fever greater than 100.5            Diffuse rash            THESE INSTRUCTIONS HAVE BEEN EXPLAINED TO THE PATIENT AND THE PATIENT/PATIENT REPRESENTATITVE HAS VERBALIZED UNDERSTANDING.  A COPY OF THE  INSTRUCTIONS HAVE BEEN GIVEN TO THE PATIENT/PATIENT REPRESENTATIVE.
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
## 2024-01-10 DIAGNOSIS — M79.18 MYOFASCIAL PAIN: ICD-10-CM

## 2024-01-10 RX ORDER — GABAPENTIN 300 MG/1
CAPSULE ORAL
Qty: 300 CAPSULE | Refills: 2 | Status: SHIPPED | OUTPATIENT
Start: 2024-01-10 | End: 2024-06-06

## 2024-01-30 DIAGNOSIS — G24.3 CERVICAL DYSTONIA: ICD-10-CM

## 2024-01-30 DIAGNOSIS — M79.18 MYOFASCIAL PAIN: ICD-10-CM

## 2024-01-30 RX ORDER — TIZANIDINE 2 MG/1
2-4 TABLET ORAL 3 TIMES DAILY PRN
Qty: 60 TABLET | Refills: 0 | Status: SHIPPED | OUTPATIENT
Start: 2024-01-30 | End: 2024-02-12

## 2024-02-12 ENCOUNTER — OFFICE VISIT (OUTPATIENT)
Dept: PHYSICAL MEDICINE AND REHAB | Facility: CLINIC | Age: 59
End: 2024-02-12
Payer: COMMERCIAL

## 2024-02-12 VITALS — HEART RATE: 113 BPM | SYSTOLIC BLOOD PRESSURE: 129 MMHG | DIASTOLIC BLOOD PRESSURE: 70 MMHG | OXYGEN SATURATION: 96 %

## 2024-02-12 DIAGNOSIS — Z98.1 S/P LUMBAR FUSION: ICD-10-CM

## 2024-02-12 DIAGNOSIS — R26.89 POOR BALANCE: ICD-10-CM

## 2024-02-12 DIAGNOSIS — M54.16 LUMBAR RADICULAR PAIN: Primary | ICD-10-CM

## 2024-02-12 DIAGNOSIS — J45.20 MILD INTERMITTENT ASTHMA WITHOUT COMPLICATION: ICD-10-CM

## 2024-02-12 DIAGNOSIS — M48.061 STENOSIS OF LATERAL RECESS OF LUMBAR SPINE: ICD-10-CM

## 2024-02-12 DIAGNOSIS — G24.3 CERVICAL DYSTONIA: ICD-10-CM

## 2024-02-12 DIAGNOSIS — Z98.1 S/P CERVICAL SPINAL FUSION: ICD-10-CM

## 2024-02-12 DIAGNOSIS — M79.18 MYOFASCIAL PAIN: ICD-10-CM

## 2024-02-12 DIAGNOSIS — M54.2 CERVICAL SPINE PAIN: ICD-10-CM

## 2024-02-12 PROCEDURE — 99214 OFFICE O/P EST MOD 30 MIN: CPT | Performed by: PHYSICAL MEDICINE & REHABILITATION

## 2024-02-12 RX ORDER — ALBUTEROL SULFATE 90 UG/1
AEROSOL, METERED RESPIRATORY (INHALATION)
Qty: 8.5 G | Refills: 0 | Status: SHIPPED | OUTPATIENT
Start: 2024-02-12 | End: 2024-09-25

## 2024-02-12 RX ORDER — METHYLPREDNISOLONE 4 MG
TABLET, DOSE PACK ORAL
Qty: 21 TABLET | Refills: 0 | Status: SHIPPED | OUTPATIENT
Start: 2024-02-12 | End: 2024-04-02

## 2024-02-12 RX ORDER — TIZANIDINE 2 MG/1
2-4 TABLET ORAL 3 TIMES DAILY PRN
Qty: 120 TABLET | Refills: 1 | Status: SHIPPED | OUTPATIENT
Start: 2024-02-12 | End: 2024-02-15

## 2024-02-12 RX ORDER — ACETAMINOPHEN AND CODEINE PHOSPHATE 300; 30 MG/1; MG/1
1 TABLET ORAL EVERY 8 HOURS PRN
Qty: 8 TABLET | Refills: 0 | Status: SHIPPED | OUTPATIENT
Start: 2024-02-12 | End: 2024-02-15

## 2024-02-12 ASSESSMENT — PAIN SCALES - GENERAL: PAINLEVEL: MODERATE PAIN (5)
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
## 2024-02-12 NOTE — PATIENT INSTRUCTIONS
Medrol dose pack has been prescribed today.  Please follow directions on package.  Do not take with NSAIDs (ibupreofen or aleve).  I would recommend tylenol (extra strength or extended release/8hour) over the counter as needed.   2. Refill of tizanidine  3. Tylenol #3   was prescribed for you today Please lock this medication up when you are not taking it. Do not share this medication with other people. Do not increase the dose without permission from your physician. Do not drink alcohol while you take this medication as this can lead to death. Do not take other pain medications without approval from your physician or this can also lead to death. If you need a refill of this medication, you must come in to clinic by appointment. Please call if you have any questions on how to take this medication.

## 2024-02-15 DIAGNOSIS — G24.3 CERVICAL DYSTONIA: ICD-10-CM

## 2024-02-15 DIAGNOSIS — M79.18 MYOFASCIAL PAIN: ICD-10-CM

## 2024-02-15 RX ORDER — TIZANIDINE 2 MG/1
2-4 TABLET ORAL 3 TIMES DAILY PRN
Qty: 120 TABLET | Refills: 1 | Status: SHIPPED | OUTPATIENT
Start: 2024-02-15 | End: 2024-03-13

## 2024-02-16 ENCOUNTER — MYC MEDICAL ADVICE (OUTPATIENT)
Dept: FAMILY MEDICINE | Facility: CLINIC | Age: 59
End: 2024-02-16
Payer: COMMERCIAL

## 2024-02-16 DIAGNOSIS — J11.1 INFLUENZA: Primary | ICD-10-CM

## 2024-02-16 RX ORDER — OSELTAMIVIR PHOSPHATE 75 MG/1
75 CAPSULE ORAL 2 TIMES DAILY
Qty: 10 CAPSULE | Refills: 0 | Status: SHIPPED | OUTPATIENT
Start: 2024-02-16 | End: 2024-02-21

## 2024-02-16 NOTE — TELEPHONE ENCOUNTER
Influenza-Like Illness (LITO) RN Standing Order (13+)    Susan KEN White      Age: 58 year old     YOB: 1965    Patient has been triaged using Epic triage guidelines: Patient does not need higher level of care     Has the patient been seen at a [facility] or [facility] (established in primary or specialty care) in the last two years? Yes     Do any of the following exclusions apply to the patient?    Does the patient have a history of CrCl less than or equal to 60 ml/min in the previous 12 months?    Estimated Creatinine Clearance: 66 mL/min (based on SCr of 0.94 mg/dL).  *If no result, instruct patient to do an evisit No   Is the patient taking Probenecid?     (Probencecid & Tamiflu together are not recommended) No   Patient reports a positive Covid-19 test:     (Encourage at home COVID-19 test or place PCR order per standing order) No     Reported Tamiflu allergy or intolerance    No     Does this patient have ANY of the above exclusions answered Yes?  No.     Does the patient have symptoms or been exposed to a confirmed case of influenza?  Symptoms- patient has LITO symptoms that started <48 hours ago and has had close contact with a confirmed case of inlfuenza within 5 days.     Since this patient has symptoms, do they have ANY of the following conditions?  Chemotherapy or radiation within the last 3 months No   Organ or bone marrow transplant No   Metabolic disorder No   HIV patient with CD4 count <200 No     Does this patient have ANY of the above conditions? No     Since this patient has symptoms, does the patient have ANY of the following?  Younger than 5 years of age or age 65 and older No   Chronic pulmonary disease such as asthma or COPD Yes   Heart disease (CHF, CAD, anticoagulation d/t arrhytmia, congenital heart anomaly) *HTN alone is excluded No   Kidney disease insufficiency No   Hepatic or Hematologic disorder (e.g. chronic liver disease patient, sickle cell disease) No   Diabetes  (Type 1 or Type 2) No   Neurologic and Neurodevelopment Conditions (including disorders of the brain, spinal cord, peripheral nerve, and muscle, such as cerebral palsy, epilepsy (seizure disorders), stroke, intellectual disability, moderate to severe developmental delay, muscular dystrophy, or spinal cord injury) No   Obese with BMI >40 No   Immunosuppression caused by medications such as those taking prednisone in excess of 20mg daily, or caused by HIV/AIDS with CD4 count more than 200 No   Is pregnant, may be pregnant, or is within two weeks after delivery No   Is a resident of a chronic care facility No   Is the patient considered a non- Black,  or , or  or  racial or ethnic minority group? No   Is <19 years old and is receiving long term aspirin- or salicylate-containing medications No     Does this patient have ANY of the above conditions? Yes     Wt Readings from Last 1 Encounters:   01/05/24 77.1 kg (170 lb)       Is weight documented in the last 12 months? Yes, age >1 Year     RECOMMENDATION:  This patient may benefit from an order of Tamiflu for treatment of LITO symptoms per the standing order.    Inform patient: Your symptoms and exposure are consistent with influenza. If your symptoms progress despite oseltamivir, or if you have concerns about the presence fo another infection including coronavirus, please contact your care provider by virtual visit.    Reviewed precautions and no additional information is needed.      AGE AND DOSING TABLE FOR THE SYMPTOMATIC INFLUENZA PATIENT:  Adult or Pediatric Patients >40kg    Oseltamivir 75mg by mouth twice a day for 5 days        Pediatric Dosing by Weight    If 3 to 9 months old Oral Oseltamivir (Tamiflu) 3mg/kg/dose twice daily for 5 days   If 9 to 12 months old Oral Oseltamivir (Tamiflu) 3.5mg/kg/dose twice daily for 5 days   If > 12 months or older and:    15 kg or less Oral Oseltamivir (Tamiflu) 30mg twice a  day for 5 days   > 15 kg to 23 kg Oral Oseltamivir (Tamiflu) 45mg twice a day for 5 days   > 23 kg to 40 kg Oral Oseltamivir (Tamiflu) 60mg twice a day for 5 days   > 40 kg Oral Oseltamivir (Tamiflu) 75mg twice a day for 5 days       Additional educational resources include:  http://www.[employer]uggeta  http://www.cdc.gov/flu/    ELI Nathan  [facility]

## 2024-03-04 DIAGNOSIS — E03.9 HYPOTHYROIDISM, UNSPECIFIED TYPE: ICD-10-CM

## 2024-03-04 DIAGNOSIS — F33.2 SEVERE EPISODE OF RECURRENT MAJOR DEPRESSIVE DISORDER, WITHOUT PSYCHOTIC FEATURES (H): ICD-10-CM

## 2024-03-04 RX ORDER — LEVOTHYROXINE SODIUM 150 UG/1
150 TABLET ORAL DAILY
Qty: 90 TABLET | Refills: 1 | Status: SHIPPED | OUTPATIENT
Start: 2024-03-04 | End: 2024-09-05

## 2024-03-13 ENCOUNTER — OFFICE VISIT (OUTPATIENT)
Dept: PHYSICAL MEDICINE AND REHAB | Facility: CLINIC | Age: 59
End: 2024-03-13
Payer: COMMERCIAL

## 2024-03-13 VITALS — DIASTOLIC BLOOD PRESSURE: 53 MMHG | HEART RATE: 80 BPM | SYSTOLIC BLOOD PRESSURE: 90 MMHG

## 2024-03-13 DIAGNOSIS — M48.061 STENOSIS OF LATERAL RECESS OF LUMBAR SPINE: ICD-10-CM

## 2024-03-13 DIAGNOSIS — M99.01 SOMATIC DYSFUNCTION OF CERVICAL REGION: ICD-10-CM

## 2024-03-13 DIAGNOSIS — M99.02 SOMATIC DYSFUNCTION OF THORACIC REGION: ICD-10-CM

## 2024-03-13 DIAGNOSIS — R26.89 POOR BALANCE: ICD-10-CM

## 2024-03-13 DIAGNOSIS — M99.00 SOMATIC DYSFUNCTION OF HEAD REGION: ICD-10-CM

## 2024-03-13 DIAGNOSIS — R51.9 NONINTRACTABLE HEADACHE, UNSPECIFIED CHRONICITY PATTERN, UNSPECIFIED HEADACHE TYPE: ICD-10-CM

## 2024-03-13 DIAGNOSIS — M54.16 LUMBAR RADICULAR PAIN: ICD-10-CM

## 2024-03-13 DIAGNOSIS — M99.08 SOMATIC DYSFUNCTION OF RIB REGION: ICD-10-CM

## 2024-03-13 DIAGNOSIS — R20.2 PARESTHESIA OF ARM: ICD-10-CM

## 2024-03-13 DIAGNOSIS — Z98.1 S/P CERVICAL SPINAL FUSION: ICD-10-CM

## 2024-03-13 DIAGNOSIS — M79.18 MYOFASCIAL PAIN: ICD-10-CM

## 2024-03-13 DIAGNOSIS — Z98.1 S/P LUMBAR FUSION: ICD-10-CM

## 2024-03-13 DIAGNOSIS — M99.07 SOMATIC DYSFUNCTION OF UPPER EXTREMITY: ICD-10-CM

## 2024-03-13 DIAGNOSIS — M54.2 CERVICAL SPINE PAIN: Primary | ICD-10-CM

## 2024-03-13 DIAGNOSIS — G24.3 CERVICAL DYSTONIA: ICD-10-CM

## 2024-03-13 DIAGNOSIS — G89.4 CHRONIC PAIN SYNDROME: ICD-10-CM

## 2024-03-13 PROCEDURE — 98927 OSTEOPATH MANJ 5-6 REGIONS: CPT | Performed by: PHYSICAL MEDICINE & REHABILITATION

## 2024-03-13 PROCEDURE — 99214 OFFICE O/P EST MOD 30 MIN: CPT | Mod: 25 | Performed by: PHYSICAL MEDICINE & REHABILITATION

## 2024-03-13 RX ORDER — TIZANIDINE 2 MG/1
2-4 TABLET ORAL 3 TIMES DAILY PRN
Qty: 120 TABLET | Refills: 1 | Status: SHIPPED | OUTPATIENT
Start: 2024-03-13 | End: 2024-04-18

## 2024-03-13 ASSESSMENT — PAIN SCALES - GENERAL: PAINLEVEL: MODERATE PAIN (5)
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
## 2024-03-13 NOTE — PATIENT INSTRUCTIONS
An CT myelogram was ordered for you today.  You will be contacted by scheduling within 3 days.    If you are not contacted, please call Radiology at [phone].    Osteopathic manual medicine today  Continue tizanidine three tunes daily as needed
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
## 2024-03-14 ENCOUNTER — TELEPHONE (OUTPATIENT)
Dept: PHYSICAL MEDICINE AND REHAB | Facility: CLINIC | Age: 59
End: 2024-03-14
Payer: COMMERCIAL

## 2024-03-14 DIAGNOSIS — M54.16 LUMBAR RADICULAR PAIN: Primary | ICD-10-CM

## 2024-03-23 ENCOUNTER — TRANSFERRED RECORDS (OUTPATIENT)
Dept: HEALTH INFORMATION MANAGEMENT | Facility: CLINIC | Age: 59
End: 2024-03-23
Payer: COMMERCIAL

## 2024-03-28 ENCOUNTER — PATIENT OUTREACH (OUTPATIENT)
Dept: CARE COORDINATION | Facility: CLINIC | Age: 59
End: 2024-03-28
Payer: COMMERCIAL

## 2024-04-02 ENCOUNTER — HOSPITAL ENCOUNTER (OUTPATIENT)
Dept: CT IMAGING | Facility: HOSPITAL | Age: 59
Discharge: HOME | End: 2024-04-02
Attending: PHYSICAL MEDICINE & REHABILITATION
Payer: COMMERCIAL

## 2024-04-02 ENCOUNTER — HOSPITAL ENCOUNTER (OUTPATIENT)
Dept: RADIOLOGY | Facility: HOSPITAL | Age: 59
Discharge: HOME | End: 2024-04-02
Attending: PHYSICAL MEDICINE & REHABILITATION
Payer: COMMERCIAL

## 2024-04-02 VITALS
TEMPERATURE: 98 F | RESPIRATION RATE: 18 BRPM | OXYGEN SATURATION: 96 % | DIASTOLIC BLOOD PRESSURE: 79 MMHG | HEART RATE: 63 BPM | SYSTOLIC BLOOD PRESSURE: 141 MMHG

## 2024-04-02 DIAGNOSIS — R20.2 PARESTHESIA OF ARM: ICD-10-CM

## 2024-04-02 DIAGNOSIS — M54.16 LUMBAR RADICULAR PAIN: ICD-10-CM

## 2024-04-02 DIAGNOSIS — Z98.1 S/P CERVICAL SPINAL FUSION: ICD-10-CM

## 2024-04-02 DIAGNOSIS — R26.89 POOR BALANCE: ICD-10-CM

## 2024-04-02 DIAGNOSIS — M54.2 CERVICAL SPINE PAIN: ICD-10-CM

## 2024-04-02 DIAGNOSIS — R51.9 NONINTRACTABLE HEADACHE, UNSPECIFIED CHRONICITY PATTERN, UNSPECIFIED HEADACHE TYPE: ICD-10-CM

## 2024-04-02 PROCEDURE — 255N000002 HC RX 255 OP 636: Performed by: PHYSICAL MEDICINE & REHABILITATION

## 2024-04-02 PROCEDURE — 62302 MYELOGRAPHY LUMBAR INJECTION: CPT

## 2024-04-02 PROCEDURE — 62305 MYELOGRAPHY LUMBAR INJECTION: CPT

## 2024-04-02 PROCEDURE — 72132 CT LUMBAR SPINE W/DYE: CPT

## 2024-04-02 PROCEDURE — 70450 CT HEAD/BRAIN W/O DYE: CPT

## 2024-04-02 PROCEDURE — 72126 CT NECK SPINE W/DYE: CPT

## 2024-04-02 RX ADMIN — IOHEXOL 20 ML: 180 INJECTION INTRAVENOUS at 13:27

## 2024-04-02 NOTE — DISCHARGE INSTRUCTIONS
The contrast used during myelography is water-based and will be absorbed by your body and removed in your urine. It may cause some side effects including nausea, vomiting, headaches, and rarely a seizure. If a headache develops, it is usually mild by can be more severe, and can last a few days to a week.     The day of the myelogram:   1. A responsible adult must stay with you overnight. It is very important that someone is with you or is available if you become ill.   2. Do not drive or operate mechanical equipment.   3. Stay in bed with your head elevated 20-30 degrees (two pillows) for the rest of the day. You may get up to go to the bathroom, but immediately return to bed.   4. Refrain from excessive exertion and movements. Excessive movement increases the possibility of severity of the headache.   5. Return to your normal diet.   6. Drink plenty of fluids. This helps to flush the contrast through your kidneys. You are encouraged to drink several glasses of fluid each hour.   7. Do not drink alcoholic beverages.     The day after the myelogram:   1. You may return to normal daily activities, but avoid strenuous work or exercise.   2. If you develop a severe headache, lie absolutely flat for 4-6 hours. If it continues or worsens, please call your referring physician.    The radiologist will study and interpret the films, and will report these findings to your doctor. Your own personal doctor will explain the results with you.    If you have questions or concerns about the myelogram, you may call the [facility] Radiology Department listed below:    [facility]    [phone]

## 2024-04-02 NOTE — PROVIDER NOTIFICATION
Reviewed education with patient. Pt. Verbalizes understanding of discharge instructions. Pt. Without any new pain and without neuro deficits post procedure. Pt. Accompanied by  at discharge.

## 2024-04-02 NOTE — PROCEDURES
Neurointerventional Surgery    Procedure: Cervical myelogram    Radiologist: Jack Stewrad MD    Contrast: 20 ml omnipaque 180  Fluoro Time: 0.9 minutes  Number of images: 2     EBL: Minimal  Complications: None    Preliminary findings: (see dictation for full detail)  Technically successful cervicalmyelogram    Assess/Plan:   Routine post procedure monitoring  Bedrest for 1hour      Jack Steward MD  Pager: [phone]  Emergency pager: [phone]  Office: [phone]

## 2024-04-04 ENCOUNTER — TELEPHONE (OUTPATIENT)
Dept: PHYSICAL MEDICINE AND REHAB | Facility: CLINIC | Age: 59
End: 2024-04-04
Payer: COMMERCIAL

## 2024-04-04 DIAGNOSIS — M48.062 SPINAL STENOSIS OF LUMBAR REGION WITH NEUROGENIC CLAUDICATION: Primary | ICD-10-CM

## 2024-04-04 NOTE — TELEPHONE ENCOUNTER
Patient had called and left message on nurse line early this AM. She is requesting referral from [facility] for her to see Dr. Ward. Please place and I will contact her with the information.

## 2024-04-04 NOTE — TELEPHONE ENCOUNTER
Phone call to patient to inform her the referral for [facility] has been placed. She will await their call to schedule.

## 2024-04-05 NOTE — TELEPHONE ENCOUNTER
SPINE PATIENTS - NEW PROTOCOL PREVISIT    RECORDS RECEIVED FROM: New referral from Jack Jennings   REASON FOR VISIT: Spinal stenosis of lumbar region with neurogenic claudication    PROVIDER: Ed   DATE OF APPT: 04/09/2024   NOTES (FOR ALL VISITS) STATUS DETAILS   OFFICE NOTE from referring provider Internal Referral and notes in chart   OFFICE NOTE from other specialist Internal Prev PT completed in 2022   DISCHARGE SUMMARY from hospital N/A    DISCHARGE REPORT from ER N/A    OPERATIVE REPORT Internal Prev Lumbar surg with Dr crawford 04/18/2022   EMG REPORT Internal EMG 04/05/2021   MEDICATION LIST N/A    IMAGING  (FOR ALL VISITS)     MRI (HEAD, NECK, SPINE) N/A    XRAY (SPINE) *NEUROSURGERY* Internal XR mylo 04/02/2024   CT (HEAD, NECK, SPINE) Internal CT Lumbar/Cervical 04/02/2024

## 2024-04-09 ENCOUNTER — OFFICE VISIT (OUTPATIENT)
Dept: NEUROSURGERY | Facility: CLINIC | Age: 59
End: 2024-04-09
Payer: COMMERCIAL

## 2024-04-09 ENCOUNTER — PRE VISIT (OUTPATIENT)
Dept: NEUROSURGERY | Facility: CLINIC | Age: 59
End: 2024-04-09

## 2024-04-09 VITALS — DIASTOLIC BLOOD PRESSURE: 59 MMHG | SYSTOLIC BLOOD PRESSURE: 116 MMHG | HEART RATE: 74 BPM | OXYGEN SATURATION: 97 %

## 2024-04-09 DIAGNOSIS — M48.062 SPINAL STENOSIS OF LUMBAR REGION WITH NEUROGENIC CLAUDICATION: Primary | ICD-10-CM

## 2024-04-09 PROCEDURE — 99213 OFFICE O/P EST LOW 20 MIN: CPT | Performed by: SURGERY

## 2024-04-09 ASSESSMENT — PAIN SCALES - GENERAL: PAINLEVEL: SEVERE PAIN (7)

## 2024-04-09 NOTE — NURSING NOTE
"Susan Kwan is a 58 year old female who presents for:  Chief Complaint   Patient presents with    Consult     Lumbar  Low back pain  Some bladder incontinence - has been doing PT for this  -this started about  1.5 months ago  Right leg numbness        Initial Vitals:  /59   Pulse 74   LMP 03/09/2010   SpO2 97%  Estimated body mass index is 29.92 kg/m  as calculated from the following:    Height as of 1/5/24: 5' 3.2\" (1.605 m).    Weight as of 1/5/24: 170 lb (77.1 kg).. There is no height or weight on file to calculate BSA. BP completed using cuff size: regular  Severe Pain (7)        Oswestry Disability Index (JACKIE   Balaji Aguilera 1980, All rights reserved. Used with permission)       Section 2 - Personal care (washing, dressing, etc.) : I can look after myself normally but it is very painful.  Section 3 - Lifting: Pain prevents me from lifting heavy weights off the floor but I can manage if they are conveniently positioned, e.g. on a table.  Section 4 - Walking: Pain prevents me from walking more than one mile.  Section 5 - Sitting: Pain prevents me from sitting for more than 1 hour.  Section 6 - Standing: Pain prevents me from standing for more than half an hour.     Section 8 - Sex life (if applicable): My sex life is severely restricted by pain.  Section 9 - Social life: Pain has restricted my social life and I do not go out as often.  Section 10 - Traveling: Pain is bad but I am able to manage trips over two hours.  Sum: 17  Count: 8  Oswestry Score (%): 42.5 %           Kulwinder Mendoza"

## 2024-04-09 NOTE — LETTER
4/9/2024         RE: Susan Kwan  [address]  [facility] [place] [address]        Dear Colleague,    Thank you for referring your patient, Susan Kwan, to the [facility]. Please see a copy of my visit note below.    NEUROSURGERY FOLLOW UP  NOTE    Susan Kwan comes today in follow-up. Patient is a 59 yo female who is status post L4-5 instrumented fusion in 2020 complicated by pseudoarthrosis, anterior cervical decompression and fusion at cervical 5-6 in 2021, revision lumbar 4-5 instrumented fusion in 2022 who re presents with neck pain and low back and left >right buttock pain. Ongoing neck pain. No significant left arm symptoms. Some paresthesias diffuse in right arm only. Botox was helping. She is unable to continue those due to cost. Underwent osteopathic manipulation without much relief.     Low back and left buttock pain. Right buttock pain chronic. No recent physical therapy. Denies leg weakness. Still falls on occasion. Right chronic paresthesias. Standing and walking worsen symptoms as well as prolonged sitting. Progressively worsened after initial improvement after surgery.     PHYSICAL EXAM:   Constitutional: /59   Pulse 74   LMP 03/09/2010   SpO2 97%      Mental Status: A & O in no acute distress.  Affect is appropriate.  Speech is fluent.  Recent and remote memory are intact.  Attention span and concentration are normal.     Motor:  Normal bulk and tone all muscle groups of upper and lower extremities. 5/5 x 4     Sensory: Sensation intact bilaterally to light touch except diffusely diminished in RLE (chronic)     Reflexes; supinator, biceps, triceps, knee/ ankle jerk 1+/4    IMAGING:   I personally reviewed all radiographic images        CONSULTATION ASSESSMENT AND PLAN:    We reviewed her cervical CT she appears to have solid fusion across her prior cervical level at cervical 5-6.  She has no radicular symptoms and has neck pain only.  It  seems muscle skeletal as she had past relief with Botox.  Consider additional Botox injections as those do help. Not sure if there is an appeal with insurance. She will return to Dr Curtis to see if any additional options that may help.     We also reviewed her new CT lumbar spine.  She has solid fusion across lumbar 4-5 level.  In addition she has severe narrowing of the lumbar spinal canal at lumbar 3-4 and possibly a left paracentral disc protrusion at lumbar 2-3 resulting in lateral recess stenosis at lumbar 2-3 on the left.  Previous lumbar injections did not help.  We discussed restarting physical therapy for her lower back.  If she has no relief  could consider surgical decompression at lumbar 3-4 . She will return to clinic after physical therapy.    Leanna Ward MD      CC:     Earline Jimenez  [address] [place] [address]      Again, thank you for allowing me to participate in the care of your patient.        Sincerely,        Leanna Ward MD

## 2024-04-09 NOTE — PROGRESS NOTES
NEUROSURGERY FOLLOW UP  NOTE    Susan Kwan comes today in follow-up. Patient is a 57 yo female who is status post L4-5 instrumented fusion in 2020 complicated by pseudoarthrosis, anterior cervical decompression and fusion at cervical 5-6 in 2021, revision lumbar 4-5 instrumented fusion in 2022 who re presents with neck pain and low back and left >right buttock pain. Ongoing neck pain. No significant left arm symptoms. Some paresthesias diffuse in right arm only. Botox was helping. She is unable to continue those due to cost. Underwent osteopathic manipulation without much relief.     Low back and left buttock pain. Right buttock pain chronic. No recent physical therapy. Denies leg weakness. Still falls on occasion. Right chronic paresthesias. Standing and walking worsen symptoms as well as prolonged sitting. Progressively worsened after initial improvement after surgery.     PHYSICAL EXAM:   Constitutional: /59   Pulse 74   LMP 03/09/2010   SpO2 97%      Mental Status: A & O in no acute distress.  Affect is appropriate.  Speech is fluent.  Recent and remote memory are intact.  Attention span and concentration are normal.     Motor:  Normal bulk and tone all muscle groups of upper and lower extremities. 5/5 x 4     Sensory: Sensation intact bilaterally to light touch except diffusely diminished in RLE (chronic)     Reflexes; supinator, biceps, triceps, knee/ ankle jerk 1+/4    IMAGING:   I personally reviewed all radiographic images        CONSULTATION ASSESSMENT AND PLAN:    We reviewed her cervical CT she appears to have solid fusion across her prior cervical level at cervical 5-6.  She has no radicular symptoms and has neck pain only.  It seems muscle skeletal as she had past relief with Botox.  Consider additional Botox injections as those do help. Not sure if there is an appeal with insurance. She will return to Dr Curtis to see if any additional options that may help.     We also reviewed her new  CT lumbar spine.  She has solid fusion across lumbar 4-5 level.  In addition she has severe narrowing of the lumbar spinal canal at lumbar 3-4 and possibly a left paracentral disc protrusion at lumbar 2-3 resulting in lateral recess stenosis at lumbar 2-3 on the left.  Previous lumbar injections did not help.  We discussed restarting physical therapy for her lower back.  If she has no relief  could consider surgical decompression at lumbar 3-4 . She will return to clinic after physical therapy.    Leanna Ward MD      CC:     Earline Jimenez  [address] [place] [address]

## 2024-04-11 ENCOUNTER — PATIENT OUTREACH (OUTPATIENT)
Dept: CARE COORDINATION | Facility: CLINIC | Age: 59
End: 2024-04-11
Payer: COMMERCIAL

## 2024-04-12 ENCOUNTER — THERAPY VISIT (OUTPATIENT)
Dept: PHYSICAL THERAPY | Facility: REHABILITATION | Age: 59
End: 2024-04-12
Attending: SURGERY
Payer: COMMERCIAL

## 2024-04-12 DIAGNOSIS — M48.062 SPINAL STENOSIS OF LUMBAR REGION WITH NEUROGENIC CLAUDICATION: ICD-10-CM

## 2024-04-12 PROCEDURE — 97110 THERAPEUTIC EXERCISES: CPT | Mod: GP | Performed by: PHYSICAL THERAPIST

## 2024-04-12 PROCEDURE — 97161 PT EVAL LOW COMPLEX 20 MIN: CPT | Mod: GP | Performed by: PHYSICAL THERAPIST

## 2024-04-12 NOTE — PROGRESS NOTES
PHYSICAL THERAPY EVALUATION  Type of Visit: Evaluation    See electronic medical record for Abuse and Falls Screening details.    Subjective       Presenting condition or subjective complaint:    Susan presents to PT today with chronic bilateral low back pain, Left buttock pain, and Right buttock pain with paresthesias into anterior shin and foot. History of multiple lumbar surgeries since 2017 (including fusion L4-L5 in 2020) and cervical fusion C5-C6. She endorses an ache in her low back and tingling down both legs R>L. Hasn't felt her R lower leg since her first lumbar surgery in 2017. Notes she gets drop foot on R side.  If she gets up and moves around she's okay. Walking, standing, prolonged sitting worsen her symptoms/ pain. Lifting heavy weights is also troublesome.   No changes in bowel or bladder changes or saddle anesthesia. Does have bladder incontinence, but did pelvic floor PT for this.     Pt notes she is planning to do another lumbar surgery, but is doing PT first.     Date of onset: 04/09/24    Relevant medical history:   Arthritis, asthma, depression, foot drop, implanted device, menopause, migraines, overweight, pain at night or rest, sleep disorder like apnea, smoking, substance use disorder, thyroid problems    Dates & types of surgery:  Multiple back surgeries & 1 neck surgery; 2017, 2018, 2020, spinal stimulator    Prior diagnostic imaging/testing results:       CT Lumbar Spine IMPRESSION:  1.  Anterior and posterior fusion hardware at L4-L5 without evidence of solid osseous fusion across the L4-L5 disc space.   2.  At L3-L4, there is circumferential disc bulge and ligamentum flavum thickening contributing to severe narrowing of the lumbar spinal canal.  3.  At L2-L3, there is a left paracentral disc protrusion narrowing the left lateral recess and potentially affecting the descending/traversing left L3 nerve root    Prior therapy history for the same diagnosis, illness or injury:     Yes    Prior Level of Function  Transfers:   Ambulation:   ADL:   IADL:     Living Environment  Social support:   Lives with significant other/ spouse & family members  Type of home:   Apartment/ condo  Stairs to enter the home:       No  Ramp:     Stairs inside the home:       No  Help at home:  None  Equipment owned:   Walker, walker with wheels, straight cane, crutches, bath bench    Employment:    No  Hobbies/Interests:      Patient goals for therapy:  Less pain, be more active with grandchildren    Pain assessment:      Objective   LUMBAR SPINE EVALUATION  PAIN:   INTEGUMENTARY (edema, incisions):   POSTURE:  Forward head, rounded shoulders, incr lumbar lordosis  GAIT:   Weightbearing Status:   Assistive Device(s):   Gait Deviations:  Increased left lateral trunk lean  BALANCE/PROPRIOCEPTION:  SLS: No sig hip drop noted; incr sway with L SLS vs R  WEIGHTBEARING ALIGNMENT:   NON-WEIGHTBEARING ALIGNMENT:    ROM:  Lumbar ROM is WNL apart from minimally limited extension. No pain.   PELVIC/SI SCREEN:   STRENGTH:  WNL apart from 4/5 Right ankle DF and 4+/5 Right Knee flexion    MYOTOMES:   DTR S:   CORD SIGNS:   DERMATOMES:   NEURAL TENSION:  SLR: (-) zehra  FLEXIBILITY: WNL  LUMBAR/HIP Special Tests:  FADIR: (-) zehra  ANCELMO: (+) right for incr low back pain; (-) left   Hip scour: (-) zehra      PELVIS/SI SPECIAL TESTS:   FUNCTIONAL TESTS:  Squat: Anterior base of support, weight on balls of feet   PALPATION:  No TTP throughout lumbar spine, mild TTP zehra piriformis; taut bands noted   SPINAL SEGMENTAL CONCLUSIONS:  Hypomobility throughout lumbar spine      Assessment & Plan   CLINICAL IMPRESSIONS  Medical Diagnosis: Spinal stenosis of lumbar spine with neurogenic claudication    Treatment Diagnosis: Chronic bilateral R>L low back pain with   Impression/Assessment: Patient is a 58 year old female with chronic bilateral R>L low back pain with zehra paresthesias R>L for years. The following significant findings have been  identified: Pain, Decreased strength, Impaired balance, Impaired gait, Impaired muscle performance, Decreased activity tolerance, and Impaired posture. These impairments interfere with their ability to perform recreational activities, household chores, and community mobility as compared to previous level of function.     Clinical Decision Making (Complexity):  Clinical Presentation: Stable/Uncomplicated  Clinical Presentation Rationale: based on medical and personal factors listed in PT evaluation  Clinical Decision Making (Complexity): Low complexity    PLAN OF CARE  Treatment Interventions:  Modalities: Dry Needling  Interventions: Gait Training, Manual Therapy, Neuromuscular Re-education, Therapeutic Activity, Therapeutic Exercise, Self-Care/Home Management    Long Term Goals     PT Goal 1  Goal Identifier: HEP  Goal Description: In 30 days, pt will demonstrate understanding of and independence with their HEP.  Goal Progress: Initial  Target Date: 05/12/24  PT Goal 2  Goal Identifier: JACKIE  Goal Description: In 60 days, pt will improve JACKIE score from 46% diability to at least 16% disability to indicate improved functional mobility.  Goal Progress: Initial  Target Date: 06/11/24  PT Goal 3  Goal Identifier: Sleep  Goal Description: In 60 days, pt's sleep will be undisturbed </= 1 hour/night for improved sleep hygiene.  Goal Progress: Initial  Target Date: 06/11/24  PT Goal 4  Goal Identifier: Standing  Goal Description: In 60 days, pt will be able to stand for at least 45 minutes in order to run errands/ perform daily household tasks with ease.  Goal Progress: Initial  Target Date: 06/11/24      Frequency of Treatment: 1x/week  Duration of Treatment: 60 days    Recommended Referrals to Other Professionals:   Education Assessment:   Learner/Method: Patient    Risks and benefits of evaluation/treatment have been explained.   Patient/Family/caregiver agrees with Plan of Care.     Evaluation Time:     PT Eval, Low  Complexity Minutes (47969): 18       Signing Clinician: AJITH Ellington [facility]                                                                                   OUTPATIENT PHYSICAL THERAPY      PLAN OF TREATMENT FOR OUTPATIENT REHABILITATION   Patient's Last Name, First Name, Susan Mchugh YOB: 1965   Provider's Name   [facility]   Medical Record No.  [id number]     Onset Date: 04/09/24  Start of Care Date: 04/12/24     Medical Diagnosis:  Spinal stenosis of lumbar spine with neurogenic claudication      PT Treatment Diagnosis:  Chronic bilateral R>L low back pain with Plan of Treatment  Frequency/Duration: 1x/week/ 60 days    Certification date from 04/12/24 to 06/11/24         See note for plan of treatment details and functional goals     Chelsie Melo PT                         I CERTIFY THE NEED FOR THESE SERVICES FURNISHED UNDER        THIS PLAN OF TREATMENT AND WHILE UNDER MY CARE     (Physician attestation of this document indicates review and certification of the therapy plan).              Referring Provider:  Leanna Ward    Initial Assessment  See Epic Evaluation- Start of Care Date: 04/12/24

## 2024-04-18 ENCOUNTER — THERAPY VISIT (OUTPATIENT)
Dept: PHYSICAL THERAPY | Facility: REHABILITATION | Age: 59
End: 2024-04-18
Payer: COMMERCIAL

## 2024-04-18 ENCOUNTER — MYC REFILL (OUTPATIENT)
Dept: PHYSICAL MEDICINE AND REHAB | Facility: CLINIC | Age: 59
End: 2024-04-18

## 2024-04-18 DIAGNOSIS — M48.062 SPINAL STENOSIS OF LUMBAR REGION WITH NEUROGENIC CLAUDICATION: Primary | ICD-10-CM

## 2024-04-18 DIAGNOSIS — G24.3 CERVICAL DYSTONIA: ICD-10-CM

## 2024-04-18 DIAGNOSIS — M79.18 MYOFASCIAL PAIN: ICD-10-CM

## 2024-04-18 PROCEDURE — 97110 THERAPEUTIC EXERCISES: CPT | Mod: GP | Performed by: PHYSICAL THERAPIST

## 2024-04-22 RX ORDER — TIZANIDINE 2 MG/1
2-4 TABLET ORAL 3 TIMES DAILY PRN
Qty: 120 TABLET | Refills: 1 | Status: SHIPPED | OUTPATIENT
Start: 2024-04-22 | End: 2024-04-23

## 2024-04-23 ENCOUNTER — MYC REFILL (OUTPATIENT)
Dept: PHYSICAL MEDICINE AND REHAB | Facility: CLINIC | Age: 59
End: 2024-04-23
Payer: COMMERCIAL

## 2024-04-23 DIAGNOSIS — M79.18 MYOFASCIAL PAIN: ICD-10-CM

## 2024-04-23 DIAGNOSIS — G24.3 CERVICAL DYSTONIA: ICD-10-CM

## 2024-04-24 RX ORDER — TIZANIDINE 2 MG/1
2-4 TABLET ORAL 3 TIMES DAILY PRN
Qty: 120 TABLET | Refills: 1 | Status: ON HOLD | OUTPATIENT
Start: 2024-04-24 | End: 2024-06-14

## 2024-04-25 ENCOUNTER — THERAPY VISIT (OUTPATIENT)
Dept: PHYSICAL THERAPY | Facility: REHABILITATION | Age: 59
End: 2024-04-25
Payer: COMMERCIAL

## 2024-04-25 DIAGNOSIS — M48.062 SPINAL STENOSIS OF LUMBAR REGION WITH NEUROGENIC CLAUDICATION: Primary | ICD-10-CM

## 2024-04-25 PROCEDURE — 97110 THERAPEUTIC EXERCISES: CPT | Mod: GP | Performed by: PHYSICAL THERAPIST

## 2024-05-08 ENCOUNTER — THERAPY VISIT (OUTPATIENT)
Dept: PHYSICAL THERAPY | Facility: REHABILITATION | Age: 59
End: 2024-05-08
Payer: COMMERCIAL

## 2024-05-08 DIAGNOSIS — M48.062 SPINAL STENOSIS OF LUMBAR REGION WITH NEUROGENIC CLAUDICATION: Primary | ICD-10-CM

## 2024-05-08 PROCEDURE — 97110 THERAPEUTIC EXERCISES: CPT | Mod: GP | Performed by: PHYSICAL THERAPIST

## 2024-05-08 NOTE — PROGRESS NOTES
DISCHARGE  Reason for Discharge: Has not made much progress in therapy thus far. After discussion with pt, agreed upon discharge and follow-up with referring provider- Dr. Ward.     Equipment Issued: none    Discharge Plan: Patient to continue home program.    Referring Provider:  Leanna Ward       05/08/24 0500   Appointment Info   Signing clinician's name / credentials Chelsie Melo, PT; Charlie Nair, SPT   Visits Used 4   Medical Diagnosis Spinal stenosis of lumbar spine with neurogenic claudication   PT Tx Diagnosis Chronic bilateral R>L low back pain with   Quick Adds Certification;Student Supervision   Progress Note/Certification   Start of Care Date 04/12/24   Onset of illness/injury or Date of Surgery 04/09/24   Therapy Frequency 1x/week   Predicted Duration 60 days   Certification date from 04/12/24   Certification date to 06/11/24   Progress Note Due Date 05/12/24   Progress Note Completed Date 04/12/24   Supervision   Student Supervision Therapy services provided with the co-signing licensed therapist guiding and directing the services, and providing the skilled judgement and assessment throughout the session;Direct Patient Contact Provided   GOALS   PT Goals 2;3;4   PT Goal 1   Goal Identifier HEP   Goal Description In 30 days, pt will demonstrate understanding of and independence with their HEP.   Goal Progress met   Target Date 05/12/24   Date Met 05/08/24   PT Goal 2   Goal Identifier JACIKE   Goal Description In 60 days, pt will improve JACKIE score from 46% diability to at least 16% disability to indicate improved functional mobility.   Goal Progress Not re-assessed   Target Date 06/11/24   PT Goal 3   Goal Identifier Sleep   Goal Description In 60 days, pt's sleep will be undisturbed </= 1 hour/night for improved sleep hygiene.   Goal Progress Not met; continues to have trouble sleeping due to pain   Target Date 06/11/24   PT Goal 4   Goal Identifier Standing   Goal Description In 60 days, pt  will be able to stand for at least 45 minutes in order to run errands/ perform daily household tasks with ease.   Goal Progress Unable to stand for 45 minutes without pain   Target Date 06/11/24   Subjective Report   Subjective Report Patient states she has decreased her HEP adherence from twice/day to once/day as discussed last session. States she has been waking up in the middle of the night in pain and gets relief from thread the needle.  Otherwise unsure if interventions are improving her sxs.   Objective Measures   Objective Measures Objective Measure 4;Objective Measure 5   Objective Measure 1   Objective Measure Lumbar AROM   Details Flexion WFL, Extension WFL   Objective Measure 2   Objective Measure worst pain   Details 9/10 muscle spasms   Objective Measure 3   Objective Measure 6MWT   Details 5/8/2024  1196; mild limp noted by end of 6 min   Objective Measure 4   Objective Measure Thoracic quadrant   Details 5/8/2024   L side; mild-to-moderate limitation in thoracic joint mobility, most notably rotation   Objective Measure 5   Objective Measure 5/8/2024 slump   Details negative (B)   Treatment Interventions (PT)   Interventions Therapeutic Procedure/Exercise;Manual Therapy   Therapeutic Procedure/Exercise   Therapeutic Procedures: strength, endurance, ROM, flexibility minutes (13382) 42   Therapeutic Procedures Ther Proc 2;Ther Proc 3   Ther Proc 1 6MWT; 1196.3 ft (performed first thing today)   Ther Proc 1 - Details TA  set + march 1 x 20; releasing contraction every 2 alternating marches   Ther Proc 2 Recumbent bike + subj x 8 min   Ther Proc 2 - Details sciatic nn. glide supine 1 x 20 (B); reports diminished L sx. ADDED HEP   Ther Proc 3 sciatic nn. glide seated 1 x 20 (B); reprts worst R LE sx DISCONTINUED   Ther Proc 3 - Details HEP review  (pt demonstrates understanding)   PTRx Ther Proc 1 - Details Objective measures, see above   Skilled Intervention Exercises to improve core/ hip strength &  improve tightness in piriformis & decrease R LE dural tension, discussed PT POC & discharge planning   Patient Response/Progress reports increase R LE sxs with seated sciatic nn. glide; decrease in L LE sxs with supine sciatic nn. glide.  Pt advised to resume regular adherence of strengthening interventions on HEP.   Manual Therapy   Manual Therapy 1 Gentle STM to thoracolumbar paraspinals   Manual Therapy 1 - Details goal desensitization   Patient Response/Progress NOT TODAY   Education   Learner/Method Patient   Plan   Home program see PTRx   Updates to plan of care Patient discharged   Comments   Comments Today was patients last scheduled visit, and she indicates d/c is appropriate for her as today's last session.  Before d/c, supine sciatic nn. glides were found effective in decreasing the radicular sxs pt experiences in L buttock; other HEP exercises reviewed and pt advised to continue exercises within pain-free tolerance regularly, even if opting for lumbar fusion in the future. Discussed stronger you are before surgery, that will help with recovery. Susan demonstrates understanding.  Susan also accomplished the entirety of 6MWT which was previously too difficult for her to perform (last session could only complete 3:47 minutes). This indicates improved activity tolerance since last session. In regards to goals, she did demonstrate adherence to HEP, but had continued pain with standing for prolonged periods & her sleep is still interrupted often due to the pain. She will follow-up with referring provider at this time. She has been discharged from PT today.   Total Session Time   Timed Code Treatment Minutes 42   Total Treatment Time (sum of timed and untimed services) 42

## 2024-05-09 DIAGNOSIS — G43.809 OTHER MIGRAINE WITHOUT STATUS MIGRAINOSUS, NOT INTRACTABLE: ICD-10-CM

## 2024-05-09 RX ORDER — SUMATRIPTAN 50 MG/1
TABLET, FILM COATED ORAL
Qty: 9 TABLET | Refills: 2 | Status: SHIPPED | OUTPATIENT
Start: 2024-05-09

## 2024-05-14 ENCOUNTER — OFFICE VISIT (OUTPATIENT)
Dept: NEUROSURGERY | Facility: CLINIC | Age: 59
End: 2024-05-14
Payer: COMMERCIAL

## 2024-05-14 ENCOUNTER — PREP FOR PROCEDURE (OUTPATIENT)
Dept: NEUROLOGY | Facility: CLINIC | Age: 59
End: 2024-05-14

## 2024-05-14 ENCOUNTER — HOSPITAL ENCOUNTER (OUTPATIENT)
Dept: RADIOLOGY | Facility: HOSPITAL | Age: 59
Discharge: HOME | End: 2024-05-14
Attending: SURGERY | Admitting: SURGERY
Payer: COMMERCIAL

## 2024-05-14 VITALS — SYSTOLIC BLOOD PRESSURE: 108 MMHG | DIASTOLIC BLOOD PRESSURE: 67 MMHG | OXYGEN SATURATION: 96 % | HEART RATE: 72 BPM

## 2024-05-14 DIAGNOSIS — Z98.1 S/P LUMBAR FUSION: ICD-10-CM

## 2024-05-14 DIAGNOSIS — M48.062 SPINAL STENOSIS OF LUMBAR REGION WITH NEUROGENIC CLAUDICATION: Primary | ICD-10-CM

## 2024-05-14 DIAGNOSIS — M54.16 LUMBAR RADICULOPATHY: Primary | ICD-10-CM

## 2024-05-14 DIAGNOSIS — S32.009K PSEUDOARTHROSIS OF LUMBAR SPINE: ICD-10-CM

## 2024-05-14 DIAGNOSIS — M48.062 SPINAL STENOSIS OF LUMBAR REGION WITH NEUROGENIC CLAUDICATION: ICD-10-CM

## 2024-05-14 PROCEDURE — 72120 X-RAY BEND ONLY L-S SPINE: CPT

## 2024-05-14 PROCEDURE — 99213 OFFICE O/P EST LOW 20 MIN: CPT | Performed by: SURGERY

## 2024-05-14 ASSESSMENT — PAIN SCALES - GENERAL: PAINLEVEL: EXTREME PAIN (8)

## 2024-05-14 NOTE — PATIENT INSTRUCTIONS
1. Trigger point injection today    2. Continue gabapentin 900mg in the morning and afternoon with 1200mg at bedtime    3. See Dr Ward if your leg pain returns        Received pt and report from MARTIN Fishman

## 2024-05-14 NOTE — NURSING NOTE
"Susan Kwan is a 58 year old female who presents for:  Chief Complaint   Patient presents with    Follow Up     Discuss PT - no relief  Surgery discussion        Initial Vitals:  /67   Pulse 72   LMP 03/09/2010   SpO2 96%  Estimated body mass index is 29.92 kg/m  as calculated from the following:    Height as of 1/5/24: 5' 3.2\" (1.605 m).    Weight as of 1/5/24: 170 lb (77.1 kg).. There is no height or weight on file to calculate BSA. BP completed using cuff size: regular  Extreme Pain (8)    Oswestry Disability Index (JACKIE   Balaji Aguilera 1980, All rights reserved. Used with permission)       Section 2 - Personal care (washing, dressing, etc.) : I can look after myself normally without causing additional pain.  Section 3 - Lifting: I can only lift very light weights.  Section 4 - Walking: Pain prevents me from walking more than 100 yards.  Section 5 - Sitting: Pain prevents me from sitting for more than half an hour.  Section 6 - Standing: Pain prevents me from standing for more than half an hour.     Section 8 - Sex life (if applicable): My sex life is nearly normal but is very painful.  Section 9 - Social life: Pain has restricted my social life and I do not go out as often.  Section 10 - Traveling: Pain is bad but I am able to manage trips over two hours.  Sum: 20  Count: 8  Oswestry Score (%): 50 %       Kulwinder Mendoza  "

## 2024-05-14 NOTE — PATIENT INSTRUCTIONS
Recommend lumbar 3-lumbar 4 laminectomies, medial facetectomies, foraminotomies with extension of prior lumbar 4-5 fusion to lumbar 3.      Please review COMPLETE information about your proposed surgery, pre-operative requirements, post-operative course and expectations - available in a folder provided to you in clinic!    Your surgery scheduler will call you within 3 business days to begin the process of scheduling your surgery and appointments.     Pre-Operative    Pre-operative physical / Lab work with primary care physician within 30 days of surgical date. We prefer the pre-op exam to be done 2 weeks prior to surgery. We also prefer pre-op lab work be done at [facility] outpatient lab, 2 weeks prior to surgery.     If all pre-op appointments/test are not completed prior to your surgery date, you will be asked to reschedule your surgery.           As part of preparation for your upcoming procedure your primary care doctor may order a test to rule out a COVID-19 infection. This is no longer a requirement prior to surgery.     Readiness Visits    Prior to surgery, you may have a telephone or in person readiness visit with our RN team to discuss your upcomming surgery, results of your pre-op physical, and lab work.   If you will require a collar/neck brace after surgery, you will be fitted for one at your readiness visit prior to surgery (scheduled by the surgery scheduler).     Shower procedure    Hibiclens shower: Use one packet the night before surgery and one packet the morning of surgery for a whole body shower. Avoid face, hair, and genitals.      Eating/Drinking    Stop all solid foods 8 hours before surgery.  Stop all clear liquids 2 hours prior to arrival time     Clear liquids include water, clear juice, black coffee, or clear tea without milk, Gatorade, clear soda.     Medications - please refer to the pre-operative medication instructions sheet in your folder    Hold Aspirin, NSAIDs  (Advil/Ibuprofen, Indocin, Naproxen,Nuprin,Relafen/Nabumetone, Diclofenac,Meloxicam, Aleve, Celebrex) and all vitamins and supplements x 7-10 days prior to surgical date  You can take Tylenol (Acetaminophen) for pain up until the date of your surgery   Do not exceed 3,000 mg per day   Any other medications prescribed, please discuss with your primary care provider at your pre-operative physical. Please discuss when/if it is safe for you to stop taking blood thinners with your primary care provider.   We will NOT provide pain medications prior to surgery. We will prescribe post-op pain medications for up to 6 weeks after surgery.       FMLA/Short-term disability    If you are currently employed, you will likely need to be off work for 4-6 weeks for post-op recovery and healing.  Please fax any FMLA/short term disability paperwork to [phone], mail it into the clinic, drop it off in person, or send via a [employer] message.   You may also call our clinic with the date in which you'd like to return to work, and we can provide a work letter to your employer  We will support Short-Term Disability up to 12 weeks, beginning the date of your surgery. We do NOT support Long-Term Disability/Social Security Benefits.     Pain Management after surgery    Dealing with pain    As your body heals, you might feel a stabbing, burning, or aching pain. You may also have some numbness.  Everyone feels pain differently, we may ask you to rate your pain using a pain scale. This will let us know how much pain you feel.   Keep in mind that medicine won't take away all of your pain. It helps to try other ways to relax and ease pain.     Things to help with pain    After surgery, we will give you medicine for your pain. These medications work well, but they can make you drowsy, itchy, or sick to your stomach, and constipated. Try to take narcotics with food if they cause nausea.   For mild to moderate pain, you can take medication such as  Tylenol or Ibuprofen. These can be used with narcotics and muscle relaxants. *If you have had a fusion: do NOT use NSAIDs for 6 months after surgery.   Do NOT drive while taking narcotic pain medication  Do NOT drink alcohol while using narcotic pain medication  You can utilize ice as needed (no longer than 20 minutes at one time) you may apply this over your covered incision  Utilize heat for muscle spasms, do not apply heat over your incision  If a muscle relaxer is prescribed, please do NOT take it at the same time as your narcotic pain medication. Take them at least 90 minutes apart.   You may also use pain cream/patches on sore muscles. Do NOT apply these on your incision. Patches may be cut in 1/2 if needed.     *After your surgery, if you will be staying in-patient, a nursing team will be monitoring you closely throughout your stay and communicate your health status to your surgeon and other providers.  You will be seen by Advanced Practice Providers (e.g., nurse practitioners, clinic nurse specialists, and physician assistants) who will check on you regularly to assess the status of your surgical recovery.     Incision Care    Look at your incision site every day. You  may need a mirror, or family member to help you.   Do not submerge your incision in water such as pools, hot tubs, baths for at least 6 weeks or until incision is healed  You may get your incision wet in the shower. Allow water and soap to run over incision, and gently pat dry.   Remove the dressing the day after you are discharged from the hospital. Keep the incision clean and dry at all times. This may require several bandage changes.   Contact us right away if you have:   Fever over 101 degrees farenheit  Green or yellow drainage (pus) from your incision or increased bloody drainage   Redness, swelling, or warmth at your surgery site   Notify the clinic [phone].    Activity Restrictions    For the first 6 weeks, no lifting,pushing, or  pulling > 5-10 pounds, no bending, twisting.  Use the stairs in moderation   Walking: Walking is the best way to start exercise after surgery. Take short frequent walks. You may gradually increase the distance as tolerated. If you feel pain, decrease your activity, but DO NOT stop walking. Walking will help you regain strength.  Avoid prolonged positioning for longer than 30 minutes (change positions frequently while awake)  No contact sports until after follow up visit  No high impact activities such as; running/jogging, snowmobile or 4 weathers riding or any other recreational vehicles until deemed safe by your surgeon/care team.   Please call the clinic if you develop any of the following symptoms:  Swelling and/or warmth in one or both legs  Pain or tenderness in your leg, ankle, foot, or arm   Red or discolored/pale skin     Post-Op Follow Up Appointments    We will call you to schedule these appointments after your discharge from the hospital.   Incision assessment within 2 weeks with a Registered Nurse   6 week post-op with a Nurse Practitioner/Physician Assistant. Your surgeon will be available on this day.

## 2024-05-14 NOTE — LETTER
5/14/2024         RE: Susan Kwan  [address]  [facility] [place] [address]        Dear Colleague,    Thank you for referring your patient, Susan Kwan, to the [facility]. Please see a copy of my visit note below.    NEUROSURGERY FOLLOW UP  NOTE    Susan Kwan comes today in follow-up. At her last visit, we reviewed her new CT lumbar spine.  She has solid fusion across lumbar 4-5 level.  In addition she has severe narrowing of the lumbar spinal canal at lumbar 3-4 and possibly a left paracentral disc protrusion at lumbar 2-3 resulting in lateral recess stenosis at lumbar 2-3 on the left.  Previous lumbar injections did not help.  We discussed restarting physical therapy for her lower back.  If she has no relief could consider surgery. She will return to clinic after physical therapy.    Leg feels weak after 6 mins of walking. Legs will give out on her. Continues to also have low back and buttock pain and now with radiation down her left leg. Physical therapy not helping symptoms. Chronic right L5 numbness.     Nicotine free since April 28 th.    PHYSICAL EXAM:   Constitutional: /67   Pulse 72   LMP 03/09/2010   SpO2 96%      Mental Status: A & O in no acute distress.  Affect is appropriate.  Speech is fluent.  Recent and remote memory are intact.  Attention span and concentration are normal.     Motor:  Normal bulk and tone all muscle groups of upper and lower extremities.       IMAGING:   I personally reviewed all radiographic images        CONSULTATION ASSESSMENT AND PLAN:    Patient has had no success with physical therapy treatment. Limited walking ability. We discussed proceeding with exploration of prior lumbar 4-lumbar 5 posterior instrumented fusion with extension to lumbar 3 with use of stealth. Lumbar 3-lumbar 4 bilateral laminectomies, medial facetectomies, foraminotomies and posterolateral arthrodesis.  Risks and benefits were discussed in  detail including but not limited to infection, hematoma, nerve damage including paralysis, post op radiculitis, durotomy, lack of a sold bone fusion, hardware malfunction, inadequate symptom relief, risks associated with the use of general anesthesia.    No further conservative care is indicated and the surgery is medically necessary for the following reasons: further physical therapy is not indicated in this case as it would only prolong the patient s suffering without providing any benefit and the delay would increase the risk of neurologic decline and/or symptoms worsening unnecessarily, with no benefit to the patient and possible harm.     The patient has also trialed activity modifications such as decreasing their bending and twisting, and decreasing their walking distance.  In spite of this, and in spite of the conservative therapies documented above, the patient has significant functional disability in their activities of daily living such as prolonged sitting and standing, prolonged walking, and daily chores each of which are very difficult or painful because of the spinal problem. Therefore the proposed surgery is medically necessary and the patient has failed conservative care.     There are no untreated, underlying mental health conditions (including but not limited to psychological conditions or drug or alcohol abuse) that will preclude an appropriate recovery from this surgery or that are contraindications to proceeding with surgery.       Leanna Ward MD      CC:     Earline Jimenez  [address] [place] [address]      Again, thank you for allowing me to participate in the care of your patient.        Sincerely,        Leanna Ward MD

## 2024-05-14 NOTE — PROGRESS NOTES
NEUROSURGERY FOLLOW UP  NOTE    Susan Kwan comes today in follow-up. At her last visit, we reviewed her new CT lumbar spine.  She has solid fusion across lumbar 4-5 level.  In addition she has severe narrowing of the lumbar spinal canal at lumbar 3-4 and possibly a left paracentral disc protrusion at lumbar 2-3 resulting in lateral recess stenosis at lumbar 2-3 on the left.  Previous lumbar injections did not help.  We discussed restarting physical therapy for her lower back.  If she has no relief could consider surgery. She will return to clinic after physical therapy.    Leg feels weak after 6 mins of walking. Legs will give out on her. Continues to also have low back and buttock pain and now with radiation down her left leg. Physical therapy not helping symptoms. Chronic right L5 numbness.     Nicotine free since April 28 th.    PHYSICAL EXAM:   Constitutional: /67   Pulse 72   LMP 03/09/2010   SpO2 96%      Mental Status: A & O in no acute distress.  Affect is appropriate.  Speech is fluent.  Recent and remote memory are intact.  Attention span and concentration are normal.     Motor:  Normal bulk and tone all muscle groups of upper and lower extremities.       IMAGING:   I personally reviewed all radiographic images        CONSULTATION ASSESSMENT AND PLAN:    Patient has had no success with physical therapy treatment. Limited walking ability. We discussed proceeding with exploration of prior lumbar 4-lumbar 5 posterior instrumented fusion with extension to lumbar 3 with use of stealth. Lumbar 3-lumbar 4 bilateral laminectomies, medial facetectomies, foraminotomies and posterolateral arthrodesis.  Risks and benefits were discussed in detail including but not limited to infection, hematoma, nerve damage including paralysis, post op radiculitis, durotomy, lack of a sold bone fusion, hardware malfunction, inadequate symptom relief, risks associated with the use of general anesthesia.    No further  conservative care is indicated and the surgery is medically necessary for the following reasons: further physical therapy is not indicated in this case as it would only prolong the patient s suffering without providing any benefit and the delay would increase the risk of neurologic decline and/or symptoms worsening unnecessarily, with no benefit to the patient and possible harm.     The patient has also trialed activity modifications such as decreasing their bending and twisting, and decreasing their walking distance.  In spite of this, and in spite of the conservative therapies documented above, the patient has significant functional disability in their activities of daily living such as prolonged sitting and standing, prolonged walking, and daily chores each of which are very difficult or painful because of the spinal problem. Therefore the proposed surgery is medically necessary and the patient has failed conservative care.     There are no untreated, underlying mental health conditions (including but not limited to psychological conditions or drug or alcohol abuse) that will preclude an appropriate recovery from this surgery or that are contraindications to proceeding with surgery.       Leanna Ward MD      CC:     Earline Jimenez  [address] [place] [address]

## 2024-05-15 ENCOUNTER — TELEPHONE (OUTPATIENT)
Dept: NEUROSURGERY | Facility: CLINIC | Age: 59
End: 2024-05-15
Payer: COMMERCIAL

## 2024-05-15 NOTE — TELEPHONE ENCOUNTER
Patient is scheduled for surgery on 6/12/24.  I gave the patient surgery details, and she verbalized understanding.

## 2024-06-05 ENCOUNTER — VIRTUAL VISIT (OUTPATIENT)
Dept: NEUROSURGERY | Facility: CLINIC | Age: 59
End: 2024-06-05
Payer: COMMERCIAL

## 2024-06-05 DIAGNOSIS — M48.062 SPINAL STENOSIS OF LUMBAR REGION WITH NEUROGENIC CLAUDICATION: Primary | ICD-10-CM

## 2024-06-05 PROCEDURE — 99207 PR NO CHARGE NURSE ONLY: CPT | Mod: 93

## 2024-06-05 NOTE — PROGRESS NOTES
Called patient, discussed surgery, post-op course, expectations, follow up plan.    Reviewed H&P from 06/06/2024 - cleared for surgery  Labs - WNL    CT  done on 04/02/2024 - in [facility]    To OR as planned.     Check in - 0600    Nothing to eat or drink after midnight the night before surgery.     Reviewed with patient: Arrive 2 hours prior to scheduled surgery.    Continue to refrain from NSAIDS (Ibuprofen, Aleve, Naprosyn), ASA, Over the counter herbal medications or supplements, anti-coagulants and blood thinners.     Patient confirmed they have help/assistance in place at home upon discharge    Instructions: using a washcloth and a bottle of provided Hibiclens, wash your body, avoiding your face and genitals. Preferably, shower the night before surgery and the morning of surgery using a half a bottle each time for your whole body shower.        Patient was informed that we will provide up to 6 weeks prescription of pain medication for post-operative pain.      Instructed patient about the following: After your surgery, if you will be staying in-patient, a nursing provider team will be monitoring you closely throughout your stay and communicate your health status to your surgeon and other providers.  You will be seen by Advanced Practice Providers (e.g., nurse practitioners, clinic nurse specialist, and physician assistants) who will check on you regularly to assess the status of your surgery.     Sussy Saldana RN, CNRN

## 2024-06-06 ENCOUNTER — OFFICE VISIT (OUTPATIENT)
Dept: FAMILY MEDICINE | Facility: CLINIC | Age: 59
End: 2024-06-06
Payer: COMMERCIAL

## 2024-06-06 VITALS
WEIGHT: 191 LBS | HEART RATE: 63 BPM | OXYGEN SATURATION: 98 % | TEMPERATURE: 97.7 F | BODY MASS INDEX: 35.15 KG/M2 | DIASTOLIC BLOOD PRESSURE: 70 MMHG | SYSTOLIC BLOOD PRESSURE: 116 MMHG | RESPIRATION RATE: 16 BRPM | HEIGHT: 62 IN

## 2024-06-06 DIAGNOSIS — G95.20 CERVICAL CORD COMPRESSION WITH MYELOPATHY (H): ICD-10-CM

## 2024-06-06 DIAGNOSIS — F33.2 SEVERE EPISODE OF RECURRENT MAJOR DEPRESSIVE DISORDER, WITHOUT PSYCHOTIC FEATURES (H): ICD-10-CM

## 2024-06-06 DIAGNOSIS — M79.18 MYOFASCIAL PAIN: ICD-10-CM

## 2024-06-06 DIAGNOSIS — Z87.891 HISTORY OF SMOKING 25-50 PACK YEARS: ICD-10-CM

## 2024-06-06 DIAGNOSIS — E03.9 HYPOTHYROIDISM, UNSPECIFIED TYPE: ICD-10-CM

## 2024-06-06 DIAGNOSIS — Z01.818 PREOP EXAMINATION: Primary | ICD-10-CM

## 2024-06-06 DIAGNOSIS — Z00.00 HEALTHCARE MAINTENANCE: ICD-10-CM

## 2024-06-06 LAB — HGB BLD-MCNC: 12.5 G/DL (ref 11.7–15.7)

## 2024-06-06 PROCEDURE — 99214 OFFICE O/P EST MOD 30 MIN: CPT | Performed by: FAMILY MEDICINE

## 2024-06-06 PROCEDURE — 36415 COLL VENOUS BLD VENIPUNCTURE: CPT | Performed by: FAMILY MEDICINE

## 2024-06-06 PROCEDURE — 80061 LIPID PANEL: CPT | Performed by: FAMILY MEDICINE

## 2024-06-06 PROCEDURE — 85018 HEMOGLOBIN: CPT | Performed by: FAMILY MEDICINE

## 2024-06-06 PROCEDURE — 84460 ALANINE AMINO (ALT) (SGPT): CPT | Performed by: FAMILY MEDICINE

## 2024-06-06 PROCEDURE — 84443 ASSAY THYROID STIM HORMONE: CPT | Performed by: FAMILY MEDICINE

## 2024-06-06 PROCEDURE — 80048 BASIC METABOLIC PNL TOTAL CA: CPT | Performed by: FAMILY MEDICINE

## 2024-06-06 RX ORDER — GABAPENTIN 300 MG/1
CAPSULE ORAL
Qty: 300 CAPSULE | Refills: 2 | Status: SHIPPED | OUTPATIENT
Start: 2024-06-06

## 2024-06-06 ASSESSMENT — PATIENT HEALTH QUESTIONNAIRE - PHQ9
SUM OF ALL RESPONSES TO PHQ QUESTIONS 1-9: 1
SUM OF ALL RESPONSES TO PHQ QUESTIONS 1-9: 1
10. IF YOU CHECKED OFF ANY PROBLEMS, HOW DIFFICULT HAVE THESE PROBLEMS MADE IT FOR YOU TO DO YOUR WORK, TAKE CARE OF THINGS AT HOME, OR GET ALONG WITH OTHER PEOPLE: NOT DIFFICULT AT ALL

## 2024-06-06 ASSESSMENT — ASTHMA QUESTIONNAIRES
ACT_TOTALSCORE: 25
QUESTION_1 LAST FOUR WEEKS HOW MUCH OF THE TIME DID YOUR ASTHMA KEEP YOU FROM GETTING AS MUCH DONE AT WORK, SCHOOL OR AT HOME: NONE OF THE TIME
QUESTION_4 LAST FOUR WEEKS HOW OFTEN HAVE YOU USED YOUR RESCUE INHALER OR NEBULIZER MEDICATION (SUCH AS ALBUTEROL): NOT AT ALL
ACT_TOTALSCORE: 25
QUESTION_2 LAST FOUR WEEKS HOW OFTEN HAVE YOU HAD SHORTNESS OF BREATH: NOT AT ALL
QUESTION_3 LAST FOUR WEEKS HOW OFTEN DID YOUR ASTHMA SYMPTOMS (WHEEZING, COUGHING, SHORTNESS OF BREATH, CHEST TIGHTNESS OR PAIN) WAKE YOU UP AT NIGHT OR EARLIER THAN USUAL IN THE MORNING: NOT AT ALL
QUESTION_5 LAST FOUR WEEKS HOW WOULD YOU RATE YOUR ASTHMA CONTROL: COMPLETELY CONTROLLED

## 2024-06-06 NOTE — TELEPHONE ENCOUNTER
Last appointment:3/13/24  Next appointment:none    Notes/Comments:1/10/24 #300 with 2 refills      Rx request(s) has been reviewed.
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
## 2024-06-07 DIAGNOSIS — E78.00 PURE HYPERCHOLESTEROLEMIA: ICD-10-CM

## 2024-06-07 DIAGNOSIS — G47.00 INSOMNIA, UNSPECIFIED TYPE: ICD-10-CM

## 2024-06-07 LAB
ALT SERPL W P-5'-P-CCNC: 28 U/L (ref 0–50)
ANION GAP SERPL CALCULATED.3IONS-SCNC: 12 MMOL/L (ref 7–15)
BUN SERPL-MCNC: 13.5 MG/DL (ref 6–20)
CALCIUM SERPL-MCNC: 9.3 MG/DL (ref 8.6–10)
CHLORIDE SERPL-SCNC: 101 MMOL/L (ref 98–107)
CHOLEST SERPL-MCNC: 202 MG/DL
CREAT SERPL-MCNC: 1.09 MG/DL (ref 0.51–0.95)
DEPRECATED HCO3 PLAS-SCNC: 27 MMOL/L (ref 22–29)
EGFRCR SERPLBLD CKD-EPI 2021: 59 ML/MIN/1.73M2
FASTING STATUS PATIENT QL REPORTED: NO
FASTING STATUS PATIENT QL REPORTED: NO
GLUCOSE SERPL-MCNC: 86 MG/DL (ref 70–99)
HDLC SERPL-MCNC: 32 MG/DL
LDLC SERPL CALC-MCNC: 105 MG/DL
NONHDLC SERPL-MCNC: 170 MG/DL
POTASSIUM SERPL-SCNC: 4.5 MMOL/L (ref 3.4–5.3)
SODIUM SERPL-SCNC: 140 MMOL/L (ref 135–145)
TRIGL SERPL-MCNC: 327 MG/DL
TSH SERPL DL<=0.005 MIU/L-ACNC: 0.58 UIU/ML (ref 0.3–4.2)

## 2024-06-07 RX ORDER — SIMVASTATIN 20 MG
20 TABLET ORAL DAILY
Qty: 90 TABLET | Refills: 3 | Status: SHIPPED | OUTPATIENT
Start: 2024-06-07

## 2024-06-07 RX ORDER — TRAZODONE HYDROCHLORIDE 50 MG/1
TABLET, FILM COATED ORAL
Qty: 180 TABLET | Refills: 0 | Status: SHIPPED | OUTPATIENT
Start: 2024-06-07 | End: 2024-07-02

## 2024-06-11 ENCOUNTER — ANESTHESIA EVENT (OUTPATIENT)
Dept: SURGERY | Facility: HOSPITAL | Age: 59
DRG: 460 | End: 2024-06-11
Payer: COMMERCIAL

## 2024-06-12 ENCOUNTER — HOSPITAL ENCOUNTER (INPATIENT)
Facility: HOSPITAL | Age: 59
LOS: 2 days | Discharge: HOME | DRG: 460 | End: 2024-06-14
Attending: SURGERY | Admitting: SURGERY
Payer: COMMERCIAL

## 2024-06-12 ENCOUNTER — ANESTHESIA (OUTPATIENT)
Dept: SURGERY | Facility: HOSPITAL | Age: 59
DRG: 460 | End: 2024-06-12
Payer: COMMERCIAL

## 2024-06-12 ENCOUNTER — APPOINTMENT (OUTPATIENT)
Dept: RADIOLOGY | Facility: HOSPITAL | Age: 59
DRG: 460 | End: 2024-06-12
Attending: SURGERY
Payer: COMMERCIAL

## 2024-06-12 DIAGNOSIS — Z98.1 S/P LUMBAR FUSION: Primary | ICD-10-CM

## 2024-06-12 LAB
GLUCOSE BLDC GLUCOMTR-MCNC: 145 MG/DL (ref 70–99)
HOLD SPECIMEN: NORMAL
PLATELET # BLD AUTO: 294 10E3/UL (ref 150–450)

## 2024-06-12 PROCEDURE — 250N000011 HC RX IP 250 OP 636: Mod: JZ | Performed by: NURSE ANESTHETIST, CERTIFIED REGISTERED

## 2024-06-12 PROCEDURE — 250N000013 HC RX MED GY IP 250 OP 250 PS 637: Performed by: ANESTHESIOLOGY

## 2024-06-12 PROCEDURE — 999N000141 HC STATISTIC PRE-PROCEDURE NURSING ASSESSMENT: Performed by: SURGERY

## 2024-06-12 PROCEDURE — 258N000003 HC RX IP 258 OP 636: Mod: JZ | Performed by: ANESTHESIOLOGY

## 2024-06-12 PROCEDURE — 22612 ARTHRD PST TQ 1NTRSPC LUMBAR: CPT | Performed by: SURGERY

## 2024-06-12 PROCEDURE — 250N000009 HC RX 250: Performed by: PHYSICIAN ASSISTANT

## 2024-06-12 PROCEDURE — 8E0W0CZ ROBOTIC ASSISTED PROCEDURE OF TRUNK REGION, OPEN APPROACH: ICD-10-PCS | Performed by: SURGERY

## 2024-06-12 PROCEDURE — 22840 INSERT SPINE FIXATION DEVICE: CPT | Performed by: SURGERY

## 2024-06-12 PROCEDURE — 360N000079 HC SURGERY LEVEL 6, PER MIN: Performed by: SURGERY

## 2024-06-12 PROCEDURE — 01NB0ZZ RELEASE LUMBAR NERVE, OPEN APPROACH: ICD-10-PCS | Performed by: SURGERY

## 2024-06-12 PROCEDURE — 258N000003 HC RX IP 258 OP 636: Mod: JZ | Performed by: NURSE ANESTHETIST, CERTIFIED REGISTERED

## 2024-06-12 PROCEDURE — 250N000011 HC RX IP 250 OP 636: Mod: JZ

## 2024-06-12 PROCEDURE — 0SG0071 FUSION OF LUMBAR VERTEBRAL JOINT WITH AUTOLOGOUS TISSUE SUBSTITUTE, POSTERIOR APPROACH, POSTERIOR COLUMN, OPEN APPROACH: ICD-10-PCS | Performed by: SURGERY

## 2024-06-12 PROCEDURE — 250N000011 HC RX IP 250 OP 636: Performed by: PHYSICIAN ASSISTANT

## 2024-06-12 PROCEDURE — 63047 LAM FACETEC & FORAMOT LUMBAR: CPT | Mod: AS

## 2024-06-12 PROCEDURE — 250N000013 HC RX MED GY IP 250 OP 250 PS 637: Performed by: PHYSICIAN ASSISTANT

## 2024-06-12 PROCEDURE — 61783 SCAN PROC SPINAL: CPT | Mod: AS

## 2024-06-12 PROCEDURE — 36415 COLL VENOUS BLD VENIPUNCTURE: CPT | Performed by: SURGERY

## 2024-06-12 PROCEDURE — P9045 ALBUMIN (HUMAN), 5%, 250 ML: HCPCS | Mod: JZ | Performed by: NURSE ANESTHETIST, CERTIFIED REGISTERED

## 2024-06-12 PROCEDURE — 250N000011 HC RX IP 250 OP 636: Mod: JZ | Performed by: ANESTHESIOLOGY

## 2024-06-12 PROCEDURE — 250N000011 HC RX IP 250 OP 636: Performed by: ANESTHESIOLOGY

## 2024-06-12 PROCEDURE — 00NY0ZZ RELEASE LUMBAR SPINAL CORD, OPEN APPROACH: ICD-10-PCS | Performed by: SURGERY

## 2024-06-12 PROCEDURE — 250N000013 HC RX MED GY IP 250 OP 250 PS 637: Performed by: SURGERY

## 2024-06-12 PROCEDURE — 0QP004Z REMOVAL OF INTERNAL FIXATION DEVICE FROM LUMBAR VERTEBRA, OPEN APPROACH: ICD-10-PCS | Performed by: SURGERY

## 2024-06-12 PROCEDURE — 250N000013 HC RX MED GY IP 250 OP 250 PS 637

## 2024-06-12 PROCEDURE — C1713 ANCHOR/SCREW BN/BN,TIS/BN: HCPCS | Performed by: SURGERY

## 2024-06-12 PROCEDURE — 120N000001 HC R&B MED SURG/OB

## 2024-06-12 PROCEDURE — 258N000003 HC RX IP 258 OP 636: Mod: JZ

## 2024-06-12 PROCEDURE — 22612 ARTHRD PST TQ 1NTRSPC LUMBAR: CPT | Mod: AS

## 2024-06-12 PROCEDURE — 20936 SP BONE AGRFT LOCAL ADD-ON: CPT | Performed by: SURGERY

## 2024-06-12 PROCEDURE — 250N000009 HC RX 250: Performed by: NURSE ANESTHETIST, CERTIFIED REGISTERED

## 2024-06-12 PROCEDURE — 61783 SCAN PROC SPINAL: CPT | Mod: 59 | Performed by: SURGERY

## 2024-06-12 PROCEDURE — 999N000180 XR SURGERY CARM FLUORO LESS THAN 5 MIN: Mod: TC

## 2024-06-12 PROCEDURE — 710N000009 HC RECOVERY PHASE 1, LEVEL 1, PER MIN: Performed by: SURGERY

## 2024-06-12 PROCEDURE — 0WJL0ZZ INSPECTION OF LOWER BACK, OPEN APPROACH: ICD-10-PCS | Performed by: SURGERY

## 2024-06-12 PROCEDURE — 250N000025 HC SEVOFLURANE, PER MIN: Performed by: SURGERY

## 2024-06-12 PROCEDURE — 22840 INSERT SPINE FIXATION DEVICE: CPT | Mod: AS

## 2024-06-12 PROCEDURE — C1762 CONN TISS, HUMAN(INC FASCIA): HCPCS | Performed by: SURGERY

## 2024-06-12 PROCEDURE — 370N000017 HC ANESTHESIA TECHNICAL FEE, PER MIN: Performed by: SURGERY

## 2024-06-12 PROCEDURE — 63047 LAM FACETEC & FORAMOT LUMBAR: CPT | Mod: 51 | Performed by: SURGERY

## 2024-06-12 PROCEDURE — 99222 1ST HOSP IP/OBS MODERATE 55: CPT | Performed by: HOSPITALIST

## 2024-06-12 PROCEDURE — 250N000009 HC RX 250: Performed by: SURGERY

## 2024-06-12 PROCEDURE — 85049 AUTOMATED PLATELET COUNT: CPT | Performed by: SURGERY

## 2024-06-12 PROCEDURE — 20930 SP BONE ALGRFT MORSEL ADD-ON: CPT | Performed by: SURGERY

## 2024-06-12 PROCEDURE — 272N000001 HC OR GENERAL SUPPLY STERILE: Performed by: SURGERY

## 2024-06-12 DEVICE — IMP SCR MEDT 4.75MM SOLERA 6.5X50MM MA 54840006550: Type: IMPLANTABLE DEVICE | Site: SPINE LUMBAR | Status: FUNCTIONAL

## 2024-06-12 DEVICE — IMP SCR MEDT BREAK-OFF SET SOLERA 4.75MM TI 5440030: Type: IMPLANTABLE DEVICE | Site: SPINE LUMBAR | Status: FUNCTIONAL

## 2024-06-12 DEVICE — GRAFT DBM ORTHOBLEND SM DEFECT 5CC: Type: IMPLANTABLE DEVICE | Site: SPINE LUMBAR | Status: FUNCTIONAL

## 2024-06-12 DEVICE — IMP ROD MEDT 6CM 1475501060: Type: IMPLANTABLE DEVICE | Site: SPINE LUMBAR | Status: FUNCTIONAL

## 2024-06-12 DEVICE — IMP ROD MEDT 7CM 1475501070: Type: IMPLANTABLE DEVICE | Site: SPINE LUMBAR | Status: FUNCTIONAL

## 2024-06-12 DEVICE — IMP SCR MEDT 4.75MM SOLERA 5.5X45MM MA 54840005545: Type: IMPLANTABLE DEVICE | Site: SPINE LUMBAR | Status: FUNCTIONAL

## 2024-06-12 RX ORDER — PROPOFOL 10 MG/ML
INJECTION, EMULSION INTRAVENOUS PRN
Status: DISCONTINUED | OUTPATIENT
Start: 2024-06-12 | End: 2024-06-12

## 2024-06-12 RX ORDER — HYDROMORPHONE HCL IN WATER/PF 6 MG/30 ML
0.2 PATIENT CONTROLLED ANALGESIA SYRINGE INTRAVENOUS EVERY 5 MIN PRN
Status: DISCONTINUED | OUTPATIENT
Start: 2024-06-12 | End: 2024-06-12 | Stop reason: HOSPADM

## 2024-06-12 RX ORDER — PROCHLORPERAZINE MALEATE 10 MG
10 TABLET ORAL EVERY 6 HOURS PRN
Status: DISCONTINUED | OUTPATIENT
Start: 2024-06-12 | End: 2024-06-14 | Stop reason: HOSPADM

## 2024-06-12 RX ORDER — DEXAMETHASONE SODIUM PHOSPHATE 10 MG/ML
INJECTION, SOLUTION INTRAMUSCULAR; INTRAVENOUS PRN
Status: DISCONTINUED | OUTPATIENT
Start: 2024-06-12 | End: 2024-06-12

## 2024-06-12 RX ORDER — SODIUM CHLORIDE 9 MG/ML
INJECTION, SOLUTION INTRAVENOUS CONTINUOUS
Status: DISCONTINUED | OUTPATIENT
Start: 2024-06-12 | End: 2024-06-14 | Stop reason: HOSPADM

## 2024-06-12 RX ORDER — NALOXONE HYDROCHLORIDE 0.4 MG/ML
0.1 INJECTION, SOLUTION INTRAMUSCULAR; INTRAVENOUS; SUBCUTANEOUS
Status: DISCONTINUED | OUTPATIENT
Start: 2024-06-12 | End: 2024-06-12 | Stop reason: HOSPADM

## 2024-06-12 RX ORDER — CEFAZOLIN SODIUM 2 G/100ML
2 INJECTION, SOLUTION INTRAVENOUS EVERY 8 HOURS
Qty: 300 ML | Refills: 0 | Status: COMPLETED | OUTPATIENT
Start: 2024-06-12 | End: 2024-06-13

## 2024-06-12 RX ORDER — FENTANYL CITRATE 50 UG/ML
50 INJECTION, SOLUTION INTRAMUSCULAR; INTRAVENOUS EVERY 5 MIN PRN
Status: DISCONTINUED | OUTPATIENT
Start: 2024-06-12 | End: 2024-06-12 | Stop reason: HOSPADM

## 2024-06-12 RX ORDER — FENTANYL CITRATE 50 UG/ML
25 INJECTION, SOLUTION INTRAMUSCULAR; INTRAVENOUS
Status: DISCONTINUED | OUTPATIENT
Start: 2024-06-12 | End: 2024-06-12 | Stop reason: HOSPADM

## 2024-06-12 RX ORDER — CEFAZOLIN SODIUM/WATER 2 G/20 ML
2 SYRINGE (ML) INTRAVENOUS SEE ADMIN INSTRUCTIONS
Status: DISCONTINUED | OUTPATIENT
Start: 2024-06-12 | End: 2024-06-12 | Stop reason: HOSPADM

## 2024-06-12 RX ORDER — AMOXICILLIN 250 MG
1 CAPSULE ORAL 2 TIMES DAILY
Status: DISCONTINUED | OUTPATIENT
Start: 2024-06-12 | End: 2024-06-14 | Stop reason: HOSPADM

## 2024-06-12 RX ORDER — HYDROMORPHONE HCL IN WATER/PF 6 MG/30 ML
0.2 PATIENT CONTROLLED ANALGESIA SYRINGE INTRAVENOUS
Status: DISCONTINUED | OUTPATIENT
Start: 2024-06-12 | End: 2024-06-14 | Stop reason: HOSPADM

## 2024-06-12 RX ORDER — DEXAMETHASONE SODIUM PHOSPHATE 4 MG/ML
4 INJECTION, SOLUTION INTRA-ARTICULAR; INTRALESIONAL; INTRAMUSCULAR; INTRAVENOUS; SOFT TISSUE
Status: DISCONTINUED | OUTPATIENT
Start: 2024-06-12 | End: 2024-06-12 | Stop reason: HOSPADM

## 2024-06-12 RX ORDER — LORATADINE 10 MG/1
10 TABLET ORAL DAILY PRN
Status: DISCONTINUED | OUTPATIENT
Start: 2024-06-12 | End: 2024-06-14 | Stop reason: HOSPADM

## 2024-06-12 RX ORDER — DEXMEDETOMIDINE HYDROCHLORIDE 4 UG/ML
INJECTION, SOLUTION INTRAVENOUS
Status: DISCONTINUED
Start: 2024-06-12 | End: 2024-06-12 | Stop reason: HOSPADM

## 2024-06-12 RX ORDER — SODIUM CHLORIDE, SODIUM LACTATE, POTASSIUM CHLORIDE, CALCIUM CHLORIDE 600; 310; 30; 20 MG/100ML; MG/100ML; MG/100ML; MG/100ML
INJECTION, SOLUTION INTRAVENOUS CONTINUOUS
Status: DISCONTINUED | OUTPATIENT
Start: 2024-06-12 | End: 2024-06-12 | Stop reason: HOSPADM

## 2024-06-12 RX ORDER — BISACODYL 10 MG
10 SUPPOSITORY, RECTAL RECTAL DAILY PRN
Status: DISCONTINUED | OUTPATIENT
Start: 2024-06-15 | End: 2024-06-14 | Stop reason: HOSPADM

## 2024-06-12 RX ORDER — ONDANSETRON 2 MG/ML
4 INJECTION INTRAMUSCULAR; INTRAVENOUS EVERY 6 HOURS PRN
Status: DISCONTINUED | OUTPATIENT
Start: 2024-06-12 | End: 2024-06-14 | Stop reason: HOSPADM

## 2024-06-12 RX ORDER — GABAPENTIN 300 MG/1
900 CAPSULE ORAL 2 TIMES DAILY
Status: DISCONTINUED | OUTPATIENT
Start: 2024-06-13 | End: 2024-06-14 | Stop reason: HOSPADM

## 2024-06-12 RX ORDER — POLYETHYLENE GLYCOL 3350 17 G/17G
17 POWDER, FOR SOLUTION ORAL DAILY
Status: DISCONTINUED | OUTPATIENT
Start: 2024-06-13 | End: 2024-06-14 | Stop reason: HOSPADM

## 2024-06-12 RX ORDER — BUPIVACAINE HYDROCHLORIDE AND EPINEPHRINE 2.5; 5 MG/ML; UG/ML
2 INJECTION, SOLUTION EPIDURAL; INFILTRATION; INTRACAUDAL; PERINEURAL ONCE
Qty: 70 ML | Refills: 0 | Status: DISCONTINUED | OUTPATIENT
Start: 2024-06-12 | End: 2024-06-12 | Stop reason: HOSPADM

## 2024-06-12 RX ORDER — TRAZODONE HYDROCHLORIDE 50 MG/1
100 TABLET, FILM COATED ORAL AT BEDTIME
Status: DISCONTINUED | OUTPATIENT
Start: 2024-06-12 | End: 2024-06-14 | Stop reason: HOSPADM

## 2024-06-12 RX ORDER — ONDANSETRON 2 MG/ML
4 INJECTION INTRAMUSCULAR; INTRAVENOUS EVERY 30 MIN PRN
Status: DISCONTINUED | OUTPATIENT
Start: 2024-06-12 | End: 2024-06-12 | Stop reason: HOSPADM

## 2024-06-12 RX ORDER — ONDANSETRON 4 MG/1
4 TABLET, ORALLY DISINTEGRATING ORAL EVERY 6 HOURS PRN
Status: DISCONTINUED | OUTPATIENT
Start: 2024-06-12 | End: 2024-06-14 | Stop reason: HOSPADM

## 2024-06-12 RX ORDER — ACETAMINOPHEN 325 MG/1
650 TABLET ORAL EVERY 4 HOURS PRN
Status: DISCONTINUED | OUTPATIENT
Start: 2024-06-15 | End: 2024-06-14 | Stop reason: HOSPADM

## 2024-06-12 RX ORDER — ACETAMINOPHEN 325 MG/1
975 TABLET ORAL ONCE
Status: COMPLETED | OUTPATIENT
Start: 2024-06-12 | End: 2024-06-12

## 2024-06-12 RX ORDER — METHADONE HYDROCHLORIDE 10 MG/ML
10 INJECTION, SOLUTION INTRAMUSCULAR; INTRAVENOUS; SUBCUTANEOUS ONCE
Status: COMPLETED | OUTPATIENT
Start: 2024-06-12 | End: 2024-06-12

## 2024-06-12 RX ORDER — MULTIVITAMIN,THERAPEUTIC
1 TABLET ORAL DAILY
Status: DISCONTINUED | OUTPATIENT
Start: 2024-06-13 | End: 2024-06-14 | Stop reason: HOSPADM

## 2024-06-12 RX ORDER — ACETAMINOPHEN 325 MG/1
975 TABLET ORAL EVERY 8 HOURS
Qty: 27 TABLET | Refills: 0 | Status: DISCONTINUED | OUTPATIENT
Start: 2024-06-12 | End: 2024-06-14 | Stop reason: HOSPADM

## 2024-06-12 RX ORDER — METHOCARBAMOL 750 MG/1
750 TABLET, FILM COATED ORAL EVERY 6 HOURS PRN
Status: DISCONTINUED | OUTPATIENT
Start: 2024-06-12 | End: 2024-06-14 | Stop reason: HOSPADM

## 2024-06-12 RX ORDER — FENTANYL CITRATE 50 UG/ML
25 INJECTION, SOLUTION INTRAMUSCULAR; INTRAVENOUS EVERY 5 MIN PRN
Status: DISCONTINUED | OUTPATIENT
Start: 2024-06-12 | End: 2024-06-12 | Stop reason: HOSPADM

## 2024-06-12 RX ORDER — NALOXONE HYDROCHLORIDE 0.4 MG/ML
0.4 INJECTION, SOLUTION INTRAMUSCULAR; INTRAVENOUS; SUBCUTANEOUS
Status: DISCONTINUED | OUTPATIENT
Start: 2024-06-12 | End: 2024-06-14 | Stop reason: HOSPADM

## 2024-06-12 RX ORDER — PROPOFOL 10 MG/ML
INJECTION, EMULSION INTRAVENOUS CONTINUOUS PRN
Status: DISCONTINUED | OUTPATIENT
Start: 2024-06-12 | End: 2024-06-12

## 2024-06-12 RX ORDER — OXYCODONE HYDROCHLORIDE 5 MG/1
5 TABLET ORAL
Status: COMPLETED | OUTPATIENT
Start: 2024-06-12 | End: 2024-06-12

## 2024-06-12 RX ORDER — OXYCODONE HYDROCHLORIDE 5 MG/1
5 TABLET ORAL EVERY 4 HOURS PRN
Status: DISCONTINUED | OUTPATIENT
Start: 2024-06-12 | End: 2024-06-14 | Stop reason: HOSPADM

## 2024-06-12 RX ORDER — GABAPENTIN 300 MG/1
300 CAPSULE ORAL
Status: COMPLETED | OUTPATIENT
Start: 2024-06-12 | End: 2024-06-12

## 2024-06-12 RX ORDER — MAGNESIUM SULFATE 4 G/50ML
4 INJECTION INTRAVENOUS ONCE
Status: COMPLETED | OUTPATIENT
Start: 2024-06-12 | End: 2024-06-12

## 2024-06-12 RX ORDER — ENOXAPARIN SODIUM 100 MG/ML
40 INJECTION SUBCUTANEOUS EVERY 24 HOURS
Status: DISCONTINUED | OUTPATIENT
Start: 2024-06-14 | End: 2024-06-14 | Stop reason: HOSPADM

## 2024-06-12 RX ORDER — METHADONE HYDROCHLORIDE 10 MG/ML
INJECTION, SOLUTION INTRAMUSCULAR; INTRAVENOUS; SUBCUTANEOUS
Status: DISCONTINUED
Start: 2024-06-12 | End: 2024-06-12 | Stop reason: HOSPADM

## 2024-06-12 RX ORDER — NALOXONE HYDROCHLORIDE 0.4 MG/ML
0.2 INJECTION, SOLUTION INTRAMUSCULAR; INTRAVENOUS; SUBCUTANEOUS
Status: DISCONTINUED | OUTPATIENT
Start: 2024-06-12 | End: 2024-06-14 | Stop reason: HOSPADM

## 2024-06-12 RX ORDER — ONDANSETRON 4 MG/1
4 TABLET, ORALLY DISINTEGRATING ORAL EVERY 30 MIN PRN
Status: DISCONTINUED | OUTPATIENT
Start: 2024-06-12 | End: 2024-06-12 | Stop reason: HOSPADM

## 2024-06-12 RX ORDER — OXYCODONE HYDROCHLORIDE 5 MG/1
10 TABLET ORAL
Status: COMPLETED | OUTPATIENT
Start: 2024-06-12 | End: 2024-06-12

## 2024-06-12 RX ORDER — GABAPENTIN 300 MG/1
1200 CAPSULE ORAL AT BEDTIME
Status: DISCONTINUED | OUTPATIENT
Start: 2024-06-12 | End: 2024-06-14 | Stop reason: HOSPADM

## 2024-06-12 RX ORDER — LIDOCAINE 40 MG/G
CREAM TOPICAL
Status: DISCONTINUED | OUTPATIENT
Start: 2024-06-12 | End: 2024-06-12 | Stop reason: HOSPADM

## 2024-06-12 RX ORDER — HYDROMORPHONE HCL IN WATER/PF 6 MG/30 ML
0.4 PATIENT CONTROLLED ANALGESIA SYRINGE INTRAVENOUS
Status: DISCONTINUED | OUTPATIENT
Start: 2024-06-12 | End: 2024-06-14 | Stop reason: HOSPADM

## 2024-06-12 RX ORDER — HYDROMORPHONE HCL IN WATER/PF 6 MG/30 ML
0.4 PATIENT CONTROLLED ANALGESIA SYRINGE INTRAVENOUS EVERY 5 MIN PRN
Status: DISCONTINUED | OUTPATIENT
Start: 2024-06-12 | End: 2024-06-12 | Stop reason: HOSPADM

## 2024-06-12 RX ORDER — ONDANSETRON 2 MG/ML
INJECTION INTRAMUSCULAR; INTRAVENOUS PRN
Status: DISCONTINUED | OUTPATIENT
Start: 2024-06-12 | End: 2024-06-12

## 2024-06-12 RX ORDER — SIMVASTATIN 10 MG
20 TABLET ORAL EVERY EVENING
Status: DISCONTINUED | OUTPATIENT
Start: 2024-06-12 | End: 2024-06-14 | Stop reason: HOSPADM

## 2024-06-12 RX ORDER — GABAPENTIN 300 MG/1
900-1200 CAPSULE ORAL 3 TIMES DAILY
Status: DISCONTINUED | OUTPATIENT
Start: 2024-06-12 | End: 2024-06-12

## 2024-06-12 RX ORDER — ALBUTEROL SULFATE 90 UG/1
1-2 AEROSOL, METERED RESPIRATORY (INHALATION) EVERY 4 HOURS PRN
Status: DISCONTINUED | OUTPATIENT
Start: 2024-06-12 | End: 2024-06-14 | Stop reason: HOSPADM

## 2024-06-12 RX ORDER — LIDOCAINE HYDROCHLORIDE AND EPINEPHRINE 10; 10 MG/ML; UG/ML
INJECTION, SOLUTION INFILTRATION; PERINEURAL PRN
Status: DISCONTINUED | OUTPATIENT
Start: 2024-06-12 | End: 2024-06-12 | Stop reason: HOSPADM

## 2024-06-12 RX ORDER — KETAMINE HYDROCHLORIDE 10 MG/ML
INJECTION INTRAMUSCULAR; INTRAVENOUS PRN
Status: DISCONTINUED | OUTPATIENT
Start: 2024-06-12 | End: 2024-06-12

## 2024-06-12 RX ORDER — LIDOCAINE HYDROCHLORIDE 10 MG/ML
INJECTION, SOLUTION INFILTRATION; PERINEURAL PRN
Status: DISCONTINUED | OUTPATIENT
Start: 2024-06-12 | End: 2024-06-12

## 2024-06-12 RX ORDER — BUPROPION HYDROCHLORIDE 150 MG/1
150 TABLET ORAL EVERY MORNING
Status: DISCONTINUED | OUTPATIENT
Start: 2024-06-13 | End: 2024-06-14 | Stop reason: HOSPADM

## 2024-06-12 RX ORDER — OXYCODONE HYDROCHLORIDE 5 MG/1
10 TABLET ORAL EVERY 4 HOURS PRN
Status: DISCONTINUED | OUTPATIENT
Start: 2024-06-12 | End: 2024-06-14 | Stop reason: HOSPADM

## 2024-06-12 RX ORDER — CEFAZOLIN SODIUM/WATER 2 G/20 ML
2 SYRINGE (ML) INTRAVENOUS
Status: COMPLETED | OUTPATIENT
Start: 2024-06-12 | End: 2024-06-12

## 2024-06-12 RX ADMIN — DOCUSATE SODIUM 50 MG AND SENNOSIDES 8.6 MG 1 TABLET: 8.6; 5 TABLET, FILM COATED ORAL at 21:12

## 2024-06-12 RX ADMIN — PHENYLEPHRINE HYDROCHLORIDE 100 MCG: 10 INJECTION INTRAVENOUS at 08:45

## 2024-06-12 RX ADMIN — METHOCARBAMOL 750 MG: 750 TABLET ORAL at 21:12

## 2024-06-12 RX ADMIN — HYDROMORPHONE HYDROCHLORIDE 0.4 MG: 0.2 INJECTION, SOLUTION INTRAMUSCULAR; INTRAVENOUS; SUBCUTANEOUS at 11:39

## 2024-06-12 RX ADMIN — PHENYLEPHRINE HYDROCHLORIDE 200 MCG: 10 INJECTION INTRAVENOUS at 08:55

## 2024-06-12 RX ADMIN — OXYCODONE HYDROCHLORIDE 5 MG: 5 TABLET ORAL at 12:55

## 2024-06-12 RX ADMIN — SODIUM CHLORIDE, POTASSIUM CHLORIDE, SODIUM LACTATE AND CALCIUM CHLORIDE: 600; 310; 30; 20 INJECTION, SOLUTION INTRAVENOUS at 09:27

## 2024-06-12 RX ADMIN — METHOCARBAMOL 750 MG: 750 TABLET ORAL at 12:01

## 2024-06-12 RX ADMIN — TRAZODONE HYDROCHLORIDE 100 MG: 50 TABLET ORAL at 21:12

## 2024-06-12 RX ADMIN — PHENYLEPHRINE HYDROCHLORIDE 100 MCG: 10 INJECTION INTRAVENOUS at 08:53

## 2024-06-12 RX ADMIN — ACETAMINOPHEN 975 MG: 325 TABLET ORAL at 12:55

## 2024-06-12 RX ADMIN — GABAPENTIN 1200 MG: 300 CAPSULE ORAL at 21:12

## 2024-06-12 RX ADMIN — ONDANSETRON 4 MG: 2 INJECTION INTRAMUSCULAR; INTRAVENOUS at 10:39

## 2024-06-12 RX ADMIN — MAGNESIUM SULFATE HEPTAHYDRATE 4 G: 80 INJECTION, SOLUTION INTRAVENOUS at 06:39

## 2024-06-12 RX ADMIN — SODIUM CHLORIDE, POTASSIUM CHLORIDE, SODIUM LACTATE AND CALCIUM CHLORIDE: 600; 310; 30; 20 INJECTION, SOLUTION INTRAVENOUS at 06:36

## 2024-06-12 RX ADMIN — ACETAMINOPHEN 975 MG: 325 TABLET ORAL at 17:13

## 2024-06-12 RX ADMIN — FENTANYL CITRATE 50 MCG: 50 INJECTION, SOLUTION INTRAMUSCULAR; INTRAVENOUS at 11:10

## 2024-06-12 RX ADMIN — PROPOFOL 100 MCG/KG/MIN: 10 INJECTION, EMULSION INTRAVENOUS at 08:24

## 2024-06-12 RX ADMIN — ALBUMIN HUMAN: 0.05 INJECTION, SOLUTION INTRAVENOUS at 09:25

## 2024-06-12 RX ADMIN — KETAMINE HYDROCHLORIDE 30 MG: 10 INJECTION INTRAMUSCULAR; INTRAVENOUS at 08:16

## 2024-06-12 RX ADMIN — Medication 10 MG: at 08:16

## 2024-06-12 RX ADMIN — ACETAMINOPHEN 975 MG: 325 TABLET ORAL at 06:43

## 2024-06-12 RX ADMIN — Medication 2 G: at 08:07

## 2024-06-12 RX ADMIN — SUGAMMADEX 200 MG: 100 INJECTION, SOLUTION INTRAVENOUS at 10:48

## 2024-06-12 RX ADMIN — CEFAZOLIN SODIUM 2 G: 2 INJECTION, SOLUTION INTRAVENOUS at 17:10

## 2024-06-12 RX ADMIN — ACETAMINOPHEN 975 MG: 325 TABLET ORAL at 23:42

## 2024-06-12 RX ADMIN — PHENYLEPHRINE HYDROCHLORIDE 200 MCG: 10 INJECTION INTRAVENOUS at 09:25

## 2024-06-12 RX ADMIN — SODIUM CHLORIDE 75 ML/HR: 9 INJECTION, SOLUTION INTRAVENOUS at 15:02

## 2024-06-12 RX ADMIN — PROPOFOL 70 MG: 10 INJECTION, EMULSION INTRAVENOUS at 10:46

## 2024-06-12 RX ADMIN — GABAPENTIN 300 MG: 300 CAPSULE ORAL at 06:44

## 2024-06-12 RX ADMIN — FLUOXETINE HYDROCHLORIDE 60 MG: 20 CAPSULE ORAL at 17:12

## 2024-06-12 RX ADMIN — CEFAZOLIN SODIUM 2 G: 2 INJECTION, SOLUTION INTRAVENOUS at 23:45

## 2024-06-12 RX ADMIN — LIDOCAINE HYDROCHLORIDE 5 ML: 10 INJECTION, SOLUTION INFILTRATION; PERINEURAL at 08:16

## 2024-06-12 RX ADMIN — OXYCODONE HYDROCHLORIDE 10 MG: 5 TABLET ORAL at 22:20

## 2024-06-12 RX ADMIN — DEXMEDETOMIDINE HYDROCHLORIDE 20 MCG: 100 INJECTION, SOLUTION INTRAVENOUS at 08:07

## 2024-06-12 RX ADMIN — HYDROMORPHONE HYDROCHLORIDE 0.4 MG: 0.2 INJECTION, SOLUTION INTRAMUSCULAR; INTRAVENOUS; SUBCUTANEOUS at 11:23

## 2024-06-12 RX ADMIN — PROPOFOL 170 MG: 10 INJECTION, EMULSION INTRAVENOUS at 08:16

## 2024-06-12 RX ADMIN — LEVOTHYROXINE SODIUM 150 MCG: 0.03 TABLET ORAL at 17:12

## 2024-06-12 RX ADMIN — HYDROMORPHONE HYDROCHLORIDE 0.4 MG: 0.2 INJECTION, SOLUTION INTRAMUSCULAR; INTRAVENOUS; SUBCUTANEOUS at 12:08

## 2024-06-12 RX ADMIN — DEXAMETHASONE SODIUM PHOSPHATE 10 MG: 10 INJECTION, SOLUTION INTRAMUSCULAR; INTRAVENOUS at 08:16

## 2024-06-12 RX ADMIN — SIMVASTATIN 20 MG: 10 TABLET, FILM COATED ORAL at 21:12

## 2024-06-12 RX ADMIN — OXYCODONE HYDROCHLORIDE 10 MG: 5 TABLET ORAL at 17:59

## 2024-06-12 RX ADMIN — PHENYLEPHRINE HYDROCHLORIDE 100 MCG: 10 INJECTION INTRAVENOUS at 09:13

## 2024-06-12 RX ADMIN — ROCURONIUM BROMIDE 50 MG: 50 INJECTION, SOLUTION INTRAVENOUS at 08:17

## 2024-06-12 RX ADMIN — ROCURONIUM BROMIDE 20 MG: 50 INJECTION, SOLUTION INTRAVENOUS at 09:45

## 2024-06-12 RX ADMIN — PHENYLEPHRINE HYDROCHLORIDE 0.5 MCG/KG/MIN: 10 INJECTION INTRAVENOUS at 08:28

## 2024-06-12 RX ADMIN — OXYCODONE HYDROCHLORIDE 5 MG: 5 TABLET ORAL at 13:30

## 2024-06-12 RX ADMIN — ALBUMIN HUMAN: 0.05 INJECTION, SOLUTION INTRAVENOUS at 09:38

## 2024-06-12 RX ADMIN — PHENYLEPHRINE HYDROCHLORIDE 100 MCG: 10 INJECTION INTRAVENOUS at 08:26

## 2024-06-12 ASSESSMENT — ACTIVITIES OF DAILY LIVING (ADL)
ADLS_ACUITY_SCORE: 26
ADLS_ACUITY_SCORE: 29
ADLS_ACUITY_SCORE: 26
ADLS_ACUITY_SCORE: 26
ADLS_ACUITY_SCORE: 29
ADLS_ACUITY_SCORE: 26

## 2024-06-12 ASSESSMENT — LIFESTYLE VARIABLES: TOBACCO_USE: 1

## 2024-06-12 NOTE — PHARMACY-ADMISSION MEDICATION HISTORY
Pharmacist Admission Medication History    Admission medication history is complete. The information provided in this note is only as accurate as the sources available at the time of the update.    Information Source(s): Patient and CareEverywhere/SureScripts via in-person    Pertinent Information: none identified    Changes made to PTA medication list:  Added: None  Deleted: None  Changed: None    Allergies reviewed with patient and updates made in EHR: yes    Medication History Completed By: Kia Hilton [facility] 6/12/2024 6:45 AM    PTA Med List   Medication Sig Last Dose    albuterol (PROAIR HFA/PROVENTIL HFA/VENTOLIN HFA) 108 (90 Base) MCG/ACT inhaler INHALE 1 TO 2 PUFFS BY MOUTH EVERY 4 HOURS AS NEEDED FOR WHEEZING Past Week    buPROPion (WELLBUTRIN XL) 150 MG 24 hr tablet Take 1 tablet (150 mg) by mouth every morning 6/10/2024    FLUoxetine (PROZAC) 20 MG capsule TAKE 3 CAPSULE BY MOUTH EVERY DAY 6/10/2024 at AM    fluticasone (FLONASE) 50 MCG/ACT nasal spray Spray 1 spray into both nostrils daily (Patient taking differently: Spray 1 spray into both nostrils daily as needed for allergies) Past Month    gabapentin (NEURONTIN) 300 MG capsule TAKE 3 CAPSULE BY MOUTH EVERY MORNING, 3 IN THE AFTERNOON AND 4 EVERY NIGHT AT BEDTIME (Patient taking differently: Take 900-1,200 mg by mouth 3 times daily TAKE 3 CAPSULE BY MOUTH EVERY MORNING, 3 IN THE AFTERNOON AND 4 EVERY NIGHT AT BEDTIME) 6/11/2024 at HS    levothyroxine (SYNTHROID/LEVOTHROID) 150 MCG tablet TAKE 1 TABLET BY MOUTH EVERY DAY 6/10/2024    multivitamin, therapeutic (THERA-VIT) TABS tablet Take 1 tablet by mouth daily 6/10/2024    simvastatin (ZOCOR) 20 MG tablet TAKE 1 TABLET BY MOUTH EVERY DAY 6/11/2024 at HS    SUMAtriptan (IMITREX) 50 MG tablet TAKE ONE TABLET BY MOUTH EVERY 2 HOURS AS NEEDED FOR MIGRAINE(MAY REPEAT IN 2 HOURS AS NEEDED) Past Week    tiZANidine (ZANAFLEX) 2 MG tablet Take 1-2 tablets (2-4 mg) by mouth 3 times daily as needed for muscle  spasms 6/10/2024    traZODone (DESYREL) 50 MG tablet TAKE 1-2 TABLETS BY MOUTH EVERY NIGHT AT BEDTIME (Patient taking differently: Take 100 mg by mouth at bedtime) 6/11/2024 at

## 2024-06-12 NOTE — INTERVAL H&P NOTE
"I have reviewed the surgical (or preoperative) H&P that is linked to this encounter, and examined the patient. There are no significant changes    Clinical Conditions Present on Arrival:  Clinically Significant Risk Factors Present on Admission                      # Obesity: Estimated body mass index is 34.29 kg/m  as calculated from the following:    Height as of this encounter: 5' 2\" (1.575 m).    Weight as of this encounter: 187 lb 8 oz (85 kg).       "

## 2024-06-12 NOTE — ANESTHESIA CARE TRANSFER NOTE
Patient: Susan Kwan    Procedure: Procedure(s):  Exploration of prior lumbar 4-lumbar 5 posterior instrumented fusion with extension to lumbar 3 with use of stealth. Lumbar 3-lumbar 4 bilateral laminectomies, medial facetectomies, foraminotomies and posterolateral arthrodesis       Diagnosis: Lumbar radiculopathy [M54.16]  Spinal stenosis of lumbar region with neurogenic claudication [M48.062]  Diagnosis Additional Information: No value filed.    Anesthesia Type:   General     Note:    Oropharynx: oropharynx clear of all foreign objects and spontaneously breathing  Level of Consciousness: awake  Oxygen Supplementation: face mask  Level of Supplemental Oxygen (L/min / FiO2): 8  Independent Airway: airway patency satisfactory and stable  Dentition: dentition unchanged  Vital Signs Stable: post-procedure vital signs reviewed and stable  Report to RN Given: handoff report given  Patient transferred to: PACU    Handoff Report: Identifed the Patient, Identified the Reponsible Provider, Reviewed the pertinent medical history, Discussed the surgical course, Reviewed Intra-OP anesthesia mangement and issues during anesthesia, Set expectations for post-procedure period and Allowed opportunity for questions and acknowledgement of understanding  Vitals:  Vitals Value Taken Time   /74 06/12/24 1058   Temp 35.8  C (96.4  F) 06/12/24 1056   Pulse 82 06/12/24 1101   Resp 19 06/12/24 1101   SpO2 97 % 06/12/24 1101   Vitals shown include unfiled device data.    Electronically Signed By: EITAN Gallardo CRNA  June 12, 2024  11:02 AM

## 2024-06-12 NOTE — ANESTHESIA POSTPROCEDURE EVALUATION
Patient: Susan Kwan    Procedure: Procedure(s):  Exploration of prior lumbar 4-lumbar 5 posterior instrumented fusion with extension to lumbar 3 with use of stealth. Lumbar 3-lumbar 4 bilateral laminectomies, medial facetectomies, foraminotomies and posterolateral arthrodesis       Anesthesia Type:  General    Note:  Disposition: Inpatient   Postop Pain Control: Uneventful            Sign Out: Well controlled pain   PONV: No   Neuro/Psych: Uneventful            Sign Out: Acceptable/Baseline neuro status   Airway/Respiratory: Uneventful            Sign Out: Acceptable/Baseline resp. status   CV/Hemodynamics: Uneventful            Sign Out: Acceptable CV status; No obvious hypovolemia; No obvious fluid overload   Other NRE: NONE   DID A NON-ROUTINE EVENT OCCUR?            Last vitals:  Vitals Value Taken Time   /69 06/12/24 1200   Temp 35.8  C (96.4  F) 06/12/24 1056   Pulse 65 06/12/24 1208   Resp 18 06/12/24 1208   SpO2 100 % 06/12/24 1208   Vitals shown include unfiled device data.    Electronically Signed By: Darlyn Kearney MD  June 12, 2024  12:10 PM

## 2024-06-12 NOTE — ANESTHESIA PROCEDURE NOTES
Airway       Patient location during procedure: OR       Procedure Start/Stop Times: 6/12/2024 8:19 AM  Staff -        CRNA: Melany Horowitz APRN CRNA       Performed By: CRNAIndications and Patient Condition       Indications for airway management: mireya-procedural       Induction type:intravenous       Mask difficulty assessment: 2 - vent by mask + OA or adjuvant +/- NMBA    Final Airway Details       Final airway type: endotracheal airway       Successful airway: ETT - single and Oral  Endotracheal Airway Details        ETT size (mm): 7.0       Cuffed: yes       Cuff volume (mL): 5       Successful intubation technique: direct laryngoscopy       DL Blade Type: Brock 2       Grade View of Cords: 1       Adjucts: stylet       Position: Right       Measured from: lips       Secured at (cm): 22       Bite block used: Soft    Post intubation assessment        Placement verified by: capnometry, equal breath sounds and chest rise        Number of attempts at approach: 1       Number of other approaches attempted: 0       Secured with: tape       Ease of procedure: easy       Dentition: Intact and Unchanged    Medication(s) Administered   Medication Administration Time: 6/12/2024 8:19 AM

## 2024-06-12 NOTE — BRIEF OP NOTE
[facility]    Brief Operative Note    Pre-operative diagnosis: Lumbar radiculopathy [M54.16]  Spinal stenosis of lumbar region with neurogenic claudication [M48.062]  Post-operative diagnosis Same as pre-operative diagnosis    Procedure: Exploration of prior lumbar 4-lumbar 5 posterior instrumented fusion with extension to lumbar 3 with use of stealth. Lumbar 3-lumbar 4 bilateral laminectomies, medial facetectomies, foraminotomies and posterolateral arthrodesis, Bilateral - Spine    Surgeon: Surgeons and Role:     * Leanna Ward MD - Primary     * Acacia Trujillo PA-C - Resident - Assisting  Anesthesia: General   Estimated Blood Loss: 150 cc    Drains: Charles-Quintero and On-Q pump  Specimens: * No specimens in log *  Findings:   None.  Complications: None.  Implants:   Implant Name Type Inv. Item Serial No.  Lot No. LRB No. Used Action   IMP GERMANIA MEDT 4.0CM [id number] - SN.A. Metallic Hardware/[place]  N.A. [employer] N.A. N/A 2 Explanted   IMP SCR MEDT BREAK-OFF SET SOLERA 4.75MM TI [id number] - [id number] Metallic Hardware/[place] IMP SCR MEDT BREAK-OFF SET SOLERA 4.75MM TI [id number]  [employer]-DANEK  Right 4 Explanted   IMP SCR MEDT BREAK-OFF SET SOLERA 4.75MM TI [id number] - [id number] Metallic Hardware/[place] IMP SCR MEDT BREAK-OFF SET SOLERA 4.75MM TI [id number]  [employer]-DANEK  N/A 1 Implanted   IMP SCR MEDT 4.75MM SOLERA 5.5X45MM MA [id number] - [id number] Metallic Hardware/[place] IMP SCR MEDT 4.75MM SOLERA 5.5X45MM MA [id number]  [employer]  N/A 1 Implanted   IMP SCR MEDT 4.75MM SOLERA 6.5X50MM MA [id number] - [id number] Metallic Hardware/[place] IMP SCR MEDT 4.75MM SOLERA 6.5X50MM MA [id number]  [employer]-DANEK  N/A 1 Implanted   GRAFT DBM ORTHOBLEND SM DEFECT 5CC - [id number] Bone/Tissue/Biologic GRAFT DBM ORTHOBLEND SM DEFECT 5CC [id number] [employer] NA N/A 1 Implanted   IMP GERMANIA MEDT 7CM [id number] - SNA Metallic Hardware/[place] IMP GERMANIA  MEDT 7CM [id number] NA [employer]-DANEK NA N/A 1 Implanted   IMP GERMANIA MEDT 6CM [id number] - SNA Metallic Hardware/[place] IMP GERMANIA MEDT 6CM [id number] NA [employer]-DAN NA N/A 1 Implanted

## 2024-06-12 NOTE — PLAN OF CARE
Problem: Adult Inpatient Plan of Care  Goal: Plan of Care Review  Description: The Plan of Care Review/Shift note should be completed every shift.  The Outcome Evaluation is a brief statement about your assessment that the patient is improving, declining, or no change.  This information will be displayed automatically on your shift  note.  Outcome: Progressing   Goal Outcome Evaluation:  Pt arrived from PACU at 1415. Pt is very awake and alert. VSS so far pt is on RA. Right leg has some old numbness. Dressing is dry and intact to low back. MALCOLM patent with a small amt of bloody drainage in it. Ceballos draining very light yellow urine-pt turned own neurostimulator back on. She rates pain at 5-which is a good pain number for her. She appears comfortable. She had water and crackers in Pa[facility] and is very hungry-she ordered a regular diet.
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
## 2024-06-12 NOTE — PROGRESS NOTES
S: Susan was seen at [facility] room 402 for fit of LSO.  O: Susan is seen S/P lumbar fusion. The patient is familiar with LSO's and understands the wear and care.  A: the patient was able to don the brace with an extension panel. Patient felt good support.  P: Patient will be seen PRN.

## 2024-06-12 NOTE — OR NURSING
Dr Cool and Dr Kearney were both informed of earrings in right and left ears, neck hardware and spinal cord stimulator.

## 2024-06-13 ENCOUNTER — APPOINTMENT (OUTPATIENT)
Dept: RADIOLOGY | Facility: HOSPITAL | Age: 59
DRG: 460 | End: 2024-06-13
Payer: COMMERCIAL

## 2024-06-13 ENCOUNTER — APPOINTMENT (OUTPATIENT)
Dept: PHYSICAL THERAPY | Facility: HOSPITAL | Age: 59
DRG: 460 | End: 2024-06-13
Payer: COMMERCIAL

## 2024-06-13 ENCOUNTER — APPOINTMENT (OUTPATIENT)
Dept: OCCUPATIONAL THERAPY | Facility: HOSPITAL | Age: 59
DRG: 460 | End: 2024-06-13
Attending: SURGERY
Payer: COMMERCIAL

## 2024-06-13 ENCOUNTER — APPOINTMENT (OUTPATIENT)
Dept: PHYSICAL THERAPY | Facility: HOSPITAL | Age: 59
DRG: 460 | End: 2024-06-13
Attending: SURGERY
Payer: COMMERCIAL

## 2024-06-13 LAB
ALBUMIN SERPL BCG-MCNC: 3.9 G/DL (ref 3.5–5.2)
ANION GAP SERPL CALCULATED.3IONS-SCNC: 6 MMOL/L (ref 7–15)
BASOPHILS # BLD AUTO: 0 10E3/UL (ref 0–0.2)
BASOPHILS NFR BLD AUTO: 0 %
BUN SERPL-MCNC: 13.7 MG/DL (ref 6–20)
CALCIUM SERPL-MCNC: 8.3 MG/DL (ref 8.6–10)
CHLORIDE SERPL-SCNC: 106 MMOL/L (ref 98–107)
CREAT SERPL-MCNC: 0.89 MG/DL (ref 0.51–0.95)
DEPRECATED HCO3 PLAS-SCNC: 30 MMOL/L (ref 22–29)
EGFRCR SERPLBLD CKD-EPI 2021: 76 ML/MIN/1.73M2
EOSINOPHIL # BLD AUTO: 0 10E3/UL (ref 0–0.7)
EOSINOPHIL NFR BLD AUTO: 0 %
ERYTHROCYTE [DISTWIDTH] IN BLOOD BY AUTOMATED COUNT: 13 % (ref 10–15)
GLUCOSE SERPL-MCNC: 126 MG/DL (ref 70–99)
HCT VFR BLD AUTO: 33.1 % (ref 35–47)
HGB BLD-MCNC: 10.8 G/DL (ref 11.7–15.7)
IMM GRANULOCYTES # BLD: 0.1 10E3/UL
IMM GRANULOCYTES NFR BLD: 1 %
LYMPHOCYTES # BLD AUTO: 2.9 10E3/UL (ref 0.8–5.3)
LYMPHOCYTES NFR BLD AUTO: 19 %
MCH RBC QN AUTO: 30.2 PG (ref 26.5–33)
MCHC RBC AUTO-ENTMCNC: 32.6 G/DL (ref 31.5–36.5)
MCV RBC AUTO: 93 FL (ref 78–100)
MONOCYTES # BLD AUTO: 0.7 10E3/UL (ref 0–1.3)
MONOCYTES NFR BLD AUTO: 5 %
NEUTROPHILS # BLD AUTO: 11.6 10E3/UL (ref 1.6–8.3)
NEUTROPHILS NFR BLD AUTO: 76 %
NRBC # BLD AUTO: 0 10E3/UL
NRBC BLD AUTO-RTO: 0 /100
PHOSPHATE SERPL-MCNC: 3 MG/DL (ref 2.5–4.5)
PLATELET # BLD AUTO: 280 10E3/UL (ref 150–450)
POTASSIUM SERPL-SCNC: 4.7 MMOL/L (ref 3.4–5.3)
RBC # BLD AUTO: 3.58 10E6/UL (ref 3.8–5.2)
SODIUM SERPL-SCNC: 142 MMOL/L (ref 135–145)
WBC # BLD AUTO: 15.3 10E3/UL (ref 4–11)

## 2024-06-13 PROCEDURE — 97161 PT EVAL LOW COMPLEX 20 MIN: CPT | Mod: GP

## 2024-06-13 PROCEDURE — 97110 THERAPEUTIC EXERCISES: CPT | Mod: GP

## 2024-06-13 PROCEDURE — 258N000003 HC RX IP 258 OP 636: Mod: JZ

## 2024-06-13 PROCEDURE — 250N000013 HC RX MED GY IP 250 OP 250 PS 637: Performed by: SURGERY

## 2024-06-13 PROCEDURE — 97116 GAIT TRAINING THERAPY: CPT | Mod: GP

## 2024-06-13 PROCEDURE — 120N000001 HC R&B MED SURG/OB

## 2024-06-13 PROCEDURE — 82040 ASSAY OF SERUM ALBUMIN: CPT | Performed by: HOSPITALIST

## 2024-06-13 PROCEDURE — 97165 OT EVAL LOW COMPLEX 30 MIN: CPT | Mod: GO

## 2024-06-13 PROCEDURE — 99232 SBSQ HOSP IP/OBS MODERATE 35: CPT | Performed by: INTERNAL MEDICINE

## 2024-06-13 PROCEDURE — 36415 COLL VENOUS BLD VENIPUNCTURE: CPT | Performed by: HOSPITALIST

## 2024-06-13 PROCEDURE — 250N000013 HC RX MED GY IP 250 OP 250 PS 637

## 2024-06-13 PROCEDURE — 72100 X-RAY EXAM L-S SPINE 2/3 VWS: CPT

## 2024-06-13 PROCEDURE — 85004 AUTOMATED DIFF WBC COUNT: CPT | Performed by: HOSPITALIST

## 2024-06-13 PROCEDURE — 250N000011 HC RX IP 250 OP 636: Mod: JZ

## 2024-06-13 PROCEDURE — 97535 SELF CARE MNGMENT TRAINING: CPT | Mod: GO

## 2024-06-13 RX ADMIN — SIMVASTATIN 20 MG: 10 TABLET, FILM COATED ORAL at 21:48

## 2024-06-13 RX ADMIN — ONDANSETRON 4 MG: 4 TABLET, ORALLY DISINTEGRATING ORAL at 17:57

## 2024-06-13 RX ADMIN — SODIUM CHLORIDE: 9 INJECTION, SOLUTION INTRAVENOUS at 03:03

## 2024-06-13 RX ADMIN — METHOCARBAMOL 750 MG: 750 TABLET ORAL at 12:18

## 2024-06-13 RX ADMIN — METHOCARBAMOL 750 MG: 750 TABLET ORAL at 06:54

## 2024-06-13 RX ADMIN — TRAZODONE HYDROCHLORIDE 100 MG: 50 TABLET ORAL at 21:48

## 2024-06-13 RX ADMIN — OXYCODONE HYDROCHLORIDE 10 MG: 5 TABLET ORAL at 21:47

## 2024-06-13 RX ADMIN — FLUOXETINE HYDROCHLORIDE 60 MG: 20 CAPSULE ORAL at 08:37

## 2024-06-13 RX ADMIN — OXYCODONE HYDROCHLORIDE 10 MG: 5 TABLET ORAL at 02:40

## 2024-06-13 RX ADMIN — THERA TABS 1 TABLET: TAB at 08:37

## 2024-06-13 RX ADMIN — GABAPENTIN 900 MG: 300 CAPSULE ORAL at 13:36

## 2024-06-13 RX ADMIN — OXYCODONE HYDROCHLORIDE 10 MG: 5 TABLET ORAL at 06:54

## 2024-06-13 RX ADMIN — ACETAMINOPHEN 975 MG: 325 TABLET ORAL at 16:21

## 2024-06-13 RX ADMIN — DOCUSATE SODIUM 50 MG AND SENNOSIDES 8.6 MG 1 TABLET: 8.6; 5 TABLET, FILM COATED ORAL at 08:37

## 2024-06-13 RX ADMIN — OXYCODONE HYDROCHLORIDE 10 MG: 5 TABLET ORAL at 16:22

## 2024-06-13 RX ADMIN — GABAPENTIN 900 MG: 300 CAPSULE ORAL at 08:37

## 2024-06-13 RX ADMIN — GABAPENTIN 1200 MG: 300 CAPSULE ORAL at 21:48

## 2024-06-13 RX ADMIN — DOCUSATE SODIUM 50 MG AND SENNOSIDES 8.6 MG 1 TABLET: 8.6; 5 TABLET, FILM COATED ORAL at 21:49

## 2024-06-13 RX ADMIN — ACETAMINOPHEN 975 MG: 325 TABLET ORAL at 08:38

## 2024-06-13 RX ADMIN — POLYETHYLENE GLYCOL 3350 17 G: 17 POWDER, FOR SOLUTION ORAL at 08:36

## 2024-06-13 RX ADMIN — CEFAZOLIN SODIUM 2 G: 2 INJECTION, SOLUTION INTRAVENOUS at 08:44

## 2024-06-13 RX ADMIN — LEVOTHYROXINE SODIUM 150 MCG: 0.03 TABLET ORAL at 08:37

## 2024-06-13 RX ADMIN — OXYCODONE HYDROCHLORIDE 10 MG: 5 TABLET ORAL at 12:18

## 2024-06-13 RX ADMIN — BUPROPION HYDROCHLORIDE 150 MG: 150 TABLET, FILM COATED, EXTENDED RELEASE ORAL at 08:38

## 2024-06-13 ASSESSMENT — ACTIVITIES OF DAILY LIVING (ADL)
ADLS_ACUITY_SCORE: 29
ADLS_ACUITY_SCORE: 29
ADLS_ACUITY_SCORE: 27
ADLS_ACUITY_SCORE: 29
ADLS_ACUITY_SCORE: 27
ADLS_ACUITY_SCORE: 29
ADLS_ACUITY_SCORE: 27
PREVIOUS_RESPONSIBILITIES: MEAL PREP;HOUSEKEEPING;LAUNDRY;SHOPPING
ADLS_ACUITY_SCORE: 27
ADLS_ACUITY_SCORE: 29
ADLS_ACUITY_SCORE: 27
ADLS_ACUITY_SCORE: 27
ADLS_ACUITY_SCORE: 29
ADLS_ACUITY_SCORE: 29
ADLS_ACUITY_SCORE: 27
ADLS_ACUITY_SCORE: 29
ADLS_ACUITY_SCORE: 27
ADLS_ACUITY_SCORE: 29
ADLS_ACUITY_SCORE: 27

## 2024-06-13 NOTE — PLAN OF CARE
Problem: Adult Inpatient Plan of Care  Goal: Absence of Hospital-Acquired Illness or Injury  Intervention: Identify and Manage Fall Risk  Recent Flowsheet Documentation  Taken 6/12/2024 1713 by Juli Kraft RN  Safety Promotion/Fall Prevention:   activity supervised   clutter free environment maintained  Intervention: Prevent Skin Injury  Recent Flowsheet Documentation  Taken 6/12/2024 1713 by Juli Kraft RN  Device Skin Pressure Protection: tubing/devices free from skin contact  Intervention: Prevent and Manage VTE (Venous Thromboembolism) Risk  Recent Flowsheet Documentation  Taken 6/12/2024 1713 by Juli Kraft RN  VTE Prevention/Management: SCDs (sequential compression devices) on  Intervention: Prevent Infection  Recent Flowsheet Documentation  Taken 6/12/2024 1713 by Juli Kraft RN  Infection Prevention:   hand hygiene promoted   equipment surfaces disinfected   single patient room provided  Goal: Optimal Comfort and Wellbeing  Intervention: Monitor Pain and Promote Comfort  Recent Flowsheet Documentation  Taken 6/12/2024 1759 by Juli Kraft RN  Pain Management Interventions: medication (see MAR)  Taken 6/12/2024 1713 by Juli Kraft RN  Pain Management Interventions: distraction      Problem: Skin Injury Risk Increased  Goal: Skin Health and Integrity  Intervention: Plan: Nurse Driven Intervention: Moisture Management  Recent Flowsheet Documentation  Taken 6/12/2024 2000 by Juli Kraft RN  Moisture Interventions: Encourage regular toileting  Bathing/Skin Care: bath, partial  Intervention: Optimize Skin Protection  Recent Flowsheet Documentation  Taken 6/12/2024 1713 by Juil Kraft RN  Pressure Reduction Techniques: frequent weight shift encouraged     Problem: Pain Acute  Goal: Optimal Pain Control and Function  Intervention: Develop Pain Management Plan  Recent Flowsheet Documentation  Taken 6/12/2024 1759 by Juli Kraft RN  Pain Management Interventions: medication (see MAR)  Taken 6/12/2024  1713 by Juli Kraft, RN  Pain Management Interventions: distraction  Intervention: Prevent or Manage Pain  Recent Flowsheet Documentation  Taken 6/12/2024 1713 by Juli Kraft RN  Medication Review/Management: medications reviewed     Problem: Comorbidity Management  Goal: Maintenance of Asthma Control  Intervention: Maintain Asthma Symptom Control  Recent Flowsheet Documentation  Taken 6/12/2024 1713 by Juli Kraft RN  Medication Review/Management: medications reviewed   Goal Outcome Evaluation:  Pt reports ongoing pain in the low back.  Lumbar dressing intact.  Scant drainage at MALCOLM site.  70 ml serosanguinous fluid from MALCOLM this shift.  Ceballos draining clear yellow urine.  Wearing brace when up to bathroom to try to have a BM.  She was not able to have a BM this shift.  She got Oxycodone x 2 this shift with fair results.  IV Ancef administered this shift.

## 2024-06-13 NOTE — PLAN OF CARE
Occupational Therapy Discharge Summary    Reason for therapy discharge:    All goals and outcomes met, no further needs identified.    Progress towards therapy goal(s). See goals on Care Plan in [facility] electronic health record for goal details.  Goals met    Therapy recommendation(s):    No further therapy is recommended.

## 2024-06-13 NOTE — PLAN OF CARE
Problem: Adult Inpatient Plan of Care  Goal: Plan of Care Review  Description: The Plan of Care Review/Shift note should be completed every shift.  The Outcome Evaluation is a brief statement about your assessment that the patient is improving, declining, or no change.  This information will be displayed automatically on your shift  note.  Outcome: Progressing   Goal Outcome Evaluation:       Pt up ambulated with brace on. MALCOLM drain not holding suction. Dr Aware. Wants drain left in. Back dressing saturated and changing by Neuro Spine PA. Tucker crump'michael'alisia this am. IV saline locked.

## 2024-06-13 NOTE — PLAN OF CARE
Problem: Adult Inpatient Plan of Care  Goal: Plan of Care Review  Description: The Plan of Care Review/Shift note should be completed every shift.  The Outcome Evaluation is a brief statement about your assessment that the patient is improving, declining, or no change.  This information will be displayed automatically on your shift  note.  Outcome: Progressing     Problem: Adult Inpatient Plan of Care  Goal: Absence of Hospital-Acquired Illness or Injury  Intervention: Prevent Infection  Recent Flowsheet Documentation  Taken 6/13/2024 0330 by John Luz RN  Infection Prevention:   hand hygiene promoted   equipment surfaces disinfected   single patient room provided  Taken 6/12/2024 2342 by John Luz RN  Infection Prevention:   hand hygiene promoted   equipment surfaces disinfected   single patient room provided     Problem: Pain Acute  Goal: Optimal Pain Control and Function  Intervention: Prevent or Manage Pain  Recent Flowsheet Documentation  Taken 6/13/2024 0330 by John Luz RN  Medication Review/Management: medications reviewed  Taken 6/12/2024 2342 by John Luz RN  Medication Review/Management: medications reviewed     Problem: Comorbidity Management  Goal: Maintenance of Asthma Control  Outcome: Progressing  Intervention: Maintain Asthma Symptom Control  Recent Flowsheet Documentation  Taken 6/13/2024 0330 by John Luz RN  Medication Review/Management: medications reviewed  Taken 6/12/2024 2342 by John Luz RN  Medication Review/Management: medications reviewed    Goal Outcome Evaluation:    Patient AOX4. Able to make needs known. VSS. Denies SOB or nausea. MALCOLM drain not holding suction with small to moderate sanguinous drainage noted from insertion site; incision intact with no drainage noted; NSGY notified and will continue to monitor. Dressing changed. Producing adequate urine via jones. Continues to report severe ongoing LB pain; administered PRN  oxycodone and Robaxin with reports of moderate relief following. CMS intact; ongoing R LE numbness however this is baseline for patient. Sleeping between cares. No signs or symptoms of infection noted.

## 2024-06-13 NOTE — PROGRESS NOTES
"[facility]    Medicine Progress Note - Hospitalist Service    Date of Admission:  6/12/2024    Assessment & Plan     Susan Kwan is a 58 year old female admitted on 6/12/2024. She underwent revision lumbar fusion for adjacent segment stenosis.    #Lumbar stenosis, adjacent segment stenosis  Patient with previous L4-L5 fusion, now revised, extended to L3-L4  Doing well postoperatively  - Postoperative cares per neurosurgery  - PT and OT, patient expecting to go home after a few days with 24-hour support  - Continue PTA gabapentin. Scheduled tylenol. Oxycodone and dilaudid prn    #Remote tobacco dependence  #Mild COPD  No exacerbated state. Patient reports quitting 1 week prior to Mother's Day  - Ongoing cessation efforts to prevent perioperative complications  - Albuterol as needed  - Pulmonary hygiene/toilet measures recommended    #Hyperlipidemia: Home statin resumed    #Depression and anxiety: Mood seems fine overall. No suicidal thoughts.   -Home Wellbutrin, Prozac resumed    #Acquired hypothyroidism  -Clinically euthymic, resume usual Synthroid          Diet: Regular Diet Adult    DVT Prophylaxis: Enoxaparin (Lovenox) SQ  Ceballos Catheter: Not present  Lines: None     Cardiac Monitoring: None  Code Status: Full Code      Clinically Significant Risk Factors          # Hypocalcemia: Lowest Ca = 8.3 mg/dL in last 2 days, will monitor and replace as appropriate                      # Obesity: Estimated body mass index is 34.29 kg/m  as calculated from the following:    Height as of this encounter: 1.575 m (5' 2\").    Weight as of this encounter: 85 kg (187 lb 8 oz)., PRESENT ON ADMISSION     # Asthma: noted on problem list        Disposition Plan     Medically Ready for Discharge: Anticipated Tomorrow             Joni Bowling MD  Hospitalist Service  [facility]  Securely message with [employer] (more info)  Text page via [employer] Paging/Directory "   ______________________________________________________________________    Interval History   Patient reports feeling OK. Her post op pain is appropriately controlled.     Physical Exam   Vital Signs: Temp: 97.7  F (36.5  C) Temp src: Oral BP: (!) 141/82 Pulse: 82   Resp: 18 SpO2: 97 % O2 Device: None (Room air)    Weight: 187 lbs 8 oz    General appearance: not in acute distress  HEENT: PERRL, EOMI  Lungs: Clear breath sounds in bilateral lung fields  Cardiovascular: Regular rate and rhythm, normal S1-S2  Abdomen: Soft, non tender, no distension  Musculoskeletal: No joint swelling  Skin: No rash and no edema  Neurology: AAO ×3.  Cranial nerves II - XII normal.  Normal muscle strength in all four extremities.     Medical Decision Making       35 MINUTES SPENT BY ME on the date of service doing chart review, history, exam, documentation & further activities per the note.      Data     I have personally reviewed the following data over the past 24 hrs:    15.3 (H)  \   10.8 (L)   / 280     142 106 13.7 /  126 (H)   4.7 30 (H) 0.89 \     ALT: N/A AST: N/A AP: N/A TBILI: N/A   ALB: 3.9 TOT PROTEIN: N/A LIPASE: N/A       Imaging results reviewed over the past 24 hrs:   Recent Results (from the past 24 hour(s))   XR Lumbar Spine 2/3 Views    Narrative    EXAM: XR LUMBAR SPINE 2/3 VIEWS  LOCATION: [facility]  DATE: 6/13/2024    INDICATION: Follow-up post fusion  COMPARISON: X-ray 5/14/2024      Impression    IMPRESSION: Nomenclature assumes 5 lumbar type vertebra and hypoplastic ribs at T12. Minor grade 1 retrolisthesis at L3-4 similar to the previous exam. Interval revision of the posterior fusion construct which now extends from L3 through L5. No evidence   for hardware complication. New dorsal decompression changes at L3-4. Overall unchanged vertebral body heights and mild lumbar spondylosis elsewhere. A surgical drain is seen in place within the posterior soft tissues at the operative site.  Partially   visualized spinal stimulator.

## 2024-06-13 NOTE — PROGRESS NOTES
06/13/24 0900   Appointment Info   Signing Clinician's Name / Credentials (PT) Zainab Morris, PT   Rehab Comments (PT) Pt has has a right drop foot from the first back.   Living Environment   People in Home child(kyle), adult;significant other   Current Living Arrangements other (see comments)  (TH that is one level.)   Home Accessibility no concerns   Transportation Anticipated family or friend will provide   Self-Care   Current Activity Tolerance good   Regular Exercise   (Pt does ex per the pt)   Equipment Currently Used at Home cane, straight;walker, rolling;shower chair  (4WW, sock aid and reacher. walk in shower and tub/shower.)   Fall history within last six months yes   Number of times patient has fallen within last six months 3   Activity/Exercise/Self-Care Comment Indep with all mobility and uses SEC on occ.  Pt does drive but not a lot. Indep ADLs home ADLs.   General Information   Onset of Illness/Injury or Date of Surgery 06/12/24   Referring Physician Acacia Trujillo PA-C   Patient/Family Therapy Goals Statement (PT) Pt wants to go home.   Pertinent History of Current Problem (include personal factors and/or comorbidities that impact the POC) Per the chart, Exploration of prior lumbar 4-lumbar 5 posterior instrumented fusion with extension to lumbar 3 with use of stealth. Lumbar 3-lumbar 4 bilateral laminectomies, medial facetectomies, foraminotomies and posterolateral arthrodesis   Existing Precautions/Restrictions fall;brace worn when out of bed;spinal  (hand oft to OT.)   Weight-Bearing Status - LLE weight-bearing as tolerated   Weight-Bearing Status - RLE weight-bearing as tolerated   Cognition   Affect/Mental Status (Cognition) WNL   Orientation Status (Cognition) oriented x 4   Pain Assessment   Patient Currently in Pain   (back pain she rates at a  6/10 and numb R leg.)   Posture    Posture Comments Some cues for posture.   Range of Motion (ROM)   Range of Motion ROM is WNL   ROM Comment  Bilat LEs   Strength (Manual Muscle Testing)   Strength (Manual Muscle Testing) strength is WNL   Strength Comments Bilat LEs   Bed Mobility   Comment, (Bed Mobility) Supine<>sit with supervision x2 reps  with cues and education for spine precautions. .   Transfers   Comment, (Transfers) Sit<>stand with FWW with mod indep x3  reps with cues for hand placement.   Gait/Stairs (Locomotion)   [place] Level (Gait) supervision;verbal cues   Assistive Device (Gait) walker, front-wheeled  (with FWW)   Distance in Feet (Gait) 20'   Pattern (Gait) swing-through;step-through   Balance   Balance Comments Supervision with FWW   Sensory Examination   Sensory Perception Comments numbness R LE knee and distal   Clinical Impression   Criteria for Skilled Therapeutic Intervention Yes, treatment indicated   PT Diagnosis (PT) Impaired functional mobility   Influenced by the following impairments dec sensation, increased pain,dec endurance.   Functional limitations due to impairments bed mobility, transfers, and gait.   Clinical Presentation (PT Evaluation Complexity) stable   Clinical Presentation Rationale Pt presents medically diagnosed.   Clinical Decision Making (Complexity) low complexity   Planned Therapy Interventions (PT) bed mobility training;gait training;home exercise program;strengthening;transfer training   Risk & Benefits of therapy have been explained evaluation/treatment results reviewed;care plan/treatment goals reviewed;risks/benefits reviewed;patient;participants voiced agreement with care plan;current/potential barriers reviewed   PT Total Evaluation Time   PT [place], Low Complexity Minutes (62109) 13   Physical Therapy Goals   PT Frequency 2x/day   PT Predicted Duration/Target Date for Goal Attainment 06/14/24   PT Goals Bed Mobility;Transfers;Gait;PT Goal 1   PT: Bed Mobility Independent;Supine to/from sit;Rolling;Within precautions   PT: Transfers Modified independent;Sit to/from stand;Bed to/from  chair;Assistive device;Within precautions   PT: Gait Modified independent;Rolling walker;Greater than 200 feet  (Or LR AD)   PT: Goal 1 Pt to know her HEP and spine precautions.   Interventions   Interventions Quick Adds Gait Training;Therapeutic Procedure   Therapeutic Procedure/Exercise   Ther. Procedure: strength, endurance, ROM, flexibillity Minutes (96273) 15   Treatment Detail/Skilled Intervention Supine bilat LE spine ex x 10 reps and standing spine ex with supervision with cues for technique x 10 reps. PT educated the pt on HEP and to walk 3x/day with the FWW. PT did issue a HEP.   Gait Training   Gait Training Minutes (62158) 10   Treatment Detail/Skilled Intervention Pt ambulated 200' with FWW with supervision.  She did have her LSO on all of the treatment.  Pt walked slowly and had no LOB.   Distance in Feet 200'   Physical Assistance Level (Gait Training) supervision;verbal cues;1 person assist  (Cues for posture and direction.  No foot drop noted on the R  foot with gait so far.)   Weight Bearing (Gait Training) weight-bearing as tolerated   Assistive Device (Gait Training) rolling walker  (FWW)   Pattern Analysis (Gait Training) swing-through gait   Gait Analysis Deviations decreased steve   Impairments (Gait Analysis/Training) pain;balance impaired   PT Discharge Planning   PT Plan gait w FWW or possible SEC.  spine ex. transfers and bed mobility.   PT Discharge Recommendation (DC Rec) home with assist   PT Rationale for DC Rec She should be able to progress to home with family to assist. Pt used the FWW and did not have any LOB.   PT Brief overview of current status PT eval, bed mobility and  gait with supervision. transfers mod indep  and spine ex and education with HEP and spine precautions.   PT Equipment Needed at Discharge walker, rolling  (FWW for now)   Total Session Time   Timed Code Treatment Minutes 25   Total Session Time (sum of timed and untimed services) 38

## 2024-06-13 NOTE — PROGRESS NOTES
06/13/24 0930   Appointment Info   Signing Clinician's Name / Credentials (OT) Lucia Godinez OTR/L   Living Environment   People in Home spouse;child(kyle), adult  (daughter)   Current Living Arrangements house  ([facility])   Home Accessibility no concerns   Transportation Anticipated family or friend will provide   Self-Care   Usual Activity Tolerance good   Current Activity Tolerance moderate   Equipment Currently Used at Home grab bar, tub/shower;shower chair;walker, rolling;cane, straight   Activity/Exercise/Self-Care Comment Pt previously I with dressing, bathing and toileting   Instrumental Activities of Daily Living (IADL)   Previous Responsibilities meal prep;housekeeping;laundry;shopping   IADL Comments pt previously did most of the housework but states family can assist at home   General Information   Onset of Illness/Injury or Date of Surgery 06/12/24   Referring Physician Dr. Ward   Patient/Family Therapy Goal Statement (OT) go home later today   Additional Occupational Profile Info/Pertinent History of Current Problem Exploration of prior lumbar 4-lumbar 5 posterior instrumented fusion with extension to lumbar 3 with use of stealth. Lumbar 3-lumbar 4 bilateral laminectomies, medial facetectomies, foraminotomies and posterolateral arthrodesis   Existing Precautions/Restrictions fall;spinal  (brace when OOB)   Cognitive Status Examination   Orientation Status orientation to person, place and time   Affect/Mental Status (Cognitive) WNL   Pain Assessment   Patient Currently in Pain Yes, see Vital Sign flowsheet  (very minimal however, back incision)   Range of Motion Comprehensive   General Range of Motion no range of motion deficits identified   Strength Comprehensive (MMT)   Comment, General Manual Muscle Testing (MMT) Assessment not formally tested, but functional during assessment with trsfs, ADL's   Bed Mobility   Bed Mobility supine-sit;sit-supine   Supine-Sit [place] (Bed Mobility)  supervision   Sit-Supine [place] (Bed Mobility) supervision   Transfers   Transfers bed-chair transfer;sit-stand transfer;toilet transfer;shower transfer   Transfer Skill: Bed to Chair/Chair to Bed   Bed-Chair [place] (Transfers) supervision   Assistive Device (Bed-Chair Transfers) rolling walker   Sit-Stand Transfer   Sit-Stand [place] (Transfers) supervision   Assistive Device (Sit-Stand Transfers) walker, front-wheeled   Shower Transfer   Type (Shower Transfer) stand-sit;sit-stand   [place] Level (Shower Transfer) supervision   Assistive Device (Shower Transfer) shower chair;grab bar, tub rail   Toilet Transfer   Type (Toilet Transfer) sit-stand;stand-sit   [place] Level (Toilet Transfer) supervision   Activities of Daily Living   BADL Assessment/Intervention lower body dressing   Lower Body Dressing Assessment/Training   Position (Lower Body Dressing) supported sitting   [place] Level (Lower Body Dressing) supervision;doff;don;socks   Clinical Impression   Criteria for Skilled Therapeutic Interventions Met (OT) Yes, treatment indicated   OT Diagnosis decreased ADL independence   Influenced by the following impairments pain, post surgical precautions   OT Problem List-Impairments impacting ADL post-surgical precautions;mobility   Assessment of Occupational Performance 1-3 Performance Deficits   Identified Performance Deficits trsfs, dressing, toileting   Planned Therapy Interventions (OT) ADL retraining;transfer training   Clinical Decision Making Complexity (OT) problem focused assessment/low complexity   Risk & Benefits of therapy have been explained evaluation/treatment results reviewed;patient   OT Total Evaluation Time   OT Eval, Low Complexity Minutes (64019) 15   OT Goals   Therapy Frequency (OT) One time eval and treatment   OT Predicted Duration/Target Date for Goal Attainment 06/13/24   OT: Lower Body Dressing Supervision/stand-by assist;Completed   OT: Transfer  Supervision/stand-by assist;Completed   OT: Toilet Transfer/Toileting Supervision/stand-by assist;Completed   Self-Care/Home Management   Self-Care/Home Mgmt/ADL, Compensatory, Meal Prep Minutes (79009) 10   Symptoms Noted During/After Treatment (Meal Preparation/Planning Training) none   Treatment Detail/Skilled Intervention Eval completed, treatment initiated.  Pt able to perform trsfs with SBA including shower trsf, chair, toilet, bed and bed mobility.  Pt needing cues for safe hand placement and tech initially however able to replicate by end of session.  LE dressing with cues for tech and SBA.  Educated and reviewed body mechanics booklet, including proper bending tech with ADL's.  Reminders to avoid twisting with bed mob provided.  Pt verbalized an understanding of precautions and body mechanics.  Mobility from therapy gym back to room with FWW and SBA.  Returned to supine with cues.  Left in care of NA once in room.   OT Discharge Planning   OT Plan no further OT needed, goals met   OT Discharge Recommendation (DC Rec) home with assist  (family available to assist)   OT Rationale for DC Rec Recommend pt return home with family assist as needed.  No further OT needs at this time   OT Brief overview of current status SBA trsfs, mobility, ADL's   Total Session Time   Timed Code Treatment Minutes 10   Total Session Time (sum of timed and untimed services) 25

## 2024-06-13 NOTE — PROGRESS NOTES
[facility]    Neurosurgery  Daily Note    Assessment & Plan   Procedure(s):  Exploration of prior lumbar 4-lumbar 5 posterior instrumented fusion with extension to lumbar 3 with use of stealth. Lumbar 3-lumbar 4 bilateral laminectomies, medial facetectomies, foraminotomies and posterolateral arthrodesis   1 Day Post-Op    24 hour events: No acute events overnight    AM Rounds: Mild incisional discomfort.  No new or worsening radicular pain/paresthesias, baseline RLE decreased sensation.  Exam stable.  Incision CDI.  Notified by nursing staff earlier this morning drain not holding suction, drain not holding suction on evaluation.     Drain output: 30 mL overnight    Plan:  -Keep drain in place, monitor/record output  -Brace OOB  -Upright x-rays today (ordered)  -PT/OT eval  -Lovenox POD 2 (ordered)  -ABX x 3 doses  -Hospitalist on board  -Routine wound care  -Advance diet and activity as tolerated    Plans discussed with Dr. Ward who was in agreement with plans.    Julianne Porter PA-C  [facility]  [address] [address]      Physical Exam   Temp: 97.5  F (36.4  C) Temp src: Oral BP: (!) 152/77 Pulse: 73   Resp: 18 SpO2: 94 % O2 Device: None (Room air)    Vitals:    06/12/24 0551   Weight: 187 lb 8 oz (85 kg)     Vital Signs with Ranges  Temp:  [96.4  F (35.8  C)-98  F (36.7  C)] 97.5  F (36.4  C)  Pulse:  [66-88] 73  Resp:  [9-40] 18  BP: (115-152)/(54-97) 152/77  SpO2:  [91 %-100 %] 94 %  I/O last 3 completed shifts:  In: 2920 [P.O.:720; I.V.:1700]  Out: 2540 [Urine:2175; Drains:215; Blood:150]    Patient sitting upright in hospital bed  Awake, alert, and appropriate. NAD  Moves all extremities equally   Strength 5/5 BLE  Sensation intact to light touch BLE, except baseline decreased sensation RLE  Incision CDI  Drain intact  Ceballos in place    Medications   Current Facility-Administered Medications   Medication Dose Route Frequency  Provider Last Rate Last Admin    sodium chloride 0.9 % infusion   Intravenous Continuous Acacia Trujillo PA-C 75 mL/hr at 06/13/24 0303 New Bag at 06/13/24 0303      Current Facility-Administered Medications   Medication Dose Route Frequency Provider Last Rate Last Admin    acetaminophen (TYLENOL) tablet 975 mg  975 mg Oral Q8H Acacia Trujillo PA-C   975 mg at 06/13/24 0838    buPROPion (WELLBUTRIN XL) 24 hr tablet 150 mg  150 mg Oral QAM Acacia Trujillo PA-C   150 mg at 06/13/24 0838    [START ON 6/14/2024] enoxaparin ANTICOAGULANT (LOVENOX) injection 40 mg  40 mg Subcutaneous Q24H Acacia Trujillo PA-C        FLUoxetine (PROzac) capsule 60 mg  60 mg Oral Daily Acacia Trujillo PA-C   60 mg at 06/13/24 0837    gabapentin (NEURONTIN) capsule 1,200 mg  1,200 mg Oral At Bedtime Leanna Ward MD   1,200 mg at 06/12/24 2112    gabapentin (NEURONTIN) capsule 900 mg  900 mg Oral BID Leanna Ward MD   900 mg at 06/13/24 0837    levothyroxine (SYNTHROID/LEVOTHROID) tablet 150 mcg  150 mcg Oral Daily Acacia Trujillo PA-C   150 mcg at 06/13/24 0837    multivitamin, therapeutic (THERA-VIT) tablet 1 tablet  1 tablet Oral Daily Acacia Trujillo PA-C   1 tablet at 06/13/24 0837    polyethylene glycol (MIRALAX) Packet 17 g  17 g Oral Daily Acacia Trujillo PA-C   17 g at 06/13/24 0836    senna-docusate (SENOKOT-S/PERICOLACE) 8.6-50 MG per tablet 1 tablet  1 tablet Oral BID Acacia Trujillo PA-C   1 tablet at 06/13/24 0837    simvastatin (ZOCOR) tablet 20 mg  20 mg Oral QPM Acacia Trujillo PA-C   20 mg at 06/12/24 2112    traZODone (DESYREL) tablet 100 mg  100 mg Oral At Bedtime Acacia Trujillo PA-C   100 mg at 06/12/24 6941

## 2024-06-14 VITALS
HEART RATE: 88 BPM | TEMPERATURE: 98.1 F | BODY MASS INDEX: 34.5 KG/M2 | SYSTOLIC BLOOD PRESSURE: 119 MMHG | HEIGHT: 62 IN | OXYGEN SATURATION: 94 % | RESPIRATION RATE: 18 BRPM | DIASTOLIC BLOOD PRESSURE: 57 MMHG | WEIGHT: 187.5 LBS

## 2024-06-14 DIAGNOSIS — Z09 HOSPITAL DISCHARGE FOLLOW-UP: ICD-10-CM

## 2024-06-14 PROCEDURE — 250N000013 HC RX MED GY IP 250 OP 250 PS 637: Performed by: SURGERY

## 2024-06-14 PROCEDURE — 250N000013 HC RX MED GY IP 250 OP 250 PS 637

## 2024-06-14 PROCEDURE — 250N000011 HC RX IP 250 OP 636: Mod: JZ

## 2024-06-14 PROCEDURE — 99232 SBSQ HOSP IP/OBS MODERATE 35: CPT | Performed by: INTERNAL MEDICINE

## 2024-06-14 RX ORDER — AMOXICILLIN 250 MG
1 CAPSULE ORAL 2 TIMES DAILY
Qty: 30 TABLET | Refills: 0 | Status: SHIPPED | OUTPATIENT
Start: 2024-06-14 | End: 2024-07-02

## 2024-06-14 RX ORDER — METHOCARBAMOL 750 MG/1
750 TABLET, FILM COATED ORAL EVERY 6 HOURS PRN
Qty: 40 TABLET | Refills: 0 | Status: SHIPPED | OUTPATIENT
Start: 2024-06-14 | End: 2024-06-26

## 2024-06-14 RX ORDER — OXYCODONE HYDROCHLORIDE 5 MG/1
5 TABLET ORAL EVERY 6 HOURS PRN
Qty: 40 TABLET | Refills: 0 | Status: SHIPPED | OUTPATIENT
Start: 2024-06-14 | End: 2024-07-02

## 2024-06-14 RX ADMIN — BUPROPION HYDROCHLORIDE 150 MG: 150 TABLET, FILM COATED, EXTENDED RELEASE ORAL at 09:07

## 2024-06-14 RX ADMIN — FLUOXETINE HYDROCHLORIDE 60 MG: 20 CAPSULE ORAL at 09:08

## 2024-06-14 RX ADMIN — THERA TABS 1 TABLET: TAB at 09:10

## 2024-06-14 RX ADMIN — ENOXAPARIN SODIUM 40 MG: 40 INJECTION SUBCUTANEOUS at 10:32

## 2024-06-14 RX ADMIN — GABAPENTIN 900 MG: 300 CAPSULE ORAL at 09:11

## 2024-06-14 RX ADMIN — ACETAMINOPHEN 975 MG: 325 TABLET ORAL at 09:08

## 2024-06-14 RX ADMIN — OXYCODONE HYDROCHLORIDE 10 MG: 5 TABLET ORAL at 06:14

## 2024-06-14 RX ADMIN — OXYCODONE HYDROCHLORIDE 10 MG: 5 TABLET ORAL at 10:31

## 2024-06-14 RX ADMIN — ACETAMINOPHEN 975 MG: 325 TABLET ORAL at 00:27

## 2024-06-14 RX ADMIN — METHOCARBAMOL 750 MG: 750 TABLET ORAL at 06:15

## 2024-06-14 RX ADMIN — METHOCARBAMOL 750 MG: 750 TABLET ORAL at 00:28

## 2024-06-14 RX ADMIN — LEVOTHYROXINE SODIUM 150 MCG: 0.03 TABLET ORAL at 09:10

## 2024-06-14 RX ADMIN — OXYCODONE HYDROCHLORIDE 10 MG: 5 TABLET ORAL at 02:06

## 2024-06-14 RX ADMIN — POLYETHYLENE GLYCOL 3350 17 G: 17 POWDER, FOR SOLUTION ORAL at 09:05

## 2024-06-14 RX ADMIN — DOCUSATE SODIUM 50 MG AND SENNOSIDES 8.6 MG 1 TABLET: 8.6; 5 TABLET, FILM COATED ORAL at 09:07

## 2024-06-14 ASSESSMENT — ACTIVITIES OF DAILY LIVING (ADL)
ADLS_ACUITY_SCORE: 27
ADLS_ACUITY_SCORE: 26
ADLS_ACUITY_SCORE: 27
ADLS_ACUITY_SCORE: 27
ADLS_ACUITY_SCORE: 26

## 2024-06-14 NOTE — PLAN OF CARE
Problem: Adult Inpatient Plan of Care  Goal: Absence of Hospital-Acquired Illness or Injury  Intervention: Identify and Manage Fall Risk  Recent Flowsheet Documentation  Taken 6/13/2024 1559 by Juli Kraft RN  Safety Promotion/Fall Prevention:   activity supervised   clutter free environment maintained  Intervention: Prevent Skin Injury  Recent Flowsheet Documentation  Taken 6/13/2024 1559 by Juli Kraft RN  Device Skin Pressure Protection: tubing/devices free from skin contact  Intervention: Prevent and Manage VTE (Venous Thromboembolism) Risk  Recent Flowsheet Documentation  Taken 6/13/2024 1559 by Juli Kraft RN  VTE Prevention/Management: SCDs (sequential compression devices) on  Intervention: Prevent Infection  Recent Flowsheet Documentation  Taken 6/13/2024 1559 by Juli Kraft RN  Infection Prevention:   hand hygiene promoted   equipment surfaces disinfected   single patient room provided  Goal: Optimal Comfort and Wellbeing  Intervention: Monitor Pain and Promote Comfort  Recent Flowsheet Documentation  Taken 6/13/2024 1559 by Juli Kraft RN  Pain Management Interventions: medication (see MAR)     Problem: Suicide Risk  Goal: Absence of Self-Harm  Intervention: Assess Risk to Self and Maintain Safety  Recent Flowsheet Documentation  Taken 6/13/2024 1559 by Juli Kraft RN  Enhanced Safety Measures: pain management     Problem: Skin Injury Risk Increased  Goal: Skin Health and Integrity  Intervention: Plan: Nurse Driven Intervention: Moisture Management  Recent Flowsheet Documentation  Taken 6/13/2024 2000 by Juli Kraft RN  Moisture Interventions: (own underwear) Other (comment)  Bathing/Skin Care: (per daughter) bath, partial  Intervention: Optimize Skin Protection  Recent Flowsheet Documentation  Taken 6/13/2024 1559 by Juli Kraft RN  Pressure Reduction Techniques: frequent weight shift encouraged     Problem: Pain Acute  Goal: Optimal Pain Control and Function  Intervention: Develop Pain  Management Plan  Recent Flowsheet Documentation  Taken 6/13/2024 1559 by Juli Kraft RN  Pain Management Interventions: medication (see MAR)  Intervention: Prevent or Manage Pain  Recent Flowsheet Documentation  Taken 6/13/2024 1559 by Juli Kraft RN  Medication Review/Management: medications reviewed     Problem: Comorbidity Management  Goal: Maintenance of Asthma Control  Intervention: Maintain Asthma Symptom Control  Recent Flowsheet Documentation  Taken 6/13/2024 1559 by Juli Kraft RN  Medication Review/Management: medications reviewed   Goal Outcome Evaluation:  Pt continues to have severe pain in low back requiring Oxycodone 10 mg to manage.  She was crying and shaking in pain after getting dressed in new pajamas tonight.  MALCOLM drain is poorly draining.  Dressing to lumbar incision was changed per neurosurgery instructions this shift after dressing saturated due to lack of suction on MALCOLM.

## 2024-06-14 NOTE — PROGRESS NOTES
"[facility]    Medicine Progress Note - Hospitalist Service    Date of Admission:  6/12/2024    Assessment & Plan     Susan Kwan is a 58 year old female admitted on 6/12/2024. She underwent revision lumbar fusion for adjacent segment stenosis.    #Lumbar stenosis, adjacent segment stenosis  Patient with previous L4-L5 fusion, now revised, extended to L3-L4  Doing well postoperatively  - Postoperative cares per neurosurgery  - PT and OT  - Continue PTA gabapentin. Scheduled tylenol. Oxycodone and dilaudid prn    #Remote tobacco dependence  #Mild COPD  No exacerbated state. Patient reports quitting 1 week prior to Mother's Day  - Ongoing cessation efforts to prevent perioperative complications  - Albuterol as needed  - Pulmonary hygiene/toilet measures recommended    #Hyperlipidemia: Home statin resumed    #Depression and anxiety: Mood seems fine overall. No suicidal thoughts.   -Home Wellbutrin, Prozac resumed    #Acquired hypothyroidism  -Clinically euthymic, resume usual Synthroid          Diet: Regular Diet Adult  Diet    DVT Prophylaxis: Enoxaparin (Lovenox) SQ  Ceballos Catheter: Not present  Lines: None     Cardiac Monitoring: None  Code Status: Full Code      Clinically Significant Risk Factors          # Hypocalcemia: Lowest Ca = 8.3 mg/dL in last 2 days, will monitor and replace as appropriate                      # Obesity: Estimated body mass index is 34.29 kg/m  as calculated from the following:    Height as of this encounter: 1.575 m (5' 2\").    Weight as of this encounter: 85 kg (187 lb 8 oz)., PRESENT ON ADMISSION     # Asthma: noted on problem list        Disposition Plan     Medically Ready for Discharge: Plan to discharge home today per the primary team.              Joni Bowling MD  Hospitalist Service  [facility]  Securely message with [employer] (more info)  Text page via [facility] Paging/Directory "   ______________________________________________________________________    Interval History   Patient reports feeling good. She is glad she is going home today. Her post op back pain is controlled.     Physical Exam   Vital Signs: Temp: 98.1  F (36.7  C) Temp src: Oral BP: 119/57 Pulse: 88   Resp: 18 SpO2: 94 % O2 Device: None (Room air)    Weight: 187 lbs 8 oz    General appearance: not in acute distress  HEENT: PERRL, EOMI  Lungs: Clear breath sounds in bilateral lung fields  Cardiovascular: Regular rate and rhythm, normal S1-S2  Abdomen: Soft, non tender, no distension  Musculoskeletal: No joint swelling  Skin: No rash and no edema  Neurology: AAO ×3.  Cranial nerves II - XII normal.  Normal muscle strength in all four extremities.     Medical Decision Making       30 MINUTES SPENT BY ME on the date of service doing chart review, history, exam, documentation & further activities per the note.      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 Minor grade 1 retrolisthesis at L3-4 similar to the previous exam. Interval revision of the posterior fusion construct which now extends from L3 through L5. No evidence   for hardware complication. New dorsal decompression changes at L3-4. Overall unchanged vertebral body heights and mild lumbar spondylosis elsewhere. A surgical drain is seen in place within the posterior soft tissues at the operative site. Partially   visualized spinal stimulator.

## 2024-06-14 NOTE — PROGRESS NOTES
Care Management Discharge Note    Discharge Date: 06/14/2024       Discharge Disposition:  Home    Discharge Services:  None        Additional Information:  Pt discharging home.     DARIUS Levine

## 2024-06-14 NOTE — OP NOTE
NEUROSURGERY OPERATIVE REPORT    DATE OF SERVICE:  6/12/2024    PREOPERATIVE DIAGNOSES:   Lumbar radiculopathy  Spinal stenosis of lumbar region with neurogenic claudication    POSTOPERATIVE DIAGNOSES:   same    PROCEDURE:     Exploration of prior lumbar 4-lumbar 5 posterior instrumented fusion with extension to lumbar 3 with use of stealth    Lumbar 3-lumbar 4 bilateral laminectomies, medial facetectomies, foraminotomies     Lumbar 3-lumbar 4 posterolateral arthrodesis     SURGEON: Leanna Ward MD    ASSISTANT: Acacia Trujillo PA-C     INDICATIONS: Susan Kwan is a 58 year old female who presents with history of prior lumbar 4-5 fusion who presents with leg weakness and back and leg pain and severe narrowing of the lumbar spinal canal at lumbar 3-4 and possibly a left paracentral disc protrusion at lumbar 2-3 resulting in lateral recess stenosis at lumbar 2-3 on the left. Patient has had no success with physical therapy treatment. Limited walking ability. We discussed proceeding with exploration of prior lumbar 4-lumbar 5 posterior instrumented fusion with extension to lumbar 3 with use of stealth. Lumbar 3-lumbar 4 bilateral laminectomies, medial facetectomies, foraminotomies and posterolateral arthrodesis.  Risks and benefits were discussed in detail including but not limited to infection, hematoma, nerve damage including paralysis, post op radiculitis, durotomy, lack of a sold bone fusion, hardware malfunction, inadequate symptom relief, risks associated with the use of general anesthesia. She agreed to proceed.     PROCEDURE:  After obtaining informed consent, the patient was brought to the operating room with pneumatic stockings in place.  IV antibiotics was administered.  The patient   was intubated under general endotracheal anesthesia. A jones catheter was placed and the patient was positioned prone on the operating table. The patient was prepped and draped in the usual sterile fashion.         A preincisional infiltration of local anesthetic was performed along the midline and the prior incision was extended superiorly and re-opened sharply and extended down to the fascia.  The fascia and scar were opened in one layer with monopolar electrocautery.  A subperiosteal dissection was undertaken to expose the lamina at lumbar 3-4 and prior hardware at lumbar 4-5. We removed our prior set screws and rods.   We then placed the stealth localization guide along a spinous process. At this point, we brought in intraoperative O-arm for navigation. After confirming navigation of all instruments (registered separately on the navigational star), we proceeded to place the screws using stereotactic localization. We then used the navigational drill to select an entry site, entered using the usual anatomical markers for the entry through the cortex of the pedicle into the vertebral body. We then proceeded to use the navigational tap and place our bilateral screws at lumbar 3.      We then proceeded to remove the lamina of lumbar 3 and superior lumbar 4 with Leksell Rongeurs,  Midas drill, and Kerrison rongeurs.   We drilled down to the ligamentum elevated the ligamentum from the underlying thecal sac and removed it.  We next drilled medial facetectomies and foraminotomies opening up the lateral recess and expose the underlying impinged lateral recess and nerve roots. Once the thecal sac and nerve roots were completely decompressed we used a small ball tip probe to verify no further pressure either in the canal or in the foramina.  The lateral recess appeared decompressed. We then proceeded to copiously irrigate the incision with antibiotic-containing saline and achieved hemostasis. We next placed our autograft and allograft along the decorticated bone in the posterolateral space after decortication of the bone at lumbar 3-4. We then placed rods bilaterally and locked into place in the top-loaded heads of the screws and  secured with set screws tightened to break off torque. We then proceeded to copiously irrigate the incision with antibiotic-containing saline and achieved hemostasis.      Due to the nature and complexity of the case an assistant was required for the duration of the case for positioning, retraction, hemostasis, and closure    A drain was tunneled out of the wound. The incision was closed in layers with interrupted 0 Vicryl to approximate the muscle and fascia, inverted 2-0 PDS to approximate the subcutaneous tissues and Monocryl to approximate the skin edges.  Dermabond was then placed. Sponge and needle counts were correct prior to closure x2.  Sterile dressings were placed.      Patient tolerated the procedure well, was taken to the recovery room where she was extubated and found to be at her neurological baseline with no new deficits appreciated.    Estimated blood loss: 150 cc    Findings: none    Specimens: none    Drains: none    Implants:   Implant Name Type Inv. Item Serial No.  Lot No. LRB No. Used Action   IMP GERMANIA MEDT 4.0CM [id number] - SN.A. Metallic Hardware/[place]  N.A. [employer] N.A. N/A 2 Explanted   IMP SCR MEDT BREAK-OFF SET SOLERA 4.75MM TI [id number] - [id number] Metallic Hardware/[place] IMP SCR MEDT BREAK-OFF SET SOLERA 4.75MM TI [id number]  [employer]  Right 4 Explanted   IMP SCR MEDT BREAK-OFF SET SOLERA 4.75MM TI [id number] - [id number] Metallic Hardware/[place] IMP SCR MEDT BREAK-OFF SET SOLERA 4.75MM TI [id number]  [employer]ANE[employer]  N/A 6 Implanted   IMP SCR MEDT 4.75MM SOLERA 5.5X45MM MA [id number] - [id number] Metallic Hardware/[place] IMP SCR MEDT 4.75MM SOLERA 5.5X45MM MA [id number]  [employer]  N/A 1 Implanted   IMP SCR MEDT 4.75MM SOLERA 6.5X50MM MA [id number] - [id number] Metallic Hardware/[place] IMP SCR MEDT 4.75MM SOLERA 6.5X50MM MA [id number]  [employer]DANEK  N/A 1 Implanted   GRAFT DBM ORTHOBLEND SM DEFECT 5C - [id number] Bone/Tissue/Biologic GRAFT DBM  ORTHOBLEND SM DEFECT [facility] [id number] [employer] NA N/A 1 Implanted   IMP GERMANIA MEDT 7CM [id number] - SNA Metallic Hardware/[place] IMP GERMANIA MEDT 7CM [id number] NA [employer] NA N/A 1 Implanted   IMP GERMANIA MEDT 6CM [id number] - SNA Metallic Hardware/[place] IMP GERMANIA MEDT 6CM [id number] NA [employer] NA N/A 1 Implanted         Leanna Ward MD    [address] [place] [address]

## 2024-06-14 NOTE — PLAN OF CARE
Problem: Pain Acute  Goal: Optimal Pain Control and Function  Outcome: Progressing  Intervention: Develop Pain Management Plan  Recent Flowsheet Documentation  Taken 6/14/2024 0206 by Angelia Patel RN  Pain Management Interventions: medication (see MAR)  Taken 6/14/2024 0028 by Angelia Patel, RN  Pain Management Interventions:   medication (see MAR)   rest     Problem: Adult Inpatient Plan of Care  Goal: Plan of Care Review  Description: The Plan of Care Review/Shift note should be completed every shift.  The Outcome Evaluation is a brief statement about your assessment that the patient is improving, declining, or no change.  This information will be displayed automatically on your shift  note.  Outcome: Progressing   Goal Outcome Evaluation:         Oxycodone and gabapentin for pain. MALCOLM drain held suction over night. 45ml emptied from MALCOLM.  Surgical dressing CDI over night.  Brace when out of bed. Up independently in room.

## 2024-06-14 NOTE — DISCHARGE SUMMARY
[facility]    Discharge Summary  Neurosurgery    Date of Admission:  6/12/2024  Date of Discharge:  No discharge date for patient encounter.  Discharging Provider: FELIBERTO CHURCHILL PA-C  Date of Service (when I saw the patient): 06/14/24    Discharge Diagnoses   Active Problems:    S/P lumbar fusion      History of Present Illness   Susan Kwan is an 58 year old female who presented with history of prior lumbar 4-5 fusion who presents with leg weakness and back and leg pain and severe narrowing of the lumbar spinal canal at lumbar 3-4 and possibly a left paracentral disc protrusion at lumbar 2-3 resulting in lateral recess stenosis at lumbar 2-3 on the left.  She had failed conservative management.  Thus she underwent Exploration of prior lumbar 4-lumbar 5 posterior instrumented fusion with extension to lumbar 3 with use of stealth. Lumbar 3-lumbar 4 bilateral laminectomies, medial facetectomies, foraminotomies and posterolateral arthrodesis with Dr. Ward on 6/12/2024.  No intraoperative complications noted.   cc.  1 drain was placed.  This will be removed prior to discharge.  Incision healing well.  Patient progressing with PT/OT.  She notes improvement in lower extremity pain compared to prior to surgery.  She does still have some lower back discomfort.    Hospital Course   Susan Kwan was admitted on 6/12/2024.  The following problems were addressed during her hospitalization:    Active Problems:    S/P lumbar fusion    Assessment and plan:  -Follow-up with neurosurgery at 2 weeks for RN wound check, 6 weeks, 12 weeks postoperatively.  We will facilitate these appointments for you  -Please limit bending, lifting, twisting until follow-up.  Please continue to wear your brace when out of bed.  -Discharge medications: Oxycodone, methocarbamol, senna  -Please monitor for any increasing back pain, leg pain, leg weakness or numbness, fevers or chills, incisional changes, bowel  or bladder dysfunction and contact neurosurgery  -Closely monitor wound for any increasing drainage, redness or warmth, increasing pain  -Please do not take any NSAIDs for 6 months following surgery    I have discussed the following assessment and plan with Dr. Ward who is in agreement with initial plan and will follow up with further consultation recommendations.    Acacia SY [facility] Neurosurgery  [address] [place] [address]  [phone]    Significant Results and Procedures   NEUROSURGERY OPERATIVE REPORT     DATE OF SERVICE:  6/12/2024     PREOPERATIVE DIAGNOSES:   Lumbar radiculopathy  Spinal stenosis of lumbar region with neurogenic claudication     POSTOPERATIVE DIAGNOSES:   same     PROCEDURE:     Exploration of prior lumbar 4-lumbar 5 posterior instrumented fusion with extension to lumbar 3 with use of stealth    Lumbar 3-lumbar 4 bilateral laminectomies, medial facetectomies, foraminotomies     Lumbar 3-lumbar 4 posterolateral arthrodesis      SURGEON: Leanna Ward MD       Pending Results   None    Code Status   Full Code    Primary Care Physician   Earline Jimenez    Physical Exam   Temp: 98.1  F (36.7  C) Temp src: Oral BP: 119/57 Pulse: 88   Resp: 18 SpO2: 94 % O2 Device: None (Room air)    Vitals:    06/12/24 0551   Weight: 85 kg (187 lb 8 oz)     Vital Signs with Ranges  Temp:  [97.7  F (36.5  C)-98.7  F (37.1  C)] 98.1  F (36.7  C)  Pulse:  [65-88] 88  Resp:  [18] 18  BP: (103-144)/(55-82) 119/57  SpO2:  [93 %-97 %] 94 %  I/O last 3 completed shifts:  In: 236 [P.O.:236]  Out: 2400 [Urine:2230; Drains:170]    Constitutional: Awake, alert, cooperative, no apparent distress, and appears stated age.  Eyes: Lids and lashes normal, pupils equal, round and reactive to light, extra ocular muscles intact  ENT: Normocephalic, without obvious abnormality, atraumatic  Respiratory: No increased work of breathing  Skin: No bruising or bleeding, normal skin color, texture,  turgor, no redness, warmth, or swelling.  Incision intact, minimal drainage, open to air.  Drain intact with serosanguineous output  Musculoskeletal: There is no redness, warmth, or swelling of the joints.  Full range of motion noted.  Motor strength is 5 out of 5 all extremities bilaterally.  Tone is normal.  Neurologic: Awake, alert, oriented to name, place and time.  Cranial nerves II-XII are grossly intact.  Motor is 5 out of 5 bilaterally.     Neuropsychiatric: Calm, normal eye contact, alert, normal affect, oriented to self, place, time and situation, memory for past and recent events intact and thought process normal.       Time Spent on this Encounter   I, FELIBERTO CHURCHILL PA-C, personally saw the patient today and spent greater than 30 minutes discharging this patient.    Discharge Disposition   Discharged to home  Condition at discharge: Stable    Consultations This Hospital Stay   CARE MANAGEMENT / SOCIAL WORK IP CONSULT  PHYSICAL THERAPY ADULT IP CONSULT  OCCUPATIONAL THERAPY ADULT IP CONSULT  HOSPITALIST IP CONSULT    Discharge Orders      Reason for your hospital stay    Exploration of prior lumbar 4-lumbar 5 posterior instrumented fusion with extension to lumbar 3 with use of stealth. Lumbar 3-lumbar 4 bilateral laminectomies, medial facetectomies, foraminotomies and posterolateral arthrodesis with Dr. Ward     Follow-up and recommended labs and tests     Your Neurosurgical follow-up appointments have been scheduled. You may call [phone] to make, confirm or change your appointment dates and/or times.     Activity    Please limit your lifting to no more that ten pounds and avoid excessive bending, twisting and turning at the lumbar spine. You should also avoid excessive jostling and jarring activities.  Please continue to wear your brace when out of bed.  Please monitor for any increasing symptoms including increasing back pain, leg pain, leg weakness or paresthesias, fevers or chills,  bowel or bladder dysfunction and contact neurosurgery at [phone].     Wound care and dressings    OK to leave incision open to air. Monitor for any increase in drainage, redness, pain or warmth and contact our office. You may shower but do not soak or scrub your incision.  Please wash your hands prior to touching her incision.  Please keep your incision clean and dry.     Diet    Follow this diet upon discharge: Advance to a regular diet as tolerated     Discharge Medications   Current Discharge Medication List        START taking these medications    Details   methocarbamol (ROBAXIN) 750 MG tablet Take 1 tablet (750 mg) by mouth every 6 hours as needed for muscle spasms  Qty: 40 tablet, Refills: 0    Associated Diagnoses: S/P lumbar fusion      oxyCODONE (ROXICODONE) 5 MG tablet Take 1 tablet (5 mg) by mouth every 6 hours as needed for moderate pain  Qty: 40 tablet, Refills: 0    Associated Diagnoses: S/P lumbar fusion      senna-docusate (SENOKOT-S/PERICOLACE) 8.6-50 MG tablet Take 1 tablet by mouth 2 times daily  Qty: 30 tablet, Refills: 0    Associated Diagnoses: S/P lumbar fusion           CONTINUE these medications which have NOT CHANGED    Details   albuterol (PROAIR HFA/PROVENTIL HFA/VENTOLIN HFA) 108 (90 Base) MCG/ACT inhaler INHALE 1 TO 2 PUFFS BY MOUTH EVERY 4 HOURS AS NEEDED FOR WHEEZING  Qty: 8.5 g, Refills: 0    Comments: Pharmacy may dispense brand covered by insurance (Proair, or proventil or ventolin or generic albuterol inhaler)  Associated Diagnoses: Mild intermittent asthma without complication      buPROPion (WELLBUTRIN XL) 150 MG 24 hr tablet Take 1 tablet (150 mg) by mouth every morning  Qty: 30 tablet, Refills: 11    Associated Diagnoses: Severe episode of recurrent major depressive disorder, without psychotic features (H)      FLUoxetine (PROZAC) 20 MG capsule TAKE 3 CAPSULE BY MOUTH EVERY DAY  Qty: 270 capsule, Refills: 2    Associated Diagnoses: Severe episode of recurrent major  depressive disorder, without psychotic features (H)      fluticasone (FLONASE) 50 MCG/ACT nasal spray Spray 1 spray into both nostrils daily  Qty: 16 g, Refills: 1    Associated Diagnoses: Dysfunction of both eustachian tubes      gabapentin (NEURONTIN) 300 MG capsule TAKE 3 CAPSULE BY MOUTH EVERY MORNING, 3 IN THE AFTERNOON AND 4 EVERY NIGHT AT BEDTIME  Qty: 300 capsule, Refills: 2    Associated Diagnoses: Myofascial pain      levothyroxine (SYNTHROID/LEVOTHROID) 150 MCG tablet TAKE 1 TABLET BY MOUTH EVERY DAY  Qty: 90 tablet, Refills: 1    Associated Diagnoses: Hypothyroidism, unspecified type      multivitamin, therapeutic (THERA-VIT) TABS tablet Take 1 tablet by mouth daily      simvastatin (ZOCOR) 20 MG tablet TAKE 1 TABLET BY MOUTH EVERY DAY  Qty: 90 tablet, Refills: 3    Associated Diagnoses: Pure hypercholesterolemia      SUMAtriptan (IMITREX) 50 MG tablet TAKE ONE TABLET BY MOUTH EVERY 2 HOURS AS NEEDED FOR MIGRAINE(MAY REPEAT IN 2 HOURS AS NEEDED)  Qty: 9 tablet, Refills: 2    Associated Diagnoses: Other migraine without status migrainosus, not intractable      traZODone (DESYREL) 50 MG tablet TAKE 1-2 TABLETS BY MOUTH EVERY NIGHT AT BEDTIME  Qty: 180 tablet, Refills: 0    Associated Diagnoses: Insomnia, unspecified type           STOP taking these medications       tiZANidine (ZANAFLEX) 2 MG tablet Comments:   Reason for Stopping:             Allergies   Allergies   Allergen Reactions    Ceftin Itching and Rash    Sulfa Antibiotics Itching and Rash

## 2024-06-14 NOTE — PLAN OF CARE
"  Problem: Adult Inpatient Plan of Care  Goal: Plan of Care Review  Description: The Plan of Care Review/Shift note should be completed every shift.  The Outcome Evaluation is a brief statement about your assessment that the patient is improving, declining, or no change.  This information will be displayed automatically on your shift  note.  Outcome: Met  Flowsheets (Taken 6/14/2024 1147)  Plan of Care Reviewed With: patient  Overall Patient Progress: improving  Goal: Patient-Specific Goal (Individualized)  Description: You can add care plan individualizations to a care plan. Examples of Individualization might be:  \"Parent requests to be called daily at 9am for status\", \"I have a hard time hearing out of my right ear\", or \"Do not touch me to wake me up as it startles  me\".  Outcome: Met  Goal: Absence of Hospital-Acquired Illness or Injury  Outcome: Met  Intervention: Identify and Manage Fall Risk  Recent Flowsheet Documentation  Taken 6/14/2024 0905 by Christine Murillo RN  Safety Promotion/Fall Prevention:   nonskid shoes/slippers when out of bed   safety round/check completed  Intervention: Prevent Skin Injury  Recent Flowsheet Documentation  Taken 6/14/2024 0905 by Christine Murillo RN  Body Position: position changed independently  Intervention: Prevent Infection  Recent Flowsheet Documentation  Taken 6/14/2024 0905 by Christine Murillo RN  Infection Prevention: hand hygiene promoted  Goal: Optimal Comfort and Wellbeing  Outcome: Met  Goal: Readiness for Transition of Care  Outcome: Met     Problem: Suicide Risk  Goal: Absence of Self-Harm  Outcome: Met  Intervention: Assess Risk to Self and Maintain Safety  Recent Flowsheet Documentation  Taken 6/14/2024 0905 by Christine Murillo RN  Enhanced Safety Measures: pain management  Intervention: Promote Psychosocial Wellbeing  Recent Flowsheet Documentation  Taken 6/14/2024 0905 by Christine Murillo RN  Supportive Measures:   active listening utilized   decision-making supported     Problem: " Skin Injury Risk Increased  Goal: Skin Health and Integrity  Outcome: Met  Intervention: Plan: Nurse Driven Intervention: Moisture Management  Recent Flowsheet Documentation  Taken 6/14/2024 0905 by Christine Murillo RN  Moisture Interventions: Encourage regular toileting  Intervention: Optimize Skin Protection  Recent Flowsheet Documentation  Taken 6/14/2024 0905 by Christine Murillo RN  Activity Management: activity adjusted per tolerance     Problem: Pain Acute  Goal: Optimal Pain Control and Function  Outcome: Met  Intervention: Prevent or Manage Pain  Recent Flowsheet Documentation  Taken 6/14/2024 0905 by Christine Murillo RN  Bowel Elimination Promotion: adequate fluid intake promoted  Medication Review/Management: medications reviewed  Intervention: Optimize Psychosocial Wellbeing  Recent Flowsheet Documentation  Taken 6/14/2024 0905 by Christine Murillo RN  Supportive Measures:   active listening utilized   decision-making supported     Problem: Comorbidity Management  Goal: Maintenance of Asthma Control  Outcome: Met  Intervention: Maintain Asthma Symptom Control  Recent Flowsheet Documentation  Taken 6/14/2024 0905 by Christine Murillo RN  Medication Review/Management: medications reviewed   Goal Outcome Evaluation:  Pt alert and oriented x 4. Able to make needs known. Pt understands pain scale (0-10). Medicated per mar. Up independently in room. MALCOLM drainage removed. Discharge plan discussed. No questions or concerns at this time. Pt took all belongs out of the room. Pt was accompanied out of the hospital by spouse.        Plan of Care Reviewed With: patient    Overall Patient Progress: improvingOverall Patient Progress: improving

## 2024-06-14 NOTE — PLAN OF CARE
Physical Therapy Discharge Summary    Reason for therapy discharge:    Discharged to home.    Progress towards therapy goal(s). See goals on Care Plan in [facility] electronic health record for goal details.  Most of the goals met.  Pt was ambulating indep her room without AD    Therapy recommendation(s):    Continue home exercise program.

## 2024-06-17 ENCOUNTER — TELEPHONE (OUTPATIENT)
Dept: NEUROSURGERY | Facility: CLINIC | Age: 59
End: 2024-06-17
Payer: COMMERCIAL

## 2024-06-17 ENCOUNTER — PATIENT OUTREACH (OUTPATIENT)
Dept: CARE COORDINATION | Facility: CLINIC | Age: 59
End: 2024-06-17
Payer: COMMERCIAL

## 2024-06-17 NOTE — TELEPHONE ENCOUNTER
[facility] Call Center    Phone Message    May a detailed message be left on voicemail: yes     Reason for Call: Pt had surgery with Dr. Ward.  She wants to know when she change her dressing.  Please call her back to let her know.  Thanks.

## 2024-06-17 NOTE — TELEPHONE ENCOUNTER
Called Susan, discussed wound care, answered all questions, She verbalized satisfaction.  Sussy Saldana RN, CNRN

## 2024-06-17 NOTE — PROGRESS NOTES
Clinic Care Coordination Contact  Transitions of Care Outreach  Chief Complaint   Patient presents with    Clinic Care Coordination - Post Hospital       Most Recent Admission Date: 6/12/2024   Most Recent Admission Diagnosis: Lumbar radiculopathy - M54.16  Spinal stenosis of lumbar region with neurogenic claudication - M48.062     Most Recent Discharge Date: 6/14/2024   Most Recent Discharge Diagnosis: S/P lumbar fusion - Z98.1     Transitions of Care Assessment    Discharge Assessment  How are your symptoms? (Red Flag symptoms escalate to triage hotline per guidelines): Improved  Do you know how to contact your clinic care team if you have future questions or changes to your health status? : Yes  Does the patient have their discharge instructions? : Yes  Does the patient have questions regarding their discharge instructions? : No  Were you started on any new medications or were there changes to any of your previous medications? : Yes  Does the patient have all of their medications?: Yes  Do you have questions regarding any of your medications? : No  Do you have all of your needed medical supplies or equipment (DME)?  (i.e. oxygen tank, CPAP, cane, etc.): Yes          Follow up Plan     Discharge Follow-Up  Discharge follow up appointment scheduled in alignment with recommended follow up timeframe or Transitions of Risk Category? (Low = within 30 days; Moderate= within 14 days; High= within 7 days): Yes    Future Appointments   Date Time Provider Department Center   6[facility]27/2024 10:30 [facility]   7/2/2024  1:00 PM Ras Segovia, PharmD [facility]   7/25/2024 10:30 AM Selin Caal NP [facility]       Outpatient Plan as outlined on AVS reviewed with patient.  Intro CCC and support, declined enrollment at this time  Pt reports understanding and denies any additional questions or concerns at this times. RN CC engaged in AIDET communication during encounter.      For any urgent  concerns, please contact our 24 hour nurse triage line: [phone] ([phone])       Tamiko Simmons RN

## 2024-06-19 ENCOUNTER — TELEPHONE (OUTPATIENT)
Dept: NEUROSURGERY | Facility: CLINIC | Age: 59
End: 2024-06-19
Payer: COMMERCIAL

## 2024-06-19 DIAGNOSIS — Z98.890 S/P LUMBAR SPINE OPERATION: Primary | ICD-10-CM

## 2024-06-19 RX ORDER — HYDROCODONE BITARTRATE AND ACETAMINOPHEN 5; 325 MG/1; MG/1
1 TABLET ORAL EVERY 8 HOURS PRN
Qty: 10 TABLET | Refills: 0 | Status: SHIPPED | OUTPATIENT
Start: 2024-06-19 | End: 2024-06-24

## 2024-06-19 NOTE — TELEPHONE ENCOUNTER
"Susan Kwan is status post Exploration of prior lumbar 4-lumbar 5 posterior instrumented fusion with extension to lumbar 3 with use of stealth; Lumbar 3-lumbar 4 bilateral laminectomies, medial facetectomies, foraminotomies; Lumbar 3-lumbar 4 posterolateral arthrodesis on 06/12/2024 with Dr. Ward.  Today Susan reports much improved back pain, denies radicular symptoms. Notes the oxycodone is \"too strong\" for her, she inquires about hydrocodone 5-325 mg three times daily. On Robaxin, uses ice packs.  Follow up on 06/27/2024.  Sussy Saldana, RN, CNRN    "

## 2024-06-19 NOTE — TELEPHONE ENCOUNTER
[facility] Call Center    Phone Message    May a detailed message be left on voicemail: yes     Reason for Call: Other: Patient stated she would like something for her back pain, she is out of the Oxycodone. She said that the pain is getting better but needs something not as strong to help take the edge off, The patient says is like a shooting pain.Please review and callback.      Action Taken: Message routed to:  Other: [facility]     Travel Screening: Not Applicable     Date of Service:

## 2024-06-24 ENCOUNTER — MYC REFILL (OUTPATIENT)
Dept: NEUROSURGERY | Facility: CLINIC | Age: 59
End: 2024-06-24
Payer: COMMERCIAL

## 2024-06-24 DIAGNOSIS — Z98.890 S/P LUMBAR SPINE OPERATION: ICD-10-CM

## 2024-06-24 RX ORDER — HYDROCODONE BITARTRATE AND ACETAMINOPHEN 5; 325 MG/1; MG/1
1 TABLET ORAL EVERY 8 HOURS PRN
Qty: 10 TABLET | Refills: 0 | Status: SHIPPED | OUTPATIENT
Start: 2024-06-24 | End: 2024-06-27

## 2024-06-24 NOTE — TELEPHONE ENCOUNTER
"Pain Assessment    Rate your pain at this moment on a scale of 1 to 10: 7/10    Where is the pain located: \"where the cage is inside\"    What does the pain feel like: constant pressure    Does anything specific cause the pain, how long does it last, or is it constant: constant    How often are you taking your medications and at what dosages: taking about twice a day    Are the medications helping: yes    Have you been, or are you able to take fewer opioid doses: yes    Are you using heat, ice, or anything else outside of medication for the pain: ice    What activities have you been doing: following activity restrictions    "

## 2024-06-26 ENCOUNTER — MYC REFILL (OUTPATIENT)
Dept: FAMILY MEDICINE | Facility: CLINIC | Age: 59
End: 2024-06-26
Payer: COMMERCIAL

## 2024-06-26 DIAGNOSIS — Z98.1 S/P LUMBAR FUSION: ICD-10-CM

## 2024-06-26 RX ORDER — METHOCARBAMOL 750 MG/1
750 TABLET, FILM COATED ORAL EVERY 6 HOURS PRN
Qty: 40 TABLET | Refills: 0 | Status: SHIPPED | OUTPATIENT
Start: 2024-06-26 | End: 2024-06-27

## 2024-06-27 ENCOUNTER — ALLIED HEALTH/NURSE VISIT (OUTPATIENT)
Dept: NEUROSURGERY | Facility: CLINIC | Age: 59
End: 2024-06-27
Payer: COMMERCIAL

## 2024-06-27 VITALS
SYSTOLIC BLOOD PRESSURE: 130 MMHG | TEMPERATURE: 98.9 F | DIASTOLIC BLOOD PRESSURE: 76 MMHG | OXYGEN SATURATION: 95 % | HEART RATE: 101 BPM

## 2024-06-27 DIAGNOSIS — Z98.1 S/P LUMBAR FUSION: ICD-10-CM

## 2024-06-27 DIAGNOSIS — Z98.890 S/P LUMBAR SPINE OPERATION: ICD-10-CM

## 2024-06-27 PROCEDURE — 99207 PR NO CHARGE NURSE ONLY: CPT

## 2024-06-27 RX ORDER — HYDROCODONE BITARTRATE AND ACETAMINOPHEN 5; 325 MG/1; MG/1
1 TABLET ORAL EVERY 8 HOURS PRN
Qty: 10 TABLET | Refills: 0 | Status: SHIPPED | OUTPATIENT
Start: 2024-06-27 | End: 2024-07-02

## 2024-06-27 RX ORDER — METHOCARBAMOL 750 MG/1
750 TABLET, FILM COATED ORAL EVERY 6 HOURS PRN
Qty: 40 TABLET | Refills: 0 | Status: SHIPPED | OUTPATIENT
Start: 2024-06-27 | End: 2024-08-11

## 2024-06-27 NOTE — PATIENT INSTRUCTIONS
Follow-up 7/25/24 at 10:15 AM with x-ray prior. Please schedule x-ray at  on your way out.    A dressing is not required.    Keep the wound clean.    Wash your hands before touching the wound.  Ensure that anyone assisting you in the care of your wound washes her/his hands before touching the wound. Good handwashing can decrease the risk of serious infection.    If you are unable to see your wound, have someone check the wound daily for redness, swelling,or drainage. A small amount of drainage is normal.    You may shower.  Pat the wound dry. Do not rub.    No tub baths until the wound is well healed.  Usually 5-6 weeks.     If you develop redness, swelling, drainage, or temp 101 or greater, call our clinic.    * No lifting, pushing or pulling greater than 5-10 pound (this is about a gallon of milk) for the first 6 weeks after surgery .  *No repetitive bending, twisting, or jarring activities for 6 weeks.  *No overhead work  *No aerobic or strenuous activity  *No activities with increased risk of falls  *You may move about your home as tolerated  *You may walk up and down stairs as tolerated  *You may increase your activity slowly over the next 6 weeks    WALKING PROGRAM: As you can tolerate, walk daily-start with 5-10 minutes of continuous walking. This is in addition to the walking that you do as part of your daily activities. Increase the time that you walk by 5 minutes every couple of days. Do not exceed 30-45 minutes of continuous walking until seen in follow-up. Walking is the best exercise after surgery.  **Listen to your body, if you find that you are more painful or fatigued, you may need to proceed more slowly.    **Do not smoke or expose yourself to second hand smoke. Cigarette smoke can delay healing and cause complications.     DRIVING:  We recommend that you do not drive while taking medications for pain or muscle spasms. Always read and follow the advice on your prescription bottle. If you  have questions, speak with your pharmacist.  We recommend that you do not drive while wearing a brace, as it could limit your range of motion.    WORK: If you plan to return to work before your 6 week post-op appointment, call and discuss with one of the nurses in the neurosurgery office.     WEARING THE LUMBAR BRACE:    * Wear the brace when out of bed or sitting upright in bed.  * You should apply the brace before standing.  * You may shower seated without brace.   Sit down on shower chair, remove brace   Shower   Dry   Re-apply brace before standing.   Take care not to fall    *Check  your incision and the skin under your brace at least daily.

## 2024-06-27 NOTE — PROGRESS NOTES
POSTOPERATIVE FOLLOW-UP NURSE VISIT NOTE    Procedure: Exploration of prior lumbar 4-lumbar 5 posterior instrumented fusion with extension to lumbar 3 with use of stealth. Lumbar 3-lumbar 4 bilateral laminectomies, medial facetectomies, foraminotomies and posterolateral arthrodesis     Date: 6/12/24    Surgeon: Ed    Pre-op symptoms: leg weakness and back and leg pain     Post-op symptoms: leg weakness has improved, pain is improved, sciatica feels slightly agitated since surgery    Pain and medications: 8/10  Hydrocodone-acetaminophen 5-325 mg Q8 PRN: taking twice a day  Methocarbamol 750 mg Q6 PRN: taking 3 times a day    Bracing compliance: compliant    Medications refilled: methocarbamol, norco    Imaging ordered: XR lumbar    Wound: CDI, approximated, no drainage, mild erythema, mild temperature change compared to surrounding tissue, mildly tender. Seroma at top edge of wound. Monocryl in place, no other closures detected.

## 2024-06-30 ENCOUNTER — HEALTH MAINTENANCE LETTER (OUTPATIENT)
Age: 59
End: 2024-06-30

## 2024-07-01 NOTE — PROGRESS NOTES
Medication Therapy Management (MTM) Encounter    ASSESSMENT:                            Medication Adherence/Access: No issues identified    S/p Lumbar Fusion:   Myofascial Pain:   Pain improving since fusion. Requiring less opioids for post-op pain. Has ongoing Neurosurgery follow-up scheduled.     Migraine headaches:  Infrequent headaches, respond well to sumatriptan.        Constipation:   Regular bowel movements even without Senna.        Asthma   Last ACT at goal >19.     Hypothyroidism:   Last TSH WNL      Mental Health   Depression  PHQ9 score at goal <5. Denies concerns. Sleeping well with trazodone.          Hyperlipidemia   LDL just above goal <100. Continue with current statin dose. Focus on dietary approaches and consider recheck in 4-6 months.       Eustachian Tube Defect:   Okay to continue PRN use of Flonase.        PLAN:                            No medication changes. Continue current therapy.     Follow-up: Schedule with Pharmacist as needed.     SUBJECTIVE/OBJECTIVE:                          Susan Kwan is a 58 year old female seen for a transitions of care visit. She was discharged from [facility] on 6/14/2024 for s/p lumbar fusion.      Reason for visit: Transitions of care.    Allergies/ADRs: Reviewed in chart  Past Medical History: Reviewed in chart  Tobacco: She reports that she quit smoking about 8 weeks ago. Her smoking use included cigarettes. She started smoking about 2 years ago. She has a 34.9 pack-year smoking history. She has never used smokeless tobacco.  Alcohol:  reports that she does not currently use alcohol.      Medication Adherence/Access: no issues reported    S/p Lumbar Fusion:   Myofascial Pain:   Gabapentin 300 mg 3 caps twice daily + 4 caps at bedtime   Hydrocodone-Acetaminophen 5-325 mg twice daily as needed   Methocarbamol 750 mg every 6 hours as needed  Oxycodone 5 mg every 6 hours as needed - no longer using this.     Has decreased Norco use to 1 tab at bedtime.      Pain is getting better. Not needing to use Norco until supper time.       Migraine headaches:  Sumatriptan 50 mg every 2 hours as needed for migraine    Not as often anymore.   Once every couple weeks. Finds sumatriptan helpful.       Constipation:   Senna-docusate 8.6-50 mg twice daily. Not using.     Having daily bowel movements even without senna.       Asthma     Albuterol HFA 90 mcg 1 to 2 puffs every 4 hours as needed     Maybe once a month or every 2 months.            4/27/2023     9:07 AM 11/21/2023    12:54 PM 6/6/2024     1:29 PM   ACT Total Scores   ACT TOTAL SCORE (Goal Greater than or Equal to 20) 20 24 25   In the past 12 months, how many times did you visit the emergency room for your asthma without being admitted to the hospital? 0 0 0   In the past 12 months, how many times were you hospitalized overnight because of your asthma? 0 0 0         Hypothyroidism: Prescribed levothyroxine 150 mcg daily.     TSH   Date Value Ref Range Status   06/06/2024 0.58 0.30 - 4.20 uIU/mL Final   11/21/2023 3.51 0.30 - 4.20 uIU/mL Final   06/16/2023 1.73 0.30 - 4.20 uIU/mL Final   04/28/2023 0.02 (L) 0.30 - 4.20 uIU/mL Final   01/03/2022 0.51 0.30 - 5.00 uIU/mL Final   12/08/2020 0.31 0.30 - 5.00 uIU/mL Final   04/19/2018 0.49 0.30 - 5.00 uIU/mL Final   04/09/2010 9.02 (H) 0.4 - 5.0 mU/L Final         Mental Health     Depression  Bupropion 150 mg ER once daily  Fluoxetine 20 mg 3 cap once daily.   Trazodone 50 mg 1-2 tabs at bedtime    Denies depression concerns. Sleeping well.        Hyperlipidemia     Simvastatin 20 mg daily  Patient reports no significant myalgias or other side effects.    Recent Labs   Lab Test 06/06/24  1351 04/28/23  0843   CHOL 202* 187   HDL 32* 35*   * 120*   TRIG 327* 162*       The 10-year ASCVD risk score (Kimberly BARRIGA, et al., 2019) is: 3.7%    Values used to calculate the score:      Age: 58 years      Sex: Female      Is Non- : No      Diabetic: No       Tobacco smoker: No      Systolic Blood Pressure: 130 mmHg      Is BP treated: No      HDL Cholesterol: 32 mg/dL      Total Cholesterol: 202 mg/dL          Eustachian Tube Defect:   Flonase 50 mcg 1 spray both nostrils daily as needed         Today's Vitals: LMP 03/09/2010   ----------------  Post Discharge Medication Reconciliation Status: discharge medications reconciled, continue medications without change.    I spent 13 minutes with this patient today. All changes were made via collaborative practice agreement with Earline Jimenez MD. A copy of the visit note was provided to the patient's provider(s).    A summary of these recommendations was sent via [employer]    Ras Segovia PharmD  Medication Therapy Management (MTM) Pharmacist  [facility]      Telemedicine Visit Details  Type of service:  Telephone visit  Start Time: 1:04 PM  End Time: 1:17 PM     Medication Therapy Recommendations  No medication therapy recommendations to display

## 2024-07-02 ENCOUNTER — VIRTUAL VISIT (OUTPATIENT)
Dept: PHARMACY | Facility: CLINIC | Age: 59
End: 2024-07-02
Attending: FAMILY MEDICINE
Payer: COMMERCIAL

## 2024-07-02 ENCOUNTER — MYC REFILL (OUTPATIENT)
Dept: NEUROSURGERY | Facility: CLINIC | Age: 59
End: 2024-07-02
Payer: COMMERCIAL

## 2024-07-02 DIAGNOSIS — J45.20 MILD INTERMITTENT ASTHMA WITHOUT COMPLICATION: ICD-10-CM

## 2024-07-02 DIAGNOSIS — F33.2 SEVERE EPISODE OF RECURRENT MAJOR DEPRESSIVE DISORDER, WITHOUT PSYCHOTIC FEATURES (H): ICD-10-CM

## 2024-07-02 DIAGNOSIS — Z98.890 S/P LUMBAR SPINE OPERATION: ICD-10-CM

## 2024-07-02 DIAGNOSIS — G47.00 INSOMNIA, UNSPECIFIED TYPE: ICD-10-CM

## 2024-07-02 DIAGNOSIS — E03.9 HYPOTHYROIDISM, UNSPECIFIED TYPE: ICD-10-CM

## 2024-07-02 DIAGNOSIS — M79.18 MYOFASCIAL PAIN: Primary | ICD-10-CM

## 2024-07-02 DIAGNOSIS — E78.00 PURE HYPERCHOLESTEROLEMIA: ICD-10-CM

## 2024-07-02 PROCEDURE — 99207 PR NO CHARGE LOS: CPT | Mod: 93 | Performed by: PHARMACIST

## 2024-07-02 RX ORDER — HYDROCODONE BITARTRATE AND ACETAMINOPHEN 5; 325 MG/1; MG/1
1 TABLET ORAL 2 TIMES DAILY PRN
Qty: 14 TABLET | Refills: 0 | Status: SHIPPED | OUTPATIENT
Start: 2024-07-02

## 2024-07-02 RX ORDER — TRAZODONE HYDROCHLORIDE 50 MG/1
50-100 TABLET, FILM COATED ORAL AT BEDTIME
Qty: 180 TABLET | Refills: 0 | Status: SHIPPED | OUTPATIENT
Start: 2024-07-02

## 2024-07-02 NOTE — TELEPHONE ENCOUNTER
Pain Assessment    Rate your pain at this moment on a scale of 1 to 10: 6/10    Where is the pain located: incisional    What does the pain feel like: hard ache    Does anything specific cause the pain, how long does it last, or is it constant: constant    How often are you taking your medications and at what dosages:   Hydrocodone-acetaminophen 5-325 mg 1 tablet Q8 PRN: taking HS  Methocarbamol 750 mg Q6 PRN: taking HS    Have you been, or are you able to take fewer opioid doses: yes, taking HS    What activities have you been doing: following restrictions

## 2024-07-02 NOTE — PATIENT INSTRUCTIONS
"Recommendations from today's MTM visit:                                                      MTM (medication therapy management) is a service provided by a clinical pharmacist designed to help you get the most of out of your medicines.   Today we reviewed what your medicines are for, how to know if they are working, that your medicines are safe and how to make your medicine regimen as easy as possible.        No medication changes. Continue current therapy.     Follow-up: Schedule with Pharmacist as needed.     It was great speaking with you today.  I value your experience and would be very thankful for your time in providing feedback in our clinic survey. In the next few days, you may receive an email or text message from [employer] with a link to a survey related to your  clinical pharmacist.\"     To schedule another MTM appointment, please call the clinic directly or you may call the MTM scheduling line at [phone].    My Clinical Pharmacist's contact information:                                                      Please feel free to contact me with any questions or concerns you have.      Ras Segovia, PharmD  Medication Therapy Management (MTM) Pharmacist  [facility]      "

## 2024-07-11 ENCOUNTER — HOSPITAL ENCOUNTER (OUTPATIENT)
Dept: CT IMAGING | Facility: CLINIC | Age: 59
Discharge: HOME | End: 2024-07-11
Attending: FAMILY MEDICINE | Admitting: FAMILY MEDICINE
Payer: COMMERCIAL

## 2024-07-11 DIAGNOSIS — Z87.891 HISTORY OF SMOKING 25-50 PACK YEARS: ICD-10-CM

## 2024-07-11 PROCEDURE — 71271 CT THORAX LUNG CANCER SCR C-: CPT

## 2024-07-17 DIAGNOSIS — F33.2 SEVERE EPISODE OF RECURRENT MAJOR DEPRESSIVE DISORDER, WITHOUT PSYCHOTIC FEATURES (H): ICD-10-CM

## 2024-07-18 RX ORDER — BUPROPION HYDROCHLORIDE 150 MG/1
150 TABLET ORAL EVERY MORNING
Qty: 30 TABLET | Refills: 3 | Status: SHIPPED | OUTPATIENT
Start: 2024-07-18

## 2024-07-25 ENCOUNTER — HOSPITAL ENCOUNTER (OUTPATIENT)
Dept: GENERAL RADIOLOGY | Facility: HOSPITAL | Age: 59
Discharge: HOME | End: 2024-07-25
Attending: SURGERY | Admitting: SURGERY
Payer: COMMERCIAL

## 2024-07-25 ENCOUNTER — OFFICE VISIT (OUTPATIENT)
Dept: NEUROSURGERY | Facility: CLINIC | Age: 59
End: 2024-07-25
Payer: COMMERCIAL

## 2024-07-25 VITALS — SYSTOLIC BLOOD PRESSURE: 129 MMHG | OXYGEN SATURATION: 90 % | HEART RATE: 70 BPM | DIASTOLIC BLOOD PRESSURE: 83 MMHG

## 2024-07-25 DIAGNOSIS — Z98.1 S/P LUMBAR FUSION: Primary | ICD-10-CM

## 2024-07-25 DIAGNOSIS — Z98.1 S/P LUMBAR FUSION: ICD-10-CM

## 2024-07-25 PROCEDURE — 99024 POSTOP FOLLOW-UP VISIT: CPT | Performed by: NURSE PRACTITIONER

## 2024-07-25 PROCEDURE — 72100 X-RAY EXAM L-S SPINE 2/3 VWS: CPT

## 2024-07-25 RX ORDER — ACETAMINOPHEN AND CODEINE PHOSPHATE 300; 30 MG/1; MG/1
1 TABLET ORAL EVERY 8 HOURS PRN
Qty: 10 TABLET | Refills: 0 | Status: SHIPPED | OUTPATIENT
Start: 2024-07-25

## 2024-07-25 ASSESSMENT — PAIN SCALES - GENERAL: PAINLEVEL: SEVERE PAIN (6)

## 2024-07-25 NOTE — PROGRESS NOTES
[facility] Neurosurgery Follow-Up:     HPI: 6 weeks s/p exploration of prior L4-5 fusion with extension to L3 on 6/12/24 with Dr. Ward. Patient is doing well and reports weekly improvements. She reports 4/10 low back pain. Pre op left leg pain resolved. Some continued pain in right buttocks. Stable, chronic right leg numbness. Denies any incision concerns. Denies any new or worsening symptoms. Patient has been following post op activity restrictions and wearing LSO brace when out of bed. She plans to start home PT exercises.     Patient has an [employer] bone stimulator from prior L4-5 fusion in 2022. She would like to start using this again.    Current pain management:   Patient would like to transition to tylenol #3 - one time refill for post op pain.     Exam:  Constitutional:  Alert, well nourished, NAD.  HEENT: Normocephalic, atraumatic.   Pulm:  Without shortness of breath   CV:  No pitting edema of BLE.      /83 (BP Location: Left arm, Patient Position: Sitting, Cuff Size: Adult Regular)   Pulse 70   LMP 03/09/2010   SpO2 90%     Neurological:  Awake  Alert  Oriented x 3  Motor exam:  Hip Flexor:                Right: 5/5  Left:  5/5  Quadriceps:              Right:  5/5  Left:  5/5  Hamstrings:              Right:  5/5  Left:  5/5  Gastroc Soleus:        Right:  5/5  Left:  5/5  Tib/Ant:                      Right:  5/5  Left:  5/5  EHL:                          Right:  5/5  Left:  5/5      Able to spontaneously move BLE  Chronic right L5 numbness   Normal gait     Incision: Well healed     Imaging: AP and lateral views reveal stable and intact hardware     Assessment:   6 weeks s/p exploration of prior L4-5 fusion with extension to L3 on 6/12/24 with Dr. Ward.    Plan:   -Continue with routine post op care plan   -May gradually increase activity and lifting restriction to 20 pounds, as tolerated  -Tylenol #3 one time refill sent to pharmacy  -Follow up with [employer] rep for bone  stimulator. Care team will help schedule appt.   -Follow up in 6 weeks with xray prior to appt    -Call our clinic for any incisional concerns, increased pain, new symptoms, or any other post operative questions or concerns    Patient voiced understanding and agreement.      Selin Caal [facility] Neurosurgery  Tel [phone]  Pager [phone]

## 2024-07-25 NOTE — NURSING NOTE
"Susan Kwan is a 58 year old female who presents for:  Chief Complaint   Patient presents with    RECHECK        Initial Vitals:  /83 (BP Location: Left arm, Patient Position: Sitting, Cuff Size: Adult Regular)   Pulse 70   LMP 03/09/2010   SpO2 90%  Estimated body mass index is 34.29 kg/m  as calculated from the following:    Height as of 6/12/24: 5' 2\" (1.575 m).    Weight as of 6/12/24: 187 lb 8 oz (85 kg).. There is no height or weight on file to calculate BSA. BP completed using cuff size: regular  Severe Pain (6)    Antonio Bourgeois    "

## 2024-07-25 NOTE — LETTER
7/25/2024      Susan Kwan  [address]  [place] [facility] [place] [address]      Dear Colleague,    Thank you for referring your patient, Susan Kwan, to the [facility] SP[facility] NE[facility]OS[facility]ERY. Please see a copy of my visit note below.    [facility] Neurosurgery Follow-Up:     HPI: 6 weeks s/p exploration of prior L4-5 fusion with extension to L3 on 6/12/24 with Dr. Ward. Patient is doing well and reports weekly improvements. She reports 4/10 low back pain. Pre op left leg pain resolved. Some continued pain in right buttocks. Stable, chronic right leg numbness. Denies any incision concerns. Denies any new or worsening symptoms. Patient has been following post op activity restrictions and wearing LSO brace when out of bed. She plans to start home PT exercises.     Patient has an [employer] bone stimulator from prior L4-5 fusion in 2022. She would like to start using this again.    Current pain management:   Patient would like to transition to tylenol #3 - one time refill for post op pain.     Exam:  Constitutional:  Alert, well nourished, NAD.  HEENT: Normocephalic, atraumatic.   Pulm:  Without shortness of breath   CV:  No pitting edema of BLE.      /83 (BP Location: Left arm, Patient Position: Sitting, Cuff Size: Adult Regular)   Pulse 70   LMP 03/09/2010   SpO2 90%     Neurological:  Awake  Alert  Oriented x 3  Motor exam:  Hip Flexor:                Right: 5/5  Left:  5/5  Quadriceps:              Right:  5/5  Left:  5/5  Hamstrings:              Right:  5/5  Left:  5/5  Gastroc Soleus:        Right:  5/5  Left:  5/5  Tib/Ant:                      Right:  5/5  Left:  5/5  EHL:                          Right:  5/5  Left:  5/5      Able to spontaneously move BLE  Chronic right L5 numbness   Normal gait     Incision: Well healed     Imaging: AP and lateral views reveal stable and intact hardware     Assessment:   6 weeks s/p exploration of prior L4-5 fusion with extension to L3 on  6/12/24 with Dr. Ward.    Plan:   -Continue with routine post op care plan   -May gradually increase activity and lifting restriction to 20 pounds, as tolerated  -Tylenol #3 one time refill sent to pharmacy  -Follow up with [employer] rep for bone stimulator. Care team will help schedule appt.   -Follow up in 6 weeks with xray prior to appt    -Call our clinic for any incisional concerns, increased pain, new symptoms, or any other post operative questions or concerns    Patient voiced understanding and agreement.      Selin Caal CNP  [facility] Neurosurgery  Tel [phone]  Pager [phone]      Again, thank you for allowing me to participate in the care of your patient.        Sincerely,        Selin Caal, JARRED

## 2024-07-25 NOTE — PATIENT INSTRUCTIONS
-Continue with routine post op care plan   -May gradually increase activity and lifting restriction to 20 pounds, as tolerated  -Tylenol #3 one time refill sent to pharmacy  -Follow up with [employer] rep for bone stimulator. Care team will help schedule appt.   -Follow up in 6 weeks with xray prior to appt    -Call our clinic for any incisional concerns, increased pain, new symptoms, or any other post operative questions or concerns

## 2024-07-25 NOTE — Clinical Note
Patient has an [employer] bone stimulator from prior L4-5 fusion in 2022. She would like to start using this again. She has the stimulator at home but needs to meet with the rep. Can we help schedule appt with [employer] rep? Thanks!

## 2024-08-11 ENCOUNTER — HOSPITAL ENCOUNTER (OUTPATIENT)
Dept: GENERAL RADIOLOGY | Facility: HOSPITAL | Age: 59
Discharge: HOME | End: 2024-08-11
Attending: STUDENT IN AN ORGANIZED HEALTH CARE EDUCATION/TRAINING PROGRAM | Admitting: STUDENT IN AN ORGANIZED HEALTH CARE EDUCATION/TRAINING PROGRAM
Payer: COMMERCIAL

## 2024-08-11 ENCOUNTER — OFFICE VISIT (OUTPATIENT)
Dept: FAMILY MEDICINE | Facility: CLINIC | Age: 59
End: 2024-08-11
Payer: COMMERCIAL

## 2024-08-11 VITALS
BODY MASS INDEX: 33.32 KG/M2 | OXYGEN SATURATION: 94 % | DIASTOLIC BLOOD PRESSURE: 84 MMHG | WEIGHT: 182.2 LBS | HEART RATE: 107 BPM | TEMPERATURE: 97.5 F | SYSTOLIC BLOOD PRESSURE: 125 MMHG

## 2024-08-11 DIAGNOSIS — J45.21 MILD INTERMITTENT ASTHMA WITH ACUTE EXACERBATION: ICD-10-CM

## 2024-08-11 DIAGNOSIS — R05.1 ACUTE COUGH: ICD-10-CM

## 2024-08-11 DIAGNOSIS — R05.1 ACUTE COUGH: Primary | ICD-10-CM

## 2024-08-11 DIAGNOSIS — F17.200 TOBACCO USE DISORDER: ICD-10-CM

## 2024-08-11 PROBLEM — M53.86 SCIATICA ASSOCIATED WITH DISORDER OF LUMBAR SPINE: Status: ACTIVE | Noted: 2017-02-02

## 2024-08-11 PROBLEM — M53.3 SACROILIAC JOINT DYSFUNCTION OF RIGHT SIDE: Status: ACTIVE | Noted: 2017-02-01

## 2024-08-11 PROBLEM — G47.33 OSA (OBSTRUCTIVE SLEEP APNEA): Status: ACTIVE | Noted: 2017-01-11

## 2024-08-11 PROBLEM — K11.7 XEROSTOMIA DUE TO HYPOSECRETION OF SALIVARY GLAND: Status: ACTIVE | Noted: 2019-08-01

## 2024-08-11 PROBLEM — G56.00 CARPAL TUNNEL SYNDROME: Status: ACTIVE | Noted: 2024-08-11

## 2024-08-11 PROBLEM — E66.01 MORBID OBESITY (H): Status: ACTIVE | Noted: 2019-04-15

## 2024-08-11 PROBLEM — L82.1 SEBORRHEIC KERATOSIS: Status: ACTIVE | Noted: 2024-08-11

## 2024-08-11 PROBLEM — E78.5 HYPERLIPIDEMIA: Status: ACTIVE | Noted: 2018-03-08

## 2024-08-11 PROBLEM — R87.810 ASCUS WITH POSITIVE HIGH RISK HPV CERVICAL: Status: ACTIVE | Noted: 2019-06-27

## 2024-08-11 PROBLEM — R87.610 ASCUS WITH POSITIVE HIGH RISK HPV CERVICAL: Status: ACTIVE | Noted: 2019-06-27

## 2024-08-11 PROBLEM — D12.2 BENIGN NEOPLASM OF ASCENDING COLON: Status: ACTIVE | Noted: 2019-07-11

## 2024-08-11 PROBLEM — E89.0 POSTABLATIVE HYPOTHYROIDISM: Status: ACTIVE | Noted: 2024-08-11

## 2024-08-11 PROBLEM — R23.2 FLUSHING: Status: ACTIVE | Noted: 2024-08-11

## 2024-08-11 PROBLEM — M51.26 LUMBAR DISC HERNIATION: Status: ACTIVE | Noted: 2017-02-01

## 2024-08-11 PROBLEM — J30.9 ALLERGIC RHINITIS: Status: ACTIVE | Noted: 2024-08-11

## 2024-08-11 PROCEDURE — 99214 OFFICE O/P EST MOD 30 MIN: CPT | Performed by: STUDENT IN AN ORGANIZED HEALTH CARE EDUCATION/TRAINING PROGRAM

## 2024-08-11 PROCEDURE — 71046 X-RAY EXAM CHEST 2 VIEWS: CPT

## 2024-08-11 RX ORDER — AZITHROMYCIN 250 MG/1
TABLET, FILM COATED ORAL
Qty: 6 TABLET | Refills: 0 | Status: SHIPPED | OUTPATIENT
Start: 2024-08-11 | End: 2024-08-16

## 2024-08-11 RX ORDER — IPRATROPIUM BROMIDE AND ALBUTEROL SULFATE 2.5; .5 MG/3ML; MG/3ML
1 SOLUTION RESPIRATORY (INHALATION) EVERY 6 HOURS PRN
Qty: 90 ML | Refills: 0 | Status: SHIPPED | OUTPATIENT
Start: 2024-08-11 | End: 2024-09-25

## 2024-08-11 RX ORDER — PREDNISONE 20 MG/1
40 TABLET ORAL DAILY
Qty: 10 TABLET | Refills: 0 | Status: SHIPPED | OUTPATIENT
Start: 2024-08-11 | End: 2024-08-16

## 2024-08-11 NOTE — PROGRESS NOTES
Assessment & Plan     Acute cough  I reviewed CXR and I don't see any obvious infiltrates, no effusions. Patient has had shortness of breath in part she believes to throwing her back out 5 days ago from coughing causing her to be mostly in bed for the past 5 days. She also has asthma and there are expiratory wheezes bibasilar on exam with rhonchi. She is high risk for hospitalization due to multiple co-morbidities including but not limited to asthma, smoking history >25 years, obesity, etc. I advised treatment with azithromycin given high risk for complications of infection and high risk for hospitalization.   - XR Chest 2 Views  - azithromycin (ZITHROMAX) 250 MG tablet  Dispense: 6 tablet; Refill: 0    Mild intermittent asthma with acute exacerbation  Treat exacerbation of asthma with prednisone burst. She has history of smoking for 25+ years and may have underlying COPD. Will treat with Duoneb for symptoms of shortness of breath. Follow up if symptoms persist or worsen.  If shortness of breath worsens, advise is seen in ED.   - predniSONE (DELTASONE) 20 MG tablet  Dispense: 10 tablet; Refill: 0  - ipratropium - albuterol 0.5 mg/2.5 mg/3 mL (DUONEB) 0.5-2.5 (3) MG/3ML neb solution  Dispense: 90 mL; Refill: 0  - Nebulizer and Supplies Order for DME - ONLY FOR DME    Tobacco use disorder  - ipratropium - albuterol 0.5 mg/2.5 mg/3 mL (DUONEB) 0.5-2.5 (3) MG/3ML neb solution  Dispense: 90 mL; Refill: 0     30 minutes spent on the date of the encounter doing chart review, review of test results, interpretation of tests, patient visit, and documentation       No follow-ups on file.    EITAN Mendes [facility]    Nohelia Davis is a 58 year old female who presents to clinic today for the following health issues:  Chief Complaint   Patient presents with    Cough     Patient states she been feeling down for about a week. Shortness of breath and coughing.         8/11/2024      2:57 PM   Additional Questions   Roomed by Angelica Lyon MA   Accompanied by Self     HPI      Review of Systems  Constitutional, HEENT, cardiovascular, pulmonary, GI, , musculoskeletal, neuro, skin, endocrine and psych systems are negative, except as otherwise noted.      Objective    /84 (BP Location: Right arm, Patient Position: Chair, Cuff Size: Adult Large)   Pulse 107   Temp 97.5  F (36.4  C) (Oral)   Wt 82.6 kg (182 lb 3.2 oz)   LMP 03/09/2010   SpO2 94%   BMI 33.32 kg/m    Physical Exam   GENERAL: alert and no acute distress  RESP: rhonchi bibasilar and expiratory wheezes bibasilar  CV: regular rate and rhythm, normal S1 S2, no S3 or S4, no murmur, click or rub, no peripheral edema  MS: no gross musculoskeletal defects noted, no edema  SKIN: no suspicious lesions or rashes  NEURO: Normal strength and tone, mentation intact and speech normal  PSYCH: mentation appears normal, affect normal/bright    No results found for any visits on 08/11/24.  I reviewed CXR and I don't see any obvious infiltrates, no effusions. Awaiting radiology review.

## 2024-08-20 ENCOUNTER — PATIENT OUTREACH (OUTPATIENT)
Dept: CARE COORDINATION | Facility: CLINIC | Age: 59
End: 2024-08-20
Payer: COMMERCIAL

## 2024-08-30 ENCOUNTER — NURSE TRIAGE (OUTPATIENT)
Dept: FAMILY MEDICINE | Facility: CLINIC | Age: 59
End: 2024-08-30
Payer: COMMERCIAL

## 2024-08-30 DIAGNOSIS — J45.21 MILD INTERMITTENT ASTHMA WITH ACUTE EXACERBATION: Primary | ICD-10-CM

## 2024-08-30 RX ORDER — PREDNISONE 20 MG/1
60 TABLET ORAL DAILY
Qty: 15 TABLET | Refills: 0 | Status: SHIPPED | OUTPATIENT
Start: 2024-08-30 | End: 2024-09-04

## 2024-08-30 NOTE — TELEPHONE ENCOUNTER
Would she like to try another round of prednisone? I prescribed this. Continue inhaler-every two to four hours, spread out as necessary. If not feeling better needs to be seen. Has to be UC or ED this weekend, otherwise I can see her Tuesday.

## 2024-08-30 NOTE — TELEPHONE ENCOUNTER
Nurse Triage SBAR    Is this a 2nd Level Triage? YES, LICENSED PRACTITIONER REVIEW IS REQUIRED    Situation: Pt reports having SOB, wheezing, coughing. Pt denies chest pain, runny nose, other sx.     Background: Pt was seen in  6 weeks ago for possible pneumonia. Pt was given prednisone and an abx. Pt states this helped sx resolve. Pt then started smoking again and sx returned and got worse. Pt has been using inhaler and neb and now since stopped smoking again.    Assessment: Breathing difficulty    Protocol Recommended Disposition:   Go to ED Now    Recommendation: Pt advised to go to ED or UC. Pt requested message be sent to PCP. Please advise on next steps for pt.     Routed to provider    Does the patient meet one of the following criteria for ADS visit consideration? No   Reason for Disposition   MODERATE difficulty breathing (e.g., speaks in phrases, SOB even at rest, pulse 100-120) of new-onset or worse than normal    Additional Information   Negative: SEVERE difficulty breathing (e.g., struggling for each breath, speaks in single words, pulse > 120)   Negative: Breathing stopped and hasn't returned   Negative: Choking on something   Negative: Bluish (or gray) lips or face   Negative: Difficult to awaken or acting confused (e.g., disoriented, slurred speech)   Negative: Passed out (i.e., fainted, collapsed and was not responding)   Negative: Wheezing started suddenly after medicine, an allergic food, or bee sting   Negative: Stridor (harsh sound while breathing in)   Negative: Slow, shallow and weak breathing   Negative: Sounds like a life-threatening emergency to the triager   Negative: Chest pain   Negative: Wheezing (high pitched whistling sound) and previous asthma attacks or use of asthma medicines   Negative: Difficulty breathing and within 14 days of COVID-19 Exposure   Negative: Difficulty breathing and only present when coughing   Negative: Difficulty breathing and only from stuffy nose   Negative:  "Difficulty breathing and only from stuffy nose or runny nose from common cold    Answer Assessment - Initial Assessment Questions  1. RESPIRATORY STATUS: \"Describe your breathing?\" (e.g., wheezing, shortness of breath, unable to speak, severe coughing)       SOB and wheezing and coughing     2. ONSET: \"When did this breathing problem begin?\"       6 weeks ago but got better and now it got worse     3. PATTERN \"Does the difficult breathing come and go, or has it been constant since it started?\"       Now its constant     4. SEVERITY: \"How bad is your breathing?\" (e.g., mild, moderate, severe)     - MILD: No SOB at rest, mild SOB with walking, speaks normally in sentences, can lie down, no retractions, pulse < 100.     - MODERATE: SOB at rest, SOB with minimal exertion and prefers to sit, cannot lie down flat, speaks in phrases, mild retractions, audible wheezing, pulse 100-120.     - SEVERE: Very SOB at rest, speaks in single words, struggling to breathe, sitting hunched forward, retractions, pulse > 120       Moderate     5. RECURRENT SYMPTOM: \"Have you had difficulty breathing before?\" If Yes, ask: \"When was the last time?\" and \"What happened that time?\"       Yes- was give steroids and abx     6. CARDIAC HISTORY: \"Do you have any history of heart disease?\" (e.g., heart attack, angina, bypass surgery, angioplasty)       No    7. LUNG HISTORY: \"Do you have any history of lung disease?\"  (e.g., pulmonary embolus, asthma, emphysema)      Yes     8. CAUSE: \"What do you think is causing the breathing problem?\"       Unsure     9. OTHER SYMPTOMS: \"Do you have any other symptoms? (e.g., dizziness, runny nose, cough, chest pain, fever)      Cough, denies all other sx     10. O2 SATURATION MONITOR:  \"Do you use an oxygen saturation monitor (pulse oximeter) at home?\" If Yes, ask: \"What is your reading (oxygen level) today?\" \"What is your usual oxygen saturation reading?\" (e.g., 95%)        No    11. PREGNANCY: \"Is there any " "chance you are pregnant?\" \"When was your last menstrual period?\"        No    12. TRAVEL: \"Have you traveled out of the country in the last month?\" (e.g., travel history, exposures)        No    Protocols used: Breathing Difficulty-A-[place]    "

## 2024-08-30 NOTE — TELEPHONE ENCOUNTER
Patient called back. States she would like an appt for Tuesday. DB for Tuesday 9/3 at 12:20pm on Dr. Jimenez's schedule (per provider note).       Joy Brennan, MSN, RN   [facility]

## 2024-08-30 NOTE — TELEPHONE ENCOUNTER
LDM for patient with Dr. Jimenez's recommendations, [facility] callback number for questions.    Susy Kaye RN  [facility]

## 2024-09-03 ENCOUNTER — OFFICE VISIT (OUTPATIENT)
Dept: FAMILY MEDICINE | Facility: CLINIC | Age: 59
End: 2024-09-03
Payer: COMMERCIAL

## 2024-09-03 VITALS
SYSTOLIC BLOOD PRESSURE: 121 MMHG | RESPIRATION RATE: 16 BRPM | HEIGHT: 62 IN | DIASTOLIC BLOOD PRESSURE: 74 MMHG | WEIGHT: 194 LBS | TEMPERATURE: 97.7 F | BODY MASS INDEX: 35.7 KG/M2 | HEART RATE: 75 BPM | OXYGEN SATURATION: 95 %

## 2024-09-03 DIAGNOSIS — E03.9 HYPOTHYROIDISM, UNSPECIFIED TYPE: ICD-10-CM

## 2024-09-03 DIAGNOSIS — J45.21 MILD INTERMITTENT ASTHMA WITH ACUTE EXACERBATION: Primary | ICD-10-CM

## 2024-09-03 PROCEDURE — 90746 HEPB VACCINE 3 DOSE ADULT IM: CPT | Performed by: FAMILY MEDICINE

## 2024-09-03 PROCEDURE — 99213 OFFICE O/P EST LOW 20 MIN: CPT | Mod: 25 | Performed by: FAMILY MEDICINE

## 2024-09-03 PROCEDURE — G0010 ADMIN HEPATITIS B VACCINE: HCPCS | Performed by: FAMILY MEDICINE

## 2024-09-03 RX ORDER — BUDESONIDE AND FORMOTEROL FUMARATE DIHYDRATE 80; 4.5 UG/1; UG/1
AEROSOL RESPIRATORY (INHALATION)
Qty: 10.2 G | Refills: 1 | Status: SHIPPED | OUTPATIENT
Start: 2024-09-03

## 2024-09-03 ASSESSMENT — ENCOUNTER SYMPTOMS
COUGH: 1
SHORTNESS OF BREATH: 1
WHEEZING: 1

## 2024-09-03 NOTE — LETTER
My Asthma Action Plan    Name: Susan Kwan   YOB: 1965  Date: 9/11/2024   My doctor: Earline Jimenez MD   My clinic: [facility]        My Rescue Medicine:   Symbicort  1-2 puffs EVERY 4 HOURS as needed. Use a spacer if recommended by your provider.   My Asthma Severity:   Intermittent / Exercise Induced  Know your asthma triggers: upper respiratory infections             GREEN ZONE   Good Control  I feel good  No cough or wheeze  Can work, sleep and play without asthma symptoms       Take your asthma control medicine every day.     If exercise triggers your asthma, take your rescue medication  15 minutes before exercise or sports, and  During exercise if you have asthma symptoms  Spacer to use with inhaler: If you have a spacer, make sure to use it with your inhaler             YELLOW ZONE Getting Worse  I have ANY of these:  I do not feel good  Cough or wheeze  Chest feels tight  Wake up at night   Keep taking your Green Zone medications  Start taking your rescue medicine:  every 20 minutes for up to 1 hour. Then every 4 hours for 24-48 hours.  If you stay in the Yellow Zone for more than 12-24 hours, contact your doctor.  If you do not return to the Green Zone in 12-24 hours or you get worse, start taking your oral steroid medicine if prescribed by your provider.           RED ZONE Medical Alert - Get Help  I have ANY of these:  I feel awful  Medicine is not helping  Breathing getting harder  Trouble walking or talking  Nose opens wide to breathe       Take your rescue medicine NOW  If your provider has prescribed an oral steroid medicine, start taking it NOW  Call your doctor NOW  If you are still in the Red Zone after 20 minutes and you have not reached your doctor:  Take your rescue medicine again and  Call 911 or go to the emergency room right away    See your regular doctor within 2 weeks of an Emergency Room or Urgent Care visit for follow-up treatment.           Annual Reminders:  Meet with Asthma Educator,  Flu Shot in the Fall, consider Pneumonia Vaccination for patients with asthma (aged 19 and older).    Pharmacy:    [facility] - [address]  [facility] [address] [facility] ([place]), [place] - [address] [id number] - [facility] [address] [facility] [address]    Electronically signed by Earline Jimenez MD   Date: 09/11/24                    Asthma Triggers  How To Control Things That Make Your Asthma Worse    Triggers are things that make your asthma worse.  Look at the list below to help you find your triggers and   what you can do about them. You can help prevent asthma flare-ups by staying away from your triggers.      Trigger                                                          What you can do   Cigarette Smoke  Tobacco smoke can make asthma worse. Do not allow smoking in your home, car or around you.  Be sure no one smokes at a child s day care or school.  If you smoke, ask your health care provider for ways to help you quit.  Ask family members to quit too.  Ask your health care provider for a referral to Quit Plan to help you quit smoking, or call 6-236-850-PLAN.     Colds, Flu, Bronchitis  These are common triggers of asthma. Wash your hands often.  Don t touch your eyes, nose or mouth.  Get a flu shot every year.     Dust Mites  These are tiny bugs that live in cloth or carpet. They are too small to see. Wash sheets and blankets in hot water every week.   Encase pillows and mattress in dust mite proof covers.  Avoid having carpet if you can. If you have carpet, vacuum weekly.   Use a dust mask and HEPA vacuum.   Pollen and Outdoor Mold  Some people are allergic to trees, grass, or weed pollen, or molds. Try to keep your windows closed.  Limit time out doors when pollen count is high.   Ask you health care provider about taking medicine during allergy  season.     Animal Dander  Some people are allergic to skin flakes, urine or saliva from pets with fur or feathers. Keep pets with fur or feathers out of your home.    If you can t keep the pet outdoors, then keep the pet out of your bedroom.  Keep the bedroom door closed.  Keep pets off cloth furniture and away from stuffed toys.     Mice, Rats, and Cockroaches  Some people are allergic to the waste from these pests.   Cover food and garbage.  Clean up spills and food crumbs.  Store grease in the refrigerator.   Keep food out of the bedroom.   Indoor Mold  This can be a trigger if your home has high moisture. Fix leaking faucets, pipes, or other sources of water.   Clean moldy surfaces.  Dehumidify basement if it is damp and smelly.   Smoke, Strong Odors, and Sprays  These can reduce air quality. Stay away from strong odors and sprays, such as perfume, powder, hair spray, paints, smoke incense, paint, cleaning products, candles and new carpet.   Exercise or Sports  Some people with asthma have this trigger. Be active!  Ask your doctor about taking medicine before sports or exercise to prevent symptoms.    Warm up for 5-10 minutes before and after sports or exercise.     Other Triggers of Asthma  Cold air:  Cover your nose and mouth with a scarf.  Sometimes laughing or crying can be a trigger.  Some medicines and food can trigger asthma.
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
## 2024-09-04 RX ORDER — ALBUTEROL SULFATE 0.83 MG/ML
2.5 SOLUTION RESPIRATORY (INHALATION) ONCE
Status: COMPLETED | OUTPATIENT
Start: 2024-09-05 | End: 2024-09-05

## 2024-09-05 ENCOUNTER — OFFICE VISIT (OUTPATIENT)
Dept: NEUROSURGERY | Facility: CLINIC | Age: 59
End: 2024-09-05
Payer: COMMERCIAL

## 2024-09-05 ENCOUNTER — HOSPITAL ENCOUNTER (OUTPATIENT)
Dept: GENERAL RADIOLOGY | Facility: HOSPITAL | Age: 59
Discharge: HOME | End: 2024-09-05
Attending: NURSE PRACTITIONER | Admitting: NURSE PRACTITIONER
Payer: COMMERCIAL

## 2024-09-05 ENCOUNTER — HOSPITAL ENCOUNTER (OUTPATIENT)
Dept: RESPIRATORY THERAPY | Facility: CLINIC | Age: 59
Discharge: HOME | End: 2024-09-05
Attending: FAMILY MEDICINE | Admitting: FAMILY MEDICINE
Payer: COMMERCIAL

## 2024-09-05 VITALS — SYSTOLIC BLOOD PRESSURE: 108 MMHG | DIASTOLIC BLOOD PRESSURE: 69 MMHG | OXYGEN SATURATION: 96 % | HEART RATE: 64 BPM

## 2024-09-05 DIAGNOSIS — Z98.1 S/P LUMBAR FUSION: ICD-10-CM

## 2024-09-05 DIAGNOSIS — M48.062 SPINAL STENOSIS OF LUMBAR REGION WITH NEUROGENIC CLAUDICATION: Primary | ICD-10-CM

## 2024-09-05 DIAGNOSIS — J45.20 MILD INTERMITTENT ASTHMA WITHOUT COMPLICATION: Primary | ICD-10-CM

## 2024-09-05 DIAGNOSIS — J45.21 MILD INTERMITTENT ASTHMA WITH ACUTE EXACERBATION: ICD-10-CM

## 2024-09-05 LAB
DLCOCOR-%PRED-PRE: 82 %
DLCOCOR-PRE: 15.59 ML/MIN/MMHG
DLCOUNC-%PRED-PRE: 80 %
DLCOUNC-PRE: 15.35 ML/MIN/MMHG
DLCOUNC-PRED: 19.01 ML/MIN/MMHG
ERV-%PRED-PRE: 18 %
ERV-PRE: 0.19 L
ERV-PRED: 1.04 L
EXPTIME-PRE: 7.33 SEC
FEF2575-%PRED-POST: 108 %
FEF2575-%PRED-PRE: 45 %
FEF2575-POST: 2.37 L/SEC
FEF2575-PRE: 1 L/SEC
FEF2575-PRED: 2.18 L/SEC
FEFMAX-%PRED-PRE: 77 %
FEFMAX-PRE: 4.76 L/SEC
FEFMAX-PRED: 6.14 L/SEC
FEV1-%PRED-PRE: 73 %
FEV1-PRE: 1.65 L
FEV1FEV6-PRE: 71 %
FEV1FEV6-PRED: 81 %
FEV1FVC-PRE: 72 %
FEV1FVC-PRED: 81 %
FEV1SVC-PRE: 64 %
FEV1SVC-PRED: 73 %
FIFMAX-PRE: 4.74 L/SEC
FRCPLETH-%PRED-PRE: 112 %
FRCPLETH-PRE: 2.9 L
FRCPLETH-PRED: 2.58 L
FVC-%PRED-PRE: 81 %
FVC-PRE: 2.28 L
FVC-PRED: 2.79 L
HGB BLD-MCNC: 12.9 G/DL (ref 11.7–15.7)
IC-%PRED-PRE: 113 %
IC-PRE: 2.36 L
IC-PRED: 2.07 L
RVPLETH-%PRED-PRE: 153 %
RVPLETH-PRE: 2.7 L
RVPLETH-PRED: 1.76 L
TLCPLETH-%PRED-PRE: 114 %
TLCPLETH-PRE: 5.26 L
TLCPLETH-PRED: 4.6 L
VA-%PRED-PRE: 117 %
VA-PRE: 5.17 L
VC-%PRED-PRE: 83 %
VC-PRE: 2.56 L
VC-PRED: 3.07 L

## 2024-09-05 PROCEDURE — 85018 HEMOGLOBIN: CPT | Performed by: FAMILY MEDICINE

## 2024-09-05 PROCEDURE — 94060 EVALUATION OF WHEEZING: CPT

## 2024-09-05 PROCEDURE — 36415 COLL VENOUS BLD VENIPUNCTURE: CPT | Performed by: FAMILY MEDICINE

## 2024-09-05 PROCEDURE — 250N000009 HC RX 250: Performed by: FAMILY MEDICINE

## 2024-09-05 PROCEDURE — 99024 POSTOP FOLLOW-UP VISIT: CPT | Performed by: PHYSICIAN ASSISTANT

## 2024-09-05 PROCEDURE — 72100 X-RAY EXAM L-S SPINE 2/3 VWS: CPT

## 2024-09-05 PROCEDURE — 94726 PLETHYSMOGRAPHY LUNG VOLUMES: CPT

## 2024-09-05 PROCEDURE — 999N000157 HC STATISTIC RCP TIME EA 10 MIN

## 2024-09-05 PROCEDURE — 94729 DIFFUSING CAPACITY: CPT

## 2024-09-05 RX ORDER — LEVOTHYROXINE SODIUM 150 UG/1
150 TABLET ORAL DAILY
Qty: 90 TABLET | Refills: 2 | Status: SHIPPED | OUTPATIENT
Start: 2024-09-05

## 2024-09-05 RX ADMIN — ALBUTEROL SULFATE 2.5 MG: 2.5 SOLUTION RESPIRATORY (INHALATION) at 07:26

## 2024-09-05 ASSESSMENT — PAIN SCALES - GENERAL: PAINLEVEL: MODERATE PAIN (5)

## 2024-09-05 NOTE — NURSING NOTE
"Susan Kwan is a 59 year old female who presents for:  Chief Complaint   Patient presents with    RECHECK        Initial Vitals:  /69 (BP Location: Right arm, Patient Position: Sitting, Cuff Size: Adult Regular)   Pulse 64   LMP 03/09/2010   SpO2 96%  Estimated body mass index is 35.48 kg/m  as calculated from the following:    Height as of 9/3/24: 5' 2\" (1.575 m).    Weight as of 9/3/24: 194 lb (88 kg).. There is no height or weight on file to calculate BSA. BP completed using cuff size: regular  Moderate Pain (5)    Antonio Bourgeois    "

## 2024-09-05 NOTE — PROGRESS NOTES
NEUROSURGERY FOLLOW UP  NOTE 9/5/2024     Ms. Kwan is a 59 year old female who comes today in follow-up. status post  Exploration of prior lumbar 4-lumbar 5 posterior instrumented fusion with extension to lumbar 3 with use of stealth. Lumbar 3-lumbar 4 bilateral laminectomies, medial facetectomies, foraminotomies and posterolateral arthrodesis on 6/12/2024 by Dr. Ward      Presently patient states that she is doing well. She has intermittent low back pain but states that it is improved following surgery. She notes continued chronic right radicular leg pian that has remained unchanged with her prior surgeries. She states that it does not limit her daily activities. She denies any new radicular lower extremity pain numbness tingling or weakness. She has restarted smoking though notes that she has cut down.        PHYSICAL EXAM:   /69 (BP Location: Right arm, Patient Position: Sitting, Cuff Size: Adult Regular)   Pulse 64   LMP 03/09/2010   SpO2 96%        Mental Status: A & O in no acute distress.  Affect is appropriate.  Speech is fluent.  Recent and remote memory are intact.  Attention span and concentration are normal.     Motor:  Normal bulk and tone all muscle groups of lower extremities.     Sensory: Sensation intact bilaterally to light touch in LE    Incision: well healed without erythema, induration, or drainage     IMAGING:   I personally reviewed all radiographic images            good hardware placement with stable lumbar alignment   IMPRESSION: Radiographically stable L3-L5 pedicle screw and boubacar instrumented fusion, L4-5 interbody fusion with interbody cage to the right of midline overlying the right L5 pedicle screw on the anterior projection. Preserved vertebral body heights and   alignment. Otherwise preserved disc spaces for patient age. Spinal stimulator device and leads are without significant change. Cholecystectomy clips.     CONSULTATION ASSESSMENT AND PLAN:    Patient seen in  conjunction with Dr. Ward. She has been advised to increase activity as tolerated. Discussed smoking cessation of which she is aware of the risks of fusion failure with continued smoking. Will plan to follow up in 3 months with repeat lumbar xray for 6 months postop. She understands that should any symptoms arise prior to contact the office.      Margo Klein PA-C  [facility] Neurosurgery  O: [phone]

## 2024-09-05 NOTE — LETTER
9/5/2024      Susan Kwan  [address]  [facility] [place] [address]      Dear Colleague,    Thank you for referring your patient, Susan Kwan, to the [facility]. Please see a copy of my visit note below.    NEUROSURGERY FOLLOW UP  NOTE 9/5/2024     Ms. Kwan is a 59 year old female who comes today in follow-up. status post  Exploration of prior lumbar 4-lumbar 5 posterior instrumented fusion with extension to lumbar 3 with use of stealth. Lumbar 3-lumbar 4 bilateral laminectomies, medial facetectomies, foraminotomies and posterolateral arthrodesis on 6/12/2024 by Dr. Ward      Presently patient states that she is doing well. She has intermittent low back pain but states that it is improved following surgery. She notes continued chronic right radicular leg pian that has remained unchanged with her prior surgeries. She states that it does not limit her daily activities. She denies any new radicular lower extremity pain numbness tingling or weakness. She has restarted smoking though notes that she has cut down.        PHYSICAL EXAM:   /69 (BP Location: Right arm, Patient Position: Sitting, Cuff Size: Adult Regular)   Pulse 64   LMP 03/09/2010   SpO2 96%        Mental Status: A & O in no acute distress.  Affect is appropriate.  Speech is fluent.  Recent and remote memory are intact.  Attention span and concentration are normal.     Motor:  Normal bulk and tone all muscle groups of lower extremities.     Sensory: Sensation intact bilaterally to light touch in LE    Incision: well healed without erythema, induration, or drainage     IMAGING:   I personally reviewed all radiographic images            good hardware placement with stable lumbar alignment   IMPRESSION: Radiographically stable L3-L5 pedicle screw and boubacar instrumented fusion, L4-5 interbody fusion with interbody cage to the right of midline overlying the right L5 pedicle screw on the anterior  projection. Preserved vertebral body heights and   alignment. Otherwise preserved disc spaces for patient age. Spinal stimulator device and leads are without significant change. Cholecystectomy clips.     CONSULTATION ASSESSMENT AND PLAN:    Patient seen in conjunction with Dr. Ward. She has been advised to increase activity as tolerated. Discussed smoking cessation of which she is aware of the risks of fusion failure with continued smoking. Will plan to follow up in 3 months with repeat lumbar xray for 6 months postop. She understands that should any symptoms arise prior to contact the office.      Margo Klein PA-C  [facility] Neurosurgery  O: [phone]            Again, thank you for allowing me to participate in the care of your patient.        Sincerely,        Margo Klein PA-C

## 2024-09-05 NOTE — PROGRESS NOTES
RESPIRATORY CARE NOTE    Complete Pulmonary Function Test completed by patient.  Good patient effort and cooperation. Albuterol 2.5 mg neb given for bronchodilation.  The results of this test meet the ATS standards for acceptability and repeatability. PT returned to baseline and left in no distress.    Breonna Ospina, RT  9/5/2024

## 2024-09-25 ENCOUNTER — OFFICE VISIT (OUTPATIENT)
Dept: PULMONOLOGY | Facility: CLINIC | Age: 59
End: 2024-09-25
Attending: NURSE PRACTITIONER
Payer: COMMERCIAL

## 2024-09-25 VITALS
HEART RATE: 86 BPM | BODY MASS INDEX: 34.74 KG/M2 | WEIGHT: 188.8 LBS | HEIGHT: 62 IN | DIASTOLIC BLOOD PRESSURE: 66 MMHG | OXYGEN SATURATION: 97 % | SYSTOLIC BLOOD PRESSURE: 118 MMHG

## 2024-09-25 DIAGNOSIS — F17.218 CIGARETTE NICOTINE DEPENDENCE WITH OTHER NICOTINE-INDUCED DISORDER: ICD-10-CM

## 2024-09-25 DIAGNOSIS — J44.89 COPD WITH ASTHMA (H): Primary | ICD-10-CM

## 2024-09-25 PROCEDURE — 99204 OFFICE O/P NEW MOD 45 MIN: CPT | Performed by: NURSE PRACTITIONER

## 2024-09-25 PROCEDURE — 99406 BEHAV CHNG SMOKING 3-10 MIN: CPT | Performed by: NURSE PRACTITIONER

## 2024-09-25 RX ORDER — TIZANIDINE 2 MG/1
TABLET ORAL
COMMUNITY
Start: 2024-09-03

## 2024-09-25 RX ORDER — IPRATROPIUM BROMIDE AND ALBUTEROL SULFATE 2.5; .5 MG/3ML; MG/3ML
1 SOLUTION RESPIRATORY (INHALATION) EVERY 6 HOURS PRN
Qty: 180 ML | Refills: 11 | Status: SHIPPED | OUTPATIENT
Start: 2024-09-25

## 2024-09-25 RX ORDER — ALBUTEROL SULFATE 90 UG/1
AEROSOL, METERED RESPIRATORY (INHALATION)
Qty: 18 G | Refills: 11 | Status: SHIPPED | OUTPATIENT
Start: 2024-09-25

## 2024-09-25 RX ORDER — PREDNISONE 10 MG/1
TABLET ORAL
Qty: 30 TABLET | Refills: 0 | Status: SHIPPED | OUTPATIENT
Start: 2024-09-25 | End: 2024-10-07

## 2024-09-25 ASSESSMENT — ASTHMA QUESTIONNAIRES
QUESTION_4 LAST FOUR WEEKS HOW OFTEN HAVE YOU USED YOUR RESCUE INHALER OR NEBULIZER MEDICATION (SUCH AS ALBUTEROL): ONE OR TWO TIMES PER DAY
QUESTION_2 LAST FOUR WEEKS HOW OFTEN HAVE YOU HAD SHORTNESS OF BREATH: MORE THAN ONCE A DAY
QUESTION_5 LAST FOUR WEEKS HOW WOULD YOU RATE YOUR ASTHMA CONTROL: NOT CONTROLLED AT ALL
ACT_TOTALSCORE: 12
QUESTION_3 LAST FOUR WEEKS HOW OFTEN DID YOUR ASTHMA SYMPTOMS (WHEEZING, COUGHING, SHORTNESS OF BREATH, CHEST TIGHTNESS OR PAIN) WAKE YOU UP AT NIGHT OR EARLIER THAN USUAL IN THE MORNING: ONCE OR TWICE
QUESTION_1 LAST FOUR WEEKS HOW MUCH OF THE TIME DID YOUR ASTHMA KEEP YOU FROM GETTING AS MUCH DONE AT WORK, SCHOOL OR AT HOME: A LITTLE OF THE TIME
ACT_TOTALSCORE: 12

## 2024-09-25 NOTE — NURSING NOTE
Patient instructed in use of nebulizer machine from [facility].  Patient states good understanding of how to use the nebulizer machine.  Nebulizer machine given to patient from [facility].  All paperwork signed and copy scanned to chart. Phone numbers given to patient to call if any questions.

## 2024-09-25 NOTE — PATIENT INSTRUCTIONS
It was a pleasure to see you in clinic today.   Here is what we discussed:    Start prednisone taper.  Start Trelegy Ellipta one puff daily, rinse/gargle after use.   Start Duonebs twice daily, and as needed throughout day.   Continue Albuterol inhaler every 4-6 hours as needed for shortness of breath or wheezing.  Recommended for this fall:  annual influenza vaccine, COVID booster.  Call my nurse, Jamaal ([phone]) with any change or worsening of your breathing.   We can then send out your 'action plan' medications (prednisone + antibiotic).   Follow-up in 2 months.    Leanna Muhammad, CNP  Pulmonary Medicine  [facility]  [phone]
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
## 2024-10-23 DIAGNOSIS — M79.18 MYOFASCIAL PAIN: Primary | ICD-10-CM

## 2024-10-23 RX ORDER — TIZANIDINE 2 MG/1
2 TABLET ORAL 3 TIMES DAILY
Qty: 120 TABLET | Refills: 1 | Status: SHIPPED | OUTPATIENT
Start: 2024-10-23

## 2024-10-24 ENCOUNTER — PATIENT OUTREACH (OUTPATIENT)
Dept: CARE COORDINATION | Facility: CLINIC | Age: 59
End: 2024-10-24
Payer: COMMERCIAL

## 2024-11-20 DIAGNOSIS — F33.2 SEVERE EPISODE OF RECURRENT MAJOR DEPRESSIVE DISORDER, WITHOUT PSYCHOTIC FEATURES (H): ICD-10-CM

## 2024-11-20 DIAGNOSIS — J45.21 MILD INTERMITTENT ASTHMA WITH ACUTE EXACERBATION: ICD-10-CM

## 2024-11-21 DIAGNOSIS — M79.18 MYOFASCIAL PAIN: ICD-10-CM

## 2024-11-21 RX ORDER — BUPROPION HYDROCHLORIDE 150 MG/1
150 TABLET ORAL EVERY MORNING
Qty: 30 TABLET | Refills: 3 | Status: SHIPPED | OUTPATIENT
Start: 2024-11-21

## 2024-11-21 RX ORDER — GABAPENTIN 300 MG/1
CAPSULE ORAL
Qty: 300 CAPSULE | Refills: 2 | Status: SHIPPED | OUTPATIENT
Start: 2024-11-21

## 2024-11-21 RX ORDER — PREDNISONE 20 MG/1
TABLET ORAL
Qty: 15 TABLET | Refills: 0 | Status: SHIPPED | OUTPATIENT
Start: 2024-11-21

## 2024-11-25 ENCOUNTER — OFFICE VISIT (OUTPATIENT)
Dept: PULMONOLOGY | Facility: CLINIC | Age: 59
End: 2024-11-25
Attending: NURSE PRACTITIONER
Payer: COMMERCIAL

## 2024-11-25 VITALS
OXYGEN SATURATION: 94 % | HEART RATE: 97 BPM | DIASTOLIC BLOOD PRESSURE: 64 MMHG | WEIGHT: 185 LBS | BODY MASS INDEX: 33.84 KG/M2 | SYSTOLIC BLOOD PRESSURE: 112 MMHG

## 2024-11-25 DIAGNOSIS — Z91.09 ENVIRONMENTAL ALLERGIES: ICD-10-CM

## 2024-11-25 DIAGNOSIS — J44.89 COPD WITH ASTHMA (H): Primary | ICD-10-CM

## 2024-11-25 DIAGNOSIS — J44.1 COPD EXACERBATION (H): ICD-10-CM

## 2024-11-25 DIAGNOSIS — F17.218 CIGARETTE NICOTINE DEPENDENCE WITH OTHER NICOTINE-INDUCED DISORDER: ICD-10-CM

## 2024-11-25 PROCEDURE — 99214 OFFICE O/P EST MOD 30 MIN: CPT | Performed by: NURSE PRACTITIONER

## 2024-11-25 RX ORDER — OMEPRAZOLE 20 MG/1
20 TABLET, DELAYED RELEASE ORAL DAILY
COMMUNITY

## 2024-11-25 RX ORDER — AZITHROMYCIN 250 MG/1
TABLET, FILM COATED ORAL
Qty: 6 TABLET | Refills: 0 | Status: SHIPPED | OUTPATIENT
Start: 2024-11-25 | End: 2024-11-30

## 2024-11-25 RX ORDER — PREDNISONE 10 MG/1
TABLET ORAL
Qty: 30 TABLET | Refills: 0 | Status: SHIPPED | OUTPATIENT
Start: 2024-11-25 | End: 2024-12-07

## 2024-11-25 ASSESSMENT — ASTHMA QUESTIONNAIRES
ACT_TOTALSCORE: 11
ACT_TOTALSCORE: 11
QUESTION_4 LAST FOUR WEEKS HOW OFTEN HAVE YOU USED YOUR RESCUE INHALER OR NEBULIZER MEDICATION (SUCH AS ALBUTEROL): ONE OR TWO TIMES PER DAY
QUESTION_3 LAST FOUR WEEKS HOW OFTEN DID YOUR ASTHMA SYMPTOMS (WHEEZING, COUGHING, SHORTNESS OF BREATH, CHEST TIGHTNESS OR PAIN) WAKE YOU UP AT NIGHT OR EARLIER THAN USUAL IN THE MORNING: TWO OR THREE NIGHTS A WEEK
QUESTION_2 LAST FOUR WEEKS HOW OFTEN HAVE YOU HAD SHORTNESS OF BREATH: MORE THAN ONCE A DAY
QUESTION_1 LAST FOUR WEEKS HOW MUCH OF THE TIME DID YOUR ASTHMA KEEP YOU FROM GETTING AS MUCH DONE AT WORK, SCHOOL OR AT HOME: A LITTLE OF THE TIME
QUESTION_5 LAST FOUR WEEKS HOW WOULD YOU RATE YOUR ASTHMA CONTROL: POORLY CONTROLLED
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
## 2024-12-04 ENCOUNTER — HOSPITAL ENCOUNTER (OUTPATIENT)
Dept: GENERAL RADIOLOGY | Facility: HOSPITAL | Age: 59
Discharge: HOME | End: 2024-12-04
Attending: PHYSICIAN ASSISTANT
Payer: COMMERCIAL

## 2024-12-04 DIAGNOSIS — M48.062 SPINAL STENOSIS OF LUMBAR REGION WITH NEUROGENIC CLAUDICATION: ICD-10-CM

## 2024-12-04 PROCEDURE — 72100 X-RAY EXAM L-S SPINE 2/3 VWS: CPT

## 2024-12-04 NOTE — PROGRESS NOTES
"NEUROSURGERY CLINIC FOLLOW UP   Susan Kwan is a pleasant 59 year old female who presents to the clinic today for a follow-up evaluation on exploration of prior lumbar 4-lumbar 5 posterior instrumented fusion with extension to lumbar 3 with use of stealth. Lumbar 3-lumbar 4 bilateral laminectomies, medial facetectomies, foraminotomies and posterolateral arthrodesis on 6/12/2024 by Dr Ward.     Presently patient states she has no complaints and has healed well. She is just wondering how the xray looks.         PHYSICAL EXAM:   /59   Pulse 71   Ht 1.575 m (5' 2\")   Wt 82.6 kg (182 lb)   LMP 03/09/2010   SpO2 95%   BMI 33.29 kg/m         Exam:   Patient appears comfortable, conversational, and in no apparent distress.     CN II-XII grossly intact, alert and appropriate with conversation and following commands.   Gait is non-antalgic.     Lumbar spine is not tender to palpation.    LE muscle strength  Right  Left    Iliopsoas (hip flexion)  5/5  5/5    Quad (knee extension)  5/5  5/5    Hamstring (knee flexion)  5/5  5/5    Gastrocnemius (PF)  5/5  5/5    Tibialis Ant. (DF)  5/5  5/5    EHL  5/5  5/5        Incision: well healed without erythema, induration, or drainage     IMAGING:   I personally reviewed all radiographic images           Radiographic Findings: Full radiological report in chart. Imaging was reviewed with with patient today.     Radiographic Impression:     EXAM: XR LUMBAR SPINE 2/3 VIEWS  LOCATION: [facility]  DATE: 12/4/2024     INDICATION: Spinal stenosis of lumbar region with neurogenic claudication.  COMPARISON: None.                                                                      IMPRESSION:   Nomenclature is based on five lumbar-type vertebral bodies.     Vertebral body heights are unremarkable.     Posterior fusion hardware spans L3-L5. Right-sided interbody fusion device at L4-L5. No evidence for hardware-related complication.     Mild L3-L4 and " L2-L3 retrolisthesis is similar to prior.     Intervertebral disc spaces are relatively preserved.     The visualized portions of the sacrum and jesús appear intact.      Partially imaged spinal stimulator. Status post cholecystectomy.          ASSESSMENT AND PLAN:      Susan Kwan is a 59 year old female who presents to the clinic for a follow-up on exploration of prior lumbar 4-lumbar 5 posterior instrumented fusion with extension to lumbar 3 with use of stealth. Lumbar 3-lumbar 4 bilateral laminectomies, medial facetectomies, foraminotomies and posterolateral arthrodesis on 6/12/2024 by Dr Ward.     The patient's most recent imaging was reviewed with her today. It was explained that images show a healed lumbar fusion.    On exam, she is noted to have appropriate strength, sensation and range of motion.     We will see her in 6 months with repeat cervical xrays and follow up appointment with any NOÉ.        Darío Gaona, DELMY, APRN, CNP  [facility] Neurosurgery  Tel [phone]

## 2024-12-05 ENCOUNTER — OFFICE VISIT (OUTPATIENT)
Dept: NEUROSURGERY | Facility: CLINIC | Age: 59
End: 2024-12-05
Payer: COMMERCIAL

## 2024-12-05 VITALS
HEART RATE: 71 BPM | WEIGHT: 182 LBS | DIASTOLIC BLOOD PRESSURE: 59 MMHG | HEIGHT: 62 IN | SYSTOLIC BLOOD PRESSURE: 110 MMHG | OXYGEN SATURATION: 95 % | BODY MASS INDEX: 33.49 KG/M2

## 2024-12-05 DIAGNOSIS — Z98.1 S/P LUMBAR FUSION: Primary | ICD-10-CM

## 2024-12-05 ASSESSMENT — PAIN SCALES - GENERAL: PAINLEVEL_OUTOF10: NO PAIN (0)

## 2024-12-05 NOTE — NURSING NOTE
"Susan Kwan is a 59 year old female who presents for:  Chief Complaint   Patient presents with    Surgical Followup     6 month lumbar fusion post-op. Patient reports no pain today, and pain only arises when they over work themself.        Initial Vitals:  /59   Pulse 71   Ht 5' 2\" (1.575 m)   Wt 182 lb (82.6 kg)   LMP 03/09/2010   SpO2 95%   BMI 33.29 kg/m   Estimated body mass index is 33.29 kg/m  as calculated from the following:    Height as of this encounter: 5' 2\" (1.575 m).    Weight as of this encounter: 182 lb (82.6 kg). Body surface area is 1.9 meters squared. BP completed using cuff size: regular  No Pain (0)        Cristobal Brenner    "

## 2024-12-05 NOTE — LETTER
"12/5/2024      Susan Kwan  [address]  [facility] [place] [facility] [address]      Dear Colleague,    Thank you for referring your patient, Susan Kwan, to the [facility]. Please see a copy of my visit note below.    NEUROSURGERY CLINIC FOLLOW UP   Susan Kwan is a pleasant 59 year old female who presents to the clinic today for a follow-up evaluation on exploration of prior lumbar 4-lumbar 5 posterior instrumented fusion with extension to lumbar 3 with use of stealth. Lumbar 3-lumbar 4 bilateral laminectomies, medial facetectomies, foraminotomies and posterolateral arthrodesis on 6/12/2024 by Dr Ward.     Presently patient states she has no complaints and has healed well. She is just wondering how the xray looks.         PHYSICAL EXAM:   /59   Pulse 71   Ht 1.575 m (5' 2\")   Wt 82.6 kg (182 lb)   LMP 03/09/2010   SpO2 95%   BMI 33.29 kg/m         Exam:   Patient appears comfortable, conversational, and in no apparent distress.     CN II-XII grossly intact, alert and appropriate with conversation and following commands.   Gait is non-antalgic.     Lumbar spine is not tender to palpation.    LE muscle strength  Right  Left    Iliopsoas (hip flexion)  5/5  5/5    Quad (knee extension)  5/5  5/5    Hamstring (knee flexion)  5/5  5/5    Gastrocnemius (PF)  5/5  5/5    Tibialis Ant. (DF)  5/5  5/5    EHL  5/5  5/5        Incision: well healed without erythema, induration, or drainage     IMAGING:   I personally reviewed all radiographic images           Radiographic Findings: Full radiological report in chart. Imaging was reviewed with with patient today.     Radiographic Impression:     EXAM: XR LUMBAR SPINE 2/3 VIEWS  LOCATION: [facility]  DATE: 12/4/2024     INDICATION: Spinal stenosis of lumbar region with neurogenic claudication.  COMPARISON: None.                                                                      IMPRESSION: "   Nomenclature is based on five lumbar-type vertebral bodies.     Vertebral body heights are unremarkable.     Posterior fusion hardware spans L3-L5. Right-sided interbody fusion device at L4-L5. No evidence for hardware-related complication.     Mild L3-L4 and L2-L3 retrolisthesis is similar to prior.     Intervertebral disc spaces are relatively preserved.     The visualized portions of the sacrum and jesús appear intact.      Partially imaged spinal stimulator. Status post cholecystectomy.          ASSESSMENT AND PLAN:      Susan Kwan is a 59 year old female who presents to the clinic for a follow-up on exploration of prior lumbar 4-lumbar 5 posterior instrumented fusion with extension to lumbar 3 with use of stealth. Lumbar 3-lumbar 4 bilateral laminectomies, medial facetectomies, foraminotomies and posterolateral arthrodesis on 6/12/2024 by Dr Ward.     The patient's most recent imaging was reviewed with her today. It was explained that images show a healed lumbar fusion.    On exam, she is noted to have appropriate strength, sensation and range of motion.     We will see her in 6 months with repeat cervical xrays and follow up appointment with any NOÉ.        Darío Gaona, DELMY, APRN, CNP  [facility] Neurosurgery  Tel [phone]          Again, thank you for allowing me to participate in the care of your patient.        Sincerely,        EITAN Quintero CNP

## 2024-12-10 DIAGNOSIS — J45.21 MILD INTERMITTENT ASTHMA WITH ACUTE EXACERBATION: ICD-10-CM

## 2024-12-10 RX ORDER — BUDESONIDE AND FORMOTEROL FUMARATE DIHYDRATE 80; 4.5 UG/1; UG/1
AEROSOL RESPIRATORY (INHALATION)
Qty: 10.2 G | Refills: 1 | Status: SHIPPED | OUTPATIENT
Start: 2024-12-10

## 2024-12-17 DIAGNOSIS — G47.00 INSOMNIA, UNSPECIFIED TYPE: ICD-10-CM

## 2024-12-17 RX ORDER — TRAZODONE HYDROCHLORIDE 50 MG/1
50-100 TABLET, FILM COATED ORAL AT BEDTIME
Qty: 180 TABLET | Refills: 0 | Status: SHIPPED | OUTPATIENT
Start: 2024-12-17

## 2024-12-18 DIAGNOSIS — F33.2 SEVERE EPISODE OF RECURRENT MAJOR DEPRESSIVE DISORDER, WITHOUT PSYCHOTIC FEATURES (H): ICD-10-CM

## 2024-12-19 ENCOUNTER — MYC MEDICAL ADVICE (OUTPATIENT)
Dept: PULMONOLOGY | Facility: CLINIC | Age: 59
End: 2024-12-19
Payer: COMMERCIAL

## 2024-12-19 DIAGNOSIS — J44.1 COPD EXACERBATION (H): Primary | ICD-10-CM

## 2024-12-19 RX ORDER — PREDNISONE 20 MG/1
TABLET ORAL
Qty: 10 TABLET | Refills: 0 | Status: SHIPPED | OUTPATIENT
Start: 2024-12-19

## 2024-12-19 RX ORDER — AZITHROMYCIN 250 MG/1
TABLET, FILM COATED ORAL
Qty: 6 TABLET | Refills: 0 | Status: SHIPPED | OUTPATIENT
Start: 2024-12-19 | End: 2024-12-24

## 2025-02-18 ENCOUNTER — ANCILLARY ORDERS (OUTPATIENT)
Dept: RADIOLOGY | Facility: CLINIC | Age: 60
End: 2025-02-18

## 2025-02-25 ENCOUNTER — OFFICE VISIT (OUTPATIENT)
Dept: PULMONOLOGY | Facility: CLINIC | Age: 60
End: 2025-02-25
Attending: NURSE PRACTITIONER
Payer: COMMERCIAL

## 2025-02-25 ENCOUNTER — MYC REFILL (OUTPATIENT)
Dept: PHYSICAL MEDICINE AND REHAB | Facility: CLINIC | Age: 60
End: 2025-02-25

## 2025-02-25 ENCOUNTER — TELEPHONE (OUTPATIENT)
Dept: PULMONOLOGY | Facility: CLINIC | Age: 60
End: 2025-02-25

## 2025-02-25 ENCOUNTER — LAB (OUTPATIENT)
Dept: LAB | Facility: HOSPITAL | Age: 60
End: 2025-02-25
Payer: COMMERCIAL

## 2025-02-25 VITALS
BODY MASS INDEX: 33.18 KG/M2 | WEIGHT: 181.4 LBS | SYSTOLIC BLOOD PRESSURE: 116 MMHG | HEART RATE: 81 BPM | OXYGEN SATURATION: 94 % | DIASTOLIC BLOOD PRESSURE: 72 MMHG

## 2025-02-25 DIAGNOSIS — Z91.09 ENVIRONMENTAL ALLERGIES: ICD-10-CM

## 2025-02-25 DIAGNOSIS — M79.18 MYOFASCIAL PAIN: ICD-10-CM

## 2025-02-25 DIAGNOSIS — J45.51 SEVERE PERSISTENT ASTHMA WITH ACUTE EXACERBATION (H): ICD-10-CM

## 2025-02-25 DIAGNOSIS — J44.89 COPD WITH ASTHMA (H): Primary | ICD-10-CM

## 2025-02-25 DIAGNOSIS — J44.89 COPD WITH ASTHMA (H): ICD-10-CM

## 2025-02-25 DIAGNOSIS — J44.1 COPD EXACERBATION (H): ICD-10-CM

## 2025-02-25 DIAGNOSIS — D72.19 OTHER EOSINOPHILIA: Primary | ICD-10-CM

## 2025-02-25 LAB
BASOPHILS # BLD AUTO: 0.1 10E3/UL (ref 0–0.2)
BASOPHILS NFR BLD AUTO: 1 %
EOSINOPHIL # BLD AUTO: 1.1 10E3/UL (ref 0–0.7)
EOSINOPHIL NFR BLD AUTO: 13 %
ERYTHROCYTE [DISTWIDTH] IN BLOOD BY AUTOMATED COUNT: 13.3 % (ref 10–15)
HCT VFR BLD AUTO: 42.6 % (ref 35–47)
HGB BLD-MCNC: 14.1 G/DL (ref 11.7–15.7)
IMM GRANULOCYTES # BLD: 0 10E3/UL
IMM GRANULOCYTES NFR BLD: 0 %
LYMPHOCYTES # BLD AUTO: 3.1 10E3/UL (ref 0.8–5.3)
LYMPHOCYTES NFR BLD AUTO: 35 %
MCH RBC QN AUTO: 29.6 PG (ref 26.5–33)
MCHC RBC AUTO-ENTMCNC: 33.1 G/DL (ref 31.5–36.5)
MCV RBC AUTO: 89 FL (ref 78–100)
MONOCYTES # BLD AUTO: 0.4 10E3/UL (ref 0–1.3)
MONOCYTES NFR BLD AUTO: 4 %
NEUTROPHILS # BLD AUTO: 4.3 10E3/UL (ref 1.6–8.3)
NEUTROPHILS NFR BLD AUTO: 48 %
NRBC # BLD AUTO: 0 10E3/UL
NRBC BLD AUTO-RTO: 0 /100
PLATELET # BLD AUTO: 282 10E3/UL (ref 150–450)
RBC # BLD AUTO: 4.77 10E6/UL (ref 3.8–5.2)
WBC # BLD AUTO: 8.9 10E3/UL (ref 4–11)

## 2025-02-25 PROCEDURE — 3074F SYST BP LT 130 MM HG: CPT | Performed by: NURSE PRACTITIONER

## 2025-02-25 PROCEDURE — 86003 ALLG SPEC IGE CRUDE XTRC EA: CPT

## 2025-02-25 PROCEDURE — 3078F DIAST BP <80 MM HG: CPT | Performed by: NURSE PRACTITIONER

## 2025-02-25 PROCEDURE — 36415 COLL VENOUS BLD VENIPUNCTURE: CPT

## 2025-02-25 PROCEDURE — 99214 OFFICE O/P EST MOD 30 MIN: CPT | Performed by: NURSE PRACTITIONER

## 2025-02-25 PROCEDURE — 85004 AUTOMATED DIFF WBC COUNT: CPT

## 2025-02-25 RX ORDER — PREDNISONE 10 MG/1
TABLET ORAL
Qty: 38 TABLET | Refills: 0 | Status: SHIPPED | OUTPATIENT
Start: 2025-02-25 | End: 2025-03-11

## 2025-02-25 RX ORDER — SODIUM CHLORIDE FOR INHALATION 3 %
4 VIAL, NEBULIZER (ML) INHALATION
COMMUNITY
Start: 2025-02-21

## 2025-02-25 RX ORDER — AZITHROMYCIN 250 MG/1
500 TABLET, FILM COATED ORAL
COMMUNITY
Start: 2025-02-21 | End: 2025-05-22

## 2025-02-25 RX ORDER — BENZONATATE 100 MG/1
CAPSULE ORAL 3 TIMES DAILY PRN
COMMUNITY
Start: 2025-02-10

## 2025-02-25 RX ORDER — GUAIFENESIN 600 MG/1
TABLET, EXTENDED RELEASE ORAL
COMMUNITY
Start: 2025-02-10

## 2025-02-25 ASSESSMENT — ASTHMA QUESTIONNAIRES
QUESTION_3 LAST FOUR WEEKS HOW OFTEN DID YOUR ASTHMA SYMPTOMS (WHEEZING, COUGHING, SHORTNESS OF BREATH, CHEST TIGHTNESS OR PAIN) WAKE YOU UP AT NIGHT OR EARLIER THAN USUAL IN THE MORNING: FOUR OR MORE NIGHTS A WEEK
ACT_TOTALSCORE: 7
QUESTION_5 LAST FOUR WEEKS HOW WOULD YOU RATE YOUR ASTHMA CONTROL: POORLY CONTROLLED
QUESTION_1 LAST FOUR WEEKS HOW MUCH OF THE TIME DID YOUR ASTHMA KEEP YOU FROM GETTING AS MUCH DONE AT WORK, SCHOOL OR AT HOME: MOST OF THE TIME
ACT_TOTALSCORE: 7
HOSPITALIZATION_OVERNIGHT_LAST_YEAR_TOTAL: ONE
QUESTION_4 LAST FOUR WEEKS HOW OFTEN HAVE YOU USED YOUR RESCUE INHALER OR NEBULIZER MEDICATION (SUCH AS ALBUTEROL): THREE OR MORE TIMES PER DAY
QUESTION_2 LAST FOUR WEEKS HOW OFTEN HAVE YOU HAD SHORTNESS OF BREATH: MORE THAN ONCE A DAY
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
## 2025-02-25 NOTE — TELEPHONE ENCOUNTER
Called patient to discuss eosinophils 1.1 during labs today.   Ordered Dupixent.  Once PA is complete and patient received injection in mail, we will set up nurse visit for education.

## 2025-02-25 NOTE — TELEPHONE ENCOUNTER
Prior Authorization Approval    Medication: DUPIXENT 300 MG/2ML SC SOAJ  Authorization Effective Date: 2/25/2025  Authorization Expiration Date: 8/25/2025  Approved Dose/Quantity: Loading dose then 4ml per 28 days  Reference #: [id number]   Insurance Company: [employer] - Phone [phone] Fax [phone]  Expected CoPay: $ 4.8  CoPay Card Available: No    Financial Assistance Needed: N/A  Which Pharmacy is filling the prescription: [facility] - [address]  Pharmacy Notified: Yes  Patient Notified: Yes        PA Initiation    Medication: DUPIXENT 300 MG/2ML SC SOAJ  Insurance Company: [employer] - Phone [phone] Fax [phone]  [facility]arm[facility]illing the Rx[facility] [address]  Filling Pharmacy Phone:    Filling Pharmacy Fax:    Start Date: 2/25/2025

## 2025-02-25 NOTE — PATIENT INSTRUCTIONS
It was a pleasure to see you in clinic today.   Here is what we discussed:    Start prednisone taper.  Have the blood work done.  Start Duonebs 4 times daily and in middle of night with coughing fit.   Continue Symbicort two puffs twice daily, rinse/gargle after use.  Continue Yupelri nebulized once daily.   Continue Azithromycin 500mg M, W, F.  Continue sodium chloride + albuterol nebulized with flutter valve twice daily.  Call my nurse, Jamaal ([phone]) with any change or worsening of your breathing.  Follow-up over the phone in 2 weeks, then in person in one month.     Leanna Muhammad, CNP  Pulmonary Medicine  [facility]  [phone]

## 2025-02-26 ENCOUNTER — TELEPHONE (OUTPATIENT)
Dept: PULMONOLOGY | Facility: CLINIC | Age: 60
End: 2025-02-26
Payer: COMMERCIAL

## 2025-02-26 ENCOUNTER — ANCILLARY PROCEDURE (OUTPATIENT)
Dept: RADIOLOGY | Facility: CLINIC | Age: 60
End: 2025-02-26
Payer: COMMERCIAL

## 2025-02-26 LAB
A ALTERNATA IGE QN: <0.1 KU(A)/L
A FUMIGATUS IGE QN: 0.69 KU(A)/L
BERMUDA GRASS IGE QN: <0.1 KU(A)/L
C HERBARUM IGE QN: <0.1 KU(A)/L
CAT DANDER IGG QN: 9.98 KU(A)/L
CEDAR IGE QN: <0.1 KU(A)/L
COMMON RAGWEED IGE QN: 55.1 KU(A)/L
COTTONWOOD IGE QN: <0.1 KU(A)/L
D FARINAE IGE QN: 1.04 KU(A)/L
D PTERONYSS IGE QN: 1.02 KU(A)/L
DOG DANDER+EPITH IGE QN: 1.06 KU(A)/L
IGE SERPL-ACNC: 1137 KU/L (ref 0–114)
MAPLE IGE QN: <0.1 KU(A)/L
MARSH ELDER IGE QN: 2.35 KU(A)/L
MOUSE URINE PROT IGE QN: <0.1 KU(A)/L
NETTLE IGE QN: <0.1 KU(A)/L
P NOTATUM IGE QN: <0.1 KU(A)/L
ROACH IGE QN: 0.68 KU(A)/L
SALTWORT IGE QN: 0.11 KU(A)/L
SILVER BIRCH IGE QN: <0.1 KU(A)/L
TIMOTHY IGE QN: <0.1 KU(A)/L
WHITE ASH IGE QN: <0.1 KU(A)/L
WHITE ELM IGE QN: <0.1 KU(A)/L
WHITE MULBERRY IGE QN: <0.1 KU(A)/L
WHITE OAK IGE QN: <0.1 KU(A)/L

## 2025-02-26 RX ORDER — TIZANIDINE 2 MG/1
2 TABLET ORAL 3 TIMES DAILY
Qty: 120 TABLET | Refills: 1 | Status: SHIPPED | OUTPATIENT
Start: 2025-02-26

## 2025-02-26 NOTE — TELEPHONE ENCOUNTER
Prior Authorization Not Needed per Insurance    Medication: DUPIXENT 300 MG/2ML SC SOAJ  Insurance Company: [employer] - Phone [phone] Fax [phone]  Expected CoPay: $    Pharmacy Filling the Rx: [facility] - [place], [place] - [address]

## 2025-02-26 NOTE — TELEPHONE ENCOUNTER
PA Initiation    Medication: DUPIXENT 300 MG/2ML SC SOAJ  Insurance Company: [employer] - Phone [phone] Fax [phone]  Pharmacy Filling the Rx: [facility] - [place], [place] - [address]  Filling Pharmacy Phone:    Filling Pharmacy Fax:    Start Date: 2/26/2025    [id number]

## 2025-02-27 ENCOUNTER — ANCILLARY ORDERS (OUTPATIENT)
Dept: RADIOLOGY | Facility: CLINIC | Age: 60
End: 2025-02-27

## 2025-02-27 ENCOUNTER — TELEPHONE (OUTPATIENT)
Dept: PULMONOLOGY | Facility: CLINIC | Age: 60
End: 2025-02-27
Payer: COMMERCIAL

## 2025-02-27 NOTE — TELEPHONE ENCOUNTER
Called patient regarding scheduling nurse visit to go over dupixent injection teaching. She is currently in the hospital at [place]. She will send a mychart or call when she receives to set up a nurse visit.     Martita BENITEZ  [facility]   [phone]

## 2025-03-03 ENCOUNTER — MYC MEDICAL ADVICE (OUTPATIENT)
Dept: FAMILY MEDICINE | Facility: CLINIC | Age: 60
End: 2025-03-03
Payer: COMMERCIAL

## 2025-03-04 NOTE — TELEPHONE ENCOUNTER
Contacted patient and scheduled a hospital discharge appointment on 3/6/25  No further action needed    Marva Harman RN  [facility]    Patient was admitted on 2/26/25 and discharged on 3/2/25      Susan Kwan  [facility] [place] (supporting Earline Jimenez MD)     I was in hospital 2x this month. Just released yesterday after 5 days.    How do I go about seeing an actual pulmonary Dr.?   Getting surya leary with this.

## 2025-03-05 ASSESSMENT — PATIENT HEALTH QUESTIONNAIRE - PHQ9
10. IF YOU CHECKED OFF ANY PROBLEMS, HOW DIFFICULT HAVE THESE PROBLEMS MADE IT FOR YOU TO DO YOUR WORK, TAKE CARE OF THINGS AT HOME, OR GET ALONG WITH OTHER PEOPLE: NOT DIFFICULT AT ALL
SUM OF ALL RESPONSES TO PHQ QUESTIONS 1-9: 0
SUM OF ALL RESPONSES TO PHQ QUESTIONS 1-9: 0

## 2025-03-06 ENCOUNTER — OFFICE VISIT (OUTPATIENT)
Dept: FAMILY MEDICINE | Facility: CLINIC | Age: 60
End: 2025-03-06
Payer: COMMERCIAL

## 2025-03-06 VITALS
DIASTOLIC BLOOD PRESSURE: 78 MMHG | BODY MASS INDEX: 34.04 KG/M2 | OXYGEN SATURATION: 97 % | SYSTOLIC BLOOD PRESSURE: 124 MMHG | HEART RATE: 60 BPM | HEIGHT: 62 IN | WEIGHT: 185 LBS | TEMPERATURE: 97.5 F | RESPIRATION RATE: 18 BRPM

## 2025-03-06 DIAGNOSIS — F33.2 SEVERE EPISODE OF RECURRENT MAJOR DEPRESSIVE DISORDER, WITHOUT PSYCHOTIC FEATURES (H): ICD-10-CM

## 2025-03-06 DIAGNOSIS — M41.9 SCOLIOSIS, UNSPECIFIED SCOLIOSIS TYPE, UNSPECIFIED SPINAL REGION: ICD-10-CM

## 2025-03-06 DIAGNOSIS — Z09 HOSPITAL DISCHARGE FOLLOW-UP: Primary | ICD-10-CM

## 2025-03-06 DIAGNOSIS — E66.01 MORBID OBESITY (H): ICD-10-CM

## 2025-03-06 DIAGNOSIS — R79.89 HIGH SERUM VITAMIN D: ICD-10-CM

## 2025-03-06 DIAGNOSIS — R73.09 ABNORMAL GLUCOSE: ICD-10-CM

## 2025-03-06 DIAGNOSIS — J44.1 COPD EXACERBATION (H): ICD-10-CM

## 2025-03-06 DIAGNOSIS — N18.31 CHRONIC KIDNEY DISEASE, STAGE 3A (H): ICD-10-CM

## 2025-03-06 DIAGNOSIS — Z83.3 FAMILY HISTORY OF DIABETES MELLITUS: ICD-10-CM

## 2025-03-06 DIAGNOSIS — J45.51 SEVERE PERSISTENT ASTHMA WITH ACUTE EXACERBATION (H): ICD-10-CM

## 2025-03-06 DIAGNOSIS — G95.20 CERVICAL CORD COMPRESSION WITH MYELOPATHY (H): ICD-10-CM

## 2025-03-06 DIAGNOSIS — F17.200 TOBACCO USE DISORDER: ICD-10-CM

## 2025-03-06 LAB
ANION GAP SERPL CALCULATED.3IONS-SCNC: 13 MMOL/L (ref 7–15)
BUN SERPL-MCNC: 24 MG/DL (ref 6–20)
CALCIUM SERPL-MCNC: 9.1 MG/DL (ref 8.8–10.4)
CHLORIDE SERPL-SCNC: 101 MMOL/L (ref 98–107)
CREAT SERPL-MCNC: 1.19 MG/DL (ref 0.51–0.95)
EGFRCR SERPLBLD CKD-EPI 2021: 53 ML/MIN/1.73M2
ERYTHROCYTE [DISTWIDTH] IN BLOOD BY AUTOMATED COUNT: 13.4 % (ref 10–15)
EST. AVERAGE GLUCOSE BLD GHB EST-MCNC: 123 MG/DL
GLUCOSE SERPL-MCNC: 139 MG/DL (ref 70–99)
HBA1C MFR BLD: 5.9 % (ref 0–5.6)
HCO3 SERPL-SCNC: 26 MMOL/L (ref 22–29)
HCT VFR BLD AUTO: 41 % (ref 35–47)
HGB BLD-MCNC: 13.1 G/DL (ref 11.7–15.7)
MCH RBC QN AUTO: 29 PG (ref 26.5–33)
MCHC RBC AUTO-ENTMCNC: 32 G/DL (ref 31.5–36.5)
MCV RBC AUTO: 91 FL (ref 78–100)
PLATELET # BLD AUTO: 284 10E3/UL (ref 150–450)
POTASSIUM SERPL-SCNC: 4.3 MMOL/L (ref 3.4–5.3)
RBC # BLD AUTO: 4.52 10E6/UL (ref 3.8–5.2)
SODIUM SERPL-SCNC: 140 MMOL/L (ref 135–145)
TSH SERPL DL<=0.005 MIU/L-ACNC: 0.9 UIU/ML (ref 0.3–4.2)
VIT D+METAB SERPL-MCNC: 35 NG/ML (ref 20–50)
WBC # BLD AUTO: 21.1 10E3/UL (ref 4–11)

## 2025-03-06 PROCEDURE — 3078F DIAST BP <80 MM HG: CPT | Performed by: FAMILY MEDICINE

## 2025-03-06 PROCEDURE — 85027 COMPLETE CBC AUTOMATED: CPT | Mod: GZ | Performed by: FAMILY MEDICINE

## 2025-03-06 PROCEDURE — 83036 HEMOGLOBIN GLYCOSYLATED A1C: CPT | Performed by: FAMILY MEDICINE

## 2025-03-06 PROCEDURE — 36415 COLL VENOUS BLD VENIPUNCTURE: CPT | Performed by: FAMILY MEDICINE

## 2025-03-06 PROCEDURE — 82306 VITAMIN D 25 HYDROXY: CPT | Mod: GZ | Performed by: FAMILY MEDICINE

## 2025-03-06 PROCEDURE — 3074F SYST BP LT 130 MM HG: CPT | Performed by: FAMILY MEDICINE

## 2025-03-06 PROCEDURE — 99495 TRANSJ CARE MGMT MOD F2F 14D: CPT | Performed by: FAMILY MEDICINE

## 2025-03-06 PROCEDURE — 80048 BASIC METABOLIC PNL TOTAL CA: CPT | Performed by: FAMILY MEDICINE

## 2025-03-06 PROCEDURE — 84443 ASSAY THYROID STIM HORMONE: CPT | Performed by: FAMILY MEDICINE

## 2025-03-06 NOTE — PROGRESS NOTES
"  {PROVIDER CHARTING PREFERENCE:398783}    Subjective   Susan is a 59 year old, presenting for the following health issues:  Hospital F/U (Not sure which med she needs refills)        3/6/2025     1:17 PM   Additional Questions   Roomed by eh   Accompanied by self     HPI      {MA/LPN/RN Pre-Provider Visit Orders- hCG/UA/Strep (Optional):655655}    Hospital Follow-up Visit:    Hospital/Nursing Home/IP Rehab Facility:  [facility]  Date of Admission: 2/26/2025  Date of Discharge: 3/3/2026  Reason(s) for Admission: Laya, COPD.  Was the patient in the ICU or did the patient experience delirium during hospitalization?  No  Do you have any other stressors you would like to discuss with your provider? No    Problems taking medications regularly:  None  Medication changes since discharge: None  Problems adhering to non-medication therapy:  None    Summary of hospitalization:  See outside records, reviewed and scanned  Diagnostic Tests/Treatments reviewed.  Follow up needed: {:747306:}  Other Healthcare Providers Involved in Patient s Care:         {those currently involved after discharge:937665::\"None\"}  Update since discharge: {:874492} {TIP  Include information from family/caregivers, SNF, Care Coordination :500112}        Plan of care communicated with {:716355}     {Reference  Coding guidelines- Moderate Complexity F2F/Video within 7 - 14 days of discharge 8629698, High Complexity F2F/Video within 7 days 7550395 or remojk58 days 8355820 :278037}      {additonal problems for provider to add (Optional):640176}  {additonal problems for provider to add (Optional):125837}    {ROS Picklists (Optional):350798}      Objective    /78 (BP Location: Left arm, Patient Position: Sitting, Cuff Size: Adult Regular)   Pulse 60   Temp 97.5  F (36.4  C) (Temporal)   Resp 18   Ht 1.575 m (5' 2.01\")   Wt 83.9 kg (185 lb)   LMP 03/09/2010   SpO2 97%   BMI 33.83 kg/m    Body mass index is 33.83 kg/m .  Physical Exam "   {Exam List (Optional):217150}    {Diagnostic Test Results (Optional):072131}        Signed Electronically by: Earline Jimenez MD  {Email feedback regarding this note to [email or web address]   :956064}  Answers submitted by the patient for this visit:  Patient Health Questionnaire (Submitted on 3/5/2025)  If you checked off any problems, how difficult have these problems made it for you to do your work, take care of things at home, or get along with other people?: Not difficult at all  PHQ9 TOTAL SCORE: 0     "discuss with your provider? No    Problems taking medications regularly:  None  Medication changes since discharge: None  Problems adhering to non-medication therapy:  None    Summary of hospitalization:  See outside records, reviewed and scanned  Diagnostic Tests/Treatments reviewed.  Follow up needed: none  Other Healthcare Providers Involved in Patient s Care:          sleep study, referral placed, pulmonology  Update since discharge: stable.         Plan of care communicated with patient                 Objective    /78 (BP Location: Left arm, Patient Position: Sitting, Cuff Size: Adult Regular)   Pulse 60   Temp 97.5  F (36.4  C) (Temporal)   Resp 18   Ht 1.575 m (5' 2.01\")   Wt 83.9 kg (185 lb)   LMP 03/09/2010   SpO2 97%   BMI 33.83 kg/m    Body mass index is 33.83 kg/m .  Physical Exam   GENERAL: alert and no distress  EYES: Eyes grossly normal to inspection, PERRL and conjunctivae and sclerae normal  NECK: no adenopathy, no asymmetry, masses, or scars  RESP: lungs clear to auscultation - no rales, rhonchi or wheezes  CV: regular rate and rhythm, normal S1 S2, no S3 or S4, no murmur, click or rub, no peripheral edema  MS: no gross musculoskeletal defects noted, no edema  SKIN: no suspicious lesions or rashes  NEURO: Normal strength and tone, mentation intact and speech normal  PSYCH: mentation appears normal, affect normal/bright    Results for orders placed or performed in visit on 03/06/25   Vitamin D Deficiency     Status: Normal   Result Value Ref Range    Vitamin D, Total (25-Hydroxy) 35 20 - 50 ng/mL    Narrative    Season, race, dietary intake, and treatment affect the concentration of 25-hydroxy-Vitamin D. Values may decrease during winter months and increase during summer months.    Vitamin D determination is routinely performed by an immunoassay specific for 25 hydroxyvitamin D3.  If an individual is on vitamin D2(ergocalciferol) supplementation, please specify 25 OH vitamin D2 and D3 " level determination by LCMSMS test VITD23.     **Hemoglobin A1c FUTURE 3mo     Status: Abnormal   Result Value Ref Range    Estimated Average Glucose 123 (H) <117 mg/dL    Hemoglobin A1C 5.9 (H) 0.0 - 5.6 %   TSH with free T4 reflex     Status: Normal   Result Value Ref Range    TSH 0.90 0.30 - 4.20 uIU/mL   Basic metabolic panel  (Ca, Cl, CO2, Creat, Gluc, K, Na, BUN)     Status: Abnormal   Result Value Ref Range    Sodium 140 135 - 145 mmol/L    Potassium 4.3 3.4 - 5.3 mmol/L    Chloride 101 98 - 107 mmol/L    Carbon Dioxide (CO2) 26 22 - 29 mmol/L    Anion Gap 13 7 - 15 mmol/L    Urea Nitrogen 24.0 (H) 6.0 - 20.0 mg/dL    Creatinine 1.19 (H) 0.51 - 0.95 mg/dL    GFR Estimate 53 (L) >60 mL/min/1.73m2    Calcium 9.1 8.8 - 10.4 mg/dL    Glucose 139 (H) 70 - 99 mg/dL   CBC with platelets     Status: Abnormal   Result Value Ref Range    WBC Count 21.1 (H) 4.0 - 11.0 10e3/uL    RBC Count 4.52 3.80 - 5.20 10e6/uL    Hemoglobin 13.1 11.7 - 15.7 g/dL    Hematocrit 41.0 35.0 - 47.0 %    MCV 91 78 - 100 fL    MCH 29.0 26.5 - 33.0 pg    MCHC 32.0 31.5 - 36.5 g/dL    RDW 13.4 10.0 - 15.0 %    Platelet Count 284 150 - 450 10e3/uL           Signed Electronically by: Earline Jimenez MD    Answers submitted by the patient for this visit:  Patient Health Questionnaire (Submitted on 3/5/2025)  If you checked off any problems, how difficult have these problems made it for you to do your work, take care of things at home, or get along with other people?: Not difficult at all  PHQ9 TOTAL SCORE: 0

## 2025-03-06 NOTE — PROGRESS NOTES
Prior to immunization administration, verified patients identity using patient s name and date of birth. Please see Immunization Activity for additional information.     Screening Questionnaire for Adult Immunization    Are you sick today?   No   Do you have allergies to medications, food, a vaccine component or latex?   Yes   Have you ever had a serious reaction after receiving a vaccination?   No   Do you have a long-term health problem with heart, lung, kidney, or metabolic disease (e.g., diabetes), asthma, a blood disorder, no spleen, complement component deficiency, a cochlear implant, or a spinal fluid leak?  Are you on long-term aspirin therapy?   Yes   Do you have cancer, leukemia, HIV/AIDS, or any other immune system problem?   No   Do you have a parent, brother, or sister with an immune system problem?   No   In the past 3 months, have you taken medications that affect  your immune system, such as prednisone, other steroids, or anticancer drugs; drugs for the treatment of rheumatoid arthritis, Crohn s disease, or psoriasis; or have you had radiation treatments?   Yes   Have you had a seizure, or a brain or other nervous system problem?   No   During the past year, have you received a transfusion of blood or blood    products, or been given immune (gamma) globulin or antiviral drug?   No   For women: Are you pregnant or is there a chance you could become       pregnant during the next month?   No   Have you received any vaccinations in the past 4 weeks?   No     Immunization questionnaire was positive for at least one answer.  Notified provider.      Patient instructed to remain in clinic for 15 minutes afterwards, and to report any adverse reactions.     Screening performed by Darrel Bradley MA on 3/6/2025 at 1:21 PM.

## 2025-03-10 ENCOUNTER — TELEPHONE (OUTPATIENT)
Dept: FAMILY MEDICINE | Facility: CLINIC | Age: 60
End: 2025-03-10
Payer: COMMERCIAL

## 2025-03-10 NOTE — TELEPHONE ENCOUNTER
1st Call. LM on VM.     Melinda SAHA RN  [facility]    ----- Message from Earline Jimenez sent at 3/7/2025  4:19 PM CST -----  Please call.   There is signs of pre diabetes, so no actual diabetes and we talked about if that is the case, just needs to work on diet and monitor closely. I know she is really sick right now and steroids which may be worsening her glucose anyway. Lets plan to recheck this in six months. Otherwise her thyroid, kidney tests, vitamin D are all stable.

## 2025-03-11 NOTE — TELEPHONE ENCOUNTER
Contacted patient with message below and she will call back to schedule an appointment.    Marva Harman RN  [facility]    ----- Message from Earline Jimenez sent at 3/7/2025  4:19 PM CST -----  Please call.   There is signs of pre diabetes, so no actual diabetes and we talked about if that is the case, just needs to work on diet and monitor closely. I know she is really sick right now and steroids which may be worsening her glucose anyway. Lets plan to recheck this in six months. Otherwise her thyroid, kidney tests, vitamin D are all stable.

## 2025-03-13 ENCOUNTER — VIRTUAL VISIT (OUTPATIENT)
Dept: PULMONOLOGY | Facility: CLINIC | Age: 60
End: 2025-03-13
Attending: NURSE PRACTITIONER
Payer: COMMERCIAL

## 2025-03-13 DIAGNOSIS — D72.19 OTHER EOSINOPHILIA: ICD-10-CM

## 2025-03-13 DIAGNOSIS — Z91.09 ENVIRONMENTAL ALLERGIES: ICD-10-CM

## 2025-03-13 DIAGNOSIS — J44.89 COPD WITH ASTHMA (H): Primary | ICD-10-CM

## 2025-03-13 ASSESSMENT — PAIN SCALES - GENERAL: PAINLEVEL_OUTOF10: NO PAIN (0)

## 2025-03-13 NOTE — NURSING NOTE
Current patient location: [facility] [address]   [facility] [address]    Is the patient currently in the state of [place]? YES    Visit mode: TELEPHONE    If the visit is dropped, the patient can be reconnected by:TELEPHONE VISIT: Phone number:   Telephone Information:   Mobile [phone]       Will anyone else be joining the visit? NO  (If patient encounters technical issues they should call [phone] :[phone])    Are changes needed to the allergy or medication list? Pt stated no changes to allergies and Pt stated no med changes    Are refills needed on medications prescribed by this physician? NO    Rooming Documentation:  Questionnaire(s) completed    Reason for visit: RECHECK    Liana FLORESF
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
## 2025-03-13 NOTE — PATIENT INSTRUCTIONS
Continue Dupixent injections every 2 weeks.  Continue Symbicort twice daily, rinse/gargle after use.   Continue Yupelri nebulized once daily.  Continue azithromycin 500mg M, W, F.  Continue saline nebulized twice daily with flutter valve.   Continue Albuterol inhaler or Duonebs every 4-6 hours as needed for shortness of breath or wheezing.  Continue oxygen to keep saturations above 88%.  I have sent a prescription for an oximeter to [facility] [place], if they do not have them, you may have to purchase at the pharmacy.  Call my nurse, Jamaal ([phone]) with any change or worsening of your breathing.  Follow-up in May as planned, with a walk test prior with respiratory therapy.     Leanna Muhammad, CNP  Pulmonary Medicine  [facility]  [phone]

## 2025-03-18 DIAGNOSIS — G47.00 INSOMNIA, UNSPECIFIED TYPE: ICD-10-CM

## 2025-03-18 PROBLEM — N18.31 CHRONIC KIDNEY DISEASE, STAGE 3A (H): Status: ACTIVE | Noted: 2025-03-18

## 2025-03-20 RX ORDER — TRAZODONE HYDROCHLORIDE 50 MG/1
50-100 TABLET ORAL AT BEDTIME
Qty: 180 TABLET | Refills: 0 | Status: SHIPPED | OUTPATIENT
Start: 2025-03-20

## 2025-03-26 ENCOUNTER — OFFICE VISIT (OUTPATIENT)
Dept: CARDIOLOGY | Facility: CLINIC | Age: 60
End: 2025-03-26
Attending: NURSE PRACTITIONER
Payer: COMMERCIAL

## 2025-03-26 VITALS
HEIGHT: 62 IN | WEIGHT: 167.6 LBS | HEART RATE: 80 BPM | BODY MASS INDEX: 30.84 KG/M2 | RESPIRATION RATE: 16 BRPM | SYSTOLIC BLOOD PRESSURE: 112 MMHG | DIASTOLIC BLOOD PRESSURE: 82 MMHG

## 2025-03-26 DIAGNOSIS — R94.31 ABNORMAL ELECTROCARDIOGRAM: ICD-10-CM

## 2025-03-26 DIAGNOSIS — R55 SYNCOPE, UNSPECIFIED SYNCOPE TYPE: Primary | ICD-10-CM

## 2025-03-26 DIAGNOSIS — E78.2 MIXED HYPERLIPIDEMIA: ICD-10-CM

## 2025-03-26 DIAGNOSIS — J44.89 COPD WITH ASTHMA (H): ICD-10-CM

## 2025-03-26 DIAGNOSIS — I25.10 CORONARY ARTERY CALCIFICATION SEEN ON CT SCAN: ICD-10-CM

## 2025-03-26 PROCEDURE — 99214 OFFICE O/P EST MOD 30 MIN: CPT | Performed by: INTERNAL MEDICINE

## 2025-03-26 PROCEDURE — 3074F SYST BP LT 130 MM HG: CPT | Performed by: INTERNAL MEDICINE

## 2025-03-26 PROCEDURE — 3079F DIAST BP 80-89 MM HG: CPT | Performed by: INTERNAL MEDICINE

## 2025-03-26 RX ORDER — BUDESONIDE AND FORMOTEROL FUMARATE DIHYDRATE 160; 4.5 UG/1; UG/1
2 AEROSOL RESPIRATORY (INHALATION)
COMMUNITY
Start: 2025-03-24

## 2025-03-26 RX ORDER — REVEFENACIN 175 UG/3ML
175 SOLUTION RESPIRATORY (INHALATION) PRN
COMMUNITY
Start: 2025-02-21
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
## 2025-03-26 NOTE — PATIENT INSTRUCTIONS
Continue current medications  Make certain to drink plenty of fluids every day to minimize dehydration as cause for fainting episodes  Set up echocardiogram and monitor with further recommendations to follow

## 2025-03-27 ENCOUNTER — ORDERS ONLY (AUTO-RELEASED) (OUTPATIENT)
Dept: CARDIOLOGY | Facility: CLINIC | Age: 60
End: 2025-03-27
Payer: COMMERCIAL

## 2025-03-27 DIAGNOSIS — R94.31 ABNORMAL ELECTROCARDIOGRAM: ICD-10-CM

## 2025-03-27 DIAGNOSIS — R55 SYNCOPE, UNSPECIFIED SYNCOPE TYPE: ICD-10-CM

## 2025-04-01 ENCOUNTER — MYC MEDICAL ADVICE (OUTPATIENT)
Dept: FAMILY MEDICINE | Facility: CLINIC | Age: 60
End: 2025-04-01
Payer: COMMERCIAL

## 2025-04-03 NOTE — TELEPHONE ENCOUNTER
Contacted patient and relayed message below. She will schedule an appointment   No further action needed  Patient was seen on 1/5/24 [facility] Clinic on 1/5/24 for her knee.     Marva Harman RN  [employer]    Susan Kwan [facility] Nurse [place] - [facility]  Phone Number: [phone]     A while back you sent me to see a knee dr. Don't remember who but, I'd like to go see them again.   During my fainting spells I did something to my left knee and it hasn't gotten better.   Can you send me or do i need to see you first?

## 2025-04-11 DIAGNOSIS — M79.18 MYOFASCIAL PAIN: ICD-10-CM

## 2025-04-11 RX ORDER — TIZANIDINE 2 MG/1
2 TABLET ORAL 3 TIMES DAILY
Qty: 120 TABLET | Refills: 1 | OUTPATIENT
Start: 2025-04-11

## 2025-04-11 RX ORDER — GABAPENTIN 300 MG/1
CAPSULE ORAL
Qty: 300 CAPSULE | Refills: 0 | Status: SHIPPED | OUTPATIENT
Start: 2025-04-11

## 2025-04-11 NOTE — TELEPHONE ENCOUNTER
The patient has not been seen in clinic since March 13, 2024.  Please confirm that she is still taking the medication and the current dosage.  I am willing to provide 1 month of gabapentin for her to wean off of until she can be seen in clinic or she needs to get this refilled by her primary care provider.  I cannot provide a refill of tizanidine as it is been over 1 year since her last visit.  This is the last    Refill of gabapentin that can provide.  She will need to wean off of this or get from her PCP.

## 2025-04-21 LAB — CV ZIO PRELIM RESULTS: NORMAL

## 2025-04-22 DIAGNOSIS — R94.31 ABNORMAL ELECTROCARDIOGRAM: ICD-10-CM

## 2025-04-22 DIAGNOSIS — R55 SYNCOPE, UNSPECIFIED SYNCOPE TYPE: Primary | ICD-10-CM

## 2025-04-22 DIAGNOSIS — J44.89 COPD WITH ASTHMA (H): ICD-10-CM

## 2025-04-22 DIAGNOSIS — E78.2 MIXED HYPERLIPIDEMIA: ICD-10-CM

## 2025-04-22 DIAGNOSIS — I25.10 CORONARY ARTERY CALCIFICATION SEEN ON CT SCAN: ICD-10-CM

## 2025-04-25 ENCOUNTER — HOSPITAL ENCOUNTER (OUTPATIENT)
Dept: CARDIOLOGY | Facility: CLINIC | Age: 60
Discharge: HOME | End: 2025-04-25
Attending: INTERNAL MEDICINE | Admitting: INTERNAL MEDICINE
Payer: COMMERCIAL

## 2025-04-25 DIAGNOSIS — J44.89 COPD WITH ASTHMA (H): ICD-10-CM

## 2025-04-25 DIAGNOSIS — R94.31 ABNORMAL ELECTROCARDIOGRAM: ICD-10-CM

## 2025-04-25 DIAGNOSIS — I25.10 CORONARY ARTERY CALCIFICATION SEEN ON CT SCAN: ICD-10-CM

## 2025-04-25 DIAGNOSIS — E78.2 MIXED HYPERLIPIDEMIA: ICD-10-CM

## 2025-04-25 DIAGNOSIS — R55 SYNCOPE, UNSPECIFIED SYNCOPE TYPE: ICD-10-CM

## 2025-04-25 LAB — LVEF ECHO: NORMAL

## 2025-04-25 PROCEDURE — 93306 TTE W/DOPPLER COMPLETE: CPT | Mod: 26 | Performed by: INTERNAL MEDICINE

## 2025-04-25 PROCEDURE — 93306 TTE W/DOPPLER COMPLETE: CPT

## 2025-04-28 ENCOUNTER — OFFICE VISIT (OUTPATIENT)
Dept: ORTHOPEDICS | Facility: CLINIC | Age: 60
End: 2025-04-28
Payer: COMMERCIAL

## 2025-04-28 ENCOUNTER — ANCILLARY PROCEDURE (OUTPATIENT)
Dept: GENERAL RADIOLOGY | Facility: CLINIC | Age: 60
End: 2025-04-28
Attending: STUDENT IN AN ORGANIZED HEALTH CARE EDUCATION/TRAINING PROGRAM
Payer: COMMERCIAL

## 2025-04-28 DIAGNOSIS — M25.362 KNEE INSTABILITY, LEFT: ICD-10-CM

## 2025-04-28 DIAGNOSIS — M25.562 ACUTE PAIN OF LEFT KNEE: ICD-10-CM

## 2025-04-28 DIAGNOSIS — M25.562 ACUTE PAIN OF LEFT KNEE: Primary | ICD-10-CM

## 2025-04-28 PROCEDURE — 73564 X-RAY EXAM KNEE 4 OR MORE: CPT | Mod: TC | Performed by: RADIOLOGY

## 2025-04-28 PROCEDURE — 99213 OFFICE O/P EST LOW 20 MIN: CPT | Performed by: STUDENT IN AN ORGANIZED HEALTH CARE EDUCATION/TRAINING PROGRAM

## 2025-04-28 ASSESSMENT — KOOS JR
GOING UP OR DOWN STAIRS: SEVERE
STRAIGHTENING KNEE FULLY: SEVERE
RISING FROM SITTING: SEVERE
HOW SEVERE IS YOUR KNEE STIFFNESS AFTER FIRST WAKING IN MORNING: SEVERE
KOOS JR SCORING: 36.93
BENDING TO THE FLOOR TO PICK UP OBJECT: MODERATE
STANDING UPRIGHT: SEVERE
TWISING OR PIVOTING ON KNEE: SEVERE

## 2025-04-28 NOTE — PATIENT INSTRUCTIONS
[facility]   [address]  [place] [address] [address] [place] [address]   Appointments: [phone] Appointments: [phone]   Fax: [phone] Fax: [phone]       1. Acute pain of left knee        An order for a CT scan was placed. Please call the imaging department at [phone] to schedule.     Dr. Dior will contact you with the results.     An order for a knee brace was placed. Please call your preferred orthotics location to schedule a brace fitting visit.     [facility] [address]  [place] [address]  Phone: [phone]  Fax: [phone] [facility]   [address]  [place] [address]  Phone: [phone]  Fax: [phone]   [facility]  [address]  [place] [address]  Phone: [phone]  Fax: [phone] [facility]  [address]  [place] [address]  Phone: [phone]   Fax: [phone]   * Please call any location listed to make an appointment for a casting/fitting. Your referral was sent to their central office and they will all have the order on file.       Call my office with any questions or concerns, [phone].

## 2025-04-28 NOTE — PROGRESS NOTES
CC: Left knee follow-up    HPI: Patient is a 59-year-old female known to my clinic for left knee.  Full is here physical please see my note from 2024.  Patient states that she had a new injury in February.  She had an orthostatic event and lost consciousness.  She does not recall the specifics of the event.  Since that time her knee has felt unstable and she has had pain on the medial side of her knee.  She has not turning or pivoting on her knee because it feels unstable.  She notes pain with ambulation.  She is not currently using a brace.  She has not had a recent injection.  No recent surgery.  No recent physical therapy.    Objective:   PE:  LLE: No open wounds lacerations noted.  Well-healed prior arthroscopic incisions.  Trace effusion.  Patellar grind negative for pain or crepitus.  Pain to palpation over the medial joint line.  No pain to palpation over the lateral joint line.  Passive range of motion is 5 degrees shy of full extension with guarding.  140 degrees knee flexion.  Active range of motion is equivalent to passive range of motion.  2B Lachman.  Stable varus and valgus stress at 0 and 30 degrees knee flexion.  Increased anterior drawer.  Stable posterior drawer.  Butch's positive for medial sided pain.    Imagin view x-ray left knee from today was reviewed.  This shows very slight narrowing of the medial joint line.  Well-preserved lateral patellofemoral joint space.  Signs of bony avulsion from the medial epicondyle that appears new from prior x-ray in 2023.    A/P:  Patient is a 59-year-old female seen here today in follow-up for her left knee.  She had a new injury approximately 2 months ago.  Today on exam she has a new positive Lachman exam.  She also has guarding and mechanical block to full extension.  When I last saw her in 2024 she had a negative Lachman exam and hyperextended by approximately 7 degrees.  These are certainly changes on her physical exam.   At this time point I have high suspicion that she has a new ACL injury.  I also have concerns for a displaced meniscus injury given her physical exam findings and mechanical block to motion.  I discussed this with her today.  We discussed treatment options.  Discussed nonoperative management in the form of oral anti-inflammatory medication, physical therapy, ACL functional bracing, and the role of an intra-articular corticosteroid injection to help with pain.  I discussed with her that the definitive diagnostic modality would be an MRI.  She is not a candidate for this because of her spinal cord stimulator.  It would certainly be reasonable to perform a CT arthrogram to evaluate her meniscus and ACL.  I cannot have a an informed discussion of operative options without definitive diagnostic imaging.  At this time she would like to trial an ACL functional brace.  I am more than happy to prescribe that for her for her left knee instability.  Will also move forward with a CT arthrogram of her left knee.  She will follow-up with me in clinic after to review the results and discuss treatment options.    Rosas Dior MD    [facility]   Department of Orthopedic Surgery      Disclaimer: This note consists of symbols derived from keyboarding, dictation and/or voice recognition software. As a result, there may be errors in the script that have gone undetected. Please consider this when interpreting information found in this chart.

## 2025-04-28 NOTE — LETTER
2025      Susan A Aniya  [address]  [place] [facility] [place] [address]      Dear Colleague,    Thank you for referring your patient, Susan Kwan, to the [facility] [place]. Please see a copy of my visit note below.    CC: Left knee follow-up    HPI: Patient is a 59-year-old female known to my clinic for left knee.  Full is here physical please see my note from 2024.  Patient states that she had a new injury in February.  She had an orthostatic event and lost consciousness.  She does not recall the specifics of the event.  Since that time her knee has felt unstable and she has had pain on the medial side of her knee.  She has not turning or pivoting on her knee because it feels unstable.  She notes pain with ambulation.  She is not currently using a brace.  She has not had a recent injection.  No recent surgery.  No recent physical therapy.    Objective:   PE:  LLE: No open wounds lacerations noted.  Well-healed prior arthroscopic incisions.  Trace effusion.  Patellar grind negative for pain or crepitus.  Pain to palpation over the medial joint line.  No pain to palpation over the lateral joint line.  Passive range of motion is 5 degrees shy of full extension with guarding.  140 degrees knee flexion.  Active range of motion is equivalent to passive range of motion.  2B Lachman.  Stable varus and valgus stress at 0 and 30 degrees knee flexion.  Increased anterior drawer.  Stable posterior drawer.  Butch's positive for medial sided pain.    Imagin view x-ray left knee from today was reviewed.  This shows very slight narrowing of the medial joint line.  Well-preserved lateral patellofemoral joint space.  Signs of bony avulsion from the medial epicondyle that appears new from prior x-ray in 2023.    A/P:  Patient is a 59-year-old female seen here today in follow-up for her left knee.  She had a new injury approximately 2 months ago.  Today on exam she  has a new positive Lachman exam.  She also has guarding and mechanical block to full extension.  When I last saw her in January 2024 she had a negative Lachman exam and hyperextended by approximately 7 degrees.  These are certainly changes on her physical exam.  At this time point I have high suspicion that she has a new ACL injury.  I also have concerns for a displaced meniscus injury given her physical exam findings and mechanical block to motion.  I discussed this with her today.  We discussed treatment options.  Discussed nonoperative management in the form of oral anti-inflammatory medication, physical therapy, ACL functional bracing, and the role of an intra-articular corticosteroid injection to help with pain.  I discussed with her that the definitive diagnostic modality would be an MRI.  She is not a candidate for this because of her spinal cord stimulator.  It would certainly be reasonable to perform a CT arthrogram to evaluate her meniscus and ACL.  I cannot have a an informed discussion of operative options without definitive diagnostic imaging.  At this time she would like to trial an ACL functional brace.  I am more than happy to prescribe that for her for her left knee instability.  Will also move forward with a CT arthrogram of her left knee.  She will follow-up with me in clinic after to review the results and discuss treatment options.    Rosas Dior MD    [facility]   Department of Orthopedic Surgery      Disclaimer: This note consists of symbols derived from keyboarding, dictation and/or voice recognition software. As a result, there may be errors in the script that have gone undetected. Please consider this when interpreting information found in this chart.       Again, thank you for allowing me to participate in the care of your patient.        Sincerely,        Rosas Dior MD    Electronically signed

## 2025-05-01 ENCOUNTER — MYC MEDICAL ADVICE (OUTPATIENT)
Dept: PULMONOLOGY | Facility: CLINIC | Age: 60
End: 2025-05-01
Payer: COMMERCIAL

## 2025-05-01 ENCOUNTER — PATIENT OUTREACH (OUTPATIENT)
Dept: CARE COORDINATION | Facility: CLINIC | Age: 60
End: 2025-05-01
Payer: COMMERCIAL

## 2025-05-21 ENCOUNTER — HOSPITAL ENCOUNTER (OUTPATIENT)
Dept: RADIOLOGY | Facility: HOSPITAL | Age: 60
Discharge: HOME | End: 2025-05-21
Attending: STUDENT IN AN ORGANIZED HEALTH CARE EDUCATION/TRAINING PROGRAM
Payer: COMMERCIAL

## 2025-05-21 ENCOUNTER — HOSPITAL ENCOUNTER (OUTPATIENT)
Dept: ULTRASOUND IMAGING | Facility: HOSPITAL | Age: 60
Discharge: HOME | End: 2025-05-21
Attending: STUDENT IN AN ORGANIZED HEALTH CARE EDUCATION/TRAINING PROGRAM
Payer: COMMERCIAL

## 2025-05-21 ENCOUNTER — HOSPITAL ENCOUNTER (OUTPATIENT)
Dept: CT IMAGING | Facility: HOSPITAL | Age: 60
Discharge: HOME | End: 2025-05-21
Attending: STUDENT IN AN ORGANIZED HEALTH CARE EDUCATION/TRAINING PROGRAM
Payer: COMMERCIAL

## 2025-05-21 DIAGNOSIS — M25.562 ACUTE PAIN OF LEFT KNEE: ICD-10-CM

## 2025-05-21 PROCEDURE — 255N000002 HC RX 255 OP 636: Performed by: STUDENT IN AN ORGANIZED HEALTH CARE EDUCATION/TRAINING PROGRAM

## 2025-05-21 PROCEDURE — 73701 CT LOWER EXTREMITY W/DYE: CPT | Mod: LT

## 2025-05-21 PROCEDURE — 20611 DRAIN/INJ JOINT/BURSA W/US: CPT | Mod: LT

## 2025-05-21 RX ADMIN — IOHEXOL 20 ML: 180 INJECTION INTRAVENOUS at 09:00

## 2025-05-22 ENCOUNTER — OFFICE VISIT (OUTPATIENT)
Dept: ORTHOPEDICS | Facility: CLINIC | Age: 60
End: 2025-05-22
Payer: COMMERCIAL

## 2025-05-22 DIAGNOSIS — G89.29 CHRONIC PAIN OF LEFT KNEE: Primary | ICD-10-CM

## 2025-05-22 DIAGNOSIS — M25.562 CHRONIC PAIN OF LEFT KNEE: Primary | ICD-10-CM

## 2025-05-22 RX ORDER — TRIAMCINOLONE ACETONIDE 40 MG/ML
40 INJECTION, SUSPENSION INTRA-ARTICULAR; INTRAMUSCULAR
Status: COMPLETED | OUTPATIENT
Start: 2025-05-22 | End: 2025-05-22

## 2025-05-22 RX ORDER — LIDOCAINE HYDROCHLORIDE 10 MG/ML
7 INJECTION, SOLUTION INFILTRATION; PERINEURAL
Status: COMPLETED | OUTPATIENT
Start: 2025-05-22 | End: 2025-05-22

## 2025-05-22 RX ADMIN — TRIAMCINOLONE ACETONIDE 40 MG: 40 INJECTION, SUSPENSION INTRA-ARTICULAR; INTRAMUSCULAR at 15:21

## 2025-05-22 RX ADMIN — LIDOCAINE HYDROCHLORIDE 7 ML: 10 INJECTION, SOLUTION INFILTRATION; PERINEURAL at 15:21

## 2025-05-22 ASSESSMENT — KOOS JR
BENDING TO THE FLOOR TO PICK UP OBJECT: MILD
KOOS JR SCORING: 57.14
TWISING OR PIVOTING ON KNEE: EXTREME
HOW SEVERE IS YOUR KNEE STIFFNESS AFTER FIRST WAKING IN MORNING: MODERATE
GOING UP OR DOWN STAIRS: MODERATE
STRAIGHTENING KNEE FULLY: MODERATE
RISING FROM SITTING: MILD

## 2025-05-22 NOTE — LETTER
5/22/2025      Susan Kwan  [address]  [facility] [address]      Dear Colleague,    Thank you for referring your patient, Susan Kwan, to the [facility] [place]. Please see a copy of my visit note below.    CC: CT follow-up    HPI: Patient is a 59-year-old female known to my practice with a right knee pain and feelings of instability.  Since last follow-up appointment she has been using a brace.  She states that this helps with feelings of instability.  She does note pain over the medial aspect of her knee.  She does feel that this has been improving.  She had a CT arthrogram of her knee yesterday as she is unable to undergo an MRI    Objective:   PE:  RLE: No open wounds or lacerations.  Trace effusion.  Passive range of motion 0 to 140 degrees knee flexion.  Active range of motion: Passive range of motion.  Ia Lachman.  Stable to varus and valgus stress at 0 and 30s knee flexion.  Mild pain to palpation over the medial aspect of the knee.  Positive Butch for pain over the medial aspect of the knee.    Imaging:   CT arthrogram shows intact ACL and PCL.  There is signs of prior meniscectomy versus meniscus tear of the medial meniscus.  There is also signs of partial root tearing.  Blunting of the lateral meniscus.    Procedure:   Written informed consent for Left knee intra-articular injection was obtained from the patient after discussing the risk and benefits of the procedure.  Risk and benefits including but not limited to bleeding, infection, failure to cure pain and allergic reaction were discussed.  The anterior inferolateral portal was identified by palpation of bony landmarks.  The skin was cleaned with iodine solution.  Under sterile technique the anterior inferolateral portal was utilized to inject the entirety of the steroid and lidocaine solution.  Soft dressings were applied.  Patient tolerated the procedure without difficulty.  I counseled the  patient on signs and symptoms of allergic reaction and infection.     Large Joint Injection/Arthocentesis: L knee joint    Date/Time: 5/22/2025 3:21 PM    Performed by: Rosas Dior MD  Authorized by: Rosas Dior MD    Indications:  Pain  Needle Size:  22 G  Guidance: landmark guided    Approach:  Superolateral  Location:  Knee      Medications:  40 mg triamcinolone 40 MG/ML; 7 mL lidocaine 1 %  Outcome:  Tolerated well, no immediate complications  Procedure discussed: discussed risks, benefits, and alternatives    Consent Given by:  Patient  Timeout: timeout called immediately prior to procedure    Prep: patient was prepped and draped in usual sterile fashion      A/P:  Patient is a 59-year-old female known to my practice for left knee pain and feelings of instability.  CT arthrogram from yesterday was reviewed.  Does not show signs of full-thickness ACL or PCL tear.  Does show signs of medial meniscus tear versus prior partial meniscectomy.  The exam is certainly limited as it is CCT arthrogram but she is unable to MRI.  We her meniscus.  Discussed nonoperative management for oral anti-inflammatory medication, activity modification, physical therapy, intra-articular injection, and bracing.  Discussed operative intervention of diagnostic arthroscopy with meniscus repair versus partial meniscectomy.    At this time point she would like to avoid surgery.  She is happy to know that she definitely relates this injury to her knee.  She wished undergo an intra-articular injection.  That is very reasonable.  I discussed with her the risks and benefits of an intra-articular injection including but not limited to infection, and failure to cure pain.  I counseled her on signs symptoms allergic reaction or infection.  A corticosteroid injection was performed as highlighted above.  She is cleared to return to all of her desired activities as symptoms allow.  I think using her brace is very reasonable.  She can follow-up  me as needed at that point future    Rosas Dior MD    [facility]   Department of Orthopedic Surgery      Disclaimer: This note consists of symbols derived from keyboarding, dictation and/or voice recognition software. As a result, there may be errors in the script that have gone undetected. Please consider this when interpreting information found in this chart.       Again, thank you for allowing me to participate in the care of your patient.        Sincerely,        Rosas Dior MD    Electronically signed

## 2025-05-22 NOTE — PROGRESS NOTES
CC: CT follow-up    HPI: Patient is a 59-year-old female known to my practice with a right knee pain and feelings of instability.  Since last follow-up appointment she has been using a brace.  She states that this helps with feelings of instability.  She does note pain over the medial aspect of her knee.  She does feel that this has been improving.  She had a CT arthrogram of her knee yesterday as she is unable to undergo an MRI    Objective:   PE:  RLE: No open wounds or lacerations.  Trace effusion.  Passive range of motion 0 to 140 degrees knee flexion.  Active range of motion: Passive range of motion.  Ia Lachman.  Stable to varus and valgus stress at 0 and 30s knee flexion.  Mild pain to palpation over the medial aspect of the knee.  Positive Butch for pain over the medial aspect of the knee.    Imaging:   CT arthrogram shows intact ACL and PCL.  There is signs of prior meniscectomy versus meniscus tear of the medial meniscus.  There is also signs of partial root tearing.  Blunting of the lateral meniscus.    Procedure:   Written informed consent for Left knee intra-articular injection was obtained from the patient after discussing the risk and benefits of the procedure.  Risk and benefits including but not limited to bleeding, infection, failure to cure pain and allergic reaction were discussed.  The anterior inferolateral portal was identified by palpation of bony landmarks.  The skin was cleaned with iodine solution.  Under sterile technique the anterior inferolateral portal was utilized to inject the entirety of the steroid and lidocaine solution.  Soft dressings were applied.  Patient tolerated the procedure without difficulty.  I counseled the patient on signs and symptoms of allergic reaction and infection.     Large Joint Injection/Arthocentesis: L knee joint    Date/Time: 5/22/2025 3:21 PM    Performed by: Rosas Dior MD  Authorized by: Rosas Dior MD    Indications:  Pain  Needle Size:  22  G  Guidance: landmark guided    Approach:  Superolateral  Location:  Knee      Medications:  40 mg triamcinolone 40 MG/ML; 7 mL lidocaine 1 %  Outcome:  Tolerated well, no immediate complications  Procedure discussed: discussed risks, benefits, and alternatives    Consent Given by:  Patient  Timeout: timeout called immediately prior to procedure    Prep: patient was prepped and draped in usual sterile fashion      A/P:  Patient is a 59-year-old female known to my practice for left knee pain and feelings of instability.  CT arthrogram from yesterday was reviewed.  Does not show signs of full-thickness ACL or PCL tear.  Does show signs of medial meniscus tear versus prior partial meniscectomy.  The exam is certainly limited as it is CCT arthrogram but she is unable to MRI.  We her meniscus.  Discussed nonoperative management for oral anti-inflammatory medication, activity modification, physical therapy, intra-articular injection, and bracing.  Discussed operative intervention of diagnostic arthroscopy with meniscus repair versus partial meniscectomy.    At this time point she would like to avoid surgery.  She is happy to know that she definitely relates this injury to her knee.  She wished undergo an intra-articular injection.  That is very reasonable.  I discussed with her the risks and benefits of an intra-articular injection including but not limited to infection, and failure to cure pain.  I counseled her on signs symptoms allergic reaction or infection.  A corticosteroid injection was performed as highlighted above.  She is cleared to return to all of her desired activities as symptoms allow.  I think using her brace is very reasonable.  She can follow-up me as needed at that point future    Rosas Dior MD    [facility]   Department of Orthopedic Surgery      Disclaimer: This note consists of symbols derived from keyboarding, dictation and/or voice recognition software. As a  result, there may be errors in the script that have gone undetected. Please consider this when interpreting information found in this chart.

## 2025-05-27 ENCOUNTER — DOCUMENTATION ONLY (OUTPATIENT)
Dept: PULMONOLOGY | Facility: CLINIC | Age: 60
End: 2025-05-27

## 2025-05-27 ENCOUNTER — OFFICE VISIT (OUTPATIENT)
Dept: PULMONOLOGY | Facility: CLINIC | Age: 60
End: 2025-05-27
Attending: NURSE PRACTITIONER
Payer: COMMERCIAL

## 2025-05-27 VITALS
OXYGEN SATURATION: 97 % | DIASTOLIC BLOOD PRESSURE: 62 MMHG | SYSTOLIC BLOOD PRESSURE: 110 MMHG | HEART RATE: 71 BPM | BODY MASS INDEX: 34.82 KG/M2 | WEIGHT: 190.4 LBS

## 2025-05-27 DIAGNOSIS — J44.89 COPD WITH ASTHMA (H): ICD-10-CM

## 2025-05-27 DIAGNOSIS — D72.19 OTHER EOSINOPHILIA: ICD-10-CM

## 2025-05-27 DIAGNOSIS — J44.89 COPD WITH ASTHMA (H): Primary | ICD-10-CM

## 2025-05-27 DIAGNOSIS — Z91.09 ENVIRONMENTAL ALLERGIES: ICD-10-CM

## 2025-05-27 DIAGNOSIS — F17.218 CIGARETTE NICOTINE DEPENDENCE WITH OTHER NICOTINE-INDUCED DISORDER: ICD-10-CM

## 2025-05-27 PROCEDURE — 94618 PULMONARY STRESS TESTING: CPT

## 2025-05-27 PROCEDURE — 3078F DIAST BP <80 MM HG: CPT | Performed by: NURSE PRACTITIONER

## 2025-05-27 PROCEDURE — 99214 OFFICE O/P EST MOD 30 MIN: CPT | Mod: 25 | Performed by: NURSE PRACTITIONER

## 2025-05-27 PROCEDURE — 3074F SYST BP LT 130 MM HG: CPT | Performed by: NURSE PRACTITIONER

## 2025-05-27 RX ORDER — DOXYCYCLINE 100 MG/1
TABLET ORAL
COMMUNITY
Start: 2025-05-14

## 2025-05-27 RX ORDER — PREDNISONE 10 MG/1
TABLET ORAL
Qty: 38 TABLET | Refills: 0 | Status: SHIPPED | OUTPATIENT
Start: 2025-05-27 | End: 2025-06-10

## 2025-05-27 ASSESSMENT — ASTHMA QUESTIONNAIRES
QUESTION_5 LAST FOUR WEEKS HOW WOULD YOU RATE YOUR ASTHMA CONTROL: WELL CONTROLLED
QUESTION_1 LAST FOUR WEEKS HOW MUCH OF THE TIME DID YOUR ASTHMA KEEP YOU FROM GETTING AS MUCH DONE AT WORK, SCHOOL OR AT HOME: A LITTLE OF THE TIME
EMERGENCY_ROOM_LAST_YEAR_TOTAL: TWO
HOSPITALIZATION_OVERNIGHT_LAST_YEAR_TOTAL: TWO
QUESTION_3 LAST FOUR WEEKS HOW OFTEN DID YOUR ASTHMA SYMPTOMS (WHEEZING, COUGHING, SHORTNESS OF BREATH, CHEST TIGHTNESS OR PAIN) WAKE YOU UP AT NIGHT OR EARLIER THAN USUAL IN THE MORNING: NOT AT ALL
QUESTION_2 LAST FOUR WEEKS HOW OFTEN HAVE YOU HAD SHORTNESS OF BREATH: ONCE OR TWICE A WEEK
QUESTION_4 LAST FOUR WEEKS HOW OFTEN HAVE YOU USED YOUR RESCUE INHALER OR NEBULIZER MEDICATION (SUCH AS ALBUTEROL): NOT AT ALL
ACT_TOTALSCORE: 22

## 2025-05-27 NOTE — PROGRESS NOTES
"DATE:  5/27/25    DME PROVIDER:  LO LOW ORDERED:  DISCONTINUE OXYGEN  PROVIDER:  JULIA YU NP    COMMENT:  12:47 PM- Faxed \"Discontinue oxygen\" order to Lo at [phone].    Jean Marie LYNN CMA, [facility]          "

## 2025-05-27 NOTE — PATIENT INSTRUCTIONS
It was a pleasure to see you in clinic today.   Here is what we discussed:    Continue Symbicort two puffs twice daily, rinse/gargle after use.   Continue Dupixent every 2 weeks.   Continue Yupelri nebulized.  Continue saline nebulized twice daily with flutter valve.   Call my nurse, Jamaal ([phone]) with any change or worsening of your breathing.  Follow-up in 6 months.     Leanna Muhammad, CNP  Pulmonary Medicine  [facility]  [phone]
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
## 2025-05-29 LAB — FIO2-PRE: 0.21 %

## 2025-06-05 ENCOUNTER — OFFICE VISIT (OUTPATIENT)
Dept: NEUROSURGERY | Facility: CLINIC | Age: 60
End: 2025-06-05
Payer: COMMERCIAL

## 2025-06-05 VITALS
HEART RATE: 77 BPM | OXYGEN SATURATION: 94 % | BODY MASS INDEX: 34.96 KG/M2 | DIASTOLIC BLOOD PRESSURE: 58 MMHG | WEIGHT: 190 LBS | HEIGHT: 62 IN | SYSTOLIC BLOOD PRESSURE: 107 MMHG

## 2025-06-05 DIAGNOSIS — Z98.1 S/P LUMBAR FUSION: Primary | ICD-10-CM

## 2025-06-05 ASSESSMENT — PAIN SCALES - GENERAL: PAINLEVEL_OUTOF10: MILD PAIN (2)

## 2025-06-05 NOTE — NURSING NOTE
"Susan Kwan is a 59 year old female who presents for:  Chief Complaint   Patient presents with    RECHECK     12 month post-op for lumbar fusion. Patient reports pain in low back about a level 2 today, with constant numbness/tingling in right leg all the way down to toes.        Initial Vitals:  /58   Pulse 77   Ht 5' 2\" (1.575 m)   Wt 190 lb (86.2 kg)   LMP 03/09/2010   SpO2 94%   BMI 34.75 kg/m   Estimated body mass index is 34.75 kg/m  as calculated from the following:    Height as of this encounter: 5' 2\" (1.575 m).    Weight as of this encounter: 190 lb (86.2 kg). Body surface area is 1.94 meters squared. BP completed using cuff size: regular  Mild Pain (2)        Cristobal Brenner    "

## 2025-06-05 NOTE — PROGRESS NOTES
"[facility] Neurosurgery Follow-Up:     HPI: 59F presents for 1 year post op follow-up. Underwent exploration of prior L4-5 posterior fusion with extension to L3, L3-4 bilateral laminectomies, medial facetectomies, foraminotomies, and posterolateral arthrodesis on 6/12/24 with Dr. Ward.     Patient is doing well. Reports mild intermittent back pain. Denies any radicular pain, weakness, numbness, or new symptoms. Denies incision concerns. She is able to complete her daily activities without issue.    Current pain management:   Gabapentin - requesting refill from Dr. Curtis     Exam:  /58   Pulse 77   Ht 1.575 m (5' 2\")   Wt 86.2 kg (190 lb)   LMP 03/09/2010   SpO2 94%   BMI 34.75 kg/m      Neurological:  Awake  Alert  Oriented x 3  Motor exam:  Hip Flexor:                Right: 5/5  Left:  5/5  Quadriceps:              Right:  5/5  Left:  5/5  Hamstrings:              Right:  5/5  Left:  5/5  Gastroc Soleus:        Right:  5/5  Left:  5/5  Tib/Ant:                      Right:  5/5  Left:  5/5  EHL:                          Right:  5/5  Left:  5/5      Able to spontaneously move BLE  Sensation intact throughout BLE  Normal gait     Incision: Well healed     Imaging: AP and lateral views reveal stable and intact hardware     Assessment:  1 year s/p exploration of prior L4-5 posterior fusion with extension to L3, L3-4 bilateral laminectomies, medial facetectomies, foraminotomies, and posterolateral arthrodesis     Plan:   -Patient requesting gabapentin refill, will send message to Dr. Curtis   -Activity as tolerated  -Follow up with our NSGY clinic as needed   -Advised patient to call our clinic with any post operative questions or concerns    Patient voiced understanding and agreement.      Selin Caal, CNP  [facility] Neurosurgery  Tel [phone]  Pager [phone]    "

## 2025-06-05 NOTE — Clinical Note
Faustino Curtis - I saw Susan today for 1 year post op follow-up s/p lumbar fusion. She is requesting a gabapentin refill from your team. I told her I'd pass along the message. Thanks!

## 2025-06-05 NOTE — LETTER
"6/5/2025      Susan A Aniya  [address]  [facility] [address]      Dear Colleague,    Thank you for referring your patient, Susan Kwan, to the [facility]ERY. Please see a copy of my visit note below.    [facility] Neurosurgery Follow-Up:     HPI: 59F presents for 1 year post op follow-up. Underwent exploration of prior L4-5 posterior fusion with extension to L3, L3-4 bilateral laminectomies, medial facetectomies, foraminotomies, and posterolateral arthrodesis on 6/12/24 with Dr. Ward.     Patient is doing well. Reports mild intermittent back pain. Denies any radicular pain, weakness, numbness, or new symptoms. Denies incision concerns. She is able to complete her daily activities without issue.    Current pain management:   Gabapentin - requesting refill from Dr. Curtis     Exam:  /58   Pulse 77   Ht 1.575 m (5' 2\")   Wt 86.2 kg (190 lb)   LMP 03/09/2010   SpO2 94%   BMI 34.75 kg/m      Neurological:  Awake  Alert  Oriented x 3  Motor exam:  Hip Flexor:                Right: 5/5  Left:  5/5  Quadriceps:              Right:  5/5  Left:  5/5  Hamstrings:              Right:  5/5  Left:  5/5  Gastroc Soleus:        Right:  5/5  Left:  5/5  Tib/Ant:                      Right:  5/5  Left:  5/5  EHL:                          Right:  5/5  Left:  5/5      Able to spontaneously move BLE  Sensation intact throughout BLE  Normal gait     Incision: Well healed     Imaging: AP and lateral views reveal stable and intact hardware     Assessment:  1 year s/p exploration of prior L4-5 posterior fusion with extension to L3, L3-4 bilateral laminectomies, medial facetectomies, foraminotomies, and posterolateral arthrodesis     Plan:   -Patient requesting gabapentin refill, will send message to Dr. Curtis   -Activity as tolerated  -Follow up with our NSGY clinic as needed   -Advised patient to call our clinic with any post operative questions or " concerns    Patient voiced understanding and agreement.      Selin Caal CNP  [facility]  Tel [phone]  Pager [phone]      Again, thank you for allowing me to participate in the care of your patient.        Sincerely,        Selin Caal, NP    Electronically signed

## 2025-07-10 ENCOUNTER — OFFICE VISIT (OUTPATIENT)
Dept: FAMILY MEDICINE | Facility: CLINIC | Age: 60
End: 2025-07-10
Attending: FAMILY MEDICINE
Payer: COMMERCIAL

## 2025-07-10 VITALS
DIASTOLIC BLOOD PRESSURE: 84 MMHG | WEIGHT: 176 LBS | HEART RATE: 107 BPM | OXYGEN SATURATION: 99 % | HEIGHT: 63 IN | BODY MASS INDEX: 31.18 KG/M2 | SYSTOLIC BLOOD PRESSURE: 116 MMHG | TEMPERATURE: 98 F | RESPIRATION RATE: 20 BRPM

## 2025-07-10 DIAGNOSIS — M54.16 LUMBAR RADICULOPATHY: ICD-10-CM

## 2025-07-10 DIAGNOSIS — E78.5 HYPERLIPIDEMIA, UNSPECIFIED HYPERLIPIDEMIA TYPE: ICD-10-CM

## 2025-07-10 DIAGNOSIS — M79.18 MYOFASCIAL PAIN: ICD-10-CM

## 2025-07-10 DIAGNOSIS — G24.3 CERVICAL DYSTONIA: ICD-10-CM

## 2025-07-10 DIAGNOSIS — E03.9 HYPOTHYROIDISM, UNSPECIFIED TYPE: ICD-10-CM

## 2025-07-10 DIAGNOSIS — N18.31 CHRONIC KIDNEY DISEASE, STAGE 3A (H): ICD-10-CM

## 2025-07-10 DIAGNOSIS — Z00.00 ENCOUNTER FOR MEDICARE ANNUAL WELLNESS EXAM: Primary | ICD-10-CM

## 2025-07-10 LAB
ALBUMIN SERPL BCG-MCNC: 4.9 G/DL (ref 3.5–5.2)
ALP SERPL-CCNC: 80 U/L (ref 40–150)
ALT SERPL W P-5'-P-CCNC: 109 U/L (ref 0–50)
ANION GAP SERPL CALCULATED.3IONS-SCNC: 15 MMOL/L (ref 7–15)
AST SERPL W P-5'-P-CCNC: 56 U/L (ref 0–45)
BILIRUB SERPL-MCNC: 0.5 MG/DL
BUN SERPL-MCNC: 20.4 MG/DL (ref 6–20)
CALCIUM SERPL-MCNC: 10.2 MG/DL (ref 8.8–10.4)
CHLORIDE SERPL-SCNC: 102 MMOL/L (ref 98–107)
CHOLEST SERPL-MCNC: 306 MG/DL
CREAT SERPL-MCNC: 0.89 MG/DL (ref 0.51–0.95)
CREAT UR-MCNC: 500 MG/DL
EGFRCR SERPLBLD CKD-EPI 2021: 75 ML/MIN/1.73M2
FASTING STATUS PATIENT QL REPORTED: YES
FASTING STATUS PATIENT QL REPORTED: YES
GLUCOSE SERPL-MCNC: 153 MG/DL (ref 70–99)
HCO3 SERPL-SCNC: 24 MMOL/L (ref 22–29)
HDLC SERPL-MCNC: 35 MG/DL
LDLC SERPL CALC-MCNC: 212 MG/DL
MICROALBUMIN UR-MCNC: 202 MG/L
MICROALBUMIN/CREAT UR: 40.4 MG/G CR (ref 0–25)
NONHDLC SERPL-MCNC: 271 MG/DL
POTASSIUM SERPL-SCNC: 4 MMOL/L (ref 3.4–5.3)
PROT SERPL-MCNC: 7.9 G/DL (ref 6.4–8.3)
SODIUM SERPL-SCNC: 141 MMOL/L (ref 135–145)
TRIGL SERPL-MCNC: 297 MG/DL

## 2025-07-10 PROCEDURE — 82570 ASSAY OF URINE CREATININE: CPT | Performed by: FAMILY MEDICINE

## 2025-07-10 PROCEDURE — 36415 COLL VENOUS BLD VENIPUNCTURE: CPT | Performed by: FAMILY MEDICINE

## 2025-07-10 PROCEDURE — 82043 UR ALBUMIN QUANTITATIVE: CPT | Performed by: FAMILY MEDICINE

## 2025-07-10 PROCEDURE — 80053 COMPREHEN METABOLIC PANEL: CPT | Performed by: FAMILY MEDICINE

## 2025-07-10 PROCEDURE — 80061 LIPID PANEL: CPT | Performed by: FAMILY MEDICINE

## 2025-07-10 SDOH — HEALTH STABILITY: PHYSICAL HEALTH: ON AVERAGE, HOW MANY MINUTES DO YOU ENGAGE IN EXERCISE AT THIS LEVEL?: 30 MIN

## 2025-07-10 SDOH — HEALTH STABILITY: PHYSICAL HEALTH: ON AVERAGE, HOW MANY DAYS PER WEEK DO YOU ENGAGE IN MODERATE TO STRENUOUS EXERCISE (LIKE A BRISK WALK)?: 3 DAYS

## 2025-07-10 ASSESSMENT — SOCIAL DETERMINANTS OF HEALTH (SDOH): HOW OFTEN DO YOU GET TOGETHER WITH FRIENDS OR RELATIVES?: TWICE A WEEK

## 2025-07-10 NOTE — PROGRESS NOTES
Prior to immunization administration, verified patients identity using patient s name and date of birth. Please see Immunization Activity for additional information.     Screening Questionnaire for Adult Immunization    Are you sick today?   Don't Know   Do you have allergies to medications, food, a vaccine component or latex?   No   Have you ever had a serious reaction after receiving a vaccination?   Yes   Do you have a long-term health problem with heart, lung, kidney, or metabolic disease (e.g., diabetes), asthma, a blood disorder, no spleen, complement component deficiency, a cochlear implant, or a spinal fluid leak?  Are you on long-term aspirin therapy?   No   Do you have cancer, leukemia, HIV/AIDS, or any other immune system problem?   No   Do you have a parent, brother, or sister with an immune system problem?   No   In the past 3 months, have you taken medications that affect  your immune system, such as prednisone, other steroids, or anticancer drugs; drugs for the treatment of rheumatoid arthritis, Crohn s disease, or psoriasis; or have you had radiation treatments?   No   Have you had a seizure, or a brain or other nervous system problem?   No   During the past year, have you received a transfusion of blood or blood    products, or been given immune (gamma) globulin or antiviral drug?   No   For women: Are you pregnant or is there a chance you could become       pregnant during the next month?   No   Have you received any vaccinations in the past 4 weeks?   No     Immunization questionnaire was positive for at least one answer.  Notified Dr. graf.      Patient instructed to remain in clinic for 15 minutes afterwards, and to report any adverse reactions.     Screening performed by Sahil Gay MA on 7/10/2025 at 8:05 AM.
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
## 2025-07-10 NOTE — PATIENT INSTRUCTIONS
Patient Education   Preventive Care Advice   This is general advice given by our system to help you stay healthy. However, your care team may have specific advice just for you. Please talk to your care team about your preventive care needs.  Nutrition  Eat 5 or more servings of fruits and vegetables each day.  Try wheat bread, brown rice and whole grain pasta (instead of white bread, rice, and pasta).  Get enough calcium and vitamin D. Check the label on foods and aim for 100% of the RDA (recommended daily allowance).  Lifestyle  Exercise at least 150 minutes each week  (30 minutes a day, 5 days a week).  Do muscle strengthening activities 2 days a week. These help control your weight and prevent disease.  No smoking.  Wear sunscreen to prevent skin cancer.  Have a dental exam and cleaning every 6 months.  Yearly exams  See your health care team every year to talk about:  Any changes in your health.  Any medicines your care team has prescribed.  Preventive care, family planning, and ways to prevent chronic diseases.  Shots (vaccines)   HPV shots (up to age 26), if you've never had them before.  Hepatitis B shots (up to age 59), if you've never had them before.  COVID-19 shot: Get this shot when it's due.  Flu shot: Get a flu shot every year.  Tetanus shot: Get a tetanus shot every 10 years.  Pneumococcal, hepatitis A, and RSV shots: Ask your care team if you need these based on your risk.  Shingles shot (for age 50 and up)  General health tests  Diabetes screening:  Starting at age 35, Get screened for diabetes at least every 3 years.  If you are younger than age 35, ask your care team if you should be screened for diabetes.  Cholesterol test: At age 39, start having a cholesterol test every 5 years, or more often if advised.  Bone density scan (DEXA): At age 50, ask your care team if you should have this scan for osteoporosis (brittle bones).  Hepatitis C: Get tested at least once in your life.  STIs (sexually  transmitted infections)  Before age 24: Ask your care team if you should be screened for STIs.  After age 24: Get screened for STIs if you're at risk. You are at risk for STIs (including HIV) if:  You are sexually active with more than one person.  You don't use condoms every time.  You or a partner was diagnosed with a sexually transmitted infection.  If you are at risk for HIV, ask about PrEP medicine to prevent HIV.  Get tested for HIV at least once in your life, whether you are at risk for HIV or not.  Cancer screening tests  Cervical cancer screening: If you have a cervix, begin getting regular cervical cancer screening tests starting at age 21.  Breast cancer scan (mammogram): If you've ever had breasts, begin having regular mammograms starting at age 40. This is a scan to check for breast cancer.  Colon cancer screening: It is important to start screening for colon cancer at age 45.  Have a colonoscopy test every 10 years (or more often if you're at risk) Or, ask your provider about stool tests like a FIT test every year or Cologuard test every 3 years.  To learn more about your testing options, visit:   .  For help making a decision, visit:   https://bit.ly/ed57400.  Prostate cancer screening test: If you have a prostate, ask your care team if a prostate cancer screening test (PSA) at age 55 is right for you.  Lung cancer screening: If you are a current or former smoker ages 50 to 80, ask your care team if ongoing lung cancer screenings are right for you.  For informational purposes only. Not to replace the advice of your health care provider. Copyright   2023 [facility]. All rights reserved. Clinically reviewed by the [facility]. [employer] [id number] - REV 01/24.  Preventing Falls: Care Instructions  Injuries and health problems such as trouble walking or poor eyesight can increase your risk of falling. So can some medicines. But there are things you can do to help  "prevent falls. You can exercise to get stronger. You can also arrange your home to make it safer.    Talk to your doctor about the medicines you take. Ask if any of them increase the risk of falls and whether they can be changed or stopped.   Try to exercise regularly. It can help improve your strength and balance. This can help lower your risk of falling.         Practice fall safety and prevention.   Wear low-heeled shoes that fit well and give your feet good support. Talk to your doctor if you have foot problems that make this hard.  Carry a cellphone or wear a medical alert device that you can use to call for help.  Use stepladders instead of chairs to reach high objects. Don't climb if you're at risk for falls. Ask for help, if needed.  Wear the correct eyeglasses, if you need them.        Make your home safer.   Remove rugs, cords, clutter, and furniture from walkways.  Keep your house well lit. Use night-lights in hallways and bathrooms.  Install and use sturdy handrails on stairways.  Wear nonskid footwear, even inside. Don't walk barefoot or in socks without shoes.        Be safe outside.   Use handrails, curb cuts, and ramps whenever possible.  Keep your hands free by using a shoulder bag or backpack.  Try to walk in well-lit areas. Watch out for uneven ground, changes in pavement, and debris.  Be careful in the winter. Walk on the grass or gravel when sidewalks are slippery. Use de-icer on steps and walkways. Add non-slip devices to shoes.    Put grab bars and nonskid mats in your shower or tub and near the toilet. Try to use a shower chair or bath bench when bathing.   Get into a tub or shower by putting in your weaker leg first. Get out with your strong side first. Have a phone or medical alert device in the bathroom with you.   Where can you learn more?  Go to https://www.Adeptencewise.net/patiented  Enter G117 in the search box to learn more about \"Preventing Falls: Care Instructions.\"  Current as of: " July 31, 2024  Content Version: 14.5    1571-6013 [employer].   Care instructions adapted under license by your healthcare professional. If you have questions about a medical condition or this instruction, always ask your healthcare professional. [employer] disclaims any warranty or liability for your use of this information.    Learning About Sleeping Well  What does sleeping well mean?     Sleeping well means getting enough sleep to feel good and stay healthy. How much sleep is enough varies among people.  The number of hours you sleep and how you feel when you wake up are both important. If you do not feel refreshed, you probably need more sleep. Another sign of not getting enough sleep is feeling tired during the day.  Experts recommend that adults get at least 7 or more hours of sleep per day. Children and older adults need more sleep.  Why is getting enough sleep important?  Getting enough quality sleep is a basic part of good health. When your sleep suffers, your physical health, mood, and your thoughts can suffer too. You may find yourself feeling more grumpy or stressed. Not getting enough sleep also can lead to serious problems, including injury, accidents, anxiety, and depression.  What might cause poor sleeping?  Many things can cause sleep problems, including:  Changes to your sleep schedule.  Stress. Stress can be caused by fear about a single event, such as giving a speech. Or you may have ongoing stress, such as worry about work or school.  Depression, anxiety, and other mental or emotional conditions.  Changes in your sleep habits or surroundings. This includes changes that happen where you sleep, such as noise, light, or sleeping in a different bed. It also includes changes in your sleep pattern, such as having jet lag or working a late shift.  Health problems, such as pain, breathing problems, and restless legs syndrome.  Lack of regular exercise.  Using alcohol, nicotine,  "or caffeine before bed.  How can you help yourself?  Here are some tips that may help you sleep more soundly and wake up feeling more refreshed.  Your sleeping area   Use your bedroom only for sleeping and sex. A bit of light reading may help you fall asleep. But if it doesn't, do your reading elsewhere in the house. Try not to use your TV, computer, smartphone, or tablet while you are in bed.  Be sure your bed is big enough to stretch out comfortably, especially if you have a sleep partner.  Keep your bedroom quiet, dark, and cool. Use curtains, blinds, or a sleep mask to block out light. To block out noise, use earplugs, soothing music, or a \"white noise\" machine.  Your evening and bedtime routine   Create a relaxing bedtime routine. You might want to take a warm shower or bath, or listen to soothing music.  Go to bed at the same time every night. And get up at the same time every morning, even if you feel tired.  What to avoid   Limit caffeine (coffee, tea, caffeinated sodas) during the day, and don't have any for at least 6 hours before bedtime.  Avoid drinking alcohol before bedtime. Alcohol can cause you to wake up more often during the night.  Try not to smoke or use tobacco, especially in the evening. Nicotine can keep you awake.  Limit naps during the day, especially close to bedtime.  Avoid lying in bed awake for too long. If you can't fall asleep or if you wake up in the middle of the night and can't get back to sleep within about 20 minutes, get out of bed and go to another room until you feel sleepy.  Avoid taking medicine right before bed that may keep you awake or make you feel hyper or energized. Your doctor can tell you if your medicine may do this and if you can take it earlier in the day.  If you can't sleep   Imagine yourself in a peaceful, pleasant scene. Focus on the details and feelings of being in a place that is relaxing.  Get up and do a quiet or boring activity until you feel " "sleepy.  Avoid drinking any liquids before going to bed to help prevent waking up often to use the bathroom.  Where can you learn more?  Go to https://www.[employer].net/patiented  Enter J942 in the search box to learn more about \"Learning About Sleeping Well.\"  Current as of: July 31, 2024  Content Version: 14.5 2024-2025 [employer].   Care instructions adapted under license by your healthcare professional. If you have questions about a medical condition or this instruction, always ask your healthcare professional. [employer] disclaims any warranty or liability for your use of this information.       "

## 2025-07-13 ENCOUNTER — HOSPITAL ENCOUNTER (OUTPATIENT)
Dept: CT IMAGING | Facility: CLINIC | Age: 60
Discharge: HOME | End: 2025-07-13
Attending: NURSE PRACTITIONER | Admitting: NURSE PRACTITIONER
Payer: COMMERCIAL

## 2025-07-13 DIAGNOSIS — F17.218 CIGARETTE NICOTINE DEPENDENCE WITH OTHER NICOTINE-INDUCED DISORDER: ICD-10-CM

## 2025-07-13 PROCEDURE — 71271 CT THORAX LUNG CANCER SCR C-: CPT

## 2025-07-14 ENCOUNTER — MYC MEDICAL ADVICE (OUTPATIENT)
Dept: FAMILY MEDICINE | Facility: CLINIC | Age: 60
End: 2025-07-14

## 2025-07-14 ENCOUNTER — OFFICE VISIT (OUTPATIENT)
Dept: SLEEP MEDICINE | Facility: CLINIC | Age: 60
End: 2025-07-14
Attending: FAMILY MEDICINE
Payer: COMMERCIAL

## 2025-07-14 VITALS
WEIGHT: 183.8 LBS | HEART RATE: 70 BPM | BODY MASS INDEX: 32.57 KG/M2 | SYSTOLIC BLOOD PRESSURE: 110 MMHG | RESPIRATION RATE: 15 BRPM | OXYGEN SATURATION: 95 % | DIASTOLIC BLOOD PRESSURE: 80 MMHG | HEIGHT: 63 IN

## 2025-07-14 DIAGNOSIS — G47.33 OSA (OBSTRUCTIVE SLEEP APNEA): ICD-10-CM

## 2025-07-14 DIAGNOSIS — E66.811 OBESITY (BMI 30.0-34.9): ICD-10-CM

## 2025-07-14 DIAGNOSIS — J44.89 ASTHMA-COPD OVERLAP SYNDROME (H): ICD-10-CM

## 2025-07-14 DIAGNOSIS — G47.9 SLEEP DISTURBANCE: Primary | ICD-10-CM

## 2025-07-14 DIAGNOSIS — J34.2 DEVIATED NASAL SEPTUM: Primary | ICD-10-CM

## 2025-07-14 PROCEDURE — 99204 OFFICE O/P NEW MOD 45 MIN: CPT | Performed by: NURSE PRACTITIONER

## 2025-07-14 PROCEDURE — 1126F AMNT PAIN NOTED NONE PRSNT: CPT | Performed by: NURSE PRACTITIONER

## 2025-07-14 ASSESSMENT — SLEEP AND FATIGUE QUESTIONNAIRES
HOW LIKELY ARE YOU TO NOD OFF OR FALL ASLEEP WHILE WATCHING TV: SLIGHT CHANCE OF DOZING
HOW LIKELY ARE YOU TO NOD OFF OR FALL ASLEEP WHILE SITTING QUIETLY AFTER LUNCH WITHOUT ALCOHOL: WOULD NEVER DOZE
HOW LIKELY ARE YOU TO NOD OFF OR FALL ASLEEP WHILE SITTING INACTIVE IN A PUBLIC PLACE: SLIGHT CHANCE OF DOZING
HOW LIKELY ARE YOU TO NOD OFF OR FALL ASLEEP WHILE LYING DOWN TO REST IN THE AFTERNOON WHEN CIRCUMSTANCES PERMIT: MODERATE CHANCE OF DOZING
HOW LIKELY ARE YOU TO NOD OFF OR FALL ASLEEP WHILE SITTING AND READING: MODERATE CHANCE OF DOZING
HOW LIKELY ARE YOU TO NOD OFF OR FALL ASLEEP WHEN YOU ARE A PASSENGER IN A CAR FOR AN HOUR WITHOUT A BREAK: WOULD NEVER DOZE
HOW LIKELY ARE YOU TO NOD OFF OR FALL ASLEEP IN A CAR, WHILE STOPPED FOR A FEW MINUTES IN TRAFFIC: WOULD NEVER DOZE
HOW LIKELY ARE YOU TO NOD OFF OR FALL ASLEEP WHILE SITTING AND TALKING TO SOMEONE: WOULD NEVER DOZE

## 2025-07-14 NOTE — PATIENT INSTRUCTIONS
"          MY TREATMENT INFORMATION FOR SLEEP APNEA-  Susan wKan    DOCTOR : EITAN Sosa CNP    Am I having a sleep study at a sleep center?  --->Due to normal delays, you will be contacted within 2-4 weeks to schedule    Am I having a home sleep study?  --->Watch the video for the device you are using:    -/drop off device-   https://www.Dreampod.com/watch?v=yGGFBdELGhk    -Disposable device sent out require phone/computer application-   https://www.Dreampod.com/watch?v=BCce_vbiwxE      Frequently asked questions:  1. What is Obstructive Sleep Apnea (DESI)? DESI is the most common type of sleep apnea. Apnea means, \"without breath.\"  Apnea is most often caused by narrowing or collapse of the upper airway as muscles relax during sleep.   Almost everyone has occasional apneas. Most people with sleep apnea have had brief interruptions at night frequently for many years.  The severity of sleep apnea is related to how frequent and severe the events are.   2. What are the consequences of DESI? Symptoms include: feeling sleepy during the day, snoring loudly, gasping or stopping of breathing, trouble sleeping, and occasionally morning headaches or heartburn at night.  Sleepiness can be serious and even increase the risk of falling asleep while driving. Other health consequences may include development of high blood pressure and other cardiovascular disease in persons who are susceptible. Untreated DESI  can contribute to heart disease, stroke and diabetes.   3. What are the treatment options? In most situations, sleep apnea is a lifelong disease that must be managed with daily therapy. Medications are not effective for sleep apnea and surgery is generally not considered until other therapies have been tried. Your treatment is your choice . Continuous Positive Airway (CPAP) works right away and is the therapy that is effective in nearly everyone. An oral device to hold your jaw forward is usually the next most " reliable option. Other options include postioning devices (to keep you off your back), weight loss, and surgery including a tongue pacing device. There is more detail about some of these options below.  4. Are my sleep studies covered by insurance? Although we will request verification of coverage, we advise you also check in advance of the study to ensure there is coverage.    Important tips for those choosing CPAP and similar devices  REMEMBER-IF YOU RECEIVE A CALL FROM  [phone]-->IT IS TO SETUP A DEVICE  For new devices, sign up for device IRINA to monitor your device for your followup visits  We encourage you to utilize the [employer] irina or website ( https://Poshly.iDoneThis/ ) to monitor your therapy progress and share the data with your healthcare team when you discuss your sleep apnea.                                                    Know your equipment:  CPAP is continuous positive airway pressure that prevents obstructive sleep apnea by keeping the throat from collapsing while you are sleeping. In most cases, the device is  smart  and can slowly self-adjusts if your throat collapses and keeps a record every day of how well you are treated-this information is available to you and your care team.  BPAP is bilevel positive airway pressure that keeps your throat open and also assists each breath with a pressure boost to maintain adequate breathing.  Special kinds of BPAP are used in patients who have inadequate breathing from lung or heart disease. In most cases, the device is  smart  and can slowly self-adjusts to assist breathing. Like CPAP, the device keeps a record of how well you are treated.  Your mask is your connection to the device. You get to choose what feels most comfortable and the staff will help to make sure if fits. Here: are some examples of the different masks that are available: Magnetic mask aids may assist with use but there are safety issues that should be addressed when  considering with magnets* ( see end of discussion).       Key points to remember on your journey with sleep apnea:  Sleep study.  PAP devices often need to be adjusted during a sleep study to show that they are effective and adjusted right.  Good tips to remember: Try wearing just the mask during a quiet time during the day so your body adapts to wearing it. A humidifier is recommended for comfort in most cases to prevent drying of your nose and throat. Allergy medication from your provider may help you if you are having nasal congestion.  Getting settled-in. It takes more than one night for most of us to get used to wearing a mask. Try wearing just the mask during a quiet time during the day so your body adapts to wearing it. A humidifier is recommended for comfort in most cases. Our team will work with you carefully on the first day and will be in contact within 4 days and again at 2 and 4 weeks for advice and remote device adjustments. Your therapy is evaluated by the device each day.   Use it every night. The more you are able to sleep naturally for 7-8 hours, the more likely you will have good sleep and to prevent health risks or symptoms from sleep apnea. Even if you use it 4 hours it helps. Occasionally all of us are unable to use a medical therapy, in sleep apnea, it is not dangerous to miss one night.   Communicate. Call our skilled team on the number provided on the first day if your visit for problems that make it difficult to wear the device. Over 2 out of 3 patients can learn to wear the device long-term with help from our team. Remember to call our team or your sleep providers if you are unable to wear the device as we may have other solutions for those who cannot adapt to mask CPAP therapy. It is recommended that you sleep your sleep provider within the first 3 months and yearly after that if you are not having problems.   Use it for your health. We encourage use of CPAP masks during daytime quiet  periods to allow your face and brain to adapt to the sensation of CPAP so that it will be a more natural sensation to awaken to at night or during naps. This can be very useful during the first few weeks or months of adapting to CPAP though it does not help medically to wear CPAP during wakefulness and  should not be used as a strategy just to meet guidelines.  Take care of your equipment. Make sure you clean your mask and tubing using directions every day and that your filter and mask are replaced as recommended or if they are not working.     *Masks with magnets:  Updated Contraindications  Masks with magnetic components are contraindicated for use by patients where they, or anyone in close physical contact while using the mask, have the following:   Active medical implants that interact with magnets (i.e., pacemakers, implantable cardioverter defibrillators (ICD), neurostimulators, cerebrospinal fluid (CSF) shunts, insulin/infusion pumps)   Metallic implants/objects containing ferromagnetic material (i.e., aneurysm clips/flow disruption devices, embolic coils, stents, valves, electrodes, implants to restore hearing or balance with implanted magnets, ocular implants, metallic splinters in the eye)  Updated Warning  Keep the mask magnets at a safe distance of at least 6 inches (150 mm) away from implants or medical devices that may be adversely affected by magnetic interference. This warning applies to you or anyone in close physical contact with your mask. The magnets are in the frame and lower headgear clips, with a magnetic field strength of up to 400mT. When worn, they connect to secure the mask but may inadvertently detach while asleep.  Implants/medical devices, including those listed within contraindications, may be adversely affected if they change function under external magnetic fields or contain ferromagnetic materials that attract/repel to magnetic fields (some metallic implants, e.g., contact lenses  with metal, dental implants, metallic cranial plates, screws, sammy hole covers, and bone substitute devices). Consult your physician and  of your implant / other medical device for information on the potential adverse effects of magnetic fields.    BESIDES CPAP, WHAT OTHER THERAPIES ARE THERE?    Positioning Device  Positioning devices are generally used when sleep apnea is mild and only occurs on your back.This example shows a pillow that straps around the waist. It may be appropriate for those whose sleep study shows milder sleep apnea that occurs primarily when lying flat on one's back. Preliminary studies have shown benefit but effectiveness at home may need to be verified by a home sleep test. These devices are generally not covered by medical insurance.  Examples of devices that maintain sleeping on the back to prevent snoring and mild sleep apnea.    Belt type body positioner  http://Sopogy/    Electronic reminder  http://nightshifttherapy.[email or web address]/            Oral Appliance  What is oral appliance therapy?  An oral appliance device fits on your teeth at night like a retainer used after having braces. The device is made by a specialized dentist and requires several visits over 1-2 months before a manufactured device is made to fit your teeth and is adjusted to prevent your sleep apnea. Once an oral device is working properly, snoring should be improved. A home sleep test may be recommended at that time if to determine whether the sleep apnea is adequately treated.       Some things to remember:  -Oral devices are often, but not always, covered by your medical insurance. Be sure to check with your insurance provider.   -If you are referred for oral therapy, you will be given a list of specialized dentists to consider or you may choose to visit the Web site of the American Academy of Dental Sleep Medicine  -Oral devices are less likely to work if you have severe sleep apnea or are extremely  overweight.     If your doctor makes a referral for a Mandibular Advancement Device, you can call a certified sleep medicine office to verify your medical insurance will be accepted and for proper care in fabricating and adjusting the device  [facility]  Search engine: https://mms.aadsm.org/members/directory/search_bootstrap.php?org_id=ADSM&   Certified in Dental Sleep Medicine    Darío Everett  Degree: DDS  [address]  [address]  [place] [address]  Professional Phone: [phone]  Website: [email or web address]    Leonardo Her  Degree: DIXON  [facility]  [address]  [address]  [place] [address]  Professional Phone: [phone]: [phone]    [facility]  [address] [place] [address]  [address]  [place] [address]  Appointments: [phone]  Fax: [phone]    Brooke Olguin  [facility]  [address]  [place] [address]  Professional Phone: [phone]  Website: [email or web address]      Franck Judd   [facility]   Degree: MD DIXON   [address] [place] [address]  [address]  [place] [address]  Appointments: [phone]    Jose Ceron  Degree: DIXON  [address]  [place] [address]  Professional Phone: [phone]  Fax: [phone]    Victor Hugo Judd  Degree: DDS  [facility]  [address]  [address]  [place] [address]  Professional Phone: [phone]  Website: [email or web address]      Ana Ann  [facility]-you should verify insurance coverage  [address]  [address]  [place] [address]  Professional Phone: [phone]  Website: [email or web address]      Abena Mahan--DOES NOT ACCEPT INSURANCE  Degree: DIXON--you should verify insurance coverage  [facility]  [address]  [place] [address]  Professional Phone: [phone]    Keysha Durand  Degree: DDS, PhD  [facility]  [address] [place] [address]   Appointments: [phone]   Fax: [phone]     Mandeep Boyer [facility]  Degree: DDS  [address] [place] [address]  Professional Phone: [phone]  Fax: [phone]  Website: [email or web address]      Devonte Davies  Degree: DDS  [facility]  [address] [place] [address]    Mariona Mulet Pradera  Degree: MS DIXON  [facility]  [address] [place] [address]  Professional Phone: [phone]      Kassandra Woodruff  Degree: MS DIXON  [facility]  [address]  [address]  [place] [address]  Professional Phone: [phone]    Gretel Williamson  Degree: DDS  [facility]  [address] [place] [address]  Appointments: [phone]    Jaime Mena  Degree: ANDRESS  [facility]  [address] [place] [address]  Professional Phone: [phone]  Website: [email or web address]      Jimmy Negrete  Degree: DDS  [facility]  [address] [place] [address]  Professional Phone: [phone]  Fax: [phone]      Mickey Foley  Degree: ANDRESS  [facility]  [address]  [address]  [place] [address]    Cachorro Lan   Degree: DDS   [facility]   [address]   [address]  [place] [address]  Phone [phone]  Website: [email or web address]    Lauren Fischer   Degree: DDS   [address]  [address]  [place] [address]  Appointments: [phone]    Dolores Larios   Degree: DDS   [address] [place] [address]   Appointments: [phone]    Yg Avendano   Degree: DIXON   [address] [place] [address]   Appointments: [phone]    Kulwinder Qureshi   Degree: DDS   [address] [place] [address]  [place] [address]   Appointments: [phone]    Live Adams   Degree: DDS   [facility], [address]   [address]   [place] [address]   Appointments: [phone]    Merlyn Haynes   Degree: DDS  [address] [place] [address]  Appointments: [phone]    Andrew Carrell   [address] [place] [address]  Appointments: [phone]    Bhumika Kilgore   Degree: DDS   [facility]   [address] [place] [address]   Appointments: [phone]    Chuckie Nascimento   Degree: DDS   [address] [place] [address]   Appointments: [phone]    Deshawn Santana-   [facility]    Degree: DDS  [address]  [address]  [place] [address]  Appointments: [phone]    Mario Alberto [place]   Degree: DDJACINTA [facility]   [address]  [address]  [place] [address]  Appointment: [phone]    Leela Girl   Degree: MS ROSE MARIE, JAK   [facility]  [address]   [address]  [place] [address]  Appointments: [phone]    Franck Lopez   Degree: DDS   [address] [place] [address]   Appointments: [phone]   Fax: [phone]        ACCEPT MEDICARE  Reji Cortez DDS  [address], [place] [address]  [phone]; [phone] (fax)  [place]    Dillon Mahan DDS, MS   [facility] [place] [address]   [address]   [place] [address]   Appointments: [phone]   Fax: [phone]     Severiano Hernandez   Degree: DERIAN HENSLEY   [facility]   [address]  [address]  [place] [address]  Appointments: [phone]  Fax: [phone]    Nelda Chavez BDS, MS(CR). MS (OFP)  Diplomate American Board of Orofacial Pain  [facility]  [address],   [place] [address]  Appointment: [phone]  Fax: [phone]        ADDITIONAL PROVIDERS    Leonides Gant DDS    [facility]  [facility]   [facility]  [address] [address]  Appointments: [phone]     [facility]  [address]  [address]  [place] [address]  Appointments: [phone]  Fax- [phone]    Gay Akbar   Degree: DDS   [facility]    [address]  [address]  [place] [address]  Appointments: [phone]        More detailed information  An oral appliance is a small acrylic device that fits over the upper and lower teeth  (similar to a retainer or a mouth guard). This device slightly moves jaw forward, which moves the base of the tongue forward, opens the airway, improves breathing for effective treat snoring and obstructive sleep apnea in perhaps 7 out of 10 people .  The best working devices are custom-made by a dental device  after a mold is made of the teeth 1, 2, 3.  When is an oral appliance indicated?  Oral appliance therapy is recommended as a first-line treatment for patients with primary snoring, mild sleep apnea, and for patients with moderate sleep apnea who prefer appliance therapy to use of CPAP4, 5. Severity of sleep apnea is determined by sleep testing and is based on the number of respiratory events per hour of sleep.   How successful is oral appliance therapy?  The success rate of oral appliance therapy in patients with mild sleep apnea is 75-80% while in patients with moderate sleep apnea it is 50-70%. The chance of success in patients with severe sleep apnea is 40-50%. The research also shows that oral appliances have a beneficial effect on the cardiovascular health of DESI patients at the same magnitude as CPAP therapy7.  Oral appliances should be a second-line treatment in cases of severe sleep apnea, but if not completely successful then a combination therapy utilizing CPAP plus oral appliance therapy may be effective. Oral appliances tend to be effective in a broad range of patients  although studies show that the patients who have the highest success are females, younger patients, those with milder disease, and less severe obesity. 3, 6.   Finding a dentist that practices dental sleep medicine  Specific training is available through the American Academy of Dental Sleep Medicine for dentists interested in working in the field of sleep. To find a dentist who is educated in the field of sleep and the use of oral appliances, near you, visit the Web site of the American Academy of Dental Sleep Medicine.    References  1. Maritza et al. Objectively measured vs self-reported compliance during oral appliance therapy for sleep-disordered breathing. Chest 2013; 144(5): 5073-4458.  2. Storm, et al. Objective measurement of compliance during oral appliance therapy for sleep-disordered breathing. Thorax 2013; 68(1): 91-96.  3. Daisy et al. Mandibular advancement devices in 620 men and women with DESI and snoring: tolerability and predictors of treatment success. Chest 2004; 125: 5782-0048.  4. Srini, et al. Oral appliances for snoring and DESI: a review. Sleep 2006; 29: 244-262.  5. Pawel et al. Oral appliance treatment for DESI: an update. J Clin Sleep Med 2014; 10(2): 215-227.  6. Kenzie et al. Predictors of OSAH treatment outcome. J Dent Res 2007; 86: 8003-0126.      Weight Loss:   Your Body mass index is 32.16 kg/m .    Being overweight does not necessarily mean you will have health consequences.  Those who have BMI over 30 or over 27 with existing medical conditions carries greater risk.   Weight loss decreases severity of sleep apnea in most people with obesity. For those with mild obesity who have developed snoring with weight gain, even 15-30 pound weight loss can improve and occasionally milder eliminate sleep apnea.  Structured and life-long dietary and health habits are necessary to lose weight and keep healthier weight levels.     The Comprehensive Weight loss program  offers all aspects of weight loss strategies including two Non-Surgical Weight Loss Programs: Medical Weight Management and our 24 Week Healthy Lifestyle Program:  Medical Weight Management: You will meet with a Medical Weight Management Provider, as well as a Registered Dietician. The program may include medication therapy, dietary education, recommended exercise and physical therapy programs, monthly support group meetings, and possible psychological counseling. Follow up visits with the provider or dietician are scheduled based on your progress and needs.  24 Week Healthy Lifestyle Program: This unique program is designed to give you the support of weekly appointments and activities thru a 24-week period. It may include all of the components of the basic program (above), with the addition of 11 individual Health  Visits, 24-week access to the [employer] website for over 700 online classes, and monthly support group meetings. This program has an out-of-pocket expense of $499 to cover the items that can not be billed to insurance (health coaches and [employer] access), and is non-refundable/non-transferable (you may be able to use a Health Savings Account; ask your HSA provider). There may be an optional meal replacement plan prescribed as well.   Medication therapy has been approved for the treatment of sleep apnea: The FDA approved tirzepatide (ZEPBOUND) for moderate to severe sleep apnea (apnea-hypopnea index greater than or equal to 15) in patients with BMI of greater than or equal to 30, or BMI greater than or equal to 27 with at least 1 weight-related condition such as hypertension or dyslipidemia.  Surgical management achieves meaningful long-term weight loss and improvement in health risks in most patients with more severe obesity.      Sleep Apnea Surgery:    Surgery for obstructive sleep apnea is considered generally only when other therapies fail to work. Surgery may be discussed with you if you are  having a difficult time tolerating CPAP and or when there is an abnormal structure that requires surgical correction.  Nose and throat surgeries often enlarge the airway to prevent collapse.  Most of these surgeries create pain for 1-2 weeks and up to half of the most common surgeries are not effective throughout life.  You should carefully discuss the benefits and drawbacks to surgery with your sleep provider and surgeon to determine if it is the best solution for you.   More information  Surgery for DESI is directed at areas that are responsible for narrowing or complete obstruction of the airway during sleep.  There are a wide range of procedures available to enlarge and/or stabilize the airway to prevent blockage of breathing in the three major areas where it can occur: the palate, tongue, and nasal regions.  Successful surgical treatment depends on the accurate identification of the factors responsible for obstructive sleep apnea in each person.  A personalized approach is required because there is no single treatment that works well for everyone.  Because of anatomic variation, consultation with an examination by a sleep surgeon is a critical first step in determining what surgical options are best for each patient.  In some cases, examination during sedation may be recommended in order to guide the selection of procedures.  Patients will be counseled about risks and benefits as well as the typical recovery course after surgery. Surgery is typically not a cure for a person s DESI.  However, surgery will often significantly improve one s DESI severity (termed  success rate ).  Even in the absence of a cure, surgery will decrease the cardiovascular risk associated with OSA7; improve overall quality of life8 (sleepiness, functionality, sleep quality, etc).      Palate Procedures:  Patients with DESI often have narrowing of their airway in the region of their tonsils and uvula.  The goals of palate procedures are to  widen the airway in this region as well as to help the tissues resist collapse.  Modern palate procedure techniques focus on tissue conservation and soft tissue rearrangement, rather than tissue removal.  Often the uvula is preserved in this procedure. Residual sleep apnea is common in patient after pharyngoplasty with an average reduction in sleep apnea events of 33%2.      Tongue Procedures:  ExamWhile patients are awake, the muscles that surround the throat are active and keep this region open for breathing. These muscles relax during sleep, allowing the tongue and other structures to collapse and block breathing.  There are several different tongue procedures available.  Selection of a tongue base procedure depends on characteristics seen on physical exam.  Generally, procedures are aimed at removing bulky tissues in this area or preventing the back of the tongue from falling back during sleep.  Success rates for tongue surgery range from 50-62%3.    Hypoglossal Nerve Stimulation:  Hypoglossal nerve stimulation has recently received approval from the [place] Food and Drug Administration for the treatment of obstructive sleep apnea.  This is based on research showing that the system was safe and effective in treating sleep apnea6.  Results showed that the median AHI score decreased 68%, from 29.3 to 9.0. This therapy uses an implant system that senses breathing patterns and delivers mild stimulation to airway muscles, which keeps the airway open during sleep.  The system consists of three fully implanted components: a small generator (similar in size to a pacemaker), a breathing sensor, and a stimulation lead.  Using a small handheld remote, a patient turns the therapy on before bed and off upon awakening.    Candidates for this device must be greater than 18 years of age, have moderate to severe obstructive sleep apnea with less than 25% central events  (AHI between 15-65), BMI less than 35, have tried  CPAP/oral appliance for at least 8 weeks without success, and have appropriate upper airway anatomy (determined by a sleep endoscopy performed by Dr. José Miguel Arrieta or Dr. Ton Guevara).     Nasal Procedures:  Nasal obstruction can interfere with nasal breathing during the day and night.  Studies have shown that relief of nasal obstruction can improve the ability of some patients to tolerate positive airway pressure therapy for obstructive sleep apnea1.  Treatment options include medications such as nasal saline, topical corticosteroid and antihistamine sprays, and oral medications such as antihistamines or decongestants. Non-surgical treatments can include external nasal dilators for selected patients. If these are not successful by themselves, surgery can improve the nasal airway either alone or in combination with these other options.        Combination Procedures:  Combination of surgical procedures and other treatments may be recommended, particularly if patients have more than one area of narrowing or persistent positional disease.  The success rate of combination surgery ranges from 66-80%2,3.    References  Jack REID. The Role of the Nose in Snoring and Obstructive Sleep Apnoea: An Update.  Eur Arch Otorhinolaryngol. 2011; 268: 1365-73.   Kusum SM; Manuel JA; Manfred JR; Pallanch JF; Boubacar MB; Ada SG; Miriam GIPSON. Surgical modifications of the upper airway for obstructive sleep apnea in adults: a systematic review and meta-analysis. SLEEP 2010;33(10):8959-6048. Pako NAVARRO. Hypopharyngeal surgery in obstructive sleep apnea: an evidence-based medicine review.  Arch Otolaryngol Head Neck Surg. 2006 Feb;132(2):206-13.  Michael YH1, Roberto Y, Carter BLAIR. The efficacy of anatomically based multilevel surgery for obstructive sleep apnea. Otolaryngol Head Neck Surg. 2003 Oct;129(4):327-35.  Pako NAVARRO, Goldberg A. Hypopharyngeal Surgery in Obstructive Sleep Apnea: An Evidence-Based Medicine Review. Arch Otolaryngol  Head Neck Surg. 2006 Feb;132(2):206-13.  Pineda PJ et al. Upper-Airway Stimulation for Obstructive Sleep Apnea.  N Engl J Med. 2014 Jan 9;370(2):139-49.  Akanksha Y et al. Increased Incidence of Cardiovascular Disease in Middle-aged Men with Obstructive Sleep Apnea. Am J Respir Crit Care Med; 2002 166: 159-165  Campbell EM et al. Studying Life Effects and Effectiveness of Palatopharyngoplasty (SLEEP) study: Subjective Outcomes of Isolated Uvulopalatopharyngoplasty. Otolaryngol Head Neck Surg. 2011; 144: 623-631.        WHAT IF I ONLY HAVE SNORING?    Mandibular advancement devices, lateral sleep positioning, long-term weight loss and treatment of nasal allergies have been shown to improve snoring.  Exercising tongue muscles with a game (https://motify.[employer]/irina/[employer]-reduce-snoring/rj0305169529) or stimulating the tongue during the day with a device (https://doi.org/10.3390/vey82491094) have improved snoring in some individuals.  https://www.[employer].Mechanology/  https://www.sleepfoundation.org/best-anti-snoring-mouthpieces-and-mouthguards    Remember to Drive Safe... Drive Alive     Sleep health profoundly affects your health, mood, and your safety.  Thirty three percent of the population (one in three of us) is not getting enough sleep and many have a sleep disorder. Not getting enough sleep or having an untreated / undertreated sleep condition may make us sleepy without even knowing it. In fact, our driving could be dramatically impaired due to our sleep health. As your provider, here are some things I would like you to know about driving:     Here are some warning signs for impairment and dangerous drowsy driving:              -Having been awake more than 16 hours               -Looking tired               -Eyelid drooping              -Head nodding (it could be too late at this point)              -Driving for more than 30 minutes     Some things you could do to make the driving safer if you are experiencing some  drowsiness:              -Stop driving and rest              -Call for transportation              -Make sure your sleep disorder is adequately treated     Some things that have been shown NOT to work when experiencing drowsiness while driving:              -Turning on the radio              -Opening windows              -Eating any  distracting  /  entertaining  foods (e.g., sunflower seeds, candy, or any other)              -Talking on the phone      Your decision may not only impact your life, but also the life of others. Please, remember to drive safe for yourself and all of us.

## 2025-07-14 NOTE — NURSING NOTE
"Chief Complaint   Patient presents with    Sleep Problem     Establish care, needs supplies       Initial /80   Pulse 70   Resp 15   Ht 1.61 m (5' 3.39\")   Wt 83.4 kg (183 lb 12.8 oz)   LMP 03/09/2010   SpO2 95%   BMI 32.16 kg/m   Estimated body mass index is 32.16 kg/m  as calculated from the following:    Height as of this encounter: 1.61 m (5' 3.39\").    Weight as of this encounter: 83.4 kg (183 lb 12.8 oz).    Medication Reconciliation: complete    Neck circumference: 38 centimeters.    DME: N/A      BREEZY Hu      "

## 2025-07-14 NOTE — PROGRESS NOTES
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  Increased Cough: (Patient-Rptd) No  Heart Racing at Night: (Patient-Rptd) No  Swelling in Feet or Legs: (Patient-Rptd) No  Diarrhea at Night: (Patient-Rptd) No  Heartburn at Night: (Patient-Rptd) Yes  Urinating More than Once at Night: (Patient-Rptd) No  Losing Control of Urine at Night: (Patient-Rptd) Yes  Joint Pains at Night: (Patient-Rptd) No  Headaches in Morning: (Patient-Rptd) No  Weakness in Arms or Legs: (Patient-Rptd) Yes  Depressed Mood: (Patient-Rptd) Yes  Anxiety: (Patient-Rptd) Yes     Physical Examination:  Vitals: Pulse 70   Resp 15   Ht 1.61 m (5' 3.39\")   Wt 83.4 kg (183 lb 12.8 oz)   LMP 03/09/2010   SpO2 95%   BMI 32.16 kg/m    BMI= Body mass index is 32.16 kg/m .    Neck Cir (cm): 38 cm      GENERAL APPEARANCE: healthy, alert, no distress, and cooperative  EYES: Eyes grossly normal to inspection  RESP: Unlabored, easy breathing with normal conversational speech  CV: color normal  NEURO: Alert and oriented x 3, normal strength and tone, mentation intact, and speech normal  PSYCH: mentation appears normal and affect normal/bright  Mallampati Class: Deferred examination  Tonsillar Stage: Deferred " "examination           Data: All pertinent previous laboratory data reviewed     Recent Labs   Lab Test 07/10/25  0913 03/06/25  1402    140   POTASSIUM 4.0 4.3   CHLORIDE 102 101   CO2 24 26   ANIONGAP 15 13   * 139*   BUN 20.4* 24.0*   CR 0.89 1.19*   VANESSA 10.2 9.1       Recent Labs   Lab Test 03/06/25  1402   WBC 21.1*   RBC 4.52   HGB 13.1   HCT 41.0   MCV 91   MCH 29.0   MCHC 32.0   RDW 13.4          Recent Labs   Lab Test 07/10/25  0913   PROTTOTAL 7.9   ALBUMIN 4.9   BILITOTAL 0.5   ALKPHOS 80   AST 56*   *       TSH   Date Value   03/06/2025 0.90 uIU/mL   06/06/2024 0.58 uIU/mL   01/03/2022 0.51 uIU/mL   12/08/2020 0.31 uIU/mL   04/09/2010 9.02 mU/L (H)       No results found for: \"UAMP\", \"UBARB\", \"BENZODIAZEUR\", \"UCANN\", \"UCOC\", \"OPIT\", \"UPCP\"    No results found for: \"IRONSAT\", \"MQ45232\", \"FRANK\"    No results found for: \"PH\", \"PHARTERIAL\", \"PO2\", \"DY9XQVECMJD\", \"SAT\", \"PCO2\", \"HCO3\", \"BASEEXCESS\", \"ANASTACIO\", \"BEB\"    @LABRCNTIPR(phv:4,pco2v:4,po2v:4,hco3v:4,tamiko:4,o2per:4)@    Echocardiology: No results found for this or any previous visit (from the past 4320 hours).  4/25/2025 Echocardiogram Complete  Interpretation Summary  The left ventricle is normal in size.  The visual ejection fraction is 60-65%.  Regional wall motion is grossly normal but endocardial border definition is limited  No hemodynamically significant valvular abnormalities on 2D or color flow imaging.  There is no comparison study available.      Chest x-ray:   XR Chest 2 Views 08/11/2024    Narrative  EXAM: XR CHEST 2 VIEWS  LOCATION: [facility]  DATE: 8/11/2024    INDICATION: cough x 5 days, rales and expiratory wheezing bilateral lower lobes; r o pneumonia  COMPARISON: CT chest 7/11/2024.    Impression  IMPRESSION:    Lungs are clear. No evidence of pneumonia. No pleural effusions or pneumothorax. Normal pulmonary vascularity. Nonenlarged cardiac silhouette. Unchanged spinal stimulator leads " overlying the mid thoracic spine. Cervical spinal fusion hardware.      Chest CT:   CT Chest Pulmonary Embolism w Contrast 03/01/2025    Narrative  For Patients: As a result of the 21st Century Cures Act, medical imaging exams and procedure reports are released immediately into your electronic medical record. You may view this report before your referring provider. If you have questions, please contact your health care provider.    EXAM: CT CHEST PE STUDY  LOCATION: [facility]  DATE: 3/1/2025    INDICATION: ongoing O2 needs, tachycardia and chest pain in setting of dyspnea.  r/o pe  COMPARISON: Chest x-ray 2/26/2025.  TECHNIQUE: CT chest pulmonary angiogram during arterial phase injection of IV contrast. Multiplanar reformats and MIP reconstructions were performed. Dose reduction techniques were used.  CONTRAST: Omnipaque 350    FINDINGS:  PULMONARY ARTERY CT ANGIOGRAM: No filling defects.    LUNGS AND PLEURA: Centrilobular emphysema. No consolidation. No pleural effusions or pneumothorax.    MEDIASTINUM/AXILLAE: No thoracic adenopathy. No cardiomegaly. No pericardial effusion. Normal caliber thoracic aorta. Aortic atherosclerosis. No acute abnormality.    CORONARY ARTERY CALCIFICATION: Mild.    UPPER ABDOMEN: Normal.    MUSCULOSKELETAL: ACDF. Thoracic spinal stimulator.    Impression  1.  Negative for acute pulmonary embolism.  2.  No consolidative airspace disease.      PFT: Most Recent [facility] Pulmonary Function Testing    FVC-Pred   Date Value Ref Range Status   09/05/2024 2.79 L      FVC-Pre   Date Value Ref Range Status   09/05/2024 2.28 L      FVC-%Pred-Pre   Date Value Ref Range Status   09/05/2024 81 %      FEV1-Pre   Date Value Ref Range Status   09/05/2024 1.65 L      FEV1-%Pred-Pre   Date Value Ref Range Status   09/05/2024 73 %      FEV1FVC-Pred   Date Value Ref Range Status   09/05/2024 81 %      FEV1FVC-Pre   Date Value Ref Range Status   09/05/2024 72 %      No results found for:  "\"20029\"  FEFMax-Pred   Date Value Ref Range Status   09/05/2024 6.14 L/sec      FEFMax-Pre   Date Value Ref Range Status   09/05/2024 4.76 L/sec      FEFMax-%Pred-Pre   Date Value Ref Range Status   09/05/2024 77 %      ExpTime-Pre   Date Value Ref Range Status   09/05/2024 7.33 sec      FIFMax-Pre   Date Value Ref Range Status   09/05/2024 4.74 L/sec      FEV1FEV6-Pred   Date Value Ref Range Status   09/05/2024 81 %      FEV1FEV6-Pre   Date Value Ref Range Status   09/05/2024 71 %      Narrative  Performed by: [facility] PFT  The FVC, FEV1 and FEV1/FVC ratio are reduced, but the SLQ82-11% is within normal limits.  The inspiratory flow rates are within normal limits.  The FVC is reduced relative to the SVC indicating air trapping.  While the TLC, FRC and SVC are within normal  limits, the RV is increased.  Following administration of bronchodilators, there is a significant response indicated by the increased FVC.  The diffusing capacity is normal.    Mild airway obstruction and a response to bronchodilators indicates asthma.    IMPRESSION:    Mild Airflow Obstruction  -Asthmatic Type    positive response to bronchodilator testing.    Air trapping is present.    Normal diffusing capacity for CO.       Narrative  Performed by: [facility] PFT  Six Minute Walk Test    Total Distance Walked: 1200ft / 367m (LLN 1238ft / 377m)    Lowest O2 saturation: 99% on room air      Impression    6MWT demonstrates no evidence for hypoxia or desaturation.    The walk distance is reduced demonstrating reduced exercise capacity.       EITAN Sosa CNP 7/14/2025   Sleep Medicine      This note was written with the assistance of the Dragon voice-dictation technology software. The final document, although reviewed, may contain errors. For corrections, please contact the office.          "

## 2025-07-15 ENCOUNTER — RESULTS FOLLOW-UP (OUTPATIENT)
Dept: OBGYN | Facility: CLINIC | Age: 60
End: 2025-07-15
Payer: COMMERCIAL

## 2025-07-15 DIAGNOSIS — E78.00 PURE HYPERCHOLESTEROLEMIA: ICD-10-CM

## 2025-07-15 DIAGNOSIS — G47.00 INSOMNIA, UNSPECIFIED TYPE: ICD-10-CM

## 2025-07-15 DIAGNOSIS — M79.18 MYOFASCIAL PAIN: ICD-10-CM

## 2025-07-15 RX ORDER — ATORVASTATIN CALCIUM 40 MG/1
40 TABLET, FILM COATED ORAL DAILY
Qty: 90 TABLET | Refills: 0 | Status: SHIPPED | OUTPATIENT
Start: 2025-07-15

## 2025-07-15 RX ORDER — ATORVASTATIN CALCIUM 10 MG/1
10 TABLET, FILM COATED ORAL EVERY EVENING
Status: CANCELLED | OUTPATIENT
Start: 2025-07-15

## 2025-07-15 RX ORDER — SIMVASTATIN 20 MG
20 TABLET ORAL DAILY
Qty: 90 TABLET | Refills: 3 | OUTPATIENT
Start: 2025-07-15

## 2025-07-15 RX ORDER — GABAPENTIN 300 MG/1
CAPSULE ORAL
Qty: 300 CAPSULE | Refills: 0 | Status: SHIPPED | OUTPATIENT
Start: 2025-07-15

## 2025-07-15 RX ORDER — TRAZODONE HYDROCHLORIDE 50 MG/1
50-100 TABLET ORAL AT BEDTIME
Qty: 180 TABLET | Refills: 0 | Status: SHIPPED | OUTPATIENT
Start: 2025-07-15

## 2025-07-15 NOTE — TELEPHONE ENCOUNTER
Reviewed chart.  Note from 7/10/25 preventative exam incomplete.  MyChart sent to patient for more information on request.    Susy Kaye RN  [facility]

## 2025-07-16 NOTE — TELEPHONE ENCOUNTER
Dr. Jimenez - Patient requesting ENT referral for deviated septum. Reports this was discussed at 7/10/25 OV.    Susy Kaye RN  [facility]

## 2025-07-17 ENCOUNTER — PATIENT OUTREACH (OUTPATIENT)
Dept: CARE COORDINATION | Facility: CLINIC | Age: 60
End: 2025-07-17
Payer: COMMERCIAL

## 2025-07-17 NOTE — TELEPHONE ENCOUNTER
FUTURE VISIT INFORMATION:  Appointment Date: 9/11/25  Appointment Time: 9:10 AM   REFERRAL INFORMATION:  Referring By: Earline Jimenez MD   Referring Clinic: [facility]   Reason for Visit/Diagnosis: Deviated nasal septum, ref'd by Earline Jimenez MD, pt made appt, [facility] location      NOTES STATUS DETAILS   OFFICE NOTE from referring provider [facility]   7/14/25 mychart: Earline Jimenez MD    OFFICE NOTE from other specialist [facility]   5/24/19 OV: Juan Carlos Barnes PA-C    IMAGES *pertaining images & report*       CT, MRI, PET, NM, US, XRAYS, etc ... P[facility]  4/2/24 CT cervical

## 2025-07-21 DIAGNOSIS — G43.809 OTHER MIGRAINE WITHOUT STATUS MIGRAINOSUS, NOT INTRACTABLE: ICD-10-CM

## 2025-07-23 RX ORDER — SUMATRIPTAN 50 MG/1
TABLET, FILM COATED ORAL
Qty: 9 TABLET | Refills: 2 | Status: SHIPPED | OUTPATIENT
Start: 2025-07-23

## 2025-07-24 RX ORDER — LEVOTHYROXINE SODIUM 150 UG/1
150 TABLET ORAL DAILY
Qty: 90 TABLET | Refills: 3 | Status: SHIPPED | OUTPATIENT
Start: 2025-07-24

## 2025-08-07 DIAGNOSIS — J45.51 SEVERE PERSISTENT ASTHMA WITH ACUTE EXACERBATION (H): ICD-10-CM

## 2025-08-07 DIAGNOSIS — Z91.09 ENVIRONMENTAL ALLERGIES: ICD-10-CM

## 2025-08-07 DIAGNOSIS — D72.19 OTHER EOSINOPHILIA: ICD-10-CM

## 2025-08-07 DIAGNOSIS — J44.89 COPD WITH ASTHMA (H): ICD-10-CM

## 2025-08-25 ENCOUNTER — TELEPHONE (OUTPATIENT)
Dept: PULMONOLOGY | Facility: CLINIC | Age: 60
End: 2025-08-25
Payer: COMMERCIAL

## 2025-09-11 ENCOUNTER — PRE VISIT (OUTPATIENT)
Dept: OTOLARYNGOLOGY | Facility: CLINIC | Age: 60
End: 2025-09-11

## (undated) DEVICE — GLOVE UNDER INDICATOR PI SZ 6.5 LF 41665

## (undated) DEVICE — BLADE KNIFE SURG 11 371111

## (undated) DEVICE — RX SURGIFLO HEMOSTATIC MATRIX W/THROMBIN 8ML 2994

## (undated) DEVICE — SOL NACL 0.9% IRRIG 1000ML BOTTLE 2F7124

## (undated) DEVICE — ESU PENCIL SMOKE EVAC W/ROCKER SWITCH 0703-047-000

## (undated) DEVICE — TRAY PREP DRY SKIN SCRUB 067

## (undated) DEVICE — DRAPE O ARM TUBE 9732722

## (undated) DEVICE — MARKER SPHERES PASSIVE MEDT PACK 1 8801071

## (undated) DEVICE — DRAPE MAYO STAND 29.5X55.5" 8339

## (undated) DEVICE — MARKER 4 SPHERE PASSIVE NDI 8801074

## (undated) DEVICE — DRAPE C-ARMOR 5 SIDED 5523

## (undated) DEVICE — GLOVE BIOGEL PI ULTRATOUCH G SZ 6.5 42165

## (undated) DEVICE — DRSG PRIMAPORE 03 1/8X6" 66000318

## (undated) DEVICE — GOWN LG DISP 9515

## (undated) DEVICE — SPONGE NEURO 1/2X1/2 WECK 200100

## (undated) DEVICE — TUBING SUCTION MEDI-VAC 1/4"X20' N620A - HE

## (undated) DEVICE — MIDAS REX DISSECTING TOOL  14MH30

## (undated) DEVICE — PLATE GROUNDING ADULT W/CORD 9165L

## (undated) DEVICE — GLOVE UNDER INDICATOR PI SZ 7.0 LF 41670

## (undated) DEVICE — GOWN IMPERVIOUS BREATHABLE SMART XLG 89045

## (undated) DEVICE — ESU ELEC BLADE 6" COATED E1450-6

## (undated) DEVICE — PREP DYNA-HEX 4% CHG SCRUB 4OZ BOTTLE MDS098710

## (undated) DEVICE — DRAPE STERI TOWEL LG 1010

## (undated) DEVICE — CUSTOM PACK LUMBAR FUSION SNE5BLFHEA

## (undated) DEVICE — STPL SKIN 35W 6.9MM  PXW35

## (undated) DEVICE — DRAPE C-ARM 60X42" 1013

## (undated) DEVICE — SUTURE VICRYL+ 2-0 18 CT1/CR VLT VCP839D

## (undated) DEVICE — DRSG PRIMAPORE 04X11 3/4"

## (undated) DEVICE — SUCTION MANIFOLD NEPTUNE 2 SYS 4 PORT 0702-020-000

## (undated) DEVICE — SUTURE PDS 2-0 27 VIO CT-2 + VLT PDP333

## (undated) DEVICE — TAPE ADH MEDIPORE 6IN SOFT CLOTH 2866

## (undated) DEVICE — SUCTION MANIFOLD NEPTUNE 2 SYS 1 PORT 702-025-000

## (undated) DEVICE — ESU ELEC BLADE 2.75" COATED/INSULATED E1455

## (undated) DEVICE — DRAIN FLAT 10MM FULL PERF SIL 0070440

## (undated) DEVICE — DRAIN RESERVOIR 100ML JP 0070740

## (undated) DEVICE — KIT PATIENT CARE JACKSON SPINE PACK 5808PV

## (undated) DEVICE — SOL WATER IRRIG 1000ML BOTTLE 2F7114

## (undated) DEVICE — Device

## (undated) DEVICE — SU MONOCRYL+ 4-0 18IN PS2 UND MCP496G

## (undated) DEVICE — TOOL DISSECT MIDAS MR8 14CM MATCH HEAD 3MM MR8-14MH30

## (undated) DEVICE — GOWN IMPERVIOUS BREATHABLE SMART LG 89015

## (undated) DEVICE — SOL ISOPROPYL RUBBING ALCOHOL USP 70% 4OZ HDX-20 I0020

## (undated) DEVICE — SYR BULB IRRIG DOVER 60 ML LATEX FREE 67000

## (undated) DEVICE — SPONGE KITNER DISSECTING 7102*

## (undated) DEVICE — CATH TRAY FOLEY SURESTEP 16FR DRAIN BAG STATOCK A899916

## (undated) DEVICE — SU VICRYL+ 0 8-18 CT1/CR UND VCP840D

## (undated) DEVICE — DRSG DRAIN 4X4" 7086

## (undated) DEVICE — SU DERMABOND ADVANCED .7ML DNX12

## (undated) DEVICE — SU ETHILON 2-0 FS 18" 664G

## (undated) DEVICE — SU SILK 2-0 FS-1 18" 685G

## (undated) DEVICE — PITCHER STERILE 1000ML  SSK9004A

## (undated) DEVICE — MARKER SURG SKIN 2 TIP STRL SPP99DT2AA

## (undated) DEVICE — BLADE BONE MILL STRK 3.2MM FINE 5400-702-000

## (undated) RX ORDER — METHYLPREDNISOLONE ACETATE 40 MG/ML
INJECTION, SUSPENSION INTRA-ARTICULAR; INTRALESIONAL; INTRAMUSCULAR; SOFT TISSUE
Status: DISPENSED
Start: 2025-05-21

## (undated) RX ORDER — FENTANYL CITRATE 50 UG/ML
INJECTION, SOLUTION INTRAMUSCULAR; INTRAVENOUS
Status: DISPENSED
Start: 2022-04-18

## (undated) RX ORDER — PROPOFOL 10 MG/ML
INJECTION, EMULSION INTRAVENOUS
Status: DISPENSED
Start: 2024-06-12

## (undated) RX ORDER — FENTANYL CITRATE 50 UG/ML
INJECTION, SOLUTION INTRAMUSCULAR; INTRAVENOUS
Status: DISPENSED
Start: 2024-06-12

## (undated) RX ORDER — FENTANYL CITRATE-0.9 % NACL/PF 10 MCG/ML
PLASTIC BAG, INJECTION (ML) INTRAVENOUS
Status: DISPENSED
Start: 2022-04-18

## (undated) RX ORDER — PROPOFOL 10 MG/ML
INJECTION, EMULSION INTRAVENOUS
Status: DISPENSED
Start: 2022-04-18

## (undated) RX ORDER — FENTANYL CITRATE-0.9 % NACL/PF 10 MCG/ML
PLASTIC BAG, INJECTION (ML) INTRAVENOUS
Status: DISPENSED
Start: 2024-06-12

## (undated) RX ORDER — PHENYLEPHRINE HYDROCHLORIDE 10 MG/ML
INJECTION INTRAVENOUS
Status: DISPENSED
Start: 2024-06-12

## (undated) RX ORDER — DEXAMETHASONE SODIUM PHOSPHATE 10 MG/ML
INJECTION INTRAMUSCULAR; INTRAVENOUS
Status: DISPENSED
Start: 2022-04-18

## (undated) RX ORDER — BUPIVACAINE HYDROCHLORIDE 2.5 MG/ML
INJECTION, SOLUTION EPIDURAL; INFILTRATION; INTRACAUDAL; PERINEURAL
Status: DISPENSED
Start: 2025-05-21

## (undated) RX ORDER — LIDOCAINE HYDROCHLORIDE AND EPINEPHRINE 10; 10 MG/ML; UG/ML
INJECTION, SOLUTION INFILTRATION; PERINEURAL
Status: DISPENSED
Start: 2024-06-12

## (undated) RX ORDER — GINSENG 100 MG
CAPSULE ORAL
Status: DISPENSED
Start: 2024-06-12

## (undated) RX ORDER — ONDANSETRON 2 MG/ML
INJECTION INTRAMUSCULAR; INTRAVENOUS
Status: DISPENSED
Start: 2022-04-18

## (undated) RX ORDER — BACITRACIN ZINC 500 [USP'U]/G
OINTMENT TOPICAL
Status: DISPENSED
Start: 2022-04-18